# Patient Record
Sex: FEMALE | Race: WHITE | NOT HISPANIC OR LATINO | Employment: FULL TIME | URBAN - METROPOLITAN AREA
[De-identification: names, ages, dates, MRNs, and addresses within clinical notes are randomized per-mention and may not be internally consistent; named-entity substitution may affect disease eponyms.]

---

## 2017-01-06 ENCOUNTER — OFFICE VISIT (OUTPATIENT)
Dept: PEDIATRICS | Facility: CLINIC | Age: 62
End: 2017-01-06
Payer: COMMERCIAL

## 2017-01-06 VITALS
WEIGHT: 236 LBS | TEMPERATURE: 98.2 F | OXYGEN SATURATION: 98 % | HEART RATE: 74 BPM | SYSTOLIC BLOOD PRESSURE: 138 MMHG | DIASTOLIC BLOOD PRESSURE: 70 MMHG | BODY MASS INDEX: 36.95 KG/M2 | RESPIRATION RATE: 16 BRPM

## 2017-01-06 DIAGNOSIS — Z12.11 SCREENING FOR COLON CANCER: ICD-10-CM

## 2017-01-06 DIAGNOSIS — E89.0 POSTABLATIVE HYPOTHYROIDISM: ICD-10-CM

## 2017-01-06 DIAGNOSIS — R04.0 EPISTAXIS: ICD-10-CM

## 2017-01-06 DIAGNOSIS — D12.6 TUBULAR ADENOMA OF COLON: ICD-10-CM

## 2017-01-06 DIAGNOSIS — J45.30 MILD PERSISTENT ASTHMA WITHOUT COMPLICATION: Primary | ICD-10-CM

## 2017-01-06 DIAGNOSIS — Z80.0 FAMILY HISTORY OF COLON CANCER: ICD-10-CM

## 2017-01-06 LAB
T4 FREE SERPL-MCNC: 1.28 NG/DL (ref 0.76–1.46)
TSH SERPL DL<=0.05 MIU/L-ACNC: 0.43 MU/L (ref 0.4–4)

## 2017-01-06 PROCEDURE — 99214 OFFICE O/P EST MOD 30 MIN: CPT | Performed by: FAMILY MEDICINE

## 2017-01-06 PROCEDURE — 84439 ASSAY OF FREE THYROXINE: CPT | Performed by: FAMILY MEDICINE

## 2017-01-06 PROCEDURE — 84443 ASSAY THYROID STIM HORMONE: CPT | Performed by: FAMILY MEDICINE

## 2017-01-06 PROCEDURE — 36415 COLL VENOUS BLD VENIPUNCTURE: CPT | Performed by: FAMILY MEDICINE

## 2017-01-06 RX ORDER — LEVOTHYROXINE SODIUM 125 UG/1
TABLET ORAL
Qty: 45 TABLET | Refills: 3 | Status: SHIPPED | OUTPATIENT
Start: 2017-01-06 | End: 2017-08-15 | Stop reason: DRUGHIGH

## 2017-01-06 RX ORDER — ALBUTEROL SULFATE 90 UG/1
2 AEROSOL, METERED RESPIRATORY (INHALATION) EVERY 6 HOURS PRN
Qty: 1 INHALER | Refills: 3 | Status: SHIPPED | OUTPATIENT
Start: 2017-01-06 | End: 2017-08-15

## 2017-01-06 RX ORDER — MONTELUKAST SODIUM 10 MG/1
10 TABLET ORAL AT BEDTIME
Qty: 90 TABLET | Refills: 3 | Status: SHIPPED | OUTPATIENT
Start: 2017-01-06 | End: 2017-08-15

## 2017-01-06 RX ORDER — LEVOTHYROXINE SODIUM 112 UG/1
TABLET ORAL
Qty: 45 TABLET | Refills: 3 | Status: SHIPPED | OUTPATIENT
Start: 2017-01-06 | End: 2017-08-15

## 2017-01-06 ASSESSMENT — PAIN SCALES - GENERAL: PAINLEVEL: NO PAIN (0)

## 2017-01-06 NOTE — NURSING NOTE
"Chief Complaint   Patient presents with     Recheck Medication     Fasting for labs today-add orders if more labs needed       Initial /70 mmHg  Pulse 74  Temp(Src) 98.2  F (36.8  C) (Oral)  Resp 16  Wt 236 lb (107.049 kg)  SpO2 98% Estimated body mass index is 36.95 kg/(m^2) as calculated from the following:    Height as of 12/27/16: 5' 7\" (1.702 m).    Weight as of this encounter: 236 lb (107.049 kg).  BP completed using cuff size: gokul Davis, JEANNETTE      "

## 2017-01-06 NOTE — PROGRESS NOTES
SUBJECTIVE:                                                    Denita Flores is a 61 year old female who presents to clinic today for the following health issues:      Asthma Follow-Up    Was ACT completed today?    Yes    ACT Total Scores 1/6/2017   ACT TOTAL SCORE -   ASTHMA ER VISITS -   ASTHMA HOSPITALIZATIONS -   ACT TOTAL SCORE (Goal Greater than or Equal to 20) 24   In the past 12 months, how many times did you visit the emergency room for your asthma without being admitted to the hospital? 0   In the past 12 months, how many times were you hospitalized overnight because of your asthma? 0       Hypothyroidism Follow-up      Since last visit, patient describes the following symptoms: Weight stable, no hair loss, no skin changes, no constipation, no loose stools       Amount of exercise or physical activity: None    Problems taking medications regularly: No    Medication side effects: none    Diet: regular (no restrictions)    EPISTAXIS-Noticed intermittent bleeding from both nostrils, mild, for the past 1 week with no previous similar concerns, no other abnormal bleeding    Problem list and histories reviewed & adjusted, as indicated.  Additional history: as documented    Patient Active Problem List   Diagnosis     B12 deficiency anemia     Graves disease     Obesity     Diverticulosis     Colon polyps     History of cervical cancer     YELENA (obstructive sleep apnea)     Warts     CARDIOVASCULAR SCREENING; LDL GOAL LESS THAN 130     Hx of herpes zoster keratoconjunctivitis, os     Plantar warts     Stasis dermatitis of both legs     Obesity (BMI 30-39.9)     Postablative hypothyroidism     Other specified hypothyroidism,post ablative     Venous stasis dermatitis of left lower extremity     Vitamin B12 deficiency (non anemic)     Mild persistent asthma without complication     Spider veins of both lower extremities     Seasonal allergic rhinitis     Family history of colon cancer     Past Surgical History    Procedure Laterality Date     Back surgery       Leep tx, cervical       in her 20's       Social History   Substance Use Topics     Smoking status: Former Smoker     Types: Cigarettes     Quit date: 01/01/2003     Smokeless tobacco: Never Used     Alcohol Use: 0.0 oz/week     0 Standard drinks or equivalent per week     Family History   Problem Relation Age of Onset     HEART DISEASE Father      CANCER Father 67     kidney cancer      HEART DISEASE Sister      CANCER Sister 59     colon cancer      DIABETES Sister      Colon Cancer Maternal Aunt      Colon Cancer Paternal Aunt          Current Outpatient Prescriptions   Medication Sig Dispense Refill     albuterol (PROAIR HFA/PROVENTIL HFA/VENTOLIN HFA) 108 (90 BASE) MCG/ACT Inhaler Inhale 2 puffs into the lungs every 6 hours as needed for shortness of breath / dyspnea 1 Inhaler 3     montelukast (SINGULAIR) 10 MG tablet Take 1 tablet (10 mg) by mouth At Bedtime 90 tablet 3     levothyroxine (SYNTHROID/LEVOTHROID) 125 MCG tablet Take one tablet of 125 mcg alternating with 112 mcg every other day 45 tablet 3     levothyroxine (SYNTHROID/LEVOTHROID) 112 MCG tablet Take one tablet alternating with 125mcg 45 tablet 3     triamcinolone (KENALOG) 0.1 % cream For eczema on lower leg  use twice daily for 2 weeks, then 3 times weekly for 2 weeks, repeat cycle as needed. 45 g 0     MEGARED OMEGA-3 KRILL  MG CAPS Take 1 capsule by mouth daily        cyanocolbalamin (VITAMIN  B-12) 100 MCG tablet Take 100 mcg by mouth daily.       Cholecalciferol (D3 ADULT PO) Take 2,000 Units by mouth daily.       cefUROXime (CEFTIN) 250 MG tablet Take 1 tablet (250 mg) by mouth 2 times daily 20 tablet 0     [DISCONTINUED] albuterol (PROAIR HFA, PROVENTIL HFA, VENTOLIN HFA) 108 (90 BASE) MCG/ACT inhaler Inhale 2 puffs into the lungs every 6 hours as needed for shortness of breath / dyspnea 1 Inhaler 3     [DISCONTINUED] montelukast (SINGULAIR) 10 MG tablet Take 1 tablet (10 mg) by  mouth At Bedtime 90 tablet 3     [DISCONTINUED] levothyroxine (SYNTHROID, LEVOTHROID) 125 MCG tablet Take one tablet of 125 mcg alternating with 112 mcg every other day 45 tablet 3     [DISCONTINUED] levothyroxine (SYNTHROID, LEVOTHROID) 112 MCG tablet Take one tablet alternating with 125mcg 45 tablet 3     Allergies   Allergen Reactions     Sulfa Drugs Hives and Swelling     Noted in 10/18/09 ER     Recent Labs   Lab Test  01/06/17   1138  07/06/16   0902   06/22/15   1012   05/08/13   1140   04/16/10   0943   LDL   --   115*   --   96   --   112   < >   --    HDL   --   53   --   47*   --   50   < >   --    TRIG   --   107   --   106   --   125   < >   --    ALT   --    --    --    --    --   24   --   10   CR   --    --    --    --    --   0.72   --   0.72   GFRESTIMATED   --    --    --    --    --   83   --   84   GFRESTBLACK   --    --    --    --    --   >90   --   >90   POTASSIUM   --    --    --    --    --   4.2   --   3.6   TSH  0.43  0.40   < >  0.21*   < >  1.58   --    --     < > = values in this interval not displayed.      BP Readings from Last 3 Encounters:   01/06/17 138/70   12/27/16 129/75   10/20/16 115/69    Wt Readings from Last 3 Encounters:   01/06/17 236 lb (107.049 kg)   12/27/16 235 lb 12.8 oz (106.958 kg)   10/20/16 234 lb 1.6 oz (106.187 kg)                  Labs reviewed in EPIC  Problem list, Medication list, Allergies, and Medical/Social/Surgical histories reviewed in [facility] and updated as appropriate.    ROS:  C: NEGATIVE for fever, chills, change in weight  INTEGUMENTARY/SKIN: NEGATIVE for worrisome rashes, moles or lesions  ENT/MOUTH: as above  R: NEGATIVE for significant cough or SOB  RESP:NEGATIVE for significant cough or SOB and Hx asthma  CV: NEGATIVE for chest pain, palpitations or peripheral edema  GI: NEGATIVE for nausea, abdominal pain, heartburn, or change in bowel habits  MUSCULOSKELETAL: NEGATIVE for significant arthralgias or myalgia  NEURO: NEGATIVE for weakness,  "dizziness or paresthesias  ENDOCRINE: NEGATIVE for temperature intolerance, skin/hair changes and Hx thyroid disease  HEME/ALLERGY/IMMUNE: NEGATIVE for bleeding problems  PSYCHIATRIC: NEGATIVE for changes in mood or affect    OBJECTIVE:                                                    /70 mmHg  Pulse 74  Temp(Src) 98.2  F (36.8  C) (Oral)  Resp 16  Wt 236 lb (107.049 kg)  SpO2 98%  Body mass index is 36.95 kg/(m^2).  GENERAL: healthy, alert and no distress  HENT: nose and mouth without ulcers or lesions, oropharynx clear and oral mucous membranes moist  NECK: no adenopathy, no asymmetry, masses, or scars and thyroid normal to palpation  RESP: lungs clear to auscultation - no rales, rhonchi or wheezes  CV: regular rate and rhythm, normal S1 S2, no S3 or S4, no murmur, click or rub, no peripheral edema and peripheral pulses strong  MS: no gross musculoskeletal defects noted, no edema  SKIN: no suspicious lesions or rashes  NEURO: Normal strength and tone, mentation intact and speech normal  PSYCH: mentation appears normal, affect normal/bright    Diagnostic Test Results:  Results for orders placed or performed in visit on 01/06/17 (from the past 24 hour(s))   TSH   Result Value Ref Range    TSH 0.43 0.40 - 4.00 mU/L   T4 FREE   Result Value Ref Range    T4 Free 1.28 0.76 - 1.46 ng/dL        ASSESSMENT/PLAN:                                                    Asthma: Mild persistent--controlled   Plan:  No changes in the patient's current treatment plan    Hypothyroidism; controlled/euthyroid   Plan:  No changes in the patient's current treatment plan      BMI:   Estimated body mass index is 36.95 kg/(m^2) as calculated from the following:    Height as of 12/27/16: 5' 7\" (1.702 m).    Weight as of this encounter: 236 lb (107.049 kg).   Weight management plan: Discussed healthy diet and exercise guidelines and patient will follow up in 6 months in clinic to re-evaluate.      1. Mild persistent asthma without " complication  Stable, continue Singulair 10 mg daily, recheck in 6 months at the time of physical  - albuterol (PROAIR HFA/PROVENTIL HFA/VENTOLIN HFA) 108 (90 BASE) MCG/ACT Inhaler; Inhale 2 puffs into the lungs every 6 hours as needed for shortness of breath / dyspnea  Dispense: 1 Inhaler; Refill: 3  - montelukast (SINGULAIR) 10 MG tablet; Take 1 tablet (10 mg) by mouth At Bedtime  Dispense: 90 tablet; Refill: 3    2. Epistaxis  Reviewed etiology of nosebleeds, given education information on the same. Recommended to use saline nasal sprays 1-2 times a day, use petrolatum jelly topically inside the nostrils once daily for the next 1-2 weeks, use room humidifier, follow up if no better in 2 weeks or sooner if needed. Consider ENT referral  for persistent or worsening concerns  Patient verbalised understanding and is agreeable to the plan.      3. Postablative hypothyroidism  Results for orders placed or performed in visit on 01/06/17   TSH   Result Value Ref Range    TSH 0.43 0.40 - 4.00 mU/L   T4 FREE   Result Value Ref Range    T4 Free 1.28 0.76 - 1.46 ng/dL     Reviewed lab results-controlled thyroid labs, will continue with current dosing regimen which consists of the time of physical  - levothyroxine (SYNTHROID/LEVOTHROID) 125 MCG tablet; Take one tablet of 125 mcg alternating with 112 mcg every other day  Dispense: 45 tablet; Refill: 3  - levothyroxine (SYNTHROID/LEVOTHROID) 112 MCG tablet; Take one tablet alternating with 125mcg  Dispense: 45 tablet; Refill: 3    4. Screening for colon cancer    - GASTROENTEROLOGY ADULT REFERRAL +/- PROCEDURE    5. Tubular adenoma of colon    - GASTROENTEROLOGY ADULT REFERRAL +/- PROCEDURE    6. Family history of colon cancer  Sister, maternal and paternal aunts  Family history updated      Work on weight loss  Regular exercise  Chart documentation done in part with Dragon Voice recognition Software. Although reviewed after completion, some word and grammatical error may  remain.    See Patient Instructions    Gee Hitchcock MD  [facility]

## 2017-01-06 NOTE — PATIENT INSTRUCTIONS
Nosebleed (Adult)    Bleeding from the nose most commonly occurs due to injury or drying and cracking of the inner lining of the nose. Most nosebleeds are due to dry air or nose-picking. They can occur during a common cold or an allergy attack. They can also occur on a very hot day, or from dry air in the winter.  If the bleeding site is found, it may be cauterized (treated to cause a blood clot to form). This may be done with a chemical, heat, or electricity. If the bleeding continues after the site is cauterized, or if the site cannot be found, packing may be placed in your nose. This is to apply pressure and stop the bleeding. The packing may be made of gauze or sponge. A small balloon catheter is sometimes used. These must be removed by your doctor. Some types of packing dissolve on their own.  Home care    If packing was put in your nose, unless told otherwise, do not pull on it or try to remove it yourself. You will be given an appointment to have it removed. You may also have been given antibiotics to prevent a sinus infection. If so, finish all of the medicine.    Do not blow your nose for 12 hours after the bleeding stops. This will allow a strong blood clot to form. Do not pick your nose. This may restart bleeding.    Avoid drinking alcohol and hot liquids for the next two days. Alcohol or hot liquids in your mouth can dilate blood vessels in your nose. This can cause bleeding to start again.    Do not take ibuprofen, naproxen, or aspirin-containing medicines. These thin the blood and may promote nose bleeding. You may take acetaminophen for pain, unless another pain medicine was prescribed.    If the bleeding starts again, sit up and lean forward to prevent swallowing blood. Pinch your nose tightly on both sides, as depicted above, for 10 to 15 minutes.  Time yourself, and don t release the pressure on your nose until 10 minutes is up. If bleeding is not controlled, continue to pinch your nose and call  your health care provider or return to this facility.    If you have a cold, allergies, or dry nasal membranes, lubricate the nasal passages. Apply a small amount of petroleum jelly inside the nose with a cotton swab twice a day (morning and night).    Avoid overheating your home, which can dry the air and worsen your condition.    A humidifier in the room where you sleep will add moisture to the air.    Use a saline nasal spray to keep nasal passages moist.    Do not pick your nose. Keep fingernails trimmed to decrease risk of bleeds.  Follow-up care  Follow up with your health care provider, or as advised. Nasal packing should be rechecked or removed within 2 to 3 days.  When to seek medical advice  Call your health care provider right away if any of these occur.    Another nosebleed that you cannot control    Dizziness, weakness or fainting    You become tired or confused    Fever of 100.4 F (38 C) or higher, or as directed by your health care provider    Headache    Sinus or facial pain    Shortness of breath or trouble breathing    8552-3422 [employer]. [address] [place] [address]. All rights reserved. This information is not intended as a substitute for professional medical care. Always follow your healthcare professional's instructions.        Nosebleed  The skin inside your nose is fragile and filled with blood vessels. That's why even a slight injury to your nose sometimes may cause bleeding. Hard nose blowing, dry winter air, colds, and nose picking can also cause nosebleeds. Some medications, such as warfarin (Coumadin), aspirin, and other blood thinners, can predispose you to a nose bleed that is difficult to stop. Normally, nosebleeds are not a cause for concern. But in some cases, they can signal a more serious medical problem. Know when to seek medical care for a nosebleed.  When to go to the emergency room (ER)  Most nosebleeds aren t a medical emergency. In fact, you  often can treat them yourself. However, see your health care provider if you have frequent nosebleeds. And seek care right away if you:    Have a head injury    Have bleeding that lasts more than 15 to 30 minutes or is severe    Feel weak or faint    Have trouble breathing  What to expect in the ER    You will be examined and may have blood tests.    You may be given medicated nose drops to stop the nosebleed.    Gauze may be packed into your nose to put pressure on the vessel and help stop bleeding.    The bleeding vessel may be cauterized.    In rare cases, you may need surgery to control the bleeding.    During this procedure, the vessel is burned with an electrical device or chemical. Your nose is first numbed so you won t feel any pain.  To help stop a nosebleed:    Sit or stand up and lean your head forward (not back)    Gently pinch the soft part of your nose for 10 minutes constantly, without rechecking. Constant pressure is helpful. If your nose still bleeds, try pinching for 10 minutes more.     5285-5927 [employer]. [address] [place] [address], [place] [address]. All rights reserved. This information is not intended as a substitute for professional medical care. Always follow your healthcare professional's instructions.      Schedule for colonoscopy  Schedule for fasting labs and physical in 6 months

## 2017-01-06 NOTE — MR AVS SNAPSHOT
After Visit Summary   1/6/2017    Denita Flores    MRN: [id number]           Patient Information     Date Of Birth          1955        Visit Information        Provider Department      1/6/2017 12:00 PM Gee Hitchcock MD [facility]        Today's Diagnoses     Mild persistent asthma without complication    -  1     Epistaxis         Postablative hypothyroidism         Screening for colon cancer         Tubular adenoma of colon         Family history of colon cancer           Care Instructions      Nosebleed (Adult)    Bleeding from the nose most commonly occurs due to injury or drying and cracking of the inner lining of the nose. Most nosebleeds are due to dry air or nose-picking. They can occur during a common cold or an allergy attack. They can also occur on a very hot day, or from dry air in the winter.  If the bleeding site is found, it may be cauterized (treated to cause a blood clot to form). This may be done with a chemical, heat, or electricity. If the bleeding continues after the site is cauterized, or if the site cannot be found, packing may be placed in your nose. This is to apply pressure and stop the bleeding. The packing may be made of gauze or sponge. A small balloon catheter is sometimes used. These must be removed by your doctor. Some types of packing dissolve on their own.  Home care    If packing was put in your nose, unless told otherwise, do not pull on it or try to remove it yourself. You will be given an appointment to have it removed. You may also have been given antibiotics to prevent a sinus infection. If so, finish all of the medicine.    Do not blow your nose for 12 hours after the bleeding stops. This will allow a strong blood clot to form. Do not pick your nose. This may restart bleeding.    Avoid drinking alcohol and hot liquids for the next two days. Alcohol or hot liquids in your mouth can dilate blood vessels in your nose. This can cause  bleeding to start again.    Do not take ibuprofen, naproxen, or aspirin-containing medicines. These thin the blood and may promote nose bleeding. You may take acetaminophen for pain, unless another pain medicine was prescribed.    If the bleeding starts again, sit up and lean forward to prevent swallowing blood. Pinch your nose tightly on both sides, as depicted above, for 10 to 15 minutes.  Time yourself, and don t release the pressure on your nose until 10 minutes is up. If bleeding is not controlled, continue to pinch your nose and call your health care provider or return to this facility.    If you have a cold, allergies, or dry nasal membranes, lubricate the nasal passages. Apply a small amount of petroleum jelly inside the nose with a cotton swab twice a day (morning and night).    Avoid overheating your home, which can dry the air and worsen your condition.    A humidifier in the room where you sleep will add moisture to the air.    Use a saline nasal spray to keep nasal passages moist.    Do not pick your nose. Keep fingernails trimmed to decrease risk of bleeds.  Follow-up care  Follow up with your health care provider, or as advised. Nasal packing should be rechecked or removed within 2 to 3 days.  When to seek medical advice  Call your health care provider right away if any of these occur.    Another nosebleed that you cannot control    Dizziness, weakness or fainting    You become tired or confused    Fever of 100.4 F (38 C) or higher, or as directed by your health care provider    Headache    Sinus or facial pain    Shortness of breath or trouble breathing    2207-2871 [employer]. [address] [place] [address], [place] [address]. All rights reserved. This information is not intended as a substitute for professional medical care. Always follow your healthcare professional's instructions.        Nosebleed  The skin inside your nose is fragile and filled with blood vessels. That's why even a slight  injury to your nose sometimes may cause bleeding. Hard nose blowing, dry winter air, colds, and nose picking can also cause nosebleeds. Some medications, such as warfarin (Coumadin), aspirin, and other blood thinners, can predispose you to a nose bleed that is difficult to stop. Normally, nosebleeds are not a cause for concern. But in some cases, they can signal a more serious medical problem. Know when to seek medical care for a nosebleed.  When to go to the emergency room (ER)  Most nosebleeds aren t a medical emergency. In fact, you often can treat them yourself. However, see your health care provider if you have frequent nosebleeds. And seek care right away if you:    Have a head injury    Have bleeding that lasts more than 15 to 30 minutes or is severe    Feel weak or faint    Have trouble breathing  What to expect in the ER    You will be examined and may have blood tests.    You may be given medicated nose drops to stop the nosebleed.    Gauze may be packed into your nose to put pressure on the vessel and help stop bleeding.    The bleeding vessel may be cauterized.    In rare cases, you may need surgery to control the bleeding.    During this procedure, the vessel is burned with an electrical device or chemical. Your nose is first numbed so you won t feel any pain.  To help stop a nosebleed:    Sit or stand up and lean your head forward (not back)    Gently pinch the soft part of your nose for 10 minutes constantly, without rechecking. Constant pressure is helpful. If your nose still bleeds, try pinching for 10 minutes more.     4315-8895 [employer]. [address] [place] [address], [place] [address]. All rights reserved. This information is not intended as a substitute for professional medical care. Always follow your healthcare professional's instructions.      Schedule for colonoscopy  Schedule for fasting labs and physical in 6 months        Follow-ups after your visit        Additional Services      GASTROENTEROLOGY ADULT REFERRAL +/- PROCEDURE       Your provider has referred you to Gastroenterology Services.    English    Procedure/Referral: PROCEDURE ONLY - COLONOSCOPY - Reason for procedure: Screening    -[facility]  Please be aware that coverage of these services is subject to the terms and limitations of your health insurance plan.  Call member services at your health plan with any benefit or coverage questions.  Any procedures must be performed at a [place] facility OR coordinated by your clinic's referral office.    Please bring the following with you to your appointment:    (1) Any X-Rays, CTs or MRIs which have been performed.  Contact the facility where they were done to arrange for  prior to your scheduled appointment.    (2) List of current medications   (3) This referral request   (4) Any documents/labs given to you for this referral                  Who to contact     If you have questions or need follow up information about today's clinic visit or your schedule please contact [facility] directly at [phone].  Normal or non-critical lab and imaging results will be communicated to you by [email or web address], letter or phone within 4 business days after the clinic has received the results. If you do not hear from us within 7 days, please contact the clinic through [email or web address] or phone. If you have a critical or abnormal lab result, we will notify you by phone as soon as possible.  Submit refill requests through [employer] or call your pharmacy and they will forward the refill request to us. Please allow 3 business days for your refill to be completed.          Additional Information About Your Visit        [employer] Information     [employer] is an electronic gateway that provides easy, online access to your medical records. With [employer], you can request a clinic appointment, read your test results, renew a prescription or communicate with your care team.     To sign up for [employer] visit the  website at [email or web address]   You will be asked to enter the access code listed below, as well as some personal information. Please follow the directions to create your username and password.     Your access code is: [email or web address]  Expires: 2017 10:17 AM     Your access code will  in 90 days. If you need help or a new code, please contact your [facility] or call [phone] for assistance.        Care EveryWhere ID     This is your Care EveryWhere ID. This could be used by other organizations to access your [place] medical records  [id number]        Your Vitals Were     Pulse Temperature Respirations Pulse Oximetry          74 98.2  F (36.8  C) (Oral) 16 98%         Blood Pressure from Last 3 Encounters:   17 138/70   16 129/75   10/20/16 115/69    Weight from Last 3 Encounters:   17 236 lb (107.049 kg)   16 235 lb 12.8 oz (106.958 kg)   10/20/16 234 lb 1.6 oz (106.187 kg)              We Performed the Following     GASTROENTEROLOGY ADULT REFERRAL +/- PROCEDURE          Where to get your medicines      These medications were sent to [id number] - [facility] [address] AT [facility] & [address] [facility] [address]     Phone:  [phone]    - albuterol 108 (90 BASE) MCG/ACT Inhaler  - levothyroxine 112 MCG tablet  - levothyroxine 125 MCG tablet  - montelukast 10 MG tablet       Primary Care Provider Office Phone # Fax #    Gee Hitchcock [phone]       [facility] [address]  [facility] [address]        Thank you!     Thank you for choosing [facility]  for your care. Our goal is always to provide you with excellent care. Hearing back from our patients is one way we can continue to improve our services. Please take a few minutes to complete the written survey that you may receive in the mail after your visit with us. Thank  you!             Your Updated Medication List - Protect others around you: Learn how to safely use, store and throw away your medicines at www.disposemymeds.org.          This list is accurate as of: 1/6/17 12:32 PM.  Always use your most recent med list.                   Brand Name Dispense Instructions for use    albuterol 108 (90 BASE) MCG/ACT Inhaler    PROAIR HFA/PROVENTIL HFA/VENTOLIN HFA    1 Inhaler    Inhale 2 puffs into the lungs every 6 hours as needed for shortness of breath / dyspnea       cefUROXime 250 MG tablet    CEFTIN    20 tablet    Take 1 tablet (250 mg) by mouth 2 times daily       cyanocolbalamin 100 MCG tablet    vitamin  B-12     Take 100 mcg by mouth daily.       D3 ADULT PO      Take 2,000 Units by mouth daily.       * levothyroxine 125 MCG tablet    SYNTHROID/LEVOTHROID    45 tablet    Take one tablet of 125 mcg alternating with 112 mcg every other day       * levothyroxine 112 MCG tablet    SYNTHROID/LEVOTHROID    45 tablet    Take one tablet alternating with 125mcg       MEGARED OMEGA-3 KRILL  MG Caps      Take 1 capsule by mouth daily       montelukast 10 MG tablet    SINGULAIR    90 tablet    Take 1 tablet (10 mg) by mouth At Bedtime       triamcinolone 0.1 % cream    KENALOG    45 g    For eczema on lower leg  use twice daily for 2 weeks, then 3 times weekly for 2 weeks, repeat cycle as needed.       * Notice:  This list has 2 medication(s) that are the same as other medications prescribed for you. Read the directions carefully, and ask your doctor or other care provider to review them with you.

## 2017-01-07 ASSESSMENT — ASTHMA QUESTIONNAIRES: ACT_TOTALSCORE: 24

## 2017-02-21 ENCOUNTER — SURGERY (OUTPATIENT)
Age: 62
End: 2017-02-21

## 2017-02-21 ENCOUNTER — HOSPITAL ENCOUNTER (OUTPATIENT)
Facility: AMBULATORY SURGERY CENTER | Age: 62
Discharge: HOME | End: 2017-02-21
Attending: INTERNAL MEDICINE | Admitting: INTERNAL MEDICINE
Payer: COMMERCIAL

## 2017-02-21 VITALS
OXYGEN SATURATION: 96 % | RESPIRATION RATE: 16 BRPM | TEMPERATURE: 98.9 F | DIASTOLIC BLOOD PRESSURE: 62 MMHG | SYSTOLIC BLOOD PRESSURE: 144 MMHG

## 2017-02-21 LAB — COLONOSCOPY: NORMAL

## 2017-02-21 PROCEDURE — 45378 DIAGNOSTIC COLONOSCOPY: CPT

## 2017-02-21 PROCEDURE — G8907 PT DOC NO EVENTS ON DISCHARG: HCPCS

## 2017-02-21 PROCEDURE — G8918 PT W/O PREOP ORDER IV AB PRO: HCPCS

## 2017-02-21 RX ORDER — LIDOCAINE 40 MG/G
CREAM TOPICAL
Status: DISCONTINUED | OUTPATIENT
Start: 2017-02-21 | End: 2017-02-22 | Stop reason: HOSPADM

## 2017-02-21 RX ORDER — ONDANSETRON 2 MG/ML
4 INJECTION INTRAMUSCULAR; INTRAVENOUS
Status: DISCONTINUED | OUTPATIENT
Start: 2017-02-21 | End: 2017-02-22 | Stop reason: HOSPADM

## 2017-02-21 RX ORDER — FENTANYL CITRATE 50 UG/ML
INJECTION, SOLUTION INTRAMUSCULAR; INTRAVENOUS PRN
Status: DISCONTINUED | OUTPATIENT
Start: 2017-02-21 | End: 2017-02-21 | Stop reason: HOSPADM

## 2017-02-21 RX ADMIN — FENTANYL CITRATE 100 MCG: 50 INJECTION, SOLUTION INTRAMUSCULAR; INTRAVENOUS at 09:36

## 2017-08-02 ENCOUNTER — TELEPHONE (OUTPATIENT)
Dept: PEDIATRICS | Facility: CLINIC | Age: 62
End: 2017-08-02

## 2017-08-02 DIAGNOSIS — Z13.6 CARDIOVASCULAR SCREENING; LDL GOAL LESS THAN 130: ICD-10-CM

## 2017-08-02 DIAGNOSIS — Z00.00 ROUTINE GENERAL MEDICAL EXAMINATION AT A HEALTH CARE FACILITY: Primary | ICD-10-CM

## 2017-08-02 DIAGNOSIS — E89.0 POSTABLATIVE HYPOTHYROIDISM: ICD-10-CM

## 2017-08-02 DIAGNOSIS — Z13.1 SCREENING FOR DIABETES MELLITUS (DM): ICD-10-CM

## 2017-08-02 DIAGNOSIS — Z13.0 SCREENING FOR DEFICIENCY ANEMIA: ICD-10-CM

## 2017-08-02 NOTE — TELEPHONE ENCOUNTER
Patient advised she is due now for a physical and recheck labs.  Last physical was on 07/06/17.  Patient scheduled an appt on 08/29/17 because that is the only day she can do a morning appt due to her schedule.  Which would give 4 physicals that day so I will call patient and reschedule but before I call her back would you do fasting labs on Denita?  If not she would be able to reschedule to an afternoon appt. Which is what she prefers.    Juli Villalba [facility]  Message routed to Dr. Hitchcock

## 2017-08-02 NOTE — TELEPHONE ENCOUNTER
[facility] Call Center    Phone Message    Name of Caller: Denita    Phone Number: Cell number on file:    Telephone Information:   Mobile [phone]       Best time to return call: Any    May a detailed message be left on voicemail: yes    Relation to patient: Self    Reason for Call: Other: Patient would like clarification on when she needs to come in for labs and how often she needs to have her labs drawn. She states it is for thyroid. Please advise.      Action Taken: Other: [id number] [phone]

## 2017-08-03 NOTE — TELEPHONE ENCOUNTER
Appointment changed to 08/15/17 with Dr. Hitchcock.  Patient advised.  Patient advised fasting labs    Juli Villalba CMA

## 2017-08-03 NOTE — TELEPHONE ENCOUNTER
Left message with patient's sister to have Denita call me back to let her know she does need to be fasting for her lab appointment and we need to reschedule her physical appt on 08/28/17 to a different day because Dr. Hitchcock has too many physicals scheduled that morning.    Juli Villalba CMA

## 2017-08-15 ENCOUNTER — OFFICE VISIT (OUTPATIENT)
Dept: PEDIATRICS | Facility: CLINIC | Age: 62
End: 2017-08-15
Payer: COMMERCIAL

## 2017-08-15 VITALS
SYSTOLIC BLOOD PRESSURE: 116 MMHG | OXYGEN SATURATION: 97 % | HEIGHT: 67 IN | BODY MASS INDEX: 37.73 KG/M2 | WEIGHT: 240.4 LBS | TEMPERATURE: 98.1 F | DIASTOLIC BLOOD PRESSURE: 60 MMHG | HEART RATE: 80 BPM

## 2017-08-15 DIAGNOSIS — L30.8 ECZEMA CRAQUELE: ICD-10-CM

## 2017-08-15 DIAGNOSIS — D12.6 TUBULAR ADENOMA OF COLON: ICD-10-CM

## 2017-08-15 DIAGNOSIS — Z00.01 ENCOUNTER FOR GENERAL ADULT MEDICAL EXAMINATION WITH ABNORMAL FINDINGS: Primary | ICD-10-CM

## 2017-08-15 DIAGNOSIS — I83.93 SPIDER VEINS OF BOTH LOWER EXTREMITIES: ICD-10-CM

## 2017-08-15 DIAGNOSIS — Z13.6 CARDIOVASCULAR SCREENING; LDL GOAL LESS THAN 130: ICD-10-CM

## 2017-08-15 DIAGNOSIS — I87.2 STASIS DERMATITIS OF BOTH LEGS: ICD-10-CM

## 2017-08-15 DIAGNOSIS — Z13.0 SCREENING FOR DEFICIENCY ANEMIA: ICD-10-CM

## 2017-08-15 DIAGNOSIS — Z80.0 FAMILY HISTORY OF COLON CANCER: ICD-10-CM

## 2017-08-15 DIAGNOSIS — R04.0 EPISTAXIS: ICD-10-CM

## 2017-08-15 DIAGNOSIS — Z00.00 ROUTINE GENERAL MEDICAL EXAMINATION AT A HEALTH CARE FACILITY: ICD-10-CM

## 2017-08-15 DIAGNOSIS — B07.0 PLANTAR WARTS: ICD-10-CM

## 2017-08-15 DIAGNOSIS — E89.0 POSTABLATIVE HYPOTHYROIDISM: ICD-10-CM

## 2017-08-15 DIAGNOSIS — E53.8 VITAMIN B12 DEFICIENCY (NON ANEMIC): ICD-10-CM

## 2017-08-15 DIAGNOSIS — G47.33 OSA (OBSTRUCTIVE SLEEP APNEA): ICD-10-CM

## 2017-08-15 DIAGNOSIS — Z13.1 SCREENING FOR DIABETES MELLITUS (DM): ICD-10-CM

## 2017-08-15 DIAGNOSIS — J45.30 MILD PERSISTENT ASTHMA WITHOUT COMPLICATION: ICD-10-CM

## 2017-08-15 LAB
ALBUMIN SERPL-MCNC: 3.7 G/DL (ref 3.4–5)
ALP SERPL-CCNC: 84 U/L (ref 40–150)
ALT SERPL W P-5'-P-CCNC: 20 U/L (ref 0–50)
ANION GAP SERPL CALCULATED.3IONS-SCNC: 7 MMOL/L (ref 3–14)
AST SERPL W P-5'-P-CCNC: 9 U/L (ref 0–45)
BASOPHILS # BLD AUTO: 0 10E9/L (ref 0–0.2)
BASOPHILS NFR BLD AUTO: 0.6 %
BILIRUB SERPL-MCNC: 0.5 MG/DL (ref 0.2–1.3)
BUN SERPL-MCNC: 11 MG/DL (ref 7–30)
CALCIUM SERPL-MCNC: 8.7 MG/DL (ref 8.5–10.1)
CHLORIDE SERPL-SCNC: 105 MMOL/L (ref 94–109)
CHOLEST SERPL-MCNC: 178 MG/DL
CO2 SERPL-SCNC: 30 MMOL/L (ref 20–32)
CREAT SERPL-MCNC: 0.76 MG/DL (ref 0.52–1.04)
DIFFERENTIAL METHOD BLD: NORMAL
EOSINOPHIL # BLD AUTO: 0.2 10E9/L (ref 0–0.7)
EOSINOPHIL NFR BLD AUTO: 2.6 %
ERYTHROCYTE [DISTWIDTH] IN BLOOD BY AUTOMATED COUNT: 13.4 % (ref 10–15)
GFR SERPL CREATININE-BSD FRML MDRD: 77 ML/MIN/1.7M2
GLUCOSE SERPL-MCNC: 88 MG/DL (ref 70–99)
HCT VFR BLD AUTO: 41.6 % (ref 35–47)
HDLC SERPL-MCNC: 52 MG/DL
HGB BLD-MCNC: 13.8 G/DL (ref 11.7–15.7)
LDLC SERPL CALC-MCNC: 101 MG/DL
LYMPHOCYTES # BLD AUTO: 1.8 10E9/L (ref 0.8–5.3)
LYMPHOCYTES NFR BLD AUTO: 25.6 %
MCH RBC QN AUTO: 30.4 PG (ref 26.5–33)
MCHC RBC AUTO-ENTMCNC: 33.2 G/DL (ref 31.5–36.5)
MCV RBC AUTO: 92 FL (ref 78–100)
MONOCYTES # BLD AUTO: 0.6 10E9/L (ref 0–1.3)
MONOCYTES NFR BLD AUTO: 8.3 %
NEUTROPHILS # BLD AUTO: 4.3 10E9/L (ref 1.6–8.3)
NEUTROPHILS NFR BLD AUTO: 62.9 %
NONHDLC SERPL-MCNC: 126 MG/DL
PLATELET # BLD AUTO: 229 10E9/L (ref 150–450)
POTASSIUM SERPL-SCNC: 4 MMOL/L (ref 3.4–5.3)
PROT SERPL-MCNC: 6.9 G/DL (ref 6.8–8.8)
RBC # BLD AUTO: 4.54 10E12/L (ref 3.8–5.2)
SODIUM SERPL-SCNC: 142 MMOL/L (ref 133–144)
T4 FREE SERPL-MCNC: 1.53 NG/DL (ref 0.76–1.46)
TRIGL SERPL-MCNC: 124 MG/DL
TSH SERPL DL<=0.005 MIU/L-ACNC: 0.36 MU/L (ref 0.4–4)
WBC # BLD AUTO: 6.9 10E9/L (ref 4–11)

## 2017-08-15 PROCEDURE — 36415 COLL VENOUS BLD VENIPUNCTURE: CPT | Performed by: FAMILY MEDICINE

## 2017-08-15 PROCEDURE — 80061 LIPID PANEL: CPT | Performed by: FAMILY MEDICINE

## 2017-08-15 PROCEDURE — 80050 GENERAL HEALTH PANEL: CPT | Performed by: FAMILY MEDICINE

## 2017-08-15 PROCEDURE — 84439 ASSAY OF FREE THYROXINE: CPT | Performed by: FAMILY MEDICINE

## 2017-08-15 PROCEDURE — 99396 PREV VISIT EST AGE 40-64: CPT | Performed by: FAMILY MEDICINE

## 2017-08-15 RX ORDER — ALBUTEROL SULFATE 90 UG/1
2 AEROSOL, METERED RESPIRATORY (INHALATION) EVERY 6 HOURS PRN
Qty: 1 INHALER | Refills: 3 | Status: SHIPPED | OUTPATIENT
Start: 2017-08-15 | End: 2018-12-31

## 2017-08-15 RX ORDER — MONTELUKAST SODIUM 10 MG/1
10 TABLET ORAL AT BEDTIME
Qty: 90 TABLET | Refills: 3 | Status: SHIPPED | OUTPATIENT
Start: 2017-08-15 | End: 2018-02-20

## 2017-08-15 RX ORDER — LEVOTHYROXINE SODIUM 112 UG/1
112 TABLET ORAL DAILY
Qty: 90 TABLET | Refills: 0 | Status: SHIPPED | OUTPATIENT
Start: 2017-08-15 | End: 2017-12-18

## 2017-08-15 RX ORDER — TRIAMCINOLONE ACETONIDE 1 MG/G
CREAM TOPICAL
Qty: 45 G | Refills: 0 | Status: SHIPPED | OUTPATIENT
Start: 2017-08-15 | End: 2017-12-28

## 2017-08-15 ASSESSMENT — PAIN SCALES - GENERAL: PAINLEVEL: NO PAIN (0)

## 2017-08-15 NOTE — NURSING NOTE
"Chief Complaint   Patient presents with     Physical     Lesion     Patient c/o possible corn or wart on bottom of right foot     Eczema       Initial /60  Pulse 80  Temp 98.1  F (36.7  C) (Oral)  Ht 5' 7\" (1.702 m)  Wt 240 lb 6.4 oz (109 kg)  SpO2 97%  BMI 37.65 kg/m2 Estimated body mass index is 37.65 kg/(m^2) as calculated from the following:    Height as of this encounter: 5' 7\" (1.702 m).    Weight as of this encounter: 240 lb 6.4 oz (109 kg).  BP completed using cuff size: gokul Davis CMA    "

## 2017-08-15 NOTE — PATIENT INSTRUCTIONS
Decrease the dose of LEVOTHYROXINE to 112 mcg daily  Try over the counter wart treatments for right foot    Schedule for thyroid lab check in 6- 8 weeks  Schedule for recheck in 6 months      Preventive Health Recommendations  Female Ages 50 - 64    Yearly exam: See your health care provider every year in order to  o Review health changes.   o Discuss preventive care.    o Review your medicines if your doctor has prescribed any.      Get a Pap test every three years (unless you have an abnormal result and your provider advises testing more often).    If you get Pap tests with HPV test, you only need to test every 5 years, unless you have an abnormal result.     You do not need a Pap test if your uterus was removed (hysterectomy) and you have not had cancer.    You should be tested each year for STDs (sexually transmitted diseases) if you're at risk.     Have a mammogram every 1 to 2 years.    Have a colonoscopy at age 50, or have a yearly FIT test (stool test). These exams screen for colon cancer.      Have a cholesterol test every 5 years, or more often if advised.    Have a diabetes test (fasting glucose) every three years. If you are at risk for diabetes, you should have this test more often.     If you are at risk for osteoporosis (brittle bone disease), think about having a bone density scan (DEXA).    Shots: Get a flu shot each year. Get a tetanus shot every 10 years.    Nutrition:     Eat at least 5 servings of fruits and vegetables each day.    Eat whole-grain bread, whole-wheat pasta and brown rice instead of white grains and rice.    Talk to your provider about Calcium and Vitamin D.     Lifestyle    Exercise at least 150 minutes a week (30 minutes a day, 5 days a week). This will help you control your weight and prevent disease.    Limit alcohol to one drink per day.    No smoking.     Wear sunscreen to prevent skin cancer.     See your dentist every six months for an exam and cleaning.    See your eye  doctor every 1 to 2 years.

## 2017-08-15 NOTE — PROGRESS NOTES
SUBJECTIVE:   CC: Denita Flores is an 62 year old woman who presents for preventive health visit.     Healthy Habits:    Do you get at least three servings of calcium containing foods daily (dairy, green leafy vegetables, etc.)? no, taking calcium and/or vitamin D supplement: no    Amount of exercise or daily activities, outside of work: 1 day(s) per week    Problems taking medications regularly No    Medication side effects: No    Have you had an eye exam in the past two years? no    Do you see a dentist twice per year? no    Do you have sleep apnea, excessive snoring or daytime drowsiness?no          Asthma Follow-Up    Was ACT completed today?    Yes    ACT Total Scores 8/15/2017   ACT TOTAL SCORE -   ASTHMA ER VISITS -   ASTHMA HOSPITALIZATIONS -   ACT TOTAL SCORE (Goal Greater than or Equal to 20) 23   In the past 12 months, how many times did you visit the emergency room for your asthma without being admitted to the hospital? 0   In the past 12 months, how many times were you hospitalized overnight because of your asthma? 0       Recent asthma triggers that patient is dealing with: None      Hypothyroidism Follow-up      Since last visit, patient describes the following symptoms: Weight stable, no hair loss, no skin changes, no constipation, no loose stools          Today's PHQ-2 Score:   PHQ-2 ( 1999 Pfizer) 8/15/2017 12/27/2016   Q1: Little interest or pleasure in doing things 0 0   Q2: Feeling down, depressed or hopeless 0 0   PHQ-2 Score 0 0         Abuse: Current or Past(Physical, Sexual or Emotional)- No  Do you feel safe in your environment - Yes  Social History   Substance Use Topics     Smoking status: Former Smoker     Types: Cigarettes     Quit date: 1/1/2003     Smokeless tobacco: Never Used     Alcohol use 0.0 oz/week     0 Standard drinks or equivalent per week     The patient does not drink >3 drinks per day nor >7 drinks per week.    Reviewed orders with patient.  Reviewed health  maintenance and updated orders accordingly - Yes  Labs reviewed in EPIC  BP Readings from Last 3 Encounters:   08/15/17 116/60   02/21/17 144/62   01/06/17 138/70    Wt Readings from Last 3 Encounters:   08/15/17 240 lb 6.4 oz (109 kg)   01/06/17 236 lb (107 kg)   12/27/16 235 lb 12.8 oz (107 kg)                  Patient Active Problem List   Diagnosis     Graves disease     Obesity     Diverticulosis of large intestine without hemorrhage     Colon polyps     History of cervical cancer     YELENA (obstructive sleep apnea)     Warts     CARDIOVASCULAR SCREENING; LDL GOAL LESS THAN 130     Hx of herpes zoster keratoconjunctivitis, os     Plantar warts     Stasis dermatitis of both legs     Obesity (BMI 30-39.9)     Postablative hypothyroidism     Other specified hypothyroidism,post ablative     Venous stasis dermatitis of left lower extremity     Vitamin B12 deficiency (non anemic)     Mild persistent asthma without complication     Spider veins of both lower extremities     Seasonal allergic rhinitis     Family history of colon cancer     Epistaxis     Tubular adenoma of colon     Past Surgical History:   Procedure Laterality Date     BACK SURGERY       COLONOSCOPY WITH CO2 INSUFFLATION N/A 2/21/2017    Procedure: COLONOSCOPY WITH CO2 INSUFFLATION;  Surgeon: Duane, William Charles, MD;  Location: [facility]     LEEP TX, CERVICAL      in her 20's       Social History   Substance Use Topics     Smoking status: Former Smoker     Types: Cigarettes     Quit date: 1/1/2003     Smokeless tobacco: Never Used     Alcohol use 0.0 oz/week     0 Standard drinks or equivalent per week     Family History   Problem Relation Age of Onset     HEART DISEASE Father      CANCER Father 67     kidney cancer      HEART DISEASE Sister      CANCER Sister 59     colon cancer      DIABETES Sister      Colon Cancer Maternal Aunt      Colon Cancer Paternal Aunt          Current Outpatient Prescriptions   Medication Sig Dispense Refill     albuterol  (PROAIR HFA/PROVENTIL HFA/VENTOLIN HFA) 108 (90 BASE) MCG/ACT Inhaler Inhale 2 puffs into the lungs every 6 hours as needed for shortness of breath / dyspnea 1 Inhaler 3     levothyroxine (SYNTHROID/LEVOTHROID) 112 MCG tablet Take 1 tablet (112 mcg) by mouth daily 90 tablet 0     montelukast (SINGULAIR) 10 MG tablet Take 1 tablet (10 mg) by mouth At Bedtime 90 tablet 3     triamcinolone (KENALOG) 0.1 % cream For eczema on lower leg  use twice daily for 2 weeks, then 3 times weekly for 2 weeks, repeat cycle as needed. 45 g 0     cyanocolbalamin (VITAMIN  B-12) 100 MCG tablet Take 100 mcg by mouth daily.       Cholecalciferol (D3 ADULT PO) Take 2,000 Units by mouth daily.       [DISCONTINUED] albuterol (PROAIR HFA/PROVENTIL HFA/VENTOLIN HFA) 108 (90 BASE) MCG/ACT Inhaler Inhale 2 puffs into the lungs every 6 hours as needed for shortness of breath / dyspnea 1 Inhaler 3     [DISCONTINUED] levothyroxine (SYNTHROID/LEVOTHROID) 125 MCG tablet Take one tablet of 125 mcg alternating with 112 mcg every other day 45 tablet 3     [DISCONTINUED] levothyroxine (SYNTHROID/LEVOTHROID) 112 MCG tablet Take one tablet alternating with 125mcg 45 tablet 3     MEGARED OMEGA-3 KRILL  MG CAPS Take 1 capsule by mouth daily        Allergies   Allergen Reactions     Sulfa Drugs Hives and Swelling     Noted in 10/18/09 ER     Recent Labs   Lab Test  08/15/17   1028  01/06/17   1138  07/06/16   0902   06/22/15   1012   05/08/13   1140   04/16/10   0943   LDL  101*   --   115*   --   96   --   112   < >   --    HDL  52   --   53   --   47*   --   50   < >   --    TRIG  124   --   107   --   106   --   125   < >   --    ALT  20   --    --    --    --    --   24   --   10   CR  0.76   --    --    --    --    --   0.72   --   0.72   GFRESTIMATED  77   --    --    --    --    --   83   --   84   GFRESTBLACK  >90   --    --    --    --    --   >90   --   >90   POTASSIUM  4.0   --    --    --    --    --   4.2   --   3.6   TSH  0.36*  0.43   0.40   < >  0.21*   < >  1.58   --    --     < > = values in this interval not displayed.              Patient over age 50, mutual decision to screen reflected in health maintenance.      Pertinent mammograms are reviewed under the imaging tab.  History of abnormal Pap smear: YES - updated in Problem List and Health Maintenance accordingly    Reviewed and updated as needed this visit by clinical staffTobacco  Allergies  Meds  Med Hx  Surg Hx  Fam Hx  Soc Hx        Reviewed and updated as needed this visit by Provider          Past Medical History:   Diagnosis Date     Malignant neoplasm (H)      Mild persistent asthma 2013     Thyroid disease       Past Surgical History:   Procedure Laterality Date     BACK SURGERY       COLONOSCOPY WITH CO2 INSUFFLATION N/A 2017    Procedure: COLONOSCOPY WITH CO2 INSUFFLATION;  Surgeon: Duane, William Charles, MD;  Location:  OR     LEEP TX, CERVICAL      in her 20's     Obstetric History       T0      L0     SAB0   TAB0   Ectopic0   Multiple0   Live Births0       # Outcome Date GA Lbr Abhi/2nd Weight Sex Delivery Anes PTL Lv   2             1                      ROS:  C: NEGATIVE for fever, chills, change in weight  INTEGUMENTARY/SKIN: Right plantar wart  E: NEGATIVE for vision changes or irritation  ENT: NEGATIVE for ear, mouth and throat problems  R: NEGATIVE for significant cough or SOB  RESP:Hx asthma  B: NEGATIVE for masses, tenderness or discharge  CV: NEGATIVE for chest pain, palpitations or peripheral edema  GI: NEGATIVE for nausea, abdominal pain, heartburn, or change in bowel habits  : NEGATIVE for unusual urinary or vaginal symptoms. No vaginal bleeding.  M: NEGATIVE for significant arthralgias or myalgia  N: NEGATIVE for weakness, dizziness or paresthesias  E: NEGATIVE for temperature intolerance, skin/hair changes  ENDOCRINE: Hx thyroid disease  H: NEGATIVE for bleeding problems  P: NEGATIVE for changes in mood or affect  "    OBJECTIVE:   /60  Pulse 80  Temp 98.1  F (36.7  C) (Oral)  Ht 5' 7\" (1.702 m)  Wt 240 lb 6.4 oz (109 kg)  SpO2 97%  BMI 37.65 kg/m2  EXAM:  GENERAL APPEARANCE: healthy, alert and no distress  EYES: Eyes grossly normal to inspection, PERRL and conjunctivae and sclerae normal  HENT: ear canals and TM's normal, nose and mouth without ulcers or lesions, oropharynx clear and oral mucous membranes moist  NECK: no adenopathy, no asymmetry, masses, or scars and thyroid normal to palpation  RESP: lungs clear to auscultation - no rales, rhonchi or wheezes  BREAST: normal without masses, tenderness or nipple discharge and no palpable axillary masses or adenopathy  CV: regular rate and rhythm, normal S1 S2, no S3 or S4, no murmur, click or rub, no peripheral edema and peripheral pulses strong  ABDOMEN: soft, nontender, no hepatosplenomegaly, no masses and bowel sounds normal   (female): Deferred per patient's request  MS: no musculoskeletal defects are noted and gait is age appropriate without ataxia  SKIN: 2-3mm wart on the right foot  NEURO: Normal strength and tone, sensory exam grossly normal, mentation intact and speech normal  PSYCH: mentation appears normal and affect normal/bright    ASSESSMENT/PLAN:   1. Encounter for general adult medical examination with abnormal findings  Discussed on regular exercises, daily calcium intake, healthy eating, self breast exams monthly and routine dental checks    2. Mild persistent asthma without complication  Stable, continue with Singular 10 mg daily, and regular p.r.n.  Recheck in one year or sooner if needed  - albuterol (PROAIR HFA/PROVENTIL HFA/VENTOLIN HFA) 108 (90 BASE) MCG/ACT Inhaler; Inhale 2 puffs into the lungs every 6 hours as needed for shortness of breath / dyspnea  Dispense: 1 Inhaler; Refill: 3  - Asthma Action Plan (AAP)    3. Plantar warts   Wart care discussed including use of otc products starting in a few days. Recommended gentle abrasion with " pumice stone wit good handwashing afterward  .RTC in 3weeks for freezing if needed.      4. YELENA (obstructive sleep apnea)  Continue CPAP, start on weight loss    5. Stasis dermatitis of both legs   emphasized on using compression stockings, keep the legs elevated while resting    6. Postablative hypothyroidism  Lab Results   Component Value Date    TSH 0.36 08/15/2017     T4 Free   Date Value Ref Range Status   08/15/2017 1.53 (H) 0.76 - 1.46 ng/dL Final   ]  Reviewed abnormal thyroid labs, recommended to decrease the dose of levothyroxine from Current regimen to 112 MCG daily  Will have thyroid Recheck in 6-8 weeks    - levothyroxine (SYNTHROID/LEVOTHROID) 112 MCG tablet; Take 1 tablet (112 mcg) by mouth daily  Dispense: 90 tablet; Refill: 0  - TSH; Standing  - T4 FREE; Standing    7. Vitamin B12 deficiency (non anemic)  On vitamin B-12 over-the-counter thousand and 60 q. daily    8. Family history of colon cancer  Continue colonoscopies every 5 years    9. Epistaxis  Resolved, continue to use saline nasal spray,  Vaseline jelly topically    10. Tubular adenoma of colon  as above      11. Spider veins of both lower extremities  as above        COUNSELING:   Reviewed preventive health counseling, as reflected in patient instructions  Special attention given to:        Regular exercise       Healthy diet/nutrition       Vision screening       Osteoporosis Prevention/Bone Health       Colon cancer screening       Consider Hep C screening for patients born between 1945 and 1965       (Mindy)menopause management       The 10-year ASCVD risk score (Lesalesly LUGO Jr, et al., 2013) is: 3.6%    Values used to calculate the score:      Age: 62 years      Sex: Female      Is Non- : No      Diabetic: No      Tobacco smoker: No      Systolic Blood Pressure: 116 mmHg      Is BP treated: No      HDL Cholesterol: 52 mg/dL      Total Cholesterol: 178 mg/dL       Advance Care Planning      BP Screening:   Last 3 BP  "Readings:    BP Readings from Last 3 Encounters:   08/15/17 116/60   02/21/17 144/62   01/06/17 138/70       The following was recommended to the patient:  Re-screen BP within a year and recommended lifestyle modifications   reports that she quit smoking about 14 years ago. Her smoking use included Cigarettes. She has never used smokeless tobacco.    Estimated body mass index is 37.65 kg/(m^2) as calculated from the following:    Height as of this encounter: 5' 7\" (1.702 m).    Weight as of this encounter: 240 lb 6.4 oz (109 kg).   Weight management plan: Discussed healthy diet and exercise guidelines and patient will follow up in 12 months in clinic to re-evaluate.    Counseling Resources:  ATP IV Guidelines  Pooled Cohorts Equation Calculator  Breast Cancer Risk Calculator  FRAX Risk Assessment  ICSI Preventive Guidelines  Dietary Guidelines for Americans, 2010  USDA's MyPlate  ASA Prophylaxis  Lung CA Screening    Gee Hitchcock MD  [facility].  Chart documentation done in part with Dragon Voice recognition Software. Although reviewed after completion, some word and grammatical error may remain.      "

## 2017-08-15 NOTE — LETTER
My Asthma Action Plan  Name: Denita Flores   YOB: 1955  Date: 8/15/2017   My doctor: Gee Hitchcock   My clinic: [facility]      My Control Medicine: Singulair        Dose:   My Rescue Medicine: Albuterol        Dose:    My Asthma Severity: mild persistent  Avoid your asthma triggers: upper respiratory infections        GREEN ZONE   Good Control    I feel good    No cough or wheeze    Can work, sleep and play without asthma symptoms       Take your asthma control medicine every day.     1. If exercise triggers your asthma, take your rescue medication    15 minutes before exercise or sports, and    During exercise if you have asthma symptoms  2. Spacer to use with inhaler: If you have a spacer, make sure to use it with your inhaler             YELLOW ZONE Getting Worse  I have ANY of these:    I do not feel good    Cough or wheeze    Chest feels tight    Wake up at night   1. Keep taking your Green Zone medications  2. Start taking your rescue medicine:    every 20 minutes for up to 1 hour. Then every 4 hours for 24-48 hours.  3. If you stay in the Yellow Zone for more than 12-24 hours, contact your doctor.  4. If you do not return to the Green Zone in 12-24 hours or you get worse, start taking your oral steroid medicine if prescribed by your provider.           RED ZONE Medical Alert - Get Help  I have ANY of these:    I feel awful    Medicine is not helping    Breathing getting harder    Trouble walking or talking    Nose opens wide to breathe       1. Take your rescue medicine NOW  2. If your provider has prescribed an oral steroid medicine, start taking it NOW  3. Call your doctor NOW  4. If you are still in the Red Zone after 20 minutes and you have not reached your doctor:    Take your rescue medicine again and    Call 911 or go to the emergency room right away    See your regular doctor within 2 weeks of an Emergency Room or Urgent Care visit for follow-up treatment.         The above medication may be given at school or day care?: N/A (Adult Patient)  Child can carry and use inhaler(s) at school with approval of school nurse?: N/A (Adult Patient)    Electronically signed by: Gee Hitchcock, August 15, 2017    Annual Reminders:  Meet with Asthma Educator,  Flu Shot in the Fall, consider Pneumonia Vaccination for patients with asthma (aged 19 and older).    Pharmacy: [facility] [address] [place] [address] [address] AT [facility] [address]                    Asthma Triggers  How To Control Things That Make Your Asthma Worse    Triggers are things that make your asthma worse.  Look at the list below to help you find your triggers and what you can do about them.  You can help prevent asthma flare-ups by staying away from your triggers.      Trigger                                                          What you can do   Cigarette Smoke  Tobacco smoke can make asthma worse. Do not allow smoking in your home, car or around you.  Be sure no one smokes at a child s day care or school.  If you smoke, ask your health care provider for ways to help you quit.  Ask family members to quit too.  Ask your health care provider for a referral to Quit Plan to help you quit smoking, or call 9-852-337-PLAN.     Colds, Flu, Bronchitis  These are common triggers of asthma. Wash your hands often.  Don t touch your eyes, nose or mouth.  Get a flu shot every year.     Dust Mites  These are tiny bugs that live in cloth or carpet. They are too small to see. Wash sheets and blankets in hot water every week.   Encase pillows and mattress in dust mite proof covers.  Avoid having carpet if you can. If you have carpet, vacuum weekly.   Use a dust mask and HEPA vacuum.   Pollen and Outdoor Mold  Some people are allergic to trees, grass, or weed pollen, or molds. Try to keep your windows closed.  Limit time out doors when pollen count is high.   Ask you health care provider about taking  medicine during allergy season.     Animal Dander  Some people are allergic to skin flakes, urine or saliva from pets with fur or feathers. Keep pets with fur or feathers out of your home.    If you can t keep the pet outdoors, then keep the pet out of your bedroom.  Keep the bedroom door closed.  Keep pets off cloth furniture and away from stuffed toys.     Mice, Rats, and Cockroaches  Some people are allergic to the waste from these pests.   Cover food and garbage.  Clean up spills and food crumbs.  Store grease in the refrigerator.   Keep food out of the bedroom.   Indoor Mold  This can be a trigger if your home has high moisture. Fix leaking faucets, pipes, or other sources of water.   Clean moldy surfaces.  Dehumidify basement if it is damp and smelly.   Smoke, Strong Odors, and Sprays  These can reduce air quality. Stay away from strong odors and sprays, such as perfume, powder, hair spray, paints, smoke incense, paint, cleaning products, candles and new carpet.   Exercise or Sports  Some people with asthma have this trigger. Be active!  Ask your doctor about taking medicine before sports or exercise to prevent symptoms.    Warm up for 5-10 minutes before and after sports or exercise.     Other Triggers of Asthma  Cold air:  Cover your nose and mouth with a scarf.  Sometimes laughing or crying can be a trigger.  Some medicines and food can trigger asthma.

## 2017-08-15 NOTE — MR AVS SNAPSHOT
After Visit Summary   8/15/2017    Denita Flores    MRN: [id number]           Patient Information     Date Of Birth          1955        Visit Information        Provider Department      8/15/2017 2:20 PM Gee Hitchcock MD [facility]        Today's Diagnoses     Encounter for general adult medical examination with abnormal findings    -  1    Mild persistent asthma without complication        Plantar warts        YELENA (obstructive sleep apnea)        Stasis dermatitis of both legs        Postablative hypothyroidism        Vitamin B12 deficiency (non anemic)        Family history of colon cancer        Epistaxis        Tubular adenoma of colon        Spider veins of both lower extremities          Care Instructions    Decrease the dose of LEVOTHYROXINE to 112 mcg daily  Try over the counter wart treatments for right foot    Schedule for thyroid lab check in 6- 8 weeks  Schedule for recheck in 6 months      Preventive Health Recommendations  Female Ages 50 - 64    Yearly exam: See your health care provider every year in order to  o Review health changes.   o Discuss preventive care.    o Review your medicines if your doctor has prescribed any.      Get a Pap test every three years (unless you have an abnormal result and your provider advises testing more often).    If you get Pap tests with HPV test, you only need to test every 5 years, unless you have an abnormal result.     You do not need a Pap test if your uterus was removed (hysterectomy) and you have not had cancer.    You should be tested each year for STDs (sexually transmitted diseases) if you're at risk.     Have a mammogram every 1 to 2 years.    Have a colonoscopy at age 50, or have a yearly FIT test (stool test). These exams screen for colon cancer.      Have a cholesterol test every 5 years, or more often if advised.    Have a diabetes test (fasting glucose) every three years. If you are at risk for diabetes,  you should have this test more often.     If you are at risk for osteoporosis (brittle bone disease), think about having a bone density scan (DEXA).    Shots: Get a flu shot each year. Get a tetanus shot every 10 years.    Nutrition:     Eat at least 5 servings of fruits and vegetables each day.    Eat whole-grain bread, whole-wheat pasta and brown rice instead of white grains and rice.    Talk to your provider about Calcium and Vitamin D.     Lifestyle    Exercise at least 150 minutes a week (30 minutes a day, 5 days a week). This will help you control your weight and prevent disease.    Limit alcohol to one drink per day.    No smoking.     Wear sunscreen to prevent skin cancer.     See your dentist every six months for an exam and cleaning.    See your eye doctor every 1 to 2 years.            Follow-ups after your visit        Who to contact     If you have questions or need follow up information about today's clinic visit or your schedule please contact [facility] directly at [phone].  Normal or non-critical lab and imaging results will be communicated to you by [employer], letter or phone within 4 business days after the clinic has received the results. If you do not hear from us within 7 days, please contact the clinic through [employer] or phone. If you have a critical or abnormal lab result, we will notify you by phone as soon as possible.  Submit refill requests through [employer] or call your pharmacy and they will forward the refill request to us. Please allow 3 business days for your refill to be completed.          Additional Information About Your Visit        [employer] Information     [employer] is an electronic gateway that provides easy, online access to your medical records. With [employer], you can request a clinic appointment, read your test results, renew a prescription or communicate with your care team.     To sign up for [employer] visit the website at [email or web address]   You  "will be asked to enter the access code listed below, as well as some personal information. Please follow the directions to create your username and password.     Your access code is: [email or web address]  Expires: 2017  2:59 PM     Your access code will  in 90 days. If you need help or a new code, please contact your [facility] or call [phone] for assistance.        Care EveryWhere ID     This is your Care EveryWhere ID. This could be used by other organizations to access your [place] medical records  [id number]        Your Vitals Were     Pulse Temperature Height Pulse Oximetry BMI (Body Mass Index)       80 98.1  F (36.7  C) (Oral) 5' 7\" (1.702 m) 97% 37.65 kg/m2        Blood Pressure from Last 3 Encounters:   08/15/17 116/60   17 144/62   17 138/70    Weight from Last 3 Encounters:   08/15/17 240 lb 6.4 oz (109 kg)   17 236 lb (107 kg)   16 235 lb 12.8 oz (107 kg)              Today, you had the following     No orders found for display         Today's Medication Changes          These changes are accurate as of: 8/15/17  2:59 PM.  If you have any questions, ask your nurse or doctor.               These medicines have changed or have updated prescriptions.        Dose/Directions    levothyroxine 112 MCG tablet   Commonly known as:  SYNTHROID/LEVOTHROID   This may have changed:    - how much to take  - how to take this  - when to take this  - additional instructions  - Another medication with the same name was removed. Continue taking this medication, and follow the directions you see here.   Used for:  Postablative hypothyroidism   Changed by:  Gee Hitchcock MD        Dose:  112 mcg   Take 1 tablet (112 mcg) by mouth daily   Quantity:  90 tablet   Refills:  0            Where to get your medicines      These medications were sent to [facility] - [place], [place] - [address] AT [facility] OF  & [address], " [facility] [address]     Phone:  [phone]     albuterol 108 (90 BASE) MCG/ACT Inhaler    levothyroxine 112 MCG tablet                Primary Care Provider Office Phone # Fax #    Gee Hitchcock [phone] [address]  [facility] [address]        Equal Access to Services     [facility]: Hadii aad ku hadasho Soomaali, waaxda luqadaha, qaybta kaalmada adeegyada, waxay idiin hayaan adeeg khjazmyne la'aan ah. So [facility] [phone].    ATENCIÓN: Si habla español, tiene a moore disposición servicios gratuitos de asistencia lingüística. Vamshi al [phone].    We comply with applicable federal civil rights laws and [place] laws. We do not discriminate on the basis of race, color, national origin, age, disability sex, sexual orientation or gender identity.            Thank you!     Thank you for choosing [facility]  for your care. Our goal is always to provide you with excellent care. Hearing back from our patients is one way we can continue to improve our services. Please take a few minutes to complete the written survey that you may receive in the mail after your visit with us. Thank you!             Your Updated Medication List - Protect others around you: Learn how to safely use, store and throw away your medicines at www.disposemymeds.org.          This list is accurate as of: 8/15/17  2:59 PM.  Always use your most recent med list.                   Brand Name Dispense Instructions for use Diagnosis    albuterol 108 (90 BASE) MCG/ACT Inhaler    PROAIR HFA/PROVENTIL HFA/VENTOLIN HFA    1 Inhaler    Inhale 2 puffs into the lungs every 6 hours as needed for shortness of breath / dyspnea    Mild persistent asthma without complication       cyanocolbalamin 100 MCG tablet    vitamin  B-12     Take 100 mcg by mouth daily.        D3 ADULT PO      Take 2,000 Units by mouth daily.        levothyroxine 112 MCG tablet    SYNTHROID/LEVOTHROID    90 tablet    Take 1 tablet  (112 mcg) by mouth daily    Postablative hypothyroidism       MEGARED OMEGA-3 KRILL  MG Caps      Take 1 capsule by mouth daily        montelukast 10 MG tablet    SINGULAIR    90 tablet    Take 1 tablet (10 mg) by mouth At Bedtime    Mild persistent asthma without complication       triamcinolone 0.1 % cream    KENALOG    45 g    For eczema on lower leg  use twice daily for 2 weeks, then 3 times weekly for 2 weeks, repeat cycle as needed.    Eczema craquele

## 2017-08-16 ASSESSMENT — ASTHMA QUESTIONNAIRES: ACT_TOTALSCORE: 23

## 2017-09-22 ENCOUNTER — OFFICE VISIT (OUTPATIENT)
Dept: OTOLARYNGOLOGY | Facility: CLINIC | Age: 62
End: 2017-09-22
Payer: COMMERCIAL

## 2017-09-22 VITALS
WEIGHT: 230 LBS | BODY MASS INDEX: 36.1 KG/M2 | HEART RATE: 82 BPM | DIASTOLIC BLOOD PRESSURE: 82 MMHG | SYSTOLIC BLOOD PRESSURE: 132 MMHG | HEIGHT: 67 IN

## 2017-09-22 DIAGNOSIS — R04.0 EPISTAXIS: Primary | ICD-10-CM

## 2017-09-22 PROCEDURE — 30901 CONTROL OF NOSEBLEED: CPT | Performed by: OTOLARYNGOLOGY

## 2017-09-22 PROCEDURE — 99203 OFFICE O/P NEW LOW 30 MIN: CPT | Mod: 25 | Performed by: OTOLARYNGOLOGY

## 2017-09-22 ASSESSMENT — ENCOUNTER SYMPTOMS
BRUISES/BLEEDS EASILY: 0
TINGLING: 0
CONSTITUTIONAL NEGATIVE: 1
NAUSEA: 0
DIZZINESS: 0
STRIDOR: 0
PHOTOPHOBIA: 0
SINUS PAIN: 0
HEADACHES: 0
COUGH: 0
HEMOPTYSIS: 0
SORE THROAT: 0
HEARTBURN: 0
DOUBLE VISION: 0
VOMITING: 0
BLURRED VISION: 0

## 2017-09-22 NOTE — MR AVS SNAPSHOT
After Visit Summary   2017    Denita Flores    MRN: [id number]           Patient Information     Date Of Birth          1955        Visit Information        Provider Department      2017 10:30 AM Gi Gonzales MD [facility]         Follow-ups after your visit        Your next 10 appointments already scheduled     Oct 13, 2017 10:30 AM CDT   [facility]    [address] [place] [id number] [phone]              Who to contact     If you have questions or need follow up information about today's clinic visit or your schedule please contact [facility] directly at [phone].  Normal or non-critical lab and imaging results will be communicated to you by MyChart, letter or phone within 4 business days after the clinic has received the results. If you do not hear from us within 7 days, please contact the clinic through MyChart or phone. If you have a critical or abnormal lab result, we will notify you by phone as soon as possible.  Submit refill requests through [employer] or call your pharmacy and they will forward the refill request to us. Please allow 3 business days for your refill to be completed.          Additional Information About Your Visit        [employer] Information     [employer] is an electronic gateway that provides easy, online access to your medical records. With [employer], you can request a clinic appointment, read your test results, renew a prescription or communicate with your care team.     To sign up for [employer] visit the website at [email or web address]   You will be asked to enter the access code listed below, as well as some personal information. Please follow the directions to create your username and password.     Your access code is: [email or web address]  Expires: 2017  2:59 PM     Your access code will  in  "90 days. If you need help or a new code, please contact your [facility] or call [phone] for assistance.        Care EveryWhere ID     This is your Care EveryWhere ID. This could be used by other organizations to access your [place] medical records  [id number]        Your Vitals Were     Pulse Height BMI (Body Mass Index)             82 1.702 m (5' 7\") 36.02 kg/m2          Blood Pressure from Last 3 Encounters:   09/22/17 132/82   08/15/17 116/60   02/21/17 144/62    Weight from Last 3 Encounters:   09/22/17 104.3 kg (230 lb)   08/15/17 109 kg (240 lb 6.4 oz)   01/06/17 107 kg (236 lb)              Today, you had the following     No orders found for display       Primary Care Provider Office Phone # Fax #    Gee Hitchcock [phone] [address]  [facility] [address]        Equal Access to Services     [facility] : Hadii aad ku hadasho Soomaali, waaxda luqadaha, qaybta kaalmada adeegyada, waxay idiin hayfritzn ronald golden . So [facility] [phone].    ATENCIÓN: Si habla español, tiene a moore disposición servicios gratuitos de asistencia lingüística. Llame al [phone].    We comply with applicable federal civil rights laws and [place] laws. We do not discriminate on the basis of race, color, national origin, age, disability sex, sexual orientation or gender identity.            Thank you!     Thank you for choosing [facility]  for your care. Our goal is always to provide you with excellent care. Hearing back from our patients is one way we can continue to improve our services. Please take a few minutes to complete the written survey that you may receive in the mail after your visit with us. Thank you!             Your Updated Medication List - Protect others around you: Learn how to safely use, store and throw away your medicines at www.disposemymeds.org.          This list is accurate as of: 9/22/17 11:02 AM.  Always " use your most recent med list.                   Brand Name Dispense Instructions for use Diagnosis    albuterol 108 (90 BASE) MCG/ACT Inhaler    PROAIR HFA/PROVENTIL HFA/VENTOLIN HFA    1 Inhaler    Inhale 2 puffs into the lungs every 6 hours as needed for shortness of breath / dyspnea    Mild persistent asthma without complication       cyanocolbalamin 100 MCG tablet    vitamin  B-12     Take 100 mcg by mouth daily.        D3 ADULT PO      Take 2,000 Units by mouth daily.        levothyroxine 112 MCG tablet    SYNTHROID/LEVOTHROID    90 tablet    Take 1 tablet (112 mcg) by mouth daily    Postablative hypothyroidism       MEGARED OMEGA-3 KRILL  MG Caps      Take 1 capsule by mouth daily        montelukast 10 MG tablet    SINGULAIR    90 tablet    Take 1 tablet (10 mg) by mouth At Bedtime    Mild persistent asthma without complication       triamcinolone 0.1 % cream    KENALOG    45 g    For eczema on lower leg  use twice daily for 2 weeks, then 3 times weekly for 2 weeks, repeat cycle as needed.    Eczema craquele

## 2017-09-22 NOTE — NURSING NOTE
"Denita Flores's goals for this visit include:   Chief Complaint   Patient presents with     Consult     Bloody noses for past year       She requests these members of her care team be copied on today's visit information: yes      PCP: Gee Hitchcock    Referring Provider:  Referred Self, MD  No address on file    Chief Complaint   Patient presents with     Consult     Bloody noses for past year       Initial /82  Pulse 82  Ht 1.702 m (5' 7\")  Wt 104.3 kg (230 lb)  BMI 36.02 kg/m2 Estimated body mass index is 36.02 kg/(m^2) as calculated from the following:    Height as of this encounter: 1.702 m (5' 7\").    Weight as of this encounter: 104.3 kg (230 lb).  Medication Reconciliation: complete    "

## 2017-09-22 NOTE — PROGRESS NOTES
HPI  This pleasant patient is here for active nose bleeds. Started more than a year ago. Denies any easy bruising, abnormal bleeding from other parts of her body. She is not any blood thinner or no hypertension. Has a hx of Graves, YELENA mild, seasonal allergies and asthma. She is not a smoker.    Review of Systems   Constitutional: Negative.    HENT: Positive for congestion and nosebleeds. Negative for ear discharge, ear pain, hearing loss, sinus pain, sore throat and tinnitus.    Eyes: Negative for blurred vision, double vision and photophobia.   Respiratory: Negative for cough, hemoptysis and stridor.    Gastrointestinal: Negative for heartburn, nausea and vomiting.   Skin: Negative.    Neurological: Negative for dizziness, tingling and headaches.   Endo/Heme/Allergies: Positive for environmental allergies. Does not bruise/bleed easily.         Physical Exam   Constitutional: She is well-developed, well-nourished, and in no distress.   HENT:   Head: Normocephalic and atraumatic.   Right Ear: Tympanic membrane, external ear and ear canal normal. No drainage, swelling or tenderness. No middle ear effusion. No decreased hearing is noted.   Left Ear: Tympanic membrane, external ear and ear canal normal. No drainage, swelling or tenderness.  No middle ear effusion. No decreased hearing is noted.   Nose: Mucosal edema, rhinorrhea and septal deviation present. Epistaxis is observed.   Mouth/Throat: Uvula is midline, oropharynx is clear and moist and mucous membranes are normal. No oropharyngeal exudate.   Eyes: Pupils are equal, round, and reactive to light.   Neck: Neck supple. No tracheal deviation present. No thyromegaly present.   Lymphadenopathy:     She has no cervical adenopathy.     Septum is slightly deviated to the right.  Ear wax removed with suctioning from both EACs. Ear drums appeared to be intact.  Left nostril: Kisselbach region is hyperemic; cauterized with silver Nitrate and Vaseline applied.    A/P  62  year old patient with nasal bleeding was seen. Left Little area was cauterized with silver Nitrate. She tolerated well. I would like to see her in 3 weeks to make sure heals well or as needed.

## 2017-09-26 ENCOUNTER — OFFICE VISIT (OUTPATIENT)
Dept: OTOLARYNGOLOGY | Facility: CLINIC | Age: 62
End: 2017-09-26
Payer: COMMERCIAL

## 2017-09-26 VITALS — SYSTOLIC BLOOD PRESSURE: 147 MMHG | DIASTOLIC BLOOD PRESSURE: 83 MMHG | HEART RATE: 69 BPM

## 2017-09-26 DIAGNOSIS — R04.0 EPISTAXIS: Primary | ICD-10-CM

## 2017-09-26 PROCEDURE — 99212 OFFICE O/P EST SF 10 MIN: CPT | Performed by: OTOLARYNGOLOGY

## 2017-09-26 NOTE — MR AVS SNAPSHOT
After Visit Summary   9/26/2017    Denita Flores    MRN: [id number]           Patient Information     Date Of Birth          1955        Visit Information        Provider Department      9/26/2017 2:30 PM Gi Gonzales MD [facility]        Today's Diagnoses     Epistaxis    -  1       Follow-ups after your visit        Your next 10 appointments already scheduled     Oct 13, 2017 10:30 AM CDT   Return Visit with Gi Gonzales MD   [facility]    [address] [place] [id number] [phone]              Who to contact     If you have questions or need follow up information about today's clinic visit or your schedule please contact [facility] directly at [phone].  Normal or non-critical lab and imaging results will be communicated to you by MyChart, letter or phone within 4 business days after the clinic has received the results. If you do not hear from us within 7 days, please contact the clinic through MyChart or phone. If you have a critical or abnormal lab result, we will notify you by phone as soon as possible.  Submit refill requests through [employer] or call your pharmacy and they will forward the refill request to us. Please allow 3 business days for your refill to be completed.          Additional Information About Your Visit        MyChart Information     [employer] is an electronic gateway that provides easy, online access to your medical records. With [employer], you can request a clinic appointment, read your test results, renew a prescription or communicate with your care team.     To sign up for [employer] visit the website at [email or web address]   You will be asked to enter the access code listed below, as well as some personal information. Please follow the directions to create your username and password.     Your access code is: [email or web address]  Expires: 11/13/2017   2:59 PM     Your access code will  in 90 days. If you need help or a new code, please contact your [facility] or call [phone] for assistance.        Care EveryWhere ID     This is your Care EveryWhere ID. This could be used by other organizations to access your [place] medical records  [id number]        Your Vitals Were     Pulse                   69            Blood Pressure from Last 3 Encounters:   17 147/83   17 132/82   08/15/17 116/60    Weight from Last 3 Encounters:   17 104.3 kg (230 lb)   08/15/17 109 kg (240 lb 6.4 oz)   17 107 kg (236 lb)              Today, you had the following     No orders found for display       Primary Care Provider Office Phone # Fax #    Gee Hitchcock [phone] [address]  [facility] [address]        Equal Access to Services     JONATHAN CHILEL : Hadii aad ku hadasho Soomaali, waaxda luqadaha, qaybta kaalmada adeegyada, waxay joniin hayfritzn ronald kharaaftab golden . So [facility] [place] [facility] [phone].    ATENCIÓN: Si habla español, tiene a moore disposición servicios gratuitos de asistencia lingüística. Llame al [phone].    We comply with applicable federal civil rights laws and [place] laws. We do not discriminate on the basis of race, color, national origin, age, disability sex, sexual orientation or gender identity.            Thank you!     Thank you for choosing [facility]  for your care. Our goal is always to provide you with excellent care. Hearing back from our patients is one way we can continue to improve our services. Please take a few minutes to complete the written survey that you may receive in the mail after your visit with us. Thank you!             Your Updated Medication List - Protect others around you: Learn how to safely use, store and throw away your medicines at www.disposemymeds.org.          This list is accurate as of: 17  3:11 PM.  Always use  your most recent med list.                   Brand Name Dispense Instructions for use Diagnosis    albuterol 108 (90 BASE) MCG/ACT Inhaler    PROAIR HFA/PROVENTIL HFA/VENTOLIN HFA    1 Inhaler    Inhale 2 puffs into the lungs every 6 hours as needed for shortness of breath / dyspnea    Mild persistent asthma without complication       cyanocolbalamin 100 MCG tablet    vitamin  B-12     Take 100 mcg by mouth daily.        D3 ADULT PO      Take 2,000 Units by mouth daily.        levothyroxine 112 MCG tablet    SYNTHROID/LEVOTHROID    90 tablet    Take 1 tablet (112 mcg) by mouth daily    Postablative hypothyroidism       MEGARED OMEGA-3 KRILL  MG Caps      Take 1 capsule by mouth daily        montelukast 10 MG tablet    SINGULAIR    90 tablet    Take 1 tablet (10 mg) by mouth At Bedtime    Mild persistent asthma without complication       triamcinolone 0.1 % cream    KENALOG    45 g    For eczema on lower leg  use twice daily for 2 weeks, then 3 times weekly for 2 weeks, repeat cycle as needed.    Eczema craquele

## 2017-09-26 NOTE — LETTER
[facility]  [address] [address] [place] [id number]  [phone]          September 26, 2017    RE:  Denita Flores                                                                                                                                                       [address] [address] [place] [id number]            To whom it may concern:    Denita Flores is under my professional care for Epistaxis She  may return to work with the following: The employee is ABLE to return to work today.    When the patient returns to work, the following restrictions apply:  A) Bend: Frequently (4-8 hours)  B) Squat: Frequently (4-8 hours)  C) Walk/Stand: Frequently (4-8 hours)  D) Reach Above Shoulders: Frequently (4-8 hours)  E) Lift, carry, push, and pull no more than:  11-20 lbs.No working or lifting restrictions.      Sincerely,        Gi Gonzales {PROVIDER CREDENTIALS:[id number]}

## 2017-09-26 NOTE — NURSING NOTE
"Denita Flores's goals for this visit include:   Chief Complaint   Patient presents with     RECHECK     Still nose bleeds       She requests these members of her care team be copied on today's visit information: yes      PCP: Gee Hitchcock    Referring Provider:  No referring provider defined for this encounter.    Chief Complaint   Patient presents with     RECHECK     Still nose bleeds       Initial /83  Pulse 69 Estimated body mass index is 36.02 kg/(m^2) as calculated from the following:    Height as of 9/22/17: 1.702 m (5' 7\").    Weight as of 9/22/17: 104.3 kg (230 lb).  Medication Reconciliation: complete    "

## 2017-10-06 ENCOUNTER — TELEPHONE (OUTPATIENT)
Dept: PEDIATRICS | Facility: CLINIC | Age: 62
End: 2017-10-06

## 2017-10-06 NOTE — TELEPHONE ENCOUNTER
[facility] Call Center    Phone Message    Name of Caller: Denita    Phone Number: Cell number on file:    Telephone Information:   Mobile [phone]       Best time to return call: Before 2pm today    May a detailed message be left on voicemail: yes    Relation to patient: Self    Reason for Call: Other: Patient is unsure of if she should see the lab or follow up with Dr. Hitchcock for her Thyroid. Patient would like a call before 2pm. Please advise.      Action Taken: Message routed to:  Primary Care p [phone]

## 2017-10-06 NOTE — TELEPHONE ENCOUNTER
Called patient, left detailed message with information that only needs lab appt, does not need to see Dr. Hitchcock.  Left phone number to call back with questions.       Lexi David RN, [facility]

## 2017-10-09 ENCOUNTER — TELEPHONE (OUTPATIENT)
Dept: PEDIATRICS | Facility: CLINIC | Age: 62
End: 2017-10-09

## 2017-10-09 DIAGNOSIS — E89.0 POSTABLATIVE HYPOTHYROIDISM: ICD-10-CM

## 2017-10-09 LAB
T4 FREE SERPL-MCNC: 1.36 NG/DL (ref 0.76–1.46)
TSH SERPL DL<=0.005 MIU/L-ACNC: 0.32 MU/L (ref 0.4–4)

## 2017-10-09 PROCEDURE — 84439 ASSAY OF FREE THYROXINE: CPT | Performed by: FAMILY MEDICINE

## 2017-10-09 PROCEDURE — 84443 ASSAY THYROID STIM HORMONE: CPT | Performed by: FAMILY MEDICINE

## 2017-10-09 PROCEDURE — 36415 COLL VENOUS BLD VENIPUNCTURE: CPT | Performed by: FAMILY MEDICINE

## 2017-10-09 NOTE — PROGRESS NOTES
Please inform Denita Flores-Normalizing thyroid Test results,We'll continue with current dose of levothyroxine 112 MCG daily   recheck thyroid labs in 3 months

## 2017-10-09 NOTE — TELEPHONE ENCOUNTER
Notes Recorded by Gee Hitchcock MD on 10/9/2017 at 11:01 AM  Please inform Denita Flores-Normalizing thyroid Test results,We'll continue with current dose of levothyroxine 112 MCG daily   recheck thyroid labs in 3 months             Ref Range & Units 10:30 AM   1mo ago   9mo ago        TSH 0.40 - 4.00 mU/L 0.32 (L) 0.36 (L) 0.43     [facility]ing Agency  [facility]          Specimen Collected: 10/09/17 10:30 AM Last Resulted: 10/09/17 10:58 AM                                 T4 FREE   Status:  Final result   Visible to patient:  No (Not Released) Dx:  Postablative hypothyroidism Order: [id number]       Notes Recorded by Gee Hitchcock MD on 10/9/2017 at 11:01 AM  Please inform Denita Flores-Normalizing thyroid Test results,We'll continue with current dose of levothyroxine 112 MCG daily   recheck thyroid labs in 3 months             Ref Range & Units 10:30 AM   1mo ago   9mo ago        T4 Free 0.76 - 1.46 ng/dL 1.36 1.53 (H) 1.28     Resulting Agency  [facility]

## 2017-10-09 NOTE — TELEPHONE ENCOUNTER
Called patient, sister said she was on her way to work. Called patient cell, gave results and Dr. Hitchcock's message to patient.  Verbalized understanding and agreement to plan.    Lexi David RN, [facility]

## 2017-10-13 ENCOUNTER — OFFICE VISIT (OUTPATIENT)
Dept: OTOLARYNGOLOGY | Facility: CLINIC | Age: 62
End: 2017-10-13
Payer: COMMERCIAL

## 2017-10-13 VITALS — DIASTOLIC BLOOD PRESSURE: 78 MMHG | SYSTOLIC BLOOD PRESSURE: 126 MMHG | HEART RATE: 76 BPM | OXYGEN SATURATION: 95 %

## 2017-10-13 DIAGNOSIS — R04.0 EPISTAXIS: Primary | ICD-10-CM

## 2017-10-13 PROCEDURE — 99212 OFFICE O/P EST SF 10 MIN: CPT | Mod: 25 | Performed by: OTOLARYNGOLOGY

## 2017-10-13 PROCEDURE — 30901 CONTROL OF NOSEBLEED: CPT | Performed by: OTOLARYNGOLOGY

## 2017-10-13 NOTE — PROGRESS NOTES
HPI  This pleasant patient is here for the f/u. She had another episode of active nose bleeding. This time too occurred in the left and stopped after pressuring for 10 minutes. Started more than a year ago. Denies any easy bruising, abnormal bleeding from other parts of her body. She is not any blood thinner or no hypertension. Has a hx of Graves, YELENA mild, seasonal allergies and asthma. She is not a smoker.    Review of Systems   Constitutional: Negative.    HENT: Positive for congestion and nosebleeds. Negative for ear discharge, ear pain, hearing loss, sinus pain, sore throat and tinnitus.    Eyes: Negative for blurred vision, double vision and photophobia.   Respiratory: Negative for cough, hemoptysis and stridor.    Gastrointestinal: Negative for heartburn, nausea and vomiting.   Skin: Negative.    Neurological: Negative for dizziness, tingling and headaches.   Endo/Heme/Allergies: Positive for environmental allergies. Does not bruise/bleed easily.         Physical Exam   Constitutional: She is well-developed, well-nourished, and in no distress.   HENT:   Head: Normocephalic and atraumatic.   Right Ear: Tympanic membrane, external ear and ear canal normal. No drainage, swelling or tenderness. No middle ear effusion. No decreased hearing is noted.   Left Ear: Tympanic membrane, external ear and ear canal normal. No drainage, swelling or tenderness.  No middle ear effusion. No decreased hearing is noted.   Nose: Mucosal edema, rhinorrhea and septal deviation present. Epistaxis is observed.   Mouth/Throat: Uvula is midline, oropharynx is clear and moist and mucous membranes are normal. No oropharyngeal exudate.   Eyes: Pupils are equal, round, and reactive to light.   Neck: Neck supple. No tracheal deviation present. No thyromegaly present.   Lymphadenopathy:     She has no cervical adenopathy.     Septum is slightly deviated to the right.  Ear wax removed with suctioning from both EACs. Ear drums appeared to be  intact.  Left nostril: Kisselbach region is hyperemic; cauterized with silver Nitrate and Vaseline applied.    A/P  62 year old patient with nasal bleeding was seen. Left Little area was cauterized with silver Nitrate. She tolerated well. I would like to see her in 3 weeks to make sure heals well or as needed.

## 2017-10-13 NOTE — NURSING NOTE
"Denita Flores's goals for this visit include: follow up - cauterizing  She requests these members of her care team be copied on today's visit information:     PCP: Gee Hitchcock    Referring Provider:  No referring provider defined for this encounter.    Chief Complaint   Patient presents with     RECHECK     cauterizing - nose       Initial /78 (BP Location: Right arm, Patient Position: Chair, Cuff Size: Adult Large)  Pulse 76  SpO2 95% Estimated body mass index is 36.02 kg/(m^2) as calculated from the following:    Height as of 9/22/17: 1.702 m (5' 7\").    Weight as of 9/22/17: 104.3 kg (230 lb).  Medication Reconciliation: complete     SMA Amarilys    "

## 2017-10-13 NOTE — MR AVS SNAPSHOT
After Visit Summary   10/13/2017    Denita Flores    MRN: [id number]           Patient Information     Date Of Birth          1955        Visit Information        Provider Department      10/13/2017 10:30 AM Gi Gonzales MD [facility]        Today's Diagnoses     Epistaxis    -  1       Follow-ups after your visit        Who to contact     If you have questions or need follow up information about today's clinic visit or your schedule please contact [facility] directly at [phone].  Normal or non-critical lab and imaging results will be communicated to you by [employer], letter or phone within 4 business days after the clinic has received the results. If you do not hear from us within 7 days, please contact the clinic through [employer] or phone. If you have a critical or abnormal lab result, we will notify you by phone as soon as possible.  Submit refill requests through [employer] or call your pharmacy and they will forward the refill request to us. Please allow 3 business days for your refill to be completed.          Additional Information About Your Visit        MyChart Information     [employer] is an electronic gateway that provides easy, online access to your medical records. With [employer], you can request a clinic appointment, read your test results, renew a prescription or communicate with your care team.     To sign up for [employer] visit the website at [email or web address]   You will be asked to enter the access code listed below, as well as some personal information. Please follow the directions to create your username and password.     Your access code is: [email or web address]  Expires: 2017  2:59 PM     Your access code will  in 90 days. If you need help or a new code, please contact your [facility] or call [phone] for assistance.        Care EveryWhere ID     This is your Care EveryWhere ID. This  could be used by other organizations to access your [place] medical records  [id number]        Your Vitals Were     Pulse Pulse Oximetry                76 95%           Blood Pressure from Last 3 Encounters:   10/13/17 126/78   09/26/17 147/83   09/22/17 132/82    Weight from Last 3 Encounters:   09/22/17 104.3 kg (230 lb)   08/15/17 109 kg (240 lb 6.4 oz)   01/06/17 107 kg (236 lb)              Today, you had the following     No orders found for display       Primary Care Provider Office Phone # Fax #    Gee Hitchcock [phone]       [address]  [facility] [address]        Equal Access to Services     JONATHAN CHILEL : Hadii brenda masono Sogeorgette, waaxda luqadaha, qaybta kaalmada adehanyyada, fabian golden . So [facility] [phone].    ATENCIÓN: Si habla español, tiene a moore disposición servicios gratuitos de asistencia lingüística. [facility] [phone].    We comply with applicable federal civil rights laws and [place] laws. We do not discriminate on the basis of race, color, national origin, age, disability, sex, sexual orientation, or gender identity.            Thank you!     Thank you for choosing [facility]  for your care. Our goal is always to provide you with excellent care. Hearing back from our patients is one way we can continue to improve our services. Please take a few minutes to complete the written survey that you may receive in the mail after your visit with us. Thank you!             Your Updated Medication List - Protect others around you: Learn how to safely use, store and throw away your medicines at www.disposemymeds.org.          This list is accurate as of: 10/13/17 12:37 PM.  Always use your most recent med list.                   Brand Name Dispense Instructions for use Diagnosis    albuterol 108 (90 BASE) MCG/ACT Inhaler    PROAIR HFA/PROVENTIL HFA/VENTOLIN HFA    1 Inhaler    Inhale 2 puffs into the lungs every 6  hours as needed for shortness of breath / dyspnea    Mild persistent asthma without complication       cyanocolbalamin 100 MCG tablet    vitamin  B-12     Take 100 mcg by mouth daily.        D3 ADULT PO      Take 2,000 Units by mouth daily.        levothyroxine 112 MCG tablet    SYNTHROID/LEVOTHROID    90 tablet    Take 1 tablet (112 mcg) by mouth daily    Postablative hypothyroidism       MEGARED OMEGA-3 KRILL  MG Caps      Take 1 capsule by mouth daily        montelukast 10 MG tablet    SINGULAIR    90 tablet    Take 1 tablet (10 mg) by mouth At Bedtime    Mild persistent asthma without complication       triamcinolone 0.1 % cream    KENALOG    45 g    For eczema on lower leg  use twice daily for 2 weeks, then 3 times weekly for 2 weeks, repeat cycle as needed.    Eczema craquele

## 2017-10-19 ENCOUNTER — NURSE TRIAGE (OUTPATIENT)
Dept: NURSING | Facility: CLINIC | Age: 62
End: 2017-10-19

## 2017-10-19 NOTE — TELEPHONE ENCOUNTER
"Patient calling reporting cough with productive yellow green sputum. Reporting fever earlier in week. Denies fever today.  \"It hurts in my lungs when coughing.\" Patient has not taken inhaler treatments today. Stating she will now.     Reason for Disposition    Sinus pain (around cheekbone or eye)    Additional Information    Negative: Severe difficulty breathing (e.g., struggling for each breath, speaks in single words)    Negative: Bluish lips, tongue, or face now    Negative: [1] Difficulty breathing AND [2] exposure to flames, smoke, or fumes    Negative: [1] Stridor AND [2] difficulty breathing    Negative: Sounds like a life-threatening emergency to the triager    [1] Previous asthma attacks AND [2] this feels like asthma attack    Negative: Severe difficulty breathing (e.g., struggling for each breath, speak in single words, pulse > 120)    Negative: Bluish lips, tongue, or face now    Negative: Difficult to awaken or acting confused  (e.g., disoriented, slurred speech)    Negative: Passed out (i.e., lost consciousness, collapsed and was not responding)    Negative: Wheezing started suddenly after medicine, an allergic food, or bee sting    Negative: Sounds like a life-threatening emergency to the triager    Negative: [1] SEVERE asthma attack (e.g., very SOB at rest, speaks in single words, loud wheezes) AND [2] not resolved after 2 nebulizer or inhaler treatments    Negative: Peak flow rate less than 50% of baseline level (RED zone)    Negative: [1] Severe wheezing or coughing AND [2] doesn't have neb or inhaler available    Negative: Chest pain    Negative: [1] Hospitalized before with asthma AND [2] feels the same now    Negative: [1] MODERATE asthma attack (e.g., SOB at rest, speaks in phrases, audible wheezes) AND [2] not resolved after 2 nebulizer or inhaler treatments given 20 minutes apart    Negative: [1] Peak flow rate 50-80% of baseline level (YELLOW zone) AND [2] not resolved after 2 nebulizer or " inhaler treatments given 20 minutes apart    Negative: Asthma medicine (nebulizer or inhaler) is needed more frequently than q 4 hours to keep you comfortable    Negative: Fever > 103 F (39.4 C)    Negative: [1] Fever > 101 F (38.3 C) AND [2] age > 60    Negative: Patient sounds very sick or weak to the triager    Negative: [1] Coughing continuously (nonstop) AND [2] keeps from working or sleeping AND [3] not improved after 2 nebulizer or inhaler treatments given 20 minutes apart    Negative: [1] Wheezing or coughing AND [2] hasn't used neb or inhaler twice AND [3] it's available    Negative: [1] Influenza prevalent in community (or household) AND [2] flu symptoms (e.g., cough WITH fever, etc) AND [3] onset < 48 hours ago    Negative: [1] MILD asthma attack (e.g., no SOB at rest, mild SOB with walking, speaks normally in sentences, mild wheezing) AND [2]  persists > 24 hours on appropriate treatment    Negative: Fever present > 3 days (72 hours)    Protocols used: ASTHMA ATTACK-ADULT-AH, COUGH - ACUTE PRODUCTIVE-ADULT-AH

## 2017-10-20 ENCOUNTER — OFFICE VISIT (OUTPATIENT)
Dept: PEDIATRICS | Facility: CLINIC | Age: 62
End: 2017-10-20
Payer: COMMERCIAL

## 2017-10-20 VITALS
WEIGHT: 242.6 LBS | HEART RATE: 75 BPM | TEMPERATURE: 98.4 F | SYSTOLIC BLOOD PRESSURE: 126 MMHG | OXYGEN SATURATION: 96 % | BODY MASS INDEX: 38 KG/M2 | DIASTOLIC BLOOD PRESSURE: 70 MMHG

## 2017-10-20 DIAGNOSIS — R09.1 PLEURISY: ICD-10-CM

## 2017-10-20 DIAGNOSIS — R05.9 COUGH: Primary | ICD-10-CM

## 2017-10-20 DIAGNOSIS — J20.9 ACUTE BRONCHITIS, UNSPECIFIED ORGANISM: ICD-10-CM

## 2017-10-20 PROCEDURE — 99213 OFFICE O/P EST LOW 20 MIN: CPT | Performed by: FAMILY MEDICINE

## 2017-10-20 RX ORDER — CODEINE PHOSPHATE AND GUAIFENESIN 10; 100 MG/5ML; MG/5ML
1 SOLUTION ORAL EVERY 4 HOURS PRN
Qty: 120 ML | Refills: 0 | Status: SHIPPED | OUTPATIENT
Start: 2017-10-20 | End: 2017-11-22

## 2017-10-20 RX ORDER — CEPHALEXIN 500 MG/1
500 CAPSULE ORAL 3 TIMES DAILY
Qty: 30 CAPSULE | Refills: 0 | Status: SHIPPED | OUTPATIENT
Start: 2017-10-20 | End: 2017-10-31

## 2017-10-20 ASSESSMENT — PAIN SCALES - GENERAL: PAINLEVEL: MODERATE PAIN (4)

## 2017-10-20 NOTE — PROGRESS NOTES
SUBJECTIVE:   Denita Flores is a 62 year old female who presents to clinic today for the following health issues:      Acute Illness  Patient with past medical history significant for Asthma, hypothyroidism, obstructive sleep apnea is here with concerns of having productive cough associated with pleuritic pain on deep breathing, nasal and sinus congestion, fatigue and no concerns for fever, chills, decrease or lack of appetite for the past 6-7 days  Patient is concerns for wheezing, But does have shortness of breath while coughing  Patient denies underlying history of allergies, smoking       Acute illness concerns: Cough, sinus  Onset: 5-6 days    Fever: YES- earlier in week    Chills/Sweats: no     Headache (location?): YES    Sinus Pressure:YES    Conjunctivitis:  no    Ear Pain: YES- plugged ears    Rhinorrhea: no    Congestion: YES    Sore Throat: no     Cough: YES-productive of yellow sputum, with shortness of breath, worsening over time Patient notes lung pain with coughing    Wheeze: no    Decreased Appetite: YES    Nausea: no    Vomiting: no    Diarrhea:  YES- looser than normal stools, but not diarrhea    Dysuria/Freq.: no     Fatigue/Achiness: YES    Sick/Strep Exposure: YES- -a lot of kids getting sick     Therapies Tried and outcome: Tylenol prn for pain, inhalers prn            Problem list and histories reviewed & adjusted, as indicated.  Additional history: as documented    Patient Active Problem List   Diagnosis     Graves disease     Obesity     Diverticulosis of large intestine without hemorrhage     Colon polyps     History of cervical cancer     YELENA (obstructive sleep apnea)     Warts     CARDIOVASCULAR SCREENING; LDL GOAL LESS THAN 130     Hx of herpes zoster keratoconjunctivitis, os     Plantar warts     Stasis dermatitis of both legs     Obesity (BMI 30-39.9)     Postablative hypothyroidism     Other specified hypothyroidism,post ablative     Venous stasis dermatitis of  left lower extremity     Vitamin B12 deficiency (non anemic)     Mild persistent asthma without complication     Spider veins of both lower extremities     Seasonal allergic rhinitis     Family history of colon cancer     Epistaxis     Tubular adenoma of colon     Past Surgical History:   Procedure Laterality Date     BACK SURGERY       COLONOSCOPY WITH CO2 INSUFFLATION N/A 2/21/2017    Procedure: COLONOSCOPY WITH CO2 INSUFFLATION;  Surgeon: Duane, William Charles, MD;  Location: [facility] OR     LEEP TX, CERVICAL      in her 20's       Social History   Substance Use Topics     Smoking status: Former Smoker     Types: Cigarettes     Quit date: 1/1/2003     Smokeless tobacco: Never Used     Alcohol use 0.0 oz/week     0 Standard drinks or equivalent per week     Family History   Problem Relation Age of Onset     HEART DISEASE Father      CANCER Father 67     kidney cancer      HEART DISEASE Sister      CANCER Sister 59     colon cancer      DIABETES Sister      Colon Cancer Maternal Aunt      Colon Cancer Paternal Aunt          Current Outpatient Prescriptions   Medication Sig Dispense Refill     cephALEXin (KEFLEX) 500 MG capsule Take 1 capsule (500 mg) by mouth 3 times daily 30 capsule 0     guaiFENesin-codeine (ROBITUSSIN AC) 100-10 MG/5ML SOLN solution Take 5 mLs by mouth every 4 hours as needed for cough 120 mL 0     albuterol (PROAIR HFA/PROVENTIL HFA/VENTOLIN HFA) 108 (90 BASE) MCG/ACT Inhaler Inhale 2 puffs into the lungs every 6 hours as needed for shortness of breath / dyspnea 1 Inhaler 3     levothyroxine (SYNTHROID/LEVOTHROID) 112 MCG tablet Take 1 tablet (112 mcg) by mouth daily 90 tablet 0     montelukast (SINGULAIR) 10 MG tablet Take 1 tablet (10 mg) by mouth At Bedtime 90 tablet 3     triamcinolone (KENALOG) 0.1 % cream For eczema on lower leg  use twice daily for 2 weeks, then 3 times weekly for 2 weeks, repeat cycle as needed. 45 g 0     MEGARED OMEGA-3 KRILL  MG CAPS Take 1 capsule by mouth daily         cyanocolbalamin (VITAMIN  B-12) 100 MCG tablet Take 100 mcg by mouth daily.       Cholecalciferol (D3 ADULT PO) Take 2,000 Units by mouth daily.       Allergies   Allergen Reactions     Sulfa Drugs Hives and Swelling     Noted in 10/18/09 ER     Recent Labs   Lab Test  10/09/17   1030  08/15/17   1028   07/06/16   0902   06/22/15   1012   05/08/13   1140   04/16/10   0943   LDL   --   101*   --   115*   --   96   --   112   < >   --    HDL   --   52   --   53   --   47*   --   50   < >   --    TRIG   --   124   --   107   --   106   --   125   < >   --    ALT   --   20   --    --    --    --    --   24   --   10   CR   --   0.76   --    --    --    --    --   0.72   --   0.72   GFRESTIMATED   --   77   --    --    --    --    --   83   --   84   GFRESTBLACK   --   >90   --    --    --    --    --   >90   --   >90   POTASSIUM   --   4.0   --    --    --    --    --   4.2   --   3.6   TSH  0.32*  0.36*   < >  0.40   < >  0.21*   < >  1.58   --    --     < > = values in this interval not displayed.      BP Readings from Last 3 Encounters:   10/20/17 126/70   10/13/17 126/78   09/26/17 147/83    Wt Readings from Last 3 Encounters:   10/20/17 242 lb 9.6 oz (110 kg)   09/22/17 230 lb (104.3 kg)   08/15/17 240 lb 6.4 oz (109 kg)                  Labs reviewed in EPIC          Reviewed and updated as needed this visit by clinical staffTobacco  Allergies  Med Hx  Surg Hx  Fam Hx  Soc Hx      Reviewed and updated as needed this visit by Provider         ROS:  C: NEGATIVE for fever, chills, change in weight  INTEGUMENTARY/SKIN: NEGATIVE for worrisome rashes, moles or lesions  EYES: NEGATIVE for vision changes or irritation  ENT/MOUTH: as above  RESP:.as above  CV: NEGATIVE for chest pain, palpitations or peripheral edema  GI: NEGATIVE for nausea, abdominal pain, heartburn, or change in bowel habits  PSYCHIATRIC: NEGATIVE for changes in mood or affect    OBJECTIVE:     /70  Pulse 75  Temp 98.4  F (36.9  C)  (Oral)  Wt 242 lb 9.6 oz (110 kg)  SpO2 96%  BMI 38 kg/m2  Body mass index is 38 kg/(m^2).  GENERAL: healthy, alert and no distress  HENT: ear canals and TM's normal, nose and mouth without ulcers or lesions  NECK: no adenopathy, no asymmetry, masses, or scars and thyroid normal to palpation  RESP: lungs clear to auscultation - no rales, rhonchi or wheezes  CV: regular rate and rhythm, normal S1 S2, no S3 or S4, no murmur, click or rub, no peripheral edema and peripheral pulses strong  MS: no gross musculoskeletal defects noted, no edema  SKIN: no suspicious lesions or rashes  PSYCH: mentation appears normal, affect normal/bright    Diagnostic Test Results:  none     ASSESSMENT/PLAN:             1. Cough  ddx-Bronchitis versus pneumonia   treat underlying bronchitis as mentioned below, take Robitussin codeine cough syrup p.r.n. For cough  Push fluids  Dosing and potential medication side effects discussed.  Patient verbalised understanding and is agreeable to the plan.  RTC in 1week if no better by then or sooner prn.      - guaiFENesin-codeine (ROBITUSSIN AC) 100-10 MG/5ML SOLN solution; Take 5 mLs by mouth every 4 hours as needed for cough  Dispense: 120 mL; Refill: 0    2. Pleurisy  as above    - guaiFENesin-codeine (ROBITUSSIN AC) 100-10 MG/5ML SOLN solution; Take 5 mLs by mouth every 4 hours as needed for cough  Dispense: 120 mL; Refill: 0    3. Acute bronchitis, unspecified organism  Start on cephalexin for 10 days, take Robitussin codeine p.r.n. For cough, Push fluids,, recheck if no better in 1 week or sooner if needed.  Dosing and potential medication side effects discussed.  RTC in 1week if no better by then or sooner prn.  Consider imaging  for persistent or worsening concerns  Patient verbalised understanding and is agreeable to the plan.    - cephALEXin (KEFLEX) 500 MG capsule; Take 1 capsule (500 mg) by mouth 3 times daily  Dispense: 30 capsule; Refill: 0  - guaiFENesin-codeine (ROBITUSSIN AC)  100-10 MG/5ML SOLN solution; Take 5 mLs by mouth every 4 hours as needed for cough  Dispense: 120 mL; Refill: 0    Chart documentation done in part with Dragon Voice recognition Software. Although reviewed after completion, some word and grammatical error may remain.    See Patient Instructions    Gee Hitchcock MD  [facility]

## 2017-10-20 NOTE — MR AVS SNAPSHOT
After Visit Summary   10/20/2017    Denita Flores    MRN: [id number]           Patient Information     Date Of Birth          1955        Visit Information        Provider Department      10/20/2017 11:30 AM Gee Hitchcock MD [facility]        Today's Diagnoses     Cough    -  1    Pleurisy        Acute bronchitis, unspecified organism          Care Instructions      Acute Bronchitis  Your healthcare provider has told you that you have acute bronchitis. Bronchitis is infection or inflammation of the bronchial tubes (airways in the lungs). Normally, air moves easily in and out of the airways. Bronchitis narrows the airways, making it harder for air to flow in and out of the lungs. This causes symptoms such as shortness of breath, coughing up yellow or green mucus, and wheezing. Bronchitis can be acute or chronic. Acute means the condition comes on quickly and goes away in a short time, usually within 3 to 10 days. Chronic means a condition lasts a long time and often comes back.    What causes acute bronchitis?  Acute bronchitis almost always starts as a viral respiratory infection, such as a cold or the flu. Certain factors make it more likely for a cold or flu to turn into bronchitis. These include being very young, being elderly, having a heart or lung problem, or having a weak immune system. Cigarette smoking also makes bronchitis more likely.  When bronchitis develops, the airways become swollen. The airways may also become infected with bacteria. This is known as a secondary infection.  Diagnosing acute bronchitis  Your healthcare provider will examine you and ask about your symptoms and health history. You may also have a sputum culture to test the fluid in your lungs. Chest X-rays may be done to look for infection in the lungs.  Treating acute bronchitis  Bronchitis usually clears up as the cold or flu goes away. You can help feel better faster by doing the  following:    Take medicine as directed. You may be told to take ibuprofen or other over-the-counter medicines. These help relieve inflammation in your bronchial tubes. Your healthcare provider may prescribe an inhaler to help open up the bronchial tubes. Most of the time, acute bronchitis is caused by a viral infection. Antibiotics are usually not prescribed for viral infections.    Drink plenty of fluids, such as water, juice, or warm soup. Fluids loosen mucus so that you can cough it up. This helps you breathe more easily. Fluids also prevent dehydration.    Make sure you get plenty of rest.    Do not smoke. Do not allow anyone else to smoke in your home.  Recovery and follow-up  Follow up with your doctor as you are told. You will likely feel better in a week or two. But a dry cough can linger beyond that time. Let your doctor know if you still have symptoms (other than a dry cough) after 2 weeks, or if you re prone to getting bronchial infections. Take steps to protect yourself from future infections. These steps include stopping smoking and avoiding tobacco smoke, washing your hands often, and getting a yearly flu shot.  When to call your healthcare provider  Call the healthcare provider if you have any of the following:    Fever of 100.4 F (38.0 C) or higher, or as advised    Symptoms that get worse, or new symptoms    Trouble breathing    Symptoms that don t start to improve within a week, or within 3 days of taking antibiotics   Date Last Reviewed: 12/1/2016 2000-2017 [employer]. [address] [place] [address]. All rights reserved. This information is not intended as a substitute for professional medical care. Always follow your healthcare professional's instructions.        Pleurisy  You have pain in your chest. Your healthcare provider has told you that you have pleurisy, or pleuritis. Pleurisy is swelling (inflammation) of the pleura. The pleura are two layers of thin smooth  tissue that surround the lungs and line the chest.  What are the symptoms of pleurisy?     Pleurisy is inflammation of the pleura. The pleura cover the lungs and line the chest.   Pleurisy usually causes sharp chest pain. It is usually worse when you take a deep breath, cough, or sneeze.  What causes pleurisy?  Many things can cause pleurisy. A common cause is a viral infection like the flu or pneumonia. Serious lung problems that can cause it include:    A blood clot in the lung (pulmonary embolism)    Air between the pleura (pneumothorax)  Serious heart problems that can cause pleurisy include:    Heart attack    Inflammation of the covering of the heart (pericarditis)  How is pleurisy diagnosed?  Your healthcare provider examines you and asks you about your symptoms and health history. He or she will first check you for the serious causes of chest pain. You may have:    Lab tests    Imaging tests such as chest X-ray, CT scan, or ultrasound    EKG  How is pleurisy treated?  Treatment depends on what is causing the pleurisy. Serious conditions are treated in the hospital. You may need medicines to decrease the inflammation and pain.     Call 911  Call 911 if any of these occur:    Trouble breathing    Chest pain that gets worse  Call your healthcare provider  Call your healthcare provider right away if you have:    A fever of 100.4 F (38 C) or higher, or as directed by your healthcare provider   Date Last Reviewed: 11/1/2016 2000-2017 The [employer]. [address] [place] [address]. All rights reserved. This information is not intended as a substitute for professional medical care. Always follow your healthcare professional's instructions.                Follow-ups after your visit        Your next 10 appointments already scheduled     Oct 31, 2017 10:45 AM CDT   Return Visit with Gail Booth MD   [facility] ([facility]    [address] [place] [address]  [place] [id number] [phone]              Who to contact     If you have questions or need follow up information about today's clinic visit or your schedule please contact [facility] directly at [phone].  Normal or non-critical lab and imaging results will be communicated to you by MyChart, letter or phone within 4 business days after the clinic has received the results. If you do not hear from us within 7 days, please contact the clinic through MyChart or phone. If you have a critical or abnormal lab result, we will notify you by phone as soon as possible.  Submit refill requests through [employer] or call your pharmacy and they will forward the refill request to us. Please allow 3 business days for your refill to be completed.          Additional Information About Your Visit        [employer] Information     [employer] is an electronic gateway that provides easy, online access to your medical records. With [employer], you can request a clinic appointment, read your test results, renew a prescription or communicate with your care team.     To sign up for [employer] visit the website at [email or web address]   You will be asked to enter the access code listed below, as well as some personal information. Please follow the directions to create your username and password.     Your access code is: [email or web address]  Expires: 2017  2:59 PM     Your access code will  in 90 days. If you need help or a new code, please contact your [facility] or call [phone] for assistance.        Care EveryWhere ID     This is your Care EveryWhere ID. This could be used by other organizations to access your [facility] medical records  [id number]        Your Vitals Were     Pulse Temperature Pulse Oximetry BMI (Body Mass Index)          75 98.4  F (36.9  C) (Oral) 96% 38 kg/m2         Blood Pressure from Last 3 Encounters:   10/20/17 126/70   10/13/17 126/78   17 147/83     Weight from Last 3 Encounters:   10/20/17 242 lb 9.6 oz (110 kg)   09/22/17 230 lb (104.3 kg)   08/15/17 240 lb 6.4 oz (109 kg)              Today, you had the following     No orders found for display         Today's Medication Changes          These changes are accurate as of: 10/20/17 11:56 AM.  If you have any questions, ask your nurse or doctor.               Start taking these medicines.        Dose/Directions    cephALEXin 500 MG capsule   Commonly known as:  KEFLEX   Used for:  Acute bronchitis, unspecified organism   Started by:  Gee Hitchcock MD        Dose:  500 mg   Take 1 capsule (500 mg) by mouth 3 times daily   Quantity:  30 capsule   Refills:  0       guaiFENesin-codeine 100-10 MG/5ML Soln solution   Commonly known as:  ROBITUSSIN AC   Used for:  Acute bronchitis, unspecified organism, Pleurisy, Cough   Started by:  Gee Hitchcock MD        Dose:  1 tsp.   Take 5 mLs by mouth every 4 hours as needed for cough   Quantity:  120 mL   Refills:  0            Where to get your medicines      These medications were sent to [facility] [address] [place] [address] [address] [facility] [address] [facility] [address]     Phone:  [phone]     cephALEXin 500 MG capsule         Some of these will need a paper prescription and others can be bought over the counter.  Ask your nurse if you have questions.     Bring a paper prescription for each of these medications     guaiFENesin-codeine 100-10 MG/5ML Soln solution                Primary Care Provider Office Phone # Fax #    Gee Hitchcock [phone] [address]  [facility] [address]        Equal Access to Services     JONATHAN CHILEL AH: Tony Bui, pardeep garcia, da kaalmamagi hansen, fabian mcmullen. So [facility] [phone].    ATENCIÓN: Si habla español, tiene a moore disposición servicios gratuitos de asistencia lingüística.  Vamshi hewitt [phone].    We comply with applicable federal civil rights laws and [place] laws. We do not discriminate on the basis of race, color, national origin, age, disability, sex, sexual orientation, or gender identity.            Thank you!     Thank you for choosing [facility]  for your care. Our goal is always to provide you with excellent care. Hearing back from our patients is one way we can continue to improve our services. Please take a few minutes to complete the written survey that you may receive in the mail after your visit with us. Thank you!             Your Updated Medication List - Protect others around you: Learn how to safely use, store and throw away your medicines at www.disposemymeds.org.          This list is accurate as of: 10/20/17 11:56 AM.  Always use your most recent med list.                   Brand Name Dispense Instructions for use Diagnosis    albuterol 108 (90 BASE) MCG/ACT Inhaler    PROAIR HFA/PROVENTIL HFA/VENTOLIN HFA    1 Inhaler    Inhale 2 puffs into the lungs every 6 hours as needed for shortness of breath / dyspnea    Mild persistent asthma without complication       cephALEXin 500 MG capsule    KEFLEX    30 capsule    Take 1 capsule (500 mg) by mouth 3 times daily    Acute bronchitis, unspecified organism       cyanocolbalamin 100 MCG tablet    vitamin  B-12     Take 100 mcg by mouth daily.        D3 ADULT PO      Take 2,000 Units by mouth daily.        guaiFENesin-codeine 100-10 MG/5ML Soln solution    ROBITUSSIN AC    120 mL    Take 5 mLs by mouth every 4 hours as needed for cough    Acute bronchitis, unspecified organism, Pleurisy, Cough       levothyroxine 112 MCG tablet    SYNTHROID/LEVOTHROID    90 tablet    Take 1 tablet (112 mcg) by mouth daily    Postablative hypothyroidism       MEGARED OMEGA-3 KRILL  MG Caps      Take 1 capsule by mouth daily        montelukast 10 MG tablet    SINGULAIR    90 tablet    Take 1 tablet (10 mg) by  mouth At Bedtime    Mild persistent asthma without complication       triamcinolone 0.1 % cream    KENALOG    45 g    For eczema on lower leg  use twice daily for 2 weeks, then 3 times weekly for 2 weeks, repeat cycle as needed.    Eczema craquele

## 2017-10-20 NOTE — PATIENT INSTRUCTIONS
Acute Bronchitis  Your healthcare provider has told you that you have acute bronchitis. Bronchitis is infection or inflammation of the bronchial tubes (airways in the lungs). Normally, air moves easily in and out of the airways. Bronchitis narrows the airways, making it harder for air to flow in and out of the lungs. This causes symptoms such as shortness of breath, coughing up yellow or green mucus, and wheezing. Bronchitis can be acute or chronic. Acute means the condition comes on quickly and goes away in a short time, usually within 3 to 10 days. Chronic means a condition lasts a long time and often comes back.    What causes acute bronchitis?  Acute bronchitis almost always starts as a viral respiratory infection, such as a cold or the flu. Certain factors make it more likely for a cold or flu to turn into bronchitis. These include being very young, being elderly, having a heart or lung problem, or having a weak immune system. Cigarette smoking also makes bronchitis more likely.  When bronchitis develops, the airways become swollen. The airways may also become infected with bacteria. This is known as a secondary infection.  Diagnosing acute bronchitis  Your healthcare provider will examine you and ask about your symptoms and health history. You may also have a sputum culture to test the fluid in your lungs. Chest X-rays may be done to look for infection in the lungs.  Treating acute bronchitis  Bronchitis usually clears up as the cold or flu goes away. You can help feel better faster by doing the following:    Take medicine as directed. You may be told to take ibuprofen or other over-the-counter medicines. These help relieve inflammation in your bronchial tubes. Your healthcare provider may prescribe an inhaler to help open up the bronchial tubes. Most of the time, acute bronchitis is caused by a viral infection. Antibiotics are usually not prescribed for viral infections.    Drink plenty of fluids, such as  water, juice, or warm soup. Fluids loosen mucus so that you can cough it up. This helps you breathe more easily. Fluids also prevent dehydration.    Make sure you get plenty of rest.    Do not smoke. Do not allow anyone else to smoke in your home.  Recovery and follow-up  Follow up with your doctor as you are told. You will likely feel better in a week or two. But a dry cough can linger beyond that time. Let your doctor know if you still have symptoms (other than a dry cough) after 2 weeks, or if you re prone to getting bronchial infections. Take steps to protect yourself from future infections. These steps include stopping smoking and avoiding tobacco smoke, washing your hands often, and getting a yearly flu shot.  When to call your healthcare provider  Call the healthcare provider if you have any of the following:    Fever of 100.4 F (38.0 C) or higher, or as advised    Symptoms that get worse, or new symptoms    Trouble breathing    Symptoms that don t start to improve within a week, or within 3 days of taking antibiotics   Date Last Reviewed: 12/1/2016 2000-2017 [employer]. [address] [place] [address]. All rights reserved. This information is not intended as a substitute for professional medical care. Always follow your healthcare professional's instructions.        Pleurisy  You have pain in your chest. Your healthcare provider has told you that you have pleurisy, or pleuritis. Pleurisy is swelling (inflammation) of the pleura. The pleura are two layers of thin smooth tissue that surround the lungs and line the chest.  What are the symptoms of pleurisy?     Pleurisy is inflammation of the pleura. The pleura cover the lungs and line the chest.   Pleurisy usually causes sharp chest pain. It is usually worse when you take a deep breath, cough, or sneeze.  What causes pleurisy?  Many things can cause pleurisy. A common cause is a viral infection like the flu or pneumonia. Serious  lung problems that can cause it include:    A blood clot in the lung (pulmonary embolism)    Air between the pleura (pneumothorax)  Serious heart problems that can cause pleurisy include:    Heart attack    Inflammation of the covering of the heart (pericarditis)  How is pleurisy diagnosed?  Your healthcare provider examines you and asks you about your symptoms and health history. He or she will first check you for the serious causes of chest pain. You may have:    Lab tests    Imaging tests such as chest X-ray, CT scan, or ultrasound    EKG  How is pleurisy treated?  Treatment depends on what is causing the pleurisy. Serious conditions are treated in the hospital. You may need medicines to decrease the inflammation and pain.     Call 911  Call 911 if any of these occur:    Trouble breathing    Chest pain that gets worse  Call your healthcare provider  Call your healthcare provider right away if you have:    A fever of 100.4 F (38 C) or higher, or as directed by your healthcare provider   Date Last Reviewed: 11/1/2016 2000-2017 The [employer]. [address] [place], [place] [address]. All rights reserved. This information is not intended as a substitute for professional medical care. Always follow your healthcare professional's instructions.

## 2017-10-20 NOTE — NURSING NOTE
"Chief Complaint   Patient presents with     URI       Initial /70  Pulse 75  Temp 98.4  F (36.9  C) (Oral)  Wt 242 lb 9.6 oz (110 kg)  SpO2 96%  BMI 38 kg/m2 Estimated body mass index is 38 kg/(m^2) as calculated from the following:    Height as of 9/22/17: 5' 7\" (1.702 m).    Weight as of this encounter: 242 lb 9.6 oz (110 kg).  BP completed using cuff size: gokul Davis, CMA    "

## 2017-10-31 ENCOUNTER — OFFICE VISIT (OUTPATIENT)
Dept: DERMATOLOGY | Facility: CLINIC | Age: 62
End: 2017-10-31
Payer: COMMERCIAL

## 2017-10-31 DIAGNOSIS — L82.1 SEBORRHEIC KERATOSIS: Primary | ICD-10-CM

## 2017-10-31 DIAGNOSIS — L30.8 ECZEMA CRAQUELE: ICD-10-CM

## 2017-10-31 DIAGNOSIS — D18.01 CHERRY ANGIOMA: ICD-10-CM

## 2017-10-31 PROCEDURE — 17110 DESTRUCTION B9 LES UP TO 14: CPT | Performed by: DERMATOLOGY

## 2017-10-31 PROCEDURE — 99213 OFFICE O/P EST LOW 20 MIN: CPT | Mod: 25 | Performed by: DERMATOLOGY

## 2017-10-31 NOTE — PROGRESS NOTES
[facility] Dermatology Note      Dermatology Problem List:  1. Condyloma acuminata s/p cryo   -Left groin biopsy 9/2014: Papillomatous epidermal hyperplasia most consistent with a condyloma acuminatum  2. Eczema Nivia  -Current Tx: Triamcinolone cream and emollient   3. Seborrheic keratosis s/p cryo   4. Skin tags s/p cryo     Encounter Date: Oct 31, 2017    CC:  Chief Complaint   Patient presents with     Skin Check     areas of concern under breasts         History of Present Illness:  Ms. Denita Flores is a 62 year old female who presents for areas of concern under breast. The patient was last seen 6/15/17 where the patient was treated for eczema craquela. Since her last visit, this has been doing well with topicals. She has a flare today. Has itchy spots on chest and legs she wants treated. No other skin concerns.     Past Medical History:   Patient Active Problem List   Diagnosis     Graves disease     Obesity     Diverticulosis of large intestine without hemorrhage     Colon polyps     History of cervical cancer     YELENA (obstructive sleep apnea)     Warts     CARDIOVASCULAR SCREENING; LDL GOAL LESS THAN 130     Hx of herpes zoster keratoconjunctivitis, os     Plantar warts     Stasis dermatitis of both legs     Obesity (BMI 30-39.9)     Postablative hypothyroidism     Other specified hypothyroidism,post ablative     Venous stasis dermatitis of left lower extremity     Vitamin B12 deficiency (non anemic)     Mild persistent asthma without complication     Spider veins of both lower extremities     Seasonal allergic rhinitis     Family history of colon cancer     Epistaxis     Tubular adenoma of colon     Past Medical History:   Diagnosis Date     Malignant neoplasm (H)      Mild persistent asthma 5/8/2013     Thyroid disease      Past Surgical History:   Procedure Laterality Date     BACK SURGERY       COLONOSCOPY WITH CO2 INSUFFLATION N/A 2/21/2017    Procedure: COLONOSCOPY WITH CO2  INSUFFLATION;  Surgeon: Duane, William Charles, MD;  Location: [facility] OR     LEEP TX, CERVICAL      in her 20's       Social History:  The patient works at [employer], and [employer].      Family History:  There is no family history of skin cancer.    Medications:  Current Outpatient Prescriptions   Medication Sig Dispense Refill     guaiFENesin-codeine (ROBITUSSIN AC) 100-10 MG/5ML SOLN solution Take 5 mLs by mouth every 4 hours as needed for cough 120 mL 0     albuterol (PROAIR HFA/PROVENTIL HFA/VENTOLIN HFA) 108 (90 BASE) MCG/ACT Inhaler Inhale 2 puffs into the lungs every 6 hours as needed for shortness of breath / dyspnea 1 Inhaler 3     levothyroxine (SYNTHROID/LEVOTHROID) 112 MCG tablet Take 1 tablet (112 mcg) by mouth daily 90 tablet 0     montelukast (SINGULAIR) 10 MG tablet Take 1 tablet (10 mg) by mouth At Bedtime 90 tablet 3     triamcinolone (KENALOG) 0.1 % cream For eczema on lower leg  use twice daily for 2 weeks, then 3 times weekly for 2 weeks, repeat cycle as needed. 45 g 0     MEGARED OMEGA-3 KRILL  MG CAPS Take 1 capsule by mouth daily        cyanocolbalamin (VITAMIN  B-12) 100 MCG tablet Take 100 mcg by mouth daily.       Cholecalciferol (D3 ADULT PO) Take 2,000 Units by mouth daily.         Allergies   Allergen Reactions     Sulfa Drugs Hives and Swelling     Noted in 10/18/09 ER       Review of Systems:  -Constitutional: The patient denies fatigue, fevers, chills, unintended weight loss, and night sweats.  -HEENT: Patient denies nonhealing oral sores.  -Skin: As above in HPI. No additional skin concerns.    Physical exam:  Vitals: There were no vitals taken for this visit.  GEN: This is a well developed, well-nourished female in no acute distress, in a pleasant mood.    SKIN: Full skin, which includes the head/face, both arms, chest, back, abdomen,both legs, genitalia and/or groin buttocks, digits and/or nails, was examined.  -There are waxy stuck on tan to brown papules on the trunk and  extremties .  -There are bright red some shaped papules scattered on the trunk.   -No other lesions of concern on areas examined.       Impression/Plan:  1. Seborrheic keratosis, trunk and extremities, 14 are pruritic on breast, and thigh, dorsal hands, right wrist dorsal and hip    Cryotherapy procedure note: After verbal consent and discussion of risks and benefits including but no limited to dyspigmentation/scar, blister, and pain, 14 was(were) treated with 1-2mm freeze border for 2 cycles with liquid nitrogen. Post cryotherapy instructions were provided.     2. Cherry angioma(s) trunk    No further intervention required. Patient to report changes.     3.Eczema craquele, lower legs, s/p triamcinolone with improvement    Continue triamcinolone as per script    Emollient      Follow-up in 1 year, earlier for new or changing lesions      Staff Involved:  Scribe/Staff    Scribe Disclosure:   I, Cheryl Santo, am serving as a scribe to document services personally performed by Dr. Gail Booth, based on data collection and the provider's statements to me.       Provider Disclosure:   The documentation recorded by the scribe accurately reflects the services I personally performed and the decisions made by me.    Gail Booth MD    Department of Dermatology  [facility]: Phone: [phone], Fax:[phone]  [facility]: Phone: [phone], Fax: [phone]

## 2017-10-31 NOTE — MR AVS SNAPSHOT
After Visit Summary   10/31/2017    Denita Flores    MRN: [id number]           Patient Information     Date Of Birth          1955        Visit Information        Provider Department      10/31/2017 10:45 AM Gail Booth MD [facility]        Today's Diagnoses     Seborrheic keratosis    -  1    Eczema craquele        Cherry angioma          Care Instructions    CRYOTHERAPY    What is it?    Use of a very cold liquid, such as liquid nitrogen, to freeze and destroy abnormal skin cells that need to be removed    What should I expect?    Tenderness and redness    A small blister that might grow and fill with dark purple blood. There may be crusting.    More than one treatment may be needed if the lesions do not go away.    How do I care for the treated area?    Gently wash the area with your hands when bathing.    Use a thin layer of Vaselin to help with healing. You may use a Band-Aid.     The area should heal within 7-10 days.    Do not use an antibiotic or Neosporin ointment.     You may take acetaminophen (Tylenol) for pain.     Call your Doctor if you have:    Severe pain    Signs of infection (warmth, redness, cloudy yellow drainage, and or a bad smell)    Questions or concerns    Contact infromation:  [facility] [phone]  [facility] [phone]            Follow-ups after your visit        Your next 10 appointments already scheduled     Oct 30, 2018 10:30 AM CDT   Return Visit with Gail Booth MD   [facility] ([facility])    [address] [place] [id number] [phone]              Who to contact     If you have questions or need follow up information about today's clinic visit or your schedule please contact [facility] directly at [phone].  Normal or non-critical lab and imaging results will be communicated to you by Sarath  letter or phone within 4 business days after the clinic has received the results. If you do not hear from us within 7 days, please contact the clinic through Twice or phone. If you have a critical or abnormal lab result, we will notify you by phone as soon as possible.  Submit refill requests through Twice or call your pharmacy and they will forward the refill request to us. Please allow 3 business days for your refill to be completed.          Additional Information About Your Visit        Twice Information     Twice is an electronic gateway that provides easy, online access to your medical records. With Twice, you can request a clinic appointment, read your test results, renew a prescription or communicate with your care team.     To sign up for Twice visit the website at [email or web address]   You will be asked to enter the access code listed below, as well as some personal information. Please follow the directions to create your username and password.     Your access code is: [email or web address]  Expires: 2017  2:59 PM     Your access code will  in 90 days. If you need help or a new code, please contact your [facility] or call [phone] for assistance.        Care EveryWhere ID     This is your Care EveryWhere ID. This could be used by other organizations to access your [facility] medical records  [id number]         Blood Pressure from Last 3 Encounters:   10/20/17 126/70   10/13/17 126/78   17 147/83    Weight from Last 3 Encounters:   10/20/17 110 kg (242 lb 9.6 oz)   17 104.3 kg (230 lb)   08/15/17 109 kg (240 lb 6.4 oz)              We Performed the Following     DESTRUCT BENIGN LESION, UP TO 14        Primary Care Provider Office Phone # Fax #    Gee Hitchcock [phone] [address]  [facility] [address]        Equal Access to Services     JONAHTAN CHILEL AH: pardeep Taylor,  da thomas thangfabian clifton ah. Genevieve [facility] [phone].    ATENCIÓN: Si kellie farr, tiene a moore disposición servicios gratuitos de asistencia lingüística. Vamshi al [phone].    We comply with applicable federal civil rights laws and [place] laws. We do not discriminate on the basis of race, color, national origin, age, disability, sex, sexual orientation, or gender identity.            Thank you!     Thank you for choosing [facility]  for your care. Our goal is always to provide you with excellent care. Hearing back from our patients is one way we can continue to improve our services. Please take a few minutes to complete the written survey that you may receive in the mail after your visit with us. Thank you!             Your Updated Medication List - Protect others around you: Learn how to safely use, store and throw away your medicines at www.disposemymeds.org.          This list is accurate as of: 10/31/17 11:16 AM.  Always use your most recent med list.                   Brand Name Dispense Instructions for use Diagnosis    albuterol 108 (90 BASE) MCG/ACT Inhaler    PROAIR HFA/PROVENTIL HFA/VENTOLIN HFA    1 Inhaler    Inhale 2 puffs into the lungs every 6 hours as needed for shortness of breath / dyspnea    Mild persistent asthma without complication       cyanocolbalamin 100 MCG tablet    vitamin  B-12     Take 100 mcg by mouth daily.        D3 ADULT PO      Take 2,000 Units by mouth daily.        guaiFENesin-codeine 100-10 MG/5ML Soln solution    ROBITUSSIN AC    120 mL    Take 5 mLs by mouth every 4 hours as needed for cough    Acute bronchitis, unspecified organism, Pleurisy, Cough       levothyroxine 112 MCG tablet    SYNTHROID/LEVOTHROID    90 tablet    Take 1 tablet (112 mcg) by mouth daily    Postablative hypothyroidism       MEGARED OMEGA-3 KRILL  MG Caps      Take 1 capsule by mouth daily        montelukast 10 MG tablet    SINGULAIR     90 tablet    Take 1 tablet (10 mg) by mouth At Bedtime    Mild persistent asthma without complication       triamcinolone 0.1 % cream    KENALOG    45 g    For eczema on lower leg  use twice daily for 2 weeks, then 3 times weekly for 2 weeks, repeat cycle as needed.    Eczema craquele

## 2017-10-31 NOTE — NURSING NOTE
Dermatology Rooming Note    Denita Flores's goals for this visit include:   Chief Complaint   Patient presents with     Skin Check     areas of concern under breasts       Is a scribe okay for this visit:Yes    Are records needed for this visit(If yes, obtain release of information): No     Vitals: There were no vitals taken for this visit.    Referring Provider:  No referring provider defined for this encounter.    Evangelina Brown LPN

## 2017-10-31 NOTE — PATIENT INSTRUCTIONS
CRYOTHERAPY    What is it?    Use of a very cold liquid, such as liquid nitrogen, to freeze and destroy abnormal skin cells that need to be removed    What should I expect?    Tenderness and redness    A small blister that might grow and fill with dark purple blood. There may be crusting.    More than one treatment may be needed if the lesions do not go away.    How do I care for the treated area?    Gently wash the area with your hands when bathing.    Use a thin layer of Vaselin to help with healing. You may use a Band-Aid.     The area should heal within 7-10 days.    Do not use an antibiotic or Neosporin ointment.     You may take acetaminophen (Tylenol) for pain.     Call your Doctor if you have:    Severe pain    Signs of infection (warmth, redness, cloudy yellow drainage, and or a bad smell)    Questions or concerns    Contact infromation:  [facility] [phone]  [facility] [phone]

## 2017-10-31 NOTE — LETTER
10/31/2017      RE: Denita Flores  [address] [address] [place] [address]       [facility] Dermatology Note      Dermatology Problem List:  1. Condyloma acuminata s/p cryo   -Left groin biopsy 9/2014: Papillomatous epidermal hyperplasia most consistent with a condyloma acuminatum  2. Eczema Nivia  -Current Tx: Triamcinolone cream and emollient   3. Seborrheic keratosis s/p cryo   4. Skin tags s/p cryo     Encounter Date: Oct 31, 2017    CC:  Chief Complaint   Patient presents with     Skin Check     areas of concern under breasts         History of Present Illness:  Ms. Denita Flores is a 62 year old female who presents for areas of concern under breast. The patient was last seen 6/15/17 where the patient was treated for eczema craquela. Since her last visit, this has been doing well with topicals. She has a flare today. Has itchy spots on chest and legs she wants treated. No other skin concerns.     Past Medical History:   Patient Active Problem List   Diagnosis     Graves disease     Obesity     Diverticulosis of large intestine without hemorrhage     Colon polyps     History of cervical cancer     YELENA (obstructive sleep apnea)     Warts     CARDIOVASCULAR SCREENING; LDL GOAL LESS THAN 130     Hx of herpes zoster keratoconjunctivitis, os     Plantar warts     Stasis dermatitis of both legs     Obesity (BMI 30-39.9)     Postablative hypothyroidism     Other specified hypothyroidism,post ablative     Venous stasis dermatitis of left lower extremity     Vitamin B12 deficiency (non anemic)     Mild persistent asthma without complication     Spider veins of both lower extremities     Seasonal allergic rhinitis     Family history of colon cancer     Epistaxis     Tubular adenoma of colon     Past Medical History:   Diagnosis Date     Malignant neoplasm (H)      Mild persistent asthma 5/8/2013     Thyroid disease      Past Surgical History:   Procedure Laterality Date     BACK  SURGERY       COLONOSCOPY WITH CO2 INSUFFLATION N/A 2/21/2017    Procedure: COLONOSCOPY WITH CO2 INSUFFLATION;  Surgeon: Duane, William Charles, MD;  Location:  OR     LEEP TX, CERVICAL      in her 20's       Social History:  The patient works at [employer], and [employer].      Family History:  There is no family history of skin cancer.    Medications:  Current Outpatient Prescriptions   Medication Sig Dispense Refill     guaiFENesin-codeine (ROBITUSSIN AC) 100-10 MG/5ML SOLN solution Take 5 mLs by mouth every 4 hours as needed for cough 120 mL 0     albuterol (PROAIR HFA/PROVENTIL HFA/VENTOLIN HFA) 108 (90 BASE) MCG/ACT Inhaler Inhale 2 puffs into the lungs every 6 hours as needed for shortness of breath / dyspnea 1 Inhaler 3     levothyroxine (SYNTHROID/LEVOTHROID) 112 MCG tablet Take 1 tablet (112 mcg) by mouth daily 90 tablet 0     montelukast (SINGULAIR) 10 MG tablet Take 1 tablet (10 mg) by mouth At Bedtime 90 tablet 3     triamcinolone (KENALOG) 0.1 % cream For eczema on lower leg  use twice daily for 2 weeks, then 3 times weekly for 2 weeks, repeat cycle as needed. 45 g 0     MEGARED OMEGA-3 KRILL  MG CAPS Take 1 capsule by mouth daily        cyanocolbalamin (VITAMIN  B-12) 100 MCG tablet Take 100 mcg by mouth daily.       Cholecalciferol (D3 ADULT PO) Take 2,000 Units by mouth daily.         Allergies   Allergen Reactions     Sulfa Drugs Hives and Swelling     Noted in 10/18/09 ER       Review of Systems:  -Constitutional: The patient denies fatigue, fevers, chills, unintended weight loss, and night sweats.  -HEENT: Patient denies nonhealing oral sores.  -Skin: As above in HPI. No additional skin concerns.    Physical exam:  Vitals: There were no vitals taken for this visit.  GEN: This is a well developed, well-nourished female in no acute distress, in a pleasant mood.    SKIN: Full skin, which includes the head/face, both arms, chest, back, abdomen,both legs, genitalia and/or groin buttocks,  digits and/or nails, was examined.  -There are waxy stuck on tan to brown papules on the trunk and extremties .  -There are bright red some shaped papules scattered on the trunk.   -No other lesions of concern on areas examined.       Impression/Plan:  1. Seborrheic keratosis, trunk and extremities, 14 are pruritic on breast, and thigh, dorsal hands, right wrist dorsal and hip    Cryotherapy procedure note: After verbal consent and discussion of risks and benefits including but no limited to dyspigmentation/scar, blister, and pain, 14 was(were) treated with 1-2mm freeze border for 2 cycles with liquid nitrogen. Post cryotherapy instructions were provided.     2. Cherry angioma(s) trunk    No further intervention required. Patient to report changes.     3.Eczema craquele, lower legs, s/p triamcinolone with improvement    Continue triamcinolone as per script    Emollient      Follow-up in 1 year, earlier for new or changing lesions      Staff Involved:  Scribe/Staff    Scribe Disclosure:   I, Cheryl Santo, am serving as a scribe to document services personally performed by Dr. Gail Booth, based on data collection and the provider's statements to me.       Provider Disclosure:   The documentation recorded by the scribe accurately reflects the services I personally performed and the decisions made by me.    Gail Booth MD    Department of Dermatology  [facility]: Phone: [phone], Fax:[phone]  [facility]: Phone: [phone], Fax: [phone]        Gail Booth MD

## 2017-11-22 ENCOUNTER — OFFICE VISIT (OUTPATIENT)
Dept: PEDIATRICS | Facility: CLINIC | Age: 62
End: 2017-11-22
Payer: COMMERCIAL

## 2017-11-22 VITALS
HEART RATE: 86 BPM | BODY MASS INDEX: 38.06 KG/M2 | WEIGHT: 243 LBS | SYSTOLIC BLOOD PRESSURE: 128 MMHG | TEMPERATURE: 98.2 F | OXYGEN SATURATION: 97 % | DIASTOLIC BLOOD PRESSURE: 64 MMHG

## 2017-11-22 DIAGNOSIS — J01.00 ACUTE NON-RECURRENT MAXILLARY SINUSITIS: Primary | ICD-10-CM

## 2017-11-22 DIAGNOSIS — R05.9 COUGH: ICD-10-CM

## 2017-11-22 PROCEDURE — 99213 OFFICE O/P EST LOW 20 MIN: CPT | Performed by: INTERNAL MEDICINE

## 2017-11-22 RX ORDER — CODEINE PHOSPHATE AND GUAIFENESIN 10; 100 MG/5ML; MG/5ML
1 SOLUTION ORAL EVERY 4 HOURS PRN
Qty: 120 ML | Refills: 0 | Status: SHIPPED | OUTPATIENT
Start: 2017-11-22 | End: 2018-02-20

## 2017-11-22 NOTE — MR AVS SNAPSHOT
After Visit Summary   11/22/2017    Denita Flores    MRN: [id number]           Patient Information     Date Of Birth          1955        Visit Information        Provider Department      11/22/2017 10:50 AM Gustabo Abel MD PhD [facility]        Today's Diagnoses     Acute non-recurrent maxillary sinusitis    -  1    Acute bronchitis, unspecified organism        Pleurisy        Cough          Care Instructions    Medication(s) prescribed today:    Orders Placed This Encounter   Medications     amoxicillin-clavulanate (AUGMENTIN) 875-125 MG per tablet     Sig: Take 1 tablet by mouth 2 times daily for 14 days     Dispense:  28 tablet     Refill:  0     guaiFENesin-codeine (ROBITUSSIN AC) 100-10 MG/5ML SOLN solution     Sig: Take 5 mLs by mouth every 4 hours as needed for cough     Dispense:  120 mL     Refill:  0               Follow-ups after your visit        Your next 10 appointments already scheduled     Oct 30, 2018 10:30 AM CDT   Return Visit with Gail Booth MD   [facility] ([facility])    [address] [place] [address] [phone]              Who to contact     If you have questions or need follow up information about today's clinic visit or your schedule please contact [facility] directly at [phone].  Normal or non-critical lab and imaging results will be communicated to you by MyChart, letter or phone within 4 business days after the clinic has received the results. If you do not hear from us within 7 days, please contact the clinic through MyChart or phone. If you have a critical or abnormal lab result, we will notify you by phone as soon as possible.  Submit refill requests through [employer] or call your pharmacy and they will forward the refill request to us. Please allow 3 business days for your refill to be completed.          Additional Information About Your Visit        [facility]  Information     [employer] is an electronic gateway that provides easy, online access to your medical records. With [employer], you can request a clinic appointment, read your test results, renew a prescription or communicate with your care team.     To sign up for [employer] visit the website at [email or web address]   You will be asked to enter the access code listed below, as well as some personal information. Please follow the directions to create your username and password.     Your access code is: [email or web address]  Expires: 2018 11:24 AM     Your access code will  in 90 days. If you need help or a new code, please contact your [facility] or call [phone] for assistance.        Care EveryWhere ID     This is your Care EveryWhere ID. This could be used by other organizations to access your [place] medical records  [id number]        Your Vitals Were     Pulse Temperature Pulse Oximetry BMI (Body Mass Index)          86 98.2  F (36.8  C) (Temporal) 97% 38.06 kg/m2         Blood Pressure from Last 3 Encounters:   17 128/64   10/20/17 126/70   10/13/17 126/78    Weight from Last 3 Encounters:   17 243 lb (110.2 kg)   10/20/17 242 lb 9.6 oz (110 kg)   17 230 lb (104.3 kg)              Today, you had the following     No orders found for display         Today's Medication Changes          These changes are accurate as of: 17 11:24 AM.  If you have any questions, ask your nurse or doctor.               Start taking these medicines.        Dose/Directions    amoxicillin-clavulanate 875-125 MG per tablet   Commonly known as:  AUGMENTIN   Used for:  Acute non-recurrent maxillary sinusitis   Started by:  Gustabo Abel MD PhD        Dose:  1 tablet   Take 1 tablet by mouth 2 times daily for 14 days   Quantity:  28 tablet   Refills:  0            Where to get your medicines      These medications were sent to [facility] [address] [place], [place] - [address] AT [facility] [address]  [address], [facility] [address]     Phone:  [phone]     amoxicillin-clavulanate 875-125 MG per tablet         Some of these will need a paper prescription and others can be bought over the counter.  Ask your nurse if you have questions.     Bring a paper prescription for each of these medications     guaiFENesin-codeine 100-10 MG/5ML Soln solution                Primary Care Provider Office Phone # Fax #    Gee Hitchcock [phone] [address]  [facility] [address]        Equal Access to Services     JONATHAN CHILEL : Hadii brenda shah hadasho Somaliniali, waaxda luqadaha, qaybta kaalmada adeegyada, fabian mcmullen. So [facility] [phone].    ATENCIÓN: Si habla español, tiene a moore disposición servicios gratuitos de asistencia lingüística. Llame al [phone].    We comply with applicable federal civil rights laws and [place] laws. We do not discriminate on the basis of race, color, national origin, age, disability, sex, sexual orientation, or gender identity.            Thank you!     Thank you for choosing [facility]  for your care. Our goal is always to provide you with excellent care. Hearing back from our patients is one way we can continue to improve our services. Please take a few minutes to complete the written survey that you may receive in the mail after your visit with us. Thank you!             Your Updated Medication List - Protect others around you: Learn how to safely use, store and throw away your medicines at www.disposemymeds.org.          This list is accurate as of: 11/22/17 11:24 AM.  Always use your most recent med list.                   Brand Name Dispense Instructions for use Diagnosis    albuterol 108 (90 BASE) MCG/ACT Inhaler    PROAIR HFA/PROVENTIL HFA/VENTOLIN HFA    1 Inhaler    Inhale 2 puffs into the lungs every 6 hours as needed for shortness of breath /  dyspnea    Mild persistent asthma without complication       amoxicillin-clavulanate 875-125 MG per tablet    AUGMENTIN    28 tablet    Take 1 tablet by mouth 2 times daily for 14 days    Acute non-recurrent maxillary sinusitis       cyanocolbalamin 100 MCG tablet    vitamin  B-12     Take 100 mcg by mouth daily.        D3 ADULT PO      Take 2,000 Units by mouth daily.        guaiFENesin-codeine 100-10 MG/5ML Soln solution    ROBITUSSIN AC    120 mL    Take 5 mLs by mouth every 4 hours as needed for cough    Acute bronchitis, unspecified organism, Pleurisy, Cough       levothyroxine 112 MCG tablet    SYNTHROID/LEVOTHROID    90 tablet    Take 1 tablet (112 mcg) by mouth daily    Postablative hypothyroidism       MEGARED OMEGA-3 KRILL  MG Caps      Take 1 capsule by mouth daily        montelukast 10 MG tablet    SINGULAIR    90 tablet    Take 1 tablet (10 mg) by mouth At Bedtime    Mild persistent asthma without complication       triamcinolone 0.1 % cream    KENALOG    45 g    For eczema on lower leg  use twice daily for 2 weeks, then 3 times weekly for 2 weeks, repeat cycle as needed.    Eczema craquele

## 2017-11-22 NOTE — PROGRESS NOTES
SUBJECTIVE:   Denita Flores is a 62 year old female who presents to clinic today for the following health issues:      RESPIRATORY SYMPTOMS Follow up       Duration: 11-13-17    Description  nasal congestion, facial pain/pressure, cough, wheezing, fever, chills, headache, fatigue/malaise, hoarse voice, myalgias and Shoulders and chest hurts    Severity: moderate    Accompanying signs and symptoms: Chest sore    History (predisposing factors):  Was on ceftin no help     Precipitating or alleviating factors: None    Therapies tried and outcome:  ceftin felt better and now back again      ROS:  Constitutional, HEENT, cardiovascular, pulmonary, gi and gu systems are negative, except as otherwise noted.      Current Outpatient Prescriptions on File Prior to Visit:  levothyroxine (SYNTHROID/LEVOTHROID) 112 MCG tablet Take 1 tablet (112 mcg) by mouth daily   montelukast (SINGULAIR) 10 MG tablet Take 1 tablet (10 mg) by mouth At Bedtime   triamcinolone (KENALOG) 0.1 % cream For eczema on lower leg  use twice daily for 2 weeks, then 3 times weekly for 2 weeks, repeat cycle as needed.   MEGARED OMEGA-3 KRILL  MG CAPS Take 1 capsule by mouth daily    cyanocolbalamin (VITAMIN  B-12) 100 MCG tablet Take 100 mcg by mouth daily.   Cholecalciferol (D3 ADULT PO) Take 2,000 Units by mouth daily.   albuterol (PROAIR HFA/PROVENTIL HFA/VENTOLIN HFA) 108 (90 BASE) MCG/ACT Inhaler Inhale 2 puffs into the lungs every 6 hours as needed for shortness of breath / dyspnea     No current facility-administered medications on file prior to visit.       Problem list, Medication list, Allergies, and Medical/Social/Surgical histories reviewed in [facility] and updated as appropriate.    OBJECTIVE:                                                    /64  Pulse 86  Temp 98.2  F (36.8  C) (Temporal)  Wt 243 lb (110.2 kg)  SpO2 97%  BMI 38.06 kg/m2    GEN: healthy, alert and no distress  HEENT: Moderate nasal congestion, bilateral  maxillary sinus tenderness to palpation.  Oropharynx is clear  Neck:     supple, no thyromegaly, no cervical lymphadenopathy.   SARA:  CTA B/L, no wheezing or crackles.  CV:  RRR no murmur.  Intact distal pulses, good cap refill.         Diagnostic test results:  No results found for this or any previous visit (from the past 24 hour(s)).       ASSESSMENT/PLAN:                                                      62 year old female with the following diagnoses and treatment plan:      ICD-10-CM    1. Acute non-recurrent maxillary sinusitis J01.00 amoxicillin-clavulanate (AUGMENTIN) 875-125 MG per tablet   2. Cough R05 guaiFENesin-codeine (ROBITUSSIN AC) 100-10 MG/5ML SOLN solution       Will call or return to clinic if worsening or symptoms not improving as discussed.  See Patient Instructions.        Gustabo Abel MD-PhD  [facility]    (Note: Chart documentation was done in part with Dragon Voice Recognition software. Although reviewed after completion, some word and grammatical errors may remain.)

## 2017-11-22 NOTE — PATIENT INSTRUCTIONS
Medication(s) prescribed today:    Orders Placed This Encounter   Medications     amoxicillin-clavulanate (AUGMENTIN) 875-125 MG per tablet     Sig: Take 1 tablet by mouth 2 times daily for 14 days     Dispense:  28 tablet     Refill:  0     guaiFENesin-codeine (ROBITUSSIN AC) 100-10 MG/5ML SOLN solution     Sig: Take 5 mLs by mouth every 4 hours as needed for cough     Dispense:  120 mL     Refill:  0

## 2017-11-22 NOTE — NURSING NOTE
"Chief Complaint   Patient presents with     URI       Initial /64  Pulse 86  Temp 98.2  F (36.8  C) (Temporal)  Wt 243 lb (110.2 kg)  SpO2 97%  BMI 38.06 kg/m2 Estimated body mass index is 38.06 kg/(m^2) as calculated from the following:    Height as of 9/22/17: 5' 7\" (1.702 m).    Weight as of this encounter: 243 lb (110.2 kg).  Medication Reconciliation: complete    "

## 2017-12-18 DIAGNOSIS — E89.0 POSTABLATIVE HYPOTHYROIDISM: ICD-10-CM

## 2017-12-18 LAB
T4 FREE SERPL-MCNC: 1.21 NG/DL (ref 0.76–1.46)
TSH SERPL DL<=0.005 MIU/L-ACNC: 0.88 MU/L (ref 0.4–4)

## 2017-12-18 PROCEDURE — 84439 ASSAY OF FREE THYROXINE: CPT | Performed by: FAMILY MEDICINE

## 2017-12-18 PROCEDURE — 36415 COLL VENOUS BLD VENIPUNCTURE: CPT | Performed by: FAMILY MEDICINE

## 2017-12-18 PROCEDURE — 84443 ASSAY THYROID STIM HORMONE: CPT | Performed by: FAMILY MEDICINE

## 2017-12-18 RX ORDER — LEVOTHYROXINE SODIUM 112 UG/1
112 TABLET ORAL DAILY
Qty: 90 TABLET | Refills: 3 | Status: SHIPPED | OUTPATIENT
Start: 2017-12-18 | End: 2018-02-20

## 2017-12-18 NOTE — PROGRESS NOTES
Please inform patient-normal thyroid labs,  Continue with current dose of levothyroxine.  Refill sent today,  We will follow for the six-month recheck in 2/2018( seen for  physical in August)

## 2017-12-18 NOTE — LETTER
December 18, 2017      Denita Flores  [address] [address] [place] [id number]        Dear Denita,     Enclosed are your recent lab results.    Normal thyroid labs.   Continue with current dose of levothyroxine.  A prescription was sent to your [facility].    Please follow up for the six-month recheck in February 2018.    Gee Hitchcock MD                Resulted Orders   TSH   Result Value Ref Range    TSH 0.88 0.40 - 4.00 mU/L   T4 FREE   Result Value Ref Range    T4 Free 1.21 0.76 - 1.46 ng/dL

## 2017-12-21 ENCOUNTER — TELEPHONE (OUTPATIENT)
Dept: PEDIATRICS | Facility: CLINIC | Age: 62
End: 2017-12-21

## 2017-12-21 NOTE — TELEPHONE ENCOUNTER
Notes Recorded by Gee Hitchcock MD on 12/18/2017 at 11:36 AM  Please inform patient-normal thyroid labs,  Continue with current dose of levothyroxine.  Refill sent today,  We will follow for the six-month recheck in 2/2018( seen for  physical in August)    Notified patient of lab result note below. Patient stated understanding and was calling to confirm that medication dose did not change.  Madison Marie, CMA

## 2017-12-21 NOTE — TELEPHONE ENCOUNTER
[facility] Call Center    Phone Message    Name of Caller: Denita    Phone Number: Cell number on file:    Telephone Information:   Mobile [phone]       Best time to return call: any    May a detailed message be left on voicemail: yes    Relation to patient: Self    Reason for Call: Other: Patient is calling requesting her TSH and T4 results from Monday 12/18/2017. Please advise.     Action Taken: Message routed to:  Primary Care p [phone]

## 2017-12-28 DIAGNOSIS — L30.8 ECZEMA CRAQUELE: ICD-10-CM

## 2017-12-29 ENCOUNTER — RADIANT APPOINTMENT (OUTPATIENT)
Dept: MAMMOGRAPHY | Facility: CLINIC | Age: 62
End: 2017-12-29
Payer: COMMERCIAL

## 2017-12-29 DIAGNOSIS — Z00.00 PREVENTATIVE HEALTH CARE: ICD-10-CM

## 2017-12-29 PROCEDURE — G0202 SCR MAMMO BI INCL CAD: HCPCS | Performed by: STUDENT IN AN ORGANIZED HEALTH CARE EDUCATION/TRAINING PROGRAM

## 2017-12-29 PROCEDURE — 77063 BREAST TOMOSYNTHESIS BI: CPT | Performed by: STUDENT IN AN ORGANIZED HEALTH CARE EDUCATION/TRAINING PROGRAM

## 2017-12-29 RX ORDER — TRIAMCINOLONE ACETONIDE 1 MG/G
CREAM TOPICAL
Qty: 45 G | Refills: 3 | Status: SHIPPED | OUTPATIENT
Start: 2017-12-29 | End: 2018-07-14

## 2017-12-29 NOTE — TELEPHONE ENCOUNTER
triamcinolone (KENALOG) 0.1 % cream  Last Written Prescription Date: 8/15/2017  Last Fill Quantity: 45 g,  # refills: 0   Last Office Visit with [facility], [facility] or [facility] prescribing provider: 11/22/2017    Refilled per [facility] protocol.    Modesta Cortes RN

## 2018-02-20 ENCOUNTER — OFFICE VISIT (OUTPATIENT)
Dept: PEDIATRICS | Facility: CLINIC | Age: 63
End: 2018-02-20
Payer: COMMERCIAL

## 2018-02-20 VITALS
WEIGHT: 243.9 LBS | TEMPERATURE: 97.4 F | BODY MASS INDEX: 38.28 KG/M2 | SYSTOLIC BLOOD PRESSURE: 116 MMHG | HEART RATE: 73 BPM | OXYGEN SATURATION: 96 % | HEIGHT: 67 IN | DIASTOLIC BLOOD PRESSURE: 64 MMHG

## 2018-02-20 DIAGNOSIS — E89.0 POSTABLATIVE HYPOTHYROIDISM: ICD-10-CM

## 2018-02-20 DIAGNOSIS — E89.0 POSTABLATIVE HYPOTHYROIDISM: Primary | ICD-10-CM

## 2018-02-20 DIAGNOSIS — J45.30 MILD PERSISTENT ASTHMA WITHOUT COMPLICATION: ICD-10-CM

## 2018-02-20 DIAGNOSIS — I83.93 SPIDER VEINS OF BOTH LOWER EXTREMITIES: ICD-10-CM

## 2018-02-20 LAB
T4 FREE SERPL-MCNC: 1.34 NG/DL (ref 0.76–1.46)
TSH SERPL DL<=0.005 MIU/L-ACNC: 0.35 MU/L (ref 0.4–4)

## 2018-02-20 PROCEDURE — 84443 ASSAY THYROID STIM HORMONE: CPT | Performed by: FAMILY MEDICINE

## 2018-02-20 PROCEDURE — 36415 COLL VENOUS BLD VENIPUNCTURE: CPT | Performed by: FAMILY MEDICINE

## 2018-02-20 PROCEDURE — 99214 OFFICE O/P EST MOD 30 MIN: CPT | Performed by: FAMILY MEDICINE

## 2018-02-20 PROCEDURE — 84439 ASSAY OF FREE THYROXINE: CPT | Performed by: FAMILY MEDICINE

## 2018-02-20 RX ORDER — LEVOTHYROXINE SODIUM 112 UG/1
112 TABLET ORAL DAILY
Qty: 90 TABLET | Refills: 3 | Status: SHIPPED | OUTPATIENT
Start: 2018-02-20 | End: 2018-09-07

## 2018-02-20 RX ORDER — MONTELUKAST SODIUM 10 MG/1
10 TABLET ORAL AT BEDTIME
Qty: 90 TABLET | Refills: 3 | Status: SHIPPED | OUTPATIENT
Start: 2018-02-20 | End: 2018-09-07

## 2018-02-20 ASSESSMENT — PAIN SCALES - GENERAL: PAINLEVEL: NO PAIN (0)

## 2018-02-20 NOTE — PROGRESS NOTES
SUBJECTIVE:   Denita Flores is a 62 year old female who presents to clinic today for the following health issues:      Asthma Follow-Up    Was ACT completed today?    Yes    ACT Total Scores 2/20/2018   ACT TOTAL SCORE -   ASTHMA ER VISITS -   ASTHMA HOSPITALIZATIONS -   ACT TOTAL SCORE (Goal Greater than or Equal to 20) 23   In the past 12 months, how many times did you visit the emergency room for your asthma without being admitted to the hospital? 0   In the past 12 months, how many times were you hospitalized overnight because of your asthma? 0       Recent asthma triggers that patient is dealing with: None      Hypothyroidism Follow-up      Since last visit, patient describes the following symptoms: Weight stable, no hair loss, no skin changes, no constipation, no loose stools      Amount of exercise or physical activity: None    Problems taking medications regularly: No    Medication side effects: none    Diet: regular (no restrictions)        SPIDER VEINS-has had spider veins of both legs for many years, recently getting worse with intermittent ache in the legs at night patient denies previous history of DVT.,  Current concerns for leg swelling, redness or pain.  Patient works as a .      Problem list and histories reviewed & adjusted, as indicated.  Additional history: as documented    Patient Active Problem List   Diagnosis     Graves disease     Obesity     Diverticulosis of large intestine without hemorrhage     Colon polyps     History of cervical cancer     YELENA (obstructive sleep apnea)     Warts     CARDIOVASCULAR SCREENING; LDL GOAL LESS THAN 130     Hx of herpes zoster keratoconjunctivitis, os     Plantar warts     Stasis dermatitis of both legs     Obesity (BMI 30-39.9)     Postablative hypothyroidism     Other specified hypothyroidism,post ablative     Venous stasis dermatitis of left lower extremity     Vitamin B12 deficiency (non anemic)     Mild persistent asthma without  complication     Spider veins of both lower extremities     Seasonal allergic rhinitis     Family history of colon cancer     Epistaxis     Tubular adenoma of colon     Past Surgical History:   Procedure Laterality Date     BACK SURGERY       COLONOSCOPY WITH CO2 INSUFFLATION N/A 2/21/2017    Procedure: COLONOSCOPY WITH CO2 INSUFFLATION;  Surgeon: Duane, William Charles, MD;  Location: [facility] OR     LEEP TX, CERVICAL      in her 20's       Social History   Substance Use Topics     Smoking status: Former Smoker     Types: Cigarettes     Quit date: 1/1/2003     Smokeless tobacco: Never Used     Alcohol use 0.0 oz/week     0 Standard drinks or equivalent per week     Family History   Problem Relation Age of Onset     HEART DISEASE Father      CANCER Father 67     kidney cancer      HEART DISEASE Sister      CANCER Sister 59     colon cancer      DIABETES Sister      Colon Cancer Maternal Aunt      Colon Cancer Paternal Aunt          Current Outpatient Prescriptions   Medication Sig Dispense Refill     montelukast (SINGULAIR) 10 MG tablet Take 1 tablet (10 mg) by mouth At Bedtime 90 tablet 3     levothyroxine (SYNTHROID/LEVOTHROID) 112 MCG tablet Take 1 tablet (112 mcg) by mouth daily 90 tablet 3     order for DME Equipment being ordered: compression stockings- LARGE, 15mm HG, THIGH HIGH 2 Units 0     triamcinolone (KENALOG) 0.1 % cream FOR ECZEMA ON LOWER LEG USE TWICE DAILY FOR 2 WEEKS THEN THREE TIMES DAILY WEEKLY FOR 2 WEEKS REPEAT CYCLE AS NEEDED 45 g 3     [DISCONTINUED] levothyroxine (SYNTHROID/LEVOTHROID) 112 MCG tablet Take 1 tablet (112 mcg) by mouth daily 90 tablet 3     albuterol (PROAIR HFA/PROVENTIL HFA/VENTOLIN HFA) 108 (90 BASE) MCG/ACT Inhaler Inhale 2 puffs into the lungs every 6 hours as needed for shortness of breath / dyspnea 1 Inhaler 3     [DISCONTINUED] montelukast (SINGULAIR) 10 MG tablet Take 1 tablet (10 mg) by mouth At Bedtime 90 tablet 3     MEGARED OMEGA-3 KRILL  MG CAPS Take 1 capsule  by mouth daily        cyanocolbalamin (VITAMIN  B-12) 100 MCG tablet Take 100 mcg by mouth daily.       Cholecalciferol (D3 ADULT PO) Take 2,000 Units by mouth daily.       Allergies   Allergen Reactions     Sulfa Drugs Hives and Swelling     Noted in 10/18/09 ER     Recent Labs   Lab Test  02/20/18   1040  12/18/17   1035   08/15/17   1028   07/06/16   0902   06/22/15   1012   05/08/13   1140   04/16/10   0943   LDL   --    --    --   101*   --   115*   --   96   --   112   < >   --    HDL   --    --    --   52   --   53   --   47*   --   50   < >   --    TRIG   --    --    --   124   --   107   --   106   --   125   < >   --    ALT   --    --    --   20   --    --    --    --    --   24   --   10   CR   --    --    --   0.76   --    --    --    --    --   0.72   --   0.72   GFRESTIMATED   --    --    --   77   --    --    --    --    --   83   --   84   GFRESTBLACK   --    --    --   >90   --    --    --    --    --   >90   --   >90   POTASSIUM   --    --    --   4.0   --    --    --    --    --   4.2   --   3.6   TSH  0.35*  0.88   < >  0.36*   < >  0.40   < >  0.21*   < >  1.58   --    --     < > = values in this interval not displayed.      BP Readings from Last 3 Encounters:   02/20/18 116/64   11/22/17 128/64   10/20/17 126/70    Wt Readings from Last 3 Encounters:   02/20/18 243 lb 14.4 oz (110.6 kg)   11/22/17 243 lb (110.2 kg)   10/20/17 242 lb 9.6 oz (110 kg)                  Labs reviewed in EPIC    Reviewed and updated as needed this visit by clinical staff  Tobacco  Allergies  Med Hx  Surg Hx  Fam Hx  Soc Hx      Reviewed and updated as needed this visit by Provider         ROS:  CONSTITUTIONAL: NEGATIVE for fever, chills, change in weight  RESP: NEGATIVE for significant cough or SOB  RESP:Hx asthma  CV: NEGATIVE for chest pain, palpitations or peripheral edema  GI: NEGATIVE for nausea, abdominal pain, heartburn, or change in bowel habits  MUSCULOSKELETAL: as above  NEURO: NEGATIVE for weakness,  "dizziness or paresthesias  ENDOCRINE: NEGATIVE for temperature intolerance, skin/hair changes and Hx thyroid disease  HEME/ALLERGY/IMMUNE: NEGATIVE for bleeding problems  PSYCHIATRIC: NEGATIVE for changes in mood or affect    OBJECTIVE:     /64 (BP Location: Right arm, Patient Position: Sitting, Cuff Size: Adult Large)  Pulse 73  Temp 97.4  F (36.3  C) (Oral)  Ht 5' 7\" (1.702 m)  Wt 243 lb 14.4 oz (110.6 kg)  SpO2 96%  BMI 38.2 kg/m2  Body mass index is 38.2 kg/(m^2).  GENERAL: healthy, alert and no distress  NECK: no adenopathy, no asymmetry, masses, or scars and thyroid normal to palpation  RESP: lungs clear to auscultation - no rales, rhonchi or wheezes  CV: regular rate and rhythm, normal S1 S2, no S3 or S4, no murmur, click or rub, no peripheral edema and peripheral pulses strong  MS: Multiple spider veins of both thighs and lower legs ,nontender to touch  SKIN: no suspicious lesions or rashes  NEURO: Normal strength and tone, mentation intact and speech normal  PSYCH: mentation appears normal, affect normal/bright    Diagnostic Test Results:  none     ASSESSMENT/PLAN:     Asthma: Mild persistent--controlled   Plan:  No changes in the patient's current treatment plan    Hypothyroidism; borderline euthyroid   Plan:  No changes in the patient's current treatment plan          1. Postablative hypothyroidism  Results for orders placed or performed in visit on 02/20/18   TSH   Result Value Ref Range    TSH 0.35 (L) 0.40 - 4.00 mU/L   T4 FREE   Result Value Ref Range    T4 Free 1.34 0.76 - 1.46 ng/dL      reviewed normal free T4 and slightly low TSH,   will continue with current dose,   recheck at her physical visit in 6 months or sooner if needed.    Patient does not exhibit any hypothyroid symptoms at this time, continue to monitor.  Patient verbalised understanding and is agreeable to the plan.    - levothyroxine (SYNTHROID/LEVOTHROID) 112 MCG tablet; Take 1 tablet (112 mcg) by mouth daily  Dispense: " 90 tablet; Refill: 3    2. Mild persistent asthma without complication  Stable, continue with Singulair 10 mg daily, recheck in 6 months at the time of physical.  - montelukast (SINGULAIR) 10 MG tablet; Take 1 tablet (10 mg) by mouth At Bedtime  Dispense: 90 tablet; Refill: 3    3. Spider veins of both lower extremities  Reviewed the etio path of spider and varicose veins, recommended leg elevation, compression stockings, avoid prolonged standing or walking,. Reviewed signs and symptoms of DVT and superficial thrombophlebitis and rtc for those concerns.  Patient education handouts on the same given.  Patient verbalised understanding and is agreeable to the plan.  Prescription given for compression stockings    - order for DME; Equipment being ordered: compression stockings- LARGE, 15mm HG, THIGH HIGH  Dispense: 2 Units; Refill: 0    Work on weight loss  Regular exercise  Chart documentation done in part with Dragon Voice recognition Software. Although reviewed after completion, some word and grammatical error may remain.    See Patient Instructions    Gee Hitchcock MD  [facility]

## 2018-02-20 NOTE — NURSING NOTE
"Chief Complaint   Patient presents with     Recheck Medication       Initial /64 (BP Location: Right arm, Patient Position: Sitting, Cuff Size: Adult Large)  Pulse 73  Temp 97.4  F (36.3  C) (Oral)  Ht 5' 7\" (1.702 m)  Wt 243 lb 14.4 oz (110.6 kg)  SpO2 96%  BMI 38.2 kg/m2 Estimated body mass index is 38.2 kg/(m^2) as calculated from the following:    Height as of this encounter: 5' 7\" (1.702 m).    Weight as of this encounter: 243 lb 14.4 oz (110.6 kg).  Medication Reconciliation: complete  Linh Davis, JEANNETTE    "

## 2018-02-20 NOTE — MR AVS SNAPSHOT
After Visit Summary   2/20/2018    Denita Flores    MRN: [id number]           Patient Information     Date Of Birth          1955        Visit Information        Provider Department      2/20/2018 11:10 AM Gee Hitchcock MD [facility]        Today's Diagnoses     YELENA (obstructive sleep apnea)    -  1    Postablative hypothyroidism        Mild persistent asthma without complication        Spider veins of both lower extremities          Care Instructions    Schedule for fasting labs and physical in 6 months  Wear compression stockings for spider veins    Self-Care for Spider and Varicose Veins  Your healthcare provider may suggest that you try self-care. Exercising and maintaining a healthy weight may keep problem veins from getting worse. Wearing elastic stockings and elevating your legs can help improve blood flow. Taking breaks when you sit or stand helps, too.     The top of the elastic stocking should be below the bend in your knee for a proper fit.   Exercising  Exercising is good for your veins because it improves blood flow. Walking, cycling, or swimming are great exercises for vein health. But be sure to check with your healthcare provider before starting any exercise program. Also, keep these hints in mind:    When exercising, start out slowly and try to build up to 30 minutes on most days.    Elevate your legs above heart level after exercise to keep blood from pooling in veins.  Maintaining a healthy weight  Being overweight puts extra pressure on your veins. To maintain a healthy weight, try these tips:    Choose lean meats, fish, and skinless chicken.    Use low-fat dairy products.    Eat foods high in fiber, such as whole grains, fruits, and vegetables.    Cut down on sugar, salt, and saturated and trans fats.    Exercise regularly.  Wearing elastic stockings  Elastic stockings gently squeeze veins so blood flows upward. If you need elastic stockings, your  healthcare provider can prescribe them for you. Follow your healthcare provider s advice about how and when to wear them. Elastic stockings come in several different levels of pressure. Ask your healthcare provider which level of pressure would benefit you the most.   Elevating your legs  Raising your legs above heart level will help relieve swelling and keep blood from pooling in veins. Try to elevate your legs for 15 to 20 minutes at the end of the day, and whenever you re relaxing. To make sure your legs are raised above heart level, prop them up on cushions or large pillows.  When sitting and standing  To keep blood moving when you have to sit or stand for long periods, try these tips:    At work, take walking breaks instead of coffee breaks. Walk during your lunch hour. Or try flexing your feet up and down 10 times each hour.    When standing, raise yourself up and down on your toes, or rock back and forth on your heels.  Date Last Reviewed: 5/1/2016 2000-2017 [employer]. [address] [place] [address], [place] [address]. All rights reserved. This information is not intended as a substitute for professional medical care. Always follow your healthcare professional's instructions.        Understanding Spider and Varicose Veins  Do you often hide your legs because of the way they look? You may have noticed tiny red or blue bursts (spider veins). Or maybe you have veins that bulge or look twisted (varicose veins). If so, there are treatments that can help  What are the symptoms?  Spider veins or varicose veins may never be a problem. But sometimes they can cause legs to ache or swell. Your legs may also feel heavy and tired, or like they re burning. These symptoms may be more severe at the end of the day. Prolonged sitting or standing can also make your symptoms worse.  Who gets spider and varicose veins?  Anyone can get spider or varicose veins. But vein problems tend to be hereditary (run in families).  Other factors that can affect veins include:    Pregnancy, hormones, and birth control pills    A job where you stand or sit a lot    Extra weight or lack of exercise    Age     Spider veins look like tiny webs on the ankles, legs, and upper thighs.      Ropy, dark blue, red, or flesh-colored varicose veins are most common on the thighs, calves, and feet.     What can be done?  Spider and varicose veins can affect the way you feel about yourself. Talk to your healthcare provider about your concerns. There are treatments that can ease symptoms and make your legs look better.  Your treatment choices  Treatment may include self-care, sclerotherapy (injecting veins with a chemical), surgery, or newer nonsurgical minimally invasive therapies. Spider veins and some varicose veins can be treated with sclerotherapy. Large varicose veins can often be treated with newer minimally invasive procedures and, in rare cases, surgery may be needed.   Date Last Reviewed: 5/1/2016 2000-2017 [employer]. [address] [place] [address]. All rights reserved. This information is not intended as a substitute for professional medical care. Always follow your healthcare professional's instructions.                Follow-ups after your visit        Your next 10 appointments already scheduled     Oct 30, 2018 10:30 AM CDT   Return Visit with Gail Booth MD   [facility]    [id number] [place] [id number] [phone]              Who to contact     If you have questions or need follow up information about today's clinic visit or your schedule please contact [facility] directly at [phone].  Normal or non-critical lab and imaging results will be communicated to you by MyChart, letter or phone within 4 business days after the clinic has received the results. If you do not hear from us within 7 days, please contact the clinic  "through [employer] or phone. If you have a critical or abnormal lab result, we will notify you by phone as soon as possible.  Submit refill requests through [employer] or call your pharmacy and they will forward the refill request to us. Please allow 3 business days for your refill to be completed.          Additional Information About Your Visit        [employer] Information     [employer] is an electronic gateway that provides easy, online access to your medical records. With [employer], you can request a clinic appointment, read your test results, renew a prescription or communicate with your care team.     To sign up for [employer] visit the website at [email or web address]   You will be asked to enter the access code listed below, as well as some personal information. Please follow the directions to create your username and password.     Your access code is: [email or web address]  Expires: 2018 11:52 AM     Your access code will  in 90 days. If you need help or a new code, please contact your [facility] or call [phone] for assistance.        Care EveryWhere ID     This is your Care EveryWhere ID. This could be used by other organizations to access your [facility] medical records  [id number]        Your Vitals Were     Pulse Temperature Height Pulse Oximetry BMI (Body Mass Index)       73 97.4  F (36.3  C) (Oral) 5' 7\" (1.702 m) 96% 38.2 kg/m2        Blood Pressure from Last 3 Encounters:   18 116/64   17 128/64   10/20/17 126/70    Weight from Last 3 Encounters:   18 243 lb 14.4 oz (110.6 kg)   17 243 lb (110.2 kg)   10/20/17 242 lb 9.6 oz (110 kg)              Today, you had the following     No orders found for display         Today's Medication Changes          These changes are accurate as of 18 11:52 AM.  If you have any questions, ask your nurse or doctor.               Start taking these medicines.        Dose/Directions    order for DME   Used for:  " Spider veins of both lower extremities   Started by:  Gee Hitchcock MD        Equipment being ordered: compression stockings- LARGE, 15mm HG, THIGH HIGH   Quantity:  2 Units   Refills:  0            Where to get your medicines      These medications were sent to [facility] [address] [place], [place] - [address] AT [facility] [address], [facility] [address]     Phone:  [phone]     levothyroxine 112 MCG tablet    montelukast 10 MG tablet         Some of these will need a paper prescription and others can be bought over the counter.  Ask your nurse if you have questions.     Bring a paper prescription for each of these medications     order for DME                Primary Care Provider Office Phone # Fax #    Gee Hitchcock [phone] [address]  [facility] [address]        Equal Access to Services     JONATHAN CHILEL AH: Hadii aad ku hadasho Soomaali, waaxda luqadaha, qaybta kaalmada adeegyada, waxay idiin hayaan adehany kharaaftab snighn . So [facility] [phone].    ATENCIÓN: Si habla español, tiene a moore disposición servicios gratuitos de asistencia lingüística. [facility] [phone].    We comply with applicable federal civil rights laws and [place] laws. We do not discriminate on the basis of race, color, national origin, age, disability, sex, sexual orientation, or gender identity.            Thank you!     Thank you for choosing [facility]  for your care. Our goal is always to provide you with excellent care. Hearing back from our patients is one way we can continue to improve our services. Please take a few minutes to complete the written survey that you may receive in the mail after your visit with us. Thank you!             Your Updated Medication List - Protect others around you: Learn how to safely use, store and throw away your medicines at www.disposemymeds.org.          This list is accurate as of 2/20/18 11:52  AM.  Always use your most recent med list.                   Brand Name Dispense Instructions for use Diagnosis    albuterol 108 (90 BASE) MCG/ACT Inhaler    PROAIR HFA/PROVENTIL HFA/VENTOLIN HFA    1 Inhaler    Inhale 2 puffs into the lungs every 6 hours as needed for shortness of breath / dyspnea    Mild persistent asthma without complication       cyanocolbalamin 100 MCG tablet    vitamin  B-12     Take 100 mcg by mouth daily.        D3 ADULT PO      Take 2,000 Units by mouth daily.        levothyroxine 112 MCG tablet    SYNTHROID/LEVOTHROID    90 tablet    Take 1 tablet (112 mcg) by mouth daily    Postablative hypothyroidism       MEGARED OMEGA-3 KRILL  MG Caps      Take 1 capsule by mouth daily        montelukast 10 MG tablet    SINGULAIR    90 tablet    Take 1 tablet (10 mg) by mouth At Bedtime    Mild persistent asthma without complication       order for DME     2 Units    Equipment being ordered: compression stockings- LARGE, 15mm HG, THIGH HIGH    Spider veins of both lower extremities       triamcinolone 0.1 % cream    KENALOG    45 g    FOR ECZEMA ON LOWER LEG USE TWICE DAILY FOR 2 WEEKS THEN THREE TIMES DAILY WEEKLY FOR 2 WEEKS REPEAT CYCLE AS NEEDED    Eczema craquele

## 2018-02-20 NOTE — PATIENT INSTRUCTIONS
Schedule for fasting labs and physical in 6 months  Wear compression stockings for spider veins    Self-Care for Spider and Varicose Veins  Your healthcare provider may suggest that you try self-care. Exercising and maintaining a healthy weight may keep problem veins from getting worse. Wearing elastic stockings and elevating your legs can help improve blood flow. Taking breaks when you sit or stand helps, too.     The top of the elastic stocking should be below the bend in your knee for a proper fit.   Exercising  Exercising is good for your veins because it improves blood flow. Walking, cycling, or swimming are great exercises for vein health. But be sure to check with your healthcare provider before starting any exercise program. Also, keep these hints in mind:    When exercising, start out slowly and try to build up to 30 minutes on most days.    Elevate your legs above heart level after exercise to keep blood from pooling in veins.  Maintaining a healthy weight  Being overweight puts extra pressure on your veins. To maintain a healthy weight, try these tips:    Choose lean meats, fish, and skinless chicken.    Use low-fat dairy products.    Eat foods high in fiber, such as whole grains, fruits, and vegetables.    Cut down on sugar, salt, and saturated and trans fats.    Exercise regularly.  Wearing elastic stockings  Elastic stockings gently squeeze veins so blood flows upward. If you need elastic stockings, your healthcare provider can prescribe them for you. Follow your healthcare provider s advice about how and when to wear them. Elastic stockings come in several different levels of pressure. Ask your healthcare provider which level of pressure would benefit you the most.   Elevating your legs  Raising your legs above heart level will help relieve swelling and keep blood from pooling in veins. Try to elevate your legs for 15 to 20 minutes at the end of the day, and whenever you re relaxing. To make sure your  legs are raised above heart level, prop them up on cushions or large pillows.  When sitting and standing  To keep blood moving when you have to sit or stand for long periods, try these tips:    At work, take walking breaks instead of coffee breaks. Walk during your lunch hour. Or try flexing your feet up and down 10 times each hour.    When standing, raise yourself up and down on your toes, or rock back and forth on your heels.  Date Last Reviewed: 5/1/2016 2000-2017 [employer]. [address] [place] [id number]. All rights reserved. This information is not intended as a substitute for professional medical care. Always follow your healthcare professional's instructions.        Understanding Spider and Varicose Veins  Do you often hide your legs because of the way they look? You may have noticed tiny red or blue bursts (spider veins). Or maybe you have veins that bulge or look twisted (varicose veins). If so, there are treatments that can help  What are the symptoms?  Spider veins or varicose veins may never be a problem. But sometimes they can cause legs to ache or swell. Your legs may also feel heavy and tired, or like they re burning. These symptoms may be more severe at the end of the day. Prolonged sitting or standing can also make your symptoms worse.  Who gets spider and varicose veins?  Anyone can get spider or varicose veins. But vein problems tend to be hereditary (run in families). Other factors that can affect veins include:    Pregnancy, hormones, and birth control pills    A job where you stand or sit a lot    Extra weight or lack of exercise    Age     Spider veins look like tiny webs on the ankles, legs, and upper thighs.      Ropy, dark blue, red, or flesh-colored varicose veins are most common on the thighs, calves, and feet.     What can be done?  Spider and varicose veins can affect the way you feel about yourself. Talk to your healthcare provider about your concerns.  There are treatments that can ease symptoms and make your legs look better.  Your treatment choices  Treatment may include self-care, sclerotherapy (injecting veins with a chemical), surgery, or newer nonsurgical minimally invasive therapies. Spider veins and some varicose veins can be treated with sclerotherapy. Large varicose veins can often be treated with newer minimally invasive procedures and, in rare cases, surgery may be needed.   Date Last Reviewed: 5/1/2016 2000-2017 [employer]. [address] [place] [address], [place] [address]. All rights reserved. This information is not intended as a substitute for professional medical care. Always follow your healthcare professional's instructions.

## 2018-02-21 ASSESSMENT — ASTHMA QUESTIONNAIRES: ACT_TOTALSCORE: 23

## 2018-05-11 ENCOUNTER — OFFICE VISIT (OUTPATIENT)
Dept: PEDIATRICS | Facility: CLINIC | Age: 63
End: 2018-05-11
Payer: COMMERCIAL

## 2018-05-11 VITALS
SYSTOLIC BLOOD PRESSURE: 116 MMHG | OXYGEN SATURATION: 96 % | TEMPERATURE: 97.1 F | HEIGHT: 67 IN | BODY MASS INDEX: 38.63 KG/M2 | DIASTOLIC BLOOD PRESSURE: 62 MMHG | WEIGHT: 246.1 LBS | HEART RATE: 82 BPM

## 2018-05-11 DIAGNOSIS — R05.9 COUGH: ICD-10-CM

## 2018-05-11 DIAGNOSIS — J30.1 SEASONAL ALLERGIC RHINITIS DUE TO POLLEN, UNSPECIFIED CHRONICITY: ICD-10-CM

## 2018-05-11 DIAGNOSIS — J01.00 ACUTE MAXILLARY SINUSITIS, RECURRENCE NOT SPECIFIED: Primary | ICD-10-CM

## 2018-05-11 PROCEDURE — 99213 OFFICE O/P EST LOW 20 MIN: CPT | Performed by: FAMILY MEDICINE

## 2018-05-11 NOTE — PROGRESS NOTES
SUBJECTIVE:   Denita Flores is a 62 year old female who presents to clinic today for the following health issues:      Acute Illness   Acute illness concerns: Sinus infection  Onset: 1 1/2 weeks    Fever: no     Chills/Sweats: no     Headache (location?): YES    Sinus Pressure:YES/facial pain    Conjunctivitis:  YES- puffy,raw feeling    Ear Pain: YES- right    Rhinorrhea: YES    Congestion: YES    Sore Throat: no      Cough: YES-productive of yellow sputum, productive of green sputum    Wheeze: YES    Decreased Appetite: YES    Nausea: YES    Vomiting: no     Diarrhea:  no     Dysuria/Freq.: no     Fatigue/Achiness: YES    Sick/Strep Exposure: YES-      Therapies Tried and outcome: Albuterol inhaler, singular     She had similar symptoms treated by Dr DUNLAP with antibiotics in Nov of 2017.      Problem list and histories reviewed & adjusted, as indicated.  Additional history: as documented    Patient Active Problem List   Diagnosis     Graves disease     Obesity     Diverticulosis of large intestine without hemorrhage     Colon polyps     History of cervical cancer     YELENA (obstructive sleep apnea)     Warts     CARDIOVASCULAR SCREENING; LDL GOAL LESS THAN 130     Hx of herpes zoster keratoconjunctivitis, os     Plantar warts     Stasis dermatitis of both legs     Obesity (BMI 30-39.9)     Postablative hypothyroidism     Other specified hypothyroidism,post ablative     Venous stasis dermatitis of left lower extremity     Vitamin B12 deficiency (non anemic)     Mild persistent asthma without complication     Spider veins of both lower extremities     Seasonal allergic rhinitis     Family history of colon cancer     Epistaxis     Tubular adenoma of colon     Past Surgical History:   Procedure Laterality Date     BACK SURGERY       COLONOSCOPY WITH CO2 INSUFFLATION N/A 2/21/2017    Procedure: COLONOSCOPY WITH CO2 INSUFFLATION;  Surgeon: Duane, William Charles, MD;  Location:  OR     [facility],  CERVICAL      in her 20's       Social History   Substance Use Topics     Smoking status: Former Smoker     Types: Cigarettes     Quit date: 1/1/2003     Smokeless tobacco: Never Used     Alcohol use 0.0 oz/week     0 Standard drinks or equivalent per week     Family History   Problem Relation Age of Onset     HEART DISEASE Father      CANCER Father 67     kidney cancer      HEART DISEASE Sister      CANCER Sister 59     colon cancer      DIABETES Sister      Colon Cancer Maternal Aunt      Colon Cancer Paternal Aunt          Current Outpatient Prescriptions   Medication Sig Dispense Refill     albuterol (PROAIR HFA/PROVENTIL HFA/VENTOLIN HFA) 108 (90 BASE) MCG/ACT Inhaler Inhale 2 puffs into the lungs every 6 hours as needed for shortness of breath / dyspnea 1 Inhaler 3     Cholecalciferol (D3 ADULT PO) Take 2,000 Units by mouth daily.       cyanocolbalamin (VITAMIN  B-12) 100 MCG tablet Take 100 mcg by mouth daily.       levothyroxine (SYNTHROID/LEVOTHROID) 112 MCG tablet Take 1 tablet (112 mcg) by mouth daily 90 tablet 3     MEGARED OMEGA-3 KRILL  MG CAPS Take 1 capsule by mouth daily        montelukast (SINGULAIR) 10 MG tablet Take 1 tablet (10 mg) by mouth At Bedtime 90 tablet 3     order for DME Equipment being ordered: compression stockings- LARGE, 15mm HG, THIGH HIGH (Patient not taking: Reported on 5/11/2018) 2 Units 0     triamcinolone (KENALOG) 0.1 % cream FOR ECZEMA ON LOWER LEG USE TWICE DAILY FOR 2 WEEKS THEN THREE TIMES DAILY WEEKLY FOR 2 WEEKS REPEAT CYCLE AS NEEDED (Patient not taking: Reported on 5/11/2018) 45 g 3     Allergies   Allergen Reactions     Sulfa Drugs Hives and Swelling     Noted in 10/18/09 ER     BP Readings from Last 3 Encounters:   05/11/18 116/62   02/20/18 116/64   11/22/17 128/64    Wt Readings from Last 3 Encounters:   05/11/18 246 lb 1.6 oz (111.6 kg)   02/20/18 243 lb 14.4 oz (110.6 kg)   11/22/17 243 lb (110.2 kg)                    Reviewed and updated as needed this  "visit by clinical staff       Reviewed and updated as needed this visit by Provider         ROS:  Constitutional, HEENT, cardiovascular, pulmonary, GI, , musculoskeletal, neuro, skin, endocrine and psych systems are negative, except as otherwise noted.    OBJECTIVE:     /62 (BP Location: Right arm, Patient Position: Sitting, Cuff Size: Adult Large)  Pulse 82  Temp 97.1  F (36.2  C) (Temporal)  Ht 5' 7\" (1.702 m)  Wt 246 lb 1.6 oz (111.6 kg)  SpO2 96%  BMI 38.54 kg/m2  Body mass index is 38.54 kg/(m^2).  GENERAL: healthy, alert and no distress  HENT: normal cephalic/atraumatic, nose and mouth without ulcers or lesions, oropharynx clear, oral mucous membranes moist and sinuses: maxillary, frontal tenderness on bilateral  NECK: no adenopathy, no asymmetry, masses, or scars and thyroid normal to palpation  RESP: lungs clear to auscultation - no rales, rhonchi or wheezes  CV: regular rate and rhythm, normal S1 S2, no S3 or S4, no murmur, click or rub, no peripheral edema and peripheral pulses stron  MS: no gross musculoskeletal defects noted, no edema  SKIN: no suspicious lesions or rashes    Diagnostic Test Results:  none     ASSESSMENT/PLAN:           ICD-10-CM    1. Acute maxillary sinusitis, recurrence not specified J01.00 amoxicillin-clavulanate (AUGMENTIN) 875-125 MG per tablet   2. Seasonal allergic rhinitis due to pollen, unspecified chronicity J30.1    3. Cough R05        See Patient Instructions    Santhosh Wood MD  [facility]  "

## 2018-05-11 NOTE — PATIENT INSTRUCTIONS
Allergic Rhinitis  Allergic rhinitis is an allergic reaction that affects the nose, and often the eyes. It s often known as nasal allergies. Nasal allergies are often due to things in the environment that are breathed in. Depending what you are sensitive to, nasal allergies may occur only during certain seasons. Or they may occur year round. Common indoor allergens include house dust mites, mold, cockroaches, and pet dander. Outdoor allergens include pollen from trees, grasses, and weeds.   Symptoms include a drippy, stuffy, and itchy nose. They also include sneezing and red and itchy eyes. You may feel tired more often. Severe allergies may also affect your breathing and trigger a condition called asthma.   Tests can be done to see what allergens are affecting you. You may be referred to an allergy specialist for testing and further evaluation.  Home care  Your healthcare provider may prescribe medicines to help relieve allergy symptoms. These may include oral medicines, nasal sprays, or eye drops.  Ask your provider for advice on how to avoid substances that you are allergic to. Below are a few tips for each type of allergen.  Pet dander:  Do not have pets with fur and feathers.  If you can't avoid having a pet, keep it out of your bedroom and off upholstered furniture.  Pollen:  When pollen counts are high, keep windows of your car and home closed. If possible, use an air conditioner instead.  Wear a filter mask when mowing or doing yard work.  House dust mites:  Wash bedding every week in warm water and detergent and dry on a hot setting.  Cover the mattress, box spring, and pillows with allergy covers.   If possible, sleep in a room with no carpet, curtains, or upholstered furniture.  Cockroaches:  Store food in sealed containers.  Remove garbage from the home promptly.  Fix water leaks  Mold:  Keep humidity low by using a dehumidifier or air conditioner. Keep the dehumidifier and air conditioner clean and  free of mold.  Clean moldy areas with bleach and water.  In general:  Vacuum once or twice a week. If possible, use a vacuum with a high-efficiency particulate air (HEPA) filter.  Do not smoke. Avoid cigarette smoke. Cigarette smoke is an irritant that can make symptoms worse.  Follow-up care  Follow up as advised by the healthcare provider or our staff. If you were referred to an allergy specialist, make this appointment promptly.  When to seek medical advice  Call your healthcare provider right away if the following occur:  Coughing or wheezing  Fever of 100.4 F (38 C) or higher, or as directed by your healthcare provider  Raised red bumps (hives)  Continuing symptoms, new symptoms, or worsening symptoms  Call 911 if you have:  Trouble breathing  Severe swelling of the face or severe itching of the eyes or mouth  Date Last Reviewed: 3/1/2017    [id number] [employer]. [address] [place] [address]. All rights reserved. This information is not intended as a substitute for professional medical care. Always follow your healthcare professional's instructions.        Sinusitis (Antibiotic Treatment)    The sinuses are air-filled spaces within the bones of the face. They connect to the inside of the nose. Sinusitis is an inflammation of the tissue that lines the sinuses. Sinusitis can occur during a cold. It can also happen due to allergies to pollens and other particles in the air. Sinusitis can cause symptoms of sinus congestion and a feeling of fullness. A sinus infection causes fever, headache, and facial pain. There is often green or yellow fluid draining from the nose or into the back of the throat (post-nasal drip). You have been given antibiotics to treat this condition.  Home care    Take the full course of antibiotics as instructed. Do not stop taking them, even when you feel better.    Drink plenty of water, hot tea, and other liquids. This may help thin nasal mucus. It also may help  your sinuses drain fluids.    Heat may help soothe painful areas of your face. Use a towel soaked in hot water. Or,  the shower and direct the warm spray onto your face. Using a vaporizer along with a menthol rub at night may also help soothe symptoms.     An expectorant with guaifenesin may help thin nasal mucus and help your sinuses drain fluids.    You can use an over-the-counter decongestant, unless a similar medicine was prescribed to you. Nasal sprays work the fastest. Use one that contains phenylephrine or oxymetazoline. First blow your nose gently. Then use the spray. Do not use these medicines more often than directed on the label. If you do, your symptoms may get worse. You may also take pills that contain pseudoephedrine. Don t use products that combine multiple medicines. This is because side effects may be increased. Read labels. You can also ask the pharmacist for help. (People with high blood pressure should not use decongestants. They can raise blood pressure.)    Over-the-counter antihistamines may help if allergies contributed to your sinusitis.      Do not use nasal rinses or irrigation during an acute sinus infection, unless your healthcare provider tells you to. Rinsing may spread the infection to other areas in your sinuses.    Use acetaminophen or ibuprofen to control pain, unless another pain medicine was prescribed to you. If you have chronic liver or kidney disease or ever had a stomach ulcer, talk with your healthcare provider before using these medicines. (Aspirin should never be taken by anyone under age 18 who is ill with a fever. It may cause severe liver damage.)    Don't smoke. This can make symptoms worse.  Follow-up care  Follow up with your healthcare provider or our staff if you are better in 1 week.  When to seek medical advice  Call your healthcare provider if any of these occur:    Facial pain or headache that gets worse    Stiff neck    Unusual drowsiness or  confusion    Swelling of your forehead or eyelids    Vision problems, such as blurred or double vision    Fever of 100.4 F (38 C) or higher, or as directed by your healthcare provider    Seizure    Breathing problems    Symptoms don't go away in 10 days  Prevention  Here are steps you can take to help prevent an infection:    Keep good hand washing habits.    Don t have close contact with people who have sore throats, colds, or other upper respiratory infections.    Don t smoke, and stay away from secondhand smoke.    Stay up to date with of your vaccines.  Date Last Reviewed: 11/1/2017 2000-2017 [employer]. [address] [place] [address], [place] [address]. All rights reserved. This information is not intended as a substitute for professional medical care. Always follow your healthcare professional's instructions.

## 2018-05-11 NOTE — MR AVS SNAPSHOT
After Visit Summary   5/11/2018    Denita Flores    MRN: [id number]           Patient Information     Date Of Birth          1955        Visit Information        Provider Department      5/11/2018 11:10 AM Santhosh Wood MD [facility]        Today's Diagnoses     Acute maxillary sinusitis, recurrence not specified    -  1    Seasonal allergic rhinitis due to pollen, unspecified chronicity        Cough          Care Instructions      Allergic Rhinitis  Allergic rhinitis is an allergic reaction that affects the nose, and often the eyes. It s often known as nasal allergies. Nasal allergies are often due to things in the environment that are breathed in. Depending what you are sensitive to, nasal allergies may occur only during certain seasons. Or they may occur year round. Common indoor allergens include house dust mites, mold, cockroaches, and pet dander. Outdoor allergens include pollen from trees, grasses, and weeds.   Symptoms include a drippy, stuffy, and itchy nose. They also include sneezing and red and itchy eyes. You may feel tired more often. Severe allergies may also affect your breathing and trigger a condition called asthma.   Tests can be done to see what allergens are affecting you. You may be referred to an allergy specialist for testing and further evaluation.  Home care  Your healthcare provider may prescribe medicines to help relieve allergy symptoms. These may include oral medicines, nasal sprays, or eye drops.  Ask your provider for advice on how to avoid substances that you are allergic to. Below are a few tips for each type of allergen.  Pet dander:  Do not have pets with fur and feathers.  If you can't avoid having a pet, keep it out of your bedroom and off upholstered furniture.  Pollen:  When pollen counts are high, keep windows of your car and home closed. If possible, use an air conditioner instead.  Wear a filter mask when mowing or doing yard  work.  House dust mites:  Wash bedding every week in warm water and detergent and dry on a hot setting.  Cover the mattress, box spring, and pillows with allergy covers.   If possible, sleep in a room with no carpet, curtains, or upholstered furniture.  Cockroaches:  Store food in sealed containers.  Remove garbage from the home promptly.  Fix water leaks  Mold:  Keep humidity low by using a dehumidifier or air conditioner. Keep the dehumidifier and air conditioner clean and free of mold.  Clean moldy areas with bleach and water.  In general:  Vacuum once or twice a week. If possible, use a vacuum with a high-efficiency particulate air (HEPA) filter.  Do not smoke. Avoid cigarette smoke. Cigarette smoke is an irritant that can make symptoms worse.  Follow-up care  Follow up as advised by the healthcare provider or our staff. If you were referred to an allergy specialist, make this appointment promptly.  When to seek medical advice  Call your healthcare provider right away if the following occur:  Coughing or wheezing  Fever of 100.4 F (38 C) or higher, or as directed by your healthcare provider  Raised red bumps (hives)  Continuing symptoms, new symptoms, or worsening symptoms  Call 911 if you have:  Trouble breathing  Severe swelling of the face or severe itching of the eyes or mouth  Date Last Reviewed: 3/1/2017    9467-8691 [employer]. [address] [place] [address]. All rights reserved. This information is not intended as a substitute for professional medical care. Always follow your healthcare professional's instructions.        Sinusitis (Antibiotic Treatment)    The sinuses are air-filled spaces within the bones of the face. They connect to the inside of the nose. Sinusitis is an inflammation of the tissue that lines the sinuses. Sinusitis can occur during a cold. It can also happen due to allergies to pollens and other particles in the air. Sinusitis can cause symptoms of sinus  congestion and a feeling of fullness. A sinus infection causes fever, headache, and facial pain. There is often green or yellow fluid draining from the nose or into the back of the throat (post-nasal drip). You have been given antibiotics to treat this condition.  Home care    Take the full course of antibiotics as instructed. Do not stop taking them, even when you feel better.    Drink plenty of water, hot tea, and other liquids. This may help thin nasal mucus. It also may help your sinuses drain fluids.    Heat may help soothe painful areas of your face. Use a towel soaked in hot water. Or,  the shower and direct the warm spray onto your face. Using a vaporizer along with a menthol rub at night may also help soothe symptoms.     An expectorant with guaifenesin may help thin nasal mucus and help your sinuses drain fluids.    You can use an over-the-counter decongestant, unless a similar medicine was prescribed to you. Nasal sprays work the fastest. Use one that contains phenylephrine or oxymetazoline. First blow your nose gently. Then use the spray. Do not use these medicines more often than directed on the label. If you do, your symptoms may get worse. You may also take pills that contain pseudoephedrine. Don t use products that combine multiple medicines. This is because side effects may be increased. Read labels. You can also ask the pharmacist for help. (People with high blood pressure should not use decongestants. They can raise blood pressure.)    Over-the-counter antihistamines may help if allergies contributed to your sinusitis.      Do not use nasal rinses or irrigation during an acute sinus infection, unless your healthcare provider tells you to. Rinsing may spread the infection to other areas in your sinuses.    Use acetaminophen or ibuprofen to control pain, unless another pain medicine was prescribed to you. If you have chronic liver or kidney disease or ever had a stomach ulcer, talk with your  healthcare provider before using these medicines. (Aspirin should never be taken by anyone under age 18 who is ill with a fever. It may cause severe liver damage.)    Don't smoke. This can make symptoms worse.  Follow-up care  Follow up with your healthcare provider or our staff if you are better in 1 week.  When to seek medical advice  Call your healthcare provider if any of these occur:    Facial pain or headache that gets worse    Stiff neck    Unusual drowsiness or confusion    Swelling of your forehead or eyelids    Vision problems, such as blurred or double vision    Fever of 100.4 F (38 C) or higher, or as directed by your healthcare provider    Seizure    Breathing problems    Symptoms don't go away in 10 days  Prevention  Here are steps you can take to help prevent an infection:    Keep good hand washing habits.    Don t have close contact with people who have sore throats, colds, or other upper respiratory infections.    Don t smoke, and stay away from secondhand smoke.    Stay up to date with of your vaccines.  Date Last Reviewed: 11/1/2017 2000-2017 [employer]. [address] [place] [address]. All rights reserved. This information is not intended as a substitute for professional medical care. Always follow your healthcare professional's instructions.                Follow-ups after your visit        Your next 10 appointments already scheduled     Oct 30, 2018 10:30 AM CDT   [facility] Visit with Gail Booth MD   [facility]    [address] [place] [id number] [phone]              Who to contact     If you have questions or need follow up information about today's clinic visit or your schedule please contact [facility] directly at [phone].  Normal or non-critical lab and imaging results will be communicated to you by MyChart, letter or phone within 4 business days after the clinic has  "received the results. If you do not hear from us within 7 days, please contact the clinic through [employer] or phone. If you have a critical or abnormal lab result, we will notify you by phone as soon as possible.  Submit refill requests through [employer] or call your pharmacy and they will forward the refill request to us. Please allow 3 business days for your refill to be completed.          Additional Information About Your Visit        [employer] Information     [employer] is an electronic gateway that provides easy, online access to your medical records. With [employer], you can request a clinic appointment, read your test results, renew a prescription or communicate with your care team.     To sign up for [employer] visit the website at [email or web address]   You will be asked to enter the access code listed below, as well as some personal information. Please follow the directions to create your username and password.     Your access code is: [email or web address]  Expires: 2018 12:52 PM     Your access code will  in 90 days. If you need help or a new code, please contact your [facility] or call [phone] for assistance.        Care EveryWhere ID     This is your Care EveryWhere ID. This could be used by other organizations to access your [facility] medical records  [id number]        Your Vitals Were     Pulse Temperature Height Pulse Oximetry BMI (Body Mass Index)       82 97.1  F (36.2  C) (Temporal) 5' 7\" (1.702 m) 96% 38.54 kg/m2        Blood Pressure from Last 3 Encounters:   18 116/62   18 116/64   17 128/64    Weight from Last 3 Encounters:   18 246 lb 1.6 oz (111.6 kg)   18 243 lb 14.4 oz (110.6 kg)   17 243 lb (110.2 kg)              Today, you had the following     No orders found for display         Today's Medication Changes          These changes are accurate as of 18 11:48 AM.  If you have any questions, ask your nurse or doctor. "               Start taking these medicines.        Dose/Directions    amoxicillin-clavulanate 875-125 MG per tablet   Commonly known as:  AUGMENTIN   Used for:  Acute maxillary sinusitis, recurrence not specified   Started by:  Santhosh Wood MD        Dose:  1 tablet   Take 1 tablet by mouth 2 times daily   Quantity:  20 tablet   Refills:  0            Where to get your medicines      These medications were sent to [facility] [id number] [place] [address] [address] AT [facility] [address], [facility] [address]     Phone:  [phone]     amoxicillin-clavulanate 875-125 MG per tablet                Primary Care Provider Office Phone # Fax #    Gee Hitchcock [phone] [address]  [facility] [address]        Equal Access to Services     [facility]: Hadii brenda ku hadasho Soomaali, waaxda luqadaha, qaybta kaalmada adeegyada, fabian lopez hayaan ronald golden . So [facility] [phone].    ATENCIÓN: Si habla español, tiene a moore disposición servicios gratuitos de asistencia lingüística. [facility] [phone].    We comply with applicable federal civil rights laws and [place] laws. We do not discriminate on the basis of race, color, national origin, age, disability, sex, sexual orientation, or gender identity.            Thank you!     Thank you for choosing [facility]  for your care. Our goal is always to provide you with excellent care. Hearing back from our patients is one way we can continue to improve our services. Please take a few minutes to complete the written survey that you may receive in the mail after your visit with us. Thank you!             Your Updated Medication List - Protect others around you: Learn how to safely use, store and throw away your medicines at www.disposemymeds.org.          This list is accurate as of 5/11/18 11:48 AM.  Always use your most recent med list.                   Brand  Name Dispense Instructions for use Diagnosis    albuterol 108 (90 Base) MCG/ACT Inhaler    PROAIR HFA/PROVENTIL HFA/VENTOLIN HFA    1 Inhaler    Inhale 2 puffs into the lungs every 6 hours as needed for shortness of breath / dyspnea    Mild persistent asthma without complication       amoxicillin-clavulanate 875-125 MG per tablet    AUGMENTIN    20 tablet    Take 1 tablet by mouth 2 times daily    Acute maxillary sinusitis, recurrence not specified       cyanocolbalamin 100 MCG tablet    vitamin  B-12     Take 100 mcg by mouth daily.        D3 ADULT PO      Take 2,000 Units by mouth daily.        levothyroxine 112 MCG tablet    SYNTHROID/LEVOTHROID    90 tablet    Take 1 tablet (112 mcg) by mouth daily    Postablative hypothyroidism       MEGARED OMEGA-3 KRILL  MG Caps      Take 1 capsule by mouth daily        montelukast 10 MG tablet    SINGULAIR    90 tablet    Take 1 tablet (10 mg) by mouth At Bedtime    Mild persistent asthma without complication       order for DME     2 Units    Equipment being ordered: compression stockings- LARGE, 15mm HG, THIGH HIGH    Spider veins of both lower extremities       triamcinolone 0.1 % cream    KENALOG    45 g    FOR ECZEMA ON LOWER LEG USE TWICE DAILY FOR 2 WEEKS THEN THREE TIMES DAILY WEEKLY FOR 2 WEEKS REPEAT CYCLE AS NEEDED    Eczema craquele

## 2018-06-06 ENCOUNTER — RADIANT APPOINTMENT (OUTPATIENT)
Dept: GENERAL RADIOLOGY | Facility: CLINIC | Age: 63
End: 2018-06-06
Attending: FAMILY MEDICINE
Payer: COMMERCIAL

## 2018-06-06 ENCOUNTER — OFFICE VISIT (OUTPATIENT)
Dept: URGENT CARE | Facility: URGENT CARE | Age: 63
End: 2018-06-06
Payer: COMMERCIAL

## 2018-06-06 VITALS
TEMPERATURE: 102.2 F | HEART RATE: 92 BPM | OXYGEN SATURATION: 92 % | SYSTOLIC BLOOD PRESSURE: 128 MMHG | RESPIRATION RATE: 16 BRPM | DIASTOLIC BLOOD PRESSURE: 68 MMHG

## 2018-06-06 DIAGNOSIS — J18.9 PNEUMONIA DUE TO INFECTIOUS ORGANISM, UNSPECIFIED LATERALITY, UNSPECIFIED PART OF LUNG: Primary | ICD-10-CM

## 2018-06-06 DIAGNOSIS — R05.9 COUGH: ICD-10-CM

## 2018-06-06 PROCEDURE — 99214 OFFICE O/P EST MOD 30 MIN: CPT | Performed by: FAMILY MEDICINE

## 2018-06-06 PROCEDURE — 71046 X-RAY EXAM CHEST 2 VIEWS: CPT

## 2018-06-06 RX ORDER — CEFDINIR 300 MG/1
300 CAPSULE ORAL 2 TIMES DAILY
Qty: 20 CAPSULE | Refills: 0 | Status: SHIPPED | OUTPATIENT
Start: 2018-06-06 | End: 2018-06-16

## 2018-06-06 NOTE — MR AVS SNAPSHOT
After Visit Summary   6/6/2018    Denita Flores    MRN: [id number]           Patient Information     Date Of Birth          1955        Visit Information        Provider Department      6/6/2018 5:50 PM Latia Shah MD [facility]        Today's Diagnoses     Pneumonia due to infectious organism, unspecified laterality, unspecified part of lung    -  1    Cough          Care Instructions      Treating Pneumonia  Pneumonia is an infection of one or both of the lungs. Pneumonia:    Is usually caused by either a virus or a bacteria    Can be very serious, especially in infants, young children, and older adults. It s also serious for those with other long-term health problems or weakened immune systems.    Is sometimes treated at home and sometimes in the hospital    Antibiotic medicines  Antibiotics may be prescribed for pneumonia caused by bacteria. They may be pills (oral medicines), or shots (injections). Or they may be given by IV (intravenously) into a vein. If you are taking oral medicines at home:    Fill your prescription and start taking your medicine as soon as you can.    You will likely start to feel better in a day or 2, but don t stop taking the antibiotic.    Use a pill organizer to help you remember to take your medicine.    Let your healthcare provider know if you have side effects.    Take your medicine exactly as directed on the label. Talk to your provider or pharmacist if you have any questions.  Antiviral medicines  Antiviral medicine may be prescribed for pneumonia caused by a virus. For example, antiviral medicine may be prescribed for pneumonia caused by the flu virus. Antibiotics do not work against viruses. If you are taking antiviral medicine at home:    Fill your prescription and start taking your medicine as soon as you can.    Talk with your provider or pharmacist about possible side effects.    Take the medicine exactly as  instructed.  To relieve symptoms  There are many medicines that can help relieve symptoms of pneumonia. Some are prescription and some are over-the-counter.  Your healthcare provider may recommend:    Acetaminophen or ibuprofen to lower your fever and to lessen headache or other pain    Cough medicine to loosen mucus or to reduce coughing  Make sure you check with your healthcare provider or pharmacist before taking any over-the-counter medicines.  Special treatments  If you are hospitalized for pneumonia, you may have other therapies, including:    Inhaled medicines to help with breathing or chest congestion    Supplemental oxygen to increase low oxygen levels  Drink fluids and eat healthy  You should eat healthy to help your body fight the infection. Drinking a lot of fluids helps to replace fluids lost from fever and to loosen mucus in your chest.    Diet. Make healthy food choices, including fruits and vegetables, lean meats and other proteins, 100% whole grain and low- or no-fat dairy products.    Fluids. Drink at least 6 to 8 tall glasses a day. Water and 100% fruit or vegetable juice are best.  Get plenty of rest and sleep  You may be more tired than usual for a while. It is important to get enough sleep at night. It s also important to rest during the day. Talk with your healthcare provider if coughing or other symptoms are interfering with your sleep.  Preventing the spread of germs  The best thing you can do to prevent spreading germs is to wash your hands often. You should:    Rub your hand with soap and water for 20 to 30 seconds.    Clean in between your fingers, the backs of your hands, and around your nails.    Dry your hands on a separate towel or use paper towels.  You should also:    Keep alcohol-based hand  nearby.    Make sure you also clean surfaces that you touch. Use a product that kills all types of germs.    Stay away from others until you are feeling better.  When to call your  healthcare provider  Call your healthcare provider if you have any of the following:    Symptoms get worse    Fever continues    Shortness of breath gets worse    Increased mucus or mucus that is darker in color    Coughing gets worse    Lips or fingers are bluish in color    Side effects from your medicine   Date Last Reviewed: 12/1/2016 2000-2017 [employer]. [address] [place] [address], [place] [address]. All rights reserved. This information is not intended as a substitute for professional medical care. Always follow your healthcare professional's instructions.        When You Have Pneumonia  You have been diagnosed with pneumonia. This is a serious lung infection. Most cases of pneumonia are caused by bacteria. Pneumonia most often occurs in older adults, young children, and people with chronic health problems.  Home care    Take your medicine exactly as directed. Don t skip doses. Continue taking your antibiotics as until they are all gone, even if you start to feel better. This will prevent the pneumonia from coming back.    Drink at least 8 glasses of water daily, unless directed otherwise. This helps to loosen and thin secretions so that you can cough them up.    Use a cool-mist humidifier in your bedroom. Be sure to clean the humidifier daily.    Don t use medicines to suppress your cough unless your cough is dry, painful, or interferes with your sleep. Coughing up mucus is normal. You may use an expectorant if your healthcare provider says it s okay.    You can use warm compresses or a heating pad on the lowest setting to relieve chest discomfort. Use several times a day for 15-20 minutes at a time. To prevent injury to your skin, set the temperature to warm, not hot. Don t put the compress or pad directly on your skin. Make certain it has a cover or wrap it in a towel. This is to prevent skin burns.    Get plenty of rest until your fever, shortness of breath, and chest pain go away.    Plan  to get a flu shot every year. The flu is a common cause of pneumonia. Getting a flu shot every year can help prevent both the flu and pneumonia.  Getting the pneumococcal vaccine  Talk with your healthcare provider about getting the pneumococcal vaccine. Pneumococcal pneumonia is caused by bacteria that spread from person to person. It can cause minor problems, such as ear infections. But it can also turn into life-threatening illnesses of the lungs (pneumonia), the covering of the brain and spinal cord (meningitis), and the blood (bacteremia).  Children under 2 years of age, adults over age 65, people with certain health conditions, and smokers are at the highest risk of pneumococcal disease. This vaccine can help prevent pneumococcal disease in both adults and children. Some people should not have the vaccine. Make sure to ask your healthcare provider if you should have the vaccine.   Follow-up care  Make a follow-up appointment as directed by our staff.  When to call your healthcare provider  Call your healthcare provider right away if you have any of the following:    Fever of 100.4 F (38 C) or higher, or as directed by your healthcare provider    Mucus from the lungs (sputum) that s yellow, green, bloody, or smells bad    A large amount of sputum    Vomiting    Symptoms that get worse  Call 911  Call 911 right away if you have any of the following:    Chest pain    Trouble breathing    Blue lips or fingernails   Date Last Reviewed: 11/1/2016 2000-2017 [employer]. [address] [place] [address]. All rights reserved. This information is not intended as a substitute for professional medical care. Always follow your healthcare professional's instructions.                Follow-ups after your visit        Follow-up notes from your care team     Return in about 5 days (around 6/11/2018).      Your next 10 appointments already scheduled     Jun 08, 2018 10:30 AM CDT   [facility]  [facility]    [address] [address] [place] [id number] [phone]            Oct 30, 2018 10:30 AM CDT   [facility] Visit with Gail Booth MD   [facility]    [address] [facility] [id number] [phone]              Who to contact     If you have questions or need follow up information about today's clinic visit or your schedule please contact [facility] directly at [phone].  Normal or non-critical lab and imaging results will be communicated to you by MyChart, letter or phone within 4 business days after the clinic has received the results. If you do not hear from us within 7 days, please contact the clinic through MyChart or phone. If you have a critical or abnormal lab result, we will notify you by phone as soon as possible.  Submit refill requests through [employer] or call your pharmacy and they will forward the refill request to us. Please allow 3 business days for your refill to be completed.          Additional Information About Your Visit        MyChart Information     [employer] lets you send messages to your doctor, view your test results, renew your prescriptions, schedule appointments and more. To sign up, go to [email or web address] . Click on \"Log in\" on the left side of the screen, which will take you to the Welcome page. Then click on \"Sign up Now\" on the right side of the page.     You will be asked to enter the access code listed below, as well as some personal information. Please follow the directions to create your username and password.     Your access code is: [email or web address]  Expires: 2018  6:40 PM     Your access code will  in 90 days. If you need help or a new code, please call your [facility] or [phone].        Care EveryWhere ID     This is your Care EveryWhere ID. This could be used by other " organizations to access your [place] medical records  [id number]        Your Vitals Were     Pulse Temperature Respirations Pulse Oximetry          92 102.2  F (39  C) (Oral) 16 90%         Blood Pressure from Last 3 Encounters:   06/06/18 128/68   05/11/18 116/62   02/20/18 116/64    Weight from Last 3 Encounters:   05/11/18 246 lb 1.6 oz (111.6 kg)   02/20/18 243 lb 14.4 oz (110.6 kg)   11/22/17 243 lb (110.2 kg)                 Today's Medication Changes          These changes are accurate as of 6/6/18  6:40 PM.  If you have any questions, ask your nurse or doctor.               Start taking these medicines.        Dose/Directions    cefdinir 300 MG capsule   Commonly known as:  OMNICEF   Used for:  Pneumonia due to infectious organism, unspecified laterality, unspecified part of lung   Started by:  Latia Shah MD        Dose:  300 mg   Take 1 capsule (300 mg) by mouth 2 times daily for 10 days   Quantity:  20 capsule   Refills:  0            Where to get your medicines      These medications were sent to [facility] [address] [place] [address] [address] AT [facility] [address], [facility] [address]     Phone:  [phone]     cefdinir 300 MG capsule                Primary Care Provider Office Phone # Fax #    Gee Hitchcock [phone] [address]  [facility] [address]        Equal Access to Services     OSVALDO CHILEL AH: Hadii brenda masono Sogeorgette, waaxda luqadaha, qaybta kaalmada adeegyada, fabian mcmullen. So [facility] [phone].    ATENCIÓN: Si habla español, tiene a moore disposición servicios gratuitos de asistencia lingüística. Llame al [phone].    We comply with applicable federal civil rights laws and [place] laws. We do not discriminate on the basis of race, color, national origin, age, disability, sex, sexual orientation, or gender identity.            Thank you!     Thank you  for choosing [facility]  for your care. Our goal is always to provide you with excellent care. Hearing back from our patients is one way we can continue to improve our services. Please take a few minutes to complete the written survey that you may receive in the mail after your visit with us. Thank you!             Your Updated Medication List - Protect others around you: Learn how to safely use, store and throw away your medicines at www.disposemymeds.org.          This list is accurate as of 6/6/18  6:40 PM.  Always use your most recent med list.                   Brand Name Dispense Instructions for use Diagnosis    albuterol 108 (90 Base) MCG/ACT Inhaler    PROAIR HFA/PROVENTIL HFA/VENTOLIN HFA    1 Inhaler    Inhale 2 puffs into the lungs every 6 hours as needed for shortness of breath / dyspnea    Mild persistent asthma without complication       amoxicillin-clavulanate 875-125 MG per tablet    AUGMENTIN    20 tablet    Take 1 tablet by mouth 2 times daily    Acute maxillary sinusitis, recurrence not specified       cefdinir 300 MG capsule    OMNICEF    20 capsule    Take 1 capsule (300 mg) by mouth 2 times daily for 10 days    Pneumonia due to infectious organism, unspecified laterality, unspecified part of lung       cyanocolbalamin 100 MCG tablet    vitamin  B-12     Take 100 mcg by mouth daily.        D3 ADULT PO      Take 2,000 Units by mouth daily.        levothyroxine 112 MCG tablet    SYNTHROID/LEVOTHROID    90 tablet    Take 1 tablet (112 mcg) by mouth daily    Postablative hypothyroidism       MEGARED OMEGA-3 KRILL  MG Caps      Take 1 capsule by mouth daily        montelukast 10 MG tablet    SINGULAIR    90 tablet    Take 1 tablet (10 mg) by mouth At Bedtime    Mild persistent asthma without complication       order for DME     2 Units    Equipment being ordered: compression stockings- LARGE, 15mm HG, THIGH HIGH    Spider veins of both lower extremities        triamcinolone 0.1 % cream    KENALOG    45 g    FOR ECZEMA ON LOWER LEG USE TWICE DAILY FOR 2 WEEKS THEN THREE TIMES DAILY WEEKLY FOR 2 WEEKS REPEAT CYCLE AS NEEDED    Eczema craquele

## 2018-06-06 NOTE — LETTER
[facility]  [address]  [facility] [place] [address]  Phone: [phone]  Fax: [phone]    06/06/18    Denita Flores  [address] [address] [place] [id number]      To whom it may concern:     Denita Flores was seen tonight at [facility].  She is not able to attend work tomorrow secondary to a medical illness.  If better, she may attend work on June 8, 2018.  Thanks.     Sincerely,      Latia Contreras MD

## 2018-06-06 NOTE — PATIENT INSTRUCTIONS
Treating Pneumonia  Pneumonia is an infection of one or both of the lungs. Pneumonia:    Is usually caused by either a virus or a bacteria    Can be very serious, especially in infants, young children, and older adults. It s also serious for those with other long-term health problems or weakened immune systems.    Is sometimes treated at home and sometimes in the hospital    Antibiotic medicines  Antibiotics may be prescribed for pneumonia caused by bacteria. They may be pills (oral medicines), or shots (injections). Or they may be given by IV (intravenously) into a vein. If you are taking oral medicines at home:    Fill your prescription and start taking your medicine as soon as you can.    You will likely start to feel better in a day or 2, but don t stop taking the antibiotic.    Use a pill organizer to help you remember to take your medicine.    Let your healthcare provider know if you have side effects.    Take your medicine exactly as directed on the label. Talk to your provider or pharmacist if you have any questions.  Antiviral medicines  Antiviral medicine may be prescribed for pneumonia caused by a virus. For example, antiviral medicine may be prescribed for pneumonia caused by the flu virus. Antibiotics do not work against viruses. If you are taking antiviral medicine at home:    Fill your prescription and start taking your medicine as soon as you can.    Talk with your provider or pharmacist about possible side effects.    Take the medicine exactly as instructed.  To relieve symptoms  There are many medicines that can help relieve symptoms of pneumonia. Some are prescription and some are over-the-counter.  Your healthcare provider may recommend:    Acetaminophen or ibuprofen to lower your fever and to lessen headache or other pain    Cough medicine to loosen mucus or to reduce coughing  Make sure you check with your healthcare provider or pharmacist before taking any over-the-counter  medicines.  Special treatments  If you are hospitalized for pneumonia, you may have other therapies, including:    Inhaled medicines to help with breathing or chest congestion    Supplemental oxygen to increase low oxygen levels  Drink fluids and eat healthy  You should eat healthy to help your body fight the infection. Drinking a lot of fluids helps to replace fluids lost from fever and to loosen mucus in your chest.    Diet. Make healthy food choices, including fruits and vegetables, lean meats and other proteins, 100% whole grain and low- or no-fat dairy products.    Fluids. Drink at least 6 to 8 tall glasses a day. Water and 100% fruit or vegetable juice are best.  Get plenty of rest and sleep  You may be more tired than usual for a while. It is important to get enough sleep at night. It s also important to rest during the day. Talk with your healthcare provider if coughing or other symptoms are interfering with your sleep.  Preventing the spread of germs  The best thing you can do to prevent spreading germs is to wash your hands often. You should:    Rub your hand with soap and water for 20 to 30 seconds.    Clean in between your fingers, the backs of your hands, and around your nails.    Dry your hands on a separate towel or use paper towels.  You should also:    Keep alcohol-based hand  nearby.    Make sure you also clean surfaces that you touch. Use a product that kills all types of germs.    Stay away from others until you are feeling better.  When to call your healthcare provider  Call your healthcare provider if you have any of the following:    Symptoms get worse    Fever continues    Shortness of breath gets worse    Increased mucus or mucus that is darker in color    Coughing gets worse    Lips or fingers are bluish in color    Side effects from your medicine   Date Last Reviewed: 12/1/2016 2000-2017 [employer]. [address] [place] [address], [place] [address]. All rights reserved.  This information is not intended as a substitute for professional medical care. Always follow your healthcare professional's instructions.        When You Have Pneumonia  You have been diagnosed with pneumonia. This is a serious lung infection. Most cases of pneumonia are caused by bacteria. Pneumonia most often occurs in older adults, young children, and people with chronic health problems.  Home care    Take your medicine exactly as directed. Don t skip doses. Continue taking your antibiotics as until they are all gone, even if you start to feel better. This will prevent the pneumonia from coming back.    Drink at least 8 glasses of water daily, unless directed otherwise. This helps to loosen and thin secretions so that you can cough them up.    Use a cool-mist humidifier in your bedroom. Be sure to clean the humidifier daily.    Don t use medicines to suppress your cough unless your cough is dry, painful, or interferes with your sleep. Coughing up mucus is normal. You may use an expectorant if your healthcare provider says it s okay.    You can use warm compresses or a heating pad on the lowest setting to relieve chest discomfort. Use several times a day for 15-20 minutes at a time. To prevent injury to your skin, set the temperature to warm, not hot. Don t put the compress or pad directly on your skin. Make certain it has a cover or wrap it in a towel. This is to prevent skin burns.    Get plenty of rest until your fever, shortness of breath, and chest pain go away.    Plan to get a flu shot every year. The flu is a common cause of pneumonia. Getting a flu shot every year can help prevent both the flu and pneumonia.  Getting the pneumococcal vaccine  Talk with your healthcare provider about getting the pneumococcal vaccine. Pneumococcal pneumonia is caused by bacteria that spread from person to person. It can cause minor problems, such as ear infections. But it can also turn into life-threatening illnesses of  the lungs (pneumonia), the covering of the brain and spinal cord (meningitis), and the blood (bacteremia).  Children under 2 years of age, adults over age 65, people with certain health conditions, and smokers are at the highest risk of pneumococcal disease. This vaccine can help prevent pneumococcal disease in both adults and children. Some people should not have the vaccine. Make sure to ask your healthcare provider if you should have the vaccine.   Follow-up care  Make a follow-up appointment as directed by our staff.  When to call your healthcare provider  Call your healthcare provider right away if you have any of the following:    Fever of 100.4 F (38 C) or higher, or as directed by your healthcare provider    Mucus from the lungs (sputum) that s yellow, green, bloody, or smells bad    A large amount of sputum    Vomiting    Symptoms that get worse  Call 911  Call 911 right away if you have any of the following:    Chest pain    Trouble breathing    Blue lips or fingernails   Date Last Reviewed: 11/1/2016 2000-2017 [employer]. [address] [place] [address], [place] [address]. All rights reserved. This information is not intended as a substitute for professional medical care. Always follow your healthcare professional's instructions.

## 2018-06-06 NOTE — PROGRESS NOTES
SUBJECTIVE:   Denita Flores is a 62 year old female who complains of cough described as productive of yellow sputum, chest congestion, shortness of breath and fever for 1 day.  Also, has some diarrhea.  She denies a history of no other unusual symptoms. She admits to a history of asthma. Patient does not smoke cigarettes.     OBJECTIVE:  /68  Pulse 92  Temp 102.2  F (39  C) (Oral)  Resp 16  SpO2 90%   Appearance: moderately ill.   ENT- ENT exam normal, no neck nodes or sinus tenderness.   Chest - no tachypnea, retractions or cyanosis, air entry reduced diffusely throughout both lungs and S1, S2 normal, no murmur, no gallop, rate regular.    CXR PA/L: NAD    ASSESSMENT:   Pneumonia     PLAN:  Tylenol 325 mg x 3 po given here for fever.  Continue albuterol MDI prn.  O2 sat was rechecked and was 92% on RA.  Symptomatic therapy suggested: push fluids, rest, use vaporizer or mist needed  and use acetaminophen, cough suppressant of choice as needed. Note for work given.  Call or return to clinic prn if these symptoms worsen or fail to improve as anticipated.       Latia Contreras MD

## 2018-06-11 ENCOUNTER — OFFICE VISIT (OUTPATIENT)
Dept: PEDIATRICS | Facility: CLINIC | Age: 63
End: 2018-06-11
Payer: COMMERCIAL

## 2018-06-11 VITALS
HEART RATE: 62 BPM | TEMPERATURE: 96.3 F | SYSTOLIC BLOOD PRESSURE: 141 MMHG | DIASTOLIC BLOOD PRESSURE: 73 MMHG | WEIGHT: 244.2 LBS | OXYGEN SATURATION: 96 % | BODY MASS INDEX: 38.25 KG/M2

## 2018-06-11 DIAGNOSIS — J40 WHEEZY BRONCHITIS: Primary | ICD-10-CM

## 2018-06-11 PROCEDURE — 99213 OFFICE O/P EST LOW 20 MIN: CPT | Performed by: FAMILY MEDICINE

## 2018-06-11 RX ORDER — PREDNISONE 20 MG/1
20 TABLET ORAL DAILY
Qty: 5 TABLET | Refills: 0 | Status: SHIPPED | OUTPATIENT
Start: 2018-06-11 | End: 2018-07-14

## 2018-06-11 NOTE — MR AVS SNAPSHOT
After Visit Summary   6/11/2018    Denita Flores    MRN: [id number]           Patient Information     Date Of Birth          1955        Visit Information        Provider Department      6/11/2018 11:50 AM Santhosh Wood MD [facility]        Today's Diagnoses     Wheezy bronchitis    -  1      Care Instructions      Viral or Bacterial Bronchitis with Wheezing (Adult)    Bronchitis is an infection of the air passages. It often occurs during a cold and is usually caused by a virus. Symptoms include cough with mucus (phlegm) and low-grade fever. This illness is contagious during the first few days and is spread through the air by coughing and sneezing, or by direct contact (touching the sick person and then touching your own eyes, nose, or mouth).  If there is a lot of inflammation, air flow is restricted. The air passages may also go into spasm, especially if you have asthma. This causes wheezing and difficulty breathing even in people who do not have asthma.  Bronchitis usually lasts 7 to 14 days. The wheezing should improve with treatment during the first week. An inhaler is often prescribed to relax the air passages and stop wheezing. Antibiotics will be prescribed if your doctor thinks there is also a secondary bacterial infection.  Home care    If symptoms are severe, rest at home for the first 2 to 3 days. When you go back to your usual activities, don't let yourself get too tired.    Do not smoke. Also avoid being exposed to secondhand smoke.    You may use over-the-counter medicine to control fever or pain, unless another medicine was prescribed. Note: If you have chronic liver or kidney disease or have ever had a stomach ulcer or gastrointestinal bleeding, talk with your healthcare provider before using these medicines. Also talk to your provider if you are taking medicine to prevent blood clots.) Aspirin should never be given to anyone younger than 18 years of age  who is ill with a viral infection or fever. It may cause severe liver or brain damage.    Your appetite may be poor, so a light diet is fine. Avoid dehydration by drinking 6 to 8 glasses of fluids per day (such as water, soft drinks, sports drinks, juices, tea, or soup). Extra fluids will help loosen secretions in the nose and lungs.    Over-the-counter cough, cold, and sore-throat medicines will not shorten the length of the illness, but they may be helpful to reduce symptoms. (Note: Do not use decongestants if you have high blood pressure.)    If you were given an inhaler, use it exactly as directed. If you need to use it more often than prescribed, your condition may be worsening. If this happens, contact your healthcare provider.    If prescribed, finish all antibiotic medicine, even if you are feeling better after only a few days.  Follow-up care  Follow up with your healthcare provider, or as advised. If you had an X-ray or ECG (electrocardiogram), a specialist will review it. You will be notified of any new findings that may affect your care.  If you are age 65 or older, or if you have a chronic lung disease or condition that affects your immune system, or you smoke, ask your healthcare provider about getting a pneumococcal vaccine and a yearly flu shot (influenza vaccine).  When to seek medical advice  Call your healthcare provider right away if any of these occur:    Fever of 100.4 F (38 C) or higher, or as directed by your healthcare provider    Coughing up increasing amounts of colored sputum    Weakness, drowsiness, headache, facial pain, ear pain, or a stiff neck  Call 911  Call 911 if any of these occur.    Coughing up blood    Worsening weakness, drowsiness, headache, or stiff neck    Increased wheezing not helped with medication, shortness of breath, or pain with breathing  Date Last Reviewed: 9/13/2015 2000-2017 [employer]. [address] [place] [address], [place] [address]. All rights  reserved. This information is not intended as a substitute for professional medical care. Always follow your healthcare professional's instructions.                Follow-ups after your visit        Your next 10 appointments already scheduled     Oct 30, 2018 10:30 AM CDT   Return Visit with Gail Booth MD   [facility]    [address] [place] [id number] [phone]              Who to contact     If you have questions or need follow up information about today's clinic visit or your schedule please contact [facility] directly at [phone].  Normal or non-critical lab and imaging results will be communicated to you by [employer]oster[employer], letter or phone within 4 business days after the clinic has received the results. If you do not hear from us within 7 days, please contact the clinic through [employer] or phone. If you have a critical or abnormal lab result, we will notify you by phone as soon as possible.  Submit refill requests through [employer] or call your pharmacy and they will forward the refill request to us. Please allow 3 business days for your refill to be completed.          Additional Information About Your Visit        [employer] Information     [employer] is an electronic gateway that provides easy, online access to your medical records. With [employer], you can request a clinic appointment, read your test results, renew a prescription or communicate with your care team.     To sign up for [employer] visit the website at [email or web address] [email or web address])   You will be asked to enter the access code listed below, as well as some personal information. Please follow the directions to create your username and password.     Your access code is: [email or web address]  Expires: 2018  6:40 PM     Your access code will  in 90 days. If you need help or a new code, please contact your [facility] or call [phone] for  assistance.        Care EveryWhere ID     This is your Care EveryWhere ID. This could be used by other organizations to access your [facility] medical records  [id number]        Your Vitals Were     Pulse Temperature Pulse Oximetry BMI (Body Mass Index)          62 96.3  F (35.7  C) (Temporal) 96% 38.25 kg/m2         Blood Pressure from Last 3 Encounters:   06/11/18 141/73   06/06/18 128/68   05/11/18 116/62    Weight from Last 3 Encounters:   06/11/18 244 lb 3.2 oz (110.8 kg)   05/11/18 246 lb 1.6 oz (111.6 kg)   02/20/18 243 lb 14.4 oz (110.6 kg)              Today, you had the following     No orders found for display         Today's Medication Changes          These changes are accurate as of 6/11/18 12:44 PM.  If you have any questions, ask your nurse or doctor.               Start taking these medicines.        Dose/Directions    predniSONE 20 MG tablet   Commonly known as:  DELTASONE   Used for:  Wheezy bronchitis   Started by:  Santhosh Wood MD        Dose:  20 mg   Take 1 tablet (20 mg) by mouth daily   Quantity:  5 tablet   Refills:  0            Where to get your medicines      These medications were sent to [facility] [address] [place] [address] [address] [facility] [facility] [address]     Phone:  [phone]     predniSONE 20 MG tablet                Primary Care Provider Office Phone # Fax #    Gee Hitchcock [phone] [address]  [facility] [address]        Equal Access to Services     [facility] AH: Hadii brenda cook Sogeorgette, waaxda luqadaha, qaybta kaalmafabian hubbard. So [facility] [phone].    ATENCIÓN: Si habla español, tiene a moore disposición servicios gratuitos de asistencia lingüística. Llame al [phone].    We comply with applicable federal civil rights laws and [place] laws. We do not discriminate on the basis of race, color, national origin,  age, disability, sex, sexual orientation, or gender identity.            Thank you!     Thank you for choosing [facility]  for your care. Our goal is always to provide you with excellent care. Hearing back from our patients is one way we can continue to improve our services. Please take a few minutes to complete the written survey that you may receive in the mail after your visit with us. Thank you!             Your Updated Medication List - Protect others around you: Learn how to safely use, store and throw away your medicines at www.disposemymeds.org.          This list is accurate as of 6/11/18 12:44 PM.  Always use your most recent med list.                   Brand Name Dispense Instructions for use Diagnosis    albuterol 108 (90 Base) MCG/ACT Inhaler    PROAIR HFA/PROVENTIL HFA/VENTOLIN HFA    1 Inhaler    Inhale 2 puffs into the lungs every 6 hours as needed for shortness of breath / dyspnea    Mild persistent asthma without complication       cefdinir 300 MG capsule    OMNICEF    20 capsule    Take 1 capsule (300 mg) by mouth 2 times daily for 10 days    Pneumonia due to infectious organism, unspecified laterality, unspecified part of lung       cyanocolbalamin 100 MCG tablet    vitamin  B-12     Take 100 mcg by mouth daily.        D3 ADULT PO      Take 2,000 Units by mouth daily.        levothyroxine 112 MCG tablet    SYNTHROID/LEVOTHROID    90 tablet    Take 1 tablet (112 mcg) by mouth daily    Postablative hypothyroidism       MEGARED OMEGA-3 KRILL  MG Caps      Take 1 capsule by mouth daily        montelukast 10 MG tablet    SINGULAIR    90 tablet    Take 1 tablet (10 mg) by mouth At Bedtime    Mild persistent asthma without complication       order for DME     2 Units    Equipment being ordered: compression stockings- LARGE, 15mm HG, THIGH HIGH    Spider veins of both lower extremities       predniSONE 20 MG tablet    DELTASONE    5 tablet    Take 1 tablet (20 mg) by mouth daily     Wheezy bronchitis       triamcinolone 0.1 % cream    KENALOG    45 g    FOR ECZEMA ON LOWER LEG USE TWICE DAILY FOR 2 WEEKS THEN THREE TIMES DAILY WEEKLY FOR 2 WEEKS REPEAT CYCLE AS NEEDED    Eczema craquele

## 2018-06-11 NOTE — PROGRESS NOTES
"  SUBJECTIVE:   Denita Flores is a 62 year old female who presents to clinic today for the following health issues:      ED/UC Followup:    Facility:  [facility]  Date of visit: 6/6/18  Reason for visit: Pneumonia  Current Status: tight chest and coughing - patients states she's feeling better but still not where she should be.      She was told she had pneumonia in UC and review of radiologic report indicates neg exam.She has albuterol inhaler and hx of asthma and has been seen by this MD on 5/11/2018 for \"sinusitis\" which responded to treatment.   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  Additional history: as documented    Patient Active Problem List   Diagnosis     Graves disease     Obesity     Diverticulosis of large intestine without hemorrhage     Colon polyps     History of cervical cancer     YELENA (obstructive sleep apnea)     Warts     CARDIOVASCULAR SCREENING; LDL GOAL LESS THAN 130     Hx of herpes zoster keratoconjunctivitis, os     Plantar warts     Stasis dermatitis of both legs     Obesity (BMI 30-39.9)     Postablative hypothyroidism     Other specified hypothyroidism,post ablative     Venous stasis dermatitis of left lower extremity     Vitamin B12 deficiency (non anemic)     Mild persistent asthma without complication     Spider veins of both lower extremities     Seasonal allergic rhinitis     Family history of colon cancer     Epistaxis     Tubular adenoma of colon     Past Surgical History:   Procedure Laterality Date     BACK SURGERY       COLONOSCOPY WITH CO2 INSUFFLATION N/A 2/21/2017    Procedure: COLONOSCOPY WITH CO2 INSUFFLATION;  Surgeon: Duane, William Charles, MD;  Location: [facility] OR     LEEP TX, CERVICAL      in " her 20's       Social History   Substance Use Topics     Smoking status: Former Smoker     Types: Cigarettes     Quit date: 1/1/2003     Smokeless tobacco: Never Used     Alcohol use 0.0 oz/week     0 Standard drinks or equivalent per week     Family History   Problem Relation Age of Onset     HEART DISEASE Father      CANCER Father 67     kidney cancer      HEART DISEASE Sister      CANCER Sister 59     colon cancer      DIABETES Sister      Colon Cancer Maternal Aunt      Colon Cancer Paternal Aunt          Current Outpatient Prescriptions   Medication Sig Dispense Refill     albuterol (PROAIR HFA/PROVENTIL HFA/VENTOLIN HFA) 108 (90 BASE) MCG/ACT Inhaler Inhale 2 puffs into the lungs every 6 hours as needed for shortness of breath / dyspnea 1 Inhaler 3     cefdinir (OMNICEF) 300 MG capsule Take 1 capsule (300 mg) by mouth 2 times daily for 10 days 20 capsule 0     Cholecalciferol (D3 ADULT PO) Take 2,000 Units by mouth daily.       cyanocolbalamin (VITAMIN  B-12) 100 MCG tablet Take 100 mcg by mouth daily.       levothyroxine (SYNTHROID/LEVOTHROID) 112 MCG tablet Take 1 tablet (112 mcg) by mouth daily 90 tablet 3     MEGARED OMEGA-3 KRILL  MG CAPS Take 1 capsule by mouth daily        montelukast (SINGULAIR) 10 MG tablet Take 1 tablet (10 mg) by mouth At Bedtime 90 tablet 3     order for DME Equipment being ordered: compression stockings- LARGE, 15mm HG, THIGH HIGH 2 Units 0     triamcinolone (KENALOG) 0.1 % cream FOR ECZEMA ON LOWER LEG USE TWICE DAILY FOR 2 WEEKS THEN THREE TIMES DAILY WEEKLY FOR 2 WEEKS REPEAT CYCLE AS NEEDED 45 g 3     Allergies   Allergen Reactions     Sulfa Drugs Hives and Swelling     Noted in 10/18/09 ER     BP Readings from Last 3 Encounters:   06/11/18 141/73   06/06/18 128/68   05/11/18 116/62    Wt Readings from Last 3 Encounters:   06/11/18 244 lb 3.2 oz (110.8 kg)   05/11/18 246 lb 1.6 oz (111.6 kg)   02/20/18 243 lb 14.4 oz (110.6 kg)                    Reviewed and updated as  needed this visit by clinical staff       Reviewed and updated as needed this visit by Provider         ROS:  Constitutional, HEENT, cardiovascular, pulmonary, GI, , musculoskeletal, neuro, skin, endocrine and psych systems are negative, except as otherwise noted.    OBJECTIVE:     /73 (BP Location: Right arm, Patient Position: Chair, Cuff Size: Adult Large)  Pulse 62  Temp 96.3  F (35.7  C) (Temporal)  Wt 244 lb 3.2 oz (110.8 kg)  SpO2 96%  BMI 38.25 kg/m2  Body mass index is 38.25 kg/(m^2).  GENERAL: healthy, alert and no distress  HENT: ear canals and TM's normal, nose and mouth without ulcers or lesions  NECK: no adenopathy, no asymmetry, masses, or scars and thyroid normal to palpation  RESP: expiratory wheezes throughout and bronchial breath sounds diffusely  CV: regular rate and rhythm, normal S1 S2, no S3 or S4, no murmur, click or rub, no peripheral edema and peripheral pulses strong  MS: no gross musculoskeletal defects noted, no edema  SKIN: no suspicious lesions or rashes    Diagnostic Test Results:  none     ASSESSMENT/PLAN:         ICD-10-CM    1. Wheezy bronchitis J40 predniSONE (DELTASONE) 20 MG tablet   Continue with antibiotic and inhaler.     See Patient Instructions    Santhosh Wood MD  [facility]

## 2018-06-11 NOTE — PATIENT INSTRUCTIONS
Viral or Bacterial Bronchitis with Wheezing (Adult)    Bronchitis is an infection of the air passages. It often occurs during a cold and is usually caused by a virus. Symptoms include cough with mucus (phlegm) and low-grade fever. This illness is contagious during the first few days and is spread through the air by coughing and sneezing, or by direct contact (touching the sick person and then touching your own eyes, nose, or mouth).  If there is a lot of inflammation, air flow is restricted. The air passages may also go into spasm, especially if you have asthma. This causes wheezing and difficulty breathing even in people who do not have asthma.  Bronchitis usually lasts 7 to 14 days. The wheezing should improve with treatment during the first week. An inhaler is often prescribed to relax the air passages and stop wheezing. Antibiotics will be prescribed if your doctor thinks there is also a secondary bacterial infection.  Home care    If symptoms are severe, rest at home for the first 2 to 3 days. When you go back to your usual activities, don't let yourself get too tired.    Do not smoke. Also avoid being exposed to secondhand smoke.    You may use over-the-counter medicine to control fever or pain, unless another medicine was prescribed. Note: If you have chronic liver or kidney disease or have ever had a stomach ulcer or gastrointestinal bleeding, talk with your healthcare provider before using these medicines. Also talk to your provider if you are taking medicine to prevent blood clots.) Aspirin should never be given to anyone younger than 18 years of age who is ill with a viral infection or fever. It may cause severe liver or brain damage.    Your appetite may be poor, so a light diet is fine. Avoid dehydration by drinking 6 to 8 glasses of fluids per day (such as water, soft drinks, sports drinks, juices, tea, or soup). Extra fluids will help loosen secretions in the nose and lungs.    Over-the-counter  cough, cold, and sore-throat medicines will not shorten the length of the illness, but they may be helpful to reduce symptoms. (Note: Do not use decongestants if you have high blood pressure.)    If you were given an inhaler, use it exactly as directed. If you need to use it more often than prescribed, your condition may be worsening. If this happens, contact your healthcare provider.    If prescribed, finish all antibiotic medicine, even if you are feeling better after only a few days.  Follow-up care  Follow up with your healthcare provider, or as advised. If you had an X-ray or ECG (electrocardiogram), a specialist will review it. You will be notified of any new findings that may affect your care.  If you are age 65 or older, or if you have a chronic lung disease or condition that affects your immune system, or you smoke, ask your healthcare provider about getting a pneumococcal vaccine and a yearly flu shot (influenza vaccine).  When to seek medical advice  Call your healthcare provider right away if any of these occur:    Fever of 100.4 F (38 C) or higher, or as directed by your healthcare provider    Coughing up increasing amounts of colored sputum    Weakness, drowsiness, headache, facial pain, ear pain, or a stiff neck  Call 911  Call 911 if any of these occur.    Coughing up blood    Worsening weakness, drowsiness, headache, or stiff neck    Increased wheezing not helped with medication, shortness of breath, or pain with breathing  Date Last Reviewed: 9/13/2015 2000-2017 [employer]. [address] [place] [address]. All rights reserved. This information is not intended as a substitute for professional medical care. Always follow your healthcare professional's instructions.

## 2018-06-19 ENCOUNTER — TELEPHONE (OUTPATIENT)
Dept: PEDIATRICS | Facility: CLINIC | Age: 63
End: 2018-06-19

## 2018-06-19 NOTE — TELEPHONE ENCOUNTER
Recall letter sent 5/15/18    2nd attempt  Left message for patient to return clinic call regarding scheduling. Patient needs a Physical  appointment for annual wellness with Dr Hitchcock on or after 8/20/18 and  Fasting labs. Number to clinic and Mychart option given, please assist in scheduling once patient returns clinic call from the recall/wait list tab.    Call Center OKAY TO SCHEDULE.    Thanks,   Shamika Pineda  Primary Care   [facility] [place]

## 2018-07-01 ENCOUNTER — TRANSFERRED RECORDS (OUTPATIENT)
Dept: HEALTH INFORMATION MANAGEMENT | Facility: CLINIC | Age: 63
End: 2018-07-01

## 2018-07-03 ENCOUNTER — NURSE TRIAGE (OUTPATIENT)
Dept: NURSING | Facility: CLINIC | Age: 63
End: 2018-07-03

## 2018-07-03 NOTE — TELEPHONE ENCOUNTER
Patient transferred to [facility] from clinic.  Patient states was [facility] 7/1/18 for hematuria.  [facility] contacted her today to say culture was negative and to stop taking the Azo but to continue the antibiotic and to follow up if start to have symptoms.  Patient is having no urinary symptoms; states unsure if having urinary frequency and oral temperature is 99.0.  Patient states [facility] said they would call her back to see how she is feeling.  [facility] advised to call back with any new symptoms/questions and to follow recommendations of [facility].  Additional Information    Negative: Shock suspected (e.g., cold/pale/clammy skin, too weak to stand, low BP, rapid pulse)    Negative: Sounds like a life-threatening emergency to the triager    Negative: Urinary tract infection suspected, but not taking antibiotics    Negative: [1] Unable to urinate (or only a few drops) > 4 hours AND     [2] bladder feels very full (e.g., palpable bladder or strong urge to urinate)    Negative: Passing pure blood or large blood clots (i.e., size > a dime)  (Exceptions: flecks, small strands, or pinkish-red color)    Negative: Patient sounds very sick or weak to the triager    Negative: [1] SEVERE pain (e.g., excruciating) AND [2] no improvement 2 hours after pain medications    Negative: [1] Fever > 100.5 F (38.1 C) AND [2] new onset since starting antibiotics    Negative: [1] Side (flank) or lower back pain AND [2] new onset since starting antibiotics    Negative: [1] Taking antibiotic > 24 hours for UTI AND [2] flank or lower back pain worsening    Negative: [1] Vomiting 2 or more times AND [2] interferes with taking oral antibiotic    Negative: [1] Taking antibiotic > 24 hours for UTI (urinary tract or bladder infection) AND [2] fever persists    Negative: [1] Taking antibiotic > 72 hours (3 days) for UTI AND [2] painful urination or frequency not improved    Negative: [1] Taking antibiotic > 72 hours (3 days)  for UTI AND [2] flank or lower back pain not improved    Negative: Diabetes mellitus or weak immune system (e.g., HIV positive, cancer chemotherapy, transplant patient)    Negative: Sickle cell disease    Negative: [1] Taking antibiotic < 24 hours for UTI AND [2] fever persists  (all triage questions negative)    Negative: [1] Taking antibiotic < 72 hours (3 days) for UTI AND [2] painful urination or frequency not improved  (all triage questions negative)    Negative: [1] Taking antibiotic < 72 hours (3 days) for UTI AND [2] flank or lower back pain not improved    Negative: [1] Reasonable improvement on antibiotics AND [2] no fever (all triage questions negative)    Negative: Preventing urinary tract infections (UTIs), questions about    Protocols used: URINARY TRACT INFECTION ON ANTIBIOTIC FOLLOW-UP CALL [facility]

## 2018-07-14 ENCOUNTER — OFFICE VISIT (OUTPATIENT)
Dept: URGENT CARE | Facility: URGENT CARE | Age: 63
End: 2018-07-14
Payer: COMMERCIAL

## 2018-07-14 VITALS
DIASTOLIC BLOOD PRESSURE: 73 MMHG | SYSTOLIC BLOOD PRESSURE: 135 MMHG | TEMPERATURE: 96.3 F | HEART RATE: 70 BPM | OXYGEN SATURATION: 96 %

## 2018-07-14 DIAGNOSIS — L23.9 ALLERGIC CONTACT DERMATITIS, UNSPECIFIED TRIGGER: Primary | ICD-10-CM

## 2018-07-14 DIAGNOSIS — L30.4 INTERTRIGO: ICD-10-CM

## 2018-07-14 PROCEDURE — 99214 OFFICE O/P EST MOD 30 MIN: CPT

## 2018-07-14 PROCEDURE — 99207 ZZC RSCC CODE FOR CODING REVIEW: CPT

## 2018-07-14 RX ORDER — PREDNISONE 20 MG/1
40 TABLET ORAL DAILY
Qty: 10 TABLET | Refills: 0 | Status: SHIPPED | OUTPATIENT
Start: 2018-07-14 | End: 2018-07-19

## 2018-07-14 RX ORDER — TRIAMCINOLONE ACETONIDE 1 MG/G
CREAM TOPICAL
Qty: 80 G | Refills: 0 | Status: SHIPPED | OUTPATIENT
Start: 2018-07-14 | End: 2018-12-28

## 2018-07-14 RX ORDER — KETOCONAZOLE 20 MG/G
CREAM TOPICAL 2 TIMES DAILY
Qty: 15 G | Refills: 1 | Status: SHIPPED | OUTPATIENT
Start: 2018-07-14 | End: 2018-12-28

## 2018-07-14 NOTE — MR AVS SNAPSHOT
After Visit Summary   7/14/2018    Denita Flores    MRN: [id number]           Patient Information     Date Of Birth          1955        Visit Information        Provider Department      7/14/2018 9:25 AM [facility] URGENT CARE [facility]        Today's Diagnoses     Allergic contact dermatitis, unspecified trigger    -  1    Intertrigo          Care Instructions    Take the steroid 40mg once a day for 5 days      In the belly folds - use the ketoconazole cream as prescribed for at least 2 weeks      On the legs and back where the rash is present and itchy, try the Kenalog steroid cream          Follow-ups after your visit        Your next 10 appointments already scheduled     Jul 30, 2018  3:30 PM CDT   Return Visit with Gee Hitchcock MD   [facility])    [id number] [place] [id number]   [phone]            Aug 06, 2018  2:30 PM CDT   New Visit with Gabriel Delaney MD, [facility] NURSE ONLY   [facility])    [address] [place] [address]   [phone]            Sep 07, 2018 10:40 AM CDT   LAB with LAB FIRST FLOOR [facility])    [address] [place] [address]   [phone]           Please do not eat 10-12 hours before your appointment if you are coming in fasting for labs on lipids, cholesterol, or glucose (sugar). This does not apply to pregnant women. Water, hot tea and black coffee (with nothing added) are okay. Do not drink other fluids, diet soda or chew gum.            Sep 07, 2018 10:50 AM CDT   PHYSICAL with Gee Hitchcock MD   [facility])    [address] [place] [id number]   [phone]            Oct 30, 2018 10:30 AM CDT   Return Visit with Gail Booth MD   [facility]  "[facility]    [address] [address] [place] [id number] [phone]              Who to contact     If you have questions or need follow up information about today's clinic visit or your schedule please contact [facility] directly at [phone].  Normal or non-critical lab and imaging results will be communicated to you by MyChart, letter or phone within 4 business days after the clinic has received the results. If you do not hear from us within 7 days, please contact the clinic through MyChart or phone. If you have a critical or abnormal lab result, we will notify you by phone as soon as possible.  Submit refill requests through [employer] or call your pharmacy and they will forward the refill request to us. Please allow 3 business days for your refill to be completed.          Additional Information About Your Visit        MyChart Information     [employer] lets you send messages to your doctor, view your test results, renew your prescriptions, schedule appointments and more. To sign up, go to [email or web address] . Click on \"Log in\" on the left side of the screen, which will take you to the Welcome page. Then click on \"Sign up Now\" on the right side of the page.     You will be asked to enter the access code listed below, as well as some personal information. Please follow the directions to create your username and password.     Your access code is: [email or web address]  Expires: 2018  6:40 PM     Your access code will  in 90 days. If you need help or a new code, please call your [facility] clinic or [phone].        Care EveryWhere ID     This is your Care EveryWhere ID. This could be used by other organizations to access your [facility] medical records  [id number]        Your Vitals Were     Pulse Temperature Pulse Oximetry             70 96.3  F (35.7  C) (Oral) 96%          Blood Pressure from Last 3 Encounters:   18 135/73   18 141/73   18 " 128/68    Weight from Last 3 Encounters:   06/11/18 244 lb 3.2 oz (110.8 kg)   05/11/18 246 lb 1.6 oz (111.6 kg)   02/20/18 243 lb 14.4 oz (110.6 kg)              Today, you had the following     No orders found for display         Today's Medication Changes          These changes are accurate as of 7/14/18  9:52 AM.  If you have any questions, ask your nurse or doctor.               Start taking these medicines.        Dose/Directions    ketoconazole 2 % cream   Commonly known as:  NIZORAL   Used for:  Intertrigo        Apply topically 2 times daily   Quantity:  15 g   Refills:  1         These medicines have changed or have updated prescriptions.        Dose/Directions    predniSONE 20 MG tablet   Commonly known as:  DELTASONE   This may have changed:  how much to take   Used for:  Allergic contact dermatitis, unspecified trigger        Dose:  40 mg   Take 2 tablets (40 mg) by mouth daily for 5 days   Quantity:  10 tablet   Refills:  0       triamcinolone 0.1 % cream   Commonly known as:  KENALOG   This may have changed:  See the new instructions.   Used for:  Allergic contact dermatitis, unspecified trigger        Apply sparingly to affected area three times daily as needed   Quantity:  80 g   Refills:  0            Where to get your medicines      These medications were sent to [facility] [address] [place] [address] [address] AT [facility] [address] [facility] [address]     Phone:  [phone]     ketoconazole 2 % cream    predniSONE 20 MG tablet    triamcinolone 0.1 % cream                Primary Care Provider Office Phone # Fax #    Gee Hitchcock [phone] [address]  [facility] [address]        Equal Access to Services     OSVALDO CHILEL AH: Tony Bui, pardeep garcia, qaemre hansen, fabian mcmullen. So [facility] [phone].    ATENCIÓN: Si habla español, tiene a moore disposición  servicios gratuitos de asistencia lingüística. Vamshi hewitt [phone].    We comply with applicable federal civil rights laws and [place] laws. We do not discriminate on the basis of race, color, national origin, age, disability, sex, sexual orientation, or gender identity.            Thank you!     Thank you for choosing [facility]  for your care. Our goal is always to provide you with excellent care. Hearing back from our patients is one way we can continue to improve our services. Please take a few minutes to complete the written survey that you may receive in the mail after your visit with us. Thank you!             Your Updated Medication List - Protect others around you: Learn how to safely use, store and throw away your medicines at www.disposemymeds.org.          This list is accurate as of 7/14/18  9:52 AM.  Always use your most recent med list.                   Brand Name Dispense Instructions for use Diagnosis    albuterol 108 (90 Base) MCG/ACT Inhaler    PROAIR HFA/PROVENTIL HFA/VENTOLIN HFA    1 Inhaler    Inhale 2 puffs into the lungs every 6 hours as needed for shortness of breath / dyspnea    Mild persistent asthma without complication       cyanocolbalamin 100 MCG tablet    vitamin  B-12     Take 100 mcg by mouth daily.        D3 ADULT PO      Take 2,000 Units by mouth daily.        ketoconazole 2 % cream    NIZORAL    15 g    Apply topically 2 times daily    Intertrigo       levothyroxine 112 MCG tablet    SYNTHROID/LEVOTHROID    90 tablet    Take 1 tablet (112 mcg) by mouth daily    Postablative hypothyroidism       MEGARED OMEGA-3 KRILL  MG Caps      Take 1 capsule by mouth daily        montelukast 10 MG tablet    SINGULAIR    90 tablet    Take 1 tablet (10 mg) by mouth At Bedtime    Mild persistent asthma without complication       order for DME     2 Units    Equipment being ordered: compression stockings- LARGE, 15mm HG, THIGH HIGH    Spider veins of both lower  extremities       predniSONE 20 MG tablet    DELTASONE    10 tablet    Take 2 tablets (40 mg) by mouth daily for 5 days    Allergic contact dermatitis, unspecified trigger       triamcinolone 0.1 % cream    KENALOG    80 g    Apply sparingly to affected area three times daily as needed    Allergic contact dermatitis, unspecified trigger

## 2018-07-14 NOTE — PATIENT INSTRUCTIONS
Take the steroid 40mg once a day for 5 days      In the belly folds - use the ketoconazole cream as prescribed for at least 2 weeks      On the legs and back where the rash is present and itchy, try the Kenalog steroid cream

## 2018-07-14 NOTE — PROGRESS NOTES
Subjective:   Denita Flores is a 62 year old female who presents for   Chief Complaint   Patient presents with     Urgent Care     Shingles     Back, around stomach, on knees, feet X4 days     Patient comes in today for evaluation for possibly her lower extremities. Currently, the rash persists bilaterally on her upper legs.  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  She endorses itchy at both locations.  There is extensive rash on her lower back.  She has not tried any creams for this.  She states it feels mostly itchy but denies any erythema.  She denies any fevers.  Also in belly folds on her anterior side she has been having this ongoing rash for over a month and states it is also itchy to the touch.  She has not tried any antifungal cream for this.  PMHX/PSHX/MEDS/ALLERGIES/SHX/FHX reviewed in Epic.    Patient Active Problem List    Diagnosis Date Noted     Family history of colon cancer 01/06/2017     Priority: Medium     Epistaxis 01/06/2017     Priority: Medium     Tubular adenoma of colon 01/06/2017     Priority: Medium     Mild persistent asthma without complication 07/06/2016     Priority: Medium     Spider veins of both lower extremities 07/06/2016     Priority: Medium     Seasonal allergic rhinitis 07/06/2016     Priority: Medium     Vitamin B12 deficiency (non anemic) 07/01/2016     Priority: Medium     Venous stasis dermatitis of left lower extremity 11/24/2015     Priority: Medium     Other specified hypothyroidism,post ablative 11/18/2015     Priority: Medium     Postablative hypothyroidism 06/22/2015     Priority: Medium     Plantar warts 01/06/2015     Priority: Medium     Stasis dermatitis of both legs 01/06/2015     Priority: Medium     Obesity (BMI 30-39.9) 01/06/2015     Priority: Medium     Hx of herpes zoster  keratoconjunctivitis, os 10/01/2014     Priority: Medium     CARDIOVASCULAR SCREENING; LDL GOAL LESS THAN 130 05/05/2014     Priority: Medium     Warts 12/12/2013     Priority: Medium     Graves disease 05/08/2013     Priority: Medium     Obesity 05/08/2013     Priority: Medium     Diverticulosis of large intestine without hemorrhage 05/08/2013     Priority: Medium     Colon polyps 05/08/2013     Priority: Medium     History of cervical cancer 05/08/2013     Priority: Medium     YELENA (obstructive sleep apnea) 05/08/2013     Priority: Medium       Current Outpatient Prescriptions   Medication     albuterol (PROAIR HFA/PROVENTIL HFA/VENTOLIN HFA) 108 (90 BASE) MCG/ACT Inhaler     Cholecalciferol (D3 ADULT PO)     cyanocolbalamin (VITAMIN  B-12) 100 MCG tablet     ketoconazole (NIZORAL) 2 % cream     levothyroxine (SYNTHROID/LEVOTHROID) 112 MCG tablet     MEGARED OMEGA-3 KRILL  MG CAPS     montelukast (SINGULAIR) 10 MG tablet     order for DME     predniSONE (DELTASONE) 20 MG tablet     triamcinolone (KENALOG) 0.1 % cream     No current facility-administered medications for this visit.        ROS:  As above per HPI    Objective:   /73  Pulse 70  Temp 96.3  F (35.7  C) (Oral)  SpO2 96%, There is no height or weight on file to calculate BMI.  Gen:  NAD, well-nourished, sitting in chair comfortably  HEENT: EOMI, PERRL sclera anicteric, Head normocephalic, ; nares patent; oropharynx pink and moist  Neck: trachea midline, supple  CV:  Hemodynamically stable  Pulm:  No increased work of breathing   ABD: obese, inbetween panus folds are areas of confluent redness without degradation of the skin layers  Extrem: no cyanosis, edema or clubbing  Skin: dermatitis/eczematous rash of lower back about 7 x 4 inches in size (no weeping), bilaterally on anterior upper legs are small patches of redness that is minimally raised.   Psych: Euthymic, linear thoughts, normal rate of speech      Assessment & Plan:   Denita RIOS  Mark, 62 year old female who presents with:  Allergic contact dermatitis, unspecified trigger  Likely an allergic reaction to harsh chemicals. As hydrocortisone has not done much for her and the extent/size of rash on her lower back we'll try oral steroids and amoderate potency cream  - predniSONE (DELTASONE) 20 MG tablet  Dispense: 10 tablet; Refill: 0  - triamcinolone (KENALOG) 0.1 % cream  Dispense: 80 g; Refill: 0    Intertrigo  Untreated fungal infection in her pannus area. Recommended an azole antifungal to be applied twice a day for at least 2 weeks.   - ketoconazole (NIZORAL) 2 % cream  Dispense: 15 g; Refill: 1      Shubham Cardoza MD    URGENT CARE [facility]    Options for treatment and/or follow-up care were reviewed with the patient. Denita Flores and/or legal guardian was engaged and actively involved in the decision making process. Patient/guardian verbalized understanding of the options discussed and was satisfied with the final plan.

## 2018-07-17 ENCOUNTER — TELEPHONE (OUTPATIENT)
Dept: FAMILY MEDICINE | Facility: CLINIC | Age: 63
End: 2018-07-17

## 2018-07-17 NOTE — TELEPHONE ENCOUNTER
Pt came into [facility] to day to pay her bill.  She wrote a check for $532.74.  Sent it through a e-check and stapled it to the receipt.  She wanted it back, so I voided check with marker.  She was unhappy bc I ruined her writing on the check.  She has receipt indicating it was paid.  Pt left mad because her check was just voided so she couldn't use elsewhere.

## 2018-07-30 ENCOUNTER — OFFICE VISIT (OUTPATIENT)
Dept: PEDIATRICS | Facility: CLINIC | Age: 63
End: 2018-07-30
Payer: COMMERCIAL

## 2018-07-30 VITALS
HEART RATE: 71 BPM | TEMPERATURE: 98.1 F | OXYGEN SATURATION: 96 % | WEIGHT: 244.9 LBS | DIASTOLIC BLOOD PRESSURE: 68 MMHG | SYSTOLIC BLOOD PRESSURE: 117 MMHG | BODY MASS INDEX: 38.36 KG/M2

## 2018-07-30 DIAGNOSIS — Z87.440 H/O URINARY TRACT INFECTION: ICD-10-CM

## 2018-07-30 DIAGNOSIS — R31.0 GROSS HEMATURIA: Primary | ICD-10-CM

## 2018-07-30 DIAGNOSIS — Z80.51 FAMILY HISTORY OF RENAL CANCER: ICD-10-CM

## 2018-07-30 DIAGNOSIS — N20.0 NEPHROLITHIASIS: ICD-10-CM

## 2018-07-30 LAB
ALBUMIN UR-MCNC: 10 MG/DL
APPEARANCE UR: CLEAR
BILIRUB UR QL STRIP: NEGATIVE
COLOR UR AUTO: YELLOW
GLUCOSE UR STRIP-MCNC: NEGATIVE MG/DL
HGB UR QL STRIP: ABNORMAL
KETONES UR STRIP-MCNC: 5 MG/DL
LEUKOCYTE ESTERASE UR QL STRIP: ABNORMAL
MUCOUS THREADS #/AREA URNS LPF: PRESENT /LPF
NITRATE UR QL: NEGATIVE
NON-SQ EPI CELLS #/AREA URNS LPF: ABNORMAL /LPF
PH UR STRIP: 5.5 PH (ref 5–7)
RBC #/AREA URNS AUTO: ABNORMAL /HPF
SOURCE: ABNORMAL
SP GR UR STRIP: 1.02 (ref 1–1.03)
UROBILINOGEN UR STRIP-MCNC: 2 MG/DL (ref 0–2)
WBC #/AREA URNS AUTO: ABNORMAL /HPF

## 2018-07-30 PROCEDURE — 99214 OFFICE O/P EST MOD 30 MIN: CPT | Performed by: FAMILY MEDICINE

## 2018-07-30 PROCEDURE — 81001 URINALYSIS AUTO W/SCOPE: CPT | Performed by: FAMILY MEDICINE

## 2018-07-30 PROCEDURE — 87086 URINE CULTURE/COLONY COUNT: CPT | Performed by: FAMILY MEDICINE

## 2018-07-30 NOTE — PROGRESS NOTES
SUBJECTIVE:   Denita Flores is a 62 year old female who presents to clinic today for the following health issues:      ED/UC Followup:    Patient with past medical history significant for history of cervical cancer, post ablative hypothyroidism, mild persistent asthma is here for follow-up on her [facility] for having gross hematuria that patient was diagnosed with a possible UTI her urine culture showed contamination nikolay.  Patient was treated with 7 days of Macrobid twice daily with resolution of symptoms.  Patient did have history of nephrolithiasis in the past, currently denies UTI symptoms, hematuria, fever, chills, nausea, vomiting, abdominal, flank or back pain.  Has family history of renal cancer in father.  Patient does not smoke.    Facility:  [facility]  Date of visit: 7/1/18  Reason for visit: UTI - blood in urine  Current Status: feeling better             Problem list and histories reviewed & adjusted, as indicated.  Additional history: as documented    Patient Active Problem List   Diagnosis     Graves disease     Obesity     Diverticulosis of large intestine without hemorrhage     Colon polyps     History of cervical cancer     YELENA (obstructive sleep apnea)     Warts     CARDIOVASCULAR SCREENING; LDL GOAL LESS THAN 130     Hx of herpes zoster keratoconjunctivitis, os     Plantar warts     Stasis dermatitis of both legs     Obesity (BMI 30-39.9)     Postablative hypothyroidism     Other specified hypothyroidism,post ablative     Venous stasis dermatitis of left lower extremity     Vitamin B12 deficiency (non anemic)     Mild persistent asthma without complication     Spider veins of both lower extremities     Seasonal allergic rhinitis     Family history of colon cancer     Epistaxis     Tubular adenoma of colon     Past Surgical History:   Procedure Laterality Date     BACK SURGERY       COLONOSCOPY WITH CO2 INSUFFLATION N/A 2/21/2017    Procedure: COLONOSCOPY WITH CO2  INSUFFLATION;  Surgeon: Duane, William Charles, MD;  Location: [facility] OR     LEEP TX, CERVICAL      in her 20's       Social History   Substance Use Topics     Smoking status: Former Smoker     Types: Cigarettes     Quit date: 1/1/2003     Smokeless tobacco: Never Used     Alcohol use 0.0 oz/week     0 Standard drinks or equivalent per week     Family History   Problem Relation Age of Onset     HEART DISEASE Father      Cancer Father 67     kidney cancer      HEART DISEASE Sister      Cancer Sister 59     colon cancer      Diabetes Sister      Colon Cancer Maternal Aunt      Colon Cancer Paternal Aunt          Current Outpatient Prescriptions   Medication Sig Dispense Refill     albuterol (PROAIR HFA/PROVENTIL HFA/VENTOLIN HFA) 108 (90 BASE) MCG/ACT Inhaler Inhale 2 puffs into the lungs every 6 hours as needed for shortness of breath / dyspnea 1 Inhaler 3     Cholecalciferol (D3 ADULT PO) Take 2,000 Units by mouth daily.       cyanocolbalamin (VITAMIN  B-12) 100 MCG tablet Take 100 mcg by mouth daily.       ketoconazole (NIZORAL) 2 % cream Apply topically 2 times daily 15 g 1     levothyroxine (SYNTHROID/LEVOTHROID) 112 MCG tablet Take 1 tablet (112 mcg) by mouth daily 90 tablet 3     MEGARED OMEGA-3 KRILL  MG CAPS Take 1 capsule by mouth daily        montelukast (SINGULAIR) 10 MG tablet Take 1 tablet (10 mg) by mouth At Bedtime 90 tablet 3     order for DME Equipment being ordered: compression stockings- LARGE, 15mm HG, THIGH HIGH 2 Units 0     triamcinolone (KENALOG) 0.1 % cream Apply sparingly to affected area three times daily as needed 80 g 0     Allergies   Allergen Reactions     Sulfa Drugs Hives and Swelling     Noted in 10/18/09 ER     Recent Labs   Lab Test  02/20/18   1040  12/18/17   1035   08/15/17   1028   07/06/16   0902   06/22/15   1012   05/08/13   1140   LDL   --    --    --   101*   --   115*   --   96   --   112   HDL   --    --    --   52   --   53   --   47*   --   50   TRIG   --    --     --   124   --   107   --   106   --   125   ALT   --    --    --   20   --    --    --    --    --   24   CR   --    --    --   0.76   --    --    --    --    --   0.72   GFRESTIMATED   --    --    --   77   --    --    --    --    --   83   GFRESTBLACK   --    --    --   >90   --    --    --    --    --   >90   POTASSIUM   --    --    --   4.0   --    --    --    --    --   4.2   TSH  0.35*  0.88   < >  0.36*   < >  0.40   < >  0.21*   < >  1.58    < > = values in this interval not displayed.      BP Readings from Last 3 Encounters:   07/30/18 117/68   07/14/18 135/73   06/11/18 141/73    Wt Readings from Last 3 Encounters:   07/30/18 244 lb 14.4 oz (111.1 kg)   06/11/18 244 lb 3.2 oz (110.8 kg)   05/11/18 246 lb 1.6 oz (111.6 kg)                  Labs reviewed in EPIC    Reviewed and updated as needed this visit by clinical staff       Reviewed and updated as needed this visit by Provider         ROS:  CONSTITUTIONAL: NEGATIVE for fever, chills, change in weight  INTEGUMENTARY/SKIN: NEGATIVE for worrisome rashes, moles or lesions  RESP: NEGATIVE for significant cough or SOB  CV: NEGATIVE for chest pain, palpitations or peripheral edema  GI: NEGATIVE for nausea, abdominal pain, heartburn, or change in bowel habits  : as above  PSYCHIATRIC: NEGATIVE for changes in mood or affect    OBJECTIVE:     /68 (BP Location: Right arm, Patient Position: Chair, Cuff Size: Adult Large)  Pulse 71  Temp 98.1  F (36.7  C) (Temporal)  Wt 244 lb 14.4 oz (111.1 kg)  SpO2 96%  BMI 38.36 kg/m2  Body mass index is 38.36 kg/(m^2).  GENERAL: healthy, alert and no distress  RESP: lungs clear to auscultation - no rales, rhonchi or wheezes  CV: regular rate and rhythm, normal S1 S2, no S3 or S4, no murmur, click or rub, no peripheral edema and peripheral pulses strong  ABDOMEN: soft, non tender, no guarding or rigidity, no organomegaly, normal BS, no costovertebral angle tenderness  BACK: no CVA tenderness, no paralumbar  tenderness  PSYCH: mentation appears normal, affect normal/bright    Diagnostic Test Results:  none     ASSESSMENT/PLAN:             1. Gross hematuria  Results for orders placed or performed in visit on 07/30/18   UA with Microscopic reflex to Culture ([facility]; [facility])   Result Value Ref Range    Color Urine Yellow     Appearance Urine Clear     Glucose Urine Negative NEG^Negative mg/dL    Bilirubin Urine Negative NEG^Negative    Ketones Urine 5 (A) NEG^Negative mg/dL    Specific Gravity Urine 1.025 1.003 - 1.035    Blood Urine Trace (A) NEG^Negative    pH Urine 5.5 5.0 - 7.0 pH    Protein Albumin Urine 10 (A) NEG^Negative mg/dL    Urobilinogen mg/dL 2.0 0.0 - 2.0 mg/dL    Nitrite Urine Negative NEG^Negative    Leukocyte Esterase Urine Small (A) NEG^Negative    Source Clean catch urine     WBC Urine 5-10 (A) OTO5^0 - 5 /HPF    RBC Urine 2-5 (A) OTO2^O - 2 /HPF    Squamous Epithelial /LPF Urine Few FEW^Few /LPF    Mucous Urine Present (A) NEG^Negative /LPF     Reviewed the possible different etiologies of hematuria including UTI, nephrolithiasis, bladder or renal pathologies including malignancies.  Patient just finished 7 days of Macrobid 3 weeks ago  Reviewed urine exam showing persistent presence of RBC and WBC  Will run the urine culture for further evaluation.  If urine culture is negative, will repeat urine exam to rule out persistent microhematuria in which case we will proceed with abdominal and pelvic CT for further evaluation to rule out nephrolithiasis.  If all the initial workup is negative and patient continues to have blood in the urine, will consider urology consult for further evaluation including cystoscopy  Patient verbalised understanding and is agreeable to the plan.  Also reviewed the presence of ketones in the urine likely from dehydration.  Recommended patient to push fluids  Will f/u on results and call with recommendations.    - Urine Culture Aerobic Bacterial    2. H/O urinary  tract infection  as above    - UA with Microscopic reflex to Culture ([facility])  - Urine Culture Aerobic Bacterial    3. Nephrolithiasis  as above    - UA with Microscopic reflex to Culture ([facility])  - Urine Culture Aerobic Bacterial    4. Family history of renal cancer, father  as above        Chart documentation done in part with Dragon Voice recognition Software. Although reviewed after completion, some word and grammatical error may remain.    See Patient Instructions    Gee Hitchcock MD  [facility]

## 2018-07-30 NOTE — MR AVS SNAPSHOT
After Visit Summary   7/30/2018    Denita Flores    MRN: [id number]           Patient Information     Date Of Birth          1955        Visit Information        Provider Department      7/30/2018 3:30 PM Gee Hitchcock MD [facility]        Today's Diagnoses     Gross hematuria    -  1    H/O urinary tract infection        Nephrolithiasis          Care Instructions    Push fluids          Follow-ups after your visit        Your next 10 appointments already scheduled     Aug 06, 2018  2:30 PM CDT   New Visit with Gabriel Delaney MD, [facility] NURSE ONLY   [facility] ([facility])    [id number] [place] [address] [id number] [phone]            Sep 07, 2018 10:40 AM CDT   LAB with LAB FIRST FLOOR [facility])    [address] [place] [address] [id number] [phone]           Please do not eat 10-12 hours before your appointment if you are coming in fasting for labs on lipids, cholesterol, or glucose (sugar). This does not apply to pregnant women. Water, hot tea and black coffee (with nothing added) are okay. Do not drink other fluids, diet soda or chew gum.            Sep 07, 2018 10:50 AM CDT   PHYSICAL with Gee Hitchcock MD   [facility] ([facility])    [address] [place] [address] [id number] [phone]            Oct 30, 2018 10:30 AM CDT   Return Visit with Gail Booth MD   [facility])    [address] [place] [address] [id number] [phone]              Who to contact     If you have questions or need follow up information about today's clinic visit or your schedule please contact [facility] directly at [phone].  Normal or non-critical lab and imaging results will be communicated to you by MyChart, letter or phone within 4  business days after the clinic has received the results. If you do not hear from us within 7 days, please contact the clinic through [employer] or phone. If you have a critical or abnormal lab result, we will notify you by phone as soon as possible.  Submit refill requests through [employer] or call your pharmacy and they will forward the refill request to us. Please allow 3 business days for your refill to be completed.          Additional Information About Your Visit        [employer] Information     [employer] is an electronic gateway that provides easy, online access to your medical records. With [employer], you can request a clinic appointment, read your test results, renew a prescription or communicate with your care team.     To sign up for [employer] visit the website at [email or web address]   You will be asked to enter the access code listed below, as well as some personal information. Please follow the directions to create your username and password.     Your access code is: [email or web address]  Expires: 2018  6:40 PM     Your access code will  in 90 days. If you need help or a new code, please contact your [facility] or call [phone] for assistance.        Care EveryWhere ID     This is your Care EveryWhere ID. This could be used by other organizations to access your [place] medical records  [id number]        Your Vitals Were     Pulse Temperature Pulse Oximetry BMI (Body Mass Index)          71 98.1  F (36.7  C) (Temporal) 96% 38.36 kg/m2         Blood Pressure from Last 3 Encounters:   18 117/68   18 135/73   18 141/73    Weight from Last 3 Encounters:   18 244 lb 14.4 oz (111.1 kg)   18 244 lb 3.2 oz (110.8 kg)   18 246 lb 1.6 oz (111.6 kg)              We Performed the Following     UA with Microscopic reflex to Culture (Zaira Dc; [facility])     Urine Culture Aerobic Bacterial        Primary Care Provider Office Phone # Fax #     Gee Hitchcock [phone]       [address]  [facility] [place] [address]        Equal Access to Services     JONATHAN CHILEL : Hadrichard brenda shah joey Bui, wananetteda bharathiqmartine, qashrutita kabrendada jade, fabian mckee lakamisabas benedict. So [facility] [phone].    ATENCIÓN: Si habla español, tiene a moore disposición servicios gratuitos de asistencia lingüística. Llame al [phone].    We comply with applicable federal civil rights laws and [place] laws. We do not discriminate on the basis of race, color, national origin, age, disability, sex, sexual orientation, or gender identity.            Thank you!     Thank you for choosing [facility]  for your care. Our goal is always to provide you with excellent care. Hearing back from our patients is one way we can continue to improve our services. Please take a few minutes to complete the written survey that you may receive in the mail after your visit with us. Thank you!             Your Updated Medication List - Protect others around you: Learn how to safely use, store and throw away your medicines at www.disposemymeds.org.          This list is accurate as of 7/30/18  4:14 PM.  Always use your most recent med list.                   Brand Name Dispense Instructions for use Diagnosis    albuterol 108 (90 Base) MCG/ACT Inhaler    PROAIR HFA/PROVENTIL HFA/VENTOLIN HFA    1 Inhaler    Inhale 2 puffs into the lungs every 6 hours as needed for shortness of breath / dyspnea    Mild persistent asthma without complication       cyanocolbalamin 100 MCG tablet    vitamin  B-12     Take 100 mcg by mouth daily.        D3 ADULT PO      Take 2,000 Units by mouth daily.        ketoconazole 2 % cream    NIZORAL    15 g    Apply topically 2 times daily    Intertrigo       levothyroxine 112 MCG tablet    SYNTHROID/LEVOTHROID    90 tablet    Take 1 tablet (112 mcg) by mouth daily    Postablative hypothyroidism       MEGARED OMEGA-3 KRILL OIL  500 MG Caps      Take 1 capsule by mouth daily        montelukast 10 MG tablet    SINGULAIR    90 tablet    Take 1 tablet (10 mg) by mouth At Bedtime    Mild persistent asthma without complication       order for DME     2 Units    Equipment being ordered: compression stockings- LARGE, 15mm HG, THIGH HIGH    Spider veins of both lower extremities       triamcinolone 0.1 % cream    KENALOG    80 g    Apply sparingly to affected area three times daily as needed    Allergic contact dermatitis, unspecified trigger

## 2018-07-31 ENCOUNTER — TELEPHONE (OUTPATIENT)
Dept: PEDIATRICS | Facility: CLINIC | Age: 63
End: 2018-07-31

## 2018-07-31 DIAGNOSIS — R31.0 GROSS HEMATURIA: Primary | ICD-10-CM

## 2018-07-31 LAB
BACTERIA SPEC CULT: NORMAL
SPECIMEN SOURCE: NORMAL

## 2018-08-01 NOTE — TELEPHONE ENCOUNTER
Please inform patient of negative urine culture.  I will repeat the UA at her [facility] in 9/2018, will consider urology referral  for persistent or worsening concerns

## 2018-08-01 NOTE — TELEPHONE ENCOUNTER
Patient advised of information below per Dr. Hitchcock.   Patient is  concerned with the blood and ketones in her urine and wondering why she has blood in her urine?  And how the urine culture could be negative and to not test her urine until September is it ok to wait that long?  Should she see a Urologist?  Patient states she is not having any symptoms and she hasn't seen any blood in her urine.    Juli Villalba CMA    Please call patient back before 3 pm or after 4 pm

## 2018-08-02 NOTE — TELEPHONE ENCOUNTER
RN staff- please inform patient- if she does not want to wait until September, we can make her see the urologist now to start the work up.  Referral made today, please help her schedule.

## 2018-08-02 NOTE — TELEPHONE ENCOUNTER
LM for patient to return clinics call if she would like to be seen sooner by a urologist. Modesta Cortes RN

## 2018-08-03 NOTE — TELEPHONE ENCOUNTER
Called patient and discussed.    She verbalized understanding, since she had blood in her urine on 7/1/18, she would like to see urology sooner.    Transferred her call to scheduling for urology.    Lexi David RN, [facility]

## 2018-08-03 NOTE — TELEPHONE ENCOUNTER
I spoke with patient on 08/01/18 and she had a lot of questions.  Recommend an RN call her back today.    Juli Villalba CMA

## 2018-08-06 ENCOUNTER — OFFICE VISIT (OUTPATIENT)
Dept: OPHTHALMOLOGY | Facility: CLINIC | Age: 63
End: 2018-08-06
Payer: COMMERCIAL

## 2018-08-06 DIAGNOSIS — H52.13 MYOPIA OF BOTH EYES WITH ASTIGMATISM AND PRESBYOPIA: Primary | ICD-10-CM

## 2018-08-06 DIAGNOSIS — H52.4 MYOPIA OF BOTH EYES WITH ASTIGMATISM AND PRESBYOPIA: Primary | ICD-10-CM

## 2018-08-06 DIAGNOSIS — H52.203 MYOPIA OF BOTH EYES WITH ASTIGMATISM AND PRESBYOPIA: Primary | ICD-10-CM

## 2018-08-06 DIAGNOSIS — H26.9 NUCLEAR CATARACT, NONSENILE: ICD-10-CM

## 2018-08-06 PROCEDURE — 92015 DETERMINE REFRACTIVE STATE: CPT | Performed by: OPHTHALMOLOGY

## 2018-08-06 PROCEDURE — 92004 COMPRE OPH EXAM NEW PT 1/>: CPT | Performed by: OPHTHALMOLOGY

## 2018-08-06 ASSESSMENT — REFRACTION_MANIFEST
OS_AXIS: 085
OD_AXIS: 100
OS_SPHERE: -2.00
OD_CYLINDER: +1.00
OD_ADD: +2.50
OS_CYLINDER: +2.00
OS_ADD: +2.50
OD_SPHERE: -1.00

## 2018-08-06 ASSESSMENT — REFRACTION_WEARINGRX
OS_SPHERE: -2.75
OD_AXIS: 100
OS_ADD: +2.50
OD_CYLINDER: +1.25
OD_SPHERE: -2.00
OD_ADD: +2.50
SPECS_TYPE: BIFOCAL
OS_CYLINDER: +2.25
OD_AXIS: 090
OS_AXIS: 080
OD_ADD: +2.50
OD_CYLINDER: +1.25
OS_ADD: +2.50
OS_CYLINDER: +2.50
SPECS_TYPE: PAL
OD_SPHERE: -2.00
OS_SPHERE: -2.75
OS_AXIS: 088

## 2018-08-06 ASSESSMENT — CUP TO DISC RATIO
OS_RATIO: 0.4
OD_RATIO: 0.4

## 2018-08-06 ASSESSMENT — VISUAL ACUITY
OD_CC: 20/25
CORRECTION_TYPE: GLASSES
METHOD: SNELLEN - LINEAR
OD_SC+: +2
OS_CC: 20/25
OS_SC: 20/60
OD_SC: 20/25
OD_CC+: -2
OS_CC+: -1

## 2018-08-06 ASSESSMENT — CONF VISUAL FIELD
OD_NORMAL: 1
OS_NORMAL: 1

## 2018-08-06 ASSESSMENT — TONOMETRY
OS_IOP_MMHG: 18
IOP_METHOD: TONOPEN
OD_IOP_MMHG: 15

## 2018-08-06 ASSESSMENT — EXTERNAL EXAM - RIGHT EYE: OD_EXAM: NORMAL

## 2018-08-06 ASSESSMENT — SLIT LAMP EXAM - LIDS
COMMENTS: 1+ DERMATOCHALASIS - UPPER LID
COMMENTS: 1+ DERMATOCHALASIS - UPPER LID

## 2018-08-06 ASSESSMENT — ENCOUNTER SYMPTOMS: JOINT SWELLING: 1

## 2018-08-06 ASSESSMENT — EXTERNAL EXAM - LEFT EYE: OS_EXAM: NORMAL

## 2018-08-06 NOTE — MR AVS SNAPSHOT
After Visit Summary   8/6/2018    Denita Flores    MRN: [id number]           Patient Information     Date Of Birth          1955        Visit Information        Provider Department      8/6/2018 2:30 PM Gabriel Delaney MD;  [facility] NURSE ONLY [facility]        Today's Diagnoses     Myopia of both eyes with astigmatism and presbyopia    -  1    Nuclear cataract, nonsenile           Follow-ups after your visit        Follow-up notes from your care team     Return in about 1 year (around 8/6/2019) for Annual Eye Exam.      Your next 10 appointments already scheduled     Aug 07, 2018  2:30 PM CDT   New Visit with Praneeth Fenton MD   [facility] ([facility])    [id number] [facility] [phone]            Sep 07, 2018 10:40 AM CDT   LAB with LAB FIRST FLOOR [facility] ([facility])    [address] [facility] [phone]           Please do not eat 10-12 hours before your appointment if you are coming in fasting for labs on lipids, cholesterol, or glucose (sugar). This does not apply to pregnant women. Water, hot tea and black coffee (with nothing added) are okay. Do not drink other fluids, diet soda or chew gum.            Sep 07, 2018 10:50 AM CDT   PHYSICAL with Gee Hitchcock MD   [facility] ([facility])    [address] [facility] [phone]            Oct 30, 2018 10:30 AM CDT   Return Visit with Gail Booth MD   [facility] ([facility])    [id number] [facility] [phone]              Who to contact     If you have questions or need follow up information about today's clinic visit or your schedule please contact [facility] directly at [phone].  Normal or non-critical lab and imaging  results will be communicated to you by MyChart, letter or phone within 4 business days after the clinic has received the results. If you do not hear from us within 7 days, please contact the clinic through MyChart or phone. If you have a critical or abnormal lab result, we will notify you by phone as soon as possible.  Submit refill requests through [employer] or call your pharmacy and they will forward the refill request to us. Please allow 3 business days for your refill to be completed.          Additional Information About Your Visit        Care EveryWhere ID     This is your Care EveryWhere ID. This could be used by other organizations to access your [place] medical records  [id number]         Blood Pressure from Last 3 Encounters:   07/30/18 117/68   07/14/18 135/73   06/11/18 141/73    Weight from Last 3 Encounters:   07/30/18 111.1 kg (244 lb 14.4 oz)   06/11/18 110.8 kg (244 lb 3.2 oz)   05/11/18 111.6 kg (246 lb 1.6 oz)              We Performed the Following     EYE EXAM, NEW PATIENT,COMPREHESV     REFRACTION        Primary Care Provider Office Phone # Fax #    Gee Hitchcock [phone]       [address] [facility] [address]        Equal Access to Services     JONATHAN CHILEL : Hadii aad ku hadasho Soomaali, waaxda luqadaha, qaybta kaalmada adeegyada, waxay idiin hayfritzn ronald mcmullen. So [facility] [phone].    ATENCIÓN: Si habla español, tiene a moore disposición servicios gratuitos de asistencia lingüística. Llame al [phone].    We comply with applicable federal civil rights laws and [place] laws. We do not discriminate on the basis of race, color, national origin, age, disability, sex, sexual orientation, or gender identity.            Thank you!     Thank you for choosing [facility]  for your care. Our goal is always to provide you with excellent care. Hearing back from our patients is one way we can continue to improve our services. Please take a  few minutes to complete the written survey that you may receive in the mail after your visit with us. Thank you!             Your Updated Medication List - Protect others around you: Learn how to safely use, store and throw away your medicines at www.disposemymeds.org.          This list is accurate as of 8/6/18 11:59 PM.  Always use your most recent med list.                   Brand Name Dispense Instructions for use Diagnosis    albuterol 108 (90 Base) MCG/ACT Inhaler    PROAIR HFA/PROVENTIL HFA/VENTOLIN HFA    1 Inhaler    Inhale 2 puffs into the lungs every 6 hours as needed for shortness of breath / dyspnea    Mild persistent asthma without complication       cyanocolbalamin 100 MCG tablet    vitamin  B-12     Take 100 mcg by mouth daily.        D3 ADULT PO      Take 2,000 Units by mouth daily.        ketoconazole 2 % cream    NIZORAL    15 g    Apply topically 2 times daily    Intertrigo       levothyroxine 112 MCG tablet    SYNTHROID/LEVOTHROID    90 tablet    Take 1 tablet (112 mcg) by mouth daily    Postablative hypothyroidism       MEGARED OMEGA-3 KRILL  MG Caps      Take 1 capsule by mouth daily        montelukast 10 MG tablet    SINGULAIR    90 tablet    Take 1 tablet (10 mg) by mouth At Bedtime    Mild persistent asthma without complication       order for DME     2 Units    Equipment being ordered: compression stockings- LARGE, 15mm HG, THIGH HIGH    Spider veins of both lower extremities       triamcinolone 0.1 % cream    KENALOG    80 g    Apply sparingly to affected area three times daily as needed    Allergic contact dermatitis, unspecified trigger

## 2018-08-06 NOTE — NURSING NOTE
Patient presents with:  COMPREHENSIVE EYE EXAM: Pt felt she could see better without glasses since last week.  Put on a different pair with the same prescritpion and feels like she can a little better.        Referring Provider:  Referred Self, MD  No address on file    HPI    Last Eye Exam:  8/6/14   Informant(s):  EMR   Affected eye(s):  Both   Symptoms:     Blurred vision   Decreased vision   Distorted vision   Difficulty with driving      Duration:  2 weeks      Do you have eye pain now?:  No      Comments:  Saw Dr. Flores in 2014.  Pt has HX of graves diease.  Like to use her bifocal to read real small print.  Pt denies any gtts.             Kim Moreau, COT

## 2018-08-06 NOTE — PROGRESS NOTES
Assessment & Plan   Denita Flores is a 63 year old female who presents with:   Review of systems for the eyes was negative other than the pertinent positives and negatives noted in the HPI.  History is obtained from the patient.    Chief Complaint   Patient presents with     COMPREHENSIVE EYE EXAM     Pt felt she could see better without glasses since last week.  Put on a different pair with the same prescritpion and feels like she can a little better.           Myopia of both eyes with astigmatism and presbyopia  - Rx per MR for glasses (optional)  - REFRACTION    Cataracts  - Good vision. Observe.    Return in about 1 year (around 8/6/2019) for Annual Eye Exam.    Documentation for today's encounter was performed by Neelima Marcos COA. OSC. Acting as a scribe in my presence. I have reviewed and verified that it is an accurate recording of today's encounter.    Attending Physician Attestation:  Complete documentation of historical and exam elements from today's encounter can be found in the full encounter summary report (not reduplicated in this progress note).  I personally obtained the chief complaint(s) and history of present illness.  I confirmed and edited as necessary the review of systems, past medical/surgical history, family history, social history, and examination findings as documented by others; and I examined the patient myself.  I personally reviewed the relevant tests, images, and reports as documented above.  I formulated and edited as necessary the assessment and plan and discussed the findings and management plan with the patient and family. - Gabriel Delaney MD

## 2018-08-07 ENCOUNTER — OFFICE VISIT (OUTPATIENT)
Dept: UROLOGY | Facility: CLINIC | Age: 63
End: 2018-08-07
Attending: FAMILY MEDICINE
Payer: COMMERCIAL

## 2018-08-07 ENCOUNTER — RADIANT APPOINTMENT (OUTPATIENT)
Dept: CT IMAGING | Facility: CLINIC | Age: 63
End: 2018-08-07
Attending: UROLOGY
Payer: COMMERCIAL

## 2018-08-07 VITALS
WEIGHT: 246 LBS | DIASTOLIC BLOOD PRESSURE: 73 MMHG | SYSTOLIC BLOOD PRESSURE: 124 MMHG | BODY MASS INDEX: 38.61 KG/M2 | HEIGHT: 67 IN | HEART RATE: 73 BPM | OXYGEN SATURATION: 95 %

## 2018-08-07 DIAGNOSIS — R31.0 GROSS HEMATURIA: ICD-10-CM

## 2018-08-07 DIAGNOSIS — E66.01 MORBID OBESITY (H): ICD-10-CM

## 2018-08-07 DIAGNOSIS — R31.0 GROSS HEMATURIA: Primary | ICD-10-CM

## 2018-08-07 LAB
ALBUMIN UR-MCNC: 10 MG/DL
ANION GAP SERPL CALCULATED.3IONS-SCNC: 4 MMOL/L (ref 3–14)
APPEARANCE UR: CLEAR
BACTERIA #/AREA URNS HPF: ABNORMAL /HPF
BILIRUB UR QL STRIP: NEGATIVE
BUN SERPL-MCNC: 11 MG/DL (ref 7–30)
CALCIUM SERPL-MCNC: 8.3 MG/DL (ref 8.5–10.1)
CHLORIDE SERPL-SCNC: 108 MMOL/L (ref 94–109)
CO2 SERPL-SCNC: 31 MMOL/L (ref 20–32)
COLOR UR AUTO: YELLOW
CREAT SERPL-MCNC: 0.76 MG/DL (ref 0.52–1.04)
GFR SERPL CREATININE-BSD FRML MDRD: 76 ML/MIN/1.7M2
GLUCOSE SERPL-MCNC: 91 MG/DL (ref 70–99)
GLUCOSE UR STRIP-MCNC: NEGATIVE MG/DL
HGB UR QL STRIP: NEGATIVE
KETONES UR STRIP-MCNC: NEGATIVE MG/DL
LEUKOCYTE ESTERASE UR QL STRIP: ABNORMAL
MUCOUS THREADS #/AREA URNS LPF: PRESENT /LPF
NITRATE UR QL: NEGATIVE
NON-SQ EPI CELLS #/AREA URNS LPF: ABNORMAL /LPF
PH UR STRIP: 7.5 PH (ref 5–7)
POTASSIUM SERPL-SCNC: 4.1 MMOL/L (ref 3.4–5.3)
RBC #/AREA URNS AUTO: ABNORMAL /HPF
SODIUM SERPL-SCNC: 143 MMOL/L (ref 133–144)
SOURCE: ABNORMAL
SP GR UR STRIP: 1.02 (ref 1–1.03)
UROBILINOGEN UR STRIP-MCNC: 2 MG/DL (ref 0–2)
WBC #/AREA URNS AUTO: ABNORMAL /HPF

## 2018-08-07 PROCEDURE — 81001 URINALYSIS AUTO W/SCOPE: CPT | Performed by: UROLOGY

## 2018-08-07 PROCEDURE — 74178 CT ABD&PLV WO CNTR FLWD CNTR: CPT | Performed by: RADIOLOGY

## 2018-08-07 PROCEDURE — 88112 CYTOPATH CELL ENHANCE TECH: CPT | Performed by: UROLOGY

## 2018-08-07 PROCEDURE — 80048 BASIC METABOLIC PNL TOTAL CA: CPT | Performed by: UROLOGY

## 2018-08-07 PROCEDURE — 36415 COLL VENOUS BLD VENIPUNCTURE: CPT | Performed by: UROLOGY

## 2018-08-07 PROCEDURE — 99244 OFF/OP CNSLTJ NEW/EST MOD 40: CPT | Performed by: UROLOGY

## 2018-08-07 RX ORDER — IOPAMIDOL 755 MG/ML
135 INJECTION, SOLUTION INTRAVASCULAR ONCE
Status: COMPLETED | OUTPATIENT
Start: 2018-08-07 | End: 2018-08-07

## 2018-08-07 RX ADMIN — IOPAMIDOL 135 ML: 755 INJECTION, SOLUTION INTRAVASCULAR at 16:27

## 2018-08-07 ASSESSMENT — PAIN SCALES - GENERAL: PAINLEVEL: NO PAIN (0)

## 2018-08-07 NOTE — MR AVS SNAPSHOT
After Visit Summary   8/7/2018    Denita Flores    MRN: [id number]           Patient Information     Date Of Birth          1955        Visit Information        Provider Department      8/7/2018 2:30 PM Praneeth Fenton MD [facility]        Today's Diagnoses     Gross hematuria    -  1    Morbid obesity (H)          Care Instructions    Cystoscopy    What is a Cystoscopy?  This is a procedure done to check for problems inside the bladder. Problems may include polyps (growths), tumors, inflammation (swelling and redness) and other concerns.    The doctor inserts a thin tube (called a cystoscope) into the bladder. The tube is about the size of a pencil. We will clean the area with special soap to remove bacteria and prevent post-procedure infection. We will give you numbing medicine (Lidocaine jelly) to reduce the pain or discomfort you may feel.    The tube allows the doctor to:  The doctor will be able to see inside the bladder by filling the bladder with water. The water makes it easier to see any problems that may be present.    If needed, the doctor may use the tube to:  The doctor is able to take tissue samples (biopsies). Samples are sent to the lab for testing.  The doctor can also burn off any small growths or tumors that are found. This is call fulguration.    How should I get ready for the exam?  There is no special preparation you need to do for this exam. Staff may ask for a urine sample prior to rooming you. You may eat and drink a normal diet before and after the exam. Medications may be taken as usual unless otherwise directed by the physician.    Please tell your doctor if:  You have a history of urinary tract infections.  You know that you have a tumor in your bladder.  You have bleeding problems.  You have any allergies.  You are or may be pregnant.    What happens after the exam?  You may go back to your normal diet and activity as you feel ready, unless your  doctor tells you not to.    For the next two days, you may notice:  Some blood in your urine.  Some burning when you urinate (use the toilet).  An urge to urinate more often.  Bladder spasms.    These are normal after the procedure. They should go away on their own after a day or two.      You can help to relieve the above listed symptoms by:  Drinking 6 to 8 large glasses of water each day (includes drinks at meals).  This will help clear the urine.  Take warm baths to relieve pain and bladder spasms.  Do not add anything to the bath water.  Your doctor may prescribe pain medicine.  You may also take Tylenol (acetaminophen) for pain.    When should I call my doctor?  A fever over 100.0 F (38 C) for more than a day.  (Before you call the doctor, check your temperature under your tongue.)  Chills.  Failure to urinate: No urine comes out when you try to use the toilet.  (Try soaking in a bathtub full of warm water.  If still no urine, call your doctor.)  A lot of blood in the urine or blood clots larger than a nickel.  Pain in the back or abdomen (belly / stomach area).  Pain or spasms that are not relieved by warm tub baths and pain medicine.  Severe pain, burning or other problems while passing urine.  Pain that gets worse after two days.            Follow-ups after your visit        Follow-up notes from your care team     Return in about 2 weeks (around 8/21/2018).      Your next 10 appointments already scheduled     Aug 07, 2018  4:30 PM CDT   CT UROGRAM WO & W CONTRAST with MGCT2   [facility]    [address] [place] [id number] [phone]           Please bring any scans or X-rays taken at other hospitals, if similar tests were done. Also bring a list of your medicines, including vitamins, minerals and over-the-counter drugs. It is safest to leave personal items at home.  Be sure to tell your doctor:   If you have any allergies.   If there s any  chance you are pregnant.   If you are breastfeeding.    If you have diabetes as your medication may need to be adjusted for this exam.  You will have contrast for this exam. To prepare:   Do not eat or drink for 2 hours before your exam. If you need to take medicine, you may take it with small sips of water. (We may ask you to take liquid medicine as well.)   The day before your exam, drink extra fluids at least six 8-ounce glasses (unless your doctor tells you to restrict your fluids).  Patients over 70 or patients with diabetes or kidney problems:   If you haven t had a blood test (creatinine test) within the last 30 days, the Cardiologist/Radiologist may require you to get this test prior to your exam.  Please wear loose clothing, such as a sweat suit or jogging clothes. Avoid snaps, zippers and other metal. We may ask you to undress and put on a hospital gown.  If you have any questions, please call the Imaging Department where you will have your exam.            Aug 10, 2018 11:30 AM CDT   CYSTO with Praneeth Fenton MD   [facility])    [address] [place] [id number]   [phone]            Sep 07, 2018 10:40 AM CDT   LAB with LAB FIRST FLOOR [facility])    [address] [place] [address]   [phone]           Please do not eat 10-12 hours before your appointment if you are coming in fasting for labs on lipids, cholesterol, or glucose (sugar). This does not apply to pregnant women. Water, hot tea and black coffee (with nothing added) are okay. Do not drink other fluids, diet soda or chew gum.            Sep 07, 2018 10:50 AM CDT   PHYSICAL with Gee Hitchcock MD   [facility])    [address] [place] [id number]   [phone]            Oct 30, 2018 10:30 AM CDT   Return Visit with Gail Booth MD   [facility]  "[facility] ([facility])    [address] [facility] [address] [phone]              Future tests that were ordered for you today     Open Future Orders        Priority Expected Expires Ordered    CT Urogram wo & w Contrast Routine  8/7/2019 8/7/2018            Who to contact     If you have questions or need follow up information about today's clinic visit or your schedule please contact [facility] directly at [phone].  Normal or non-critical lab and imaging results will be communicated to you by MyChart, letter or phone within 4 business days after the clinic has received the results. If you do not hear from us within 7 days, please contact the clinic through MyChart or phone. If you have a critical or abnormal lab result, we will notify you by phone as soon as possible.  Submit refill requests through [employer] or call your pharmacy and they will forward the refill request to us. Please allow 3 business days for your refill to be completed.          Additional Information About Your Visit        Care EveryWhere ID     This is your Care EveryWhere ID. This could be used by other organizations to access your [facility] medical records  [id number]        Your Vitals Were     Pulse Height Pulse Oximetry BMI (Body Mass Index)          73 1.702 m (5' 7\") 95% 38.53 kg/m2         Blood Pressure from Last 3 Encounters:   08/07/18 124/73   07/30/18 117/68   07/14/18 135/73    Weight from Last 3 Encounters:   08/07/18 111.6 kg (246 lb)   07/30/18 111.1 kg (244 lb 14.4 oz)   06/11/18 110.8 kg (244 lb 3.2 oz)              We Performed the Following     Basic metabolic panel [LAB15]     Cytology non gyn [UXF4938]     UA reflex to Microscopic and Culture        Primary Care Provider Office Phone # Fax #    Gee Hitchcock [phone]       [address] [facility] [address]        Equal Access to Services     JONATHAN CHILEL AH: Hadii brenda shah " joey Bui, wananetteda luqadaha, qaybta kacarlene hansen, fabian ocampo thanghany josejazmyne lakamisabas benedict. So [facility] [phone].    ATENCIÓN: Si dannyla chika, tiene a moore disposición servicios gratuitos de asistencia lingüística. Vamshi al [phone].    We comply with applicable federal civil rights laws and [place] laws. We do not discriminate on the basis of race, color, national origin, age, disability, sex, sexual orientation, or gender identity.            Thank you!     Thank you for choosing [facility]  for your care. Our goal is always to provide you with excellent care. Hearing back from our patients is one way we can continue to improve our services. Please take a few minutes to complete the written survey that you may receive in the mail after your visit with us. Thank you!             Your Updated Medication List - Protect others around you: Learn how to safely use, store and throw away your medicines at www.disposemymeds.org.          This list is accurate as of 8/7/18  3:20 PM.  Always use your most recent med list.                   Brand Name Dispense Instructions for use Diagnosis    albuterol 108 (90 Base) MCG/ACT Inhaler    PROAIR HFA/PROVENTIL HFA/VENTOLIN HFA    1 Inhaler    Inhale 2 puffs into the lungs every 6 hours as needed for shortness of breath / dyspnea    Mild persistent asthma without complication       cyanocolbalamin 100 MCG tablet    vitamin  B-12     Take 100 mcg by mouth daily.        D3 ADULT PO      Take 2,000 Units by mouth daily.        ketoconazole 2 % cream    NIZORAL    15 g    Apply topically 2 times daily    Intertrigo       levothyroxine 112 MCG tablet    SYNTHROID/LEVOTHROID    90 tablet    Take 1 tablet (112 mcg) by mouth daily    Postablative hypothyroidism       MEGARED OMEGA-3 KRILL  MG Caps      Take 1 capsule by mouth daily        montelukast 10 MG tablet    SINGULAIR    90 tablet    Take 1 tablet (10 mg) by mouth At Bedtime    Mild  persistent asthma without complication       order for DME     2 Units    Equipment being ordered: compression stockings- LARGE, 15mm HG, THIGH HIGH    Spider veins of both lower extremities       triamcinolone 0.1 % cream    KENALOG    80 g    Apply sparingly to affected area three times daily as needed    Allergic contact dermatitis, unspecified trigger

## 2018-08-07 NOTE — PROGRESS NOTES
"Urology Consult History and Physical    Name: Denita Flores    MRN: [id number]   YOB: 1955       We were asked to see Denita Flores at the request of Dr. Hitchcock for evaluation and treatment of gross hematuria.        Chief Complaint:   Gross hematuria    History is obtained from the patient          History of Present Illness:   Denita Flores is a 63 year old female who is being seen for evaluation of gross hematuria. She reports that around  she developed symptoms of a UTI with frequency, dysuria and noted blood on the toilet paper following several voids. She presented to a [facility] where a U/A and U/Cx were obtained and she was started on Abx. UCx resulted as negative. Her symptoms resolved following this and she followed up with Dr. Hitchcock. U/A at that time showed 2-5 RBC on micro. She does have significant >50 pack/year smoking history and quit in . Family history is significant for her father who  from kidney cancer at age 67. She reports remote history of passing a \"5-10mm\" kidney stone around .            Past Medical History:     Past Medical History:   Diagnosis Date     Kidney stones     Passed a 5-10mm stone     Malignant neoplasm (H)      Mild persistent asthma 2013     Thyroid disease             Past Surgical History:     Past Surgical History:   Procedure Laterality Date     BACK SURGERY       COLONOSCOPY WITH CO2 INSUFFLATION N/A 2017    Procedure: COLONOSCOPY WITH CO2 INSUFFLATION;  Surgeon: Duane, William Charles, MD;  Location:  OR     [facility], CERV[facility]      in her 20's            Social History:     Social History   Substance Use Topics     Smoking status: Former Smoker     Packs/day: 1.50     Years: 35.00     Types: Cigarettes     Quit date: 2003     Smokeless tobacco: Never Used     Alcohol use 0.0 oz/week     0 Standard drinks or equivalent per week            Family History:     Family History   Problem Relation Age of Onset     " "Glaucoma Mother      HEART DISEASE Father      Cancer Father 67     kidney cancer      HEART DISEASE Sister      Cancer Sister 59     colon cancer      Diabetes Sister      Colon Cancer Maternal Aunt      Colon Cancer Paternal Aunt      Glaucoma Maternal Grandmother               Allergies:     Allergies   Allergen Reactions     Sulfa Drugs Hives and Swelling     Noted in 10/18/09 ER            Medications:     Current Outpatient Prescriptions   Medication Sig     albuterol (PROAIR HFA/PROVENTIL HFA/VENTOLIN HFA) 108 (90 BASE) MCG/ACT Inhaler Inhale 2 puffs into the lungs every 6 hours as needed for shortness of breath / dyspnea     Cholecalciferol (D3 ADULT PO) Take 2,000 Units by mouth daily.     cyanocolbalamin (VITAMIN  B-12) 100 MCG tablet Take 100 mcg by mouth daily.     ketoconazole (NIZORAL) 2 % cream Apply topically 2 times daily     levothyroxine (SYNTHROID/LEVOTHROID) 112 MCG tablet Take 1 tablet (112 mcg) by mouth daily     MEGARED OMEGA-3 KRILL  MG CAPS Take 1 capsule by mouth daily      montelukast (SINGULAIR) 10 MG tablet Take 1 tablet (10 mg) by mouth At Bedtime     order for DME Equipment being ordered: compression stockings- LARGE, 15mm HG, THIGH HIGH     triamcinolone (KENALOG) 0.1 % cream Apply sparingly to affected area three times daily as needed     No current facility-administered medications for this visit.              Review of Systems:     Skin: negative  Eyes: negative  Ears/Nose/Throat: negative  Respiratory: No shortness of breath, dyspnea on exertion, cough, or hemoptysis  Cardiovascular: negative  Gastrointestinal: negative  Genitourinary: as above  Musculoskeletal: negative  Neurologic: negative  Psychiatric: negative  Hematologic/Lymphatic/Immunologic: negative  Endocrine: thyroid disorder          Physical Exam:     Patient Vitals for the past 24 hrs:   BP Pulse SpO2 Height Weight   08/07/18 1438 124/73 73 95 % 1.702 m (5' 7\") 111.6 kg (246 lb)   Body mass index is 38.53 " kg/(m^2).     General: age-appropriate appearing female in NAD  HEENT: Head AT/NC, EOMI, CN Grossly intact  Lungs: no respiratory distress, or pursed lip breathing  Heart: No obvious jugular venous distension present  Back: no bony midline tenderness, no CVAT bilaterally.  Abdomen: soft, non-distended, non-tender. No organomegaly  Flank: No CVA tenderness  : genital exam deferred until time of cysto  Lymph: no palpable inguinal lymphadenopathy.  LE: no edema.   Musculoskeltal: extremities normal, no peripheral edema  Skin: no suspicious lesions or rashes  Neuro: Alert, oriented, speech and mentation normal;  moving all 4 extremities equally.  Psych: affect and mood normal          Data:   All laboratory data reviewed:    UA RESULTS:  Recent Labs   Lab Test  08/07/18   1440  07/30/18   1550   06/06/12   1416   COLOR  Yellow  Yellow   < >  Yellow   APPEARANCE  Clear  Clear   < >   --    URINEGLC  Negative  Negative   < >  NEGATIVE   URINEBILI  Negative  Negative   < >   --    URINEKETONE  Negative  5*   < >   --    SG  1.021  1.025   < >  >=1.030   UBLD  Negative  Trace*   < >   --    URINEPH  7.5*  5.5   < >  5.5   PROTEIN  10*  10*   < >   --    UROBILINOGEN   --    --    --   0.2 E.U./dL   NITRITE  Negative  Negative   < >  NEGATIVE   LEUKEST  Trace*  Small*   < >   --    RBCU   --   2-5*   --    --    WBCU   --   5-10*   --   Rare    < > = values in this interval not displayed.               Impression and Plan:   Impression:   64 y/o female with morbid obesity who presented with small volume gross hematuria during possible time of UTI, though UCx was negative. She then had microscopic hematuria at time of follow up with Dr. Hitchcock. U/A today negative on dip, however given significant smoking history and family history of kidney cancer a hematuria work up is warranted.       Plan:   Gross hematuria  - Urine cytology   - BMP as last SCr >1 year prior  - CT Urogram  - Office cystoscopy     Thank you for the kind  consultation.    Time spent: 45 minutes of which >50% was counseling.    Praneeth Fenton MD   Urology  [facility]  Clinic Phone [phone]

## 2018-08-07 NOTE — NURSING NOTE
"Denita Flores's goals for this visit include:   Chief Complaint   Patient presents with     Consult     Gross hematuria       She requests these members of her care team be copied on today's visit information: Yes    PCP: Gee Hitchcock    Referring Provider:  Gee Hitchcock MD  [address] [place] [address]    /73 (BP Location: Left arm, Patient Position: Sitting, Cuff Size: Adult Large)  Pulse 73  Ht 1.702 m (5' 7\")  Wt 111.6 kg (246 lb)  SpO2 95%  BMI 38.53 kg/m2    Do you need any medication refills at today's visit? No    "

## 2018-08-08 LAB — COPATH REPORT: NORMAL

## 2018-08-10 ENCOUNTER — OFFICE VISIT (OUTPATIENT)
Dept: UROLOGY | Facility: CLINIC | Age: 63
End: 2018-08-10
Payer: COMMERCIAL

## 2018-08-10 VITALS — SYSTOLIC BLOOD PRESSURE: 113 MMHG | DIASTOLIC BLOOD PRESSURE: 59 MMHG | OXYGEN SATURATION: 95 % | HEART RATE: 83 BPM

## 2018-08-10 DIAGNOSIS — R31.0 GROSS HEMATURIA: Primary | ICD-10-CM

## 2018-08-10 LAB — RADIOLOGIST FLAGS: NORMAL

## 2018-08-10 PROCEDURE — 52000 CYSTOURETHROSCOPY: CPT | Performed by: UROLOGY

## 2018-08-10 ASSESSMENT — PAIN SCALES - GENERAL: PAINLEVEL: NO PAIN (0)

## 2018-08-10 NOTE — PATIENT INSTRUCTIONS
"After Your Cystoscopy    What happens after the exam?    You may go back to your normal diet and activity as you feel ready, unless your doctor tells you not to.    For the next two days, you may notice:    Some blood in your urine  Some burning when you urinate (use the toilet)  An urge to urinate more often  Bladder spasms    These are normal after the procedure and should go away after a day or two.  To relieve these problems drink 6 to 8 large glasses of water each day (includes drinks at meals) as this will help clear the urine.  Take warm baths to relieve pain and bladder spasms.  Do not add anything to the bath water.  You may also take Tylenol (acetaminophen) for pain if needed.    When should I call my doctor?    A fever over 100F (38C) for more than a day. (Before you call the doctor, check your temperature under your tongue)  Chills  Failure to urinate: No urine comes out when you try to use the toilet. (Try soaking in a bathtub full of warm water. If still no urine, call your doctor)  A lot of blood in the urine, or blood clots larger than a nickel  Pain in the back or belly area (abdomen)  Pain or spasms that are not relieved by warm tub baths and pain medicine  Severe pain, burning or other problems while passing urine  Pain that gets worse after two days                AFTER YOUR CYSTOSCOPY        You have just completed a cystoscopy, or \"cysto\", which allowed your physician to learn more about your bladder (or to remove a stent placed after surgery). We suggest that you continue to avoid caffeine, fruit juice, and alcohol for the next 24 hours, however, you are encouraged to return to your normal activities.         A few things that are considered normal after your cystoscopy:     * Small amount of bleeding (or spotting) that clears within the next 24 hours     * Slight burning sensation with urination     * Sensation to of needing to avoid more frequently     * The feeling of \"air\" in your " urine     * Mild discomfort that is relieved with Tylenol        Please contact our office promptly if you:     * Develop a fever above 101 degrees     * Are unable to urinate     * Develop bright red blood that does not stop     * Severe pain or swelling         Please contact our office with any concerns or questions @[facility].

## 2018-08-10 NOTE — PROCEDURES
CYSTOSCOPY PROCEDURE NOTE:    Denita Flores is a 63 year old female who presents with gross hematuria for a Cystoscopy.    Pt ID verified with patient: yes    Procedure verified with patient: Yes    Procedure confirmed with physician and support staff: Yes    Consent confirmed with physician and support staff.    Sign In:  History and Physical Exam reviewed and unchanged  Primary Diagnosis: (R31.0) Gross hematuria  (primary encounter diagnosis)  Informed Consent Discussed: Yes  Sign in Communication: Completed  Time Out:  Team Confirms the Correct Patient, Correct Procedure  Affirmation of Time Out: Yes  Sign Out:  Sign Out Discussion: Yes  Physician: Praneeth Fenton  Indications for procedure:     Denita Flores is a 63 year old female with a history of gross hematuria. Urine cytology 8/7 negative.     Description of procedure:   After fully informed, voluntary consent was obtained, the patient was brought into the procedure room, identified and placed in a dorsal lithotomy position on the cystoscopy table.  The vagina/introitus were prepped with betadine and draped in a sterile fashion.  Urojet lidocaine gel was introduced.  A 15F flexible cystoscope was inserted into the urethra, and the bladder and urethra were examined in a systematic manner.  The patient tolerated the procedure well and there were no complications.      Findings:  External exam revealed no cystocele and no rectocele.   Cystoscopy then showed the urethra to be normal  in appearance with normal coaptation. The bladder itself was completely surveyed.  The ureteric orifices were normal in position and number and effluxing clear urine.  There was no trabeculation.  There were no neoplasms, stones, or diverticula identifed.      Assessment/Plan:   Denita Flores is a 63 year old female with a history of intermittent gross hematuria, now with normal findings on cystoscopy.      - Urine cytology negative  - CT Urogram appears normal with no  upper tract abnormalities. Radiology review pending and I will call her if their interpretation differs.  - We discussed that given the normal findings on the work up, no further intervention is needed at this time  - Recommend continued treatment of symptomatic, culture proven UTI  - If gross hematuria persists, she would require a repeat hematuria work up in 3 years based on guidelines      Praneeth Fenton MD

## 2018-08-10 NOTE — MR AVS SNAPSHOT
After Visit Summary   8/10/2018    Denita Flores    MRN: [id number]           Patient Information     Date Of Birth          1955        Visit Information        Provider Department      8/10/2018 11:30 AM Praneeth Fenton MD; PROC RM 1 MED [facility]        Today's Diagnoses     Gross hematuria    -  1      Care Instructions    After Your Cystoscopy    What happens after the exam?    You may go back to your normal diet and activity as you feel ready, unless your doctor tells you not to.    For the next two days, you may notice:    Some blood in your urine  Some burning when you urinate (use the toilet)  An urge to urinate more often  Bladder spasms    These are normal after the procedure and should go away after a day or two.  To relieve these problems drink 6 to 8 large glasses of water each day (includes drinks at meals) as this will help clear the urine.  Take warm baths to relieve pain and bladder spasms.  Do not add anything to the bath water.  You may also take Tylenol (acetaminophen) for pain if needed.    When should I call my doctor?    A fever over 100F (38C) for more than a day. (Before you call the doctor, check your temperature under your tongue)  Chills  Failure to urinate: No urine comes out when you try to use the toilet. (Try soaking in a bathtub full of warm water. If still no urine, call your doctor)  A lot of blood in the urine, or blood clots larger than a nickel  Pain in the back or belly area (abdomen)  Pain or spasms that are not relieved by warm tub baths and pain medicine  Severe pain, burning or other problems while passing urine  Pain that gets worse after two days                      Follow-ups after your visit        Your next 10 appointments already scheduled     Sep 07, 2018 10:40 AM CDT   LAB with LAB FIRST FLOOR Critical access hospital ([facility], [place])    [id number] [place] [id number]    [phone]           Please do not eat 10-12 hours before your appointment if you are coming in fasting for labs on lipids, cholesterol, or glucose (sugar). This does not apply to pregnant women. Water, hot tea and black coffee (with nothing added) are okay. Do not drink other fluids, diet soda or chew gum.            Sep 07, 2018 10:50 AM CDT   PHYSICAL with [facility]cock [facility]   [facility]    [address] [place] [id number] [phone]            Oct 30, 2018 10:30 AM CDT   [facility] Visit with [facility]ail Booth MD   [facility]    [address] [address] [place] [address] [phone]              Who to contact     If you have questions or need follow up information about today's clinic visit or your schedule please contact [facility] directly at [phone].  Normal or non-critical lab and imaging results will be communicated to you by MyChart, letter or phone within 4 business days after the clinic has received the results. If you do not hear from us within 7 days, please contact the clinic through MyChart or phone. If you have a critical or abnormal lab result, we will notify you by phone as soon as possible.  Submit refill requests through [email or web address] or call your pharmacy and they will forward the refill request to us. Please allow 3 business days for your refill to be completed.          Additional Information About Your Visit        Care EveryWhere ID     This is your Care EveryWhere ID. This could be used by other organizations to access your [place] medical records  [id number]        Your Vitals Were     Pulse Pulse Oximetry                83 95%           Blood Pressure from Last 3 Encounters:   08/10/18 113/59   08/07/18 124/73   07/30/18 117/68    Weight from Last 3 Encounters:   08/07/18 111.6 kg (246 lb)   07/30/18 111.1 kg (244 lb 14.4 oz)   06/11/18 110.8 kg  (244 lb 3.2 oz)              Today, you had the following     No orders found for display       Primary Care Provider Office Phone # Fax #    Gee Hitchcock [phone] [address]  [facility] [place] [facility] [address]        Equal Access to Services     [facility] : Hadii aad ku hadasho Soomaali, waaxda luqadaha, qaybta kaalmada adeegyada, waxay joniin hayfritzn ronald peggyaftab latrixie . So [facility] [phone].    ATENCIÓN: Si habla español, tiene a moore disposición servicios gratuitos de asistencia lingüística. Llame al [phone].    We comply with applicable federal civil rights laws and [place] laws. We do not discriminate on the basis of race, color, national origin, age, disability, sex, sexual orientation, or gender identity.            Thank you!     Thank you for choosing [facility]  for your care. Our goal is always to provide you with excellent care. Hearing back from our patients is one way we can continue to improve our services. Please take a few minutes to complete the written survey that you may receive in the mail after your visit with us. Thank you!             Your Updated Medication List - Protect others around you: Learn how to safely use, store and throw away your medicines at www.disposemymeds.org.          This list is accurate as of 8/10/18 12:06 PM.  Always use your most recent med list.                   Brand Name Dispense Instructions for use Diagnosis    albuterol 108 (90 Base) MCG/ACT inhaler    PROAIR HFA/PROVENTIL HFA/VENTOLIN HFA    1 Inhaler    Inhale 2 puffs into the lungs every 6 hours as needed for shortness of breath / dyspnea    Mild persistent asthma without complication       cyanocolbalamin 100 MCG tablet    vitamin  B-12     Take 100 mcg by mouth daily.        D3 ADULT PO      Take 2,000 Units by mouth daily.        ketoconazole 2 % cream    NIZORAL    15 g    Apply topically 2 times daily    Intertrigo       levothyroxine 112 MCG tablet     SYNTHROID/LEVOTHROID    90 tablet    Take 1 tablet (112 mcg) by mouth daily    Postablative hypothyroidism       MEGARED OMEGA-3 KRILL  MG Caps      Take 1 capsule by mouth daily        montelukast 10 MG tablet    SINGULAIR    90 tablet    Take 1 tablet (10 mg) by mouth At Bedtime    Mild persistent asthma without complication       order for DME     2 Units    Equipment being ordered: compression stockings- LARGE, 15mm HG, THIGH HIGH    Spider veins of both lower extremities       triamcinolone 0.1 % cream    KENALOG    80 g    Apply sparingly to affected area three times daily as needed    Allergic contact dermatitis, unspecified trigger

## 2018-08-22 DIAGNOSIS — Z00.00 ROUTINE GENERAL MEDICAL EXAMINATION AT A HEALTH CARE FACILITY: Primary | ICD-10-CM

## 2018-08-22 DIAGNOSIS — Z13.0 SCREENING FOR DEFICIENCY ANEMIA: ICD-10-CM

## 2018-08-22 DIAGNOSIS — E89.0 POSTABLATIVE HYPOTHYROIDISM: ICD-10-CM

## 2018-08-22 DIAGNOSIS — Z13.6 CARDIOVASCULAR SCREENING; LDL GOAL LESS THAN 130: ICD-10-CM

## 2018-09-07 ENCOUNTER — OFFICE VISIT (OUTPATIENT)
Dept: PEDIATRICS | Facility: CLINIC | Age: 63
End: 2018-09-07
Payer: COMMERCIAL

## 2018-09-07 VITALS
DIASTOLIC BLOOD PRESSURE: 83 MMHG | TEMPERATURE: 98 F | OXYGEN SATURATION: 97 % | HEART RATE: 64 BPM | WEIGHT: 241.3 LBS | HEIGHT: 67 IN | SYSTOLIC BLOOD PRESSURE: 140 MMHG | BODY MASS INDEX: 37.87 KG/M2

## 2018-09-07 DIAGNOSIS — Z13.6 CARDIOVASCULAR SCREENING; LDL GOAL LESS THAN 130: ICD-10-CM

## 2018-09-07 DIAGNOSIS — Z13.0 SCREENING FOR DEFICIENCY ANEMIA: ICD-10-CM

## 2018-09-07 DIAGNOSIS — J45.30 MILD PERSISTENT ASTHMA WITHOUT COMPLICATION: ICD-10-CM

## 2018-09-07 DIAGNOSIS — R53.83 OTHER FATIGUE: ICD-10-CM

## 2018-09-07 DIAGNOSIS — R31.0 GROSS HEMATURIA: ICD-10-CM

## 2018-09-07 DIAGNOSIS — E89.0 POSTABLATIVE HYPOTHYROIDISM: ICD-10-CM

## 2018-09-07 DIAGNOSIS — Z00.00 ROUTINE GENERAL MEDICAL EXAMINATION AT A HEALTH CARE FACILITY: Primary | ICD-10-CM

## 2018-09-07 DIAGNOSIS — R93.89 ABNORMAL CT SCAN: ICD-10-CM

## 2018-09-07 DIAGNOSIS — R03.0 ELEVATED BLOOD PRESSURE READING WITHOUT DIAGNOSIS OF HYPERTENSION: ICD-10-CM

## 2018-09-07 DIAGNOSIS — Z23 NEED FOR PROPHYLACTIC VACCINATION AND INOCULATION AGAINST INFLUENZA: ICD-10-CM

## 2018-09-07 DIAGNOSIS — Z00.00 ROUTINE GENERAL MEDICAL EXAMINATION AT A HEALTH CARE FACILITY: ICD-10-CM

## 2018-09-07 DIAGNOSIS — Z23 NEED FOR SHINGLES VACCINE: ICD-10-CM

## 2018-09-07 DIAGNOSIS — Z12.4 SCREENING FOR CERVICAL CANCER: ICD-10-CM

## 2018-09-07 DIAGNOSIS — Z11.4 SCREENING FOR HUMAN IMMUNODEFICIENCY VIRUS: ICD-10-CM

## 2018-09-07 LAB
BASOPHILS # BLD AUTO: 0.1 10E9/L (ref 0–0.2)
BASOPHILS NFR BLD AUTO: 0.9 %
CHOLEST SERPL-MCNC: 159 MG/DL
DIFFERENTIAL METHOD BLD: NORMAL
EOSINOPHIL # BLD AUTO: 0.3 10E9/L (ref 0–0.7)
EOSINOPHIL NFR BLD AUTO: 4.2 %
ERYTHROCYTE [DISTWIDTH] IN BLOOD BY AUTOMATED COUNT: 13.2 % (ref 10–15)
HCT VFR BLD AUTO: 40.9 % (ref 35–47)
HDLC SERPL-MCNC: 47 MG/DL
HGB BLD-MCNC: 13.4 G/DL (ref 11.7–15.7)
IMM GRANULOCYTES # BLD: 0 10E9/L (ref 0–0.4)
IMM GRANULOCYTES NFR BLD: 0.3 %
LDLC SERPL CALC-MCNC: 87 MG/DL
LYMPHOCYTES # BLD AUTO: 1.4 10E9/L (ref 0.8–5.3)
LYMPHOCYTES NFR BLD AUTO: 20.5 %
MCH RBC QN AUTO: 29.6 PG (ref 26.5–33)
MCHC RBC AUTO-ENTMCNC: 32.8 G/DL (ref 31.5–36.5)
MCV RBC AUTO: 90 FL (ref 78–100)
MONOCYTES # BLD AUTO: 0.5 10E9/L (ref 0–1.3)
MONOCYTES NFR BLD AUTO: 7.6 %
NEUTROPHILS # BLD AUTO: 4.5 10E9/L (ref 1.6–8.3)
NEUTROPHILS NFR BLD AUTO: 66.5 %
NONHDLC SERPL-MCNC: 112 MG/DL
PLATELET # BLD AUTO: 257 10E9/L (ref 150–450)
RBC # BLD AUTO: 4.53 10E12/L (ref 3.8–5.2)
T4 FREE SERPL-MCNC: 1.46 NG/DL (ref 0.76–1.46)
TRIGL SERPL-MCNC: 126 MG/DL
TSH SERPL DL<=0.005 MIU/L-ACNC: 0.26 MU/L (ref 0.4–4)
VIT B12 SERPL-MCNC: 449 PG/ML (ref 193–986)
WBC # BLD AUTO: 6.7 10E9/L (ref 4–11)

## 2018-09-07 PROCEDURE — 99000 SPECIMEN HANDLING OFFICE-LAB: CPT | Performed by: FAMILY MEDICINE

## 2018-09-07 PROCEDURE — 90686 IIV4 VACC NO PRSV 0.5 ML IM: CPT | Performed by: FAMILY MEDICINE

## 2018-09-07 PROCEDURE — 99396 PREV VISIT EST AGE 40-64: CPT | Mod: 25 | Performed by: FAMILY MEDICINE

## 2018-09-07 PROCEDURE — 36415 COLL VENOUS BLD VENIPUNCTURE: CPT | Performed by: FAMILY MEDICINE

## 2018-09-07 PROCEDURE — 83921 ORGANIC ACID SINGLE QUANT: CPT | Mod: 90 | Performed by: FAMILY MEDICINE

## 2018-09-07 PROCEDURE — 82306 VITAMIN D 25 HYDROXY: CPT | Performed by: FAMILY MEDICINE

## 2018-09-07 PROCEDURE — 82607 VITAMIN B-12: CPT | Performed by: FAMILY MEDICINE

## 2018-09-07 PROCEDURE — 99213 OFFICE O/P EST LOW 20 MIN: CPT | Mod: 25 | Performed by: FAMILY MEDICINE

## 2018-09-07 PROCEDURE — 84439 ASSAY OF FREE THYROXINE: CPT | Performed by: FAMILY MEDICINE

## 2018-09-07 PROCEDURE — 87389 HIV-1 AG W/HIV-1&-2 AB AG IA: CPT | Performed by: FAMILY MEDICINE

## 2018-09-07 PROCEDURE — 85025 COMPLETE CBC W/AUTO DIFF WBC: CPT | Performed by: FAMILY MEDICINE

## 2018-09-07 PROCEDURE — 87624 HPV HI-RISK TYP POOLED RSLT: CPT | Performed by: FAMILY MEDICINE

## 2018-09-07 PROCEDURE — 90471 IMMUNIZATION ADMIN: CPT | Performed by: FAMILY MEDICINE

## 2018-09-07 PROCEDURE — 84443 ASSAY THYROID STIM HORMONE: CPT | Performed by: FAMILY MEDICINE

## 2018-09-07 PROCEDURE — 80061 LIPID PANEL: CPT | Performed by: FAMILY MEDICINE

## 2018-09-07 PROCEDURE — G0145 SCR C/V CYTO,THINLAYER,RESCR: HCPCS | Performed by: FAMILY MEDICINE

## 2018-09-07 RX ORDER — MONTELUKAST SODIUM 10 MG/1
10 TABLET ORAL AT BEDTIME
Qty: 90 TABLET | Refills: 3 | Status: SHIPPED | OUTPATIENT
Start: 2018-09-07 | End: 2019-09-13

## 2018-09-07 RX ORDER — LEVOTHYROXINE SODIUM 112 UG/1
112 TABLET ORAL DAILY
Qty: 90 TABLET | Refills: 3 | Status: SHIPPED | OUTPATIENT
Start: 2018-09-07 | End: 2019-09-30

## 2018-09-07 ASSESSMENT — PAIN SCALES - GENERAL: PAINLEVEL: NO PAIN (0)

## 2018-09-07 NOTE — PROGRESS NOTES
SUBJECTIVE:   CC: Denita Flores is an 63 year old woman who presents for preventive health visit.     Healthy Habits:    Patient is here for annual physical and with questions on her recent CT scan that she had from neurology.  Patient states she never received any results for her CT urogram she wants PCP to review the results with her today.  Also patient is wondering if she needs to increase her dose of levothyroxine due to her ongoing fatigue.   Her past medical history is significant for ablative hypothyroidism, asthma, obesity  Patient denies concerns for depression, insomnia.          Do you get at least three servings of calcium containing foods daily (dairy, green leafy vegetables, etc.)? yes    Amount of exercise or daily activities, outside of work: 5-7 day(s) per week    Problems taking medications regularly No    Medication side effects: No    Have you had an eye exam in the past two years? yes    Do you see a dentist twice per year? yes    Do you have sleep apnea, excessive snoring or daytime drowsiness? Yes, uses mouth guard              Asthma Follow-Up    Was ACT completed today?    Yes    ACT Total Scores 9/7/2018   ACT TOTAL SCORE -   ASTHMA ER VISITS -   ASTHMA HOSPITALIZATIONS -   ACT TOTAL SCORE (Goal Greater than or Equal to 20) 24   In the past 12 months, how many times did you visit the emergency room for your asthma without being admitted to the hospital? 0   In the past 12 months, how many times were you hospitalized overnight because of your asthma? 0       Recent asthma triggers that patient is dealing with: None      Hypothyroidism Follow-up      Since last visit, patient describes the following symptoms: Weight stable, no hair loss, no skin changes, no constipation, no loose stools  C/o ongoing fatigue    Today's PHQ-2 Score:   PHQ-2 ( 1999 Pfizer) 9/7/2018 7/30/2018   Q1: Little interest or pleasure in doing things 0 0   Q2: Feeling down, depressed or hopeless 0 0   PHQ-2 Score 0  0       Abuse: Current or Past(Physical, Sexual or Emotional)- No  Do you feel safe in your environment - Yes    Social History   Substance Use Topics     Smoking status: Former Smoker     Packs/day: 1.50     Years: 35.00     Types: Cigarettes     Quit date: 1/1/2003     Smokeless tobacco: Never Used     Alcohol use 0.0 oz/week     0 Standard drinks or equivalent per week     If you drink alcohol do you typically have >3 drinks per day or >7 drinks per week? No                     Reviewed orders with patient.  Reviewed health maintenance and updated orders accordingly - Yes  Labs reviewed in EPIC  BP Readings from Last 3 Encounters:   09/07/18 140/83   08/10/18 113/59   08/07/18 124/73    Wt Readings from Last 3 Encounters:   09/07/18 241 lb 4.8 oz (109.5 kg)   08/07/18 246 lb (111.6 kg)   07/30/18 244 lb 14.4 oz (111.1 kg)                  Patient Active Problem List   Diagnosis     Graves disease     Obesity     Diverticulosis of large intestine without hemorrhage     Colon polyps     History of cervical cancer     YELENA (obstructive sleep apnea)     Warts     CARDIOVASCULAR SCREENING; LDL GOAL LESS THAN 130     Hx of herpes zoster keratoconjunctivitis, os     Plantar warts     Stasis dermatitis of both legs     Obesity (BMI 30-39.9)     Postablative hypothyroidism     Other specified hypothyroidism,post ablative     Venous stasis dermatitis of left lower extremity     Vitamin B12 deficiency (non anemic)     Mild persistent asthma without complication     Spider veins of both lower extremities     Seasonal allergic rhinitis     Family history of colon cancer     Epistaxis     Tubular adenoma of colon     Family history of renal cancer     Nephrolithiasis     Gross hematuria     Morbid obesity (H)     Past Surgical History:   Procedure Laterality Date     BACK SURGERY       COLONOSCOPY WITH CO2 INSUFFLATION N/A 2/21/2017    Procedure: COLONOSCOPY WITH CO2 INSUFFLATION;  Surgeon: Duane, William Charles, MD;   Location: [facility] OR     CYSTOSCOPY FLEXIBLE  8/10/2018     LEEP TX, CERVICAL      in her 20's       Social History   Substance Use Topics     Smoking status: Former Smoker     Packs/day: 1.50     Years: 35.00     Types: Cigarettes     Quit date: 1/1/2003     Smokeless tobacco: Never Used     Alcohol use 0.0 oz/week     0 Standard drinks or equivalent per week     Family History   Problem Relation Age of Onset     Glaucoma Mother      HEART DISEASE Father      Cancer Father 67     kidney cancer      HEART DISEASE Sister      Cancer Sister 59     colon cancer      Diabetes Sister      Colon Cancer Maternal Aunt      Colon Cancer Paternal Aunt      Glaucoma Maternal Grandmother          Current Outpatient Prescriptions   Medication Sig Dispense Refill     albuterol (PROAIR HFA/PROVENTIL HFA/VENTOLIN HFA) 108 (90 BASE) MCG/ACT Inhaler Inhale 2 puffs into the lungs every 6 hours as needed for shortness of breath / dyspnea 1 Inhaler 3     Cholecalciferol (D3 ADULT PO) Take 2,000 Units by mouth daily.       cyanocolbalamin (VITAMIN  B-12) 100 MCG tablet Take 100 mcg by mouth daily.       ketoconazole (NIZORAL) 2 % cream Apply topically 2 times daily 15 g 1     levothyroxine (SYNTHROID/LEVOTHROID) 112 MCG tablet Take 1 tablet (112 mcg) by mouth daily 90 tablet 3     MEGARED OMEGA-3 KRILL  MG CAPS Take 1 capsule by mouth daily        montelukast (SINGULAIR) 10 MG tablet Take 1 tablet (10 mg) by mouth At Bedtime 90 tablet 3     triamcinolone (KENALOG) 0.1 % cream Apply sparingly to affected area three times daily as needed 80 g 0     zoster vaccine recombinant adjuvanted (SHINGRIX) injection Inject 0.5 mLs into the muscle once for 1 dose 0.5 mL 0     order for DME Equipment being ordered: compression stockings- LARGE, 15mm HG, THIGH HIGH (Patient not taking: Reported on 9/7/2018) 2 Units 0     [DISCONTINUED] levothyroxine (SYNTHROID/LEVOTHROID) 112 MCG tablet Take 1 tablet (112 mcg) by mouth daily 90 tablet 3      [DISCONTINUED] montelukast (SINGULAIR) 10 MG tablet Take 1 tablet (10 mg) by mouth At Bedtime 90 tablet 3     Allergies   Allergen Reactions     Sulfa Drugs Hives and Swelling     Noted in 10/18/09 ER     Recent Labs   Lab Test  18   1043  18   1527  18   1040   08/15/17   1028   16   0902   13   1140   LDL  87   --    --    --   101*   --   115*   < >  112   HDL  47*   --    --    --   52   --   53   < >  50   TRIG  126   --    --    --   124   --   107   < >  125   ALT   --    --    --    --   20   --    --    --   24   CR   --   0.76   --    --   0.76   --    --    --   0.72   GFRESTIMATED   --   76   --    --   77   --    --    --   83   GFRESTBLACK   --   >90   --    --   >90   --    --    --   >90   POTASSIUM   --   4.1   --    --   4.0   --    --    --   4.2   TSH  0.26*   --   0.35*   < >  0.36*   < >  0.40   < >  1.58    < > = values in this interval not displayed.        Patient over age 50, mutual decision to screen reflected in health maintenance.    Pertinent mammograms are reviewed under the imaging tab.  History of abnormal Pap smear: NO - age 30- 65 PAP every 3 years recommended  PAP / HPV 2015 10/14/2014 10/8/2013   PAP NIL NIL NIL     Reviewed and updated as needed this visit by clinical staff  Tobacco  Allergies  Meds  Med Hx  Surg Hx  Fam Hx  Soc Hx        Reviewed and updated as needed this visit by Provider          Past Medical History:   Diagnosis Date     Kidney stones     Passed a 5-10mm stone     Malignant neoplasm (H)      Mild persistent asthma 2013     Thyroid disease       Past Surgical History:   Procedure Laterality Date     BACK SURGERY       COLONOSCOPY WITH CO2 INSUFFLATION N/A 2017    Procedure: COLONOSCOPY WITH CO2 INSUFFLATION;  Surgeon: Duane, William Charles, MD;  Location: [facility]     CYSTOSCOPY FLEXIBLE  8/10/2018     LEEP TX, CERVICAL      in her 20's     Obstetric History       T0      L0     SAB0   TAB0    "Ectopic0   Multiple0   Live Births0       # Outcome Date GA Lbr Abhi/2nd Weight Sex Delivery Anes PTL Lv   2             1                    ROS:  CONSTITUTIONAL:fatigue  INTEGUMENTARY/SKIN: NEGATIVE for worrisome rashes, moles or lesions  EYES: NEGATIVE for vision changes or irritation  ENT: NEGATIVE for ear, mouth and throat problems  RESP: NEGATIVE for significant cough or SOB  History of asthma  BREAST: NEGATIVE for masses, tenderness or discharge  CV: NEGATIVE for chest pain, palpitations or peripheral edema  GI: NEGATIVE for nausea, abdominal pain, heartburn, or change in bowel habits  : NEGATIVE for unusual urinary or vaginal symptoms. No vaginal bleeding.   menopausal female: ongoing urinary frequency  MUSCULOSKELETAL: NEGATIVE for significant arthralgias or myalgia  NEURO: NEGATIVE for weakness, dizziness or paresthesias  ENDOCRINE: NEGATIVE for temperature intolerance, skin/hair changes  ENDOCRINE: Hx thyroid disease  HEME/ALLERGY/IMMUNE: NEGATIVE for bleeding problems  PSYCHIATRIC: NEGATIVE for changes in mood or affect     OBJECTIVE:   /83 (BP Location: Right arm, Patient Position: Sitting, Cuff Size: Adult Large)  Pulse 64  Temp 98  F (36.7  C) (Oral)  Ht 5' 7\" (1.702 m)  Wt 241 lb 4.8 oz (109.5 kg)  SpO2 97%  BMI 37.79 kg/m2  EXAM:  GENERAL APPEARANCE: healthy, alert and no distress  EYES: Eyes grossly normal to inspection, PERRL and conjunctivae and sclerae normal  HENT: ear canals and TM's normal, nose and mouth without ulcers or lesions, oropharynx clear and oral mucous membranes moist  NECK: no adenopathy, no asymmetry, masses, or scars and thyroid normal to palpation  RESP: lungs clear to auscultation - no rales, rhonchi or wheezes  BREAST: normal without masses, tenderness or nipple discharge and no palpable axillary masses or adenopathy  CV: regular rate and rhythm, normal S1 S2, no S3 or S4, no murmur, click or rub, no peripheral edema and peripheral pulses " strong  ABDOMEN: soft, nontender, no hepatosplenomegaly, no masses and bowel sounds normal   (female): normal female external genitalia, normal urethral meatus, vaginal mucosal atrophy noted, normal cervix, adnexae, and uterus without masses or abnormal discharge  MS: no musculoskeletal defects are noted and gait is age appropriate without ataxia  SKIN: no suspicious lesions or rashes  NEURO: Normal strength and tone, sensory exam grossly normal, mentation intact and speech normal  PSYCH: mentation appears normal and affect normal/bright    Diagnostic Test Results:  Results for orders placed or performed in visit on 09/07/18 (from the past 24 hour(s))   **TSH with free T4 reflex FUTURE anytime   Result Value Ref Range    TSH 0.26 (L) 0.40 - 4.00 mU/L   Lipid panel reflex to direct LDL Fasting   Result Value Ref Range    Cholesterol 159 <200 mg/dL    Triglycerides 126 <150 mg/dL    HDL Cholesterol 47 (L) >49 mg/dL    LDL Cholesterol Calculated 87 <100 mg/dL    Non HDL Cholesterol 112 <130 mg/dL   CBC with platelets differential   Result Value Ref Range    WBC 6.7 4.0 - 11.0 10e9/L    RBC Count 4.53 3.8 - 5.2 10e12/L    Hemoglobin 13.4 11.7 - 15.7 g/dL    Hematocrit 40.9 35.0 - 47.0 %    MCV 90 78 - 100 fl    MCH 29.6 26.5 - 33.0 pg    MCHC 32.8 31.5 - 36.5 g/dL    RDW 13.2 10.0 - 15.0 %    Platelet Count 257 150 - 450 10e9/L    Diff Method Automated Method     % Neutrophils 66.5 %    % Lymphocytes 20.5 %    % Monocytes 7.6 %    % Eosinophils 4.2 %    % Basophils 0.9 %    % Immature Granulocytes 0.3 %    Absolute Neutrophil 4.5 1.6 - 8.3 10e9/L    Absolute Lymphocytes 1.4 0.8 - 5.3 10e9/L    Absolute Monocytes 0.5 0.0 - 1.3 10e9/L    Absolute Eosinophils 0.3 0.0 - 0.7 10e9/L    Absolute Basophils 0.1 0.0 - 0.2 10e9/L    Abs Immature Granulocytes 0.0 0 - 0.4 10e9/L   T4 free   Result Value Ref Range    T4 Free 1.46 0.76 - 1.46 ng/dL       ASSESSMENT/PLAN:   1. Routine general medical examination at [facility]  facility  Discussed on regular exercises, daily calcium intake, healthy eating, self breast exams monthly and routine dental checks    - Pap imaged thin layer screen with HPV - recommended age 30 - 65 years (select HPV order below)  - HPV High Risk Types DNA Cervical  - HIV Antigen Antibody Combo    2. Need for prophylactic vaccination and inoculation against influenza    - FLU VACCINE, SPLIT VIRUS, IM (QUADRIVALENT) [82728]- >3 YRS  - Vaccine Administration, Initial [93347]    3. Abnormal CT scan  Per patient's request, her recent CT urogram was reviewed that showed a stable pulmonary nodule since 2010 and a new concern for gallbladder nodule that is needing further evaluation.    Recommended patient to have ultrasound abdomen for further evaluation she does not have any current concerning symptoms regarding gallbladder disease.  Will f/u on results and call with recommendations.  Patient verbalised understanding and is agreeable to the plan.      Recent Results (from the past 744 hour(s))   CT Urogram wo & w Contrast   Result Value    Radiologist flags Small gallbladder wall indeterminate nodule.    Narrative    EXAMINATION: CT UROGRAM WO & W CONTRAST, 8/7/2018 4:44 PM    TECHNIQUE:  Helical CT images from the lung bases through the  symphysis pubis were obtained  without and with contrast using CT  urogram protocol. Contrast dose: 135 ml Isovue 370     COMPARISON: CT abdomen on 04/16/2010    HISTORY: Gross hematuria    FINDINGS:    Urinary tract: Kidneys are unremarkable. No evidence of  nephrolithiasis, hydroureter, or hydronephrosis. Urinary bladder is  decompressed without focal abnormality. Excretory phase demonstrate  normal-appearing ureters with adequate opacification of the collecting  system.    Remainder of the abdomen and pelvis: Few subcentimeter hypodensities  in the liver are too small to characterize, stable since 2010 and  favor benign etiology. Patent hepatic vasculature. No biliary  tree  dilation. Small nodularity arising from the gallbladder wall is too  small to characterize (series 5 image 155). No hyperdense gallstone,  gallbladder wall thickening, or pericholecystic fluid collection.  Spleen, pancreas, right adrenal gland are unremarkable. Mild  hypertrophy of the medial limb of the left adrenal gland, stable from  4/16/2010.    Esophagus and stomach are within normal limits. Sigmoid diverticulosis  without CT evidence of diverticulitis. Normal-appearing appendix in  the right lower quadrant. No pneumatosis or pneumoperitoneum. No free  intra-abdominal pelvic fluid. No adnexal mass identified. Prominent  bilateral inguinal nodes with preserved architecture. No abdominal or  retroperitoneal lymphadenopathy. Unremarkable uterus. No adnexal  masses. The no abdominal aortic aneurysm. Mild atherosclerotic  calcifications of the abdominal aorta and its major branches.    No acute soft tissue abnormality. Mild lower thoracolumbar  degenerative changes most significant at the level of L4-L5. Benign  hemangioma of T12 vertebral body (series 10 image 82). No acute  osseous abnormality.    Lung bases:  Scattered predominantly centrilobular emphysematous  changes throughout the visualized lung. 9 mm solid pulmonary nodule in  the superior right lower lobe, has been stable since 04/16/2010.       Impression    IMPRESSION:   1.  Unremarkable CT urogram. Kidneys are unremarkable without evidence  of nephrolithiasis, hydroureter, or hydronephrosis. Urinary bladder is  partially distended, within normal limits.  2. 9 mm pulmonary nodule has been stable since 2010, which is  suggestive of benign etiology. No further follow-up is warranted.  3. Small nodule arising from the gallbladder wall is too small to  characterize. If clinically indicated consider a dedicated gallbladder  ultrasound to further characterize.    [Consider Follow Up: Small gallbladder wall indeterminate nodule.]    This report will be  copied to the [facility] to ensure a  provider acknowledges the finding.     I have personally reviewed the examination and initial interpretation  and I agree with the findings.    ELVIRA HERRERA MD       - US Abdomen Complete; Future    4. Gross hematuria  Patient does not have concerns for hematuria, she recently was seen by urology, had a CT urogram as mentioned above, urine cytology was negative for malignant cells.  Patient was reassured.  She continues to have some urinary urgency, reviewed Kegel exercises.  Follow-up with urology as needed.    5. Screening for cervical cancer    - Pap imaged thin layer screen with HPV - recommended age 30 - 65 years (select HPV order below)  - HPV High Risk Types DNA Cervical    6. Screening for human immunodeficiency virus    - HIV Antigen Antibody Combo    7. Mild persistent asthma without complication  Stable, continue with similar 10 mg daily, recheck in 6 months or sooner if needed  - montelukast (SINGULAIR) 10 MG tablet; Take 1 tablet (10 mg) by mouth At Bedtime  Dispense: 90 tablet; Refill: 3    8. Other fatigue  Hemoglobin   Date Value Ref Range Status   09/07/2018 13.4 11.7 - 15.7 g/dL Final   08/15/2017 13.8 11.7 - 15.7 g/dL Final       Reviewed normal hemoglobin, normal BMP except for slightly low calcium  We will check her vitamin B12 and D levels reviewed thyroid labs which are stable from before  - Methylmalonic acid  - Vitamin B12  - Vitamin D Deficiency    9. Need for shingles vaccine  Hard copy was given to patient to have it done at the pharmacy after checking with insurance for coverage.    - zoster vaccine recombinant adjuvanted (SHINGRIX) injection; Inject 0.5 mLs into the muscle once for 1 dose  Dispense: 0.5 mL; Refill: 0    10. Postablative hypothyroidism  TSH   Date Value Ref Range Status   09/07/2018 0.26 (L) 0.40 - 4.00 mU/L Final   02/20/2018 0.35 (L) 0.40 - 4.00 mU/L Final   12/18/2017 0.88 0.40 - 4.00 mU/L Final   10/09/2017  "0.32 (L) 0.40 - 4.00 mU/L Final   08/15/2017 0.36 (L) 0.40 - 4.00 mU/L Final     T4 Free   Date Value Ref Range Status   2018 1.46 0.76 - 1.46 ng/dL Final   2018 1.34 0.76 - 1.46 ng/dL Final   2017 1.21 0.76 - 1.46 ng/dL Final   10/09/2017 1.36 0.76 - 1.46 ng/dL Final   08/15/2017 1.53 (H) 0.76 - 1.46 ng/dL Final       Normal free T4 and slightly low TSH   will continue with current dose and will recheck in 6 months to make sure numbers are not getting worse and stable    - levothyroxine (SYNTHROID/LEVOTHROID) 112 MCG tablet; Take 1 tablet (112 mcg) by mouth daily  Dispense: 90 tablet; Refill: 3    11. Elevated blood pressure reading without diagnosis of hypertension  BP Readings from Last 6 Encounters:   18 140/83   08/10/18 113/59   18 124/73   18 117/68   18 135/73   18 141/73     Will follow low salt diet, weight loss and regular exercises.  Recheck at her next visit      COUNSELING:   Reviewed preventive health counseling, as reflected in patient instructions  Special attention given to:        Regular exercise       Healthy diet/nutrition       Vision screening       Immunizations    Vaccinated for: Influenza             Osteoporosis Prevention/Bone Health       HIV screeninx in teen years, 1x in adult years, and at intervals if high risk       (Mindy)menopause management       The 10-year ASCVD risk score ([place] BRITTNEY Jr, et al., 2013) is: 5.6%    Values used to calculate the score:      Age: 63 years      Sex: Female      Is Non- : No      Diabetic: No      Tobacco smoker: No      Systolic Blood Pressure: 140 mmHg      Is BP treated: No      HDL Cholesterol: 47 mg/dL      Total Cholesterol: 159 mg/dL       Advance Care Planning    BP Readings from Last 1 Encounters:   18 140/83     Estimated body mass index is 37.79 kg/(m^2) as calculated from the following:    Height as of this encounter: 5' 7\" (1.702 m).    Weight as of this " encounter: 241 lb 4.8 oz (109.5 kg).    BP Screening:   Last 3 BP Readings:    BP Readings from Last 3 Encounters:   09/07/18 140/83   08/10/18 113/59   08/07/18 124/73       The following was recommended to the patient:  Re-screen BP within a year and recommended lifestyle modifications  Weight management plan: Discussed healthy diet and exercise guidelines and patient will follow up in 6 months in clinic to re-evaluate.     reports that she quit smoking about 15 years ago. Her smoking use included Cigarettes. She has a 52.50 pack-year smoking history. She has never used smokeless tobacco.      Counseling Resources:  ATP IV Guidelines  Pooled Cohorts Equation Calculator  Breast Cancer Risk Calculator  FRAX Risk Assessment  ICSI Preventive Guidelines  Dietary Guidelines for Americans, 2010  USDA's MyPlate  ASA Prophylaxis  Lung CA Screening    Gee Hitchcock MD  [facility]  Chart documentation done in part with Dragon Voice recognition Software. Although reviewed after completion, some word and grammatical error may remain.      Injectable Influenza Immunization Documentation    1.  Is the person to be vaccinated sick today?   No    2. Does the person to be vaccinated have an allergy to a component   of the vaccine?   No  Egg Allergy Algorithm Link    3. Has the person to be vaccinated ever had a serious reaction   to influenza vaccine in the past?   No    4. Has the person to be vaccinated ever had Guillain-Barré syndrome?   No    Form completed by Linh Davis CMA

## 2018-09-07 NOTE — MR AVS SNAPSHOT
After Visit Summary   9/7/2018    Denita Flores    MRN: [id number]           Patient Information     Date Of Birth          1955        Visit Information        Provider Department      9/7/2018 10:50 AM Gee Hitchcock MD [facility]        Today's Diagnoses     Routine general medical examination at a health care facility    -  1    Need for prophylactic vaccination and inoculation against influenza        Abnormal CT scan        Screening for cervical cancer        Screening for human immunodeficiency virus        Mild persistent asthma without complication        Other fatigue        Need for shingles vaccine          Care Instructions    Get the flu shot today  Call [phone] to schedule for ultrasound abdomen      Preventive Health Recommendations  Female Ages 50 - 64    Yearly exam: See your health care provider every year in order to  o Review health changes.   o Discuss preventive care.    o Review your medicines if your doctor has prescribed any.      Get a Pap test every three years (unless you have an abnormal result and your provider advises testing more often).    If you get Pap tests with HPV test, you only need to test every 5 years, unless you have an abnormal result.     You do not need a Pap test if your uterus was removed (hysterectomy) and you have not had cancer.    You should be tested each year for STDs (sexually transmitted diseases) if you're at risk.     Have a mammogram every 1 to 2 years.    Have a colonoscopy at age 50, or have a yearly FIT test (stool test). These exams screen for colon cancer.      Have a cholesterol test every 5 years, or more often if advised.    Have a diabetes test (fasting glucose) every three years. If you are at risk for diabetes, you should have this test more often.     If you are at risk for osteoporosis (brittle bone disease), think about having a bone density scan (DEXA).    Shots: Get a flu shot each year. Get  a tetanus shot every 10 years.    Nutrition:     Eat at least 5 servings of fruits and vegetables each day.    Eat whole-grain bread, whole-wheat pasta and brown rice instead of white grains and rice.    Get adequate Calcium and Vitamin D.     Lifestyle    Exercise at least 150 minutes a week (30 minutes a day, 5 days a week). This will help you control your weight and prevent disease.    Limit alcohol to one drink per day.    No smoking.     Wear sunscreen to prevent skin cancer.     See your dentist every six months for an exam and cleaning.    See your eye doctor every 1 to 2 years.            Follow-ups after your visit        Your next 10 appointments already scheduled     Oct 30, 2018 10:30 AM CDT   Return Visit with Gail Booth MD   [facility] ([facility])    [address] [place] [address] [phone]              Future tests that were ordered for you today     Open Future Orders        Priority Expected Expires Ordered    US Abdomen Complete Routine  9/7/2019 9/7/2018            Who to contact     If you have questions or need follow up information about today's clinic visit or your schedule please contact [facility] directly at [phone].  Normal or non-critical lab and imaging results will be communicated to you by MyChart, letter or phone within 4 business days after the clinic has received the results. If you do not hear from us within 7 days, please contact the clinic through MyChart or phone. If you have a critical or abnormal lab result, we will notify you by phone as soon as possible.  Submit refill requests through [employer] or call your pharmacy and they will forward the refill request to us. Please allow 3 business days for your refill to be completed.          Additional Information About Your Visit        Care EveryWhere ID     This is your Care EveryWhere ID. This could be used by other organizations to access your  "[place] medical records  [id number]        Your Vitals Were     Pulse Temperature Height Pulse Oximetry BMI (Body Mass Index)       64 98  F (36.7  C) (Oral) 5' 7\" (1.702 m) 97% 37.79 kg/m2        Blood Pressure from Last 3 Encounters:   09/07/18 140/83   08/10/18 113/59   08/07/18 124/73    Weight from Last 3 Encounters:   09/07/18 241 lb 4.8 oz (109.5 kg)   08/07/18 246 lb (111.6 kg)   07/30/18 244 lb 14.4 oz (111.1 kg)              We Performed the Following     FLU VACCINE, SPLIT VIRUS, IM (QUADRIVALENT) [65087]- >3 YRS     HIV Antigen Antibody Combo     HPV High Risk Types DNA Cervical     Methylmalonic acid     Pap imaged thin layer screen with HPV - recommended age 30 - 65 years (select HPV order below)     Vaccine Administration, Initial [29089]     Vitamin B12     Vitamin D Deficiency          Today's Medication Changes          These changes are accurate as of 9/7/18 11:30 AM.  If you have any questions, ask your nurse or doctor.               Start taking these medicines.        Dose/Directions    zoster vaccine recombinant adjuvanted injection   Commonly known as:  SHINGRIX   Used for:  Need for shingles vaccine   Started by:  Gee Hitchcock MD        Dose:  1 each   Inject 0.5 mLs into the muscle once for 1 dose   Quantity:  0.5 mL   Refills:  0            Where to get your medicines      These medications were sent to [email or web address] [address] [facility] [address] [facility]  [address], [facility] [address]     Phone:  [phone]     montelukast 10 MG tablet         Some of these will need a paper prescription and others can be bought over the counter.  Ask your nurse if you have questions.     Bring a paper prescription for each of these medications     zoster vaccine recombinant adjuvanted injection                Primary Care Provider Office Phone # Fax #    Gee Hitchcock [phone] [address] [address] [place], [place] [address]E [facility] " [address]        Equal Access to Services     [facility] : Hadii brenda shah marlonabbey Hao, wananetteda luqmartine, qaybta kabrendamagi hansen, fabian mcmullen. So [facility] [phone].    ATENCIÓN: Si habla español, tiene a moore disposición servicios gratuitos de asistencia lingüística. Leandroame al [phone].    We comply with applicable federal civil rights laws and [place] laws. We do not discriminate on the basis of race, color, national origin, age, disability, sex, sexual orientation, or gender identity.            Thank you!     Thank you for choosing [facility]  for your care. Our goal is always to provide you with excellent care. Hearing back from our patients is one way we can continue to improve our services. Please take a few minutes to complete the written survey that you may receive in the mail after your visit with us. Thank you!             Your Updated Medication List - Protect others around you: Learn how to safely use, store and throw away your medicines at www.disposemymeds.org.          This list is accurate as of 9/7/18 11:30 AM.  Always use your most recent med list.                   Brand Name Dispense Instructions for use Diagnosis    albuterol 108 (90 Base) MCG/ACT inhaler    PROAIR HFA/PROVENTIL HFA/VENTOLIN HFA    1 Inhaler    Inhale 2 puffs into the lungs every 6 hours as needed for shortness of breath / dyspnea    Mild persistent asthma without complication       cyanocolbalamin 100 MCG tablet    vitamin  B-12     Take 100 mcg by mouth daily.        D3 ADULT PO      Take 2,000 Units by mouth daily.        ketoconazole 2 % cream    NIZORAL    15 g    Apply topically 2 times daily    Intertrigo       levothyroxine 112 MCG tablet    SYNTHROID/LEVOTHROID    90 tablet    Take 1 tablet (112 mcg) by mouth daily    Postablative hypothyroidism       MEGARED OMEGA-3 KRILL  MG Caps      Take 1 capsule by mouth daily        montelukast 10 MG tablet    SINGULAIR     90 tablet    Take 1 tablet (10 mg) by mouth At Bedtime    Mild persistent asthma without complication       order for DME     2 Units    Equipment being ordered: compression stockings- LARGE, 15mm HG, THIGH HIGH    Spider veins of both lower extremities       triamcinolone 0.1 % cream    KENALOG    80 g    Apply sparingly to affected area three times daily as needed    Allergic contact dermatitis, unspecified trigger       zoster vaccine recombinant adjuvanted injection    SHINGRIX    0.5 mL    Inject 0.5 mLs into the muscle once for 1 dose    Need for shingles vaccine

## 2018-09-07 NOTE — LETTER
September 10, 2018      Denita Flores  [address] [address] [place] [address]        Dear Denita,     Enclosed are your recent lab results.    Normal vitamin D and B12 labs, negative HIV screening.      Sincerely,        Gee Hitchcock MD                                                  Resulted Orders   HIV Antigen Antibody Combo   Result Value Ref Range    HIV Antigen Antibody Combo Nonreactive NR^Nonreactive          Comment:      HIV-1 p24 Ag & HIV-1/HIV-2 Ab Not Detected   Methylmalonic acid   Result Value Ref Range    Methylmalonic Acid 0.15 0.00 - 0.40 umol/L      Comment:      (Note)  INTERPRETIVE INFORMATION: MMA Serum/Plasma,                            Vitamin B12 Status  Test developed and characteristics determined by [facility]. See Compliance Statement B: [facility]/CS  Performed by [facility],  [address] [place] [address] [phone]  [email or web address], Dante Escalera MD, Lab. Director     Vitamin B12   Result Value Ref Range    Vitamin B12 449 193 - 986 pg/mL   Vitamin D Deficiency   Result Value Ref Range    Vitamin D Deficiency screening 42 20 - 75 ug/L      Comment:      Season, race, dietary intake, and treatment affect the concentration of   25-hydroxy-Vitamin D. Values may decrease during winter months and increase   during summer months. Values 20-29 ug/L may indicate Vitamin D insufficiency   and values <20 ug/L may indicate Vitamin D deficiency.  Vitamin D determination is routinely performed by an immunoassay specific for   25 hydroxyvitamin D3.  If an individual is on vitamin D2 (ergocalciferol)   supplementation, please specify 25 OH vitamin D2 and D3 level determination by   LCMSMS test VITD23.

## 2018-09-07 NOTE — PATIENT INSTRUCTIONS
Get the flu shot today  Call [phone] to schedule for ultrasound abdomen      Preventive Health Recommendations  Female Ages 50 - 64    Yearly exam: See your health care provider every year in order to  o Review health changes.   o Discuss preventive care.    o Review your medicines if your doctor has prescribed any.      Get a Pap test every three years (unless you have an abnormal result and your provider advises testing more often).    If you get Pap tests with HPV test, you only need to test every 5 years, unless you have an abnormal result.     You do not need a Pap test if your uterus was removed (hysterectomy) and you have not had cancer.    You should be tested each year for STDs (sexually transmitted diseases) if you're at risk.     Have a mammogram every 1 to 2 years.    Have a colonoscopy at age 50, or have a yearly FIT test (stool test). These exams screen for colon cancer.      Have a cholesterol test every 5 years, or more often if advised.    Have a diabetes test (fasting glucose) every three years. If you are at risk for diabetes, you should have this test more often.     If you are at risk for osteoporosis (brittle bone disease), think about having a bone density scan (DEXA).    Shots: Get a flu shot each year. Get a tetanus shot every 10 years.    Nutrition:     Eat at least 5 servings of fruits and vegetables each day.    Eat whole-grain bread, whole-wheat pasta and brown rice instead of white grains and rice.    Get adequate Calcium and Vitamin D.     Lifestyle    Exercise at least 150 minutes a week (30 minutes a day, 5 days a week). This will help you control your weight and prevent disease.    Limit alcohol to one drink per day.    No smoking.     Wear sunscreen to prevent skin cancer.     See your dentist every six months for an exam and cleaning.    See your eye doctor every 1 to 2 years.

## 2018-09-08 ASSESSMENT — ASTHMA QUESTIONNAIRES: ACT_TOTALSCORE: 24

## 2018-09-10 LAB
DEPRECATED CALCIDIOL+CALCIFEROL SERPL-MC: 42 UG/L (ref 20–75)
HIV 1+2 AB+HIV1 P24 AG SERPL QL IA: NONREACTIVE
METHYLMALONATE SERPL-SCNC: 0.15 UMOL/L (ref 0–0.4)

## 2018-09-12 LAB
COPATH REPORT: NORMAL
PAP: NORMAL

## 2018-09-13 ENCOUNTER — TELEPHONE (OUTPATIENT)
Dept: PEDIATRICS | Facility: CLINIC | Age: 63
End: 2018-09-13

## 2018-09-13 ENCOUNTER — RADIANT APPOINTMENT (OUTPATIENT)
Dept: ULTRASOUND IMAGING | Facility: CLINIC | Age: 63
End: 2018-09-13
Attending: FAMILY MEDICINE
Payer: COMMERCIAL

## 2018-09-13 DIAGNOSIS — R93.89 ABNORMAL CT SCAN: ICD-10-CM

## 2018-09-13 PROBLEM — K82.4 POLYP OF GALLBLADDER: Status: ACTIVE | Noted: 2018-09-13

## 2018-09-13 LAB
FINAL DIAGNOSIS: NORMAL
HPV HR 12 DNA CVX QL NAA+PROBE: NEGATIVE
HPV16 DNA SPEC QL NAA+PROBE: NEGATIVE
HPV18 DNA SPEC QL NAA+PROBE: NEGATIVE
SPECIMEN DESCRIPTION: NORMAL
SPECIMEN SOURCE CVX/VAG CYTO: NORMAL

## 2018-09-13 PROCEDURE — 76700 US EXAM ABDOM COMPLETE: CPT

## 2018-09-13 NOTE — TELEPHONE ENCOUNTER
Please inform Lois of US results- it showed a small GB polyp of 4mm, which was viewed as nodule in CT.  This is benign appearing and we will continue to monitor with no further intervention at this time.

## 2018-09-14 NOTE — TELEPHONE ENCOUNTER
Called patient and gave message per Dr. Hitchcock.  Patient verbalized understanding and agreement to plan. Lexi David RN, [facility]

## 2018-09-14 NOTE — TELEPHONE ENCOUNTER
Regarding the 2 small benign appearing cysts in liver, would you like to pass a message to patient:    The liver measures a craniocaudal dimension of 16.7 cm.  There are 2 small benign-appearing cysts in the liver measuring 6 mm  in the left lobe and 13 mm in the right lobe.     Routing to Dr. Hitchcock to clarify.    Lexi David RN, [facility]

## 2018-09-23 ENCOUNTER — OFFICE VISIT (OUTPATIENT)
Dept: URGENT CARE | Facility: URGENT CARE | Age: 63
End: 2018-09-23
Payer: COMMERCIAL

## 2018-09-23 VITALS
TEMPERATURE: 98.2 F | DIASTOLIC BLOOD PRESSURE: 78 MMHG | OXYGEN SATURATION: 95 % | RESPIRATION RATE: 16 BRPM | SYSTOLIC BLOOD PRESSURE: 116 MMHG | WEIGHT: 244 LBS | HEART RATE: 76 BPM | BODY MASS INDEX: 38.22 KG/M2

## 2018-09-23 DIAGNOSIS — R09.89 CHEST CONGESTION: ICD-10-CM

## 2018-09-23 DIAGNOSIS — J34.89 PURULENT NASAL DISCHARGE: ICD-10-CM

## 2018-09-23 DIAGNOSIS — R05.8 PRODUCTIVE COUGH: Primary | ICD-10-CM

## 2018-09-23 DIAGNOSIS — J30.2 ACUTE SEASONAL ALLERGIC RHINITIS, UNSPECIFIED TRIGGER: ICD-10-CM

## 2018-09-23 PROCEDURE — 99214 OFFICE O/P EST MOD 30 MIN: CPT | Performed by: PHYSICIAN ASSISTANT

## 2018-09-23 RX ORDER — FLUTICASONE PROPIONATE 50 MCG
1-2 SPRAY, SUSPENSION (ML) NASAL DAILY
Qty: 1 BOTTLE | Refills: 11 | Status: SHIPPED | OUTPATIENT
Start: 2018-09-23 | End: 2019-04-27

## 2018-09-23 RX ORDER — AZITHROMYCIN 250 MG/1
TABLET, FILM COATED ORAL
Qty: 6 TABLET | Refills: 0 | Status: SHIPPED | OUTPATIENT
Start: 2018-09-23 | End: 2018-10-20

## 2018-09-23 NOTE — MR AVS SNAPSHOT
After Visit Summary   9/23/2018    Denita Flores    MRN: [id number]           Patient Information     Date Of Birth          1955        Visit Information        Provider Department      9/23/2018 11:30 AM Mark Reyes PA-C [facility]        Today's Diagnoses     Productive cough    -  1    Chest congestion        Purulent nasal discharge        Acute seasonal allergic rhinitis, unspecified trigger           Follow-ups after your visit        Your next 10 appointments already scheduled     Oct 30, 2018 10:30 AM CDT   Return Visit with Gail Booth MD   [facility] ([facility])    [address] [address] [place] [address] [phone]              Who to contact     If you have questions or need follow up information about today's clinic visit or your schedule please contact [facility] directly at [phone].  Normal or non-critical lab and imaging results will be communicated to you by MyChart, letter or phone within 4 business days after the clinic has received the results. If you do not hear from us within 7 days, please contact the clinic through MyChart or phone. If you have a critical or abnormal lab result, we will notify you by phone as soon as possible.  Submit refill requests through [employer] or call your pharmacy and they will forward the refill request to us. Please allow 3 business days for your refill to be completed.          Additional Information About Your Visit        Care EveryWhere ID     This is your Care EveryWhere ID. This could be used by other organizations to access your [place] medical records  [id number]        Your Vitals Were     Pulse Temperature Respirations Pulse Oximetry Breastfeeding? BMI (Body Mass Index)    76 98.2  F (36.8  C) (Tympanic) 16 95% No 38.22 kg/m2       Blood Pressure from Last 3 Encounters:   09/23/18 116/78   09/07/18 140/83   08/10/18  113/59    Weight from Last 3 Encounters:   09/23/18 244 lb (110.7 kg)   09/07/18 241 lb 4.8 oz (109.5 kg)   08/07/18 246 lb (111.6 kg)              Today, you had the following     No orders found for display         Today's Medication Changes          These changes are accurate as of 9/23/18 11:59 PM.  If you have any questions, ask your nurse or doctor.               Start taking these medicines.        Dose/Directions    azithromycin 250 MG tablet   Commonly known as:  ZITHROMAX   Used for:  Productive cough, Chest congestion, Purulent nasal discharge, Acute seasonal allergic rhinitis, unspecified trigger   Started by:  Mark Reyes PA-C        2 tabs po qd day 1, then 1 tab po qd days 2-5   Quantity:  6 tablet   Refills:  0       fluticasone 50 MCG/ACT spray   Commonly known as:  FLONASE   Used for:  Acute seasonal allergic rhinitis, unspecified trigger   Started by:  Mark Reyes PA-C        Dose:  1-2 spray   Spray 1-2 sprays into both nostrils daily   Quantity:  1 Bottle   Refills:  11            Where to get your medicines      These medications were sent to [facility] [address] [place] [address] [address] [facility] [address] [facility] [address]     Phone:  [phone]     azithromycin 250 MG tablet    fluticasone 50 MCG/ACT spray                Primary Care Provider Office Phone # Fax #    Gee Hitchcock [phone] [address]  [facility] [address]        Equal Access to Services     [facility] AH: Hadii brenda shah hadasho Soomaali, waaxda luqadaha, qaybta kaalmada adeegyamagi, fabian golden . So [facility] [phone].    ATENCIÓN: Si habla español, tiene a moore disposición servicios gratuitos de asistencia lingüística. Vamshi hewitt [phone].    We comply with applicable federal civil rights laws and [place] laws. We do not discriminate on the basis of race, color, national origin, age, disability,  sex, sexual orientation, or gender identity.            Thank you!     Thank you for choosing [facility]  for your care. Our goal is always to provide you with excellent care. Hearing back from our patients is one way we can continue to improve our services. Please take a few minutes to complete the written survey that you may receive in the mail after your visit with us. Thank you!             Your Updated Medication List - Protect others around you: Learn how to safely use, store and throw away your medicines at www.disposemymeds.org.          This list is accurate as of 9/23/18 11:59 PM.  Always use your most recent med list.                   Brand Name Dispense Instructions for use Diagnosis    albuterol 108 (90 Base) MCG/ACT inhaler    PROAIR HFA/PROVENTIL HFA/VENTOLIN HFA    1 Inhaler    Inhale 2 puffs into the lungs every 6 hours as needed for shortness of breath / dyspnea    Mild persistent asthma without complication       azithromycin 250 MG tablet    ZITHROMAX    6 tablet    2 tabs po qd day 1, then 1 tab po qd days 2-5    Productive cough, Chest congestion, Purulent nasal discharge, Acute seasonal allergic rhinitis, unspecified trigger       cyanocolbalamin 100 MCG tablet    vitamin  B-12     Take 100 mcg by mouth daily.        D3 ADULT PO      Take 2,000 Units by mouth daily.        fluticasone 50 MCG/ACT spray    FLONASE    1 Bottle    Spray 1-2 sprays into both nostrils daily    Acute seasonal allergic rhinitis, unspecified trigger       ketoconazole 2 % cream    NIZORAL    15 g    Apply topically 2 times daily    Intertrigo       levothyroxine 112 MCG tablet    SYNTHROID/LEVOTHROID    90 tablet    Take 1 tablet (112 mcg) by mouth daily    Postablative hypothyroidism       MEGARED OMEGA-3 KRILL  MG Caps      Take 1 capsule by mouth daily        montelukast 10 MG tablet    SINGULAIR    90 tablet    Take 1 tablet (10 mg) by mouth At Bedtime    Mild persistent asthma  without complication       order for DME     2 Units    Equipment being ordered: compression stockings- LARGE, 15mm HG, THIGH HIGH    Spider veins of both lower extremities       triamcinolone 0.1 % cream    KENALOG    80 g    Apply sparingly to affected area three times daily as needed    Allergic contact dermatitis, unspecified trigger

## 2018-10-01 NOTE — PROGRESS NOTES
SUBJECTIVE:   Denita Flores is a 63 year old female presenting with a chief complaint of sinus congestion, nasal drainage, coughing.  Onset of symptoms was 6 day(s) ago.  Course of illness is worsening.    Severity moderate  Current and Associated symptoms: sinus congestion, drainage, coughing  Treatment measures tried include OTC medications.  Predisposing factors include recent illness.    Past Medical History:   Diagnosis Date     Kidney stones 2000    Passed a 5-10mm stone     Malignant neoplasm (H)      Mild persistent asthma 5/8/2013     Thyroid disease         Allergies   Allergen Reactions     Sulfa Drugs Hives and Swelling     Noted in 10/18/09 ER         Social History   Substance Use Topics     Smoking status: Former Smoker     Packs/day: 1.50     Years: 35.00     Types: Cigarettes     Quit date: 1/1/2003     Smokeless tobacco: Never Used     Alcohol use 0.0 oz/week     0 Standard drinks or equivalent per week       ROS:  CONSTITUTIONAL:NEGATIVE for fever, chills, change in weight  INTEGUMENTARY/SKIN: NEGATIVE for worrisome rashes, moles or lesions  EYES: NEGATIVE for vision changes or irritation  ENT/MOUTH: POSITIVE for nasal congestion, drainage, congestion  RESP:Postiive for productive cough  CV: NEGATIVE for chest pain, palpitations or peripheral edema  GI: NEGATIVE for nausea, abdominal pain, heartburn, or change in bowel habits  MUSCULOSKELETAL: NEGATIVE for significant arthralgias or myalgia  NEURO: NEGATIVE for weakness, dizziness or paresthesias    OBJECTIVE  :/78  Pulse 76  Temp 98.2  F (36.8  C) (Tympanic)  Resp 16  Wt 244 lb (110.7 kg)  SpO2 95%  Breastfeeding? No  BMI 38.22 kg/m2  GENERAL APPEARANCE: healthy, alert and no distress  EYES: EOMI,  PERRL, conjunctiva clear  HENT: TM's normal bilaterally, nasal turbinates erythematous, swollen, frontal sinus tenderness  and maxillary sinus tenderness   NECK: supple, nontender, no lymphadenopathy  RESP: lungs clear to auscultation -  "no rales, rhonchi or wheezes  CV: regular rates and rhythm, normal S1 S2, no murmur noted  NEURO: Normal strength and tone, sensory exam grossly normal,  normal speech and mentation  SKIN: no suspicious lesions or rashes    ASSESSMENT/PLAN:\"      ICD-10-CM    1. Productive cough R05 azithromycin (ZITHROMAX) 250 MG tablet   2. Chest congestion R09.89 azithromycin (ZITHROMAX) 250 MG tablet   3. Purulent nasal discharge J34.89 azithromycin (ZITHROMAX) 250 MG tablet   4. Acute seasonal allergic rhinitis, unspecified trigger J30.2 azithromycin (ZITHROMAX) 250 MG tablet     fluticasone (FLONASE) 50 MCG/ACT spray       Orders Placed This Encounter     azithromycin (ZITHROMAX) 250 MG tablet     fluticasone (FLONASE) 50 MCG/ACT spray       Follow up with PCP as needed  See orders in Epic    "

## 2018-10-20 ENCOUNTER — OFFICE VISIT (OUTPATIENT)
Dept: URGENT CARE | Facility: URGENT CARE | Age: 63
End: 2018-10-20
Payer: COMMERCIAL

## 2018-10-20 VITALS
TEMPERATURE: 97.2 F | DIASTOLIC BLOOD PRESSURE: 67 MMHG | SYSTOLIC BLOOD PRESSURE: 130 MMHG | OXYGEN SATURATION: 100 % | HEART RATE: 68 BPM

## 2018-10-20 DIAGNOSIS — M54.42 ACUTE LEFT-SIDED LOW BACK PAIN WITH LEFT-SIDED SCIATICA: Primary | ICD-10-CM

## 2018-10-20 PROCEDURE — 99213 OFFICE O/P EST LOW 20 MIN: CPT | Performed by: PHYSICIAN ASSISTANT

## 2018-10-20 RX ORDER — PREDNISONE 20 MG/1
20 TABLET ORAL DAILY
Qty: 5 TABLET | Refills: 0 | Status: ON HOLD | OUTPATIENT
Start: 2018-10-20 | End: 2019-01-21

## 2018-10-20 RX ORDER — METHOCARBAMOL 750 MG/1
750 TABLET, FILM COATED ORAL 4 TIMES DAILY PRN
Qty: 30 TABLET | Refills: 0 | Status: SHIPPED | OUTPATIENT
Start: 2018-10-20 | End: 2020-03-23

## 2018-10-20 NOTE — MR AVS SNAPSHOT
After Visit Summary   10/20/2018    Denita Flores    MRN: [id number]           Patient Information     Date Of Birth          1955        Visit Information        Provider Department      10/20/2018 11:40 AM Paige Borden PA-C [facility]        Today's Diagnoses     Acute left-sided low back pain with left-sided sciatica    -  1      Care Instructions      Aleve TWO TIMES PER DAY for pain  Tylenol 500-1000mg every 4-6 hours for pain  Use Muscle Relaxer as needed (Methocarbomal)  Take prednisone as directed    Exercises to Strengthen Your Lower Back  Strong lower back and abdominal muscles work together to support your spine. The exercises below will help strengthen the lower back. It is important that you begin exercising slowly and increase levels gradually.  Always begin any exercise program with stretching. If you feel pain while doing any of these exercises, stop and talk to your doctor about a more specific exercise program that better suits your condition.   Low back stretch  The point of stretching is to make you more flexible and increase your range of motion. Stretch only as much as you are able. Stretch slowly. Do not push your stretch to the limit. If at any point you feel pain while stretching, this is your (temporary) limit.    Lie on your back with your knees bent and both feet on the ground.    Slowly raise your left knee to your chest as you flatten your lower back against the floor. Hold for 5 seconds.    Relax and repeat the exercise with your right knee.    Do 10 of these exercises for each leg.    Repeat hugging both knees to your chest at the same time.  Building lower back strength  Start your exercise routine with 10 to 30 minutes a day, 1 to 3 times a day.  Initial exercises  Lying on your back:  1. Ankle pumps: Move your foot up and down, towards your head, and then away. Repeat 10 times with each foot.  2. Heel slides: Slowly bend your  knee, drawing the heel of your foot towards you. Then slide your heel/foot from you, straightening your knee. Do not lift your foot off the floor (this is not a leg lift).  3. Abdominal contraction: Bend your knees and put your hands on your stomach. Tighten your stomach muscles. Hold for 5 seconds, then relax. Repeat 10 times.  4. Straight leg raise: Bend one leg at the knee and keep the other leg straight. Tighten your stomach muscles. Slowly lift your straight leg 6 to 12 inches off the floor and hold for up to 5 seconds. Repeat 10 times on each side.  Standin. Wall squats: Stand with your back against the wall. Move your feet about 12 inches away from the wall. Tighten your stomach muscles, and slowly bend your knees until they are at about a 45 degree angle. Do not go down too far. Hold about 5 seconds. Then slowly return to your starting position. Repeat 10 times.  2. Heel raises: Stand facing the wall. Slowly raise the heels of your feet up and down, while keeping your toes on the floor. If you have trouble balancing, you can touch the wall with your hands. Repeat 10 times.  More advanced exercises  When you feel comfortable enough, try these exercises.  1. Kneeling lumbar extension: Begin on your hands and knees. At the same time, raise and straighten your right arm and left leg until they are parallel to the ground. Hold for 2 seconds and come back slowly to a starting position. Repeat with left arm and right leg, alternating 10 times.  2. Prone lumbar extension: Lie face down, arms extended overhead, palms on the floor. At the same time, raise your right arm and left leg as high as comfortably possible. Hold for 10 seconds and slowly return to start. Repeat with left arm and right leg, alternating 10 times. Gradually build up to 20 times. (Advanced: Repeat this exercise raising both arms and both legs a few inches off the floor at the same time. Hold for 5 seconds and release.)  3. Pelvic tilt: Lie on  the floor on your back with your knees bent at 90 degrees. Your feet should be flat on the floor. Inhale, exhale, then slowly contract your abdominal muscles bringing your navel toward your spine. Let your pelvis rock back until your lower back is flat on the floor. Hold for 10 seconds while breathing smoothly.  4. Abdominal crunch: Perform a pelvic tilt (above) flattening your lower back against the floor. Holding the tension in your abdominal muscles, take another breath and raise your shoulder blades off the ground (this is not a full sit-up). Keep your head in line with your body (don t bend your neck forward). Hold for 2 seconds, then slowly lower.  Date Last Reviewed: 6/1/2016 2000-2017 [employer]. [address] [place] [address]. All rights reserved. This information is not intended as a substitute for professional medical care. Always follow your healthcare professional's instructions.                Follow-ups after your visit        Your next 10 appointments already scheduled     Oct 30, 2018 10:30 AM CDT   [facility] with Gail Booth MD   [facility]    [id number] [place] [id number] [phone]              Who to contact     If you have questions or need follow up information about today's clinic visit or your schedule please contact [facility] directly at [phone].  Normal or non-critical lab and imaging results will be communicated to you by MyChart, letter or phone within 4 business days after the clinic has received the results. If you do not hear from us within 7 days, please contact the clinic through MyChart or phone. If you have a critical or abnormal lab result, we will notify you by phone as soon as possible.  Submit refill requests through [facility] or call your pharmacy and they will forward the refill request to us. Please allow 3 business days for your refill to be  completed.          Additional Information About Your Visit        Care EveryWhere ID     This is your Care EveryWhere ID. This could be used by other organizations to access your [place] medical records  [id number]        Your Vitals Were     Pulse Temperature Pulse Oximetry             68 97.2  F (36.2  C) (Oral) 100%          Blood Pressure from Last 3 Encounters:   10/20/18 130/67   09/23/18 116/78   09/07/18 140/83    Weight from Last 3 Encounters:   09/23/18 244 lb (110.7 kg)   09/07/18 241 lb 4.8 oz (109.5 kg)   08/07/18 246 lb (111.6 kg)              Today, you had the following     No orders found for display         Today's Medication Changes          These changes are accurate as of 10/20/18 12:10 PM.  If you have any questions, ask your nurse or doctor.               Start taking these medicines.        Dose/Directions    methocarbamol 750 MG tablet   Commonly known as:  ROBAXIN   Used for:  Acute left-sided low back pain with left-sided sciatica   Started by:  Paige Borden PA-C        Dose:  750 mg   Take 1 tablet (750 mg) by mouth 4 times daily as needed for muscle spasms   Quantity:  30 tablet   Refills:  0       predniSONE 20 MG tablet   Commonly known as:  DELTASONE   Used for:  Acute left-sided low back pain with left-sided sciatica   Started by:  Paige Borden PA-C        Dose:  20 mg   Take 1 tablet (20 mg) by mouth daily   Quantity:  5 tablet   Refills:  0         Stop taking these medicines if you haven't already. Please contact your care team if you have questions.     azithromycin 250 MG tablet   Commonly known as:  ZITHROMAX   Stopped by:  Paige Borden PA-C                Where to get your medicines      These medications were sent to [facility] [address] [address] AT [facility] [address], [facility] [address]     Phone:  [phone]     methocarbamol 750 MG tablet    predniSONE 20 MG tablet                 Primary Care Provider Office Phone # Fax #    Gee Hitchcock [phone] [address]  [facility] [place] [address]        Equal Access to Services     JONATHAN CHILEL : Hadii brenda ku marlono Somaliniali, waaxda luqadaha, qaybta kaalmada jade, fabian reyesronald mcmullen. So [facility] [phone].    ATENCIÓN: Si habla español, tiene a moore disposición servicios gratuitos de asistencia lingüística. Llame al [phone].    We comply with applicable federal civil rights laws and [place] laws. We do not discriminate on the basis of race, color, national origin, age, disability, sex, sexual orientation, or gender identity.            Thank you!     Thank you for choosing [facility]  for your care. Our goal is always to provide you with excellent care. Hearing back from our patients is one way we can continue to improve our services. Please take a few minutes to complete the written survey that you may receive in the mail after your visit with us. Thank you!             Your Updated Medication List - Protect others around you: Learn how to safely use, store and throw away your medicines at www.disposemymeds.org.          This list is accurate as of 10/20/18 12:10 PM.  Always use your most recent med list.                   Brand Name Dispense Instructions for use Diagnosis    albuterol 108 (90 Base) MCG/ACT inhaler    PROAIR HFA/PROVENTIL HFA/VENTOLIN HFA    1 Inhaler    Inhale 2 puffs into the lungs every 6 hours as needed for shortness of breath / dyspnea    Mild persistent asthma without complication       cyanocolbalamin 100 MCG tablet    vitamin  B-12     Take 100 mcg by mouth daily.        D3 ADULT PO      Take 2,000 Units by mouth daily.        fluticasone 50 MCG/ACT spray    FLONASE    1 Bottle    Spray 1-2 sprays into both nostrils daily    Acute seasonal allergic rhinitis, unspecified trigger       ketoconazole 2 % cream    NIZORAL    15 g     Apply topically 2 times daily    Intertrigo       levothyroxine 112 MCG tablet    SYNTHROID/LEVOTHROID    90 tablet    Take 1 tablet (112 mcg) by mouth daily    Postablative hypothyroidism       MEGARED OMEGA-3 KRILL  MG Caps      Take 1 capsule by mouth daily        methocarbamol 750 MG tablet    ROBAXIN    30 tablet    Take 1 tablet (750 mg) by mouth 4 times daily as needed for muscle spasms    Acute left-sided low back pain with left-sided sciatica       montelukast 10 MG tablet    SINGULAIR    90 tablet    Take 1 tablet (10 mg) by mouth At Bedtime    Mild persistent asthma without complication       order for DME     2 Units    Equipment being ordered: compression stockings- LARGE, 15mm HG, THIGH HIGH    Spider veins of both lower extremities       predniSONE 20 MG tablet    DELTASONE    5 tablet    Take 1 tablet (20 mg) by mouth daily    Acute left-sided low back pain with left-sided sciatica       triamcinolone 0.1 % cream    KENALOG    80 g    Apply sparingly to affected area three times daily as needed    Allergic contact dermatitis, unspecified trigger

## 2018-10-20 NOTE — PROGRESS NOTES
12 L side of back and into groin   [place] still before that  Took 2 tylenol without improvement    SUBJECTIVE  HPI: Denita Flores is a 63 year old female who presents for evaluation of back pain  Symptoms began last night, around 9P, she began to feel like her back was getting sore, midnight is when the pain really began. Pain is located in her lower back left side and radiates into left leg. She rates the pain as a 9/10 at its worst. 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 The pain is worsened by movement and changing position. She has taking 2 Tylenol without improvement.   ASSOCIATED sx include: denies fecal incontinence, lower extremity numbness, tingling, urinary incontinence (Diret patient to the ER). She admits to radicular lower extremity symptoms right without LE weakness.   Red flag symptoms: negative for, fever, chills, night sweats, dizziness, fatigue, weakness.    Past Medical History:   Diagnosis Date     Kidney stones 2000    Passed a 5-10mm stone     Malignant neoplasm (H)      Mild persistent asthma 5/8/2013     Thyroid disease      Current Outpatient Prescriptions   Medication Sig Dispense Refill     albuterol (PROAIR HFA/PROVENTIL HFA/VENTOLIN HFA) 108 (90 BASE) MCG/ACT Inhaler Inhale 2 puffs into the lungs every 6 hours as needed for shortness of breath / dyspnea 1 Inhaler 3     Cholecalciferol (D3 ADULT PO) Take 2,000 Units by mouth daily.       cyanocolbalamin (VITAMIN  B-12) 100 MCG tablet Take 100 mcg by mouth daily.       fluticasone (FLONASE) 50 MCG/ACT spray Spray 1-2 sprays into both nostrils daily 1 Bottle 11     ketoconazole (NIZORAL) 2 % cream Apply topically 2 times daily 15 g 1     levothyroxine (SYNTHROID/LEVOTHROID) 112 MCG tablet Take 1 tablet (112 mcg) by mouth daily 90 tablet 3     MEGARED OMEGA-3 KRILL  MG CAPS Take 1 capsule by mouth daily        montelukast (SINGULAIR) 10 MG tablet Take 1 tablet (10 mg) by mouth At Bedtime  90 tablet 3     order for DME Equipment being ordered: compression stockings- LARGE, 15mm HG, THIGH HIGH 2 Units 0     triamcinolone (KENALOG) 0.1 % cream Apply sparingly to affected area three times daily as needed 80 g 0     Social History   Substance Use Topics     Smoking status: Former Smoker     Packs/day: 1.50     Years: 35.00     Types: Cigarettes     Quit date: 1/1/2003     Smokeless tobacco: Never Used     Alcohol use 0.0 oz/week     0 Standard drinks or equivalent per week       ROS:  10 review of systems negative except as stated above.    OBJECTIVE:  /67  Pulse 68  Temp 97.2  F (36.2  C) (Oral)  SpO2 100%  Back examination: Back symmetric, no curvature. ROM normal. No CVA tenderness., positive findings: paraspinal muscle spasm, tenderness to palpation on left side glute.  [unfilled] leg raise test: negative  GENERAL APPEARANCE: healthy, alert and no distress  RESP: lungs clear to auscultation - no rales, rhonchi or wheezes  CV: regular rates and rhythm, normal S1 S2, no murmur noted  ABDOMEN:  soft, nontender, no HSM or masses and bowel sounds normal  NEURO: Normal strength and tone with no weakness or sensory deficit noted, reflexes normal   SKIN: no suspicious lesions or rashes    ASSESSMENT/IMPRESSION/PLAN:  1. Acute left-sided low back pain with left-sided sciatica  Overuse with painting causing her acute back pain. 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   - predniSONE (DELTASONE) 20 MG tablet; Take 1 tablet (20 mg) by mouth daily  Dispense: 5 tablet; Refill: 0  - methocarbamol (ROBAXIN) 750 MG tablet; Take 1 tablet (750 mg) by mouth 4 times daily as needed for muscle spasms  Dispense: 30 tablet; Refill: 0        Paige Borden PA-C

## 2018-10-20 NOTE — PATIENT INSTRUCTIONS
Aleve TWO TIMES PER DAY for pain  Tylenol 500-1000mg every 4-6 hours for pain  Use Muscle Relaxer as needed (Methocarbomal)  Take prednisone as directed    Exercises to Strengthen Your Lower Back  Strong lower back and abdominal muscles work together to support your spine. The exercises below will help strengthen the lower back. It is important that you begin exercising slowly and increase levels gradually.  Always begin any exercise program with stretching. If you feel pain while doing any of these exercises, stop and talk to your doctor about a more specific exercise program that better suits your condition.   Low back stretch  The point of stretching is to make you more flexible and increase your range of motion. Stretch only as much as you are able. Stretch slowly. Do not push your stretch to the limit. If at any point you feel pain while stretching, this is your (temporary) limit.    Lie on your back with your knees bent and both feet on the ground.    Slowly raise your left knee to your chest as you flatten your lower back against the floor. Hold for 5 seconds.    Relax and repeat the exercise with your right knee.    Do 10 of these exercises for each leg.    Repeat hugging both knees to your chest at the same time.  Building lower back strength  Start your exercise routine with 10 to 30 minutes a day, 1 to 3 times a day.  Initial exercises  Lying on your back:  1. Ankle pumps: Move your foot up and down, towards your head, and then away. Repeat 10 times with each foot.  2. Heel slides: Slowly bend your knee, drawing the heel of your foot towards you. Then slide your heel/foot from you, straightening your knee. Do not lift your foot off the floor (this is not a leg lift).  3. Abdominal contraction: Bend your knees and put your hands on your stomach. Tighten your stomach muscles. Hold for 5 seconds, then relax. Repeat 10 times.  4. Straight leg raise: Bend one leg at the knee and keep the other leg straight.  Tighten your stomach muscles. Slowly lift your straight leg 6 to 12 inches off the floor and hold for up to 5 seconds. Repeat 10 times on each side.  Standin. Wall squats: Stand with your back against the wall. Move your feet about 12 inches away from the wall. Tighten your stomach muscles, and slowly bend your knees until they are at about a 45 degree angle. Do not go down too far. Hold about 5 seconds. Then slowly return to your starting position. Repeat 10 times.  2. Heel raises: Stand facing the wall. Slowly raise the heels of your feet up and down, while keeping your toes on the floor. If you have trouble balancing, you can touch the wall with your hands. Repeat 10 times.  More advanced exercises  When you feel comfortable enough, try these exercises.  1. Kneeling lumbar extension: Begin on your hands and knees. At the same time, raise and straighten your right arm and left leg until they are parallel to the ground. Hold for 2 seconds and come back slowly to a starting position. Repeat with left arm and right leg, alternating 10 times.  2. Prone lumbar extension: Lie face down, arms extended overhead, palms on the floor. At the same time, raise your right arm and left leg as high as comfortably possible. Hold for 10 seconds and slowly return to start. Repeat with left arm and right leg, alternating 10 times. Gradually build up to 20 times. (Advanced: Repeat this exercise raising both arms and both legs a few inches off the floor at the same time. Hold for 5 seconds and release.)  3. Pelvic tilt: Lie on the floor on your back with your knees bent at 90 degrees. Your feet should be flat on the floor. Inhale, exhale, then slowly contract your abdominal muscles bringing your navel toward your spine. Let your pelvis rock back until your lower back is flat on the floor. Hold for 10 seconds while breathing smoothly.  4. Abdominal crunch: Perform a pelvic tilt (above) flattening your lower back against the floor.  Holding the tension in your abdominal muscles, take another breath and raise your shoulder blades off the ground (this is not a full sit-up). Keep your head in line with your body (don t bend your neck forward). Hold for 2 seconds, then slowly lower.  Date Last Reviewed: 6/1/2016 2000-2017 [employer]. [address] [place] [address]. All rights reserved. This information is not intended as a substitute for professional medical care. Always follow your healthcare professional's instructions.

## 2018-10-22 ENCOUNTER — NURSE TRIAGE (OUTPATIENT)
Dept: NURSING | Facility: CLINIC | Age: 63
End: 2018-10-22

## 2018-10-22 ENCOUNTER — HOSPITAL ENCOUNTER (EMERGENCY)
Facility: CLINIC | Age: 63
Discharge: HOME | End: 2018-10-22
Attending: EMERGENCY MEDICINE | Admitting: EMERGENCY MEDICINE
Payer: COMMERCIAL

## 2018-10-22 ENCOUNTER — APPOINTMENT (OUTPATIENT)
Dept: MRI IMAGING | Facility: CLINIC | Age: 63
End: 2018-10-22
Attending: EMERGENCY MEDICINE
Payer: COMMERCIAL

## 2018-10-22 VITALS
HEART RATE: 72 BPM | TEMPERATURE: 97.9 F | SYSTOLIC BLOOD PRESSURE: 149 MMHG | OXYGEN SATURATION: 98 % | DIASTOLIC BLOOD PRESSURE: 99 MMHG | RESPIRATION RATE: 16 BRPM

## 2018-10-22 DIAGNOSIS — R93.89 ABNORMAL MRI: ICD-10-CM

## 2018-10-22 DIAGNOSIS — M51.26 HERNIATED LUMBAR INTERVERTEBRAL DISC: ICD-10-CM

## 2018-10-22 DIAGNOSIS — M79.662 PAIN OF LEFT LOWER LEG: ICD-10-CM

## 2018-10-22 PROCEDURE — 99284 EMERGENCY DEPT VISIT MOD MDM: CPT | Mod: 25

## 2018-10-22 PROCEDURE — 72148 MRI LUMBAR SPINE W/O DYE: CPT

## 2018-10-22 RX ORDER — ONDANSETRON 4 MG/1
4 TABLET, ORALLY DISINTEGRATING ORAL EVERY 8 HOURS PRN
Qty: 10 TABLET | Refills: 0 | Status: SHIPPED | OUTPATIENT
Start: 2018-10-22 | End: 2018-10-25

## 2018-10-22 RX ORDER — METHYLPREDNISOLONE 4 MG
TABLET, DOSE PACK ORAL
Qty: 21 TABLET | Refills: 0 | Status: ON HOLD | OUTPATIENT
Start: 2018-10-22 | End: 2019-01-21

## 2018-10-22 RX ORDER — HYDROCODONE BITARTRATE AND ACETAMINOPHEN 5; 325 MG/1; MG/1
1 TABLET ORAL EVERY 6 HOURS PRN
Qty: 10 TABLET | Refills: 0 | Status: SHIPPED | OUTPATIENT
Start: 2018-10-22 | End: 2020-03-23

## 2018-10-22 ASSESSMENT — ENCOUNTER SYMPTOMS
FREQUENCY: 1
WEAKNESS: 1

## 2018-10-22 NOTE — ED AVS SNAPSHOT
Emergency Department    [address] [facility] [address]    Phone:  [phone]    Fax:  [phone]                                       Denita Flores   MRN: [id number]    Department:   Emergency Department   Date of Visit:  10/22/2018           After Visit Summary Signature Page     I have received my discharge instructions, and my questions have been answered. I have discussed any challenges I see with this plan with the nurse or doctor.    ..........................................................................................................................................  Patient/Patient Representative Signature      ..........................................................................................................................................  Patient Representative Print Name and Relationship to Patient    ..................................................               ................................................  Date                                   Time    ..........................................................................................................................................  Reviewed by Signature/Title    ...................................................              ..............................................  Date                                               Time          22EPIC Rev 08/18

## 2018-10-22 NOTE — ED AVS SNAPSHOT
Emergency Department    [address] [facility] [address]    Phone:  [phone]    Fax:  [phone]                                       Denita Flores   MRN: [id number]    Department:   Emergency Department   Date of Visit:  10/22/2018           Patient Information     Date Of Birth          1955        Your diagnoses for this visit were:     Herniated lumbar intervertebral disc     Abnormal MRI     Pain of left lower leg        You were seen by Erwin Smith MD.      Follow-up Information     Follow up with Josué Parekh MD. Schedule an appointment as soon as possible for a visit in 4 days.    Specialty:  Neurosurgery    Why:  For repeat evaluation and symptom check.  This is very important.      Contact information:    [address]  [facility] [address] [phone]          Follow up with  Emergency Department.    Specialty:  EMERGENCY MEDICINE    Why:  If symptoms worsen    Contact information:    [address] [facility] [place] [address] [phone]      Discharge References/Attachments     HERNIATED INTERVERTEBRAL DISK (ENGLISH)    GETTING INTO AND OUT OF BED, BACK SAFETY (ENGLISH)      Your next 10 appointments already scheduled     Oct 30, 2018 10:30 AM CDT   [facility] Visit with Gail Booth MD   [facility]    [address] [place] [address] [phone]              24 Hour Appointment Hotline       To make an appointment at any [facility], call [phone] ([phone]). If you don't have a family doctor or clinic, we will help you find one. [facility] are conveniently located to serve the needs of you and your family.             Review of your medicines      START taking        Dose / Directions Last dose taken    HYDROcodone-acetaminophen 5-325 MG per tablet   Commonly known as:  NORCO   Dose:  1 tablet   Quantity:  10 tablet        Take 1  tablet by mouth every 6 hours as needed for severe pain   Refills:  0        methylPREDNISolone 4 MG tablet   Commonly known as:  MEDROL DOSEPAK   Quantity:  21 tablet        Follow package instructions   Refills:  0        ondansetron 4 MG ODT tab   Commonly known as:  ZOFRAN ODT   Dose:  4 mg   Quantity:  10 tablet        Take 1 tablet (4 mg) by mouth every 8 hours as needed for nausea   Refills:  0          Our records show that you are taking the medicines listed below. If these are incorrect, please call your family doctor or clinic.        Dose / Directions Last dose taken    albuterol 108 (90 Base) MCG/ACT inhaler   Commonly known as:  PROAIR HFA/PROVENTIL HFA/VENTOLIN HFA   Dose:  2 puff   Quantity:  1 Inhaler        Inhale 2 puffs into the lungs every 6 hours as needed for shortness of breath / dyspnea   Refills:  3        cyanocolbalamin 100 MCG tablet   Commonly known as:  vitamin  B-12   Dose:  100 mcg        Take 100 mcg by mouth daily.   Refills:  0        D3 ADULT PO   Dose:  2000 Units        Take 2,000 Units by mouth daily.   Refills:  0        fluticasone 50 MCG/ACT spray   Commonly known as:  FLONASE   Dose:  1-2 spray   Quantity:  1 Bottle        Spray 1-2 sprays into both nostrils daily   Refills:  11        ketoconazole 2 % cream   Commonly known as:  NIZORAL   Quantity:  15 g        Apply topically 2 times daily   Refills:  1        levothyroxine 112 MCG tablet   Commonly known as:  SYNTHROID/LEVOTHROID   Dose:  112 mcg   Quantity:  90 tablet        Take 1 tablet (112 mcg) by mouth daily   Refills:  3        MEGARED OMEGA-3 KRILL  MG Caps   Dose:  1 capsule        Take 1 capsule by mouth daily   Refills:  0        methocarbamol 750 MG tablet   Commonly known as:  ROBAXIN   Dose:  750 mg   Quantity:  30 tablet        Take 1 tablet (750 mg) by mouth 4 times daily as needed for muscle spasms   Refills:  0        montelukast 10 MG tablet   Commonly known as:  SINGULAIR   Dose:  10 mg    Quantity:  90 tablet        Take 1 tablet (10 mg) by mouth At Bedtime   Refills:  3        order for DME   Quantity:  2 Units        Equipment being ordered: compression stockings- LARGE, 15mm HG, THIGH HIGH   Refills:  0        predniSONE 20 MG tablet   Commonly known as:  DELTASONE   Dose:  20 mg   Quantity:  5 tablet        Take 1 tablet (20 mg) by mouth daily   Refills:  0        triamcinolone 0.1 % cream   Commonly known as:  KENALOG   Quantity:  80 g        Apply sparingly to affected area three times daily as needed   Refills:  0                Information about OPIOIDS     PRESCRIPTION OPIOIDS: WHAT YOU NEED TO KNOW   We gave you an opioid (narcotic) pain medicine. It is important to manage your pain, but opioids are not always the best choice. You should first try all the other options your care team gave you. Take this medicine for as short a time (and as few doses) as possible.    Some activities can increase your pain, such as bandage changes or therapy sessions. It may help to take your pain medicine 30 to 60 minutes before these activities. Reduce your stress by getting enough sleep, working on hobbies you enjoy and practicing relaxation or meditation. Talk to your care team about ways to manage your pain beyond prescription opioids.    These medicines have risks:    DO NOT drive when on new or higher doses of pain medicine. These medicines can affect your alertness and reaction times, and you could be arrested for driving under the influence (DUI). If you need to use opioids long-term, talk to your care team about driving.    DO NOT operate heavy machinery    DO NOT do any other dangerous activities while taking these medicines.    DO NOT drink any alcohol while taking these medicines.     If the opioid prescribed includes acetaminophen, DO NOT take with any other medicines that contain acetaminophen. Read all labels carefully. Look for the word  acetaminophen  or  Tylenol.  Ask your pharmacist if  you have questions or are unsure.    You can get addicted to pain medicines, especially if you have a history of addiction (chemical, alcohol or substance dependence). Talk to your care team about ways to reduce this risk.    All opioids tend to cause constipation. Drink plenty of water and eat foods that have a lot of fiber, such as fruits, vegetables, prune juice, apple juice and high-fiber cereal. Take a laxative (Miralax, milk of magnesia, Colace, Senna) if you don t move your bowels at least every other day. Other side effects include upset stomach, sleepiness, dizziness, throwing up, tolerance (needing more of the medicine to have the same effect), physical dependence and slowed breathing.    Store your pills in a secure place, locked if possible. We will not replace any lost or stolen medicine. If you don t finish your medicine, please throw away (dispose) as directed by your pharmacist. The [place] Pollution [employer] has more information about safe disposal: https://www.pca.[facility].[email or web address].us/living-green/managing-unwanted-medications        Prescriptions were sent or printed at these locations (3 Prescriptions)                   Other Prescriptions                Printed at Department/Unit printer (3 of 3)         HYDROcodone-acetaminophen (NORCO) 5-325 MG per tablet               methylPREDNISolone (MEDROL DOSEPAK) 4 MG tablet               ondansetron (ZOFRAN ODT) 4 MG ODT tab                Procedures and tests performed during your visit     Lumbar spine MRI w/o contrast      Orders Needing Specimen Collection     None      Pending Results     No orders found from 10/20/2018 to 10/23/2018.            Pending Culture Results     No orders found from 10/20/2018 to 10/23/2018.            Pending Results Instructions     If you had any lab results that were not finalized at the time of your Discharge, you can call the ED Lab Result RN at [phone]. You will be contacted by this team for any positive Lab  results or changes in treatment. The nurses are available 7 days a week from 10A to 6:30P.  You can leave a message 24 hours per day and they will return your call.        Test Results From Your Hospital Stay        10/22/2018  4:36 PM      Narrative     MRI LUMBAR SPINE WITHOUT CONTRAST   10/22/2018 4:12 PM     HISTORY: Low back pain with left lower extremity weakness and  incontinence. Evaluate cord compression/cauda equina.    TECHNIQUE: Multiplanar multisequence MRI of the lumbar spine without  contrast.    COMPARISON: None.    FINDINGS: The report is dictated assuming five lumbar-type vertebral  bodies.  Sagittal images demonstrate normal vertebral body height.  Bone marrow signal is unremarkable. Tip of the conus medullaris and  cauda equina are unremarkable.    T12-L1:  No disc herniation or stenosis. Facet joints are  unremarkable.    L1-L2:  No disc herniation or stenosis. Facet joints are unremarkable.       L2-L3:  Mild facet hypertrophy and disc bulge. Mild to moderate  central stenosis. Neural foramen are patent.    L3-L4:  Facet hypertrophy and disc bulge result in moderate central  stenosis. Right neural foramen is patent. There is a somewhat more  focal left foraminal disc protrusion that results in at least moderate  left foraminal stenosis best seen on axial series 8 image 26 and  sagittal series 5 image 11.    L4-L5:  Degenerative disc disease with mild disc bulge. Mild to  moderate facet hypertrophy. Mild central stenosis. Left greater than  right subarticular stenosis with possible compression of the  descending left L5 nerve root in the subarticular recess as  demonstrated on axial series 9 image 36. Mild to moderate left and  mild right foraminal stenosis.    L5-S1:  Prominent right and moderate left facet degenerative changes.  Mild broad-based disc bulge. No central stenosis. Neural foramen are  patent.    Paraspinous soft tissues:  Unremarkable.        Impression     IMPRESSION:    1. At  L2-L3 there is mild to moderate central stenosis.  2. At L3-L4 there is a somewhat focal left foraminal disc protrusion  that results in at least moderate left foraminal stenosis. There is  also moderate central stenosis at this level as a result of more  broad-based disc bulge.  3. At L4-L5 there is mild central stenosis. Left greater than right  subarticular stenosis with possible mild compression of the descending  left L5 nerve root in the subarticular recess. Mild to moderate left  and mild right foraminal stenosis.    ARIK BRANCH MD                Clinical Quality Measure: Blood Pressure Screening     Your blood pressure was checked while you were in the emergency department today. The last reading we obtained was  BP: (!) 152/116 . Please read the guidelines below about what these numbers mean and what you should do about them.  If your systolic blood pressure (the top number) is less than 120 and your diastolic blood pressure (the bottom number) is less than 80, then your blood pressure is normal. There is nothing more that you need to do about it.  If your systolic blood pressure (the top number) is 120-139 or your diastolic blood pressure (the bottom number) is 80-89, your blood pressure may be higher than it should be. You should have your blood pressure rechecked within a year by a primary care provider.  If your systolic blood pressure (the top number) is 140 or greater or your diastolic blood pressure (the bottom number) is 90 or greater, you may have high blood pressure. High blood pressure is treatable, but if left untreated over time it can put you at risk for heart attack, stroke, or kidney failure. You should have your blood pressure rechecked by a primary care provider within the next 4 weeks.  If your provider in the emergency department today gave you specific instructions to follow-up with your doctor or provider even sooner than that, you should follow that instruction and not wait for up to  4 weeks for your follow-up visit.        Thank you for choosing [facility]       Thank you for choosing [facility] for your care. Our goal is always to provide you with excellent care. Hearing back from our patients is one way we can continue to improve our services. Please take a few minutes to complete the written survey that you may receive in the mail after you visit with us. Thank you!        Care EveryWhere ID     This is your Care EveryWhere ID. This could be used by other organizations to access your [facility] medical records  [id number]        Equal Access to Services     JONATHAN CHILEL : Tony cook Sogeorgette, waaxmagi luqadaha, qaybasuncion kaalmamagi hansen, fabian golden . So [facility] [phone].    ATENCIÓN: Si habla español, tiene a moore disposición servicios gratuitos de asistencia lingüística. Llame al [phone].    We comply with applicable federal civil rights laws and [place] laws. We do not discriminate on the basis of race, color, national origin, age, disability, sex, sexual orientation, or gender identity.            After Visit Summary       This is your record. Keep this with you and show to your community pharmacist(s) and doctor(s) at your next visit.

## 2018-10-22 NOTE — TELEPHONE ENCOUNTER
"\"I was helping my sister paint and I think I may have injured my back.  I don't know what I did but the pain came shortly after doing this activity.  I went to  and was given Prednisone and a muscle relaxer and was told to take Tylenol and Aleve.  I have been doing these things.  I fell down 6 times yesterday when my legs gave out and now I injured my knee and little toe.\"  Back pain 6-8/10, knee pain 5/10.      Disposition:  ED.  Pt verbalized understanding and had no further questions.     Tasha Luo RN/FNA    Reason for Disposition    Weakness of a leg or foot (e.g., unable to bear weight, dragging foot)    Additional Information    Negative: Passed out (i.e., lost consciousness, collapsed and was not responding)    Negative: Shock suspected (e.g., cold/pale/clammy skin, too weak to stand, low BP, rapid pulse)    Negative: Sounds like a life-threatening emergency to the triager    Negative: Major injury to the back (e.g., MVA, fall > 10 feet or 3 meters, penetrating injury, etc.)    Negative: Followed a tailbone injury    Negative: [1] Pain in the upper back over the ribs (rib cage) AND [2] radiates (travels, goes) into chest    Negative: [1] Pain in the upper back over the ribs (rib cage) AND [2] worsened by coughing (or clearly increases with breathing)    Negative: Back pain during pregnancy    Negative: Pain mainly in flank (i.e., in the side, over the lower ribs or just below the ribs)    Negative: [1] SEVERE back pain (e.g., excruciating) AND [2] sudden onset AND [3] age > 60    Negative: [1] Unable to urinate (or only a few drops) > 4 hours AND     [2] bladder feels very full (e.g., palpable bladder or strong urge to urinate)    Negative: [1] Urinary or bowel incontinence (i.e., loss of bladder or bowel control) AND [2] new onset    Negative: Numbness in groin or rectal area (i.e., loss of sensation)    Negative: [1] SEVERE abdominal pain AND [2] present > 1 hour    Negative: [1] Abdominal pain AND " [2] age > 60    Protocols used: BACK PAIN-ADULT-AH

## 2018-10-22 NOTE — LETTER
October 22, 2018      To Whom It May Concern:      Denita Flores was seen in our Emergency Department today, 10/22/18.  I expect her condition to improve over the next 3 days.  She may return to work/school when improved.    Sincerely,        Erwin Smith MD

## 2018-10-22 NOTE — ED PROVIDER NOTES
"  History     Chief Complaint:  Back Pain    HPI   Denita Flores is a 63 year old female with a history of type 2 diabetes and hypertension who presents to the emergency department today for evaluation of back pain. Per chart review, patient was seen by urgent care on 10/20 for which she was diagnosed with acute-left sided low back pain with left-sided sciatica and was discharged with prednisone and Robaxin. Today, patient reports that she fell 5 times yesterday after her leg felt \"rubbery\" and weak and endorses taking 3 Robaxin since Friday. She has fallen on both knees for which her left knee is now swollen and she thinks she has a broken toe. She is also noting some urinary frequency and leaking as well as a different feeling when wiping her privates.     Allergies:  Sulfa Drugs     Medications:    Levothyroxine  Albuterol  Singular   Robaxin   Prednisone     Past Medical History:    Kidney stones  Malignant neoplasm  Mild persistent asthma  Hypothyroidism   Morbid obesity   YELENA (obstructive sleep apnea)  Type 2 diabetes  Hypertension     Past Surgical History:    Back surgery  Colonoscopy with CO2 insufflation   LEEP, cervical     Family History:    Mother: Glaucoma  Father: Heart disease, kidney cancer  Sister: Heart disease, colon cancer, diabetes  Maternal Aunt: Colon cancer  Paternal Aunt: Colon cancer  Maternal Grandmother: Glaucoma    Social History:  Smoking Status: Former Smoker    Packs/day: 1.50    Years: 35   Types: Cigarettes   Quit date: 2003  Smokeless Tobacco: Never Used  Alcohol Use: Negative    Marital Status:      Review of Systems   Gastrointestinal:        Different feeling when wiping privates   Genitourinary: Positive for frequency.        Some incontinence   Musculoskeletal:        Swollen knee and toe pain   Neurological: Positive for weakness.   All other systems reviewed and are negative.    Physical Exam     Patient Vitals for the past 24 hrs:   BP Temp Temp src Pulse Resp " SpO2   10/22/18 1241 (!) 152/116 97.9  F (36.6  C) Oral 72 18 98 %     Physical Exam  Vitals: reviewed by me  General: [place], cooperative, and alert to conversation  Eyes: Tracking well, clear conjunctiva BL  ENT: MMM, midline trachea.   Lungs:   No tachypnea, no accessory muscle use. No respiratory distress.   CV: Rate as above, regular rhythm.    Abd: Soft, non tender, no guarding, no rebound. Non distended  MSK: no peripheral edema or joint effusion.  No evidence of trauma  No midline tenderness to CTL or S spine.  Does have minimal left paraspinal tenderness at L3, L4, L5 area.  No skin changes.  Skin: No rash, normal turgor and temperature  Neuro: Clear speech and no facial droop.  Bilateral lower extremities with sensation intact light touch and 5 out of 5 motor throughout.  Does have pain with hip flexion of left leg.  Positive straight leg raise of left.  Able to bear weight, ambulatory, no tenderness to palpation to bilateral knees.  Psych: Not RIS, no e/o AH/VH        Emergency Department Course     Imaging:  Radiology findings were communicated with the patient who voiced understanding of the findings.    Lumbar spine MRI w/o contrast  1. At L2-L3 there is mild to moderate central stenosis.  2. At L3-L4 there is a somewhat focal left foraminal disc protrusion  that results in at least moderate left foraminal stenosis. There is  also moderate central stenosis at this level as a result of more  broad-based disc bulge.  3. At L4-L5 there is mild central stenosis. Left greater than right  subarticular stenosis with possible mild compression of the descending  left L5 nerve root in the subarticular recess. Mild to moderate left  and mild right foraminal stenosis.  Reading per radiology     Emergency Department Course:    1237 Nursing notes and vitals reviewed.    1248 I performed an exam of the patient as documented above.     1551 The patient was sent for a MR while in the  emergency department, results above.     1635 Recheck and update.      1640 I spoke with HAILEE Velarde of the neurosurgery service from [facility] regarding patient's presentation, findings, and plan of care.    1651 Update.     1715 I personally reviewed the imaging results with the patient and answered all related questions prior to discharge.    Impression & Plan      Medical Decision Making:  Denita Flores is a 63 year old female who presents to the emergency department with back pain with left sided leg weakness. Patient does have some concerning symptoms with some incontinence on and off for the last month or two, unclear if that is related to her presentation today. Her MRI was done because she keeps falling, and does appear to be guarding her left lower extremity pretty significantly, and again she had some nonspecific urinary incontinence. Fortunately, the MRI shows no evidence of cauda equina, minimal spinal compression, or stenosis noted. Does have significant radicular  symptoms on the left side. I did ask Dr. Parekh of the neurosurgery team as well as HAILEE Velarde to review the images and patient's case, and they have done so and called back to say that they do think patient is stable for outpatient management. They agree that her radicular symptoms are significant, but no evidence of cord compression at this time. They do favor solu-medrol as well as clinic follow up for steroid injections. I explained this to the patient Ms. Flores, who stated that she is okay with this plan as she prefers to go home and is reassured that she does not need emergent surgery. I went over her pain meds, given her work note for several days, and given her Dr. Parekh's number so she can follow up. Very clear return to emergency department precautions were discussed, will discharge as above.     Diagnosis:    ICD-10-CM    1. Herniated lumbar intervertebral disc M51.26    2. Abnormal MRI R93.89    3. Pain of left lower  leg M79.326      Disposition:   The patient is discharged to home.    Discharge Medications:  New Prescriptions    HYDROCODONE-ACETAMINOPHEN (NORCO) 5-325 MG PER TABLET    Take 1 tablet by mouth every 6 hours as needed for severe pain    METHYLPREDNISOLONE (MEDROL DOSEPAK) 4 MG TABLET    Follow package instructions    ONDANSETRON (ZOFRAN ODT) 4 MG ODT TAB    Take 1 tablet (4 mg) by mouth every 8 hours as needed for nausea     Scribe Disclosure:  I, Leona Meyer, am serving as a scribe at 12:38 PM on 10/22/2018 to document services personally performed by Patricio Smith MD based on my observations and the provider's statements to me.       EMERGENCY DEPARTMENT       Erwin Smith MD  10/22/18 2035

## 2018-10-25 ENCOUNTER — OFFICE VISIT (OUTPATIENT)
Dept: ORTHOPEDICS | Facility: CLINIC | Age: 63
End: 2018-10-25
Payer: COMMERCIAL

## 2018-10-25 ENCOUNTER — RADIANT APPOINTMENT (OUTPATIENT)
Dept: GENERAL RADIOLOGY | Facility: CLINIC | Age: 63
End: 2018-10-25
Attending: PREVENTIVE MEDICINE
Payer: COMMERCIAL

## 2018-10-25 VITALS
DIASTOLIC BLOOD PRESSURE: 82 MMHG | OXYGEN SATURATION: 98 % | SYSTOLIC BLOOD PRESSURE: 147 MMHG | HEART RATE: 71 BPM | BODY MASS INDEX: 38.3 KG/M2 | WEIGHT: 244 LBS | HEIGHT: 67 IN

## 2018-10-25 DIAGNOSIS — M51.26 LUMBAR HERNIATED DISC: ICD-10-CM

## 2018-10-25 DIAGNOSIS — S83.412A SPRAIN OF MEDIAL COLLATERAL LIGAMENT OF LEFT KNEE, INITIAL ENCOUNTER: ICD-10-CM

## 2018-10-25 DIAGNOSIS — M79.671 RIGHT FOOT PAIN: Primary | ICD-10-CM

## 2018-10-25 DIAGNOSIS — S92.911A CLOSED NONDISPLACED FRACTURE OF PHALANX OF TOE OF RIGHT FOOT, UNSPECIFIED TOE, INITIAL ENCOUNTER: ICD-10-CM

## 2018-10-25 DIAGNOSIS — M79.671 RIGHT FOOT PAIN: ICD-10-CM

## 2018-10-25 LAB — RADIOLOGIST FLAGS: NORMAL

## 2018-10-25 PROCEDURE — 99214 OFFICE O/P EST MOD 30 MIN: CPT | Performed by: PREVENTIVE MEDICINE

## 2018-10-25 PROCEDURE — 73630 X-RAY EXAM OF FOOT: CPT | Mod: RT | Performed by: RADIOLOGY

## 2018-10-25 ASSESSMENT — PAIN SCALES - GENERAL: PAINLEVEL: MILD PAIN (3)

## 2018-10-25 NOTE — LETTER
October 25, 2018      Denita Flores  [address] [address] [place] [phone]              To whom it may concern:  Denita is under my care for medical conditions, which require use of a boot on her right foot. She should not drive with the boot until I re-evaluate her on Nov. 1, at which time we will determine if she needs to continue use of the walking boot.        Sincerely,      Frankie Santos MD

## 2018-10-25 NOTE — LETTER
10/25/2018         RE: Denita Flores  [address] [address] [place] [id number]        Dear Colleague,    Thank you for referring your patient, Denita Flores, to the [facility]. Please see a copy of my visit note below.    HISTORY OF PRESENT ILLNESS  Ms. Flores is a pleasant 63 year old year old female who presents to clinic today with recent fall that injured her left knee, right foot, and aggravated her back pain  She has some numbness in her left leg and knee area, and bruising and pain over lateral right foot  She can bend her left knee and walk and bend her knee  She has a limp due to her right foot    MEDICAL HISTORY  Patient Active Problem List   Diagnosis     Graves disease     Obesity     Diverticulosis of large intestine without hemorrhage     Colon polyps     History of cervical cancer     YELENA (obstructive sleep apnea)     Warts     CARDIOVASCULAR SCREENING; LDL GOAL LESS THAN 130     Hx of herpes zoster keratoconjunctivitis, os     Plantar warts     Stasis dermatitis of both legs     Obesity (BMI 30-39.9)     Postablative hypothyroidism     Other specified hypothyroidism,post ablative     Venous stasis dermatitis of left lower extremity     Vitamin B12 deficiency (non anemic)     Mild persistent asthma without complication     Spider veins of both lower extremities     Seasonal allergic rhinitis     Family history of colon cancer     Epistaxis     Tubular adenoma of colon     Family history of renal cancer     Nephrolithiasis     Gross hematuria     Morbid obesity (H)     Elevated blood pressure reading without diagnosis of hypertension     Polyp of gallbladder, 4mm       Current Outpatient Prescriptions   Medication Sig Dispense Refill     albuterol (PROAIR HFA/PROVENTIL HFA/VENTOLIN HFA) 108 (90 BASE) MCG/ACT Inhaler Inhale 2 puffs into the lungs every 6 hours as needed for shortness of breath / dyspnea 1 Inhaler 3     Cholecalciferol (D3 ADULT PO) Take 2,000  Units by mouth daily.       cyanocolbalamin (VITAMIN  B-12) 100 MCG tablet Take 100 mcg by mouth daily.       fluticasone (FLONASE) 50 MCG/ACT spray Spray 1-2 sprays into both nostrils daily 1 Bottle 11     ketoconazole (NIZORAL) 2 % cream Apply topically 2 times daily 15 g 1     levothyroxine (SYNTHROID/LEVOTHROID) 112 MCG tablet Take 1 tablet (112 mcg) by mouth daily 90 tablet 3     MEGARED OMEGA-3 KRILL  MG CAPS Take 1 capsule by mouth daily        methocarbamol (ROBAXIN) 750 MG tablet Take 1 tablet (750 mg) by mouth 4 times daily as needed for muscle spasms 30 tablet 0     methylPREDNISolone (MEDROL DOSEPAK) 4 MG tablet Follow package instructions 21 tablet 0     montelukast (SINGULAIR) 10 MG tablet Take 1 tablet (10 mg) by mouth At Bedtime 90 tablet 3     ondansetron (ZOFRAN ODT) 4 MG ODT tab Take 1 tablet (4 mg) by mouth every 8 hours as needed for nausea 10 tablet 0     order for DME Equipment being ordered: compression stockings- LARGE, 15mm HG, THIGH HIGH 2 Units 0     predniSONE (DELTASONE) 20 MG tablet Take 1 tablet (20 mg) by mouth daily 5 tablet 0     triamcinolone (KENALOG) 0.1 % cream Apply sparingly to affected area three times daily as needed 80 g 0     HYDROcodone-acetaminophen (NORCO) 5-325 MG per tablet Take 1 tablet by mouth every 6 hours as needed for severe pain (Patient not taking: Reported on 10/25/2018) 10 tablet 0       Allergies   Allergen Reactions     Sulfa Drugs Hives and Swelling     Noted in 10/18/09 ER       Family History   Problem Relation Age of Onset     Glaucoma Mother      HEART DISEASE Father      Cancer Father 67     kidney cancer      HEART DISEASE Sister      Cancer Sister 59     colon cancer      Diabetes Sister      Colon Cancer Maternal Aunt      Colon Cancer Paternal Aunt      Glaucoma Maternal Grandmother        Additional medical/Social/Surgical histories reviewed in [facility] and updated as appropriate.     REVIEW OF SYSTEMS (10/25/2018)  10 point ROS of systems  "including Constitutional, Eyes, Respiratory, Cardiovascular, Gastroenterology, Genitourinary, Integumentary, Musculoskeletal, Psychiatric were all negative except for pertinent positives noted in my HPI.     PHYSICAL EXAM  Vitals:    10/25/18 1037   BP: 147/82   Pulse: 71   SpO2: 98%   Weight: 110.7 kg (244 lb)   Height: 1.702 m (5' 7\")     Vital Signs: /82  Pulse 71  Ht 1.702 m (5' 7\")  Wt 110.7 kg (244 lb)  SpO2 98%  BMI 38.22 kg/m2 Patient declined being weighed. Body mass index is 38.22 kg/(m^2).    General  - normal appearance, in no obvious distress, obese  CV  - normal peripheral perfusion  Pulm  - normal respiratory pattern, non-labored  Musculoskeletal - lumbar spine  - stance: normal gait without limp, no obvious leg length discrepancy, normal heel and toe walk  - inspection: normal bone and joint alignment, no obvious scoliosis  - palpation: no paravertebral or bony tenderness  - ROM: flexion exacerbates pain, normal extension, sidebending, rotation  - strength: lower extremities 5/5 in all planes  - special tests:  (+) straight leg raise- left  (+) slump test- low back  Neuro  - patellar and Achilles DTRs 2+ bilaterally, left lower extremity sensory deficit throughout L4/5 distribution, grossly normal coordination, normal muscle tone  Skin  - no ecchymosis, erythema, warmth, or induration, no obvious rash  Psych  - interactive, appropriate, normal mood and affect  Left knee: has pain with palpation over medial joint and MCL, and with flexion of knee, no signficant effusion  Right foot: has pain to palpation over proximal phalanx 5th, bruising over 5th digit foot right, and swelling, painful to weight bear and walk without limp    ASSESSMENT & PLAN  62 yo female with lumbar ddd, herniated disc, left knee MCL sprain, and right 5th toe phalanx fracture  Walking boot for right foot  Hinge brace for left knee, with stretches  Cont. Medications she is currently taking for pain  Reviewed her lumbar " MRI: shows L4/5 herniated disc  Ordered ANTON    Frankie Santos MD, CAQSM    Again, thank you for allowing me to participate in the care of your patient.        Sincerely,        Frankie Santos MD

## 2018-10-25 NOTE — PATIENT INSTRUCTIONS
Thanks for coming today.  Ortho[facility] Medicine Clinic  [address] [place] [address]    To schedule future appointments in Ortho Clinic, you may call [phone].    To schedule ordered imaging by your provider:   Call Central Imaging [phone]    To schedule an injection ordered by your provider:  Call Central Imaging Injection scheduling line: [phone]  [email or web address] available online at:  [email or web address]    Please call if any further questions or concerns ([phone]).  Clinic hours 8 am to 5 pm.    Return to clinic (call) if symptoms worsen or fail to improve.

## 2018-10-25 NOTE — NURSING NOTE
"Denita Flores's chief complaint for this visit includes:  Chief Complaint   Patient presents with     Consult     pt states she has swelling on left knee. pt fell 6 times on 10/21/18. pt states her left  knee kept giving out. pt went to ER for herniated lumbar inervertebral disc.      PCP: Gee Hitchcock    Referring Provider:  Referred Self, MD  No address on file    /82  Pulse 71  Ht 1.702 m (5' 7\")  Wt 110.7 kg (244 lb)  SpO2 98%  BMI 38.22 kg/m2  Mild Pain (3)     Do you need any medication refills at today's visit? No        "

## 2018-10-25 NOTE — MR AVS SNAPSHOT
After Visit Summary   10/25/2018    Denita Flores    MRN: [id number]           Patient Information     Date Of Birth          1955        Visit Information        Provider Department      10/25/2018 10:40 AM Frankie Santos MD [facility]        Today's Diagnoses     Right foot pain    -  1    Lumbar herniated disc          Care Instructions    Thanks for coming today.  Ortho[facility] Medicine Clinic  [address] [place] [address]    To schedule future appointments in Ortho Clinic, you may call [phone].    To schedule ordered imaging by your provider:   Call Central Imaging [phone]    To schedule an injection ordered by your provider:  Call Central Imaging Injection scheduling line: [phone]  [email or web address] available online at:  [email or web address]    Please call if any further questions or concerns ([phone]).  Clinic hours 8 am to 5 pm.    Return to clinic (call) if symptoms worsen or fail to improve.            Follow-ups after your visit        Your next 10 appointments already scheduled     Oct 25, 2018  1:05 PM CDT   XR FOOT RIGHT G/E 3 VIEWS with MGXR2   [facility]    [address] [place] [id number] [phone]           How do I prepare for my exam? (Food and drink instructions) No Food and Drink Restrictions.  How do I prepare for my exam? (Other instructions) You do not need to do anything special for this exam.  What should I wear: Wear comfortable clothes.  How long does the exam take: Most scans take less than 5 minutes.  What should I bring: Bring a list of your medicines, including vitamins, minerals and over-the-counter drugs. It is safest to leave personal items at home.  Do I need a :  No  is needed.  What do I need to tell my doctor: Tell your doctor if there s any chance you are pregnant.  What should I do after the exam: No  restrictions, You may resume normal activities.  What is this test: An image of a specific body part shown in shades of black and white.  Who should I call with questions: If you have any questions, please call the Imaging Department where you will have your exam. Directions, parking instructions, and other information is available on our website, [email or web address].            Oct 29, 2018 11:15 AM CDT   New Visit with Josué Parekh MD   [facility] ([facility])    [address] [id number] [phone]            Oct 30, 2018 10:30 AM CDT   Return Visit with Gail Booth MD   [facility] ([facility])    [address] [place] [id number] [phone]              Future tests that were ordered for you today     Open Future Orders        Priority Expected Expires Ordered    X-ray Lumbar epidural injection Routine 10/25/2018 10/25/2019 10/25/2018            Who to contact     If you have questions or need follow up information about today's clinic visit or your schedule please contact [facility] directly at [phone].  Normal or non-critical lab and imaging results will be communicated to you by MyChart, letter or phone within 4 business days after the clinic has received the results. If you do not hear from us within 7 days, please contact the clinic through MyChart or phone. If you have a critical or abnormal lab result, we will notify you by phone as soon as possible.  Submit refill requests through [employer] or call your pharmacy and they will forward the refill request to us. Please allow 3 business days for your refill to be completed.          Additional Information About Your Visit        Care EveryWhere ID     This is your Care EveryWhere ID. This could be used by other organizations to access your [facility] medical records  [id number]        Your Vitals  "Were     Pulse Height Pulse Oximetry BMI (Body Mass Index)          71 1.702 m (5' 7\") 98% 38.22 kg/m2         Blood Pressure from Last 3 Encounters:   10/25/18 147/82   10/22/18 (!) 149/99   10/20/18 130/67    Weight from Last 3 Encounters:   10/25/18 110.7 kg (244 lb)   09/23/18 110.7 kg (244 lb)   09/07/18 109.5 kg (241 lb 4.8 oz)               Primary Care Provider Office Phone # Fax #    Gee Hitchcock [phone] [address] [facility] [address]        Equal Access to Services     JONATHAN CHILEL : Hadrichard Bui, wadewayne garcia, qaybta kaalmada jade, fabian golden . So [facility] [phone].    ATENCIÓN: Si habla español, tiene a moore disposición servicios gratuitos de asistencia lingüística. Ll[facility] [phone].    We comply with applicable federal civil rights laws and [place] laws. We do not discriminate on the basis of race, color, national origin, age, disability, sex, sexual orientation, or gender identity.            Thank you!     Thank you for choosing [facility]  for your care. Our goal is always to provide you with excellent care. Hearing back from our patients is one way we can continue to improve our services. Please take a few minutes to complete the written survey that you may receive in the mail after your visit with us. Thank you!             Your Updated Medication List - Protect others around you: Learn how to safely use, store and throw away your medicines at www.disposemymeds.org.          This list is accurate as of 10/25/18 12:01 PM.  Always use your most recent med list.                   Brand Name Dispense Instructions for use Diagnosis    albuterol 108 (90 Base) MCG/ACT inhaler    PROAIR HFA/PROVENTIL HFA/VENTOLIN HFA    1 Inhaler    Inhale 2 puffs into the lungs every 6 hours as needed for shortness of breath / dyspnea    Mild persistent asthma without complication       cyanocolbalamin " 100 MCG tablet    vitamin  B-12     Take 100 mcg by mouth daily.        D3 ADULT PO      Take 2,000 Units by mouth daily.        fluticasone 50 MCG/ACT spray    FLONASE    1 Bottle    Spray 1-2 sprays into both nostrils daily    Acute seasonal allergic rhinitis, unspecified trigger       HYDROcodone-acetaminophen 5-325 MG per tablet    NORCO    10 tablet    Take 1 tablet by mouth every 6 hours as needed for severe pain        ketoconazole 2 % cream    NIZORAL    15 g    Apply topically 2 times daily    Intertrigo       levothyroxine 112 MCG tablet    SYNTHROID/LEVOTHROID    90 tablet    Take 1 tablet (112 mcg) by mouth daily    Postablative hypothyroidism       MEGARED OMEGA-3 KRILL  MG Caps      Take 1 capsule by mouth daily        methocarbamol 750 MG tablet    ROBAXIN    30 tablet    Take 1 tablet (750 mg) by mouth 4 times daily as needed for muscle spasms    Acute left-sided low back pain with left-sided sciatica       methylPREDNISolone 4 MG tablet    MEDROL DOSEPAK    21 tablet    Follow package instructions        montelukast 10 MG tablet    SINGULAIR    90 tablet    Take 1 tablet (10 mg) by mouth At Bedtime    Mild persistent asthma without complication       ondansetron 4 MG ODT tab    ZOFRAN ODT    10 tablet    Take 1 tablet (4 mg) by mouth every 8 hours as needed for nausea        order for DME     2 Units    Equipment being ordered: compression stockings- LARGE, 15mm HG, THIGH HIGH    Spider veins of both lower extremities       predniSONE 20 MG tablet    DELTASONE    5 tablet    Take 1 tablet (20 mg) by mouth daily    Acute left-sided low back pain with left-sided sciatica       triamcinolone 0.1 % cream    KENALOG    80 g    Apply sparingly to affected area three times daily as needed    Allergic contact dermatitis, unspecified trigger

## 2018-10-25 NOTE — PROGRESS NOTES
HISTORY OF PRESENT ILLNESS  Ms. Flores is a pleasant 63 year old year old female who presents to clinic today with recent fall that injured her left knee, right foot, and aggravated her back pain  She has some numbness in her left leg and knee area, and bruising and pain over lateral right foot  She can bend her left knee and walk and bend her knee  She has a limp due to her right foot    MEDICAL HISTORY  Patient Active Problem List   Diagnosis     Graves disease     Obesity     Diverticulosis of large intestine without hemorrhage     Colon polyps     History of cervical cancer     YELENA (obstructive sleep apnea)     Warts     CARDIOVASCULAR SCREENING; LDL GOAL LESS THAN 130     Hx of herpes zoster keratoconjunctivitis, os     Plantar warts     Stasis dermatitis of both legs     Obesity (BMI 30-39.9)     Postablative hypothyroidism     Other specified hypothyroidism,post ablative     Venous stasis dermatitis of left lower extremity     Vitamin B12 deficiency (non anemic)     Mild persistent asthma without complication     Spider veins of both lower extremities     Seasonal allergic rhinitis     Family history of colon cancer     Epistaxis     Tubular adenoma of colon     Family history of renal cancer     Nephrolithiasis     Gross hematuria     Morbid obesity (H)     Elevated blood pressure reading without diagnosis of hypertension     Polyp of gallbladder, 4mm       Current Outpatient Prescriptions   Medication Sig Dispense Refill     albuterol (PROAIR HFA/PROVENTIL HFA/VENTOLIN HFA) 108 (90 BASE) MCG/ACT Inhaler Inhale 2 puffs into the lungs every 6 hours as needed for shortness of breath / dyspnea 1 Inhaler 3     Cholecalciferol (D3 ADULT PO) Take 2,000 Units by mouth daily.       cyanocolbalamin (VITAMIN  B-12) 100 MCG tablet Take 100 mcg by mouth daily.       fluticasone (FLONASE) 50 MCG/ACT spray Spray 1-2 sprays into both nostrils daily 1 Bottle 11     ketoconazole (NIZORAL) 2 % cream Apply topically 2 times  daily 15 g 1     levothyroxine (SYNTHROID/LEVOTHROID) 112 MCG tablet Take 1 tablet (112 mcg) by mouth daily 90 tablet 3     MEGARED OMEGA-3 KRILL  MG CAPS Take 1 capsule by mouth daily        methocarbamol (ROBAXIN) 750 MG tablet Take 1 tablet (750 mg) by mouth 4 times daily as needed for muscle spasms 30 tablet 0     methylPREDNISolone (MEDROL DOSEPAK) 4 MG tablet Follow package instructions 21 tablet 0     montelukast (SINGULAIR) 10 MG tablet Take 1 tablet (10 mg) by mouth At Bedtime 90 tablet 3     ondansetron (ZOFRAN ODT) 4 MG ODT tab Take 1 tablet (4 mg) by mouth every 8 hours as needed for nausea 10 tablet 0     order for DME Equipment being ordered: compression stockings- LARGE, 15mm HG, THIGH HIGH 2 Units 0     predniSONE (DELTASONE) 20 MG tablet Take 1 tablet (20 mg) by mouth daily 5 tablet 0     triamcinolone (KENALOG) 0.1 % cream Apply sparingly to affected area three times daily as needed 80 g 0     HYDROcodone-acetaminophen (NORCO) 5-325 MG per tablet Take 1 tablet by mouth every 6 hours as needed for severe pain (Patient not taking: Reported on 10/25/2018) 10 tablet 0       Allergies   Allergen Reactions     Sulfa Drugs Hives and Swelling     Noted in 10/18/09 ER       Family History   Problem Relation Age of Onset     Glaucoma Mother      HEART DISEASE Father      Cancer Father 67     kidney cancer      HEART DISEASE Sister      Cancer Sister 59     colon cancer      Diabetes Sister      Colon Cancer Maternal Aunt      Colon Cancer Paternal Aunt      Glaucoma Maternal Grandmother        Additional medical/Social/Surgical histories reviewed in [facility] and updated as appropriate.     REVIEW OF SYSTEMS (10/25/2018)  10 point ROS of systems including Constitutional, Eyes, Respiratory, Cardiovascular, Gastroenterology, Genitourinary, Integumentary, Musculoskeletal, Psychiatric were all negative except for pertinent positives noted in my HPI.     PHYSICAL EXAM  Vitals:    10/25/18 1037   BP: 147/82  "  Pulse: 71   SpO2: 98%   Weight: 110.7 kg (244 lb)   Height: 1.702 m (5' 7\")     Vital Signs: /82  Pulse 71  Ht 1.702 m (5' 7\")  Wt 110.7 kg (244 lb)  SpO2 98%  BMI 38.22 kg/m2 Patient declined being weighed. Body mass index is 38.22 kg/(m^2).    General  - normal appearance, in no obvious distress, obese  CV  - normal peripheral perfusion  Pulm  - normal respiratory pattern, non-labored  Musculoskeletal - lumbar spine  - stance: normal gait without limp, no obvious leg length discrepancy, normal heel and toe walk  - inspection: normal bone and joint alignment, no obvious scoliosis  - palpation: no paravertebral or bony tenderness  - ROM: flexion exacerbates pain, normal extension, sidebending, rotation  - strength: lower extremities 5/5 in all planes  - special tests:  (+) straight leg raise- left  (+) slump test- low back  Neuro  - patellar and Achilles DTRs 2+ bilaterally, left lower extremity sensory deficit throughout L4/5 distribution, grossly normal coordination, normal muscle tone  Skin  - no ecchymosis, erythema, warmth, or induration, no obvious rash  Psych  - interactive, appropriate, normal mood and affect  Left knee: has pain with palpation over medial joint and MCL, and with flexion of knee, no signficant effusion  Right foot: has pain to palpation over proximal phalanx 5th, bruising over 5th digit foot right, and swelling, painful to weight bear and walk without limp    ASSESSMENT & PLAN  64 yo female with lumbar ddd, herniated disc, left knee MCL sprain, and right 5th toe phalanx fracture  Walking boot for right foot  Hinge brace for left knee, with stretches  Cont. Medications she is currently taking for pain  Reviewed her lumbar MRI: shows L4/5 herniated disc  Ordered ANTON    Frankie Santos MD, CAQSM  "

## 2018-10-26 ENCOUNTER — TELEPHONE (OUTPATIENT)
Dept: ORTHOPEDICS | Facility: CLINIC | Age: 63
End: 2018-10-26

## 2018-10-26 NOTE — TELEPHONE ENCOUNTER
Called and talked with patient, relayed that it is ok to get her injection while having a bulging disk.  Discussed multiple questions.  She had no other questions.  Mariana Smart RN

## 2018-10-26 NOTE — TELEPHONE ENCOUNTER
Health Call Center    Phone Message  Patient is calling and asking about her buldging disc, is there a wait time before she should get the steroid injection?   May a detailed message be left on voicemail: yes    Reason for Call: Other: Patient is calling and asking about her buldging disc, is there a wait time before she should get the steroid injection?      Action Taken: Message routed to:  Adult Clinics: Sports Medicine p [phone]

## 2018-10-30 ENCOUNTER — OFFICE VISIT (OUTPATIENT)
Dept: DERMATOLOGY | Facility: CLINIC | Age: 63
End: 2018-10-30
Payer: COMMERCIAL

## 2018-10-30 DIAGNOSIS — L82.1 SEBORRHEIC KERATOSIS: Primary | ICD-10-CM

## 2018-10-30 DIAGNOSIS — D48.5 NEOPLASM OF UNCERTAIN BEHAVIOR OF SKIN: ICD-10-CM

## 2018-10-30 PROCEDURE — 11100 HC BIOPSY SKIN/SUBQ/MUC MEM, SINGLE LESION: CPT | Mod: 59 | Performed by: DERMATOLOGY

## 2018-10-30 PROCEDURE — 99213 OFFICE O/P EST LOW 20 MIN: CPT | Mod: 25 | Performed by: DERMATOLOGY

## 2018-10-30 PROCEDURE — 17110 DESTRUCTION B9 LES UP TO 14: CPT | Performed by: DERMATOLOGY

## 2018-10-30 PROCEDURE — 88305 TISSUE EXAM BY PATHOLOGIST: CPT | Mod: TC | Performed by: DERMATOLOGY

## 2018-10-30 ASSESSMENT — PAIN SCALES - GENERAL: PAINLEVEL: SEVERE PAIN (6)

## 2018-10-30 NOTE — PROGRESS NOTES
[facility] Dermatology Note      Dermatology Problem List:  1. Condyloma acuminata s/p cryo   -Left groin biopsy 9/2014: Papillomatous epidermal hyperplasia most consistent with a condyloma acuminatum  2. Eczema Nivia  -Current Tx: Triamcinolone cream and emollient   3. Seborrheic keratosis s/p cryo   4. Skin tags s/p cryo     Encounter Date: Oct 30, 2018    CC:  Chief Complaint   Patient presents with     Skin Check     areas of concern breast area, right hip and back no hx of skin cancer         History of Present Illness:  Ms. Denita Flores is a 63 year old female who presents for areas of concern under breast. The patient was last seen 10/31/17 when she had a number of symptomatic SKs treated with cryo. Today, the patient reports that she is in a considerable amount of pain. She threw out her back while painting and broke her right toe. In terms of skin concerns, she has a lesion to examine on the right hip which is pruritic and bothersome. There might be two of them really close together; she cannot tell for sure. Additionally, there are a number of lesions underneath her breasts which itch and bother her. Finally, she wants to make sure we take a close look at all of the spots on her back, since she cannot easily monitor these lesions on her own. No other concerns today.      Past Medical History:   Patient Active Problem List   Diagnosis     Graves disease     Obesity     Diverticulosis of large intestine without hemorrhage     Colon polyps     History of cervical cancer     YELENA (obstructive sleep apnea)     Warts     CARDIOVASCULAR SCREENING; LDL GOAL LESS THAN 130     Hx of herpes zoster keratoconjunctivitis, os     Plantar warts     Stasis dermatitis of both legs     Obesity (BMI 30-39.9)     Postablative hypothyroidism     Other specified hypothyroidism,post ablative     Venous stasis dermatitis of left lower extremity     Vitamin B12 deficiency (non anemic)     Mild persistent  asthma without complication     Spider veins of both lower extremities     Seasonal allergic rhinitis     Family history of colon cancer     Epistaxis     Tubular adenoma of colon     Family history of renal cancer     Nephrolithiasis     Gross hematuria     Morbid obesity (H)     Elevated blood pressure reading without diagnosis of hypertension     Polyp of gallbladder, 4mm     Past Medical History:   Diagnosis Date     Kidney stones 2000    Passed a 5-10mm stone     Malignant neoplasm (H)      Mild persistent asthma 5/8/2013     Thyroid disease      Past Surgical History:   Procedure Laterality Date     BACK SURGERY       COLONOSCOPY WITH CO2 INSUFFLATION N/A 2/21/2017    Procedure: COLONOSCOPY WITH CO2 INSUFFLATION;  Surgeon: Duane, William Charles, MD;  Location: [facility] OR     CYSTOSCOPY FLEXIBLE  8/10/2018     LEEP TX, CERVICAL      in her 20's       Social History:  The patient works at [employer], and [employer].   Kept in chart for convenience.        Family History:  There is no family history of skin cancer.  Kept in chart for convenience.       Medications:  Current Outpatient Prescriptions   Medication Sig Dispense Refill     albuterol (PROAIR HFA/PROVENTIL HFA/VENTOLIN HFA) 108 (90 BASE) MCG/ACT Inhaler Inhale 2 puffs into the lungs every 6 hours as needed for shortness of breath / dyspnea 1 Inhaler 3     Cholecalciferol (D3 ADULT PO) Take 2,000 Units by mouth daily.       cyanocolbalamin (VITAMIN  B-12) 100 MCG tablet Take 100 mcg by mouth daily.       fluticasone (FLONASE) 50 MCG/ACT spray Spray 1-2 sprays into both nostrils daily 1 Bottle 11     HYDROcodone-acetaminophen (NORCO) 5-325 MG per tablet Take 1 tablet by mouth every 6 hours as needed for severe pain 10 tablet 0     ketoconazole (NIZORAL) 2 % cream Apply topically 2 times daily 15 g 1     levothyroxine (SYNTHROID/LEVOTHROID) 112 MCG tablet Take 1 tablet (112 mcg) by mouth daily 90 tablet 3     MEGARED OMEGA-3 KRILL  MG CAPS Take 1  capsule by mouth daily        methylPREDNISolone (MEDROL DOSEPAK) 4 MG tablet Follow package instructions 21 tablet 0     montelukast (SINGULAIR) 10 MG tablet Take 1 tablet (10 mg) by mouth At Bedtime 90 tablet 3     order for DME Equipment being ordered: compression stockings- LARGE, 15mm HG, THIGH HIGH 2 Units 0     triamcinolone (KENALOG) 0.1 % cream Apply sparingly to affected area three times daily as needed 80 g 0     methocarbamol (ROBAXIN) 750 MG tablet Take 1 tablet (750 mg) by mouth 4 times daily as needed for muscle spasms (Patient not taking: Reported on 10/30/2018) 30 tablet 0     predniSONE (DELTASONE) 20 MG tablet Take 1 tablet (20 mg) by mouth daily (Patient not taking: Reported on 10/30/2018) 5 tablet 0       Allergies   Allergen Reactions     Sulfa Drugs Hives and Swelling     Noted in 10/18/09 ER       Review of Systems:  -Skin: As above in HPI. No additional skin concerns.    Physical exam:  Vitals: There were no vitals taken for this visit.  GEN: This is a well developed, well-nourished female in no acute distress, in a pleasant mood.    SKIN: Total skin excluding the undergarment areas was performed. The exam included the head/face, neck, both arms, chest, back, abdomen, both legs, digits and/or nails.   - There are waxy stuck on tan to brown papules on the right lateral thigh x2, right inframammary, central chest, left breast, left forehead  - 5 mm oily papule on the right jaw line  - There is a verrucous papule on the right ball of the foot. 3mm  -No other lesions of concern on areas examined.       Impression/Plan:  1. Seborrheic keratosis, right inframammary, central chest, left breast, right lateral thigh x2, left forehead with hx of pruritus    Cryotherapy procedure note: After verbal consent and discussion of risks and benefits including but no limited to dyspigmentation/scar, blister, and pain, 15 was(were) treated with 1-2mm freeze border for 2 cycles with liquid nitrogen. Post  cryotherapy instructions were provided.     2. 5 mm oily papule on the right jaw line. Neoplasm of uncertain behavior. Likely a sebaceous adenoma r/o other.     Shave biopsy:  After discussion of benefits and risks including but not limited to bleeding/bruising, pain/swelling, infection, scar, incomplete removal, nerve damage/numbness, recurrence, and non-diagnostic biopsy, written consent, verbal consent and photographs were obtained. Time-out was performed. The area was cleaned with isopropyl alcohol. 0.5mL of 1% lidocaine with epinephrine was injected to obtain adequate anesthesia of the lesion on the right jaw line. A shave biopsy was performed. Hemostasis was achieved with aluminium chloride. Vaseline and a sterile dressing were applied. The patient tolerated the procedure and no complications were noted. The patient was provided with verbal and written post care instructions.     3. There is a verrucous papule on the right ball of the foot. Likely wart or corn. Patient with recently problems ambulating due to back problems.     We will monitor this spot.       Follow-up in 12 months, earlier as needed      Staff Involved:  Scribe/Staff    Scribe Disclosure  I, Joaquín Miranda, am serving as a scribe to document services personally performed by Dr. Gail Booth MD, based on data collection and the provider's statements to me.     Provider Disclosure:   The documentation recorded by the scribe accurately reflects the services I personally performed and the decisions made by me.    Gail Booth MD    Department of Dermatology  [facility]: Phone: [phone], Fax:[phone]  [facility]: Phone: [phone], Fax: [phone]

## 2018-10-30 NOTE — MR AVS SNAPSHOT
After Visit Summary   10/30/2018    Denita Flores    MRN: [id number]           Patient Information     Date Of Birth          1955        Visit Information        Provider Department      10/30/2018 10:30 AM Gail Booth MD [facility]        Today's Diagnoses     Seborrheic keratosis    -  1    Neoplasm of uncertain behavior of skin          Care Instructions    Cryotherapy    What is it?    Use of a very cold liquid, such as liquid nitrogen, to freeze and destroy abnormal skin cells that need to be removed    What should I expect?    Tenderness and redness    A small blister that might grow and fill with dark purple blood. There may be crusting.    More than one treatment may be needed if the lesions do not go away.    How do I care for the treated area?    Gently wash the area with your hands when bathing.    Use a thin layer of Vaseline to help with healing. You may use a Band-Aid.     The area should heal within 7-10 days and may leave behind a pink or lighter color.     Do not use an antibiotic or Neosporin ointment.     You may take acetaminophen (Tylenol) for pain.     Call your Doctor if you have:    Severe pain    Signs of infection (warmth, redness, cloudy yellow drainage, and or a bad smell)    Questions or concerns    Who should I call with questions?       [facility]: [phone]       [facility]: [phone]       For urgent needs outside of business hours call the [facility] at [phone]        and ask for the dermatology resident on call    Wound Care After a Biopsy    What is a skin biopsy?  A skin biopsy allows the doctor to examine a very small piece of tissue under the microscope to determine the diagnosis and the best treatment for the skin condition. A local anesthetic (numbing medicine)  is injected with a very small needle into the skin area to be tested. A small piece of skin  is taken from the area. Sometimes a suture (stitch) is used.     What are the risks of a skin biopsy?  I will experience scar, bleeding, swelling, pain, crusting and redness. I may experience incomplete removal or recurrence. Risks of this procedure are excessive bleeding, bruising, infection, nerve damage, numbness, thick (hypertrophic or keloidal) scar and non-diagnostic biopsy.    How should I care for my wound for the first 24 hours?    Keep the wound dry and covered for 24 hours    If it bleeds, hold direct pressure on the area for 15 minutes. If bleeding does not stop then go to the emergency room    Avoid strenuous exercise the first 1-2 days or as your doctor instructs you    How should I care for the wound after 24 hours?    After 24 hours, remove the bandage    You may bathe or shower as normal    If you had a scalp biopsy, you can shampoo as usual and can use shower water to clean the biopsy site daily    Clean the wound twice a day with gentle soap and water    Do not scrub, be gentle    Apply white petroleum/Vaseline after cleaning the wound with a cotton swab or a clean finger, and keep the site covered with a Bandaid /bandage. Bandages are not necessary with a scalp biopsy    If you are unable to cover the site with a Bandaid /bandage, re-apply ointment 2-3 times a day to keep the site moist. Moisture will help with healing    Avoid strenuous activity for first 1-2 days    Avoid lakes, rivers, pools, and oceans until the stitches are removed or the site is healed    How do I clean my wound?    Wash hands thoroughly with soap or use hand  before all wound care    Clean the wound with gentle soap and water    Apply white petroleum/Vaseline  to wound after it is clean    Replace the Bandaid /bandage to keep the wound covered for the first few days or as instructed by your doctor    If you had a scalp biopsy, warm shower water to the area on a daily basis should suffice    What should I use to  clean my wound?     Cotton-tipped applicators (Qtips )    White petroleum jelly (Vaseline ). Use a clean new container and use Q-tips to apply.    Bandaids   as needed    Gentle soap     How should I care for my wound long term?    Do not get your wound dirty    Keep up with wound care for one week or until the area is healed.    A small scab will form and fall off by itself when the area is completely healed. The area will be red and will become pink in color as it heals. Sun protection is very important for how your scar will turn out. Sunscreen with an SPF 30 or greater is recommended once the area is healed.    You should have some soreness but it should be mild and slowly go away over several days. Talk to your doctor about using tylenol for pain,    When should I call my doctor?  If you have increased:     Pain or swelling    Pus or drainage (clear or slightly yellow drainage is ok)    Temperature over 100F    Spreading redness or warmth around wound    When will I hear about my results?  The biopsy results can take 2-3 weeks to come back. The clinic will call you with the results, send you a Yesmywine message, or have you schedule a follow-up clinic or phone time to discuss the results. Contact our clinics if you do not hear from us in 3 weeks.     Who should I call with questions?    [facility]: [phone]     [facility]: [phone]    For urgent needs outside of business hours call the [facility] at [phone] and ask for the dermatology resident on call              Follow-ups after your visit        Follow-up notes from your care team     Return in about 1 year (around 10/30/2019).      Your next 10 appointments already scheduled     Nov 01, 2018 10:00 AM CDT   [facility] with Frankie Santos MD   [facility]    [address] [address] [place] [id number] [phone]             Nov 06, 2018  1:00 PM CST   XR LUMBAR EPIDURAL INJECTION with MGXR3, MG NEURO RAD   [facility]    [address] [address] [place] [id number] [phone]           How do I prepare for my exam? (Food and drink instructions) No Food and Drink Restrictions.  How do I prepare for my exam? (Other instructions) * If you take Coumadin (or any other blood thinners) you may need to stop taking them up to 5 days prior to the exam. Talk to your doctor before stopping. * If you take Plavix, Ticlid, Pletal or Persantine, please ask your doctor if you should stop these medicines. You may need extra tests on the morning of your scan. * If you take Xarelto (Rivaroxaban), you may need to stop taking it 24 hours before treatment. Talk to your doctor before stopping this medicine.  What should I wear: Wear comfortable clothes.  How long does the exam take: Injections take about 30 to 45 minutes. Most people spend up to 2 hours in the clinic or hospital.  What should I bring: Please bring any scans or X-rays taken at other hospitals, if similar tests were done. Also bring a list of your medicines, including vitamins, minerals and over-the-counter drugs. It is safest to leave personal items at home. You will need a  : Plan to have someone drive you home afterward.  What do I need to tell my doctor: Tell your doctor in advance: * If you are allergic to X-ray dye (contrast fluid). * If you may be pregnant.  What should I do after the exam: You may have slight cramping during this exam. The cramps should go away afterward. You may have some spotting after the exam.  What is this test: A spinal shot is done in or near the spine. You may receive steroid medicine (to reduce swelling) or contrast fluid (dye that makes the area show up more clearly on X-rays). A nerve root shot is a shot into the nerve near the spine. It may reduce inflammation near the nerve root or spinal  cord and reduce pain in the arm or leg.  Who should I call with questions: If you have any questions, please call the Imaging Department where you will have your exam. Directions, parking instructions, and other information is available on our website, [email or web address].              Who to contact     If you have questions or need follow up information about today's clinic visit or your schedule please contact [facility] directly at [phone].  Normal or non-critical lab and imaging results will be communicated to you by MyChart, letter or phone within 4 business days after the clinic has received the results. If you do not hear from us within 7 days, please contact the clinic through [facility] or phone. If you have a critical or abnormal lab result, we will notify you by phone as soon as possible.  Submit refill requests through [facility] or call your pharmacy and they will forward the refill request to us. Please allow 3 business days for your refill to be completed.          Additional Information About Your Visit        Care EveryWhere ID     This is your Care EveryWhere ID. This could be used by other organizations to access your [facility] medical records  [id number]         Blood Pressure from Last 3 Encounters:   10/25/18 147/82   10/22/18 (!) 149/99   10/20/18 130/67    Weight from Last 3 Encounters:   10/25/18 244 lb (110.7 kg)   09/23/18 244 lb (110.7 kg)   09/07/18 241 lb 4.8 oz (109.5 kg)              We Performed the Following     BIOPSY SKIN/SUBQ/MUC MEM, SINGLE LESION     Dermatological path order and indications     DESTRUCT BENIGN LESION, UP TO 14        Primary Care Provider Office Phone # Fax #    Gee Hitchcock [phone] [address] [facility] [address]        Equal Access to Services     JONATHAN CHILEL : Tony Bui, pardeep garcia, da hansen, fabian mcmullen. So [facility]  [phone].    ATENCIÓN: Si kellie farr, tiene a moore disposición servicios gratuitos de asistencia lingüística. Vamshi hewitt [phone].    We comply with applicable federal civil rights laws and [place] laws. We do not discriminate on the basis of race, color, national origin, age, disability, sex, sexual orientation, or gender identity.            Thank you!     Thank you for choosing [facility]  for your care. Our goal is always to provide you with excellent care. Hearing back from our patients is one way we can continue to improve our services. Please take a few minutes to complete the written survey that you may receive in the mail after your visit with us. Thank you!             Your Updated Medication List - Protect others around you: Learn how to safely use, store and throw away your medicines at www.disposemymeds.org.          This list is accurate as of 10/30/18 11:05 AM.  Always use your most recent med list.                   Brand Name Dispense Instructions for use Diagnosis    albuterol 108 (90 Base) MCG/ACT inhaler    PROAIR HFA/PROVENTIL HFA/VENTOLIN HFA    1 Inhaler    Inhale 2 puffs into the lungs every 6 hours as needed for shortness of breath / dyspnea    Mild persistent asthma without complication       cyanocolbalamin 100 MCG tablet    vitamin  B-12     Take 100 mcg by mouth daily.        D3 ADULT PO      Take 2,000 Units by mouth daily.        fluticasone 50 MCG/ACT spray    FLONASE    1 Bottle    Spray 1-2 sprays into both nostrils daily    Acute seasonal allergic rhinitis, unspecified trigger       HYDROcodone-acetaminophen 5-325 MG per tablet    NORCO    10 tablet    Take 1 tablet by mouth every 6 hours as needed for severe pain        ketoconazole 2 % cream    NIZORAL    15 g    Apply topically 2 times daily    Intertrigo       levothyroxine 112 MCG tablet    SYNTHROID/LEVOTHROID    90 tablet    Take 1 tablet (112 mcg) by mouth daily    Postablative hypothyroidism        MEGARED OMEGA-3 KRILL  MG Caps      Take 1 capsule by mouth daily        methocarbamol 750 MG tablet    ROBAXIN    30 tablet    Take 1 tablet (750 mg) by mouth 4 times daily as needed for muscle spasms    Acute left-sided low back pain with left-sided sciatica       methylPREDNISolone 4 MG tablet    MEDROL DOSEPAK    21 tablet    Follow package instructions        montelukast 10 MG tablet    SINGULAIR    90 tablet    Take 1 tablet (10 mg) by mouth At Bedtime    Mild persistent asthma without complication       order for DME     2 Units    Equipment being ordered: compression stockings- LARGE, 15mm HG, THIGH HIGH    Spider veins of both lower extremities       predniSONE 20 MG tablet    DELTASONE    5 tablet    Take 1 tablet (20 mg) by mouth daily    Acute left-sided low back pain with left-sided sciatica       triamcinolone 0.1 % cream    KENALOG    80 g    Apply sparingly to affected area three times daily as needed    Allergic contact dermatitis, unspecified trigger

## 2018-10-30 NOTE — PATIENT INSTRUCTIONS
Cryotherapy    What is it?    Use of a very cold liquid, such as liquid nitrogen, to freeze and destroy abnormal skin cells that need to be removed    What should I expect?    Tenderness and redness    A small blister that might grow and fill with dark purple blood. There may be crusting.    More than one treatment may be needed if the lesions do not go away.    How do I care for the treated area?    Gently wash the area with your hands when bathing.    Use a thin layer of Vaseline to help with healing. You may use a Band-Aid.     The area should heal within 7-10 days and may leave behind a pink or lighter color.     Do not use an antibiotic or Neosporin ointment.     You may take acetaminophen (Tylenol) for pain.     Call your Doctor if you have:    Severe pain    Signs of infection (warmth, redness, cloudy yellow drainage, and or a bad smell)    Questions or concerns    Who should I call with questions?       [facility]: [phone]       [facility]: [phone]       For urgent needs outside of business hours call the [facility] at [phone]        and ask for the dermatology resident on call    Wound Care After a Biopsy    What is a skin biopsy?  A skin biopsy allows the doctor to examine a very small piece of tissue under the microscope to determine the diagnosis and the best treatment for the skin condition. A local anesthetic (numbing medicine)  is injected with a very small needle into the skin area to be tested. A small piece of skin is taken from the area. Sometimes a suture (stitch) is used.     What are the risks of a skin biopsy?  I will experience scar, bleeding, swelling, pain, crusting and redness. I may experience incomplete removal or recurrence. Risks of this procedure are excessive bleeding, bruising, infection, nerve damage, numbness, thick (hypertrophic or keloidal) scar and non-diagnostic biopsy.    How should I care  for my wound for the first 24 hours?    Keep the wound dry and covered for 24 hours    If it bleeds, hold direct pressure on the area for 15 minutes. If bleeding does not stop then go to the emergency room    Avoid strenuous exercise the first 1-2 days or as your doctor instructs you    How should I care for the wound after 24 hours?    After 24 hours, remove the bandage    You may bathe or shower as normal    If you had a scalp biopsy, you can shampoo as usual and can use shower water to clean the biopsy site daily    Clean the wound twice a day with gentle soap and water    Do not scrub, be gentle    Apply white petroleum/Vaseline after cleaning the wound with a cotton swab or a clean finger, and keep the site covered with a Bandaid /bandage. Bandages are not necessary with a scalp biopsy    If you are unable to cover the site with a Bandaid /bandage, re-apply ointment 2-3 times a day to keep the site moist. Moisture will help with healing    Avoid strenuous activity for first 1-2 days    Avoid lakes, rivers, pools, and oceans until the stitches are removed or the site is healed    How do I clean my wound?    Wash hands thoroughly with soap or use hand  before all wound care    Clean the wound with gentle soap and water    Apply white petroleum/Vaseline  to wound after it is clean    Replace the Bandaid /bandage to keep the wound covered for the first few days or as instructed by your doctor    If you had a scalp biopsy, warm shower water to the area on a daily basis should suffice    What should I use to clean my wound?     Cotton-tipped applicators (Qtips )    White petroleum jelly (Vaseline ). Use a clean new container and use Q-tips to apply.    Bandaids   as needed    Gentle soap     How should I care for my wound long term?    Do not get your wound dirty    Keep up with wound care for one week or until the area is healed.    A small scab will form and fall off by itself when the area is completely  healed. The area will be red and will become pink in color as it heals. Sun protection is very important for how your scar will turn out. Sunscreen with an SPF 30 or greater is recommended once the area is healed.    You should have some soreness but it should be mild and slowly go away over several days. Talk to your doctor about using tylenol for pain,    When should I call my doctor?  If you have increased:     Pain or swelling    Pus or drainage (clear or slightly yellow drainage is ok)    Temperature over 100F    Spreading redness or warmth around wound    When will I hear about my results?  The biopsy results can take 2-3 weeks to come back. The clinic will call you with the results, send you a Clifford Thames message, or have you schedule a follow-up clinic or phone time to discuss the results. Contact our clinics if you do not hear from us in 3 weeks.     Who should I call with questions?    [facility]: [phone]     [facility]: [phone]    For urgent needs outside of business hours call the [facility] at [phone] and ask for the dermatology resident on call

## 2018-10-30 NOTE — LETTER
"    Patient:  Edward Trent  :   1955  MRN:     [id number]        Ms.Debra BLANCA Trent  [address] [address] [place] [id number]        2018    Dear Edward Trent,     We are writing to inform you of your test results that show an oil gland. No skin cancer was seen.     Thank you for taking the time to be seen in our dermatology clinic. If you have further questions or concerns, please contact the clinic(see phone number listed below).       Sincerely,     Gail Booth MD      Department of Dermatology   [facility]: Phone: [phone], Fax:[phone]   [facility]: Phone: [phone], Fax: [phone]     Resulted Orders   Dermatological path order and indications   Result Value Ref Range    Copath Report       Patient Name: EDWARD TRENT  MR#: [id number]  Specimen #: [id number]  Collected: 10/30/2018  Received: 10/31/2018  Reported: 2018 11:29  Ordering Phy(s): GAIL BOOTH    For improved result formatting, select 'View Enhanced Report Format' under   Linked Documents section.    SPECIMEN(S):  Skin, right jawline    FINAL DIAGNOSIS:  Skin, right jawline:  - Sebaceous hyperplasia  (see description)    I have personally reviewed all specimens  and/or slides, including the   listed special stains, and used them  with my medical judgement to determine or confirm the final diagnosis.    Electronically signed out by:  Shay Clarke M.D., PhD, [facility]    CLINICAL HISTORY:  The patient is a 63-year-old female    GROSS:  The specimen is received in formalin with proper patient identification,   labeled \"R. jawline\" and the specimen  consists of a single, irregular skin shave measuring 0.7 x 0.6 cm.  The   skin surface displays a 0.6 x 0.5 cm  tan nodular lesion.  The resection isaac n is inked black.  It is trisected   and entirely submitted in cassette  A1. (Dictated " by: Marika WRIGHT [facility] 10/31/2018 11:39 AM)    MICROSCOPIC:  The specimen exhibits abundant mature sebaceous glands radially arrayed   around a terminal hair follicle.    CPT Codes:  A: 72755-IF7.P, 46356-VE5.T    TESTING LAB LOCATION:  [facility], [address]   [address] [phone]    COLLECTION SITE:  Client: [facility]  Location: ALEXANDRA ALEXANDER)

## 2018-10-30 NOTE — NURSING NOTE
Denita Flores's goals for this visit include:  Chief Complaint   Patient presents with     Skin Check     areas of concern breast area, right hip and back no hx of skin cancer         She requests these members of her care team be copied on today's visit information:     PCP: Gee Hitchcock    Referring Provider:  No referring provider defined for this encounter.    There were no vitals taken for this visit.    Do you need any medication refills at today's visit? Rae Eaton LPN

## 2018-10-30 NOTE — NURSING NOTE
The following medication was given:     MEDICATION:  Lidocaine with epinephrine 1% 1:163889  ROUTE: SQ  SITE: right jawline  DOSE: 0.5cc  LOT #: -[id number]  : Armando  EXPIRATION DATE: 9-1-19  NDC#: 0585-8956-67   Was there drug waste? No  Multi-dose vial: Yes    Evangelina Eaton LPN  October 30, 2018

## 2018-11-01 ENCOUNTER — OFFICE VISIT (OUTPATIENT)
Dept: ORTHOPEDICS | Facility: CLINIC | Age: 63
End: 2018-11-01
Payer: COMMERCIAL

## 2018-11-01 VITALS
BODY MASS INDEX: 38.3 KG/M2 | SYSTOLIC BLOOD PRESSURE: 119 MMHG | HEART RATE: 72 BPM | WEIGHT: 244 LBS | DIASTOLIC BLOOD PRESSURE: 69 MMHG | HEIGHT: 67 IN

## 2018-11-01 DIAGNOSIS — M51.26 LUMBAR HERNIATED DISC: Primary | ICD-10-CM

## 2018-11-01 DIAGNOSIS — S92.911A CLOSED NONDISPLACED FRACTURE OF PHALANX OF TOE OF RIGHT FOOT, UNSPECIFIED TOE, INITIAL ENCOUNTER: ICD-10-CM

## 2018-11-01 DIAGNOSIS — S83.412A SPRAIN OF MEDIAL COLLATERAL LIGAMENT OF LEFT KNEE, INITIAL ENCOUNTER: ICD-10-CM

## 2018-11-01 LAB — COPATH REPORT: NORMAL

## 2018-11-01 PROCEDURE — 99213 OFFICE O/P EST LOW 20 MIN: CPT | Performed by: PREVENTIVE MEDICINE

## 2018-11-01 ASSESSMENT — PAIN SCALES - GENERAL: PAINLEVEL: MILD PAIN (3)

## 2018-11-01 NOTE — MR AVS SNAPSHOT
After Visit Summary   2018    Denita Flores    MRN: [id number]           Patient Information     Date Of Birth          1955        Visit Information        Provider Department      2018 10:00 AM Frankie Santos MD [facility]        Today's Diagnoses     Lumbar herniated disc    -  1    Closed nondisplaced fracture of phalanx of toe of right foot, unspecified toe, subsequent encounter        Sprain of medial collateral ligament of left knee, subsequent encounter          Care Instructions    Thanks for coming today.  Ortho[facility] Medicine Clinic  [address] [place] [address]    To schedule future appointments in Ortho Clinic, you may call [phone].    To schedule ordered imaging by your provider:   Call Central Imaging [phone]    To schedule an injection ordered by your provider:  Call Central Imaging Injection scheduling line: [phone]  [email or web address] available online at:  [email or web address]    Please call if any further questions or concerns ([phone]).  Clinic hours 8 am to 5 pm.    Return to clinic (call) if symptoms worsen or fail to improve.          Follow-ups after your visit        Your next 10 appointments already scheduled     2018  9:40 AM CST   Return Visit with Frankie Santos MD   [facility]    [address] [place] [address] [phone]            2018  1:00 PM CST   XR LUMBAR EPIDURAL INJECTION with MGXR3, MG NEURO RAD   [facility] ([facility])    [address] [place] [address] [phone]           How do I prepare for my exam? (Food and drink instructions) No Food and Drink Restrictions.  How do I prepare for my exam? (Other instructions) * If you take Coumadin (or any other blood thinners) you may need to stop taking them up to 5 days prior to the exam. Talk  to your doctor before stopping. * If you take Plavix, Ticlid, Pletal or Persantine, please ask your doctor if you should stop these medicines. You may need extra tests on the morning of your scan. * If you take Xarelto (Rivaroxaban), you may need to stop taking it 24 hours before treatment. Talk to your doctor before stopping this medicine.  What should I wear: Wear comfortable clothes.  How long does the exam take: Injections take about 30 to 45 minutes. Most people spend up to 2 hours in the clinic or hospital.  What should I bring: Please bring any scans or X-rays taken at other hospitals, if similar tests were done. Also bring a list of your medicines, including vitamins, minerals and over-the-counter drugs. It is safest to leave personal items at home. You will need a  : Plan to have someone drive you home afterward.  What do I need to tell my doctor: Tell your doctor in advance: * If you are allergic to X-ray dye (contrast fluid). * If you may be pregnant.  What should I do after the exam: You may have slight cramping during this exam. The cramps should go away afterward. You may have some spotting after the exam.  What is this test: A spinal shot is done in or near the spine. You may receive steroid medicine (to reduce swelling) or contrast fluid (dye that makes the area show up more clearly on X-rays). A nerve root shot is a shot into the nerve near the spine. It may reduce inflammation near the nerve root or spinal cord and reduce pain in the arm or leg.  Who should I call with questions: If you have any questions, please call the Imaging Department where you will have your exam. Directions, parking instructions, and other information is available on our website, [email or web address].            Oct 31, 2019 10:45 AM CDT   Return Visit with Gail Booth MD   [facility]    [address] [address] [place] [address] [phone]              Who to  "contact     If you have questions or need follow up information about today's clinic visit or your schedule please contact [facility] directly at [phone].  Normal or non-critical lab and imaging results will be communicated to you by MyChart, letter or phone within 4 business days after the clinic has received the results. If you do not hear from us within 7 days, please contact the clinic through MyChart or phone. If you have a critical or abnormal lab result, we will notify you by phone as soon as possible.  Submit refill requests through [employer] or call your pharmacy and they will forward the refill request to us. Please allow 3 business days for your refill to be completed.          Additional Information About Your Visit        Care EveryWhere ID     This is your Care EveryWhere ID. This could be used by other organizations to access your [facility] medical records  [id number]        Your Vitals Were     Pulse Height BMI (Body Mass Index)             72 1.702 m (5' 7\") 38.22 kg/m2          Blood Pressure from Last 3 Encounters:   11/01/18 119/69   10/25/18 147/82   10/22/18 (!) 149/99    Weight from Last 3 Encounters:   11/01/18 110.7 kg (244 lb)   10/25/18 110.7 kg (244 lb)   09/23/18 110.7 kg (244 lb)              Today, you had the following     No orders found for display       Primary Care Provider Office Phone # Fax #    Gee Hitchcock [phone]       [address]  [facility] [address]        Equal Access to Services     [facility]: Hadii aad ku hadasho Soomaali, waaxda luqadaha, qaybta kaalmada adeegyada, wax jessica mcmullen. So [facility] [phone].    ATENCIÓN: Si habla español, tiene a moore disposición servicios gratuitos de asistencia lingüística. Llame al [phone].    We comply with applicable federal civil rights laws and [place] laws. We do not discriminate on the basis of race, color, national origin, age, disability, " sex, sexual orientation, or gender identity.            Thank you!     Thank you for choosing [facility]  for your care. Our goal is always to provide you with excellent care. Hearing back from our patients is one way we can continue to improve our services. Please take a few minutes to complete the written survey that you may receive in the mail after your visit with us. Thank you!             Your Updated Medication List - Protect others around you: Learn how to safely use, store and throw away your medicines at www.disposemymeds.org.          This list is accurate as of 11/1/18 10:58 AM.  Always use your most recent med list.                   Brand Name Dispense Instructions for use Diagnosis    albuterol 108 (90 Base) MCG/ACT inhaler    PROAIR HFA/PROVENTIL HFA/VENTOLIN HFA    1 Inhaler    Inhale 2 puffs into the lungs every 6 hours as needed for shortness of breath / dyspnea    Mild persistent asthma without complication       cyanocolbalamin 100 MCG tablet    vitamin  B-12     Take 100 mcg by mouth daily.        D3 ADULT PO      Take 2,000 Units by mouth daily.        fluticasone 50 MCG/ACT spray    FLONASE    1 Bottle    Spray 1-2 sprays into both nostrils daily    Acute seasonal allergic rhinitis, unspecified trigger       HYDROcodone-acetaminophen 5-325 MG per tablet    NORCO    10 tablet    Take 1 tablet by mouth every 6 hours as needed for severe pain        ketoconazole 2 % cream    NIZORAL    15 g    Apply topically 2 times daily    Intertrigo       levothyroxine 112 MCG tablet    SYNTHROID/LEVOTHROID    90 tablet    Take 1 tablet (112 mcg) by mouth daily    Postablative hypothyroidism       MEGARED OMEGA-3 KRILL  MG Caps      Take 1 capsule by mouth daily        methocarbamol 750 MG tablet    ROBAXIN    30 tablet    Take 1 tablet (750 mg) by mouth 4 times daily as needed for muscle spasms    Acute left-sided low back pain with left-sided sciatica       methylPREDNISolone 4 MG  tablet    MEDROL DOSEPAK    21 tablet    Follow package instructions        montelukast 10 MG tablet    SINGULAIR    90 tablet    Take 1 tablet (10 mg) by mouth At Bedtime    Mild persistent asthma without complication       order for DME     2 Units    Equipment being ordered: compression stockings- LARGE, 15mm HG, THIGH HIGH    Spider veins of both lower extremities       predniSONE 20 MG tablet    DELTASONE    5 tablet    Take 1 tablet (20 mg) by mouth daily    Acute left-sided low back pain with left-sided sciatica       triamcinolone 0.1 % cream    KENALOG    80 g    Apply sparingly to affected area three times daily as needed    Allergic contact dermatitis, unspecified trigger

## 2018-11-01 NOTE — LETTER
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 7th.        Sincerely,      Frankie Santos MD

## 2018-11-01 NOTE — NURSING NOTE
"Denita Flores's chief complaint for this visit includes:  Chief Complaint   Patient presents with     RECHECK     Follow up for foot pain     PCP: Gee Hitchcock    Referring Provider:  No referring provider defined for this encounter.    /69  Pulse 72  Ht 1.702 m (5' 7\")  Wt 110.7 kg (244 lb)  BMI 38.22 kg/m2  Mild Pain (3)     "

## 2018-11-01 NOTE — PATIENT INSTRUCTIONS
Thanks for coming today.  Ortho/Sports Medicine Clinic  [address] [place] [address]    To schedule future appointments in Ortho Clinic, you may call [phone].    To schedule ordered imaging by your provider:   Call Central Imaging [phone]    To schedule an injection ordered by your provider:  Call Central Imaging Injection scheduling line: [phone]  [email or web address] available online at:  [email or web address]    Please call if any further questions or concerns ([phone]).  Clinic hours 8 am to 5 pm.    Return to clinic (call) if symptoms worsen or fail to improve.

## 2018-11-01 NOTE — PROGRESS NOTES
HISTORY OF PRESENT ILLNESS  Ms. Flores is a pleasant 63 year old year old female who presents to clinic today with left knee pain improved, and right foot toe fracture improved and lumbar ddd, plan for injection next week  Overall she is feeling better and walking better with use of knee brace and right foot boot  Less pain today    MEDICAL HISTORY  Patient Active Problem List   Diagnosis     Graves disease     Obesity     Diverticulosis of large intestine without hemorrhage     Colon polyps     History of cervical cancer     YELENA (obstructive sleep apnea)     Warts     CARDIOVASCULAR SCREENING; LDL GOAL LESS THAN 130     Hx of herpes zoster keratoconjunctivitis, os     Plantar warts     Stasis dermatitis of both legs     Obesity (BMI 30-39.9)     Postablative hypothyroidism     Other specified hypothyroidism,post ablative     Venous stasis dermatitis of left lower extremity     Vitamin B12 deficiency (non anemic)     Mild persistent asthma without complication     Spider veins of both lower extremities     Seasonal allergic rhinitis     Family history of colon cancer     Epistaxis     Tubular adenoma of colon     Family history of renal cancer     Nephrolithiasis     Gross hematuria     Morbid obesity (H)     Elevated blood pressure reading without diagnosis of hypertension     Polyp of gallbladder, 4mm       Current Outpatient Prescriptions   Medication Sig Dispense Refill     albuterol (PROAIR HFA/PROVENTIL HFA/VENTOLIN HFA) 108 (90 BASE) MCG/ACT Inhaler Inhale 2 puffs into the lungs every 6 hours as needed for shortness of breath / dyspnea 1 Inhaler 3     Cholecalciferol (D3 ADULT PO) Take 2,000 Units by mouth daily.       cyanocolbalamin (VITAMIN  B-12) 100 MCG tablet Take 100 mcg by mouth daily.       fluticasone (FLONASE) 50 MCG/ACT spray Spray 1-2 sprays into both nostrils daily 1 Bottle 11     HYDROcodone-acetaminophen (NORCO) 5-325 MG per tablet Take 1 tablet by mouth every 6 hours as needed for severe  "pain 10 tablet 0     ketoconazole (NIZORAL) 2 % cream Apply topically 2 times daily 15 g 1     levothyroxine (SYNTHROID/LEVOTHROID) 112 MCG tablet Take 1 tablet (112 mcg) by mouth daily 90 tablet 3     MEGARED OMEGA-3 KRILL  MG CAPS Take 1 capsule by mouth daily        methocarbamol (ROBAXIN) 750 MG tablet Take 1 tablet (750 mg) by mouth 4 times daily as needed for muscle spasms 30 tablet 0     methylPREDNISolone (MEDROL DOSEPAK) 4 MG tablet Follow package instructions 21 tablet 0     montelukast (SINGULAIR) 10 MG tablet Take 1 tablet (10 mg) by mouth At Bedtime 90 tablet 3     order for DME Equipment being ordered: compression stockings- LARGE, 15mm HG, THIGH HIGH 2 Units 0     predniSONE (DELTASONE) 20 MG tablet Take 1 tablet (20 mg) by mouth daily 5 tablet 0     triamcinolone (KENALOG) 0.1 % cream Apply sparingly to affected area three times daily as needed 80 g 0       Allergies   Allergen Reactions     Sulfa Drugs Hives and Swelling     Noted in 10/18/09 ER       Family History   Problem Relation Age of Onset     Glaucoma Mother      HEART DISEASE Father      Cancer Father 67     kidney cancer      HEART DISEASE Sister      Cancer Sister 59     colon cancer      Diabetes Sister      Colon Cancer Maternal Aunt      Colon Cancer Paternal Aunt      Glaucoma Maternal Grandmother        Additional medical/Social/Surgical histories reviewed in [facility] and updated as appropriate.     REVIEW OF SYSTEMS (11/1/2018)  10 point ROS of systems including Constitutional, Eyes, Respiratory, Cardiovascular, Gastroenterology, Genitourinary, Integumentary, Musculoskeletal, Psychiatric were all negative except for pertinent positives noted in my HPI.     PHYSICAL EXAM  Vitals:    11/01/18 1007   BP: 119/69   Pulse: 72   Weight: 110.7 kg (244 lb)   Height: 1.702 m (5' 7\")     Vital Signs: /69  Pulse 72  Ht 1.702 m (5' 7\")  Wt 110.7 kg (244 lb)  BMI 38.22 kg/m2 Patient declined being weighed. Body mass index is 38.22 " kg/(m^2).    General  - normal appearance, in no obvious distress  CV  - normal popliteal pulse  Pulm  - normal respiratory pattern, non-labored  Musculoskeletal - right knee  - stance: mildly antalgic gait, genu varum  - inspection: generalized swelling, trace effusion  - palpation: medial joint line tenderness, patella and patellar tendon non-tender, normal popliteal pulse  - ROM: 100 degrees flexion, 0 degrees extension, slightly painful active ROM  - strength: 5/5 in flexion, 5/5 in extension  - neuro: no sensory or motor deficit  - special tests:  (-) Lachman  (-) anterior drawer  (-) posterior drawer  (-) pivot shift  (-) Adrian  (-) Thessaly  (-) varus at 0 and 30 degrees flexion  (+) valgus at 0 and 30 degrees flexion  (-) Jefferson s compression test  (-) patellar apprehension  Neuro  - no sensory or motor deficit, grossly normal coordination, normal muscle tone  Skin  - no ecchymosis, erythema, warmth, or induration, no obvious rash  Psych  - interactive, appropriate, normal mood and affect  Right foot: has ttp over 5th toe, at base, bruising is improving      ASSESSMENT & PLAN  62 yo female with lumbar ddd, right foot toe fracture, and left knee MCL sprain, overall improved   Cont. Use of brace  Cont. 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

## 2018-11-01 NOTE — LETTER
11/1/2018         RE: Denita Flores  [address] [address] [place] [phone]        Dear Colleague,    Thank you for referring your patient, Denita Flores, to the [facility]. Please see a copy of my visit note below.    HISTORY OF PRESENT ILLNESS  Ms. Flores is a pleasant 63 year old year old female who presents to clinic today with left knee pain improved, and right foot toe fracture improved and lumbar ddd, plan for injection next week  Overall she is feeling better and walking better with use of knee brace and right foot boot  Less pain today    MEDICAL HISTORY  Patient Active Problem List   Diagnosis     Graves disease     Obesity     Diverticulosis of large intestine without hemorrhage     Colon polyps     History of cervical cancer     YELENA (obstructive sleep apnea)     Warts     CARDIOVASCULAR SCREENING; LDL GOAL LESS THAN 130     Hx of herpes zoster keratoconjunctivitis, os     Plantar warts     Stasis dermatitis of both legs     Obesity (BMI 30-39.9)     Postablative hypothyroidism     Other specified hypothyroidism,post ablative     Venous stasis dermatitis of left lower extremity     Vitamin B12 deficiency (non anemic)     Mild persistent asthma without complication     Spider veins of both lower extremities     Seasonal allergic rhinitis     Family history of colon cancer     Epistaxis     Tubular adenoma of colon     Family history of renal cancer     Nephrolithiasis     Gross hematuria     Morbid obesity (H)     Elevated blood pressure reading without diagnosis of hypertension     Polyp of gallbladder, 4mm       Current Outpatient Prescriptions   Medication Sig Dispense Refill     albuterol (PROAIR HFA/PROVENTIL HFA/VENTOLIN HFA) 108 (90 BASE) MCG/ACT Inhaler Inhale 2 puffs into the lungs every 6 hours as needed for shortness of breath / dyspnea 1 Inhaler 3     Cholecalciferol (D3 ADULT PO) Take 2,000 Units by mouth daily.       cyanocolbalamin (VITAMIN  B-12)  100 MCG tablet Take 100 mcg by mouth daily.       fluticasone (FLONASE) 50 MCG/ACT spray Spray 1-2 sprays into both nostrils daily 1 Bottle 11     HYDROcodone-acetaminophen (NORCO) 5-325 MG per tablet Take 1 tablet by mouth every 6 hours as needed for severe pain 10 tablet 0     ketoconazole (NIZORAL) 2 % cream Apply topically 2 times daily 15 g 1     levothyroxine (SYNTHROID/LEVOTHROID) 112 MCG tablet Take 1 tablet (112 mcg) by mouth daily 90 tablet 3     MEGARED OMEGA-3 KRILL  MG CAPS Take 1 capsule by mouth daily        methocarbamol (ROBAXIN) 750 MG tablet Take 1 tablet (750 mg) by mouth 4 times daily as needed for muscle spasms 30 tablet 0     methylPREDNISolone (MEDROL DOSEPAK) 4 MG tablet Follow package instructions 21 tablet 0     montelukast (SINGULAIR) 10 MG tablet Take 1 tablet (10 mg) by mouth At Bedtime 90 tablet 3     order for DME Equipment being ordered: compression stockings- LARGE, 15mm HG, THIGH HIGH 2 Units 0     predniSONE (DELTASONE) 20 MG tablet Take 1 tablet (20 mg) by mouth daily 5 tablet 0     triamcinolone (KENALOG) 0.1 % cream Apply sparingly to affected area three times daily as needed 80 g 0       Allergies   Allergen Reactions     Sulfa Drugs Hives and Swelling     Noted in 10/18/09 ER       Family History   Problem Relation Age of Onset     Glaucoma Mother      HEART DISEASE Father      Cancer Father 67     kidney cancer      HEART DISEASE Sister      Cancer Sister 59     colon cancer      Diabetes Sister      Colon Cancer Maternal Aunt      Colon Cancer Paternal Aunt      Glaucoma Maternal Grandmother        Additional medical/Social/Surgical histories reviewed in [facility] and updated as appropriate.     REVIEW OF SYSTEMS (11/1/2018)  10 point ROS of systems including Constitutional, Eyes, Respiratory, Cardiovascular, Gastroenterology, Genitourinary, Integumentary, Musculoskeletal, Psychiatric were all negative except for pertinent positives noted in my HPI.     PHYSICAL  "EXAM  Vitals:    11/01/18 1007   BP: 119/69   Pulse: 72   Weight: 110.7 kg (244 lb)   Height: 1.702 m (5' 7\")     Vital Signs: /69  Pulse 72  Ht 1.702 m (5' 7\")  Wt 110.7 kg (244 lb)  BMI 38.22 kg/m2 Patient declined being weighed. Body mass index is 38.22 kg/(m^2).    General  - normal appearance, in no obvious distress  CV  - normal popliteal pulse  Pulm  - normal respiratory pattern, non-labored  Musculoskeletal - right knee  - stance: mildly antalgic gait, genu varum  - inspection: generalized swelling, trace effusion  - palpation: medial joint line tenderness, patella and patellar tendon non-tender, normal popliteal pulse  - ROM: 100 degrees flexion, 0 degrees extension, slightly painful active ROM  - strength: 5/5 in flexion, 5/5 in extension  - neuro: no sensory or motor deficit  - special tests:  (-) Lachman  (-) anterior drawer  (-) posterior drawer  (-) pivot shift  (-) Adrian  (-) Thessaly  (-) varus at 0 and 30 degrees flexion  (+) valgus at 0 and 30 degrees flexion  (-) Jefferson s compression test  (-) patellar apprehension  Neuro  - no sensory or motor deficit, grossly normal coordination, normal muscle tone  Skin  - no ecchymosis, erythema, warmth, or induration, no obvious rash  Psych  - interactive, appropriate, normal mood and affect  Right foot: has ttp over 5th toe, at base, bruising is improving      ASSESSMENT & PLAN  64 yo female with lumbar ddd, right foot toe fracture, and left knee MCL sprain, overall improved   Cont. Use of brace  Cont. 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        Sincerely,        Frankie Santos MD    "

## 2018-11-01 NOTE — LETTER
November 1, 2018      Denita Flores  [address] [address] [place] [phone]              To whom it may concern:   Please allow Denita to wear a walking boot at her job at the [employer] as needed.        Sincerely,      Frankie Santos MD

## 2018-11-05 ENCOUNTER — OFFICE VISIT (OUTPATIENT)
Dept: ORTHOPEDICS | Facility: CLINIC | Age: 63
End: 2018-11-05
Payer: COMMERCIAL

## 2018-11-05 VITALS
WEIGHT: 244 LBS | HEART RATE: 80 BPM | DIASTOLIC BLOOD PRESSURE: 72 MMHG | HEIGHT: 67 IN | BODY MASS INDEX: 38.3 KG/M2 | SYSTOLIC BLOOD PRESSURE: 122 MMHG

## 2018-11-05 DIAGNOSIS — M54.16 LUMBAR RADICULAR PAIN: Primary | ICD-10-CM

## 2018-11-05 PROCEDURE — 99213 OFFICE O/P EST LOW 20 MIN: CPT | Performed by: PREVENTIVE MEDICINE

## 2018-11-05 ASSESSMENT — PAIN SCALES - GENERAL: PAINLEVEL: MODERATE PAIN (4)

## 2018-11-05 NOTE — LETTER
November 5, 2018      Denita Flores  [address] [address] [place] [phone]            To whom it may concern: Patient was seen in my office today, and is cleared to return to work without restrictions as a  on November 7th, 2018.  Please contact me with any questions or concerns.        Sincerely,      Frankie Santos MD

## 2018-11-05 NOTE — LETTER
11/5/2018         RE: Denita Flores  [address] [address] [place] [phone]        Dear Colleague,    Thank you for referring your patient, Denita Flores, to the [facility]. Please see a copy of my visit note below.    HISTORY OF PRESENT ILLNESS  Ms. Flores is a pleasant 63 year old year old female who presents to clinic today with low back pain and foot pain after having MRI and will discuss possible injection low back  Feels pain in low back and into legs      MEDICAL HISTORY  Patient Active Problem List   Diagnosis     Graves disease     Obesity     Diverticulosis of large intestine without hemorrhage     Colon polyps     History of cervical cancer     YELENA (obstructive sleep apnea)     Warts     CARDIOVASCULAR SCREENING; LDL GOAL LESS THAN 130     Hx of herpes zoster keratoconjunctivitis, os     Plantar warts     Stasis dermatitis of both legs     Obesity (BMI 30-39.9)     Postablative hypothyroidism     Other specified hypothyroidism,post ablative     Venous stasis dermatitis of left lower extremity     Vitamin B12 deficiency (non anemic)     Mild persistent asthma without complication     Spider veins of both lower extremities     Seasonal allergic rhinitis     Family history of colon cancer     Epistaxis     Tubular adenoma of colon     Family history of renal cancer     Nephrolithiasis     Gross hematuria     Morbid obesity (H)     Elevated blood pressure reading without diagnosis of hypertension     Polyp of gallbladder, 4mm       Current Outpatient Prescriptions   Medication Sig Dispense Refill     albuterol (PROAIR HFA/PROVENTIL HFA/VENTOLIN HFA) 108 (90 BASE) MCG/ACT Inhaler Inhale 2 puffs into the lungs every 6 hours as needed for shortness of breath / dyspnea 1 Inhaler 3     Cholecalciferol (D3 ADULT PO) Take 2,000 Units by mouth daily.       cyanocolbalamin (VITAMIN  B-12) 100 MCG tablet Take 100 mcg by mouth daily.       fluticasone (FLONASE) 50 MCG/ACT spray  Spray 1-2 sprays into both nostrils daily 1 Bottle 11     HYDROcodone-acetaminophen (NORCO) 5-325 MG per tablet Take 1 tablet by mouth every 6 hours as needed for severe pain 10 tablet 0     ketoconazole (NIZORAL) 2 % cream Apply topically 2 times daily 15 g 1     levothyroxine (SYNTHROID/LEVOTHROID) 112 MCG tablet Take 1 tablet (112 mcg) by mouth daily 90 tablet 3     MEGARED OMEGA-3 KRILL  MG CAPS Take 1 capsule by mouth daily        methocarbamol (ROBAXIN) 750 MG tablet Take 1 tablet (750 mg) by mouth 4 times daily as needed for muscle spasms 30 tablet 0     methylPREDNISolone (MEDROL DOSEPAK) 4 MG tablet Follow package instructions 21 tablet 0     montelukast (SINGULAIR) 10 MG tablet Take 1 tablet (10 mg) by mouth At Bedtime 90 tablet 3     order for DME Equipment being ordered: compression stockings- LARGE, 15mm HG, THIGH HIGH 2 Units 0     predniSONE (DELTASONE) 20 MG tablet Take 1 tablet (20 mg) by mouth daily 5 tablet 0     triamcinolone (KENALOG) 0.1 % cream Apply sparingly to affected area three times daily as needed 80 g 0       Allergies   Allergen Reactions     Sulfa Drugs Hives and Swelling     Noted in 10/18/09 ER       Family History   Problem Relation Age of Onset     Glaucoma Mother      HEART DISEASE Father      Cancer Father 67     kidney cancer      HEART DISEASE Sister      Cancer Sister 59     colon cancer      Diabetes Sister      Colon Cancer Maternal Aunt      Colon Cancer Paternal Aunt      Glaucoma Maternal Grandmother        Additional medical/Social/Surgical histories reviewed in [facility] and updated as appropriate.     REVIEW OF SYSTEMS (11/5/2018)  10 point ROS of systems including Constitutional, Eyes, Respiratory, Cardiovascular, Gastroenterology, Genitourinary, Integumentary, Musculoskeletal, Psychiatric were all negative except for pertinent positives noted in my HPI.     PHYSICAL EXAM  Vitals:    11/05/18 0949   BP: 122/72   Pulse: 80   Weight: 110.7 kg (244 lb)   Height:  "1.702 m (5' 7\")     Vital Signs: /72  Pulse 80  Ht 1.702 m (5' 7\")  Wt 110.7 kg (244 lb)  BMI 38.22 kg/m2 Patient declined being weighed. Body mass index is 38.22 kg/(m^2).    General  - normal appearance, in no obvious distress  CV  - normal peripheral perfusion  Pulm  - normal respiratory pattern, non-labored  Musculoskeletal - lumbar spine  - stance: normal gait without limp, no obvious leg length discrepancy, normal heel and toe walk  - inspection: normal bone and joint alignment, no obvious scoliosis  - palpation: no paravertebral or bony tenderness  - ROM: flexion exacerbates pain, normal extension, sidebending, rotation  - strength: lower extremities 5/5 in all planes  - special tests:  (+) straight leg raise  (+) slump test  Neuro  - patellar and Achilles DTRs 2+ bilaterally, lower extremity sensory deficit throughout L5 distribution, grossly normal coordination, normal muscle tone  Skin  - no ecchymosis, erythema, warmth, or induration, no obvious rash  Psych  - interactive, appropriate, normal mood and affect  ASSESSMENT & PLAN  62 yo female with lumbar radicular pain due to herniated disc  Reviewed lumbar MRI shows disc herniations  Ordered and discussed lumbar ANTON  Cont. HEP    Frankie Santos MD, [facility]    Again, thank you for allowing me to participate in the care of your patient.        Sincerely,        Frankie Santos MD    "

## 2018-11-05 NOTE — PROGRESS NOTES
HISTORY OF PRESENT ILLNESS  Ms. Flores is a pleasant 63 year old year old female who presents to clinic today with low back pain and foot pain after having MRI and will discuss possible injection low back  Feels pain in low back and into legs      MEDICAL HISTORY  Patient Active Problem List   Diagnosis     Graves disease     Obesity     Diverticulosis of large intestine without hemorrhage     Colon polyps     History of cervical cancer     YELENA (obstructive sleep apnea)     Warts     CARDIOVASCULAR SCREENING; LDL GOAL LESS THAN 130     Hx of herpes zoster keratoconjunctivitis, os     Plantar warts     Stasis dermatitis of both legs     Obesity (BMI 30-39.9)     Postablative hypothyroidism     Other specified hypothyroidism,post ablative     Venous stasis dermatitis of left lower extremity     Vitamin B12 deficiency (non anemic)     Mild persistent asthma without complication     Spider veins of both lower extremities     Seasonal allergic rhinitis     Family history of colon cancer     Epistaxis     Tubular adenoma of colon     Family history of renal cancer     Nephrolithiasis     Gross hematuria     Morbid obesity (H)     Elevated blood pressure reading without diagnosis of hypertension     Polyp of gallbladder, 4mm       Current Outpatient Prescriptions   Medication Sig Dispense Refill     albuterol (PROAIR HFA/PROVENTIL HFA/VENTOLIN HFA) 108 (90 BASE) MCG/ACT Inhaler Inhale 2 puffs into the lungs every 6 hours as needed for shortness of breath / dyspnea 1 Inhaler 3     Cholecalciferol (D3 ADULT PO) Take 2,000 Units by mouth daily.       cyanocolbalamin (VITAMIN  B-12) 100 MCG tablet Take 100 mcg by mouth daily.       fluticasone (FLONASE) 50 MCG/ACT spray Spray 1-2 sprays into both nostrils daily 1 Bottle 11     HYDROcodone-acetaminophen (NORCO) 5-325 MG per tablet Take 1 tablet by mouth every 6 hours as needed for severe pain 10 tablet 0     ketoconazole (NIZORAL) 2 % cream Apply topically 2 times daily 15 g  "1     levothyroxine (SYNTHROID/LEVOTHROID) 112 MCG tablet Take 1 tablet (112 mcg) by mouth daily 90 tablet 3     MEGARED OMEGA-3 KRILL  MG CAPS Take 1 capsule by mouth daily        methocarbamol (ROBAXIN) 750 MG tablet Take 1 tablet (750 mg) by mouth 4 times daily as needed for muscle spasms 30 tablet 0     methylPREDNISolone (MEDROL DOSEPAK) 4 MG tablet Follow package instructions 21 tablet 0     montelukast (SINGULAIR) 10 MG tablet Take 1 tablet (10 mg) by mouth At Bedtime 90 tablet 3     order for DME Equipment being ordered: compression stockings- LARGE, 15mm HG, THIGH HIGH 2 Units 0     predniSONE (DELTASONE) 20 MG tablet Take 1 tablet (20 mg) by mouth daily 5 tablet 0     triamcinolone (KENALOG) 0.1 % cream Apply sparingly to affected area three times daily as needed 80 g 0       Allergies   Allergen Reactions     Sulfa Drugs Hives and Swelling     Noted in 10/18/09 ER       Family History   Problem Relation Age of Onset     Glaucoma Mother      HEART DISEASE Father      Cancer Father 67     kidney cancer      HEART DISEASE Sister      Cancer Sister 59     colon cancer      Diabetes Sister      Colon Cancer Maternal Aunt      Colon Cancer Paternal Aunt      Glaucoma Maternal Grandmother        Additional medical/Social/Surgical histories reviewed in [facility] and updated as appropriate.     REVIEW OF SYSTEMS (11/5/2018)  10 point ROS of systems including Constitutional, Eyes, Respiratory, Cardiovascular, Gastroenterology, Genitourinary, Integumentary, Musculoskeletal, Psychiatric were all negative except for pertinent positives noted in my HPI.     PHYSICAL EXAM  Vitals:    11/05/18 0949   BP: 122/72   Pulse: 80   Weight: 110.7 kg (244 lb)   Height: 1.702 m (5' 7\")     Vital Signs: /72  Pulse 80  Ht 1.702 m (5' 7\")  Wt 110.7 kg (244 lb)  BMI 38.22 kg/m2 Patient declined being weighed. Body mass index is 38.22 kg/(m^2).    General  - normal appearance, in no obvious distress  CV  - normal peripheral " perfusion  Pulm  - normal respiratory pattern, non-labored  Musculoskeletal - lumbar spine  - stance: normal gait without limp, no obvious leg length discrepancy, normal heel and toe walk  - inspection: normal bone and joint alignment, no obvious scoliosis  - palpation: no paravertebral or bony tenderness  - ROM: flexion exacerbates pain, normal extension, sidebending, rotation  - strength: lower extremities 5/5 in all planes  - special tests:  (+) straight leg raise  (+) slump test  Neuro  - patellar and Achilles DTRs 2+ bilaterally, lower extremity sensory deficit throughout L5 distribution, grossly normal coordination, normal muscle tone  Skin  - no ecchymosis, erythema, warmth, or induration, no obvious rash  Psych  - interactive, appropriate, normal mood and affect  ASSESSMENT & PLAN  64 yo female with lumbar radicular pain due to herniated disc  Reviewed lumbar MRI shows disc herniations  Ordered and discussed lumbar ANTON  Cont. HEP    Frankie Santos MD, CAQ[facility]

## 2018-11-05 NOTE — PATIENT INSTRUCTIONS
Thanks for coming today.  Ortho[facility] Medicine Clinic  [address] [place] [address]    To schedule future appointments in Ortho Clinic, you may call [phone].    To schedule ordered imaging by your provider:   Call [facility] [phone]    To schedule an injection ordered by your provider:  Call [facility] Injection scheduling line: [phone]  [email or web address] available online at:  [email or web address]    Please call if any further questions or concerns ([phone]).  Clinic hours 8 am to 5 pm.    Return to clinic (call) if symptoms worsen or fail to improve.

## 2018-11-06 ENCOUNTER — RADIANT APPOINTMENT (OUTPATIENT)
Dept: GENERAL RADIOLOGY | Facility: CLINIC | Age: 63
End: 2018-11-06
Attending: PREVENTIVE MEDICINE
Payer: COMMERCIAL

## 2018-11-06 VITALS — HEART RATE: 75 BPM | DIASTOLIC BLOOD PRESSURE: 76 MMHG | SYSTOLIC BLOOD PRESSURE: 165 MMHG | OXYGEN SATURATION: 98 %

## 2018-11-06 DIAGNOSIS — M51.26 LUMBAR HERNIATED DISC: ICD-10-CM

## 2018-11-06 PROCEDURE — 62323 NJX INTERLAMINAR LMBR/SAC: CPT | Performed by: RADIOLOGY

## 2018-11-06 RX ORDER — LIDOCAINE HYDROCHLORIDE 10 MG/ML
5 INJECTION, SOLUTION EPIDURAL; INFILTRATION; INTRACAUDAL; PERINEURAL ONCE
Status: COMPLETED | OUTPATIENT
Start: 2018-11-06 | End: 2018-11-06

## 2018-11-06 RX ORDER — IOPAMIDOL 408 MG/ML
10 INJECTION, SOLUTION INTRATHECAL ONCE
Status: COMPLETED | OUTPATIENT
Start: 2018-11-06 | End: 2018-11-06

## 2018-11-06 RX ORDER — BUPIVACAINE HYDROCHLORIDE 5 MG/ML
3 INJECTION, SOLUTION PERINEURAL ONCE
Status: COMPLETED | OUTPATIENT
Start: 2018-11-06 | End: 2018-11-06

## 2018-11-06 RX ORDER — METHYLPREDNISOLONE ACETATE 80 MG/ML
80 INJECTION, SUSPENSION INTRA-ARTICULAR; INTRALESIONAL; INTRAMUSCULAR; SOFT TISSUE ONCE
Status: COMPLETED | OUTPATIENT
Start: 2018-11-06 | End: 2018-11-06

## 2018-11-06 RX ADMIN — BUPIVACAINE HYDROCHLORIDE 15 MG: 5 INJECTION, SOLUTION PERINEURAL at 13:53

## 2018-11-06 RX ADMIN — IOPAMIDOL 1 ML: 408 INJECTION, SOLUTION INTRATHECAL at 13:53

## 2018-11-06 RX ADMIN — METHYLPREDNISOLONE ACETATE 80 MG: 80 INJECTION, SUSPENSION INTRA-ARTICULAR; INTRALESIONAL; INTRAMUSCULAR; SOFT TISSUE at 13:54

## 2018-11-06 RX ADMIN — LIDOCAINE HYDROCHLORIDE 5 ML: 10 INJECTION, SOLUTION EPIDURAL; INFILTRATION; INTRACAUDAL; PERINEURAL at 13:54

## 2018-11-20 ENCOUNTER — OFFICE VISIT (OUTPATIENT)
Dept: ORTHOPEDICS | Facility: CLINIC | Age: 63
End: 2018-11-20
Payer: COMMERCIAL

## 2018-11-20 VITALS
SYSTOLIC BLOOD PRESSURE: 138 MMHG | DIASTOLIC BLOOD PRESSURE: 73 MMHG | HEART RATE: 85 BPM | BODY MASS INDEX: 38.3 KG/M2 | HEIGHT: 67 IN | WEIGHT: 244 LBS

## 2018-11-20 DIAGNOSIS — S83.412A SPRAIN OF MEDIAL COLLATERAL LIGAMENT OF LEFT KNEE, INITIAL ENCOUNTER: ICD-10-CM

## 2018-11-20 DIAGNOSIS — M51.26 LUMBAR HERNIATED DISC: Primary | ICD-10-CM

## 2018-11-20 DIAGNOSIS — S92.911A CLOSED NONDISPLACED FRACTURE OF PHALANX OF TOE OF RIGHT FOOT, UNSPECIFIED TOE, INITIAL ENCOUNTER: ICD-10-CM

## 2018-11-20 PROCEDURE — 99213 OFFICE O/P EST LOW 20 MIN: CPT | Performed by: PREVENTIVE MEDICINE

## 2018-11-20 ASSESSMENT — PAIN SCALES - GENERAL: PAINLEVEL: MILD PAIN (2)

## 2018-11-20 NOTE — MR AVS SNAPSHOT
After Visit Summary   2018    Denita Flores    MRN: [id number]           Patient Information     Date Of Birth          1955        Visit Information        Provider Department      2018 10:40 AM Frankie Santos MD [facility]        Today's Diagnoses     Lumbar herniated disc    -  1    Closed nondisplaced fracture of phalanx of toe of right foot, unspecified toe, subsequent encounter        Sprain of medial collateral ligament of left knee, subsequent encounter          Care Instructions    Thanks for coming today.  Ortho[facility]Sports Medicine Clinic  [address] [place] [id number]    To schedule future appointments in Ortho Clinic, you may call [phone].    To schedule ordered imaging by your provider:   Call Central Imaging [phone]    To schedule an injection ordered by your provider:  Call Central Imaging Injection scheduling line: [phone]  [email or web address] available online at:  [email or web address]    Please call if any further questions or concerns ([phone]).  Clinic hours 8 am to 5 pm.    Return to clinic (call) if symptoms worsen or fail to improve.          Follow-ups after your visit        Additional Services     PHYSICAL THERAPY REFERRAL (External-Prints)       Physical Therapy Referral                  Your next 10 appointments already scheduled     Dec 18, 2018 10:40 AM CST   Return Visit with Frankie Santos MD   [facility])    [id number] [place] [id number] [phone]            Oct 31, 2019 10:45 AM CDT   Return Visit with Gail Booth MD   [facility])    [id number] [place] [id number] [phone]              Future tests that were ordered for you today     Open Future Orders        Priority Expected Expires Ordered    PHYSICAL THERAPY REFERRAL (External-Prints) Routine   "2019            Who to contact     If you have questions or need follow up information about today's clinic visit or your schedule please contact [facility] directly at [phone].  Normal or non-critical lab and imaging results will be communicated to you by MyChart, letter or phone within 4 business days after the clinic has received the results. If you do not hear from us within 7 days, please contact the clinic through MyChart or phone. If you have a critical or abnormal lab result, we will notify you by phone as soon as possible.  Submit refill requests through [employer] or call your pharmacy and they will forward the refill request to us. Please allow 3 business days for your refill to be completed.          Additional Information About Your Visit        [employer] Information     [employer] is an electronic gateway that provides easy, online access to your medical records. With [employer], you can request a clinic appointment, read your test results, renew a prescription or communicate with your care team.     To sign up for [employer] visit the website at [email or web address]   You will be asked to enter the access code listed below, as well as some personal information. Please follow the directions to create your username and password.     Your access code is: [email or web address]  Expires: 2019 11:13 AM     Your access code will  in 90 days. If you need help or a new code, please contact your [facility] or call [phone] for assistance.        Care EveryWhere ID     This is your Care EveryWhere ID. This could be used by other organizations to access your [place] medical records  [id number]        Your Vitals Were     Pulse Height BMI (Body Mass Index)             85 5' 7\" (1.702 m) 38.22 kg/m2          Blood Pressure from Last 3 Encounters:   18 138/73   18 165/76   18 122/72    Weight from Last 3 Encounters:   18 " 244 lb (110.7 kg)   11/05/18 244 lb (110.7 kg)   11/01/18 244 lb (110.7 kg)               Primary Care Provider Office Phone # Fax #    Gee Hitchcock [phone] [address] [facility] [address]        Equal Access to Services     [facility]: Hadii aad ku hadasho Soomaali, waaxda luqadaha, qaybta kaalmada adeegyada, waxay idiin hayaan adeeg khciarrash laMelodyaan . So [facility] [phone].    ATENCIÓN: Si habla español, tiene a moore disposición servicios gratuitos de asistencia lingüística. Llame al [phone].    We comply with applicable federal civil rights laws and [place] laws. We do not discriminate on the basis of race, color, national origin, age, disability, sex, sexual orientation, or gender identity.            Thank you!     Thank you for choosing [facility]  for your care. Our goal is always to provide you with excellent care. Hearing back from our patients is one way we can continue to improve our services. Please take a few minutes to complete the written survey that you may receive in the mail after your visit with us. Thank you!             Your Updated Medication List - Protect others around you: Learn how to safely use, store and throw away your medicines at www.disposemymeds.org.          This list is accurate as of 11/20/18 11:13 AM.  Always use your most recent med list.                   Brand Name Dispense Instructions for use Diagnosis    albuterol 108 (90 Base) MCG/ACT inhaler    PROAIR HFA/PROVENTIL HFA/VENTOLIN HFA    1 Inhaler    Inhale 2 puffs into the lungs every 6 hours as needed for shortness of breath / dyspnea    Mild persistent asthma without complication       cyanocolbalamin 100 MCG tablet    vitamin  B-12     Take 100 mcg by mouth daily.        D3 ADULT PO      Take 2,000 Units by mouth daily.        fluticasone 50 MCG/ACT spray    FLONASE    1 Bottle    Spray 1-2 sprays into both nostrils daily    Acute seasonal allergic rhinitis,  unspecified trigger       HYDROcodone-acetaminophen 5-325 MG per tablet    NORCO    10 tablet    Take 1 tablet by mouth every 6 hours as needed for severe pain        ketoconazole 2 % cream    NIZORAL    15 g    Apply topically 2 times daily    Intertrigo       levothyroxine 112 MCG tablet    SYNTHROID/LEVOTHROID    90 tablet    Take 1 tablet (112 mcg) by mouth daily    Postablative hypothyroidism       MEGARED OMEGA-3 KRILL  MG Caps      Take 1 capsule by mouth daily        methocarbamol 750 MG tablet    ROBAXIN    30 tablet    Take 1 tablet (750 mg) by mouth 4 times daily as needed for muscle spasms    Acute left-sided low back pain with left-sided sciatica       methylPREDNISolone 4 MG tablet    MEDROL DOSEPAK    21 tablet    Follow package instructions        montelukast 10 MG tablet    SINGULAIR    90 tablet    Take 1 tablet (10 mg) by mouth At Bedtime    Mild persistent asthma without complication       order for DME     2 Units    Equipment being ordered: compression stockings- LARGE, 15mm HG, THIGH HIGH    Spider veins of both lower extremities       predniSONE 20 MG tablet    DELTASONE    5 tablet    Take 1 tablet (20 mg) by mouth daily    Acute left-sided low back pain with left-sided sciatica       triamcinolone 0.1 % cream    KENALOG    80 g    Apply sparingly to affected area three times daily as needed    Allergic contact dermatitis, unspecified trigger

## 2018-11-20 NOTE — LETTER
11/20/2018         RE: Denita Flores  [address] [address] [place] [address]        Dear Colleague,    Thank you for referring your patient, Denita Flores, to the [facility]. Please see a copy of my visit note below.    HISTORY OF PRESENT ILLNESS  Ms. Flores is a pleasant 63 year old year old female who presents to clinic today with   Denita explains that   Location:   Quality:  achy pain      Duration:   Timing: occurs intermittently  Context: occurs while exercising and lifting  Modifying factors:  resting and non-use makes it better, movement and use makes it worse  Associated signs & symptoms: none  Previous similar pain: yes  Exercise: lifting weights and cardiovascular on machine  Additional history: as documented    MEDICAL HISTORY  Patient Active Problem List   Diagnosis     Graves disease     Obesity     Diverticulosis of large intestine without hemorrhage     Colon polyps     History of cervical cancer     YELENA (obstructive sleep apnea)     Warts     CARDIOVASCULAR SCREENING; LDL GOAL LESS THAN 130     Hx of herpes zoster keratoconjunctivitis, os     Plantar warts     Stasis dermatitis of both legs     Obesity (BMI 30-39.9)     Postablative hypothyroidism     Other specified hypothyroidism,post ablative     Venous stasis dermatitis of left lower extremity     Vitamin B12 deficiency (non anemic)     Mild persistent asthma without complication     Spider veins of both lower extremities     Seasonal allergic rhinitis     Family history of colon cancer     Epistaxis     Tubular adenoma of colon     Family history of renal cancer     Nephrolithiasis     Gross hematuria     Morbid obesity (H)     Elevated blood pressure reading without diagnosis of hypertension     Polyp of gallbladder, 4mm       Current Outpatient Prescriptions   Medication Sig Dispense Refill     albuterol (PROAIR HFA/PROVENTIL HFA/VENTOLIN HFA) 108 (90 BASE) MCG/ACT Inhaler Inhale 2 puffs into the lungs  every 6 hours as needed for shortness of breath / dyspnea 1 Inhaler 3     Cholecalciferol (D3 ADULT PO) Take 2,000 Units by mouth daily.       cyanocolbalamin (VITAMIN  B-12) 100 MCG tablet Take 100 mcg by mouth daily.       fluticasone (FLONASE) 50 MCG/ACT spray Spray 1-2 sprays into both nostrils daily 1 Bottle 11     HYDROcodone-acetaminophen (NORCO) 5-325 MG per tablet Take 1 tablet by mouth every 6 hours as needed for severe pain 10 tablet 0     ketoconazole (NIZORAL) 2 % cream Apply topically 2 times daily 15 g 1     levothyroxine (SYNTHROID/LEVOTHROID) 112 MCG tablet Take 1 tablet (112 mcg) by mouth daily 90 tablet 3     MEGARED OMEGA-3 KRILL  MG CAPS Take 1 capsule by mouth daily        methocarbamol (ROBAXIN) 750 MG tablet Take 1 tablet (750 mg) by mouth 4 times daily as needed for muscle spasms 30 tablet 0     methylPREDNISolone (MEDROL DOSEPAK) 4 MG tablet Follow package instructions 21 tablet 0     montelukast (SINGULAIR) 10 MG tablet Take 1 tablet (10 mg) by mouth At Bedtime 90 tablet 3     order for DME Equipment being ordered: compression stockings- LARGE, 15mm HG, THIGH HIGH 2 Units 0     predniSONE (DELTASONE) 20 MG tablet Take 1 tablet (20 mg) by mouth daily 5 tablet 0     triamcinolone (KENALOG) 0.1 % cream Apply sparingly to affected area three times daily as needed 80 g 0       Allergies   Allergen Reactions     Sulfa Drugs Hives and Swelling     Noted in 10/18/09 ER       Family History   Problem Relation Age of Onset     Glaucoma Mother      HEART DISEASE Father      Cancer Father 67     kidney cancer      HEART DISEASE Sister      Cancer Sister 59     colon cancer      Diabetes Sister      Colon Cancer Maternal Aunt      Colon Cancer Paternal Aunt      Glaucoma Maternal Grandmother        Additional medical/Social/Surgical histories reviewed in [facility] and updated as appropriate.     REVIEW OF SYSTEMS (11/20/2018)  10 point ROS of systems including Constitutional, Eyes, Respiratory,  "Cardiovascular, Gastroenterology, Genitourinary, Integumentary, Musculoskeletal, Psychiatric were all negative except for pertinent positives noted in my HPI.     PHYSICAL EXAM  Vitals:    11/20/18 1041   BP: 138/73   Pulse: 85   Weight: 110.7 kg (244 lb)   Height: 1.702 m (5' 7\")     Vital Signs: /73  Pulse 85  Ht 1.702 m (5' 7\")  Wt 110.7 kg (244 lb)  BMI 38.22 kg/m2 Patient declined being weighed. Body mass index is 38.22 kg/(m^2).    General  - normal appearance, in no obvious distress  CV  - normal popliteal pulse  Pulm  - normal respiratory pattern, non-labored  Musculoskeletal - knee  - stance: mildly antalgic gait, genu varum  - inspection: generalized swelling, trace effusion  - palpation: medial joint line tenderness, patella and patellar tendon non-tender, normal popliteal pulse  - ROM: 100 degrees flexion, 0 degrees extension, painful active ROM  - strength: 5/5 in flexion, 5/5 in extension  - neuro: no sensory or motor deficit  - special tests:  (-) Lachman  (-) anterior drawer  (-) posterior drawer  (-) pivot shift  (-) Adrian  (-) Thessaly  (-) varus at 0 and 30 degrees flexion  (-) valgus at 0 and 30 degrees flexion  (-) Jefferson s compression test  (-) patellar apprehension  Neuro  - no sensory or motor deficit, grossly normal coordination, normal muscle tone  Skin  - no ecchymosis, erythema, warmth, or induration, no obvious rash  Psych  - interactive, appropriate, normal mood and affect    ASSESSMENT & PLAN      Frankie Santos MD, CAQSM    Again, thank you for allowing me to participate in the care of your patient.        Sincerely,        Frankie Santos MD    "

## 2018-11-20 NOTE — PATIENT INSTRUCTIONS
Thanks for coming today.  Ortho/Sports Medicine Clinic  [address] [place] [address]    To schedule future appointments in Ortho Clinic, you may call [phone].    To schedule ordered imaging by your provider:   Call Central Imaging [phone]    To schedule an injection ordered by your provider:  Call Central Imaging Injection scheduling line: [phone]  [email or web address] available online at:  [email or web address]    Please call if any further questions or concerns ([phone]).  Clinic hours 8 am to 5 pm.    Return to clinic (call) if symptoms worsen or fail to improve.

## 2018-11-20 NOTE — PROGRESS NOTES
HISTORY OF PRESENT ILLNESS  Ms. Flores is a pleasant 63 year old year old female who presents to clinic today for followup for lumbar injection  She is feeling better overall  She would like to discuss starting PT as her symptoms have  improved    MEDICAL HISTORY  Patient Active Problem List   Diagnosis     Graves disease     Obesity     Diverticulosis of large intestine without hemorrhage     Colon polyps     History of cervical cancer     YELENA (obstructive sleep apnea)     Warts     CARDIOVASCULAR SCREENING; LDL GOAL LESS THAN 130     Hx of herpes zoster keratoconjunctivitis, os     Plantar warts     Stasis dermatitis of both legs     Obesity (BMI 30-39.9)     Postablative hypothyroidism     Other specified hypothyroidism,post ablative     Venous stasis dermatitis of left lower extremity     Vitamin B12 deficiency (non anemic)     Mild persistent asthma without complication     Spider veins of both lower extremities     Seasonal allergic rhinitis     Family history of colon cancer     Epistaxis     Tubular adenoma of colon     Family history of renal cancer     Nephrolithiasis     Gross hematuria     Morbid obesity (H)     Elevated blood pressure reading without diagnosis of hypertension     Polyp of gallbladder, 4mm       Current Outpatient Prescriptions   Medication Sig Dispense Refill     albuterol (PROAIR HFA/PROVENTIL HFA/VENTOLIN HFA) 108 (90 BASE) MCG/ACT Inhaler Inhale 2 puffs into the lungs every 6 hours as needed for shortness of breath / dyspnea 1 Inhaler 3     Cholecalciferol (D3 ADULT PO) Take 2,000 Units by mouth daily.       cyanocolbalamin (VITAMIN  B-12) 100 MCG tablet Take 100 mcg by mouth daily.       fluticasone (FLONASE) 50 MCG/ACT spray Spray 1-2 sprays into both nostrils daily 1 Bottle 11     HYDROcodone-acetaminophen (NORCO) 5-325 MG per tablet Take 1 tablet by mouth every 6 hours as needed for severe pain 10 tablet 0     ketoconazole (NIZORAL) 2 % cream Apply topically 2 times daily 15 g  "1     levothyroxine (SYNTHROID/LEVOTHROID) 112 MCG tablet Take 1 tablet (112 mcg) by mouth daily 90 tablet 3     MEGARED OMEGA-3 KRILL  MG CAPS Take 1 capsule by mouth daily        methocarbamol (ROBAXIN) 750 MG tablet Take 1 tablet (750 mg) by mouth 4 times daily as needed for muscle spasms 30 tablet 0     methylPREDNISolone (MEDROL DOSEPAK) 4 MG tablet Follow package instructions 21 tablet 0     montelukast (SINGULAIR) 10 MG tablet Take 1 tablet (10 mg) by mouth At Bedtime 90 tablet 3     order for DME Equipment being ordered: compression stockings- LARGE, 15mm HG, THIGH HIGH 2 Units 0     predniSONE (DELTASONE) 20 MG tablet Take 1 tablet (20 mg) by mouth daily 5 tablet 0     triamcinolone (KENALOG) 0.1 % cream Apply sparingly to affected area three times daily as needed 80 g 0       Allergies   Allergen Reactions     Sulfa Drugs Hives and Swelling     Noted in 10/18/09 ER       Family History   Problem Relation Age of Onset     Glaucoma Mother      HEART DISEASE Father      Cancer Father 67     kidney cancer      HEART DISEASE Sister      Cancer Sister 59     colon cancer      Diabetes Sister      Colon Cancer Maternal Aunt      Colon Cancer Paternal Aunt      Glaucoma Maternal Grandmother        Additional medical/Social/Surgical histories reviewed in [facility] and updated as appropriate.     REVIEW OF SYSTEMS (11/20/2018)  10 point ROS of systems including Constitutional, Eyes, Respiratory, Cardiovascular, Gastroenterology, Genitourinary, Integumentary, Musculoskeletal, Psychiatric were all negative except for pertinent positives noted in my HPI.     PHYSICAL EXAM  Vitals:    11/20/18 1041   BP: 138/73   Pulse: 85   Weight: 110.7 kg (244 lb)   Height: 1.702 m (5' 7\")     Vital Signs: /73   Pulse 85   Ht 1.702 m (5' 7\")   Wt 110.7 kg (244 lb)   BMI 38.22 kg/m   Patient declined being weighed. Body mass index is 38.22 kg/m .    General  - normal appearance, in no obvious distress  CV  - normal " peripheral perfusion  Pulm  - normal respiratory pattern, non-labored  Musculoskeletal - lumbar spine  - stance: normal gait without limp, no obvious leg length discrepancy, normal heel and toe walk  - inspection: normal bone and joint alignment, no obvious scoliosis  - palpation: no paravertebral or bony tenderness  - ROM: flexion still slightly exacerbates pain, normal extension, sidebending, rotation  - strength: lower extremities 5/5 in all planes  - special tests:  (-) straight leg raise  (-) slump test  Neuro  - patellar and Achilles DTRs 2+ bilaterally, no lower extremity sensory deficit throughout L5 distribution, grossly normal coordination, normal muscle tone  Skin  - no ecchymosis, erythema, warmth, or induration, no obvious rash  Psych  - interactive, appropriate, normal mood and affect  ASSESSMENT & PLAN  64 yo female with lumbar disc herniation, radicular pain, improved  Reviewed her response to injection, can consider repeating  Start PT  F/u in 1-2 months      Frankie Santos MD, CAQSM

## 2018-12-18 ENCOUNTER — OFFICE VISIT (OUTPATIENT)
Dept: ORTHOPEDICS | Facility: CLINIC | Age: 63
End: 2018-12-18
Payer: COMMERCIAL

## 2018-12-18 VITALS — HEART RATE: 82 BPM | DIASTOLIC BLOOD PRESSURE: 74 MMHG | SYSTOLIC BLOOD PRESSURE: 137 MMHG

## 2018-12-18 DIAGNOSIS — M51.26 LUMBAR DISC HERNIATION: Primary | ICD-10-CM

## 2018-12-18 PROCEDURE — 99214 OFFICE O/P EST MOD 30 MIN: CPT | Performed by: PREVENTIVE MEDICINE

## 2018-12-18 ASSESSMENT — PAIN SCALES - GENERAL: PAINLEVEL: NO PAIN (1)

## 2018-12-18 NOTE — PROGRESS NOTES
HISTORY OF PRESENT ILLNESS  Ms. Flores is a pleasant 63 year old year old female who presents to clinic today for followup for low back pain and right foot pain, improved  Had injection  Has not started PT yet  Has some symptoms in her left foot, possibly foot drop    MEDICAL HISTORY  Patient Active Problem List   Diagnosis     Graves disease     Obesity     Diverticulosis of large intestine without hemorrhage     Colon polyps     History of cervical cancer     YELENA (obstructive sleep apnea)     Warts     CARDIOVASCULAR SCREENING; LDL GOAL LESS THAN 130     Hx of herpes zoster keratoconjunctivitis, os     Plantar warts     Stasis dermatitis of both legs     Obesity (BMI 30-39.9)     Postablative hypothyroidism     Other specified hypothyroidism,post ablative     Venous stasis dermatitis of left lower extremity     Vitamin B12 deficiency (non anemic)     Mild persistent asthma without complication     Spider veins of both lower extremities     Seasonal allergic rhinitis     Family history of colon cancer     Epistaxis     Tubular adenoma of colon     Family history of renal cancer     Nephrolithiasis     Gross hematuria     Morbid obesity (H)     Elevated blood pressure reading without diagnosis of hypertension     Polyp of gallbladder, 4mm       Current Outpatient Medications   Medication Sig Dispense Refill     albuterol (PROAIR HFA/PROVENTIL HFA/VENTOLIN HFA) 108 (90 BASE) MCG/ACT Inhaler Inhale 2 puffs into the lungs every 6 hours as needed for shortness of breath / dyspnea 1 Inhaler 3     Cholecalciferol (D3 ADULT PO) Take 2,000 Units by mouth daily.       cyanocolbalamin (VITAMIN  B-12) 100 MCG tablet Take 100 mcg by mouth daily.       fluticasone (FLONASE) 50 MCG/ACT spray Spray 1-2 sprays into both nostrils daily 1 Bottle 11     HYDROcodone-acetaminophen (NORCO) 5-325 MG per tablet Take 1 tablet by mouth every 6 hours as needed for severe pain 10 tablet 0     ketoconazole (NIZORAL) 2 % cream Apply topically  2 times daily 15 g 1     levothyroxine (SYNTHROID/LEVOTHROID) 112 MCG tablet Take 1 tablet (112 mcg) by mouth daily 90 tablet 3     MEGARED OMEGA-3 KRILL  MG CAPS Take 1 capsule by mouth daily        methocarbamol (ROBAXIN) 750 MG tablet Take 1 tablet (750 mg) by mouth 4 times daily as needed for muscle spasms 30 tablet 0     methylPREDNISolone (MEDROL DOSEPAK) 4 MG tablet Follow package instructions 21 tablet 0     montelukast (SINGULAIR) 10 MG tablet Take 1 tablet (10 mg) by mouth At Bedtime 90 tablet 3     order for DME Equipment being ordered: compression stockings- LARGE, 15mm HG, THIGH HIGH 2 Units 0     predniSONE (DELTASONE) 20 MG tablet Take 1 tablet (20 mg) by mouth daily 5 tablet 0     triamcinolone (KENALOG) 0.1 % cream Apply sparingly to affected area three times daily as needed 80 g 0       Allergies   Allergen Reactions     Sulfa Drugs Hives and Swelling     Noted in 10/18/09 ER       Family History   Problem Relation Age of Onset     Glaucoma Mother      Heart Disease Father      Cancer Father 67        kidney cancer      Heart Disease Sister      Cancer Sister 59        colon cancer      Diabetes Sister      Colon Cancer Maternal Aunt      Colon Cancer Paternal Aunt      Glaucoma Maternal Grandmother        Additional medical/Social/Surgical histories reviewed in [facility] and updated as appropriate.     REVIEW OF SYSTEMS (12/18/2018)  10 point ROS of systems including Constitutional, Eyes, Respiratory, Cardiovascular, Gastroenterology, Genitourinary, Integumentary, Musculoskeletal, Psychiatric were all negative except for pertinent positives noted in my HPI.     PHYSICAL EXAM  Vitals:    12/18/18 1041   BP: 137/74   Pulse: 82     Vital Signs: /74   Pulse 82  Patient declined being weighed. There is no height or weight on file to calculate BMI.    General  - normal appearance, in no obvious distress  CV  - normal peripheral perfusion  Pulm  - normal respiratory pattern,  non-labored  Musculoskeletal - lumbar spine  - stance: normal gait without limp, no obvious leg length discrepancy, normal heel and toe walk  - inspection: normal bone and joint alignment, no obvious scoliosis  - palpation: no paravertebral or bony tenderness  - ROM: flexion exacerbates pain, normal extension, sidebending, rotation  - strength: lower extremities 5/5 in all planes  - special tests:  (+) straight leg raise  (+) slump test  Neuro  - patellar and Achilles DTRs 2+ bilaterally, bilateral lower extremity sensory deficit throughout L5 distribution, grossly normal coordination, normal muscle tone  Skin  - no ecchymosis, erythema, warmth, or induration, no obvious rash  Psych  - interactive, appropriate, normal mood and affect  ASSESSMENT & PLAN  64 yo female with lumbar disc herniation, radicular pain, foot drop  Ordered another Mitch  Cont. PT exercises  F/u in 1-2 months        Frankie Santos MD, CAQSM

## 2018-12-18 NOTE — PATIENT INSTRUCTIONS
Thanks for coming today.  [facility]  [address] [place] [address]    To schedule future appointments in Ortho Clinic, you may call [phone].    To schedule ordered imaging by your provider:   Call [facility] [phone]    To schedule an injection ordered by your provider:  Call [facility] Injection scheduling line: [phone]  [facility] available online at:  [email or web address]    Please call if any further questions or concerns ([phone]).  Clinic hours 8 am to 5 pm.    Return to clinic (call) if symptoms worsen or fail to improve.

## 2018-12-18 NOTE — LETTER
12/18/2018         RE: Denita Flores  [address] [address] [place] [id number]        Dear Colleague,    Thank you for referring your patient, Denita Flores, to the [facility]. Please see a copy of my visit note below.    HISTORY OF PRESENT ILLNESS  Ms. Flores is a pleasant 63 year old year old female who presents to clinic today for followup for low back pain and right foot pain, improved  Had injection  Has not started PT yet  Has some symptoms in her left foot, possibly foot drop    MEDICAL HISTORY  Patient Active Problem List   Diagnosis     Graves disease     Obesity     Diverticulosis of large intestine without hemorrhage     Colon polyps     History of cervical cancer     YELENA (obstructive sleep apnea)     Warts     CARDIOVASCULAR SCREENING; LDL GOAL LESS THAN 130     Hx of herpes zoster keratoconjunctivitis, os     Plantar warts     Stasis dermatitis of both legs     Obesity (BMI 30-39.9)     Postablative hypothyroidism     Other specified hypothyroidism,post ablative     Venous stasis dermatitis of left lower extremity     Vitamin B12 deficiency (non anemic)     Mild persistent asthma without complication     Spider veins of both lower extremities     Seasonal allergic rhinitis     Family history of colon cancer     Epistaxis     Tubular adenoma of colon     Family history of renal cancer     Nephrolithiasis     Gross hematuria     Morbid obesity (H)     Elevated blood pressure reading without diagnosis of hypertension     Polyp of gallbladder, 4mm       Current Outpatient Medications   Medication Sig Dispense Refill     albuterol (PROAIR HFA/PROVENTIL HFA/VENTOLIN HFA) 108 (90 BASE) MCG/ACT Inhaler Inhale 2 puffs into the lungs every 6 hours as needed for shortness of breath / dyspnea 1 Inhaler 3     Cholecalciferol (D3 ADULT PO) Take 2,000 Units by mouth daily.       cyanocolbalamin (VITAMIN  B-12) 100 MCG tablet Take 100 mcg by mouth daily.       fluticasone  (FLONASE) 50 MCG/ACT spray Spray 1-2 sprays into both nostrils daily 1 Bottle 11     HYDROcodone-acetaminophen (NORCO) 5-325 MG per tablet Take 1 tablet by mouth every 6 hours as needed for severe pain 10 tablet 0     ketoconazole (NIZORAL) 2 % cream Apply topically 2 times daily 15 g 1     levothyroxine (SYNTHROID/LEVOTHROID) 112 MCG tablet Take 1 tablet (112 mcg) by mouth daily 90 tablet 3     MEGARED OMEGA-3 KRILL  MG CAPS Take 1 capsule by mouth daily        methocarbamol (ROBAXIN) 750 MG tablet Take 1 tablet (750 mg) by mouth 4 times daily as needed for muscle spasms 30 tablet 0     methylPREDNISolone (MEDROL DOSEPAK) 4 MG tablet Follow package instructions 21 tablet 0     montelukast (SINGULAIR) 10 MG tablet Take 1 tablet (10 mg) by mouth At Bedtime 90 tablet 3     order for DME Equipment being ordered: compression stockings- LARGE, 15mm HG, THIGH HIGH 2 Units 0     predniSONE (DELTASONE) 20 MG tablet Take 1 tablet (20 mg) by mouth daily 5 tablet 0     triamcinolone (KENALOG) 0.1 % cream Apply sparingly to affected area three times daily as needed 80 g 0       Allergies   Allergen Reactions     Sulfa Drugs Hives and Swelling     Noted in 10/18/09 ER       Family History   Problem Relation Age of Onset     Glaucoma Mother      Heart Disease Father      Cancer Father 67        kidney cancer      Heart Disease Sister      Cancer Sister 59        colon cancer      Diabetes Sister      Colon Cancer Maternal Aunt      Colon Cancer Paternal Aunt      Glaucoma Maternal Grandmother        Additional medical/Social/Surgical histories reviewed in [facility] and updated as appropriate.     REVIEW OF SYSTEMS (12/18/2018)  10 point ROS of systems including Constitutional, Eyes, Respiratory, Cardiovascular, Gastroenterology, Genitourinary, Integumentary, Musculoskeletal, Psychiatric were all negative except for pertinent positives noted in my HPI.     PHYSICAL EXAM  Vitals:    12/18/18 1041   BP: 137/74   Pulse: 82      Vital Signs: /74   Pulse 82  Patient declined being weighed. There is no height or weight on file to calculate BMI.    General  - normal appearance, in no obvious distress  CV  - normal peripheral perfusion  Pulm  - normal respiratory pattern, non-labored  Musculoskeletal - lumbar spine  - stance: normal gait without limp, no obvious leg length discrepancy, normal heel and toe walk  - inspection: normal bone and joint alignment, no obvious scoliosis  - palpation: no paravertebral or bony tenderness  - ROM: flexion exacerbates pain, normal extension, sidebending, rotation  - strength: lower extremities 5/5 in all planes  - special tests:  (+) straight leg raise  (+) slump test  Neuro  - patellar and Achilles DTRs 2+ bilaterally, bilateral lower extremity sensory deficit throughout L5 distribution, grossly normal coordination, normal muscle tone  Skin  - no ecchymosis, erythema, warmth, or induration, no obvious rash  Psych  - interactive, appropriate, normal mood and affect  ASSESSMENT & PLAN  62 yo female with lumbar disc herniation, radicular pain, foot drop  Ordered another Mitch  Cont. PT exercises  F/u in 1-2 months        Frankie Santos MD, CAQSM    Again, thank you for allowing me to participate in the care of your patient.        Sincerely,        Frankie Santos MD

## 2018-12-18 NOTE — NURSING NOTE
Denita Flores's chief complaint for this visit includes:  Chief Complaint   Patient presents with     RECHECK     Follow up for right knee and low back.      PCP: Gee Hitchcock    Referring Provider:  No referring provider defined for this encounter.    /74   Pulse 82   No Pain (1)     Do you need any medication refills at today's visit? No

## 2018-12-21 ENCOUNTER — HOSPITAL ENCOUNTER (OUTPATIENT)
Dept: PHYSICAL THERAPY | Facility: CLINIC | Age: 63
Setting detail: THERAPIES SERIES
End: 2018-12-21
Attending: PREVENTIVE MEDICINE
Payer: COMMERCIAL

## 2018-12-21 DIAGNOSIS — M51.26 LUMBAR HERNIATED DISC: ICD-10-CM

## 2018-12-21 DIAGNOSIS — S83.412A SPRAIN OF MEDIAL COLLATERAL LIGAMENT OF LEFT KNEE, INITIAL ENCOUNTER: ICD-10-CM

## 2018-12-21 PROCEDURE — 97161 PT EVAL LOW COMPLEX 20 MIN: CPT | Mod: GP | Performed by: PHYSICAL THERAPIST

## 2018-12-21 PROCEDURE — 97162 PT EVAL MOD COMPLEX 30 MIN: CPT | Mod: GP | Performed by: PHYSICAL THERAPIST

## 2018-12-21 NOTE — PROGRESS NOTES
12/21/18 1000   Quick Adds   Type of Visit Initial OP PT Evaluation   General Information   Start of Care Date 12/21/18   Referring Physician Dr. Santos   Orders Evaluate and Treat as Indicated   Order Date 11/20/18   Medical Diagnosis Lumbar herniated disc; Sprain of medial collateral ligament of left knee, subsequent encounter   Onset of illness/injury or Date of Surgery 11/20/18   Surgical/Medical history reviewed Yes   Pertinent history of current problem (include personal factors and/or comorbidities that impact the POC) The pt reports that she originally injured herself while painting over [employer] weekend. She was reaching overhead and tweeked her back. Had back surgery on L3-4 years ago. Went to the ER and was give muscle relaxors. Her knee gave out and she had about 6 falls on her L knee. The pt had 1 epidural and is planned to have ontoher Jan 21. The pt states that her back has been feeling better overall. Has difficulty ascending stairs. Reports that L foot numbness increased after going over curb in school bus. PMH significant for Graves disease, obseity, Diverticulosis of large intestine without hemorrhage, lumbar surgery, cervical cancer   Patient role/Employment history Employed  ()   Living environment [facility]   Patient/Family Goals Statement Improve strength in LLE.   Fall Risk Screen   Fall screen completed by PT   Have you fallen 2 or more times in the past year? Yes   Have you fallen and had an injury in the past year? Yes   Timed Up and Go score (seconds) 11.97   Is patient a fall risk? Yes   Fall screen comments due to frequency of falls   Pain   Pain comments Mild LBP   Cognitive Status Examination   Orientation orientation to person, place and time   Posture   Posture Forward head position   Range of Motion (ROM)   ROM Comment B LEs [facility]   Strength   Strength Comments R LE grossly 4+/5, L hip flexion 3/5, knee extension 4/5, dorsiflexion 4+/5   Transfer Skills    Transfer Comments Requires UE support   Gait   Gait Comments Ambulates with lean to the left, difficulty with consistency of L foot placement   Sensory Examination   Sensory Perception Comments Numbness in L foot that began recently, recommended following up with MD   Clinical Impression   Criteria for Skilled Therapeutic Interventions Met yes, treatment indicated;evaluation only   Clinical Impression Comments The pt would benefit from skilled PT treatment due to pain, weakness, difficulty with gait and stairs however the pt reports that she is unable to attend pool hterapy in the afternoon. This is the only time availabe through [facility] for pool treatments. Recommended looking for other options, would also recommend land based rehab due to severe LLE weakness. The pt will speak to MD and pursue if recommended.   Total Evaluation Time   PT Blayne, Low Complexity Minutes (31762) 30

## 2018-12-26 DIAGNOSIS — L23.9 ALLERGIC CONTACT DERMATITIS, UNSPECIFIED TRIGGER: ICD-10-CM

## 2018-12-27 NOTE — TELEPHONE ENCOUNTER
triamcinolone (KENALOG) 0.1 % cream      Last Written Prescription Date:  07/14/18  Last Fill Quantity: 80 g,   # refills: 0  Last Office Visit: 09/07/18 for annual physical  Future Office visit:

## 2018-12-27 NOTE — PROGRESS NOTES
12/21/18 1000   Quick Adds   Type of Visit Initial OP PT Evaluation   General Information   Start of Care Date 12/21/18   Referring Physician Dr. Santos   Orders Evaluate and Treat as Indicated   Order Date 11/20/18   Medical Diagnosis Lumbar herniated disc; Sprain of medial collateral ligament of left knee, subsequent encounter   Onset of illness/injury or Date of Surgery 11/20/18   Surgical/Medical history reviewed Yes   Pertinent history of current problem (include personal factors and/or comorbidities that impact the POC) The pt reports that she originally injured herself while painting over [employer] weekend. She was reaching overhead and tweeked her back. Had back surgery on L3-4 years ago. Went to the ER and was give muscle relaxors. Her knee gave out and she had about 6 falls on her L knee. The pt had 1 epidural and is planned to have ontoher Jan 21. The pt states that her back has been feeling better overall. Has difficulty ascending stairs. Reports that L foot numbness increased after going over curb in school bus. PMH significant for Graves disease, obseity, Diverticulosis of large intestine without hemorrhage, lumbar surgery, cervical cancer   Patient role/Employment history Employed  ()   Living environment [facility]   Patient/Family Goals Statement Improve strength in LLE.   Fall Risk Screen   Fall screen completed by PT   Have you fallen 2 or more times in the past year? Yes   Have you fallen and had an injury in the past year? Yes   Timed Up and Go score (seconds) 11.97   Is patient a fall risk? Yes   Fall screen comments due to frequency of falls   Pain   Pain comments Mild LBP   Cognitive Status Examination   Orientation orientation to person, place and time   Posture   Posture Forward head position   Range of Motion (ROM)   ROM Comment B LEs [facility]   Strength   Strength Comments R LE grossly 4+/5, L hip flexion 3/5, knee extension 4/5, dorsiflexion 4+/5   Transfer Skills    Transfer Comments Requires UE support   Gait   Gait Comments Ambulates with lean to the left, difficulty with consistency of L foot placement   Sensory Examination   Sensory Perception Comments Numbness in L foot that began recently, recommended following up with MD   Clinical Impression   Criteria for Skilled Therapeutic Interventions Met yes, treatment indicated;evaluation only   PT Diagnosis Pain and weakness   Influenced by the following impairments significant LE weakness, pain, numbness   Functional limitations due to impairments impaired transfers, gait, ability to ambulate longer distances, standing tolerance   Clinical Presentation Evolving/Changing   Clinical Presentation Rationale fluctuating sensory symptoms   Clinical Decision Making (Complexity) Moderate complexity   Therapy Frequency 1 time/week   Predicted Duration of Therapy Intervention (days/wks) 8 weeks   Risk & Benefits of therapy have been explained Yes   Patient, Family & other staff in agreement with plan of care Yes   Clinical Impression Comments The pt would benefit from skilled PT treatment due to pain, weakness, difficulty with gait and stairs. The new nubmness in her L foot is concerning. Recommend following up with MD   Education Assessment   Preferred Learning Style Listening   Barriers to Learning No barriers   GOALS   PT Eval Goals 1;2;3   Goal 1   Goal Identifier Stairs   Goal Description Due to improvements in functional strength, the pt will be able to ascend 1 flight of stairs with rail using reciprocal pattern   Target Date 03/20/19   Goal 2   Goal Identifier L hip weakness   Goal Description The pt will improve L hip flexion strength to 4/5, improving ability to complete transfers and mobilty tasks   Target Date 03/20/19   Goal 3   Goal Identifier HEP   Goal Description The pt will be indpendent with an aquatic HEP focusing on improving strength and activity tolerance, improving ability to self manage chronic pain.   Target  Date 03/20/19   Total Evaluation Time   PT Eval, Moderate Complexity Minutes (02436) 30

## 2018-12-28 DIAGNOSIS — J45.30 MILD PERSISTENT ASTHMA WITHOUT COMPLICATION: ICD-10-CM

## 2018-12-28 NOTE — TELEPHONE ENCOUNTER
VENTOLIN HFA INH W/DOS  PUFFS  Last Written Prescription Date:  12/28/2017  Last Fill Quantity: 1 INHALER,  # refills: 3  Last office visit: 9/7/2018 with prescribing provider:    Last refill per Pharmacy   Future Office Visit:      Toribio Delgado CMA ([facility])

## 2018-12-30 RX ORDER — TRIAMCINOLONE ACETONIDE 1 MG/G
CREAM TOPICAL
Qty: 80 G | Refills: 0 | OUTPATIENT
Start: 2018-12-30

## 2018-12-31 ENCOUNTER — ANCILLARY PROCEDURE (OUTPATIENT)
Dept: MAMMOGRAPHY | Facility: CLINIC | Age: 63
End: 2018-12-31
Attending: FAMILY MEDICINE
Payer: COMMERCIAL

## 2018-12-31 DIAGNOSIS — Z12.31 VISIT FOR SCREENING MAMMOGRAM: ICD-10-CM

## 2018-12-31 PROCEDURE — 77067 SCR MAMMO BI INCL CAD: CPT

## 2018-12-31 PROCEDURE — 77063 BREAST TOMOSYNTHESIS BI: CPT

## 2018-12-31 RX ORDER — ALBUTEROL SULFATE 90 UG/1
2 AEROSOL, METERED RESPIRATORY (INHALATION) EVERY 6 HOURS PRN
Qty: 1 INHALER | Refills: 3 | Status: SHIPPED | OUTPATIENT
Start: 2018-12-31 | End: 2019-04-27

## 2018-12-31 NOTE — TELEPHONE ENCOUNTER
Patient is calling to follow up on Rx request. States she would like it today if possible. Wondering if another provider can approve it. The pharmacy closes at 5pm. Please advise.

## 2019-01-21 ENCOUNTER — HOSPITAL ENCOUNTER (OUTPATIENT)
Facility: CLINIC | Age: 64
Discharge: HOME | End: 2019-01-21
Attending: SURGERY | Admitting: SURGERY
Payer: COMMERCIAL

## 2019-01-21 ENCOUNTER — HOSPITAL ENCOUNTER (OUTPATIENT)
Dept: GENERAL RADIOLOGY | Facility: CLINIC | Age: 64
End: 2019-01-21
Attending: PREVENTIVE MEDICINE | Admitting: SURGERY
Payer: COMMERCIAL

## 2019-01-21 VITALS
OXYGEN SATURATION: 95 % | RESPIRATION RATE: 18 BRPM | SYSTOLIC BLOOD PRESSURE: 141 MMHG | HEART RATE: 69 BPM | TEMPERATURE: 98.2 F | DIASTOLIC BLOOD PRESSURE: 70 MMHG

## 2019-01-21 DIAGNOSIS — M51.26 LUMBAR DISC HERNIATION: ICD-10-CM

## 2019-01-21 PROCEDURE — 25000125 ZZHC RX 250: Performed by: PHYSICIAN ASSISTANT

## 2019-01-21 PROCEDURE — 40000863 ZZH STATISTIC RADIOLOGY XRAY, US, CT, MAR, NM

## 2019-01-21 PROCEDURE — 25000132 ZZH RX MED GY IP 250 OP 250 PS 637: Performed by: RADIOLOGY

## 2019-01-21 PROCEDURE — 62323 NJX INTERLAMINAR LMBR/SAC: CPT

## 2019-01-21 PROCEDURE — 25000128 H RX IP 250 OP 636: Performed by: PHYSICIAN ASSISTANT

## 2019-01-21 PROCEDURE — 25500064 ZZH RX 255 OP 636: Performed by: PHYSICIAN ASSISTANT

## 2019-01-21 RX ORDER — NICOTINE POLACRILEX 4 MG
15-30 LOZENGE BUCCAL
Status: CANCELLED | OUTPATIENT
Start: 2019-01-21

## 2019-01-21 RX ORDER — IOPAMIDOL 408 MG/ML
10 INJECTION, SOLUTION INTRATHECAL ONCE
Status: COMPLETED | OUTPATIENT
Start: 2019-01-21 | End: 2019-01-21

## 2019-01-21 RX ORDER — DEXTROSE MONOHYDRATE 25 G/50ML
25-50 INJECTION, SOLUTION INTRAVENOUS
Status: CANCELLED | OUTPATIENT
Start: 2019-01-21

## 2019-01-21 RX ORDER — DEXTROSE MONOHYDRATE 25 G/50ML
25-50 INJECTION, SOLUTION INTRAVENOUS
Status: DISCONTINUED | OUTPATIENT
Start: 2019-01-21 | End: 2019-01-21

## 2019-01-21 RX ORDER — DIPHENHYDRAMINE HCL 25 MG
25 CAPSULE ORAL ONCE
Status: COMPLETED | OUTPATIENT
Start: 2019-01-21 | End: 2019-01-21

## 2019-01-21 RX ORDER — DEXTROSE MONOHYDRATE 25 G/50ML
25-50 INJECTION, SOLUTION INTRAVENOUS
Status: DISCONTINUED | OUTPATIENT
Start: 2019-01-21 | End: 2019-01-21 | Stop reason: HOSPADM

## 2019-01-21 RX ORDER — DEXAMETHASONE SODIUM PHOSPHATE 10 MG/ML
10 INJECTION, SOLUTION INTRAMUSCULAR; INTRAVENOUS ONCE
Status: COMPLETED | OUTPATIENT
Start: 2019-01-21 | End: 2019-01-21

## 2019-01-21 RX ORDER — NICOTINE POLACRILEX 4 MG
15-30 LOZENGE BUCCAL
Status: DISCONTINUED | OUTPATIENT
Start: 2019-01-21 | End: 2019-01-21 | Stop reason: HOSPADM

## 2019-01-21 RX ORDER — NICOTINE POLACRILEX 4 MG
15-30 LOZENGE BUCCAL
Status: DISCONTINUED | OUTPATIENT
Start: 2019-01-21 | End: 2019-01-21

## 2019-01-21 RX ADMIN — DEXAMETHASONE SODIUM PHOSPHATE 20 MG: 10 INJECTION, SOLUTION INTRAMUSCULAR; INTRAVENOUS at 13:44

## 2019-01-21 RX ADMIN — IOPAMIDOL 1 ML: 408 INJECTION, SOLUTION INTRATHECAL at 13:45

## 2019-01-21 RX ADMIN — DIPHENHYDRAMINE HYDROCHLORIDE 25 MG: 25 CAPSULE ORAL at 14:29

## 2019-01-21 RX ADMIN — LIDOCAINE HYDROCHLORIDE 5 ML: 10 INJECTION, SOLUTION EPIDURAL; INFILTRATION; INTRACAUDAL; PERINEURAL at 13:45

## 2019-01-21 NOTE — IP AVS SNAPSHOT
[place] [address] [place] [address]  Phone:  [phone]                                    After Visit Summary   1/21/2019    Denita Flores    MRN: [id number]           After Visit Summary Signature Page    I have received my discharge instructions, and my questions have been answered. I have discussed any challenges I see with this plan with the nurse or doctor.    ..........................................................................................................................................  Patient/Patient Representative Signature      ..........................................................................................................................................  Patient Representative Print Name and Relationship to Patient    ..................................................               ................................................  Date                                   Time    ..........................................................................................................................................  Reviewed by Signature/Title    ...................................................              ..............................................  Date                                               Time          22EPIC Rev 08/18

## 2019-01-21 NOTE — DISCHARGE INSTRUCTIONS
Steroid Injection Discharge Instructions     After you go home:      You may resume your normal diet.    Care of Puncture Site:      If you have a bandaid on your puncture site, you may remove it the next morning    You may shower tomorrow    No bath tubs, whirlpools or swimming for at least 3 days     Activity:      You may go back to normal activity in 24 hours    You should let pain be your guide as to the extent of your activities    Maintain any activity limitations as ordered by your provider    Do NOT drive a vehicle until tomorrow    Medicines:      You may resume all medications    Resume your Warfarin/Coumadin at your regular dose today. Follow up with your provider to have your INR rechecked    Resume your Platelet Inhibitors and Aspirin tomorrow at your regular dose    For minor pain, you may take Acetaminophen (Tylenol) or Ibuprofen (Advil)    Pain:       You may experience increased or different pain over the next 24-48 hours    For the next 48 hrs - you may use ice packs for discomfort     Call your primary care doctor if:      You have severe pain that does not improve with pain medication    You have chills or a fever greater than 101 F (38 C)    The site is red, swollen, hot or tender    New problems with your bowel or bladder    Any questions or concerns    Other Instructions:      New numbness down your leg post injection is temporary and may last for up to 6 hours. You may need assistance with activity until your leg has normal sensation.    If you are diabetic, monitor your blood sugar closely. Contact the provider who manages your diabetes to help you control your blood sugar if needed.    For Your Information:      A steroid was injected to help decrease swelling and may help to reduce pain. It may take up to 7-10 days to obtain full results.    Some patients will get lasting relief from a single injection. Others may require up to 3 injections to get results. If you have more than one  steroid injection, they should be given 2 weeks apart.    Side effects of your steroid injection are mild and will go away in 2-3 days  - Insomnia  - Heartburn  - Flushed face  - Water retention  - Increased appetite  - Increased blood sugar      If you have questions call:        [facility] Radiology Dept @ [phone]      The provider who performed your procedure was Dr Etienne.    IV Contrast  Allergic Reaction   Procedure: Epidural Steroid Injection.  Contrast Type:  Isovue  Amt: 1-2 cc  Reaction: Hives and itching.  Treated with: Oral Benadryl.    When you get home:    Drink 4-6 large glasses of fluid over the next two hours (unless your provider has asked you to limit fluids). This will help your kidneys remove the contrast fluid from your body.    If your symptoms continue, take 25-50 mg of Benadryl (diphenhydramine) @ 6:30 PM. Take the benadryl every 4 to 6 hours until your symptoms improve.    This drug may make you drowsy. Do not drive for 8 hours after taking Benadryl.    For future exams that use IV contrast dye:      Tell all your care team providers that you are allergic to IV contrast dye    You will need to be premedicated with steroids and/or Benadryl before receiving the IV contrast dye    Call your primary care provider if:      You have new symptoms    Symptoms last more than 48 hours    Symptoms get worse      Call 911 if you have sudden throat tightness or breathing problems.    If you have questions call:          [facility] Radiology Dept @ [phone]

## 2019-01-21 NOTE — IP AVS SNAPSHOT
MRN:[id number]                      After Visit Summary   1/21/2019    Denita Flores    MRN: [id number]           Visit Information        Department      1/21/2019 12:07 PM [facility]          Review of your medicines      UNREVIEWED medicines. Ask your doctor about these medicines       Dose / Directions   albuterol 108 (90 Base) MCG/ACT inhaler  Commonly known as:  PROAIR HFA/PROVENTIL HFA/VENTOLIN HFA  Used for:  Mild persistent asthma without complication      Dose:  2 puff  Inhale 2 puffs into the lungs every 6 hours as needed for shortness of breath / dyspnea  Quantity:  1 Inhaler  Refills:  3     D3 ADULT PO      Dose:  2000 Units  Take 2,000 Units by mouth daily.  Refills:  0     fluticasone 50 MCG/ACT nasal spray  Commonly known as:  FLONASE  Used for:  Acute seasonal allergic rhinitis, unspecified trigger      Dose:  1-2 spray  Spray 1-2 sprays into both nostrils daily  Quantity:  1 Bottle  Refills:  11     HYDROcodone-acetaminophen 5-325 MG tablet  Commonly known as:  NORCO      Dose:  1 tablet  Take 1 tablet by mouth every 6 hours as needed for severe pain  Quantity:  10 tablet  Refills:  0     ketoconazole 2 % external cream  Commonly known as:  NIZORAL  Used for:  Intertrigo      Apply topically 2 times daily  Quantity:  15 g  Refills:  1     levothyroxine 112 MCG tablet  Commonly known as:  SYNTHROID/LEVOTHROID  Used for:  Postablative hypothyroidism      Dose:  112 mcg  Take 1 tablet (112 mcg) by mouth daily  Quantity:  90 tablet  Refills:  3     MEGARED OMEGA-3 KRILL  MG Caps      Dose:  1 capsule  Take 1 capsule by mouth daily  Refills:  0     methocarbamol 750 MG tablet  Commonly known as:  ROBAXIN  Used for:  Acute left-sided low back pain with left-sided sciatica      Dose:  750 mg  Take 1 tablet (750 mg) by mouth 4 times daily as needed for muscle spasms  Quantity:  30 tablet  Refills:  0     montelukast 10 MG tablet  Commonly known as:   SINGULAIR  Used for:  Mild persistent asthma without complication      Dose:  10 mg  Take 1 tablet (10 mg) by mouth At Bedtime  Quantity:  90 tablet  Refills:  3     triamcinolone 0.1 % external cream  Commonly known as:  KENALOG  Used for:  Allergic contact dermatitis, unspecified trigger      Apply sparingly to affected area three times daily as needed  Quantity:  80 g  Refills:  0     vitamin B-12 100 MCG tablet  Commonly known as:  CYANOCOBALAMIN      Dose:  100 mcg  Take 100 mcg by mouth daily.  Refills:  0        CONTINUE these medicines which have NOT CHANGED       Dose / Directions   order for DME  Used for:  Spider veins of both lower extremities      Equipment being ordered: compression stockings- LARGE, 15mm HG, THIGH HIGH  Quantity:  2 Units  Refills:  0              Protect others around you: Learn how to safely use, store and throw away your medicines at www.disposemymeds.org.       Follow-ups after your visit       Your next 10 appointments already scheduled    Feb 25, 2019 11:15 AM CST  Pool Treatment with Erin Dill PT  [facility] ([facility]) [address] [address]  [phone]   Mar 04, 2019 11:15 AM CST  Pool Treatment with Erin Dill PT  [facility] ([facility]) [address] [address]  [phone]   Mar 11, 2019 11:15 AM CDT  Pool Treatment with Erin Dill PT  [facility] ([facility]) [address] [address]  [phone]   Mar 18, 2019 11:15 AM CDT  Pool Treatment with Erin Dill PT  [facility] ([facility]) [address] [address]  [phone]   Mar 25, 2019 11:15 AM CDT  Pool Treatment with Erin Dill PT  [facility] ([facility]) [address] [address]  [phone]   Apr 01,  2019 11:15 AM CDT  Pool Treatment with Erin Michaelafsaneh, PT  [facility] ([facility]) [address] [facility] [address]  [phone]   Apr 08, 2019 11:15 AM CDT  Pool Treatment with Erin Dill, PT  [facility] ([facility]) [address] [facility] [address]  [phone]   Apr 15, 2019 11:15 AM CDT  Pool Treatment with Erin Dill, PT  [facility] ([facility]) [address] [facility] [address]  [phone]   Apr 22, 2019 11:15 AM CDT  Pool Treatment with Erin Dill, PT  [facility] ([facility]) [address] [facility] [address]  [phone]   Apr 29, 2019 11:15 AM CDT  Pool Treatment with Erin Dill, PT  [facility] ([facility]) [address] [facility] [address]  [phone]      Care Instructions       Further instructions from your care team       Steroid Injection Discharge Instructions     After you go home:      You may resume your normal diet.    Care of Puncture Site:      If you have a bandaid on your puncture site, you may remove it the next morning    You may shower tomorrow    No bath tubs, whirlpools or swimming for at least 3 days     Activity:      You may go back to normal activity in 24 hours    You should let pain be your guide as to the extent of your activities    Maintain any activity limitations as ordered by your provider    Do NOT drive a vehicle until tomorrow    Medicines:      You may resume all medications    Resume your Warfarin/Coumadin at your regular dose today. Follow up with your provider to have your INR rechecked    Resume your Platelet Inhibitors and Aspirin tomorrow at your regular dose    For minor pain, you may take Acetaminophen (Tylenol) or Ibuprofen (Advil)    Pain:       You may experience increased or different pain over  the next 24-48 hours    For the next 48 hrs - you may use ice packs for discomfort     Call your primary care doctor if:      You have severe pain that does not improve with pain medication    You have chills or a fever greater than 101 F (38 C)    The site is red, swollen, hot or tender    New problems with your bowel or bladder    Any questions or concerns    Other Instructions:      New numbness down your leg post injection is temporary and may last for up to 6 hours. You may need assistance with activity until your leg has normal sensation.    If you are diabetic, monitor your blood sugar closely. Contact the provider who manages your diabetes to help you control your blood sugar if needed.    For Your Information:      A steroid was injected to help decrease swelling and may help to reduce pain. It may take up to 7-10 days to obtain full results.    Some patients will get lasting relief from a single injection. Others may require up to 3 injections to get results. If you have more than one steroid injection, they should be given 2 weeks apart.    Side effects of your steroid injection are mild and will go away in 2-3 days  - Insomnia  - Heartburn  - Flushed face  - Water retention  - Increased appetite  - Increased blood sugar      If you have questions call:        [facility] Radiology Dept @ [phone]      The provider who performed your procedure was Dr Etienne.    IV Contrast  Allergic Reaction   Procedure: Epidural Steroid Injection.  Contrast Type:  Isovue  Amt: 1-2 cc  Reaction: Hives and itching.  Treated with: Oral Benadryl.    When you get home:    Drink 4-6 large glasses of fluid over the next two hours (unless your provider has asked you to limit fluids). This will help your kidneys remove the contrast fluid from your body.    If your symptoms continue, take 25-50 mg of Benadryl (diphenhydramine) @ 6:30 PM. Take the benadryl every 4 to 6 hours until your symptoms improve.    This drug  may make you drowsy. Do not drive for 8 hours after taking Benadryl.    For future exams that use IV contrast dye:      Tell all your care team providers that you are allergic to IV contrast dye    You will need to be premedicated with steroids and/or Benadryl before receiving the IV contrast dye    Call your primary care provider if:      You have new symptoms    Symptoms last more than 48 hours    Symptoms get worse      Call 911 if you have sudden throat tightness or breathing problems.    If you have questions call:          [facility] Radiology Dept @ [phone]          Additional Information About Your Visit       Care EveryWhere ID    This is your Care EveryWhere ID. This could be used by other organizations to access your [facility] medical records  [id number]       Your Vitals Were     Blood Pressure   141/70    Pulse   69    Temperature   98.2  F (36.8  C)    Respirations   18    Pulse Oximetry   95%          Primary Care Provider Office Phone # Fax #    Gee Hitchcock [phone]      Equal Access to Services    [facility] : Hadii aad ku hadasho Soomaali, waaxda luqadaha, qaybta kaalmada adeegyada, waxay jessica golden . So [facility] [phone].    ATENCIÓN: Si habla español, tiene a moore disposición servicios gratuitos de asistencia lingüística. Llame al [phone].    We comply with applicable federal civil rights laws and [place] laws. We do not discriminate on the basis of race, color, national origin, age, disability, sex, sexual orientation, or gender identity.           Thank you!    Thank you for choosing [facility] for your care. Our goal is always to provide you with excellent care. Hearing back from our patients is one way we can continue to improve our services. Please take a few minutes to complete the written survey that you may receive in the mail after you visit with us. Thank you!            Medication List      Medications           Morning Afternoon Evening Bedtime As Needed    order for DME  INSTRUCTIONS:  Equipment being ordered: compression stockings- LARGE, 15mm HG, THIGH HIGH                       ASK your doctor about these medications          Morning Afternoon Evening Bedtime As Needed    albuterol 108 (90 Base) MCG/ACT inhaler  Also known as:  PROAIR HFA/PROVENTIL HFA/VENTOLIN HFA  INSTRUCTIONS:  Inhale 2 puffs into the lungs every 6 hours as needed for shortness of breath / dyspnea                     D3 ADULT PO  INSTRUCTIONS:  Take 2,000 Units by mouth daily.                     fluticasone 50 MCG/ACT nasal spray  Also known as:  FLONASE  INSTRUCTIONS:  Spray 1-2 sprays into both nostrils daily                     HYDROcodone-acetaminophen 5-325 MG tablet  Also known as:  NORCO  INSTRUCTIONS:  Take 1 tablet by mouth every 6 hours as needed for severe pain                     ketoconazole 2 % external cream  Also known as:  NIZORAL  INSTRUCTIONS:  Apply topically 2 times daily                     levothyroxine 112 MCG tablet  Also known as:  SYNTHROID/LEVOTHROID  INSTRUCTIONS:  Take 1 tablet (112 mcg) by mouth daily                     MEGARED OMEGA-3 KRILL  MG Caps  INSTRUCTIONS:  Take 1 capsule by mouth daily                     methocarbamol 750 MG tablet  Also known as:  ROBAXIN  INSTRUCTIONS:  Take 1 tablet (750 mg) by mouth 4 times daily as needed for muscle spasms                     montelukast 10 MG tablet  Also known as:  SINGULAIR  INSTRUCTIONS:  Take 1 tablet (10 mg) by mouth At Bedtime                     triamcinolone 0.1 % external cream  Also known as:  KENALOG  INSTRUCTIONS:  Apply sparingly to affected area three times daily as needed                     vitamin B-12 100 MCG tablet  Also known as:  CYANOCOBALAMIN  INSTRUCTIONS:  Take 100 mcg by mouth daily.

## 2019-01-21 NOTE — PROGRESS NOTES
Here with friend for ANTON.. Explained pre procedure and gave discharge instructions with verbalized understanding.  Resting on cart with call light in reach.

## 2019-01-21 NOTE — PROGRESS NOTES
RADIOLOGY PROCEDURE NOTE  Patient name: Denita Flores  MRN: [id number]  : 1955    Pre-procedure diagnosis: Left back pain  Post-procedure diagnosis: Same    Procedure Date/Time: 2019  1:54 PM  Procedure: Left L4-L5 TFESI  Estimated blood loss: None  Specimen(s) collected with description: none  The patient tolerated the procedure well with no immediate complications.  Significant findings:none    See imaging dictation for procedural details.    Provider name: Ten Carrillo  Assistant(s):None

## 2019-01-21 NOTE — PROGRESS NOTES
Shortly after return to room developed some hives on arms and back with itching.  Called Dr Etienne and got order for po Benadryl which was given with lessening of hives.  Encouraged liquids and updated AVS with verbalized understanding form pt re: pre medication in the future.  Denies pain post ANTON.  See flow sheet.  Care Suites Discharge Summary    Discharge Criteria:   Discharge Criteria met per MD orders: Yes.   Vital signs stable.     Pt demonstrates ability to ambulate safely: Yes.  (See discharge questionnaire for additional information)    Discharge instructions & education:   Discharge instructions reviewed with patient and friend. Patient verbalizes  understanding.      Medications:   Patient will be discharging on new medications- Benadryl prn. Patient verbalizes reason for use, start date, and side effects Yes.    Items returned to patient:   Home and hospital acquired medications returned to patient NA   Listed belongings gathered and returned to patient: Yes    Patient discharged to home via w/c with friend.    Lila Ruiz

## 2019-02-25 ENCOUNTER — HOSPITAL ENCOUNTER (OUTPATIENT)
Dept: PHYSICAL THERAPY | Facility: CLINIC | Age: 64
Setting detail: THERAPIES SERIES
End: 2019-02-25
Attending: PREVENTIVE MEDICINE
Payer: COMMERCIAL

## 2019-02-25 PROCEDURE — 97113 AQUATIC THERAPY/EXERCISES: CPT | Mod: GP | Performed by: PHYSICAL THERAPIST

## 2019-02-25 NOTE — PROGRESS NOTES
AQUATIC PHYSICAL THERAPY EXERCISE LOG (TRUNK)  Date  Erin Dill, DPT  2/25/19   Warm Up Ambulation (Forward/Side/Back/March/All) X 2 Laps each             Stretching/ROM Chin Tucks     CROM (Flex/Ext/SB/Rot/All)     Shoulder (Shrugs/Rolls)     Scapular (Retraction/Depression)     Upper Trunk (Levator/Scalene/Upper Trapezius)     Pec Stretch (Unilateral/Bilateral)     Posterior Shoulder     Gluteal 30 sec x 2    Hamstring (On Step/Cuff) 30 sec x 2    Calf (In Hole/At Wall) 30 sec x 2    Quad     Hip Flexor (Kneel)     Piriformis (Seated/Stand)     Trunk ROM (Flex/Ext/SB/Rot/All)     BAD RAGAZ              Strengthening Abdominal Sets/ Pelvic Tilt Education prior to initiation of exercises about proper posture and positioning with manual cues for core positioning/tightening    Shoulder (Flex/Ext, Abd/Add, IR/ER, Circles, Alternation flex/ext for core) X 10 with closed paddles away from wall, cues for core activiation    Horizon (Abd/Add, Diagonals) As above    Rowing Arms     UE PNF (D1/D2)     Abdominal Sets (Push/Pull, side to side, push down with board)     Squat X 10 with wall support cued for pushing through heels    Lunge Squat     Hip (Flex/Ext, Abd/Add, single leg bike, circles, figure 8, All) X 10 with wall support 1 UE    Heel/Toe Raises     Step Ups (Forward, Lateral)     Noodle Push Downs with UE(B UE/1 UE) for core strengthening X 10 in wall sit with yellow noodle    Noodle Push Downs with LE X 10  with yellow noodle    Stir the Pot/Punches with Dumbells     Sit to Stand at Bench     Prone Plank on step     Side Plank on step              Aerobic Fast Walking     Bike/Ski/Noble/All              Balance Narrow Base of Support     Tandem Stand     Single Leg Stance     Heel/Toe Walking     3 Step and Stop     Braiding                   Cool Down Ambulation     Virgilina Dangle     Whirlpool

## 2019-03-07 ENCOUNTER — TELEPHONE (OUTPATIENT)
Dept: ORTHOPEDICS | Facility: CLINIC | Age: 64
End: 2019-03-07

## 2019-03-07 NOTE — TELEPHONE ENCOUNTER
[facility] Call Center    Phone Message    May a detailed message be left on voicemail: yes    Reason for Call: Patient states she did not submit a prior auth to [employer] for the epidural injections she had on 11/6/2018 at  and 1/21/2019 at [facility] and needs to discuss the appeal process.     Patient states the physical therapist she was referred to, Erin Mayer at [facility], is not in network so she has not been going to therapy sessions.    Per protocols, writer scheduled patient with a [facility] visit type (back epidural follow up), but she would like to discuss possible sooner appointment.    Please call patient to discuss above messages at [phone]. Best time to reach her is between 9:45am - 2:00pm.    Action Taken: Message routed to:  Adult Clinics: Orthopedics p [phone]

## 2019-03-07 NOTE — TELEPHONE ENCOUNTER
Noted, Will OK earlier appointment and call patient back during appropriate time as she prefers in message.

## 2019-03-08 NOTE — TELEPHONE ENCOUNTER
3/8 called and left voicemail. Instructed patient to call [phone] to be seen sooner. She is currently scheduled to see Dr. Santos on 3/25/19 but we would have times on 3/18. The openings currently on 3/18 are 7:20, 8:00, 8:40, 10:40.    Jeana Mann   Procedure    Ortho/Sports Med/Ent/Eye   [facility]   [phone]

## 2019-03-18 ENCOUNTER — OFFICE VISIT (OUTPATIENT)
Dept: ORTHOPEDICS | Facility: CLINIC | Age: 64
End: 2019-03-18
Payer: COMMERCIAL

## 2019-03-18 VITALS
SYSTOLIC BLOOD PRESSURE: 142 MMHG | DIASTOLIC BLOOD PRESSURE: 74 MMHG | HEIGHT: 67 IN | WEIGHT: 244 LBS | BODY MASS INDEX: 38.3 KG/M2 | HEART RATE: 72 BPM

## 2019-03-18 DIAGNOSIS — M51.26 LUMBAR HERNIATED DISC: Primary | ICD-10-CM

## 2019-03-18 DIAGNOSIS — M54.16 LUMBAR RADICULAR PAIN: ICD-10-CM

## 2019-03-18 PROCEDURE — 99214 OFFICE O/P EST MOD 30 MIN: CPT | Performed by: PREVENTIVE MEDICINE

## 2019-03-18 RX ORDER — DICLOFENAC SODIUM 75 MG/1
75 TABLET, DELAYED RELEASE ORAL 2 TIMES DAILY
Qty: 40 TABLET | Refills: 1 | Status: SHIPPED | OUTPATIENT
Start: 2019-03-18 | End: 2020-03-23

## 2019-03-18 ASSESSMENT — MIFFLIN-ST. JEOR: SCORE: 1689.41

## 2019-03-18 NOTE — PROGRESS NOTES
HISTORY OF PRESENT ILLNESS  Ms. Flores is a pleasant 63 year old year old female who presents to clinic today for followup after injection ANTON in Jan. Of this year  She had improvement in her ability to walk and use stairs  She states that she has gone to PT 2 times and has some sciatica for a day or two following those sessions, but overall is feeling better in her left knee pain and foot pain resolved  She still has on/off daily low back pain that sometimes shoots into her legs but this is not constant      MEDICAL HISTORY  Patient Active Problem List   Diagnosis     Graves disease     Obesity     Diverticulosis of large intestine without hemorrhage     Colon polyps     History of cervical cancer     YELENA (obstructive sleep apnea)     Warts     CARDIOVASCULAR SCREENING; LDL GOAL LESS THAN 130     Hx of herpes zoster keratoconjunctivitis, os     Plantar warts     Stasis dermatitis of both legs     Obesity (BMI 30-39.9)     Postablative hypothyroidism     Other specified hypothyroidism,post ablative     Venous stasis dermatitis of left lower extremity     Vitamin B12 deficiency (non anemic)     Mild persistent asthma without complication     Spider veins of both lower extremities     Seasonal allergic rhinitis     Family history of colon cancer     Epistaxis     Tubular adenoma of colon     Family history of renal cancer     Nephrolithiasis     Gross hematuria     Morbid obesity (H)     Elevated blood pressure reading without diagnosis of hypertension     Polyp of gallbladder, 4mm       Current Outpatient Medications   Medication Sig Dispense Refill     albuterol (PROAIR HFA/PROVENTIL HFA/VENTOLIN HFA) 108 (90 Base) MCG/ACT inhaler Inhale 2 puffs into the lungs every 6 hours as needed for shortness of breath / dyspnea 1 Inhaler 3     Cholecalciferol (D3 ADULT PO) Take 2,000 Units by mouth daily.       cyanocolbalamin (VITAMIN  B-12) 100 MCG tablet Take 100 mcg by mouth daily.       fluticasone (FLONASE) 50 MCG/ACT  "spray Spray 1-2 sprays into both nostrils daily 1 Bottle 11     HYDROcodone-acetaminophen (NORCO) 5-325 MG per tablet Take 1 tablet by mouth every 6 hours as needed for severe pain 10 tablet 0     ketoconazole (NIZORAL) 2 % external cream Apply topically 2 times daily 15 g 1     levothyroxine (SYNTHROID/LEVOTHROID) 112 MCG tablet Take 1 tablet (112 mcg) by mouth daily 90 tablet 3     MEGARED OMEGA-3 KRILL  MG CAPS Take 1 capsule by mouth daily        methocarbamol (ROBAXIN) 750 MG tablet Take 1 tablet (750 mg) by mouth 4 times daily as needed for muscle spasms 30 tablet 0     montelukast (SINGULAIR) 10 MG tablet Take 1 tablet (10 mg) by mouth At Bedtime 90 tablet 3     order for DME Equipment being ordered: compression stockings- LARGE, 15mm HG, THIGH HIGH 2 Units 0     triamcinolone (KENALOG) 0.1 % external cream Apply sparingly to affected area three times daily as needed 80 g 0       Allergies   Allergen Reactions     Contrast Dye Hives and Itching     Isovue with ANTON on 1-21-19     Sulfa Drugs Hives and Swelling     Noted in 10/18/09 ER       Family History   Problem Relation Age of Onset     Glaucoma Mother      Heart Disease Father      Cancer Father 67        kidney cancer      Heart Disease Sister      Cancer Sister 59        colon cancer      Diabetes Sister      Colon Cancer Maternal Aunt      Colon Cancer Paternal Aunt      Glaucoma Maternal Grandmother        Additional medical/Social/Surgical histories reviewed in [facility] and updated as appropriate.     REVIEW OF SYSTEMS (3/18/2019)  10 point ROS of systems including Constitutional, Eyes, Respiratory, Cardiovascular, Gastroenterology, Genitourinary, Integumentary, Musculoskeletal, Psychiatric were all negative except for pertinent positives noted in my HPI.     PHYSICAL EXAM  Vitals:    03/18/19 1041   BP: 142/74   Pulse: 72   Weight: 110.7 kg (244 lb)   Height: 1.702 m (5' 7\")     Vital Signs: /74   Pulse 72   Ht 1.702 m (5' 7\")   Wt 110.7 " kg (244 lb)   BMI 38.22 kg/m   Patient declined being weighed. Body mass index is 38.22 kg/m .    General  - normal appearance, in no obvious distress, overweight  CV  - normal peripheral perfusion  Pulm  - normal respiratory pattern, non-labored  Musculoskeletal - lumbar spine  - stance: normal gait without limp, no obvious leg length discrepancy, normal heel and toe walk  - inspection: normal bone and joint alignment, no obvious scoliosis  - palpation: no paravertebral or bony tenderness  - ROM: flexion today does not exacerbates pain, normal extension, sidebending, rotation  - strength: lower extremities 5/5 in all planes  - special tests:  (-) straight leg raise  (-) slump test  Neuro  - patellar and Achilles DTRs 2+ bilaterally, today, no lower extremity sensory deficit throughout L5 distribution, grossly normal coordination, normal muscle tone  Skin  - no ecchymosis, erythema, warmth, or induration, no obvious rash  Psych  - interactive, appropriate, normal mood and affect  ASSESSMENT & PLAN  64 yo female with lumbar ddd, radicular pain, improving  Will plan on repeat MITCH in 1 month's time  F/u in 3 weeks  Will obtain pre approval for Mitch  Given number for financial counseling  voltaren bid PRN  Cont. Pool therapy as she is able  No plan for surgery consult yet     Frankie Santos MD, CAQSM

## 2019-03-18 NOTE — LETTER
3/18/2019         RE: Denita Flores  [address]  [facility] [address]        Dear Colleague,    Thank you for referring your patient, Denita Folres, to the [facility]. Please see a copy of my visit note below.    HISTORY OF PRESENT ILLNESS  Ms. Flores is a pleasant 63 year old year old female who presents to clinic today for followup after injection ANTON in Jan. Of this year  She had improvement in her ability to walk and use stairs  She states that she has gone to PT 2 times and has some sciatica for a day or two following those sessions, but overall is feeling better in her left knee pain and foot pain resolved  She still has on/off daily low back pain that sometimes shoots into her legs but this is not constant      MEDICAL HISTORY  Patient Active Problem List   Diagnosis     Graves disease     Obesity     Diverticulosis of large intestine without hemorrhage     Colon polyps     History of cervical cancer     YELENA (obstructive sleep apnea)     Warts     CARDIOVASCULAR SCREENING; LDL GOAL LESS THAN 130     Hx of herpes zoster keratoconjunctivitis, os     Plantar warts     Stasis dermatitis of both legs     Obesity (BMI 30-39.9)     Postablative hypothyroidism     Other specified hypothyroidism,post ablative     Venous stasis dermatitis of left lower extremity     Vitamin B12 deficiency (non anemic)     Mild persistent asthma without complication     Spider veins of both lower extremities     Seasonal allergic rhinitis     Family history of colon cancer     Epistaxis     Tubular adenoma of colon     Family history of renal cancer     Nephrolithiasis     Gross hematuria     Morbid obesity (H)     Elevated blood pressure reading without diagnosis of hypertension     Polyp of gallbladder, 4mm       Current Outpatient Medications   Medication Sig Dispense Refill     albuterol (PROAIR HFA/PROVENTIL HFA/VENTOLIN HFA) 108 (90 Base) MCG/ACT inhaler Inhale 2 puffs into the lungs every  6 hours as needed for shortness of breath / dyspnea 1 Inhaler 3     Cholecalciferol (D3 ADULT PO) Take 2,000 Units by mouth daily.       cyanocolbalamin (VITAMIN  B-12) 100 MCG tablet Take 100 mcg by mouth daily.       fluticasone (FLONASE) 50 MCG/ACT spray Spray 1-2 sprays into both nostrils daily 1 Bottle 11     HYDROcodone-acetaminophen (NORCO) 5-325 MG per tablet Take 1 tablet by mouth every 6 hours as needed for severe pain 10 tablet 0     ketoconazole (NIZORAL) 2 % external cream Apply topically 2 times daily 15 g 1     levothyroxine (SYNTHROID/LEVOTHROID) 112 MCG tablet Take 1 tablet (112 mcg) by mouth daily 90 tablet 3     MEGARED OMEGA-3 KRILL  MG CAPS Take 1 capsule by mouth daily        methocarbamol (ROBAXIN) 750 MG tablet Take 1 tablet (750 mg) by mouth 4 times daily as needed for muscle spasms 30 tablet 0     montelukast (SINGULAIR) 10 MG tablet Take 1 tablet (10 mg) by mouth At Bedtime 90 tablet 3     order for DME Equipment being ordered: compression stockings- LARGE, 15mm HG, THIGH HIGH 2 Units 0     triamcinolone (KENALOG) 0.1 % external cream Apply sparingly to affected area three times daily as needed 80 g 0       Allergies   Allergen Reactions     Contrast Dye Hives and Itching     Isovue with ANTON on 1-21-19     Sulfa Drugs Hives and Swelling     Noted in 10/18/09 ER       Family History   Problem Relation Age of Onset     Glaucoma Mother      Heart Disease Father      Cancer Father 67        kidney cancer      Heart Disease Sister      Cancer Sister 59        colon cancer      Diabetes Sister      Colon Cancer Maternal Aunt      Colon Cancer Paternal Aunt      Glaucoma Maternal Grandmother        Additional medical/Social/S[facility] and updated as appropriate.     REVIEW OF SYSTEMS (3/18/2019)  10 point ROS of systems including Constitutional, Eyes, Respiratory, Cardiovascular, Gastroenterology, Genitourinary, Integumentary, Musculoskeletal, Psychiatric were all  "negative except for pertinent positives noted in my HPI.     PHYSICAL EXAM  Vitals:    03/18/19 1041   BP: 142/74   Pulse: 72   Weight: 110.7 kg (244 lb)   Height: 1.702 m (5' 7\")     Vital Signs: /74   Pulse 72   Ht 1.702 m (5' 7\")   Wt 110.7 kg (244 lb)   BMI 38.22 kg/m    Patient declined being weighed. Body mass index is 38.22 kg/m .    General  - normal appearance, in no obvious distress, overweight  CV  - normal peripheral perfusion  Pulm  - normal respiratory pattern, non-labored  Musculoskeletal - lumbar spine  - stance: normal gait without limp, no obvious leg length discrepancy, normal heel and toe walk  - inspection: normal bone and joint alignment, no obvious scoliosis  - palpation: no paravertebral or bony tenderness  - ROM: flexion today does not exacerbates pain, normal extension, sidebending, rotation  - strength: lower extremities 5/5 in all planes  - special tests:  (-) straight leg raise  (-) slump test  Neuro  - patellar and Achilles DTRs 2+ bilaterally, today, no lower extremity sensory deficit throughout L5 distribution, grossly normal coordination, normal muscle tone  Skin  - no ecchymosis, erythema, warmth, or induration, no obvious rash  Psych  - interactive, appropriate, normal mood and affect  ASSESSMENT & PLAN  62 yo female with lumbar ddd, radicular pain, improving  Will plan on repeat MITCH in 1 month's time  F/u in 3 weeks  Will obtain pre approval for Mitch  Given number for financial counseling  voltaren bid PRN  Cont. Pool therapy as she is able  No plan for surgery consult yet     Frankie Santos MD, CAQSM    Again, thank you for allowing me to participate in the care of your patient.        Sincerely,        Frankie Santos MD    "

## 2019-04-01 ENCOUNTER — TELEPHONE (OUTPATIENT)
Dept: ORTHOPEDICS | Facility: CLINIC | Age: 64
End: 2019-04-01

## 2019-04-01 NOTE — TELEPHONE ENCOUNTER
Financial Counselor Review:    Procedure to be performed (include CPT code):Yes Xray lumbar epidural injection [id number]      Diagnosis code (include ICD-10 code):Lumbar DDD M54.16    Medication order (include J code):NA    Medication dose and frequency:N/A    Cosmetic procedure/medication:NA    Coverage information only:YES    Coverage and patient financial responsibility information:YES    Does patient need to be contacted by Financial Counselor:YES    Note: Do not use abbreviations and route encounter to Sports Med

## 2019-04-08 NOTE — TELEPHONE ENCOUNTER
Upon review, Xray lumbar epidural injection [id number] CPT 92276 does require PA.     Primary Diagnosis Code: M54.16   Description: Radiculopathy, lumbar region   Date of Service: 4/8/2019 CPT Code: 96653 Description: Injection with guidance L/S Modifier:   Case Number: [id number] Review Date: 4/8/2019 2:10:19 PM Expiration Date: N/A Status: Your case has been sent to Medical Review.

## 2019-04-09 ENCOUNTER — OFFICE VISIT (OUTPATIENT)
Dept: ORTHOPEDICS | Facility: CLINIC | Age: 64
End: 2019-04-09
Payer: COMMERCIAL

## 2019-04-09 VITALS
SYSTOLIC BLOOD PRESSURE: 118 MMHG | HEART RATE: 64 BPM | BODY MASS INDEX: 38.3 KG/M2 | HEIGHT: 67 IN | DIASTOLIC BLOOD PRESSURE: 72 MMHG | WEIGHT: 244 LBS

## 2019-04-09 DIAGNOSIS — M51.16 LUMBAR DISC HERNIATION WITH RADICULOPATHY: Primary | ICD-10-CM

## 2019-04-09 PROCEDURE — 99213 OFFICE O/P EST LOW 20 MIN: CPT | Performed by: PREVENTIVE MEDICINE

## 2019-04-09 ASSESSMENT — MIFFLIN-ST. JEOR: SCORE: 1689.41

## 2019-04-09 NOTE — NURSING NOTE
"Denita Flores's chief complaint for this visit includes:  Chief Complaint   Patient presents with     RECHECK     Follow up for low back pain     PCP: Gee Hitchcock    Referring Provider:  No referring provider defined for this encounter.    /72   Pulse 64   Ht 1.702 m (5' 7\")   Wt 110.7 kg (244 lb)   BMI 38.22 kg/m    Data Unavailable         "

## 2019-04-09 NOTE — PROGRESS NOTES
HISTORY OF PRESENT ILLNESS  Ms. Flores is a pleasant 63 year old year old female who presents to clinic today low back pain that radiates into her legs  She last had an injection 3 months prior. Which helps somewhat, but pain is coming back  She would like to know if she can get another injection    MEDICAL HISTORY  Patient Active Problem List   Diagnosis     Graves disease     Obesity     Diverticulosis of large intestine without hemorrhage     Colon polyps     History of cervical cancer     YELENA (obstructive sleep apnea)     Warts     CARDIOVASCULAR SCREENING; LDL GOAL LESS THAN 130     Hx of herpes zoster keratoconjunctivitis, os     Plantar warts     Stasis dermatitis of both legs     Obesity (BMI 30-39.9)     Postablative hypothyroidism     Other specified hypothyroidism,post ablative     Venous stasis dermatitis of left lower extremity     Vitamin B12 deficiency (non anemic)     Mild persistent asthma without complication     Spider veins of both lower extremities     Seasonal allergic rhinitis     Family history of colon cancer     Epistaxis     Tubular adenoma of colon     Family history of renal cancer     Nephrolithiasis     Gross hematuria     Morbid obesity (H)     Elevated blood pressure reading without diagnosis of hypertension     Polyp of gallbladder, 4mm       Current Outpatient Medications   Medication Sig Dispense Refill     albuterol (PROAIR HFA/PROVENTIL HFA/VENTOLIN HFA) 108 (90 Base) MCG/ACT inhaler Inhale 2 puffs into the lungs every 6 hours as needed for shortness of breath / dyspnea 1 Inhaler 3     Cholecalciferol (D3 ADULT PO) Take 2,000 Units by mouth daily.       cyanocolbalamin (VITAMIN  B-12) 100 MCG tablet Take 100 mcg by mouth daily.       diclofenac (VOLTAREN) 75 MG EC tablet Take 1 tablet (75 mg) by mouth 2 times daily 40 tablet 1     fluticasone (FLONASE) 50 MCG/ACT spray Spray 1-2 sprays into both nostrils daily 1 Bottle 11     HYDROcodone-acetaminophen (NORCO) 5-325 MG per  "tablet Take 1 tablet by mouth every 6 hours as needed for severe pain 10 tablet 0     ketoconazole (NIZORAL) 2 % external cream Apply topically 2 times daily 15 g 1     levothyroxine (SYNTHROID/LEVOTHROID) 112 MCG tablet TAKE 1 TABLET(112 MCG) BY MOUTH DAILY 90 tablet 0     levothyroxine (SYNTHROID/LEVOTHROID) 112 MCG tablet Take 1 tablet (112 mcg) by mouth daily 90 tablet 3     MEGARED OMEGA-3 KRILL  MG CAPS Take 1 capsule by mouth daily        methocarbamol (ROBAXIN) 750 MG tablet Take 1 tablet (750 mg) by mouth 4 times daily as needed for muscle spasms 30 tablet 0     montelukast (SINGULAIR) 10 MG tablet Take 1 tablet (10 mg) by mouth At Bedtime 90 tablet 3     order for DME Equipment being ordered: compression stockings- LARGE, 15mm HG, THIGH HIGH 2 Units 0     triamcinolone (KENALOG) 0.1 % external cream Apply sparingly to affected area three times daily as needed 80 g 0       Allergies   Allergen Reactions     Contrast Dye Hives and Itching     Isovue with ANTON on 1-21-19     Sulfa Drugs Hives and Swelling     Noted in 10/18/09 ER       Family History   Problem Relation Age of Onset     Glaucoma Mother      Heart Disease Father      Cancer Father 67        kidney cancer      Heart Disease Sister      Cancer Sister 59        colon cancer      Diabetes Sister      Colon Cancer Maternal Aunt      Colon Cancer Paternal Aunt      Glaucoma Maternal Grandmother        Additional medical/Social/Surgical histories reviewed in [facility] and updated as appropriate.     REVIEW OF SYSTEMS (4/9/2019)  10 point ROS of systems including Constitutional, Eyes, Respiratory, Cardiovascular, Gastroenterology, Genitourinary, Integumentary, Musculoskeletal, Psychiatric were all negative except for pertinent positives noted in my HPI.     PHYSICAL EXAM  Vitals:    04/09/19 1035   BP: 118/72   Pulse: 64   Weight: 110.7 kg (244 lb)   Height: 1.702 m (5' 7\")       Vital Signs: /72   Pulse 64   Ht 1.702 m (5' 7\")   Wt 110.7 kg " (244 lb)   BMI 38.22 kg/m   Patient declined being weighed. Body mass index is 38.22 kg/m .    General  - normal appearance, in no obvious distress  CV  - normal peripheral perfusion  Pulm  - normal respiratory pattern, non-labored  Musculoskeletal - lumbar spine  - stance: normal gait without limp, no obvious leg length discrepancy, normal heel and toe walk  - inspection: normal bone and joint alignment, no obvious scoliosis  - palpation: no paravertebral or bony tenderness  - ROM: flexion exacerbates pain, normal extension, sidebending, rotation  - strength: lower extremities 5/5 in all planes  - special tests:  (+) straight leg raise- bilateral low back pain  (+) slump test  Neuro  - patellar and Achilles DTRs 2+ bilaterally, some lower extremity sensory deficit throughout L5 distribution, grossly normal coordination, normal muscle tone  Skin  - no ecchymosis, erythema, warmth, or induration, no obvious rash  Psych  - interactive, appropriate, normal mood and affect    ASSESSMENT & PLAN  64 yo female with lumbar ddd, radicular pain not improved  F/u in 2-3 weeks  Ordered another ANTON  Cont. PT  Cont. heidi Santos MD, CAQSM

## 2019-04-09 NOTE — PATIENT INSTRUCTIONS
Thanks for coming today.  [facility]  [address] [place] [address]    To schedule future appointments in Ortho Clinic, you may call [phone].    To schedule ordered imaging by your provider:   Call Central Imaging [phone]    To schedule an injection ordered by your provider:  Call Central Imaging Injection scheduling line: [phone]  [employer] available online at:  [email or web address]    Please call if any further questions or concerns ([phone]).  Clinic hours 8 am to 5 pm.    Return to clinic (call) if symptoms worsen or fail to improve.

## 2019-04-09 NOTE — LETTER
4/9/2019         RE: Denita Flores  [address]  [facility] [address]        Dear Colleague,    Thank you for referring your patient, Denita Flores, to the [facility]. Please see a copy of my visit note below.    HISTORY OF PRESENT ILLNESS  Ms. Flores is a pleasant 63 year old year old female who presents to clinic today low back pain that radiates into her legs  She last had an injection 3 months prior. Which helps somewhat, but pain is coming back  She would like to know if she can get another injection    MEDICAL HISTORY  Patient Active Problem List   Diagnosis     Graves disease     Obesity     Diverticulosis of large intestine without hemorrhage     Colon polyps     History of cervical cancer     YELENA (obstructive sleep apnea)     Warts     CARDIOVASCULAR SCREENING; LDL GOAL LESS THAN 130     Hx of herpes zoster keratoconjunctivitis, os     Plantar warts     Stasis dermatitis of both legs     Obesity (BMI 30-39.9)     Postablative hypothyroidism     Other specified hypothyroidism,post ablative     Venous stasis dermatitis of left lower extremity     Vitamin B12 deficiency (non anemic)     Mild persistent asthma without complication     Spider veins of both lower extremities     Seasonal allergic rhinitis     Family history of colon cancer     Epistaxis     Tubular adenoma of colon     Family history of renal cancer     Nephrolithiasis     Gross hematuria     Morbid obesity (H)     Elevated blood pressure reading without diagnosis of hypertension     Polyp of gallbladder, 4mm       Current Outpatient Medications   Medication Sig Dispense Refill     albuterol (PROAIR HFA/PROVENTIL HFA/VENTOLIN HFA) 108 (90 Base) MCG/ACT inhaler Inhale 2 puffs into the lungs every 6 hours as needed for shortness of breath / dyspnea 1 Inhaler 3     Cholecalciferol (D3 ADULT PO) Take 2,000 Units by mouth daily.       cyanocolbalamin (VITAMIN  B-12) 100 MCG tablet Take 100 mcg by mouth daily.        diclofenac (VOLTAREN) 75 MG EC tablet Take 1 tablet (75 mg) by mouth 2 times daily 40 tablet 1     fluticasone (FLONASE) 50 MCG/ACT spray Spray 1-2 sprays into both nostrils daily 1 Bottle 11     HYDROcodone-acetaminophen (NORCO) 5-325 MG per tablet Take 1 tablet by mouth every 6 hours as needed for severe pain 10 tablet 0     ketoconazole (NIZORAL) 2 % external cream Apply topically 2 times daily 15 g 1     levothyroxine (SYNTHROID/LEVOTHROID) 112 MCG tablet TAKE 1 TABLET(112 MCG) BY MOUTH DAILY 90 tablet 0     levothyroxine (SYNTHROID/LEVOTHROID) 112 MCG tablet Take 1 tablet (112 mcg) by mouth daily 90 tablet 3     MEGARED OMEGA-3 KRILL  MG CAPS Take 1 capsule by mouth daily        methocarbamol (ROBAXIN) 750 MG tablet Take 1 tablet (750 mg) by mouth 4 times daily as needed for muscle spasms 30 tablet 0     montelukast (SINGULAIR) 10 MG tablet Take 1 tablet (10 mg) by mouth At Bedtime 90 tablet 3     order for DME Equipment being ordered: compression stockings- LARGE, 15mm HG, THIGH HIGH 2 Units 0     triamcinolone (KENALOG) 0.1 % external cream Apply sparingly to affected area three times daily as needed 80 g 0       Allergies   Allergen Reactions     Contrast Dye Hives and Itching     Isovue with ANTON on 1-21-19     Sulfa Drugs Hives and Swelling     Noted in 10/18/09 ER       Family History   Problem Relation Age of Onset     Glaucoma Mother      Heart Disease Father      Cancer Father 67        kidney cancer      Heart Disease Sister      Cancer Sister 59        colon cancer      Diabetes Sister      Colon Cancer Maternal Aunt      Colon Cancer Paternal Aunt      Glaucoma Maternal Grandmother        Additional medical/Social/Surgical histories reviewed in [facility] and updated as appropriate.     REVIEW OF SYSTEMS (4/9/2019)  10 point ROS of systems including Constitutional, Eyes, Respiratory, Cardiovascular, Gastroenterology, Genitourinary, Integumentary, Musculoskeletal, Psychiatric were all negative  "except for pertinent positives noted in my HPI.     PHYSICAL EXAM  Vitals:    04/09/19 1035   BP: 118/72   Pulse: 64   Weight: 110.7 kg (244 lb)   Height: 1.702 m (5' 7\")       Vital Signs: /72   Pulse 64   Ht 1.702 m (5' 7\")   Wt 110.7 kg (244 lb)   BMI 38.22 kg/m    Patient declined being weighed. Body mass index is 38.22 kg/m .    General  - normal appearance, in no obvious distress  CV  - normal peripheral perfusion  Pulm  - normal respiratory pattern, non-labored  Musculoskeletal - lumbar spine  - stance: normal gait without limp, no obvious leg length discrepancy, normal heel and toe walk  - inspection: normal bone and joint alignment, no obvious scoliosis  - palpation: no paravertebral or bony tenderness  - ROM: flexion exacerbates pain, normal extension, sidebending, rotation  - strength: lower extremities 5/5 in all planes  - special tests:  (+) straight leg raise- bilateral low back pain  (+) slump test  Neuro  - patellar and Achilles DTRs 2+ bilaterally, some lower extremity sensory deficit throughout L5 distribution, grossly normal coordination, normal muscle tone  Skin  - no ecchymosis, erythema, warmth, or induration, no obvious rash  Psych  - interactive, appropriate, normal mood and affect    ASSESSMENT & PLAN  64 yo female with lumbar ddd, radicular pain not improved  F/u in 2-3 weeks  Ordered another ANTON  Cont. PT  Cont. heidi Santos MD, CAQSM    Again, thank you for allowing me to participate in the care of your patient.        Sincerely,        Frankie Santos MD    "

## 2019-04-11 NOTE — TELEPHONE ENCOUNTER
4/11/19: X-ray Lumbar epidural injection CPT: 88614   Authorization Number:  [id number]    Case Number:  [id number]    Status:  Approved    Approval Date:  4/11/2019 12:00:00 AM      4/11/19: Patient called inquiring on PA for Maria R for  X-ray Lumbar  epidural injection CPT: 25839. I reached out to Prabhjot and was advised by  Bess MORRIS (Rep) that the PA that was approved on 4/10/19 is not site specific.  Patient may go anywhere she likes.  Reached out to the FC team with [facility] at [phone] and gave them the Auth # [id number]

## 2019-04-18 ENCOUNTER — TELEPHONE (OUTPATIENT)
Dept: ORTHOPEDICS | Facility: CLINIC | Age: 64
End: 2019-04-18

## 2019-04-18 NOTE — TELEPHONE ENCOUNTER
[facility] Call Center    Phone Message    May a detailed message be left on voicemail: yes    Reason for Call: Other: patient has an epidural injection scheduled on Monday, April 22nd.  She states the last injection she had she had hives from the contrast.  She is requesting Dr. Santos put that information in her chart so they are aware that this happened.  She is wondering if she should buy Benadryl and take it before the injection on Monday.  She will only be available for the next hour today but a message can be left on her cell phone.  Or she can be reached tomorrow.      Action Taken: Message routed to:  Adult Clinics: Sports Medicine p [phone]

## 2019-04-19 NOTE — TELEPHONE ENCOUNTER
Pt calling back about the message below. Please call pt back ASAP to discuss. Pt states after 2pm to please leave a voicemail.

## 2019-04-22 ENCOUNTER — HOSPITAL ENCOUNTER (OUTPATIENT)
Dept: GENERAL RADIOLOGY | Facility: CLINIC | Age: 64
End: 2019-04-22
Attending: PREVENTIVE MEDICINE
Payer: COMMERCIAL

## 2019-04-22 ENCOUNTER — HOSPITAL ENCOUNTER (OUTPATIENT)
Facility: CLINIC | Age: 64
Discharge: HOME | End: 2019-04-22
Admitting: PHYSICIAN ASSISTANT
Payer: COMMERCIAL

## 2019-04-22 ENCOUNTER — TELEPHONE (OUTPATIENT)
Dept: ORTHOPEDICS | Facility: CLINIC | Age: 64
End: 2019-04-22

## 2019-04-22 VITALS
DIASTOLIC BLOOD PRESSURE: 52 MMHG | HEIGHT: 68 IN | WEIGHT: 244 LBS | OXYGEN SATURATION: 95 % | RESPIRATION RATE: 18 BRPM | HEART RATE: 83 BPM | BODY MASS INDEX: 36.98 KG/M2 | SYSTOLIC BLOOD PRESSURE: 159 MMHG | TEMPERATURE: 98.5 F

## 2019-04-22 DIAGNOSIS — Z91.041 CONTRAST MEDIA ALLERGY: Primary | ICD-10-CM

## 2019-04-22 DIAGNOSIS — M51.16 LUMBAR DISC HERNIATION WITH RADICULOPATHY: ICD-10-CM

## 2019-04-22 PROCEDURE — 27211111 XR LUMBAR SACRAL TRANSFORMINAL INJ LEFT

## 2019-04-22 PROCEDURE — 25500064 ZZH RX 255 OP 636: Performed by: PHYSICIAN ASSISTANT

## 2019-04-22 PROCEDURE — 40000863 ZZH STATISTIC RADIOLOGY XRAY, US, CT, MAR, NM

## 2019-04-22 PROCEDURE — 25000125 ZZHC RX 250: Performed by: PHYSICIAN ASSISTANT

## 2019-04-22 PROCEDURE — 62323 NJX INTERLAMINAR LMBR/SAC: CPT

## 2019-04-22 PROCEDURE — 25000128 H RX IP 250 OP 636: Performed by: PHYSICIAN ASSISTANT

## 2019-04-22 RX ORDER — NICOTINE POLACRILEX 4 MG
15-30 LOZENGE BUCCAL
Status: DISCONTINUED | OUTPATIENT
Start: 2019-04-22 | End: 2019-04-22 | Stop reason: HOSPADM

## 2019-04-22 RX ORDER — NICOTINE POLACRILEX 4 MG
15-30 LOZENGE BUCCAL
Status: CANCELLED | OUTPATIENT
Start: 2019-04-22

## 2019-04-22 RX ORDER — DEXTROSE MONOHYDRATE 25 G/50ML
25-50 INJECTION, SOLUTION INTRAVENOUS
Status: DISCONTINUED | OUTPATIENT
Start: 2019-04-22 | End: 2019-04-22 | Stop reason: HOSPADM

## 2019-04-22 RX ORDER — LIDOCAINE HYDROCHLORIDE 10 MG/ML
30 INJECTION, SOLUTION EPIDURAL; INFILTRATION; INTRACAUDAL; PERINEURAL ONCE
Status: COMPLETED | OUTPATIENT
Start: 2019-04-22 | End: 2019-04-22

## 2019-04-22 RX ORDER — IOPAMIDOL 408 MG/ML
10 INJECTION, SOLUTION INTRATHECAL ONCE
Status: COMPLETED | OUTPATIENT
Start: 2019-04-22 | End: 2019-04-22

## 2019-04-22 RX ORDER — DEXAMETHASONE SODIUM PHOSPHATE 10 MG/ML
10 INJECTION, SOLUTION INTRAMUSCULAR; INTRAVENOUS EVERY 6 HOURS
Status: DISCONTINUED | OUTPATIENT
Start: 2019-04-22 | End: 2019-04-23 | Stop reason: HOSPADM

## 2019-04-22 RX ORDER — METHYLPREDNISOLONE 4 MG
TABLET, DOSE PACK ORAL
Qty: 21 TABLET | Refills: 0 | Status: SHIPPED | OUTPATIENT
Start: 2019-04-22 | End: 2019-04-27

## 2019-04-22 RX ORDER — DEXTROSE MONOHYDRATE 25 G/50ML
25-50 INJECTION, SOLUTION INTRAVENOUS
Status: CANCELLED | OUTPATIENT
Start: 2019-04-22

## 2019-04-22 RX ORDER — DIPHENHYDRAMINE HCL 25 MG
25 TABLET ORAL EVERY 6 HOURS PRN
COMMUNITY
End: 2020-03-23

## 2019-04-22 RX ADMIN — LIDOCAINE HYDROCHLORIDE 3 ML: 10 INJECTION, SOLUTION EPIDURAL; INFILTRATION; INTRACAUDAL; PERINEURAL at 13:40

## 2019-04-22 RX ADMIN — LIDOCAINE HYDROCHLORIDE 3 ML: 10 INJECTION, SOLUTION EPIDURAL; INFILTRATION; INTRACAUDAL; PERINEURAL at 13:48

## 2019-04-22 RX ADMIN — DEXAMETHASONE SODIUM PHOSPHATE 20 MG: 10 INJECTION, SOLUTION INTRAMUSCULAR; INTRAVENOUS at 13:47

## 2019-04-22 RX ADMIN — IOPAMIDOL 1 ML: 408 INJECTION, SOLUTION INTRATHECAL at 13:42

## 2019-04-22 ASSESSMENT — MIFFLIN-ST. JEOR: SCORE: 1697.34

## 2019-04-22 NOTE — PROGRESS NOTES
Pt states she has contrast dye allergy and attempted to call and message her PMD for direction pre procedure. No response per pt. Teresa WRIGHT notified pre procedure.  Pt here has sister her for driving to home.

## 2019-04-22 NOTE — PROGRESS NOTES
Teresa WRIGHT here to see and discuss contrast allergy --OK to proceed with exam  OK for pt to take her own benadryl pre procedure.

## 2019-04-22 NOTE — DISCHARGE INSTRUCTIONS
Steroid Injection Discharge Instructions     After you go home:      You may resume your normal diet.    Care of Puncture Site:      If you have a bandaid on your puncture site, you may remove it the next morning    You may shower tomorrow    No bath tubs, whirlpools or swimming for at least 3 days     Activity:      You may go back to normal activity in 24 hours    You should let pain be your guide as to the extent of your activities    Maintain any activity limitations as ordered by your provider    Do NOT drive a vehicle until tomorrow    Medicines:      You may resume all medications    For minor pain, you may take Acetaminophen (Tylenol) or Ibuprofen (Advil)    Pain:       You may experience increased or different pain over the next 24-48 hours    For the next 48 hrs - you may use ice packs for discomfort     Call your primary care doctor if:      You have severe pain that does not improve with pain medication    You have chills or a fever greater than 101 F (38 C)    The site is red, swollen, hot or tender    New problems with your bowel or bladder    Any questions or concerns    Other Instructions:      New numbness down your leg post injection is temporary and may last for up to 6 hours. You may need assistance with activity until your leg has normal sensation.    If you are diabetic, monitor your blood sugar closely. Contact the provider who manages your diabetes to help you control your blood sugar if needed.    For Your Information:      A steroid was injected to help decrease swelling and may help to reduce pain. It may take up to 7-10 days to obtain full results.    Some patients will get lasting relief from a single injection. Others may require up to 3 injections to get results. If you have more than one steroid injection, they should be given 2 weeks apart.    Side effects of your steroid injection are mild and will go away in 2-3 days  - Insomnia  - Heartburn  - Flushed face  - Water  retention  - Increased appetite  - Increased blood sugar      If you have questions call:        [facility] @ [phone]

## 2019-04-22 NOTE — PROGRESS NOTES
Pre procedure plan of care reviewed with pt and sister prior to exam.  All questions answered and pt appears to accept and understand. Discharge instructions also reviewed with pt prior to exam

## 2019-04-22 NOTE — TELEPHONE ENCOUNTER
Called patient and let her know that she is OK to take Benadryl. She states she did prior to injection and so far no reaction.     She has a follow up with Dr. Santos in a couple of weeks, and will let us know if anything changes in the meantime.

## 2019-04-22 NOTE — PROGRESS NOTES
RADIOLOGY PROCEDURE NOTE  Patient name: Denita Flores  MRN: [id number]  : 1955    Pre-procedure diagnosis: low back and left leg pain  Post-procedure diagnosis: Same    Procedure Date/Time: 2019  1:55 PM  Procedure: left L4-5 transforaminal ANTON  Estimated blood loss: None  Specimen(s) collected with description: none  The patient tolerated the procedure well with no immediate complications.  Significant findings:none    See imaging dictation for procedural details.    Provider name: Teresa Sy  Assistant(s):None

## 2019-04-22 NOTE — PROGRESS NOTES
1400 Pt returned from radiology. Bandaid CDI to lower back. Pt denies pain at this time.     1420 OOB - steady on feet. Ambulates w/o difficulty. Bandaid CDI to puncture site.    1450 Pt discharged per w/c to private vehicle. All personal belongings taken with pt.

## 2019-04-22 NOTE — TELEPHONE ENCOUNTER
Health Call Center    Phone Message    May a detailed message be left on voicemail: yes    Reason for Call: Patient requesting a call to discuss methylPREDNISolone (MEDROL DOSEPAK) 4 MG tablet therapy pack.  She said she was not aware that any medications were being prescribed.  Please advise.  Thank you.     Action Taken: Message routed to:  Adult Clinics: Sports Medicine p [phone]

## 2019-04-27 ENCOUNTER — OFFICE VISIT (OUTPATIENT)
Dept: PEDIATRICS | Facility: CLINIC | Age: 64
End: 2019-04-27
Payer: COMMERCIAL

## 2019-04-27 VITALS
TEMPERATURE: 97.9 F | WEIGHT: 245.9 LBS | OXYGEN SATURATION: 96 % | BODY MASS INDEX: 37.94 KG/M2 | DIASTOLIC BLOOD PRESSURE: 77 MMHG | SYSTOLIC BLOOD PRESSURE: 127 MMHG | HEART RATE: 86 BPM

## 2019-04-27 DIAGNOSIS — J01.00 ACUTE MAXILLARY SINUSITIS, RECURRENCE NOT SPECIFIED: Primary | ICD-10-CM

## 2019-04-27 DIAGNOSIS — J20.9 ACUTE BRONCHITIS, UNSPECIFIED ORGANISM: ICD-10-CM

## 2019-04-27 DIAGNOSIS — J45.20 MILD INTERMITTENT ASTHMA WITHOUT COMPLICATION: ICD-10-CM

## 2019-04-27 DIAGNOSIS — J45.30 MILD PERSISTENT ASTHMA WITHOUT COMPLICATION: ICD-10-CM

## 2019-04-27 PROCEDURE — 99214 OFFICE O/P EST MOD 30 MIN: CPT | Performed by: FAMILY MEDICINE

## 2019-04-27 PROCEDURE — 94640 AIRWAY INHALATION TREATMENT: CPT | Performed by: FAMILY MEDICINE

## 2019-04-27 RX ORDER — ALBUTEROL SULFATE 90 UG/1
2 AEROSOL, METERED RESPIRATORY (INHALATION) EVERY 6 HOURS PRN
Qty: 18 G | Refills: 1 | Status: SHIPPED | OUTPATIENT
Start: 2019-04-27 | End: 2019-09-30

## 2019-04-27 RX ORDER — AZITHROMYCIN 250 MG/1
TABLET, FILM COATED ORAL
Qty: 6 TABLET | Refills: 0 | Status: SHIPPED | OUTPATIENT
Start: 2019-04-27 | End: 2019-09-30

## 2019-04-27 RX ORDER — PREDNISONE 20 MG/1
40 TABLET ORAL DAILY
Qty: 10 TABLET | Refills: 0 | Status: SHIPPED | OUTPATIENT
Start: 2019-04-27 | End: 2019-09-30

## 2019-04-27 RX ORDER — ALBUTEROL SULFATE 0.83 MG/ML
2.5 SOLUTION RESPIRATORY (INHALATION) ONCE
Status: COMPLETED | OUTPATIENT
Start: 2019-04-27 | End: 2019-04-27

## 2019-04-27 RX ORDER — BENZONATATE 100 MG/1
100 CAPSULE ORAL 3 TIMES DAILY PRN
Qty: 42 CAPSULE | Refills: 0 | Status: SHIPPED | OUTPATIENT
Start: 2019-04-27 | End: 2020-03-23

## 2019-04-27 RX ADMIN — ALBUTEROL SULFATE 2.5 MG: 0.83 SOLUTION RESPIRATORY (INHALATION) at 11:38

## 2019-04-27 NOTE — PATIENT INSTRUCTIONS
Patient Education     Acute Sinusitis    Acute sinusitis is irritation and swelling of the sinuses. It is usually caused by a viral infection after a common cold. Your doctor can help you find relief.  What is acute sinusitis?  Sinuses are air-filled spaces in the skull behind the face. They are kept moist and clean by a lining of mucosa. Things such as pollen, smoke, and chemical fumes can irritate the mucosa. It can then swell up. As a response to irritation, the mucosa makes more mucus and other fluids. Tiny hairlike cilia cover the mucosa. Cilia help carry mucus toward the opening of the sinus. Too much mucus may cause the cilia to stop working. This blocks the sinus opening. A buildup of fluid in the sinuses then causes pain and pressure. It can also encourage bacteria to grow in the sinuses.  Common symptoms of acute sinusitis  You may have:    Facial soreness pain    Headache    Fever    Fluid draining in the back of the throat (postnasal drip)    Congestion    Drainage that is thick and colored, instead of clear    Cough  Diagnosing acute sinusitis  Your doctor will ask about your symptoms and health history. He or she will look at your ear, nose, and throat. You usually won't need to have X-rays taken.    The doctor may take a sample of mucus to check for bacteria. If you have sinusitis that keeps coming back, you may need imaging tests such as X-rays or CAT scans. This will help your doctor check for a structural problem that may be causing the infection.  Treating acute sinusitis  Treatment is aimed at unblocking the sinus opening and helping the cilia work again. You may need to take antihistamine and decongestant medicine. These can reduce inflammation and decrease the amount of fluid your sinuses make. If you have a bacterial infection, you will need to take antibiotic medicine for 10 to 14 days. Take this medicine until it is gone, even if you feel better.  Date Last Reviewed: 10/1/2016    [id number]  [employer]. [address] [place] [address], [place] [address]. All rights reserved. This information is not intended as a substitute for professional medical care. Always follow your healthcare professional's instructions.         Patient Education     Acute Bronchitis  Your healthcare provider has told you that you have acute bronchitis. Bronchitis is infection or inflammation of the bronchial tubes (airways in the lungs). Normally, air moves easily in and out of the airways. Bronchitis narrows the airways, making it harder for air to flow in and out of the lungs. This causes symptoms such as shortness of breath, coughing up yellow or green mucus, and wheezing. Bronchitis can be acute or chronic. Acute means the condition comes on quickly and goes away in a short time, usually within 3 to 10 days. Chronic means a condition lasts a long time and often comes back.    What causes acute bronchitis?  Acute bronchitis almost always starts as a viral respiratory infection, such as a cold or the flu. Certain factors make it more likely for a cold or flu to turn into bronchitis. These include being very young, being elderly, having a heart or lung problem, or having a weak immune system. Cigarette smoking also makes bronchitis more likely.  When bronchitis develops, the airways become swollen. The airways may also become infected with bacteria. This is known as a secondary infection.  Diagnosing acute bronchitis  Your healthcare provider will examine you and ask about your symptoms and health history. You may also have a sputum culture to test the fluid in your lungs. Chest X-rays may be done to look for infection in the lungs.  Treating acute bronchitis  Bronchitis usually clears up as the cold or flu goes away. You can help feel better faster by doing the following:    Take medicine as directed. You may be told to take ibuprofen or other over-the-counter medicines. These help relieve inflammation in your bronchial  tubes. Your healthcare provider may prescribe an inhaler to help open up the bronchial tubes. Most of the time, acute bronchitis is caused by a viral infection. Antibiotics are usually not prescribed for viral infections.    Drink plenty of fluids, such as water, juice, or warm soup. Fluids loosen mucus so that you can cough it up. This helps you breathe more easily. Fluids also prevent dehydration.    Make sure you get plenty of rest.    Do not smoke. Do not allow anyone else to smoke in your home.  Recovery and follow-up  Follow up with your doctor as you are told. You will likely feel better in a week or two. But a dry cough can linger beyond that time. Let your doctor know if you still have symptoms (other than a dry cough) after 2 weeks, or if you re prone to getting bronchial infections. Take steps to protect yourself from future infections. These steps include stopping smoking and avoiding tobacco smoke, washing your hands often, and getting a yearly flu shot.  When to call your healthcare provider  Call the healthcare provider if you have any of the following:    Fever of 100.4 F (38.0 C) or higher, or as advised    Symptoms that get worse, or new symptoms    Trouble breathing    Symptoms that don t start to improve within a week, or within 3 days of taking antibiotics   Date Last Reviewed: 12/1/2016 2000-2018 [employer]. [address] [place] [address], [place] [address]. All rights reserved. This information is not intended as a substitute for professional medical care. Always follow your healthcare professional's instructions.         Patient Education     Acute Bronchitis  Your healthcare provider has told you that you have acute bronchitis. Bronchitis is infection or inflammation of the bronchial tubes (airways in the lungs). Normally, air moves easily in and out of the airways. Bronchitis narrows the airways, making it harder for air to flow in and out of the lungs. This causes symptoms such  as shortness of breath, coughing up yellow or green mucus, and wheezing. Bronchitis can be acute or chronic. Acute means the condition comes on quickly and goes away in a short time, usually within 3 to 10 days. Chronic means a condition lasts a long time and often comes back.    What causes acute bronchitis?  Acute bronchitis almost always starts as a viral respiratory infection, such as a cold or the flu. Certain factors make it more likely for a cold or flu to turn into bronchitis. These include being very young, being elderly, having a heart or lung problem, or having a weak immune system. Cigarette smoking also makes bronchitis more likely.  When bronchitis develops, the airways become swollen. The airways may also become infected with bacteria. This is known as a secondary infection.  Diagnosing acute bronchitis  Your healthcare provider will examine you and ask about your symptoms and health history. You may also have a sputum culture to test the fluid in your lungs. Chest X-rays may be done to look for infection in the lungs.  Treating acute bronchitis  Bronchitis usually clears up as the cold or flu goes away. You can help feel better faster by doing the following:    Take medicine as directed. You may be told to take ibuprofen or other over-the-counter medicines. These help relieve inflammation in your bronchial tubes. Your healthcare provider may prescribe an inhaler to help open up the bronchial tubes. Most of the time, acute bronchitis is caused by a viral infection. Antibiotics are usually not prescribed for viral infections.    Drink plenty of fluids, such as water, juice, or warm soup. Fluids loosen mucus so that you can cough it up. This helps you breathe more easily. Fluids also prevent dehydration.    Make sure you get plenty of rest.    Do not smoke. Do not allow anyone else to smoke in your home.  Recovery and follow-up  Follow up with your doctor as you are told. You will likely feel better in  a week or two. But a dry cough can linger beyond that time. Let your doctor know if you still have symptoms (other than a dry cough) after 2 weeks, or if you re prone to getting bronchial infections. Take steps to protect yourself from future infections. These steps include stopping smoking and avoiding tobacco smoke, washing your hands often, and getting a yearly flu shot.  When to call your healthcare provider  Call the healthcare provider if you have any of the following:    Fever of 100.4 F (38.0 C) or higher, or as advised    Symptoms that get worse, or new symptoms    Trouble breathing    Symptoms that don t start to improve within a week, or within 3 days of taking antibiotics   Date Last Reviewed: 12/1/2016 2000-2018 [employer]. [address] [place] [address], [place] [address]. All rights reserved. This information is not intended as a substitute for professional medical care. Always follow your healthcare professional's instructions.

## 2019-04-27 NOTE — PROGRESS NOTES
SUBJECTIVE:   Denita Flores is a 63 year old female who presents to clinic today for the following   health issues:        Acute Illness   Acute illness concerns: possible sinus infection  Onset: about a week or two    Fever: no    Chills/Sweats: YES    Headache (location?): YES    Sinus Pressure:YES    Conjunctivitis:  no    Ear Pain: no    Rhinorrhea: YES    Congestion: YES    Sore Throat: YES     Cough: YES    Wheeze: YES    Decreased Appetite: YES    Nausea: no but dizzy    Vomiting: no    Diarrhea:  no    Dysuria/Freq.: no    Fatigue/Achiness: YES    Sick/Strep Exposure: no     Therapies Tried and outcome: claritin helped      Additional history: as documented    Reviewed  and updated as needed this visit by clinical staff  Tobacco  Allergies  Meds         Reviewed and updated as needed this visit by Provider         Patient Active Problem List   Diagnosis     Graves disease     Obesity     Diverticulosis of large intestine without hemorrhage     Colon polyps     History of cervical cancer     YELENA (obstructive sleep apnea)     Warts     CARDIOVASCULAR SCREENING; LDL GOAL LESS THAN 130     Hx of herpes zoster keratoconjunctivitis, os     Plantar warts     Stasis dermatitis of both legs     Obesity (BMI 30-39.9)     Postablative hypothyroidism     Other specified hypothyroidism,post ablative     Venous stasis dermatitis of left lower extremity     Vitamin B12 deficiency (non anemic)     Mild persistent asthma without complication     Spider veins of both lower extremities     Seasonal allergic rhinitis     Family history of colon cancer     Epistaxis     Tubular adenoma of colon     Family history of renal cancer     Nephrolithiasis     Gross hematuria     Morbid obesity (H)     Elevated blood pressure reading without diagnosis of hypertension     Polyp of gallbladder, 4mm     Past Surgical History:   Procedure Laterality Date     BACK SURGERY       COLONOSCOPY WITH CO2 INSUFFLATION N/A 2/21/2017     Procedure: COLONOSCOPY WITH CO2 INSUFFLATION;  Surgeon: Duane, William Charles, MD;  Location: [facility] OR     CYSTOSCOPY FLEXIBLE  8/10/2018     LEEP TX, CERVICAL      in her 20's       Social History     Tobacco Use     Smoking status: Former Smoker     Packs/day: 1.50     Years: 35.00     Pack years: 52.50     Types: Cigarettes     Last attempt to quit: 2003     Years since quittin.3     Smokeless tobacco: Never Used   Substance Use Topics     Alcohol use: Yes     Alcohol/week: 0.0 oz     Comment: 3 dinks/ year     Family History   Problem Relation Age of Onset     Glaucoma Mother      Heart Disease Father      Cancer Father 67        kidney cancer      Heart Disease Sister      Cancer Sister 59        colon cancer      Diabetes Sister      Colon Cancer Maternal Aunt      Colon Cancer Paternal Aunt      Glaucoma Maternal Grandmother          Current Outpatient Medications   Medication Sig Dispense Refill     albuterol (PROAIR HFA/PROVENTIL HFA/VENTOLIN HFA) 108 (90 Base) MCG/ACT inhaler Inhale 2 puffs into the lungs every 6 hours as needed for shortness of breath / dyspnea 18 g 1     azithromycin (ZITHROMAX) 250 MG tablet Take 2 tablets (500 mg) by mouth daily for 1 day, THEN 1 tablet (250 mg) daily for 4 days. 6 tablet 0     benzonatate (TESSALON) 100 MG capsule Take 1 capsule (100 mg) by mouth 3 times daily as needed for cough 42 capsule 0     Cholecalciferol (D3 ADULT PO) Take 2,000 Units by mouth daily.       cyanocolbalamin (VITAMIN  B-12) 100 MCG tablet Take 100 mcg by mouth daily.       diphenhydrAMINE (BENADRYL) 25 MG tablet Take 25 mg by mouth every 6 hours as needed for itching or allergies       ketoconazole (NIZORAL) 2 % external cream Apply topically 2 times daily 15 g 1     levothyroxine (SYNTHROID/LEVOTHROID) 112 MCG tablet TAKE 1 TABLET(112 MCG) BY MOUTH DAILY 90 tablet 0     levothyroxine (SYNTHROID/LEVOTHROID) 112 MCG tablet Take 1 tablet (112 mcg) by mouth daily 90 tablet 3     MEGARED  OMEGA-3 KRILL  MG CAPS Take 1 capsule by mouth daily        montelukast (SINGULAIR) 10 MG tablet Take 1 tablet (10 mg) by mouth At Bedtime 90 tablet 3     order for DME Equipment being ordered: compression stockings- LARGE, 15mm HG, THIGH HIGH 2 Units 0     predniSONE (DELTASONE) 20 MG tablet Take 40 mg by mouth daily for 5 days. 10 tablet 0     triamcinolone (KENALOG) 0.1 % external cream Apply sparingly to affected area three times daily as needed 80 g 0     diclofenac (VOLTAREN) 75 MG EC tablet Take 1 tablet (75 mg) by mouth 2 times daily (Patient not taking: Reported on 4/27/2019) 40 tablet 1     HYDROcodone-acetaminophen (NORCO) 5-325 MG per tablet Take 1 tablet by mouth every 6 hours as needed for severe pain (Patient not taking: Reported on 4/27/2019) 10 tablet 0     methocarbamol (ROBAXIN) 750 MG tablet Take 1 tablet (750 mg) by mouth 4 times daily as needed for muscle spasms (Patient not taking: Reported on 4/27/2019) 30 tablet 0     Allergies   Allergen Reactions     Contrast Dye Hives and Itching     Isovue with ANTON on 1-21-19     Sulfa Drugs Hives and Swelling     Noted in 10/18/09 ER       ROS:  Constitutional, HEENT, cardiovascular, pulmonary, gi and gu systems are negative, except as otherwise noted.    OBJECTIVE:     /77 (BP Location: Right arm, Patient Position: Chair, Cuff Size: Adult Large)   Pulse 86   Temp 97.9  F (36.6  C) (Temporal)   Wt 111.5 kg (245 lb 14.4 oz)   SpO2 96%   BMI 37.94 kg/m    Body mass index is 37.94 kg/m .   GENERAL: healthy, alert and no distress  EYES: Eyes grossly normal to inspection, PERRL and conjunctivae and sclerae normal  HENT: ear canals and TM's normal, nose and mouth without ulcers or lesions  NECK: no adenopathy, no asymmetry, masses, or scars and thyroid normal to palpation  RESP: inspiratory wheezes R lower posterior and decreased breath sounds L mid posterior  CV: regular rate and rhythm, normal S1 S2, no S3 or S4, no murmur, click or rub,  no peripheral edema and peripheral pulses strong    Diagnostic Test Results:  none     ASSESSMENT:   Denita was seen today for sinus problem.    Diagnoses and all orders for this visit:    Acute maxillary sinusitis, recurrence not specified  -     albuterol (PROVENTIL) neb solution 2.5 mg  -     predniSONE (DELTASONE) 20 MG tablet; Take 40 mg by mouth daily for 5 days.  -     azithromycin (ZITHROMAX) 250 MG tablet; Take 2 tablets (500 mg) by mouth daily for 1 day, THEN 1 tablet (250 mg) daily for 4 days.  -     benzonatate (TESSALON) 100 MG capsule; Take 1 capsule (100 mg) by mouth 3 times daily as needed for cough    Acute bronchitis, unspecified organism  -     albuterol (PROVENTIL) neb solution 2.5 mg  -     predniSONE (DELTASONE) 20 MG tablet; Take 40 mg by mouth daily for 5 days.  -     azithromycin (ZITHROMAX) 250 MG tablet; Take 2 tablets (500 mg) by mouth daily for 1 day, THEN 1 tablet (250 mg) daily for 4 days.  -     benzonatate (TESSALON) 100 MG capsule; Take 1 capsule (100 mg) by mouth 3 times daily as needed for cough    Mild intermittent asthma without complication  -     albuterol (PROVENTIL) neb solution 2.5 mg  -     predniSONE (DELTASONE) 20 MG tablet; Take 40 mg by mouth daily for 5 days.  -     azithromycin (ZITHROMAX) 250 MG tablet; Take 2 tablets (500 mg) by mouth daily for 1 day, THEN 1 tablet (250 mg) daily for 4 days.  -     benzonatate (TESSALON) 100 MG capsule; Take 1 capsule (100 mg) by mouth 3 times daily as needed for cough        PLAN:   Orders per epic care  Wheezing improved after an albuterol nebulizer at the clinic  Symptomatic therapy suggested: push fluids, rest, gargle warm salt water, use vaporizer or mist needed , use acetaminophen, ibuprofen as needed, apply heat to sinuses as needed and Return office visit if symptoms persist or worsen.     See Patient Instructions    Ruslan Menendez MD  [facility]

## 2019-05-06 ENCOUNTER — OFFICE VISIT (OUTPATIENT)
Dept: ORTHOPEDICS | Facility: CLINIC | Age: 64
End: 2019-05-06
Payer: COMMERCIAL

## 2019-05-06 VITALS
SYSTOLIC BLOOD PRESSURE: 140 MMHG | HEART RATE: 74 BPM | BODY MASS INDEX: 37.13 KG/M2 | WEIGHT: 245 LBS | DIASTOLIC BLOOD PRESSURE: 100 MMHG | HEIGHT: 68 IN

## 2019-05-06 DIAGNOSIS — M51.16 LUMBAR DISC HERNIATION WITH RADICULOPATHY: Primary | ICD-10-CM

## 2019-05-06 DIAGNOSIS — M51.26 LUMBAR HERNIATED DISC: ICD-10-CM

## 2019-05-06 PROCEDURE — 99213 OFFICE O/P EST LOW 20 MIN: CPT | Performed by: PREVENTIVE MEDICINE

## 2019-05-06 ASSESSMENT — MIFFLIN-ST. JEOR: SCORE: 1701.87

## 2019-05-06 NOTE — NURSING NOTE
"Denita Flores's chief complaint for this visit includes:  Chief Complaint   Patient presents with     Follow Up     Follow up after epidural injection on 4-22     PCP: Gee Hitchcock    Referring Provider:  No referring provider defined for this encounter.    BP (!) 140/100   Pulse 74   Ht 1.715 m (5' 7.5\")   Wt 111.1 kg (245 lb)   BMI 37.81 kg/m    Data Unavailable       "

## 2019-05-06 NOTE — LETTER
5/6/2019         RE: Denita Flores  [address]  [facility] [address]        Dear Colleague,    Thank you for referring your patient, Denita Flores, to the [facility]. Please see a copy of my visit note below.    HISTORY OF PRESENT ILLNESS  Ms. Flores is a pleasant 63 year old year old female who presents to clinic today for followup for lumbar injection  Overall feeling better  Less pain in foot and knee since injection    She has been doing PT and feeling better sicnce injection  Not taking any medications currently         MEDICAL HISTORY  Patient Active Problem List   Diagnosis     Graves disease     Obesity     Diverticulosis of large intestine without hemorrhage     Colon polyps     History of cervical cancer     YELENA (obstructive sleep apnea)     Warts     CARDIOVASCULAR SCREENING; LDL GOAL LESS THAN 130     Hx of herpes zoster keratoconjunctivitis, os     Plantar warts     Stasis dermatitis of both legs     Obesity (BMI 30-39.9)     Postablative hypothyroidism     Other specified hypothyroidism,post ablative     Venous stasis dermatitis of left lower extremity     Vitamin B12 deficiency (non anemic)     Mild persistent asthma without complication     Spider veins of both lower extremities     Seasonal allergic rhinitis     Family history of colon cancer     Epistaxis     Tubular adenoma of colon     Family history of renal cancer     Nephrolithiasis     Gross hematuria     Morbid obesity (H)     Elevated blood pressure reading without diagnosis of hypertension     Polyp of gallbladder, 4mm       Current Outpatient Medications   Medication Sig Dispense Refill     albuterol (PROAIR HFA/PROVENTIL HFA/VENTOLIN HFA) 108 (90 Base) MCG/ACT inhaler Inhale 2 puffs into the lungs every 6 hours as needed for shortness of breath / dyspnea 18 g 1     benzonatate (TESSALON) 100 MG capsule Take 1 capsule (100 mg) by mouth 3 times daily as needed for cough 42 capsule 0      Cholecalciferol (D3 ADULT PO) Take 2,000 Units by mouth daily.       cyanocolbalamin (VITAMIN  B-12) 100 MCG tablet Take 100 mcg by mouth daily.       diclofenac (VOLTAREN) 75 MG EC tablet Take 1 tablet (75 mg) by mouth 2 times daily 40 tablet 1     diphenhydrAMINE (BENADRYL) 25 MG tablet Take 25 mg by mouth every 6 hours as needed for itching or allergies       HYDROcodone-acetaminophen (NORCO) 5-325 MG per tablet Take 1 tablet by mouth every 6 hours as needed for severe pain 10 tablet 0     ketoconazole (NIZORAL) 2 % external cream Apply topically 2 times daily 15 g 1     levothyroxine (SYNTHROID/LEVOTHROID) 112 MCG tablet TAKE 1 TABLET(112 MCG) BY MOUTH DAILY 90 tablet 0     levothyroxine (SYNTHROID/LEVOTHROID) 112 MCG tablet Take 1 tablet (112 mcg) by mouth daily 90 tablet 3     MEGARED OMEGA-3 KRILL  MG CAPS Take 1 capsule by mouth daily        methocarbamol (ROBAXIN) 750 MG tablet Take 1 tablet (750 mg) by mouth 4 times daily as needed for muscle spasms 30 tablet 0     montelukast (SINGULAIR) 10 MG tablet Take 1 tablet (10 mg) by mouth At Bedtime 90 tablet 3     order for DME Equipment being ordered: compression stockings- LARGE, 15mm HG, THIGH HIGH 2 Units 0     triamcinolone (KENALOG) 0.1 % external cream Apply sparingly to affected area three times daily as needed 80 g 0       Allergies   Allergen Reactions     Contrast Dye Hives and Itching     Isovue with ANTON on 1-21-19     Sulfa Drugs Hives and Swelling     Noted in 10/18/09 ER       Family History   Problem Relation Age of Onset     Glaucoma Mother      Heart Disease Father      Cancer Father 67        kidney cancer      Heart Disease Sister      Cancer Sister 59        colon cancer      Diabetes Sister      Colon Cancer Maternal Aunt      Colon Cancer Paternal Aunt      Glaucoma Maternal Grandmother        Additional medical/Social/Surgical histories reviewed in [employer] and updated as appropriate.     REVIEW OF SYSTEMS (5/6/2019)  10 point ROS of  "systems including Constitutional, Eyes, Respiratory, Cardiovascular, Gastroenterology, Genitourinary, Integumentary, Musculoskeletal, Psychiatric were all negative except for pertinent positives noted in my HPI.     PHYSICAL EXAM  Vitals:    05/06/19 1052   BP: (!) 140/100   Pulse: 74   Weight: 111.1 kg (245 lb)   Height: 1.715 m (5' 7.5\")     Vital Signs: BP (!) 140/100   Pulse 74   Ht 1.715 m (5' 7.5\")   Wt 111.1 kg (245 lb)   BMI 37.81 kg/m    Patient declined being weighed. Body mass index is 37.81 kg/m .    General  - normal appearance, in no obvious distress  CV  - normal peripheral perfusion  Pulm  - normal respiratory pattern, non-labored  Musculoskeletal - lumbar spine  - stance: normal gait without limp, no obvious leg length discrepancy, normal heel and toe walk  - inspection: normal bone and joint alignment, no obvious scoliosis  - palpation: no paravertebral or bony tenderness  - ROM: flexion today does not exacerbates pain, normal extension, sidebending, rotation  - strength: lower extremities 5/5 in all planes  - special tests:  (-) straight leg raise  (-) slump test  Neuro  - patellar and Achilles DTRs 2+ bilaterally, no lower extremity sensory deficit throughout L5 distribution, grossly normal coordination, normal muscle tone  Skin  - no ecchymosis, erythema, warmth, or induration, no obvious rash  Psych  - interactive, appropriate, normal mood and affect    Knee: has full flexion with little pain, no effusion  ASSESSMENT & PLAN  64 yo female with lumbar ddd, radicular pain, improved  Reviewed response to ANTON  No plan for further ANTON  F/u in 3 months  Sooner if condition worsens  Cont. Therapy PRN      Frankie Santos MD, CAQSM    Again, thank you for allowing me to participate in the care of your patient.        Sincerely,        Frankie Santos MD    "

## 2019-05-06 NOTE — PROGRESS NOTES
HISTORY OF PRESENT ILLNESS  Ms. Flores is a pleasant 63 year old year old female who presents to clinic today for followup for lumbar injection  Overall feeling better  Less pain in foot and knee since injection    She has been doing PT and feeling better sicnce injection  Not taking any medications currently         MEDICAL HISTORY  Patient Active Problem List   Diagnosis     Graves disease     Obesity     Diverticulosis of large intestine without hemorrhage     Colon polyps     History of cervical cancer     YELENA (obstructive sleep apnea)     Warts     CARDIOVASCULAR SCREENING; LDL GOAL LESS THAN 130     Hx of herpes zoster keratoconjunctivitis, os     Plantar warts     Stasis dermatitis of both legs     Obesity (BMI 30-39.9)     Postablative hypothyroidism     Other specified hypothyroidism,post ablative     Venous stasis dermatitis of left lower extremity     Vitamin B12 deficiency (non anemic)     Mild persistent asthma without complication     Spider veins of both lower extremities     Seasonal allergic rhinitis     Family history of colon cancer     Epistaxis     Tubular adenoma of colon     Family history of renal cancer     Nephrolithiasis     Gross hematuria     Morbid obesity (H)     Elevated blood pressure reading without diagnosis of hypertension     Polyp of gallbladder, 4mm       Current Outpatient Medications   Medication Sig Dispense Refill     albuterol (PROAIR HFA/PROVENTIL HFA/VENTOLIN HFA) 108 (90 Base) MCG/ACT inhaler Inhale 2 puffs into the lungs every 6 hours as needed for shortness of breath / dyspnea 18 g 1     benzonatate (TESSALON) 100 MG capsule Take 1 capsule (100 mg) by mouth 3 times daily as needed for cough 42 capsule 0     Cholecalciferol (D3 ADULT PO) Take 2,000 Units by mouth daily.       cyanocolbalamin (VITAMIN  B-12) 100 MCG tablet Take 100 mcg by mouth daily.       diclofenac (VOLTAREN) 75 MG EC tablet Take 1 tablet (75 mg) by mouth 2 times daily 40 tablet 1      diphenhydrAMINE (BENADRYL) 25 MG tablet Take 25 mg by mouth every 6 hours as needed for itching or allergies       HYDROcodone-acetaminophen (NORCO) 5-325 MG per tablet Take 1 tablet by mouth every 6 hours as needed for severe pain 10 tablet 0     ketoconazole (NIZORAL) 2 % external cream Apply topically 2 times daily 15 g 1     levothyroxine (SYNTHROID/LEVOTHROID) 112 MCG tablet TAKE 1 TABLET(112 MCG) BY MOUTH DAILY 90 tablet 0     levothyroxine (SYNTHROID/LEVOTHROID) 112 MCG tablet Take 1 tablet (112 mcg) by mouth daily 90 tablet 3     MEGARED OMEGA-3 KRILL  MG CAPS Take 1 capsule by mouth daily        methocarbamol (ROBAXIN) 750 MG tablet Take 1 tablet (750 mg) by mouth 4 times daily as needed for muscle spasms 30 tablet 0     montelukast (SINGULAIR) 10 MG tablet Take 1 tablet (10 mg) by mouth At Bedtime 90 tablet 3     order for DME Equipment being ordered: compression stockings- LARGE, 15mm HG, THIGH HIGH 2 Units 0     triamcinolone (KENALOG) 0.1 % external cream Apply sparingly to affected area three times daily as needed 80 g 0       Allergies   Allergen Reactions     Contrast Dye Hives and Itching     Isovue with ANTON on 1-21-19     Sulfa Drugs Hives and Swelling     Noted in 10/18/09 ER       Family History   Problem Relation Age of Onset     Glaucoma Mother      Heart Disease Father      Cancer Father 67        kidney cancer      Heart Disease Sister      Cancer Sister 59        colon cancer      Diabetes Sister      Colon Cancer Maternal Aunt      Colon Cancer Paternal Aunt      Glaucoma Maternal Grandmother        Additional medical/Social/Surgical histories reviewed in [facility] and updated as appropriate.     REVIEW OF SYSTEMS (5/6/2019)  10 point ROS of systems including Constitutional, Eyes, Respiratory, Cardiovascular, Gastroenterology, Genitourinary, Integumentary, Musculoskeletal, Psychiatric were all negative except for pertinent positives noted in my HPI.     PHYSICAL EXAM  Vitals:    05/06/19  "1052   BP: (!) 140/100   Pulse: 74   Weight: 111.1 kg (245 lb)   Height: 1.715 m (5' 7.5\")     Vital Signs: BP (!) 140/100   Pulse 74   Ht 1.715 m (5' 7.5\")   Wt 111.1 kg (245 lb)   BMI 37.81 kg/m   Patient declined being weighed. Body mass index is 37.81 kg/m .    General  - normal appearance, in no obvious distress  CV  - normal peripheral perfusion  Pulm  - normal respiratory pattern, non-labored  Musculoskeletal - lumbar spine  - stance: normal gait without limp, no obvious leg length discrepancy, normal heel and toe walk  - inspection: normal bone and joint alignment, no obvious scoliosis  - palpation: no paravertebral or bony tenderness  - ROM: flexion today does not exacerbates pain, normal extension, sidebending, rotation  - strength: lower extremities 5/5 in all planes  - special tests:  (-) straight leg raise  (-) slump test  Neuro  - patellar and Achilles DTRs 2+ bilaterally, no lower extremity sensory deficit throughout L5 distribution, grossly normal coordination, normal muscle tone  Skin  - no ecchymosis, erythema, warmth, or induration, no obvious rash  Psych  - interactive, appropriate, normal mood and affect    Knee: has full flexion with little pain, no effusion  ASSESSMENT & PLAN  64 yo female with lumbar ddd, radicular pain, improved  Reviewed response to ANTON  No plan for further ANTON  F/u in 3 months  Sooner if condition worsens  Cont. Therapy PRN      Frankie Santos MD, CAQSM  "

## 2019-05-06 NOTE — PATIENT INSTRUCTIONS
Thanks for coming today.  [facility]tho[facility] Medicine Clinic  [address] [place] [address]    To schedule future appointments in Ortho Clinic, you may call [phone].    To schedule ordered imaging by your provider:   Call Central Imaging [phone]    To schedule an injection ordered by your provider:  Call [facility] Injection scheduling line: [phone]  [email or web address] available online at:  [email or web address]    Please call if any further questions or concerns ([phone]).  Clinic hours 8 am to 5 pm.    Return to clinic (call) if symptoms worsen or fail to improve.

## 2019-06-21 ENCOUNTER — TELEPHONE (OUTPATIENT)
Dept: PEDIATRICS | Facility: CLINIC | Age: 64
End: 2019-06-21

## 2019-06-21 NOTE — TELEPHONE ENCOUNTER
Called patient on mobile number to review. Patient denies any change in symptoms or concerns regarding thyroid. Unable to clearly see in chart the plan for follow up thyroid labs. Advised patient will route to Dr. Hitchcock for review and response. Patient notes she just filled medication for #90 days so no calderon.  Linh ESTRELLA, CMA

## 2019-06-21 NOTE — TELEPHONE ENCOUNTER
Patient called and notified of provider's response. Patient verbalized understanding. Regine BUI CMA

## 2019-06-21 NOTE — TELEPHONE ENCOUNTER
Health Call Center    Phone Message    May a detailed message be left on voicemail: no    Reason for Call: Other: Pt saw Dr Hitchcock last September.  She did not know when she was to come back other than for her yearly.  She is asking for a call back because she needs labs for thyroid.  Pt thought she needed to be seen every 6 months.       Action Taken: Message routed to:  Primary Care p [phone]

## 2019-06-21 NOTE — TELEPHONE ENCOUNTER
Please inform Denita-she will be seen once a year during her annual physical including her thyroid check or sooner if needed

## 2019-07-31 ENCOUNTER — OFFICE VISIT (OUTPATIENT)
Dept: ORTHOPEDICS | Facility: CLINIC | Age: 64
End: 2019-07-31
Payer: COMMERCIAL

## 2019-07-31 VITALS — HEIGHT: 68 IN | WEIGHT: 245 LBS | BODY MASS INDEX: 37.13 KG/M2

## 2019-07-31 DIAGNOSIS — M51.26 LUMBAR HERNIATED DISC: Primary | ICD-10-CM

## 2019-07-31 DIAGNOSIS — M51.16 LUMBAR DISC HERNIATION WITH RADICULOPATHY: ICD-10-CM

## 2019-07-31 PROCEDURE — 99213 OFFICE O/P EST LOW 20 MIN: CPT | Performed by: PREVENTIVE MEDICINE

## 2019-07-31 ASSESSMENT — MIFFLIN-ST. JEOR: SCORE: 1701.87

## 2019-07-31 ASSESSMENT — PAIN SCALES - GENERAL: PAINLEVEL: MILD PAIN (3)

## 2019-07-31 NOTE — LETTER
"    7/31/2019         RE: Denita Flores  [address] [facility] [address]        Dear Colleague,    Thank you for referring your patient, Denita Flores, to the [facility]. Please see a copy of my visit note below.    Denita Flores's chief complaint for this visit includes:  Chief Complaint   Patient presents with     Follow Up     Follow up after low back injection      PCP: Gee Hitchcock    Referring Provider:  No referring provider defined for this encounter.    Ht 1.715 m (5' 7.5\")   Wt 111.1 kg (245 lb)   BMI 37.81 kg/m     Mild Pain (3)           HISTORY OF PRESENT ILLNESS  Ms. Flores is a pleasant 63 year old year old female who presents to clinic today for followup for lumbar injection  Had last April and has helped improve her low back and leg pains  Feels good today    MEDICAL HISTORY  Patient Active Problem List   Diagnosis     Graves disease     Obesity     Diverticulosis of large intestine without hemorrhage     Colon polyps     History of cervical cancer     YELENA (obstructive sleep apnea)     Warts     CARDIOVASCULAR SCREENING; LDL GOAL LESS THAN 130     Hx of herpes zoster keratoconjunctivitis, os     Plantar warts     Stasis dermatitis of both legs     Obesity (BMI 30-39.9)     Postablative hypothyroidism     Other specified hypothyroidism,post ablative     Venous stasis dermatitis of left lower extremity     Vitamin B12 deficiency (non anemic)     Mild persistent asthma without complication     Spider veins of both lower extremities     Seasonal allergic rhinitis     Family history of colon cancer     Epistaxis     Tubular adenoma of colon     Family history of renal cancer     Nephrolithiasis     Gross hematuria     Morbid obesity (H)     Elevated blood pressure reading without diagnosis of hypertension     Polyp of gallbladder, 4mm       Current Outpatient Medications   Medication Sig Dispense Refill     albuterol (PROAIR HFA/PROVENTIL HFA/VENTOLIN HFA) 108 " (90 Base) MCG/ACT inhaler Inhale 2 puffs into the lungs every 6 hours as needed for shortness of breath / dyspnea 18 g 1     benzonatate (TESSALON) 100 MG capsule Take 1 capsule (100 mg) by mouth 3 times daily as needed for cough 42 capsule 0     Cholecalciferol (D3 ADULT PO) Take 2,000 Units by mouth daily.       cyanocolbalamin (VITAMIN  B-12) 100 MCG tablet Take 100 mcg by mouth daily.       diclofenac (VOLTAREN) 75 MG EC tablet Take 1 tablet (75 mg) by mouth 2 times daily 40 tablet 1     diphenhydrAMINE (BENADRYL) 25 MG tablet Take 25 mg by mouth every 6 hours as needed for itching or allergies       HYDROcodone-acetaminophen (NORCO) 5-325 MG per tablet Take 1 tablet by mouth every 6 hours as needed for severe pain 10 tablet 0     ketoconazole (NIZORAL) 2 % external cream Apply topically 2 times daily 15 g 1     levothyroxine (SYNTHROID/LEVOTHROID) 112 MCG tablet TAKE 1 TABLET(112 MCG) BY MOUTH DAILY 90 tablet 0     levothyroxine (SYNTHROID/LEVOTHROID) 112 MCG tablet Take 1 tablet (112 mcg) by mouth daily 90 tablet 3     MEGARED OMEGA-3 KRILL  MG CAPS Take 1 capsule by mouth daily        methocarbamol (ROBAXIN) 750 MG tablet Take 1 tablet (750 mg) by mouth 4 times daily as needed for muscle spasms 30 tablet 0     montelukast (SINGULAIR) 10 MG tablet Take 1 tablet (10 mg) by mouth At Bedtime 90 tablet 3     order for DME Equipment being ordered: compression stockings- LARGE, 15mm HG, THIGH HIGH 2 Units 0     triamcinolone (KENALOG) 0.1 % external cream Apply sparingly to affected area three times daily as needed 80 g 0       Allergies   Allergen Reactions     Contrast Dye Hives and Itching     Isovue with ANTON on 1-21-19     Sulfa Drugs Hives and Swelling     Noted in 10/18/09 ER       Family History   Problem Relation Age of Onset     Glaucoma Mother      Heart Disease Father      Cancer Father 67        kidney cancer      Heart Disease Sister      Cancer Sister 59        colon cancer      Diabetes Sister   "    Colon Cancer Maternal Aunt      Colon Cancer Paternal Aunt      Glaucoma Maternal Grandmother        Additional medical/Social/Surgical histories reviewed in [facility] and updated as appropriate.     REVIEW OF SYSTEMS (7/31/2019)  10 point ROS of systems including Constitutional, Eyes, Respiratory, Cardiovascular, Gastroenterology, Genitourinary, Integumentary, Musculoskeletal, Psychiatric were all negative except for pertinent positives noted in my HPI.     PHYSICAL EXAM  Vitals:    07/31/19 1021   Weight: 111.1 kg (245 lb)   Height: 1.715 m (5' 7.5\")     Vital Signs: Ht 1.715 m (5' 7.5\")   Wt 111.1 kg (245 lb)   BMI 37.81 kg/m    Patient declined being weighed. Body mass index is 37.81 kg/m .    General  - normal appearance, in no obvious distress, overweight  CV  - normal peripheral perfusion  Pulm  - normal respiratory pattern, non-labored  Musculoskeletal - lumbar spine  - stance: normal gait without limp, no obvious leg length discrepancy, normal heel and toe walk  - inspection: normal bone and joint alignment, no obvious scoliosis  - palpation: no paravertebral or bony tenderness  - ROM: flexion does not exacerbates pain, normal extension, sidebending, rotation  - strength: lower extremities 5/5 in all planes  - special tests:  (-) straight leg raise  (-) slump test  Neuro  - patellar and Achilles DTRs 2+ bilaterally, no lower extremity sensory deficit throughout L5 distribution, grossly normal coordination, normal muscle tone  Skin  - no ecchymosis, erythema, warmth, or induration, no obvious rash  Psych  - interactive, appropriate, normal mood and affect  ASSESSMENT & PLAN  62 yo female with lumbar ddd, disc herniation, stable  Consider another ANTON  Cont. HEP        Frankie Santos MD, CAQSM    Again, thank you for allowing me to participate in the care of your patient.        Sincerely,        Frankie Santos MD    "

## 2019-07-31 NOTE — PROGRESS NOTES
"Denita Flores's chief complaint for this visit includes:  Chief Complaint   Patient presents with     Follow Up     Follow up after low back injection      PCP: Gee Hitchcock    Referring Provider:  No referring provider defined for this encounter.    Ht 1.715 m (5' 7.5\")   Wt 111.1 kg (245 lb)   BMI 37.81 kg/m    Mild Pain (3)         "

## 2019-07-31 NOTE — PROGRESS NOTES
HISTORY OF PRESENT ILLNESS  Ms. Flores is a pleasant 63 year old year old female who presents to clinic today for followup for lumbar injection  Had last April and has helped improve her low back and leg pains  Feels good today    MEDICAL HISTORY  Patient Active Problem List   Diagnosis     Graves disease     Obesity     Diverticulosis of large intestine without hemorrhage     Colon polyps     History of cervical cancer     YELENA (obstructive sleep apnea)     Warts     CARDIOVASCULAR SCREENING; LDL GOAL LESS THAN 130     Hx of herpes zoster keratoconjunctivitis, os     Plantar warts     Stasis dermatitis of both legs     Obesity (BMI 30-39.9)     Postablative hypothyroidism     Other specified hypothyroidism,post ablative     Venous stasis dermatitis of left lower extremity     Vitamin B12 deficiency (non anemic)     Mild persistent asthma without complication     Spider veins of both lower extremities     Seasonal allergic rhinitis     Family history of colon cancer     Epistaxis     Tubular adenoma of colon     Family history of renal cancer     Nephrolithiasis     Gross hematuria     Morbid obesity (H)     Elevated blood pressure reading without diagnosis of hypertension     Polyp of gallbladder, 4mm       Current Outpatient Medications   Medication Sig Dispense Refill     albuterol (PROAIR HFA/PROVENTIL HFA/VENTOLIN HFA) 108 (90 Base) MCG/ACT inhaler Inhale 2 puffs into the lungs every 6 hours as needed for shortness of breath / dyspnea 18 g 1     benzonatate (TESSALON) 100 MG capsule Take 1 capsule (100 mg) by mouth 3 times daily as needed for cough 42 capsule 0     Cholecalciferol (D3 ADULT PO) Take 2,000 Units by mouth daily.       cyanocolbalamin (VITAMIN  B-12) 100 MCG tablet Take 100 mcg by mouth daily.       diclofenac (VOLTAREN) 75 MG EC tablet Take 1 tablet (75 mg) by mouth 2 times daily 40 tablet 1     diphenhydrAMINE (BENADRYL) 25 MG tablet Take 25 mg by mouth every 6 hours as needed for itching or  "allergies       HYDROcodone-acetaminophen (NORCO) 5-325 MG per tablet Take 1 tablet by mouth every 6 hours as needed for severe pain 10 tablet 0     ketoconazole (NIZORAL) 2 % external cream Apply topically 2 times daily 15 g 1     levothyroxine (SYNTHROID/LEVOTHROID) 112 MCG tablet TAKE 1 TABLET(112 MCG) BY MOUTH DAILY 90 tablet 0     levothyroxine (SYNTHROID/LEVOTHROID) 112 MCG tablet Take 1 tablet (112 mcg) by mouth daily 90 tablet 3     MEGARED OMEGA-3 KRILL  MG CAPS Take 1 capsule by mouth daily        methocarbamol (ROBAXIN) 750 MG tablet Take 1 tablet (750 mg) by mouth 4 times daily as needed for muscle spasms 30 tablet 0     montelukast (SINGULAIR) 10 MG tablet Take 1 tablet (10 mg) by mouth At Bedtime 90 tablet 3     order for DME Equipment being ordered: compression stockings- LARGE, 15mm HG, THIGH HIGH 2 Units 0     triamcinolone (KENALOG) 0.1 % external cream Apply sparingly to affected area three times daily as needed 80 g 0       Allergies   Allergen Reactions     Contrast Dye Hives and Itching     Isovue with ANTON on 1-21-19     Sulfa Drugs Hives and Swelling     Noted in 10/18/09 ER       Family History   Problem Relation Age of Onset     Glaucoma Mother      Heart Disease Father      Cancer Father 67        kidney cancer      Heart Disease Sister      Cancer Sister 59        colon cancer      Diabetes Sister      Colon Cancer Maternal Aunt      Colon Cancer Paternal Aunt      Glaucoma Maternal Grandmother        Additional medical/Social/Surgical histories reviewed in [facility] and updated as appropriate.     REVIEW OF SYSTEMS (7/31/2019)  10 point ROS of systems including Constitutional, Eyes, Respiratory, Cardiovascular, Gastroenterology, Genitourinary, Integumentary, Musculoskeletal, Psychiatric were all negative except for pertinent positives noted in my HPI.     PHYSICAL EXAM  Vitals:    07/31/19 1021   Weight: 111.1 kg (245 lb)   Height: 1.715 m (5' 7.5\")     Vital Signs: Ht 1.715 m (5' 7.5\") "   Wt 111.1 kg (245 lb)   BMI 37.81 kg/m   Patient declined being weighed. Body mass index is 37.81 kg/m .    General  - normal appearance, in no obvious distress, overweight  CV  - normal peripheral perfusion  Pulm  - normal respiratory pattern, non-labored  Musculoskeletal - lumbar spine  - stance: normal gait without limp, no obvious leg length discrepancy, normal heel and toe walk  - inspection: normal bone and joint alignment, no obvious scoliosis  - palpation: no paravertebral or bony tenderness  - ROM: flexion does not exacerbates pain, normal extension, sidebending, rotation  - strength: lower extremities 5/5 in all planes  - special tests:  (-) straight leg raise  (-) slump test  Neuro  - patellar and Achilles DTRs 2+ bilaterally, no lower extremity sensory deficit throughout L5 distribution, grossly normal coordination, normal muscle tone  Skin  - no ecchymosis, erythema, warmth, or induration, no obvious rash  Psych  - interactive, appropriate, normal mood and affect  ASSESSMENT & PLAN  62 yo female with lumbar ddd, disc herniation, stable  Consider another ANTON  Cont. HEP        Frankie Santos MD, CAQSM

## 2019-07-31 NOTE — PATIENT INSTRUCTIONS
Thanks for coming today.  Ortho/[facility] Medicine Clinic  [address] [place] [address]    To schedule future appointments in Ortho Clinic, you may call [phone].    To schedule ordered imaging by your provider:   Call Central Imaging [phone]    To schedule an injection ordered by your provider:  Call Central Imaging Injection scheduling line: [phone]  [email or web address] available online at:  [email or web address]    Please call if any further questions or concerns ([phone]).  Clinic hours 8 am to 5 pm.    Return to clinic (call) if symptoms worsen or fail to improve.

## 2019-09-13 DIAGNOSIS — J45.30 MILD PERSISTENT ASTHMA WITHOUT COMPLICATION: ICD-10-CM

## 2019-09-13 RX ORDER — MONTELUKAST SODIUM 10 MG/1
TABLET ORAL
Qty: 30 TABLET | Refills: 0 | Status: SHIPPED | OUTPATIENT
Start: 2019-09-13 | End: 2019-09-30

## 2019-09-15 DIAGNOSIS — J45.30 MILD PERSISTENT ASTHMA WITHOUT COMPLICATION: ICD-10-CM

## 2019-09-16 RX ORDER — MONTELUKAST SODIUM 10 MG/1
TABLET ORAL
Qty: 90 TABLET | Refills: 0 | OUTPATIENT
Start: 2019-09-16

## 2019-09-20 DIAGNOSIS — Z00.00 ROUTINE GENERAL MEDICAL EXAMINATION AT A HEALTH CARE FACILITY: Primary | ICD-10-CM

## 2019-09-20 DIAGNOSIS — Z13.1 SCREENING FOR DIABETES MELLITUS (DM): ICD-10-CM

## 2019-09-20 DIAGNOSIS — Z13.0 SCREENING FOR DEFICIENCY ANEMIA: ICD-10-CM

## 2019-09-20 DIAGNOSIS — E89.0 POSTABLATIVE HYPOTHYROIDISM: ICD-10-CM

## 2019-09-20 DIAGNOSIS — Z13.6 CARDIOVASCULAR SCREENING; LDL GOAL LESS THAN 160: ICD-10-CM

## 2019-09-30 ENCOUNTER — OFFICE VISIT (OUTPATIENT)
Dept: PEDIATRICS | Facility: CLINIC | Age: 64
End: 2019-09-30
Payer: COMMERCIAL

## 2019-09-30 VITALS
HEART RATE: 67 BPM | SYSTOLIC BLOOD PRESSURE: 131 MMHG | DIASTOLIC BLOOD PRESSURE: 82 MMHG | TEMPERATURE: 98.4 F | BODY MASS INDEX: 37.56 KG/M2 | HEIGHT: 68 IN | OXYGEN SATURATION: 95 % | WEIGHT: 247.8 LBS

## 2019-09-30 DIAGNOSIS — Z13.0 SCREENING FOR DEFICIENCY ANEMIA: ICD-10-CM

## 2019-09-30 DIAGNOSIS — L23.9 ALLERGIC CONTACT DERMATITIS, UNSPECIFIED TRIGGER: ICD-10-CM

## 2019-09-30 DIAGNOSIS — Z12.39 BREAST CANCER SCREENING: ICD-10-CM

## 2019-09-30 DIAGNOSIS — Z13.6 CARDIOVASCULAR SCREENING; LDL GOAL LESS THAN 160: ICD-10-CM

## 2019-09-30 DIAGNOSIS — Z23 NEED FOR PROPHYLACTIC VACCINATION AND INOCULATION AGAINST INFLUENZA: ICD-10-CM

## 2019-09-30 DIAGNOSIS — L30.4 INTERTRIGO: ICD-10-CM

## 2019-09-30 DIAGNOSIS — G47.33 OSA (OBSTRUCTIVE SLEEP APNEA): ICD-10-CM

## 2019-09-30 DIAGNOSIS — E89.0 POSTABLATIVE HYPOTHYROIDISM: ICD-10-CM

## 2019-09-30 DIAGNOSIS — Z13.820 SCREENING FOR OSTEOPOROSIS: ICD-10-CM

## 2019-09-30 DIAGNOSIS — Z78.0 MENOPAUSE: ICD-10-CM

## 2019-09-30 DIAGNOSIS — J45.30 MILD PERSISTENT ASTHMA WITHOUT COMPLICATION: ICD-10-CM

## 2019-09-30 DIAGNOSIS — R31.29 MICROSCOPIC HEMATURIA: ICD-10-CM

## 2019-09-30 DIAGNOSIS — E53.8 VITAMIN B12 DEFICIENCY (NON ANEMIC): ICD-10-CM

## 2019-09-30 DIAGNOSIS — D12.6 TUBULAR ADENOMA OF COLON: ICD-10-CM

## 2019-09-30 DIAGNOSIS — Z23 NEED FOR PNEUMOCOCCAL VACCINE: ICD-10-CM

## 2019-09-30 DIAGNOSIS — Z13.1 SCREENING FOR DIABETES MELLITUS (DM): ICD-10-CM

## 2019-09-30 DIAGNOSIS — Z00.00 ROUTINE GENERAL MEDICAL EXAMINATION AT A HEALTH CARE FACILITY: Primary | ICD-10-CM

## 2019-09-30 DIAGNOSIS — Z00.00 ROUTINE GENERAL MEDICAL EXAMINATION AT A HEALTH CARE FACILITY: ICD-10-CM

## 2019-09-30 DIAGNOSIS — Z80.0 FAMILY HISTORY OF COLON CANCER: ICD-10-CM

## 2019-09-30 LAB
BASOPHILS # BLD AUTO: 0.1 10E9/L (ref 0–0.2)
BASOPHILS NFR BLD AUTO: 1 %
CHOLEST SERPL-MCNC: 187 MG/DL
DIFFERENTIAL METHOD BLD: NORMAL
EOSINOPHIL # BLD AUTO: 0.3 10E9/L (ref 0–0.7)
EOSINOPHIL NFR BLD AUTO: 3.9 %
ERYTHROCYTE [DISTWIDTH] IN BLOOD BY AUTOMATED COUNT: 13 % (ref 10–15)
GLUCOSE SERPL-MCNC: 95 MG/DL (ref 70–99)
HCT VFR BLD AUTO: 44.3 % (ref 35–47)
HDLC SERPL-MCNC: 54 MG/DL
HGB BLD-MCNC: 14.4 G/DL (ref 11.7–15.7)
IMM GRANULOCYTES # BLD: 0 10E9/L (ref 0–0.4)
IMM GRANULOCYTES NFR BLD: 0.4 %
LDLC SERPL CALC-MCNC: 108 MG/DL
LYMPHOCYTES # BLD AUTO: 1.7 10E9/L (ref 0.8–5.3)
LYMPHOCYTES NFR BLD AUTO: 24.1 %
MCH RBC QN AUTO: 30 PG (ref 26.5–33)
MCHC RBC AUTO-ENTMCNC: 32.5 G/DL (ref 31.5–36.5)
MCV RBC AUTO: 92 FL (ref 78–100)
MONOCYTES # BLD AUTO: 0.6 10E9/L (ref 0–1.3)
MONOCYTES NFR BLD AUTO: 8.9 %
NEUTROPHILS # BLD AUTO: 4.3 10E9/L (ref 1.6–8.3)
NEUTROPHILS NFR BLD AUTO: 61.7 %
NONHDLC SERPL-MCNC: 133 MG/DL
PLATELET # BLD AUTO: 244 10E9/L (ref 150–450)
RBC # BLD AUTO: 4.8 10E12/L (ref 3.8–5.2)
TRIGL SERPL-MCNC: 126 MG/DL
TSH SERPL DL<=0.005 MIU/L-ACNC: 1.29 MU/L (ref 0.4–4)
WBC # BLD AUTO: 7 10E9/L (ref 4–11)

## 2019-09-30 PROCEDURE — 87086 URINE CULTURE/COLONY COUNT: CPT | Performed by: FAMILY MEDICINE

## 2019-09-30 PROCEDURE — 90662 IIV NO PRSV INCREASED AG IM: CPT | Performed by: FAMILY MEDICINE

## 2019-09-30 PROCEDURE — 90472 IMMUNIZATION ADMIN EACH ADD: CPT | Performed by: FAMILY MEDICINE

## 2019-09-30 PROCEDURE — 90471 IMMUNIZATION ADMIN: CPT | Performed by: FAMILY MEDICINE

## 2019-09-30 PROCEDURE — 81001 URINALYSIS AUTO W/SCOPE: CPT | Performed by: FAMILY MEDICINE

## 2019-09-30 PROCEDURE — 82947 ASSAY GLUCOSE BLOOD QUANT: CPT | Performed by: FAMILY MEDICINE

## 2019-09-30 PROCEDURE — 84443 ASSAY THYROID STIM HORMONE: CPT | Performed by: FAMILY MEDICINE

## 2019-09-30 PROCEDURE — 99397 PER PM REEVAL EST PAT 65+ YR: CPT | Mod: 25 | Performed by: FAMILY MEDICINE

## 2019-09-30 PROCEDURE — 36415 COLL VENOUS BLD VENIPUNCTURE: CPT | Performed by: FAMILY MEDICINE

## 2019-09-30 PROCEDURE — 80061 LIPID PANEL: CPT | Performed by: FAMILY MEDICINE

## 2019-09-30 PROCEDURE — 90670 PCV13 VACCINE IM: CPT | Performed by: FAMILY MEDICINE

## 2019-09-30 PROCEDURE — 85025 COMPLETE CBC W/AUTO DIFF WBC: CPT | Performed by: FAMILY MEDICINE

## 2019-09-30 RX ORDER — TRIAMCINOLONE ACETONIDE 1 MG/G
CREAM TOPICAL
Qty: 80 G | Refills: 0 | Status: SHIPPED | OUTPATIENT
Start: 2019-09-30 | End: 2020-03-23

## 2019-09-30 RX ORDER — LEVOTHYROXINE SODIUM 112 UG/1
TABLET ORAL
Qty: 90 TABLET | Refills: 3 | Status: SHIPPED | OUTPATIENT
Start: 2019-09-30 | End: 2020-03-23

## 2019-09-30 RX ORDER — KETOCONAZOLE 20 MG/G
CREAM TOPICAL 2 TIMES DAILY
Qty: 15 G | Refills: 1 | Status: SHIPPED | OUTPATIENT
Start: 2019-09-30 | End: 2020-03-23

## 2019-09-30 RX ORDER — MONTELUKAST SODIUM 10 MG/1
TABLET ORAL
Qty: 90 TABLET | Refills: 1 | Status: SHIPPED | OUTPATIENT
Start: 2019-09-30 | End: 2020-03-23

## 2019-09-30 RX ORDER — ALBUTEROL SULFATE 90 UG/1
2 AEROSOL, METERED RESPIRATORY (INHALATION) EVERY 6 HOURS PRN
Qty: 18 G | Refills: 1 | Status: SHIPPED | OUTPATIENT
Start: 2019-09-30 | End: 2020-03-23

## 2019-09-30 ASSESSMENT — PAIN SCALES - GENERAL: PAINLEVEL: NO PAIN (0)

## 2019-09-30 ASSESSMENT — MIFFLIN-ST. JEOR: SCORE: 1709.57

## 2019-09-30 NOTE — PROGRESS NOTES
"  SUBJECTIVE:   Denita Flores is a 64 year old female who presents for Preventive Visit.  Are you in the first 12 months of your Medicare Part B coverage?  Yes    Physical Health:      In general, how would you rate your overall physical health? fair    Outside of work, how many days during the week do you exercise? none    Outside of work, approximately how many minutes a day do you exercise?not applicable    If you drink alcohol do you typically have >3 drinks per day or >7 drinks per week? No    Do you usually eat at least 4 servings of fruit and vegetables a day, include whole grains & fiber and avoid regularly eating high fat or \"junk\" foods? NO    Do you have any problems taking medications regularly?  No    Do you have any side effects from medications? none    Needs assistance for the following daily activities: no assistance needed    Which of the following safety concerns are present in your home?  none identified     Hearing impairment: No    In the past 6 months, have you been bothered by leaking of urine? no    Mental Health:    In general, how would you rate your overall mental or emotional health? good  PHQ-2 Score:      Do you feel safe in your environment? Yes    Do you have a Health Care Directive? No: Advance care planning reviewed with patient; information given to patient to review.    Additional concerns to address?  YES, Patient had work physical with trace of blood in urine. Patient left urine sample today if needed. Review if needs immunizations.    Flu Vaccine - offered, patient will do today.    Asthma Follow-Up  Was ACT completed today?    Yes    ACT Total Scores 9/30/2019   ACT TOTAL SCORE -   ASTHMA ER VISITS -   ASTHMA HOSPITALIZATIONS -   ACT TOTAL SCORE (Goal Greater than or Equal to 20) 16   In the past 12 months, how many times did you visit the emergency room for your asthma without being admitted to the hospital? 0   In the past 12 months, how many times were you hospitalized " overnight because of your asthma? 0       How many days per week do you miss taking your asthma controller medication?  0    Please describe any recent triggers for your asthma: pollens    Have you had any Emergency Room Visits, Urgent Care Visits, or Hospital Admissions since your last office visit?  No      Hypothyroidism Follow-up    Since last visit, patient describes the following symptoms: no hair loss, no skin changes, no constipation, no loose stools and weight gain.    Patient would like discuss taking brand new medication vs generic brand.       Fall risk:  Fallen 2 or more times in the past year?: No  Any fall with injury in the past year?: No    Cognitive Screenin) Repeat 3 items (Leader, Season, Table)    2) Clock draw: NORMAL  3) 3 item recall: Recalls 3 objects  Results: NORMAL clock, 1-2 items recalled: COGNITIVE IMPAIRMENT LESS LIKELY    Mini-CogTM Copyright S Meme. Licensed by the author for use in [facility]; reprinted with permission (sonia@[facility]). All rights reserved.      Do you have sleep apnea, excessive snoring or daytime drowsiness?: yes, uses dental appliance            Reviewed and updated as needed this visit by clinical staff  Tobacco  Allergies         Reviewed and updated as needed this visit by Provider        Social History     Tobacco Use     Smoking status: Former Smoker     Packs/day: 1.50     Years: 35.00     Pack years: 52.50     Types: Cigarettes     Last attempt to quit: 2003     Years since quittin.7     Smokeless tobacco: Never Used   Substance Use Topics     Alcohol use: Yes     Alcohol/week: 0.0 standard drinks     Comment: 3 dinks/ year                           Current providers sharing in care for this patient include:   Patient Care Team:  Gee Hitchcock MD as PCP - General (Family Practice)  Agbeh, Cephas Mawuena, MD as MD (OB/Gyn)  Santhosh Wood MD as Assigned PCP    The following health maintenance items are reviewed in  Epic and correct as of today:  Health Maintenance   Topic Date Due     DEXA  1955     ZOSTER IMMUNIZATION (2 of 3) 09/01/2016     ASTHMA ACTION PLAN  08/15/2018     ADVANCE CARE PLANNING  12/12/2018     ASTHMA CONTROL TEST  03/07/2019     INFLUENZA VACCINE (1) 09/01/2019     FALL RISK ASSESSMENT  09/07/2019     PNEUMOCOCCAL IMMUNIZATION 65+ LOW/MEDIUM RISK (1 of 2 - PCV13) 08/01/2019     MEDICARE ANNUAL WELLNESS VISIT  09/07/2019     TSH W/FREE T4 REFLEX  09/07/2019     MAMMO SCREENING  12/31/2019     DTAP/TDAP/TD IMMUNIZATION (2 - Td) 06/22/2025     COLONOSCOPY  02/21/2027     HEPATITIS C SCREENING  Completed     HIV SCREENING  Completed     PHQ-2  Completed     IPV IMMUNIZATION  Aged Out     MENINGITIS IMMUNIZATION  Aged Out     Lab work is in process  Labs reviewed in EPIC  BP Readings from Last 3 Encounters:   09/30/19 131/82   05/06/19 (!) 140/100   04/27/19 127/77    Wt Readings from Last 3 Encounters:   09/30/19 112.4 kg (247 lb 12.8 oz)   07/31/19 111.1 kg (245 lb)   05/06/19 111.1 kg (245 lb)                  Patient Active Problem List   Diagnosis     Graves disease     Obesity     Diverticulosis of large intestine without hemorrhage     Colon polyps     History of cervical cancer     YLEENA (obstructive sleep apnea)     Warts     CARDIOVASCULAR SCREENING; LDL GOAL LESS THAN 130     Hx of herpes zoster keratoconjunctivitis, os     Plantar warts     Stasis dermatitis of both legs     Obesity (BMI 30-39.9)     Postablative hypothyroidism     Other specified hypothyroidism,post ablative     Venous stasis dermatitis of left lower extremity     Vitamin B12 deficiency (non anemic)     Mild persistent asthma without complication     Spider veins of both lower extremities     Seasonal allergic rhinitis     Family history of colon cancer     Epistaxis     Tubular adenoma of colon     Family history of renal cancer     Nephrolithiasis     Gross hematuria     Morbid obesity (H)     Elevated blood pressure reading  without diagnosis of hypertension     Polyp of gallbladder, 4mm     CARDIOVASCULAR SCREENING; LDL GOAL LESS THAN 160     Microscopic hematuria     Past Surgical History:   Procedure Laterality Date     BACK SURGERY       COLONOSCOPY WITH CO2 INSUFFLATION N/A 2017    Procedure: COLONOSCOPY WITH CO2 INSUFFLATION;  Surgeon: Duane, William Charles, MD;  Location: [facility] OR     CYSTOSCOPY FLEXIBLE  8/10/2018     LEEP TX, CERVICAL      in her 20's       Social History     Tobacco Use     Smoking status: Former Smoker     Packs/day: 1.50     Years: 35.00     Pack years: 52.50     Types: Cigarettes     Last attempt to quit: 2003     Years since quittin.7     Smokeless tobacco: Never Used   Substance Use Topics     Alcohol use: Yes     Alcohol/week: 0.0 standard drinks     Comment: 3 dinks/ year     Family History   Problem Relation Age of Onset     Glaucoma Mother      Heart Disease Father      Cancer Father 67        kidney cancer      Heart Disease Sister      Cancer Sister 59        colon cancer      Diabetes Sister      Colon Cancer Maternal Aunt      Colon Cancer Paternal Aunt      Glaucoma Maternal Grandmother          Current Outpatient Medications   Medication Sig Dispense Refill     albuterol (PROAIR HFA/PROVENTIL HFA/VENTOLIN HFA) 108 (90 Base) MCG/ACT inhaler Inhale 2 puffs into the lungs every 6 hours as needed for shortness of breath / dyspnea 18 g 1     benzonatate (TESSALON) 100 MG capsule Take 1 capsule (100 mg) by mouth 3 times daily as needed for cough 42 capsule 0     Cholecalciferol (D3 ADULT PO) Take 2,000 Units by mouth daily.       cyanocolbalamin (VITAMIN  B-12) 100 MCG tablet Take 100 mcg by mouth daily.       ketoconazole (NIZORAL) 2 % external cream Apply topically 2 times daily 15 g 1     levothyroxine (SYNTHROID/LEVOTHROID) 112 MCG tablet TAKE 1 TABLET(112 MCG) BY MOUTH DAILY 90 tablet 3     MEGARED OMEGA-3 KRILL  MG CAPS Take 1 capsule by mouth daily        montelukast  (SINGULAIR) 10 MG tablet TAKE 1 TABLET(10 MG) BY MOUTH AT BEDTIME 90 tablet 1     order for DME Equipment being ordered: compression stockings- LARGE, 15mm HG, THIGH HIGH 2 Units 0     triamcinolone (KENALOG) 0.1 % external cream Apply sparingly to affected area three times daily as needed 80 g 0     diclofenac (VOLTAREN) 75 MG EC tablet Take 1 tablet (75 mg) by mouth 2 times daily (Patient not taking: Reported on 9/30/2019) 40 tablet 1     diphenhydrAMINE (BENADRYL) 25 MG tablet Take 25 mg by mouth every 6 hours as needed for itching or allergies       HYDROcodone-acetaminophen (NORCO) 5-325 MG per tablet Take 1 tablet by mouth every 6 hours as needed for severe pain (Patient not taking: Reported on 9/30/2019) 10 tablet 0     methocarbamol (ROBAXIN) 750 MG tablet Take 1 tablet (750 mg) by mouth 4 times daily as needed for muscle spasms (Patient not taking: Reported on 9/30/2019) 30 tablet 0     Allergies   Allergen Reactions     Contrast Dye Hives and Itching     Isovue with ANTON on 1-21-19     Sulfa Drugs Hives and Swelling     Noted in 10/18/09 ER     Recent Labs   Lab Test 09/30/19  0912 09/07/18  1043 08/07/18  1527  08/15/17  1028  05/08/13  1140   * 87  --   --  101*   < > 112   HDL 54 47*  --   --  52   < > 50   TRIG 126 126  --   --  124   < > 125   ALT  --   --   --   --  20  --  24   CR  --   --  0.76  --  0.76  --  0.72   GFRESTIMATED  --   --  76  --  77  --  83   GFRESTBLACK  --   --  >90  --  >90  --  >90   POTASSIUM  --   --  4.1  --  4.0  --  4.2   TSH 1.29 0.26*  --    < > 0.36*   < > 1.58    < > = values in this interval not displayed.      Pneumonia Vaccine:Adults age 65+ who received Pneumovax (PPSV23) at 65 years or older: Should be given PCV13 > 1 year after their most recent PPSV23  Mammogram Screening: Mammogram Screening: Patient over age 50, mutual decision to screen reflected in health maintenance.  History of abnormal Pap smear: NO - age 65 - see link Cervical Cytology Screening  "Guidelines    ROS:  CONSTITUTIONAL: NEGATIVE for fever, chills, change in weight  INTEGUMENTARY/SKIN: NEGATIVE for worrisome rashes, moles or lesions  EYES: NEGATIVE for vision changes or irritation  ENT/MOUTH: NEGATIVE for ear, mouth and throat problems  ENT/MOUTH: History of chronic allergic rhinitis  RESP: NEGATIVE for significant cough or SOB  RESP: History of asthma  CV: NEGATIVE for chest pain, palpitations or peripheral edema  GI: NEGATIVE for nausea, abdominal pain, heartburn, or change in bowel habits  : History of microscopic hematuria  MUSCULOSKELETAL: NEGATIVE for significant arthralgias or myalgia  NEURO: NEGATIVE for weakness, dizziness or paresthesias  ENDOCRINE: NEGATIVE for temperature intolerance, skin/hair changes and Hx thyroid disease  HEME/ALLERGY/IMMUNE: NEGATIVE for bleeding problems  PSYCHIATRIC: NEGATIVE for changes in mood or affect    OBJECTIVE:   /82 (BP Location: Right arm, Patient Position: Sitting, Cuff Size: Adult Large)   Pulse 67   Temp 98.4  F (36.9  C) (Oral)   Ht 1.715 m (5' 7.5\")   Wt 112.4 kg (247 lb 12.8 oz)   SpO2 95%   BMI 38.24 kg/m   Estimated body mass index is 38.24 kg/m  as calculated from the following:    Height as of this encounter: 1.715 m (5' 7.5\").    Weight as of this encounter: 112.4 kg (247 lb 12.8 oz).  EXAM:   GENERAL APPEARANCE: healthy, alert, no distress and obese  EYES: Eyes grossly normal to inspection, PERRL and conjunctivae and sclerae normal  HENT: ear canals and TM's normal, nose and mouth without ulcers or lesions, oropharynx clear and oral mucous membranes moist  NECK: no adenopathy, no asymmetry, masses, or scars and thyroid normal to palpation  RESP: lungs clear to auscultation - no rales, rhonchi or wheezes  BREAST: normal without masses, tenderness or nipple discharge and no palpable axillary masses or adenopathy  CV: regular rate and rhythm, normal S1 S2, no S3 or S4, no murmur, click or rub, no peripheral edema and peripheral " pulses strong  ABDOMEN: soft, nontender, no hepatosplenomegaly, no masses and bowel sounds normal  MS: no musculoskeletal defects are noted and gait is age appropriate without ataxia  SKIN: no suspicious lesions or rashes  NEURO: Normal strength and tone, sensory exam grossly normal, mentation intact and speech normal  PSYCH: mentation appears normal and affect normal/bright    Diagnostic Test Results:  Labs reviewed in Epic  Results for orders placed or performed in visit on 09/30/19 (from the past 24 hour(s))   **Glucose FUTURE anytime   Result Value Ref Range    Glucose 95 70 - 99 mg/dL   CBC with platelets differential   Result Value Ref Range    WBC 7.0 4.0 - 11.0 10e9/L    RBC Count 4.80 3.8 - 5.2 10e12/L    Hemoglobin 14.4 11.7 - 15.7 g/dL    Hematocrit 44.3 35.0 - 47.0 %    MCV 92 78 - 100 fl    MCH 30.0 26.5 - 33.0 pg    MCHC 32.5 31.5 - 36.5 g/dL    RDW 13.0 10.0 - 15.0 %    Platelet Count 244 150 - 450 10e9/L    % Neutrophils 61.7 %    % Lymphocytes 24.1 %    % Monocytes 8.9 %    % Eosinophils 3.9 %    % Basophils 1.0 %    % Immature Granulocytes 0.4 %    Absolute Neutrophil 4.3 1.6 - 8.3 10e9/L    Absolute Lymphocytes 1.7 0.8 - 5.3 10e9/L    Absolute Monocytes 0.6 0.0 - 1.3 10e9/L    Absolute Eosinophils 0.3 0.0 - 0.7 10e9/L    Absolute Basophils 0.1 0.0 - 0.2 10e9/L    Abs Immature Granulocytes 0.0 0 - 0.4 10e9/L    Diff Method Automated Method    Lipid panel reflex to direct LDL Fasting   Result Value Ref Range    Cholesterol 187 <200 mg/dL    Triglycerides 126 <150 mg/dL    HDL Cholesterol 54 >49 mg/dL    LDL Cholesterol Calculated 108 (H) <100 mg/dL    Non HDL Cholesterol 133 (H) <130 mg/dL   **TSH with free T4 reflex FUTURE anytime   Result Value Ref Range    TSH 1.29 0.40 - 4.00 mU/L       ASSESSMENT / PLAN:   1. Routine general medical examination at a health care facility  Discussed on regular exercises, daily calcium intake, healthy eating, self breast exams monthly and routine dental  checks  Recommended patient to have it done at the pharmacy after checking with insurance for coverage.      - INFLUENZA (HIGH DOSE) 3 VALENT VACCINE [29155]  - Vaccine Administration, Initial [94135]  - PNEUMOCOCCAL CONJ VACCINE 13 VALENT IM  - DX Hip/Pelvis/Spine; Future  - MA Screening Digital Bilateral; Future    2. 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    - *UA reflex to Microscopic and Culture ([place]; [facility]; [facility]; [facility]; [place]; [place])  - Urine Culture Aerobic Bacterial    3. Mild persistent asthma without complication  Stable except for slight exacerbation during the month of August and September due to worsening allergies for which patient takes extra antihistamine during those 2 months  She will monitor for now,  Will consider adding low-dose of steroid inhaler  for persistent or worsening concerns  Follow-up in 6 months or sooner if needed  - albuterol (PROAIR HFA/PROVENTIL HFA/VENTOLIN HFA) 108 (90 Base) MCG/ACT inhaler; Inhale 2 puffs into the lungs every 6 hours as needed for shortness of breath / dyspnea  Dispense: 18 g; Refill: 1  - montelukast (SINGULAIR) 10 MG tablet; TAKE 1 TABLET(10 MG) BY MOUTH AT BEDTIME  Dispense: 90 tablet; Refill: 1    4. Intertrigo    - ketoconazole (NIZORAL) 2 % external cream; Apply topically 2 times daily  Dispense: 15 g; Refill: 1    5. Postablative hypothyroidism  TSH    Date Value Ref Range Status   09/30/2019 1.29 0.40 - 4.00 mU/L Final   09/07/2018 0.26 (L) 0.40 - 4.00 mU/L Final   02/20/2018 0.35 (L) 0.40 - 4.00 mU/L Final   12/18/2017 0.88 0.40 - 4.00 mU/L Final   10/09/2017 0.32 (L) 0.40 - 4.00 mU/L Final     Reviewed normal thyroid labs, continue with levothyroxine 112 MCG daily, recheck in 1 year or sooner if needed  - levothyroxine (SYNTHROID/LEVOTHROID) 112 MCG tablet; TAKE 1 TABLET(112 MCG) BY MOUTH DAILY  Dispense: 90 tablet; Refill: 3    6. Allergic contact dermatitis, unspecified trigger    - triamcinolone (KENALOG) 0.1 % external cream; Apply sparingly to affected area three times daily as needed  Dispense: 80 g; Refill: 0    7. Need for prophylactic vaccination and inoculation against influenza    - INFLUENZA (HIGH DOSE) 3 VALENT VACCINE [05703]  - Vaccine Administration, Initial [48570]    8. Menopause    - DX Hip/Pelvis/Spine; Future    9. Screening for osteoporosis    - DX Hip/Pelvis/Spine; Future    10. Need for pneumococcal vaccine    - PNEUMOCOCCAL CONJ VACCINE 13 VALENT IM    11. Breast cancer screening    - MA Screening Digital Bilateral; Future    12. CARDIOVASCULAR SCREENING; LDL GOAL LESS THAN 160  LDL Cholesterol Calculated   Date Value Ref Range Status   09/30/2019 108 (H) <100 mg/dL Final     Comment:     Above desirable:  100-129 mg/dl  Borderline High:  130-159 mg/dL  High:             160-189 mg/dL  Very high:       >189 mg/dl           13. Family history of colon cancer  Continue colonoscopy every 5 years, recheck in 2022    14. Vitamin B12 deficiency (non anemic)  Continue with over-the-counter vitamin B12 1000 MCG daily    15. Tubular adenoma of colon  Recheck colonoscopy for 5 years    16. YELENA (obstructive sleep apnea)  Using dental appliances      End of Life Planning:  Patient currently has an advanced directive: No.  I have verified the patient's ablity to prepare an advanced directive/make health care decisions.  Literature was provided to  "assist patient in preparing an advanced directive.    COUNSELING:  Reviewed preventive health counseling, as reflected in patient instructions  Special attention given to:       Regular exercise       Healthy diet/nutrition       Vision screening       Hearing screening       Dental care       Bladder control       Fall risk prevention       Immunizations    Vaccinated for: Influenza and Pneumococcal             Osteoporosis Prevention/Bone Health       Colon cancer screening       (Mindy)menopause management       The 10-year ASCVD risk score (Lesa LUGO Jr., et al., 2013) is: 5.7%    Values used to calculate the score:      Age: 64 years      Sex: Female      Is Non- : No      Diabetic: No      Tobacco smoker: No      Systolic Blood Pressure: 131 mmHg      Is BP treated: No      HDL Cholesterol: 54 mg/dL      Total Cholesterol: 187 mg/dL       Advanced Planning     Estimated body mass index is 38.24 kg/m  as calculated from the following:    Height as of this encounter: 1.715 m (5' 7.5\").    Weight as of this encounter: 112.4 kg (247 lb 12.8 oz).    Weight management plan: Discussed healthy diet and exercise guidelines     reports that she quit smoking about 16 years ago. Her smoking use included cigarettes. She has a 52.50 pack-year smoking history. She has never used smokeless tobacco.      Appropriate preventive services were discussed with this patient, including applicable screening as appropriate for cardiovascular disease, diabetes, osteopenia/osteoporosis, and glaucoma.  As appropriate for age/gender, discussed screening for colorectal cancer, prostate cancer, breast cancer, and cervical cancer. Checklist reviewing preventive services available has been given to the patient.    Reviewed patients plan of care and provided an AVS. The Intermediate Care Plan ( asthma action plan, low back pain action plan, and migraine action plan) for Denita meets the Care Plan requirement. This Care Plan " has been established and reviewed with the Patient.    Counseling Resources:  ATP IV Guidelines  Pooled Cohorts Equation Calculator  Breast Cancer Risk Calculator  FRAX Risk Assessment  ICSI Preventive Guidelines  Dietary Guidelines for Americans, 2010  USDA's MyPlate  ASA Prophylaxis  Lung CA Screening    Gee Hitchcock MD  [facility]  Chart documentation done in part with Dragon Voice recognition Software. Although reviewed after completion, some word and grammatical error may remain.

## 2019-09-30 NOTE — PATIENT INSTRUCTIONS
Get the flu shot today  Get the PCV 13 SHOT TODAY  Schedule for DEXA  Schedule for mammogram [place] 12/2019  Schedule for recheck in 6 months or sooner if needed    Patient Education   Personalized Prevention Plan  You are due for the preventive services outlined below.  Your care team is available to assist you in scheduling these services.  If you have already completed any of these items, please share that information with your care team to update in your medical record.  Health Maintenance Due   Topic Date Due     Osteoporosis Screening  1955     Zoster (Shingles) Vaccine (2 of 3) 09/01/2016     Asthma Action Plan - yearly  08/15/2018     Discuss Advance Care Planning  12/12/2018     Asthma Control Test  03/07/2019     Flu Vaccine (1) 09/01/2019     FALL RISK ASSESSMENT  09/07/2019     Pneumococcal Vaccine (1 of 2 - PCV13) 08/01/2019     Annual Wellness Visit  09/07/2019     Thyroid Function Lab  09/07/2019

## 2019-09-30 NOTE — NURSING NOTE
Prior to immunization administration, verified patients identity using patient s name and date of birth. Please see Immunization Activity for additional information.     Screening Questionnaire for Adult Immunization    Are you sick today?   No   Do you have allergies to medications, food, a vaccine component or latex?   No   Have you ever had a serious reaction after receiving a vaccination?   No   Do you have a long-term health problem with heart disease, lung disease, asthma, kidney disease, metabolic disease (e.g. diabetes), anemia, or other blood disorder?   No   Do you have cancer, leukemia, HIV/AIDS, or any other immune system problem?   No   In the past 3 months, have you taken medications that affect  your immune system, such as prednisone, other steroids, or anticancer drugs; drugs for the treatment of rheumatoid arthritis, Crohn s disease, or psoriasis; or have you had radiation treatments?   No   Have you had a seizure, or a brain or other nervous system problem?   No   During the past year, have you received a transfusion of blood or blood     products, or been given immune (gamma) globulin or antiviral drug?   No   For women: Are you pregnant or is there a chance you could become        pregnant during the next month?   No   Have you received any vaccinations in the past 4 weeks?   No     Immunization questionnaire answers were all negative.        Per orders of Dr. Hitchcock, injection of PCV13 given by Regine Quijano CMA. Patient instructed to remain in clinic for 15 minutes afterwards, and to report any adverse reaction to me immediately.       Screening performed by Regine Quijano CMA on 9/30/2019 at 10:38 AM.

## 2019-10-01 LAB
ALBUMIN UR-MCNC: NEGATIVE MG/DL
APPEARANCE UR: CLEAR
BACTERIA #/AREA URNS HPF: ABNORMAL /HPF
BILIRUB UR QL STRIP: NEGATIVE
COLOR UR AUTO: YELLOW
GLUCOSE UR STRIP-MCNC: NEGATIVE MG/DL
HGB UR QL STRIP: ABNORMAL
KETONES UR STRIP-MCNC: NEGATIVE MG/DL
LEUKOCYTE ESTERASE UR QL STRIP: NEGATIVE
NITRATE UR QL: NEGATIVE
NON-SQ EPI CELLS #/AREA URNS LPF: ABNORMAL /LPF
PH UR STRIP: 6.5 PH (ref 5–7)
RBC #/AREA URNS AUTO: ABNORMAL /HPF
SOURCE: ABNORMAL
SP GR UR STRIP: 1.02 (ref 1–1.03)
UROBILINOGEN UR STRIP-MCNC: NORMAL MG/DL (ref 0–2)
WBC #/AREA URNS AUTO: ABNORMAL /HPF

## 2019-10-01 ASSESSMENT — ASTHMA QUESTIONNAIRES: ACT_TOTALSCORE: 16

## 2019-10-02 LAB
BACTERIA SPEC CULT: NORMAL
SPECIMEN SOURCE: NORMAL

## 2019-10-04 ENCOUNTER — TELEPHONE (OUTPATIENT)
Dept: PEDIATRICS | Facility: CLINIC | Age: 64
End: 2019-10-04

## 2019-10-04 NOTE — TELEPHONE ENCOUNTER
Left VM to call back to the clinic.  Mgean Redding, RN     Please call patient with lab results   dear Lois,   Your urine culture is negative with no concerns for infection   Given that you had a work-up last year that was negative, I would recommend we repeat the work-up in 2 years as the urologist recommended    Let me know if you have any questions. Take care.   Gee Hitchcock MD

## 2019-10-04 NOTE — RESULT ENCOUNTER NOTE
Please call patient with lab results   dear Lois,  Your urine culture is negative with no concerns for infection  Given that you had a work-up last year that was negative, I would recommend we repeat the work-up in 2 years as the urologist recommended   Let me know if you have any questions. Take care.  Gee Hitchcock MD

## 2019-10-17 ENCOUNTER — ANCILLARY PROCEDURE (OUTPATIENT)
Dept: BONE DENSITY | Facility: CLINIC | Age: 64
End: 2019-10-17
Attending: FAMILY MEDICINE
Payer: COMMERCIAL

## 2019-10-17 DIAGNOSIS — Z00.00 ROUTINE GENERAL MEDICAL EXAMINATION AT A HEALTH CARE FACILITY: ICD-10-CM

## 2019-10-17 DIAGNOSIS — Z78.0 MENOPAUSE: ICD-10-CM

## 2019-10-17 DIAGNOSIS — Z13.820 SCREENING FOR OSTEOPOROSIS: ICD-10-CM

## 2019-10-17 PROCEDURE — 77080 DXA BONE DENSITY AXIAL: CPT | Performed by: RADIOLOGY

## 2019-10-17 NOTE — LETTER
October 18, 2019    Denita Flores                                                                                                                                                       [address] [facility] [address]              Dear Denita,    The results of your recent bone density scan were normal.  Continue taking 1200/1500 mg of Calcium daily and 400/800 international units of Vitamin D per day.    Exercise daily to keep your bones strong. Thirty minutes of moderate walking or other aerobic activity is recommended.    If you are currently taking Fosamax, Miacalcin, Estrogen (for women who have had a hysterectomy) or Evista, continue your medication.     If you have any questions or concerns, please call myself or my nurse.    Sincerely,      Gee Hitchcock MD

## 2019-11-01 ENCOUNTER — OFFICE VISIT (OUTPATIENT)
Dept: DERMATOLOGY | Facility: CLINIC | Age: 64
End: 2019-11-01
Payer: COMMERCIAL

## 2019-11-01 DIAGNOSIS — Z12.83 SCREENING FOR SKIN CANCER: ICD-10-CM

## 2019-11-01 DIAGNOSIS — D22.9 MULTIPLE BENIGN NEVI: Primary | ICD-10-CM

## 2019-11-01 DIAGNOSIS — L30.4 INTERTRIGO: ICD-10-CM

## 2019-11-01 PROCEDURE — 17110 DESTRUCTION B9 LES UP TO 14: CPT | Performed by: DERMATOLOGY

## 2019-11-01 PROCEDURE — 99213 OFFICE O/P EST LOW 20 MIN: CPT | Mod: 25 | Performed by: DERMATOLOGY

## 2019-11-01 ASSESSMENT — PAIN SCALES - GENERAL: PAINLEVEL: MILD PAIN (2)

## 2019-11-01 NOTE — LETTER
2019         RE: Edward Trent  [address]  [place] [address]        Dear Colleague,    Thank you for referring your patient, Edward Trent, to the [facility]. Please see a copy of my visit note below.    Visit Date:   2019      SUBJECTIVE:  Edward comes back for a recheck.  She has a history of eczema and some intertrigo, as well as many years ago some condyloma.  She has numerous areas of concern today.  None serious, but she is bothered by them around her neck and her chest.  She would like a general skin check.      OBJECTIVE:  Shows a healthy lady in no distress.  We checked her face, her neck, her back, her breasts, her legs and arms carefully.  She has numerous skin tags about the neck.  She has scattered seborrheic keratoses on the breasts and submammary regions.  She has intertrigo in the abdominal pannus but has a cream for that problem area.   She denies any lesions that have been bleeding, crusting or causing pain.      ASSESSMENT:  Numerous benign nevi and seborrheic keratoses.      PLAN:  Per request, we treated about a dozen of the seborrheic keratoses with cryotherapy.  She tolerated it very well.  I advised that she use powder and her cream for the intertrigo under her pannus.     MEDICATIONS AND ALLERGIES:  Reviewed.      Return p.r.n. to see Dr. Booth.         MARK DAVIS MD             D: 2019   T: 2019   MT:       Name:     EDWARD TRENT   MRN:      [id number]        Account:      [id number]   :      1955           Visit Date:   2019      Document: [id number]        Again, thank you for allowing me to participate in the care of your patient.        Sincerely,        MARK Davis MD

## 2019-11-01 NOTE — PROGRESS NOTES
Visit Date:   2019      SUBJECTIVE:  Edward comes back for a recheck.  She has a history of eczema and some intertrigo, as well as many years ago some condyloma.  She has numerous areas of concern today.  None serious, but she is bothered by them around her neck and her chest.  She would like a general skin check.      OBJECTIVE:  Shows a healthy lady in no distress.  We checked her face, her neck, her back, her breasts, her legs and arms carefully.  She has numerous skin tags about the neck.  She has scattered seborrheic keratoses on the breasts and submammary regions.  She has intertrigo in the abdominal pannus but has a cream for that problem area.   She denies any lesions that have been bleeding, crusting or causing pain.      ASSESSMENT:  Numerous benign nevi and seborrheic keratoses.      PLAN:  Per request, we treated about a dozen of the seborrheic keratoses with cryotherapy.  She tolerated it very well.  I advised that she use powder and her cream for the intertrigo under her pannus.     MEDICATIONS AND ALLERGIES:  Reviewed.      Return p.r.n. to see Dr. Booth.         MARK SMITH MD             D: 2019   T: 2019   MT: LEATHA      Name:     EDWARD TRENT   MRN:      [id number]        Account:      [id number]   :      1955           Visit Date:   2019      Document: [id number]

## 2019-11-01 NOTE — NURSING NOTE
Denita Flores's goals for this visit include:   Chief Complaint   Patient presents with     Skin Check     no major areas of concern       She requests these members of her care team be copied on today's visit information: Yes    PCP: Gee Hitchcock    Referring Provider:  No referring provider defined for this encounter.    There were no vitals taken for this visit.    Do you need any medication refills at today's visit? Possibly, ketaconazole cream

## 2019-11-12 ENCOUNTER — OFFICE VISIT (OUTPATIENT)
Dept: URGENT CARE | Facility: URGENT CARE | Age: 64
End: 2019-11-12
Payer: COMMERCIAL

## 2019-11-12 VITALS
TEMPERATURE: 97 F | BODY MASS INDEX: 38.42 KG/M2 | WEIGHT: 249 LBS | SYSTOLIC BLOOD PRESSURE: 167 MMHG | HEART RATE: 74 BPM | DIASTOLIC BLOOD PRESSURE: 80 MMHG | OXYGEN SATURATION: 96 %

## 2019-11-12 DIAGNOSIS — J30.2 SEASONAL ALLERGIC RHINITIS, UNSPECIFIED TRIGGER: ICD-10-CM

## 2019-11-12 DIAGNOSIS — J45.21 MILD INTERMITTENT ASTHMA WITH EXACERBATION: Primary | ICD-10-CM

## 2019-11-12 PROCEDURE — 99214 OFFICE O/P EST MOD 30 MIN: CPT | Performed by: PHYSICIAN ASSISTANT

## 2019-11-12 RX ORDER — METHYLPREDNISOLONE 4 MG
TABLET, DOSE PACK ORAL
Qty: 21 TABLET | Refills: 0 | Status: SHIPPED | OUTPATIENT
Start: 2019-11-12 | End: 2020-03-23

## 2019-11-12 ASSESSMENT — ENCOUNTER SYMPTOMS
SHORTNESS OF BREATH: 0
FEVER: 0
WHEEZING: 1
DIARRHEA: 0
ABDOMINAL PAIN: 0
VOMITING: 0
CHILLS: 0
HEADACHES: 0
COUGH: 1
FOCAL WEAKNESS: 0
NAUSEA: 0

## 2019-11-13 NOTE — PROGRESS NOTES
HPI  2019    HPI: Denita Flores is a 64 year old female with hx asthma & allergic rhinitis who complains of moderate hoarse voice, productive cough (primarily at night and in AM), nasal congestion, rhinorrhea, and chest tightness/wheezing onset 4 days ago. She takes Singulair for allergies/asthma and uses albuterol as needed. Symptoms are constant in duration. Denies fever/chills, HA, CP, SOB, abd pain, N/V/D, rash, or any other symptoms. Patient denies sick contacts.    Past Medical History:   Diagnosis Date     Graves disease      Kidney stones     Passed a 5-10mm stone     Malignant neoplasm (H)     Cervical CA     Mild persistent asthma 2013     Thyroid disease      Past Surgical History:   Procedure Laterality Date     BACK SURGERY       COLONOSCOPY WITH CO2 INSUFFLATION N/A 2017    Procedure: COLONOSCOPY WITH CO2 INSUFFLATION;  Surgeon: Duane, William Charles, MD;  Location: [facility] OR     CYSTOSCOPY FLEXIBLE  8/10/2018     LEEP TX, CERVICAL      in her 20's     Social History     Tobacco Use     Smoking status: Former Smoker     Packs/day: 1.50     Years: 35.00     Pack years: 52.50     Types: Cigarettes     Last attempt to quit: 2003     Years since quittin.8     Smokeless tobacco: Never Used   Substance Use Topics     Alcohol use: Yes     Alcohol/week: 0.0 standard drinks     Comment: 3 dinks/ year     Drug use: No     Patient Active Problem List   Diagnosis     Graves disease     Obesity     Diverticulosis of large intestine without hemorrhage     Colon polyps     History of cervical cancer     YELENA (obstructive sleep apnea)     Warts     CARDIOVASCULAR SCREENING; LDL GOAL LESS THAN 130     Hx of herpes zoster keratoconjunctivitis, os     Plantar warts     Stasis dermatitis of both legs     Obesity (BMI 30-39.9)     Postablative hypothyroidism     Other specified hypothyroidism,post ablative     Venous stasis dermatitis of left lower extremity     Vitamin B12 deficiency (non  anemic)     Mild persistent asthma without complication     Spider veins of both lower extremities     Seasonal allergic rhinitis     Family history of colon cancer     Epistaxis     Tubular adenoma of colon     Family history of renal cancer     Nephrolithiasis     Gross hematuria     Morbid obesity (H)     Elevated blood pressure reading without diagnosis of hypertension     Polyp of gallbladder, 4mm     CARDIOVASCULAR SCREENING; LDL GOAL LESS THAN 160     Microscopic hematuria     Family History   Problem Relation Age of Onset     Glaucoma Mother      Heart Disease Father      Cancer Father 67        kidney cancer      Heart Disease Sister      Cancer Sister 59        colon cancer      Diabetes Sister      Colon Cancer Maternal Aunt      Colon Cancer Paternal Aunt      Glaucoma Maternal Grandmother         Problem list, Medication list, Allergies, and Medical/Social/Surgical histories reviewed in [facility] and updated as appropriate.      Review of Systems   Constitutional: Negative for chills and fever.   HENT: Positive for congestion.         Hoarse voice   Respiratory: Positive for cough and wheezing. Negative for shortness of breath.         Chest tightness   Cardiovascular: Negative for chest pain.   Gastrointestinal: Negative for abdominal pain, diarrhea, nausea and vomiting.   Skin: Negative for rash.   Neurological: Negative for focal weakness and headaches.   All other systems reviewed and are negative.        Physical Exam  Vitals signs and nursing note reviewed.   HENT:      Head: Normocephalic and atraumatic.      Right Ear: Tympanic membrane and external ear normal.      Left Ear: Tympanic membrane and external ear normal.      Nose: Nose normal.      Mouth/Throat:      Mouth: Mucous membranes are moist.      Pharynx: Oropharynx is clear.   Cardiovascular:      Rate and Rhythm: Normal rate and regular rhythm.      Heart sounds: Normal heart sounds.   Pulmonary:      Effort: Pulmonary effort is normal.       Breath sounds: Normal breath sounds.   Musculoskeletal: Normal range of motion.   Skin:     General: Skin is warm and dry.   Neurological:      Mental Status: She is alert and oriented to person, place, and time.       Vital Signs  BP (!) 167/80   Pulse 74   Temp 97  F (36.1  C) (Oral)   Wt 112.9 kg (249 lb)   SpO2 96%   BMI 38.42 kg/m       Diagnostic Test Results:  none     ASSESSMENT/PLAN      ICD-10-CM    1. Mild intermittent asthma with exacerbation J45.21 methylPREDNISolone (MEDROL DOSEPAK) 4 MG tablet therapy pack   2. Seasonal allergic rhinitis, unspecified trigger J30.2         Lungs CTAB, afebrile, no sign of focal bacterial infection. Suspect symptoms due to allergies/asthma exacerbation. Continue Singulair and albuterol PRN, Rx Medrol dosepak.    I have discussed any lab or imaging results, the patient's diagnosis, and my plan of treatment with the patient and/or family. Patient is aware to come back in if with worsening symptoms or if no relief despite treatment plan.  Patient voiced understanding and had no further questions.       Follow Up: Return in about 3 days (around 11/15/2019) for Follow up w/ primary care provider if not better.    GIANNI Osorio, PA-C  [facility]

## 2019-12-11 DIAGNOSIS — M51.16 LUMBAR DISC HERNIATION WITH RADICULOPATHY: Primary | ICD-10-CM

## 2019-12-20 ENCOUNTER — HOSPITAL ENCOUNTER (OUTPATIENT)
Dept: MRI IMAGING | Facility: CLINIC | Age: 64
Discharge: HOME | End: 2019-12-20
Attending: PREVENTIVE MEDICINE | Admitting: PREVENTIVE MEDICINE
Payer: COMMERCIAL

## 2019-12-20 DIAGNOSIS — M51.16 LUMBAR DISC HERNIATION WITH RADICULOPATHY: ICD-10-CM

## 2019-12-20 PROCEDURE — 72148 MRI LUMBAR SPINE W/O DYE: CPT

## 2020-01-02 ENCOUNTER — ANCILLARY PROCEDURE (OUTPATIENT)
Dept: MAMMOGRAPHY | Facility: CLINIC | Age: 65
End: 2020-01-02
Attending: FAMILY MEDICINE
Payer: COMMERCIAL

## 2020-01-02 DIAGNOSIS — Z12.39 BREAST CANCER SCREENING: ICD-10-CM

## 2020-01-02 DIAGNOSIS — Z00.00 ROUTINE GENERAL MEDICAL EXAMINATION AT A HEALTH CARE FACILITY: ICD-10-CM

## 2020-01-02 PROCEDURE — 77063 BREAST TOMOSYNTHESIS BI: CPT | Performed by: RADIOLOGY

## 2020-01-02 PROCEDURE — 77067 SCR MAMMO BI INCL CAD: CPT | Performed by: RADIOLOGY

## 2020-01-15 ENCOUNTER — OFFICE VISIT (OUTPATIENT)
Dept: ORTHOPEDICS | Facility: CLINIC | Age: 65
End: 2020-01-15
Payer: COMMERCIAL

## 2020-01-15 VITALS — HEIGHT: 68 IN | WEIGHT: 247 LBS | BODY MASS INDEX: 37.44 KG/M2

## 2020-01-15 DIAGNOSIS — M51.16 LUMBAR DISC HERNIATION WITH RADICULOPATHY: Primary | ICD-10-CM

## 2020-01-15 PROCEDURE — 99214 OFFICE O/P EST MOD 30 MIN: CPT | Performed by: PREVENTIVE MEDICINE

## 2020-01-15 ASSESSMENT — PAIN SCALES - GENERAL: PAINLEVEL: MILD PAIN (2)

## 2020-01-15 ASSESSMENT — MIFFLIN-ST. JEOR: SCORE: 1705.94

## 2020-01-15 NOTE — LETTER
1/15/2020         RE: Denita Flores  [address] [facility] [address]        Dear Colleague,    Thank you for referring your patient, Denita Flores, to the [facility]. Please see a copy of my visit note below.    HISTORY OF PRESENT ILLNESS  Ms. Flores is a pleasant 64 year old year old female who presents to clinic today for followup for lumbar MRI  Still having symptoms in low back and legs  Would like to discuss MRI and make a plan    MEDICAL HISTORY  Patient Active Problem List   Diagnosis     Graves disease     Obesity     Diverticulosis of large intestine without hemorrhage     Colon polyps     History of cervical cancer     YELENA (obstructive sleep apnea)     Warts     CARDIOVASCULAR SCREENING; LDL GOAL LESS THAN 130     Hx of herpes zoster keratoconjunctivitis, os     Plantar warts     Stasis dermatitis of both legs     Obesity (BMI 30-39.9)     Postablative hypothyroidism     Other specified hypothyroidism,post ablative     Venous stasis dermatitis of left lower extremity     Vitamin B12 deficiency (non anemic)     Mild persistent asthma without complication     Spider veins of both lower extremities     Seasonal allergic rhinitis     Family history of colon cancer     Epistaxis     Tubular adenoma of colon     Family history of renal cancer     Nephrolithiasis     Gross hematuria     Morbid obesity (H)     Elevated blood pressure reading without diagnosis of hypertension     Polyp of gallbladder, 4mm     CARDIOVASCULAR SCREENING; LDL GOAL LESS THAN 160     Microscopic hematuria       Current Outpatient Medications   Medication Sig Dispense Refill     albuterol (PROAIR HFA/PROVENTIL HFA/VENTOLIN HFA) 108 (90 Base) MCG/ACT inhaler Inhale 2 puffs into the lungs every 6 hours as needed for shortness of breath / dyspnea 18 g 1     benzonatate (TESSALON) 100 MG capsule Take 1 capsule (100 mg) by mouth 3 times daily as needed for cough 42 capsule 0     Cholecalciferol (D3 ADULT  PO) Take 2,000 Units by mouth daily.       cyanocolbalamin (VITAMIN  B-12) 100 MCG tablet Take 100 mcg by mouth daily.       diclofenac (VOLTAREN) 75 MG EC tablet Take 1 tablet (75 mg) by mouth 2 times daily 40 tablet 1     diphenhydrAMINE (BENADRYL) 25 MG tablet Take 25 mg by mouth every 6 hours as needed for itching or allergies       HYDROcodone-acetaminophen (NORCO) 5-325 MG per tablet Take 1 tablet by mouth every 6 hours as needed for severe pain 10 tablet 0     ketoconazole (NIZORAL) 2 % external cream Apply topically 2 times daily 15 g 1     levothyroxine (SYNTHROID/LEVOTHROID) 112 MCG tablet TAKE 1 TABLET(112 MCG) BY MOUTH DAILY 90 tablet 3     MEGARED OMEGA-3 KRILL  MG CAPS Take 1 capsule by mouth daily        methocarbamol (ROBAXIN) 750 MG tablet Take 1 tablet (750 mg) by mouth 4 times daily as needed for muscle spasms 30 tablet 0     methylPREDNISolone (MEDROL DOSEPAK) 4 MG tablet therapy pack Follow package instructions 21 tablet 0     montelukast (SINGULAIR) 10 MG tablet TAKE 1 TABLET(10 MG) BY MOUTH AT BEDTIME 90 tablet 1     order for DME Equipment being ordered: compression stockings- LARGE, 15mm HG, THIGH HIGH 2 Units 0     triamcinolone (KENALOG) 0.1 % external cream Apply sparingly to affected area three times daily as needed 80 g 0       Allergies   Allergen Reactions     Contrast Dye Hives and Itching     Isovue with ANTON on 1-21-19     Sulfa Drugs Hives and Swelling     Noted in 10/18/09 ER       Family History   Problem Relation Age of Onset     Glaucoma Mother      Heart Disease Father      Cancer Father 67        kidney cancer      Heart Disease Sister      Cancer Sister 59        colon cancer      Diabetes Sister      Colon Cancer Maternal Aunt      Colon Cancer Paternal Aunt      Glaucoma Maternal Grandmother        Additional medical/Social/Surgical histories reviewed in [facility] and updated as appropriate.     REVIEW OF SYSTEMS (1/15/2020)  10 point ROS of systems including  "Constitutional, Eyes, Respiratory, Cardiovascular, Gastroenterology, Genitourinary, Integumentary, Musculoskeletal, Psychiatric were all negative except for pertinent positives noted in my HPI.     PHYSICAL EXAM  Vitals:    01/15/20 1021   Weight: 112 kg (247 lb)   Height: 1.715 m (5' 7.5\")     Vital Signs: Ht 1.715 m (5' 7.5\")   Wt 112 kg (247 lb)   BMI 38.11 kg/m    Patient declined being weighed. Body mass index is 38.11 kg/m .    General  - normal appearance, in no obvious distress  CV  - normal peripheral perfusion  Pulm  - normal respiratory pattern, non-labored  Musculoskeletal - lumbar spine  - stance: normal gait without limp, no obvious leg length discrepancy, normal heel and toe walk  - inspection: normal bone and joint alignment, no obvious scoliosis  - palpation: no paravertebral or bony tenderness  - ROM: flexion exacerbates pain, normal extension, sidebending, rotation  - strength: lower extremities 5/5 in all planes  - special tests:  (+) straight leg raise  (+) slump test  Neuro  - patellar and Achilles DTRs 2+ bilaterally, some lower extremity sensory deficit throughout L5 distribution, grossly normal coordination, normal muscle tone  Skin  - no ecchymosis, erythema, warmth, or induration, no obvious rash  Psych  - interactive, appropriate, normal mood and affect  ASSESSMENT & PLAN  63 yo female with lumbar ddd, radicular pain, worse  Reviewed lumbar MRI: shows ddd, herniated discs  Ordered ANTON  Cont. HEP  F/u after    Frankie Santos MD, CAQSM    Again, thank you for allowing me to participate in the care of your patient.        Sincerely,        Frankie Santos MD    "

## 2020-01-15 NOTE — PATIENT INSTRUCTIONS
Thanks for coming today.  Ortho[facility] Clinic  [address] [place] [address]    To schedule future appointments in Ortho Clinic, you may call [phone].    To schedule ordered imaging by your provider:   Call [facility] [phone]    To schedule an injection ordered by your provider:  Call [facility] Injection scheduling line: [phone]  Dctiohart available online at:  [email or web address]    Please call if any further questions or concerns ([phone]).  Clinic hours 8 am to 5 pm.    Return to clinic (call) if symptoms worsen or fail to improve.

## 2020-01-15 NOTE — PROGRESS NOTES
HISTORY OF PRESENT ILLNESS  Ms. Flores is a pleasant 64 year old year old female who presents to clinic today for followup for lumbar MRI  Still having symptoms in low back and legs  Would like to discuss MRI and make a plan    MEDICAL HISTORY  Patient Active Problem List   Diagnosis     Graves disease     Obesity     Diverticulosis of large intestine without hemorrhage     Colon polyps     History of cervical cancer     YELENA (obstructive sleep apnea)     Warts     CARDIOVASCULAR SCREENING; LDL GOAL LESS THAN 130     Hx of herpes zoster keratoconjunctivitis, os     Plantar warts     Stasis dermatitis of both legs     Obesity (BMI 30-39.9)     Postablative hypothyroidism     Other specified hypothyroidism,post ablative     Venous stasis dermatitis of left lower extremity     Vitamin B12 deficiency (non anemic)     Mild persistent asthma without complication     Spider veins of both lower extremities     Seasonal allergic rhinitis     Family history of colon cancer     Epistaxis     Tubular adenoma of colon     Family history of renal cancer     Nephrolithiasis     Gross hematuria     Morbid obesity (H)     Elevated blood pressure reading without diagnosis of hypertension     Polyp of gallbladder, 4mm     CARDIOVASCULAR SCREENING; LDL GOAL LESS THAN 160     Microscopic hematuria       Current Outpatient Medications   Medication Sig Dispense Refill     albuterol (PROAIR HFA/PROVENTIL HFA/VENTOLIN HFA) 108 (90 Base) MCG/ACT inhaler Inhale 2 puffs into the lungs every 6 hours as needed for shortness of breath / dyspnea 18 g 1     benzonatate (TESSALON) 100 MG capsule Take 1 capsule (100 mg) by mouth 3 times daily as needed for cough 42 capsule 0     Cholecalciferol (D3 ADULT PO) Take 2,000 Units by mouth daily.       cyanocolbalamin (VITAMIN  B-12) 100 MCG tablet Take 100 mcg by mouth daily.       diclofenac (VOLTAREN) 75 MG EC tablet Take 1 tablet (75 mg) by mouth 2 times daily 40 tablet 1     diphenhydrAMINE  (BENADRYL) 25 MG tablet Take 25 mg by mouth every 6 hours as needed for itching or allergies       HYDROcodone-acetaminophen (NORCO) 5-325 MG per tablet Take 1 tablet by mouth every 6 hours as needed for severe pain 10 tablet 0     ketoconazole (NIZORAL) 2 % external cream Apply topically 2 times daily 15 g 1     levothyroxine (SYNTHROID/LEVOTHROID) 112 MCG tablet TAKE 1 TABLET(112 MCG) BY MOUTH DAILY 90 tablet 3     MEGARED OMEGA-3 KRILL  MG CAPS Take 1 capsule by mouth daily        methocarbamol (ROBAXIN) 750 MG tablet Take 1 tablet (750 mg) by mouth 4 times daily as needed for muscle spasms 30 tablet 0     methylPREDNISolone (MEDROL DOSEPAK) 4 MG tablet therapy pack Follow package instructions 21 tablet 0     montelukast (SINGULAIR) 10 MG tablet TAKE 1 TABLET(10 MG) BY MOUTH AT BEDTIME 90 tablet 1     order for DME Equipment being ordered: compression stockings- LARGE, 15mm HG, THIGH HIGH 2 Units 0     triamcinolone (KENALOG) 0.1 % external cream Apply sparingly to affected area three times daily as needed 80 g 0       Allergies   Allergen Reactions     Contrast Dye Hives and Itching     Isovue with ANTON on 1-21-19     Sulfa Drugs Hives and Swelling     Noted in 10/18/09 ER       Family History   Problem Relation Age of Onset     Glaucoma Mother      Heart Disease Father      Cancer Father 67        kidney cancer      Heart Disease Sister      Cancer Sister 59        colon cancer      Diabetes Sister      Colon Cancer Maternal Aunt      Colon Cancer Paternal Aunt      Glaucoma Maternal Grandmother        Additional medical/Social/Surgical histories reviewed in [facility] and updated as appropriate.     REVIEW OF SYSTEMS (1/15/2020)  10 point ROS of systems including Constitutional, Eyes, Respiratory, Cardiovascular, Gastroenterology, Genitourinary, Integumentary, Musculoskeletal, Psychiatric were all negative except for pertinent positives noted in my HPI.     PHYSICAL EXAM  Vitals:    01/15/20 1021   Weight: 112  "kg (247 lb)   Height: 1.715 m (5' 7.5\")     Vital Signs: Ht 1.715 m (5' 7.5\")   Wt 112 kg (247 lb)   BMI 38.11 kg/m   Patient declined being weighed. Body mass index is 38.11 kg/m .    General  - normal appearance, in no obvious distress  CV  - normal peripheral perfusion  Pulm  - normal respiratory pattern, non-labored  Musculoskeletal - lumbar spine  - stance: normal gait without limp, no obvious leg length discrepancy, normal heel and toe walk  - inspection: normal bone and joint alignment, no obvious scoliosis  - palpation: no paravertebral or bony tenderness  - ROM: flexion exacerbates pain, normal extension, sidebending, rotation  - strength: lower extremities 5/5 in all planes  - special tests:  (+) straight leg raise  (+) slump test  Neuro  - patellar and Achilles DTRs 2+ bilaterally, some lower extremity sensory deficit throughout L5 distribution, grossly normal coordination, normal muscle tone  Skin  - no ecchymosis, erythema, warmth, or induration, no obvious rash  Psych  - interactive, appropriate, normal mood and affect  ASSESSMENT & PLAN  63 yo female with lumbar ddd, radicular pain, worse  Reviewed lumbar MRI: shows ddd, herniated discs  Ordered ANTON  Cont. HEP  F/u after    Frankie Santos MD, CAQSM  "

## 2020-01-23 ENCOUNTER — HOSPITAL ENCOUNTER (EMERGENCY)
Facility: CLINIC | Age: 65
Discharge: HOME | End: 2020-01-23
Attending: EMERGENCY MEDICINE | Admitting: EMERGENCY MEDICINE
Payer: COMMERCIAL

## 2020-01-23 ENCOUNTER — APPOINTMENT (OUTPATIENT)
Dept: MRI IMAGING | Facility: CLINIC | Age: 65
End: 2020-01-23
Attending: EMERGENCY MEDICINE
Payer: COMMERCIAL

## 2020-01-23 VITALS
TEMPERATURE: 98.1 F | WEIGHT: 250 LBS | OXYGEN SATURATION: 96 % | RESPIRATION RATE: 18 BRPM | BODY MASS INDEX: 39.24 KG/M2 | HEIGHT: 67 IN | SYSTOLIC BLOOD PRESSURE: 134 MMHG | DIASTOLIC BLOOD PRESSURE: 66 MMHG

## 2020-01-23 DIAGNOSIS — H81.399 PERIPHERAL VERTIGO, UNSPECIFIED LATERALITY: ICD-10-CM

## 2020-01-23 PROCEDURE — 99285 EMERGENCY DEPT VISIT HI MDM: CPT | Mod: 25

## 2020-01-23 PROCEDURE — 25500064 ZZH RX 255 OP 636: Performed by: EMERGENCY MEDICINE

## 2020-01-23 PROCEDURE — 25000128 H RX IP 250 OP 636

## 2020-01-23 PROCEDURE — 70553 MRI BRAIN STEM W/O & W/DYE: CPT

## 2020-01-23 PROCEDURE — 25800030 ZZH RX IP 258 OP 636: Performed by: EMERGENCY MEDICINE

## 2020-01-23 PROCEDURE — 96374 THER/PROPH/DIAG INJ IV PUSH: CPT | Mod: 59

## 2020-01-23 PROCEDURE — A9585 GADOBUTROL INJECTION: HCPCS | Performed by: EMERGENCY MEDICINE

## 2020-01-23 PROCEDURE — 25000132 ZZH RX MED GY IP 250 OP 250 PS 637: Performed by: EMERGENCY MEDICINE

## 2020-01-23 PROCEDURE — 96361 HYDRATE IV INFUSION ADD-ON: CPT

## 2020-01-23 RX ORDER — ONDANSETRON 2 MG/ML
8 INJECTION INTRAMUSCULAR; INTRAVENOUS ONCE
Status: COMPLETED | OUTPATIENT
Start: 2020-01-23 | End: 2020-01-23

## 2020-01-23 RX ORDER — MECLIZINE HYDROCHLORIDE 25 MG/1
50 TABLET ORAL ONCE
Status: COMPLETED | OUTPATIENT
Start: 2020-01-23 | End: 2020-01-23

## 2020-01-23 RX ORDER — GADOBUTROL 604.72 MG/ML
11 INJECTION INTRAVENOUS ONCE
Status: COMPLETED | OUTPATIENT
Start: 2020-01-23 | End: 2020-01-23

## 2020-01-23 RX ORDER — MECLIZINE HYDROCHLORIDE 25 MG/1
25 TABLET ORAL EVERY 6 HOURS PRN
Qty: 30 TABLET | Refills: 1 | Status: SHIPPED | OUTPATIENT
Start: 2020-01-23 | End: 2020-03-23

## 2020-01-23 RX ORDER — ONDANSETRON 2 MG/ML
INJECTION INTRAMUSCULAR; INTRAVENOUS
Status: COMPLETED
Start: 2020-01-23 | End: 2020-01-23

## 2020-01-23 RX ORDER — ONDANSETRON 4 MG/1
4 TABLET, ORALLY DISINTEGRATING ORAL EVERY 6 HOURS PRN
Qty: 10 TABLET | Refills: 0 | Status: SHIPPED | OUTPATIENT
Start: 2020-01-23 | End: 2020-03-23

## 2020-01-23 RX ADMIN — ONDANSETRON 8 MG: 2 INJECTION INTRAMUSCULAR; INTRAVENOUS at 18:15

## 2020-01-23 RX ADMIN — GADOBUTROL 11 ML: 604.72 INJECTION INTRAVENOUS at 20:36

## 2020-01-23 RX ADMIN — MECLIZINE HYDROCHLORIDE 50 MG: 25 TABLET ORAL at 18:45

## 2020-01-23 RX ADMIN — SODIUM CHLORIDE 1000 ML: 9 INJECTION, SOLUTION INTRAVENOUS at 18:27

## 2020-01-23 ASSESSMENT — ENCOUNTER SYMPTOMS
WEAKNESS: 1
SHORTNESS OF BREATH: 0
DIZZINESS: 1
NAUSEA: 1

## 2020-01-23 ASSESSMENT — MIFFLIN-ST. JEOR: SCORE: 1711.62

## 2020-01-23 NOTE — LETTER
January 23, 2020      To Whom It May Concern:      Denita Flores was seen in our Emergency Department today, 01/23/20.  I expect her condition to improve over the next 24 hours.  She may return to work/school when improved.    Sincerely,        Lee Salazar RN

## 2020-01-23 NOTE — ED AVS SNAPSHOT
Emergency Department  [address] [facility] [address]  Phone:  [phone]  Fax:  [phone]                                    Denita Flores   MRN: [id number]    Department:   Emergency Department   Date of Visit:  1/23/2020           After Visit Summary Signature Page    I have received my discharge instructions, and my questions have been answered. I have discussed any challenges I see with this plan with the nurse or doctor.    ..........................................................................................................................................  Patient/Patient Representative Signature      ..........................................................................................................................................  Patient Representative Print Name and Relationship to Patient    ..................................................               ................................................  Date                                   Time    ..........................................................................................................................................  Reviewed by Signature/Title    ...................................................              ..............................................  Date                                               Time          22EPIC Rev 08/18

## 2020-01-24 NOTE — ED PROVIDER NOTES
History     Chief Complaint:  Dizziness    HPI  Denita Flores is a 64 year old female who presents with dizziness. The patient was leaving from work when she felt weak on right side. She started driving home when she began to feel dizzy and people started honking at her. She pulled over and called 911. The patient noticed some nausea but no chest pain or shortness of breath. EMS has taken her here at which point she began to vomit copiously. Here, EMS reports blood pressure of 150/90, blood sugar of 109, and a normal 12 lead. The patient denies Zofran. The patient reports being on Augmentin for a tooth infection recently.     Allergies:  Contrast dye and Sulfa drugs    Medications:    albuterol (PROAIR HFA/PROVENTIL HFA/VENTOLIN HFA) 108 (90 Base) MCG/ACT inhaler  benzonatate (TESSALON) 100 MG capsule  diclofenac (VOLTAREN) 75 MG EC tablet  diphenhydrAMINE (BENADRYL) 25 MG tablet  HYDROcodone-acetaminophen (NORCO) 5-325 MG per tablet  ketoconazole (NIZORAL) 2 % external cream  levothyroxine (SYNTHROID/LEVOTHROID) 112 MCG tablet  MEGARED OMEGA-3 KRILL  MG CAPS  methocarbamol (ROBAXIN) 750 MG tablet  methylPREDNISolone (MEDROL DOSEPAK) 4 MG tablet therapy pack  montelukast (SINGULAIR) 10 MG tablet  triamcinolone (KENALOG) 0.1 % external cream    Past Medical History:    Graves disease   Kidney stones  Malignant neoplasm  Asthma   Morbid obesity   Diverticulosis     Past Surgical History:    Back surgery   Leep tx     Family History:    Glaucoma   Heart disease   Cancer      Social History:  Marital Status:     Smoker:   Former   Smokeless:   Never   Alcohol:   Yes  Drugs:   No    Review of Systems   Respiratory: Negative for shortness of breath.    Cardiovascular: Negative for chest pain.   Gastrointestinal: Positive for nausea.   Neurological: Positive for dizziness and weakness.   All other systems reviewed and are negative.    Physical Exam     Patient Vitals for the past 24 hrs:   BP Temp Temp  "src Heart Rate Resp SpO2 Height Weight   01/23/20 1823 (!) 159/67 98.1  F (36.7  C) Oral 69 16 95 % 1.702 m (5' 7\") 113.4 kg (250 lb)     Physical Exam  General/Appearance: appears stated age, well-groomed, appears very uncomfortable and actively vomiting  Eyes: EOMI, no scleral injection, no icterus  ENT: MMM  Neck: supple, nl ROM, no stiffness  Cardiovascular: RRR, nl S1S2, no m/r/g, 2+ pulses in all 4 extremities, cap refill <2sec  Respiratory: CTAB, good air movement throughout, no wheezes/rhonchi/rales, no increased WOB, no retractions  Back: no lesions  GI: abd soft, non-distended, nttp,  no HSM, no rebound, no guarding, nl BS  MSK: SRIVASTAVA, good tone, no bony abnormality  Skin: warm and well-perfused, no rash, no edema, no ecchymosis, nl turgor  Neuro: GCS 15, alert and oriented, neuro exam limited 2/2 active emesis  Psych: interacts appropriately  Heme: no petechia, no purpura, no active bleeding    Emergency Department Course   Imaging:  Radiographic findings were communicated with the patient who voiced understanding of the findings.    MR Brain w/o and w/ contrast:   Normal brain MRI. as per radiology    Interventions:  1815: Zofran 4 mg IV  1827: NS 1L IV Bolus   1845: Meclizine 50 mg PO    Emergency Department Course:  1811 I performed an exam of the patient as documented above.   2123 I rechecked the patient and discussed the results of their workup thus far. The patient feels well at this time.     Findings and plan explained. Patient discharged home with instructions regarding supportive care, medications, and reasons to return. The importance of close follow-up was reviewed. I personally reviewed the workup results with them and answered all related questions prior to discharge.    Impression & Plan    Medical Decision Making:  Denita Flores is a 64 year old female who presents for evaluation of vertigo. The differential diagnosis of vertigo is broad and includes common etiologies such as menieres " disease, labyrinthitis, benign positional vertigo, otitis media, etc.  More serious etiologies considered include central etiologies such as tumor, intracerebral bleed, dissection, ischemic cerebral vascular accident.  The history, physical exam including detailed neurologic exam, and workup in the emergency room suggests a benign cause of vertigo today. Additionally and importantly her MRI is wnl.  Patient feels improved after interventions noted above. Further outpatient management is indicated with vertigo medications.          Diagnosis:    ICD-10-CM    1. Peripheral vertigo, unspecified laterality H81.399      Disposition:  Discharged to home with meclizine.     Discharge Medications:  New Prescriptions    MECLIZINE (ANTIVERT) 25 MG TABLET    Take 1 tablet (25 mg) by mouth every 6 hours as needed for dizziness    ONDANSETRON (ZOFRAN ODT) 4 MG ODT TAB    Take 1 tablet (4 mg) by mouth every 6 hours as needed for nausea or vomiting     Scribe Disclosure: I, Ra Vieira, am serving as a scribe on 1/23/2020 at 6:11 PM to personally document services performed by Tracey Medel* based on my observations and the provider's statements to me.      Tracey Medel MD  01/23/20 [id number]

## 2020-01-24 NOTE — ED NOTES
Bed: ED20  Expected date:   Expected time:   Means of arrival:   Comments:  Shy 1-64F Nausea/dizziness

## 2020-02-04 DIAGNOSIS — Z91.041 CONTRAST MEDIA ALLERGY: Primary | ICD-10-CM

## 2020-02-05 RX ORDER — METHYLPREDNISOLONE 16 MG/1
TABLET ORAL
Qty: 4 TABLET | Refills: 0 | Status: SHIPPED | OUTPATIENT
Start: 2020-02-05 | End: 2020-07-20

## 2020-02-10 ENCOUNTER — TELEPHONE (OUTPATIENT)
Dept: GENERAL RADIOLOGY | Facility: CLINIC | Age: 65
End: 2020-02-10

## 2020-02-10 NOTE — TELEPHONE ENCOUNTER
Spoke with pt regarding upcoming injection on 2/18/20. Pt has a contrast allergy and premedication has been prescribed by Dr. Santos. Discussed with pt when to take the premeds and answered general questions about the injection. She verbalized understanding.

## 2020-02-18 ENCOUNTER — ANCILLARY PROCEDURE (OUTPATIENT)
Dept: GENERAL RADIOLOGY | Facility: CLINIC | Age: 65
End: 2020-02-18
Attending: PREVENTIVE MEDICINE
Payer: COMMERCIAL

## 2020-02-18 DIAGNOSIS — M51.16 LUMBAR DISC HERNIATION WITH RADICULOPATHY: ICD-10-CM

## 2020-02-18 PROCEDURE — 62323 NJX INTERLAMINAR LMBR/SAC: CPT | Performed by: RADIOLOGY

## 2020-02-18 RX ORDER — METHYLPREDNISOLONE ACETATE 80 MG/ML
80 INJECTION, SUSPENSION INTRA-ARTICULAR; INTRALESIONAL; INTRAMUSCULAR; SOFT TISSUE ONCE
Status: COMPLETED | OUTPATIENT
Start: 2020-02-18 | End: 2020-02-18

## 2020-02-18 RX ORDER — BUPIVACAINE HYDROCHLORIDE 5 MG/ML
10 INJECTION, SOLUTION PERINEURAL ONCE
Status: COMPLETED | OUTPATIENT
Start: 2020-02-18 | End: 2020-02-18

## 2020-02-18 RX ORDER — IOPAMIDOL 408 MG/ML
10 INJECTION, SOLUTION INTRATHECAL ONCE
Status: COMPLETED | OUTPATIENT
Start: 2020-02-18 | End: 2020-02-18

## 2020-02-18 RX ADMIN — METHYLPREDNISOLONE ACETATE 80 MG: 80 INJECTION, SUSPENSION INTRA-ARTICULAR; INTRALESIONAL; INTRAMUSCULAR; SOFT TISSUE at 13:05

## 2020-02-18 RX ADMIN — IOPAMIDOL 2 ML: 408 INJECTION, SOLUTION INTRATHECAL at 13:05

## 2020-02-18 RX ADMIN — BUPIVACAINE HYDROCHLORIDE 2 ML: 5 INJECTION, SOLUTION PERINEURAL at 13:05

## 2020-02-18 NOTE — DISCHARGE SUMMARY
AFTER YOU GO HOME   ü DO relax; minimize your activity for 24 hours   ü You may resume normal activity tomorrow   ü You may remove the bandage in the evening or next morning   ü You may resume bathing the next day   ü Drink at least 4 extra glasses of fluid today if not on fluid restrictions   ü DO NOT drive or operate machinery at home or at work for at least 24 hours   VISIT THE EMERGENCY ROOM OR URGENT CARE IF:   ü There is redness or swelling at the injection site   ü There is discharge from the injection site   ü You develop a temperature of 101  F or greater   ADDITIONAL INSTRUCTIONS:   ü You may resume your Coumadin or other blood thinner at your regular dose today. Follow up with your physician to have your INR rechecked if indicated.   ü If you gain no relief from the injection after two (2) weeks, follow-up with your provider for your options.   Contacts:   During business hours from 8 to 5 pm, you may call [phone] to reach a nurse advisor at [facility].   After hours, call [facility]  [phone]. Ask for the Radiologist on-call. Someone is on-call 24 hrs/day.   [facility] Toll Free Number   [phone]

## 2020-02-18 NOTE — PROGRESS NOTES
: Denita was seen in X-ray today for a lumbar injection. Patient rated pain before procedure 2/10. After procedure patient rated pain 0/10.  Patient discharged home with her sister.

## 2020-03-06 ENCOUNTER — OFFICE VISIT (OUTPATIENT)
Dept: URGENT CARE | Facility: URGENT CARE | Age: 65
End: 2020-03-06
Payer: COMMERCIAL

## 2020-03-06 VITALS
TEMPERATURE: 101.6 F | DIASTOLIC BLOOD PRESSURE: 79 MMHG | SYSTOLIC BLOOD PRESSURE: 152 MMHG | HEART RATE: 87 BPM | OXYGEN SATURATION: 96 %

## 2020-03-06 DIAGNOSIS — J10.1 INFLUENZA A: Primary | ICD-10-CM

## 2020-03-06 DIAGNOSIS — J06.9 UPPER RESPIRATORY TRACT INFECTION, UNSPECIFIED TYPE: ICD-10-CM

## 2020-03-06 LAB
FLUAV+FLUBV AG SPEC QL: NEGATIVE
FLUAV+FLUBV AG SPEC QL: POSITIVE
SPECIMEN SOURCE: ABNORMAL

## 2020-03-06 PROCEDURE — 87804 INFLUENZA ASSAY W/OPTIC: CPT | Performed by: FAMILY MEDICINE

## 2020-03-06 PROCEDURE — 99214 OFFICE O/P EST MOD 30 MIN: CPT

## 2020-03-06 RX ORDER — AZITHROMYCIN 250 MG/1
TABLET, FILM COATED ORAL
Qty: 6 TABLET | Refills: 0 | Status: SHIPPED | OUTPATIENT
Start: 2020-03-06 | End: 2020-03-23

## 2020-03-07 NOTE — PROGRESS NOTES
azithrSubjective         HPI   Presents with fever and bodyaches.  Has had harsh congested cough, RIGHT ear hurts.  Tolerating po okay.  No ST.  Works as .  Multiple sick contacts.  Co-worker with influenza A.      Patient Active Problem List   Diagnosis     Graves disease     Obesity     Diverticulosis of large intestine without hemorrhage     Colon polyps     History of cervical cancer     YELENA (obstructive sleep apnea)     Warts     CARDIOVASCULAR SCREENING; LDL GOAL LESS THAN 130     Hx of herpes zoster keratoconjunctivitis, os     Plantar warts     Stasis dermatitis of both legs     Obesity (BMI 30-39.9)     Postablative hypothyroidism     Other specified hypothyroidism,post ablative     Venous stasis dermatitis of left lower extremity     Vitamin B12 deficiency (non anemic)     Mild persistent asthma without complication     Spider veins of both lower extremities     Seasonal allergic rhinitis     Family history of colon cancer     Epistaxis     Tubular adenoma of colon     Family history of renal cancer     Nephrolithiasis     Gross hematuria     Morbid obesity (H)     Elevated blood pressure reading without diagnosis of hypertension     Polyp of gallbladder, 4mm     CARDIOVASCULAR SCREENING; LDL GOAL LESS THAN 160     Microscopic hematuria     Past Surgical History:   Procedure Laterality Date     BACK SURGERY       COLONOSCOPY WITH CO2 INSUFFLATION N/A 2017    Procedure: COLONOSCOPY WITH CO2 INSUFFLATION;  Surgeon: Duane, William Charles, MD;  Location: [facility] OR     CYSTOSCOPY FLEXIBLE  8/10/2018     LEEP TX, CERVICAL      in her 20's       Social History     Tobacco Use     Smoking status: Former Smoker     Packs/day: 1.50     Years: 35.00     Pack years: 52.50     Types: Cigarettes     Last attempt to quit: 2003     Years since quittin.1     Smokeless tobacco: Never Used   Substance Use Topics     Alcohol use: Yes     Alcohol/week: 0.0 standard drinks     Comment: 3 dinks/ year      Family History   Problem Relation Age of Onset     Glaucoma Mother      Heart Disease Father      Cancer Father 67        kidney cancer      Heart Disease Sister      Cancer Sister 59        colon cancer      Diabetes Sister      Colon Cancer Maternal Aunt      Colon Cancer Paternal Aunt      Glaucoma Maternal Grandmother          Current Outpatient Medications   Medication Sig Dispense Refill     albuterol (PROAIR HFA/PROVENTIL HFA/VENTOLIN HFA) 108 (90 Base) MCG/ACT inhaler Inhale 2 puffs into the lungs every 6 hours as needed for shortness of breath / dyspnea 18 g 1     benzonatate (TESSALON) 100 MG capsule Take 1 capsule (100 mg) by mouth 3 times daily as needed for cough 42 capsule 0     Cholecalciferol (D3 ADULT PO) Take 2,000 Units by mouth daily.       cyanocolbalamin (VITAMIN  B-12) 100 MCG tablet Take 100 mcg by mouth daily.       diclofenac (VOLTAREN) 75 MG EC tablet Take 1 tablet (75 mg) by mouth 2 times daily 40 tablet 1     diphenhydrAMINE (BENADRYL) 25 MG tablet Take 25 mg by mouth every 6 hours as needed for itching or allergies       HYDROcodone-acetaminophen (NORCO) 5-325 MG per tablet Take 1 tablet by mouth every 6 hours as needed for severe pain 10 tablet 0     ketoconazole (NIZORAL) 2 % external cream Apply topically 2 times daily 15 g 1     levothyroxine (SYNTHROID/LEVOTHROID) 112 MCG tablet TAKE 1 TABLET(112 MCG) BY MOUTH DAILY 90 tablet 3     meclizine (ANTIVERT) 25 MG tablet Take 1 tablet (25 mg) by mouth every 6 hours as needed for dizziness 30 tablet 1     MEGARED OMEGA-3 KRILL  MG CAPS Take 1 capsule by mouth daily        methocarbamol (ROBAXIN) 750 MG tablet Take 1 tablet (750 mg) by mouth 4 times daily as needed for muscle spasms 30 tablet 0     methylPREDNISolone (MEDROL DOSEPAK) 4 MG tablet therapy pack Follow package instructions 21 tablet 0     methylPREDNISolone (MEDROL) 16 MG tablet Take 32 mg by mouth 12 hours before the procedure and repeat 32 mg by mouth 2 hours  before the procedure to prevent contrast reaction. 4 tablet 0     montelukast (SINGULAIR) 10 MG tablet TAKE 1 TABLET(10 MG) BY MOUTH AT BEDTIME 90 tablet 1     order for DME Equipment being ordered: compression stockings- LARGE, 15mm HG, THIGH HIGH 2 Units 0     triamcinolone (KENALOG) 0.1 % external cream Apply sparingly to affected area three times daily as needed 80 g 0     Allergies   Allergen Reactions     Contrast Dye Hives and Itching     Isovue with ANTON on 1-21-19     Sulfa Drugs Hives and Swelling     Noted in 10/18/09 ER     Recent Labs   Lab Test 09/30/19  0912 09/07/18  1043 08/07/18  1527  08/15/17  1028  05/08/13  1140   * 87  --   --  101*   < > 112   HDL 54 47*  --   --  52   < > 50   TRIG 126 126  --   --  124   < > 125   ALT  --   --   --   --  20  --  24   CR  --   --  0.76  --  0.76  --  0.72   GFRESTIMATED  --   --  76  --  77  --  83   GFRESTBLACK  --   --  >90  --  >90  --  >90   POTASSIUM  --   --  4.1  --  4.0  --  4.2   TSH 1.29 0.26*  --    < > 0.36*   < > 1.58    < > = values in this interval not displayed.      BP Readings from Last 3 Encounters:   03/06/20 (!) 152/79   01/23/20 134/66   11/12/19 (!) 167/80    Wt Readings from Last 3 Encounters:   01/23/20 113.4 kg (250 lb)   01/15/20 112 kg (247 lb)   11/12/19 112.9 kg (249 lb)                    Reviewed and updated as needed this visit by Provider         Review of Systems   ROS COMP: Constitutional, HEENT, cardiovascular, pulmonary, GI, , musculoskeletal, neuro, skin, endocrine and psych systems are negative, except as otherwise noted.      Objective    BP (!) 152/79   Pulse 87   Temp 101.6  F (38.7  C) (Oral)   SpO2 96%     Physical Exam   GENERAL: healthy, alert and no distress  EYES: Eyes grossly normal to inspection, PERRL and conjunctivae and sclerae normal  HENT: ear canals and TM's normal, nose and mouth without ulcers or lesions  NECK: no adenopathy, no asymmetry, masses, or scars and thyroid normal to  palpation  RESP: lungs clear to auscultation - no rales, rhonchi or wheezes, harsh, congested cough  CV: regular rate and rhythm, normal S1 S2, no S3 or S4, no murmur, click or rub, no peripheral edema and peripheral pulses strong  ABDOMEN: soft, nontender, no hepatosplenomegaly, no masses and bowel sounds normal  MS: no gross musculoskeletal defects noted, no edema  SKIN: no suspicious lesions or rashes  NEURO: Normal strength and tone, mentation intact and speech normal  PSYCH: mentation appears normal, affect normal/bright    Diagnostic Test Results:  Labs reviewed in Epic  Results for orders placed or performed in visit on 03/06/20 (from the past 24 hour(s))   Influenza A/B antigen   Result Value Ref Range    Influenza A/B Agn Specimen Nasal     Influenza A Positive (A) NEG^Negative    Influenza B Negative NEG^Negative           Assessment & Plan     1. Influenza A    - Influenza A/B antigen    Continue with supportive cares, including rest, fluids, NSAIDs/Tylenol prn fever/comfort.    Close Follow-up if no change or worsening symptoms prn.  Defers Tamiflu today as will be taking Zpak.    2. Upper respiratory tract infection, unspecified type    - azithromycin (ZITHROMAX) 250 MG tablet; Take 2 tablets (500 mg) by mouth daily for 1 day, THEN 1 tablet (250 mg) daily for 4 days.  Dispense: 6 tablet; Refill: 0     Treat with Zpak.  Close Follow-up if no change or worsening symptoms prn.    Eboni Munoz MD  [facility]

## 2020-03-09 ENCOUNTER — TELEPHONE (OUTPATIENT)
Dept: PEDIATRICS | Facility: CLINIC | Age: 65
End: 2020-03-09

## 2020-03-09 NOTE — TELEPHONE ENCOUNTER
Patient was diagnosed with Influenza A and borderline PNA on Friday after symptoms started on Wednesday. Given Azithromycin and doesn't feel like she's improving.   She still has an occasional fever, chills, lungs hurt, wheezing, coughing and wheezing at night.   She can't find a thermometer.   She was told to call into work through Wednesday. 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  No SOB.   Advised of home care measures, she is using her inhaler and she will call back if she becomes SOB or worsens and doesn't improve in about 1 week. Patient verbalized understanding.     Megan Redding RN

## 2020-03-11 ENCOUNTER — TELEPHONE (OUTPATIENT)
Dept: NURSING | Facility: CLINIC | Age: 65
End: 2020-03-11

## 2020-03-11 NOTE — TELEPHONE ENCOUNTER
[facility]: Nurse Triage Note  SITUATION/BACKGROUND                                                      Denita Flores is a 64 year old female who calls to report having completed antibiotic treatment on 3/10/20 for Influenza A.   Patient has been having intermittent cough since 3/6/20.   She feels that her cough and wheezing has become worse as of today and has intermittent SOB. Using albuterol  Inhaler PRN for SOB.   Cough produces small amount of mucus, yellow in color.   She is a  and not sure if should return to work. Feeling very tired.   Not sure if she has fever at this time. Unable to buy a thermometer, all sold out.     Routed to [facility] as High Priority to review and follow up call.   FYI to  Primary Care

## 2020-03-12 ENCOUNTER — NURSE TRIAGE (OUTPATIENT)
Dept: CALL CENTER | Age: 65
End: 2020-03-12

## 2020-03-12 NOTE — TELEPHONE ENCOUNTER
Pt called stating she is feeling better, she has taken claritin and is breathing better/easier. She does not need a call back from PCP regarding the triage call from 3-11-20. Pt will call back if symptoms return. Pt understood.

## 2020-03-13 NOTE — TELEPHONE ENCOUNTER
Pt is calling regarding symptoms after taking antibiotics and pt would like to know if this is normal. Pt is having diarrhea. Please advise

## 2020-03-13 NOTE — TELEPHONE ENCOUNTER
Patient states she has diarrhea once a day. Her other symptoms are improved. Informed to eat yogurt or take a probiotic and drink lots of fluids. She will call back if the diarrhea doesn't improve. Patient verbalized understanding.     Megan Redding RN

## 2020-03-17 NOTE — PROGRESS NOTES
"Denita Flores is a 64 year old female who is being evaluated via a billable telephone visit.      The patient has been notified of following:     \"This telephone visit will be conducted via a call between you and your physician/provider. We have found that certain health care needs can be provided without the need for a physical exam.  This service lets us provide the care you need with a short phone conversation.  If a prescription is necessary we can send it directly to your pharmacy.  If lab work is needed we can place an order for that and you can then stop by our lab to have the test done at a later time.    If during the course of the call the physician/provider feels a telephone visit is not appropriate, you will not be charged for this service.\"       Denita Flores complains of    Chief Complaint   Patient presents with     Recheck Medication       I have reviewed and updated the patient's Past Medical History, Social History, Family History and Medication List.    ALLERGIES  Contrast dye and Sulfa drugs    Regine BUI CMA   (MA signature)    Asthma Follow-Up  Patient has noticed recent worsening of asthma symptoms since she had influenza-like illness 2 weeks ago along with flareup of her seasonal allergies in the last week.  Patient noticed improving wheezing since she started taking Claritin 10 mg once daily in the last 4 days, though she continues to feel the need to use albuterol inhaler at night due to bronchospasm  Denies fever, chills, nasal congestion, postnasal drip, sore throat, cough, shortness of breath  Was ACT completed today?    Yes    ACT Total Scores 3/17/2020   ACT TOTAL SCORE -   ASTHMA ER VISITS -   ASTHMA HOSPITALIZATIONS -   ACT TOTAL SCORE (Goal Greater than or Equal to 20) 16   In the past 12 months, how many times did you visit the emergency room for your asthma without being admitted to the hospital? 0   In the past 12 months, how many times were you hospitalized overnight " because of your asthma? 0       How many days per week do you miss taking your asthma controller medication?  I do not have an asthma controller medication    Please describe any recent triggers for your asthma: upper respiratory infections and pollens    Have you had any Emergency Room Visits, Urgent Care Visits, or Hospital Admissions since your last office visit?  No      Hypothyroidism Follow-up    Since last visit, patient describes the following symptoms: Weight stable, no hair loss, no skin changes, no constipation, no loose stools      How many servings of fruits and vegetables do you eat daily?  2-3    On average, how many sweetened beverages do you drink each day (Examples: soda, juice, sweet tea, etc.  Do NOT count diet or artificially sweetened beverages)?   0    How many days per week do you exercise enough to make your heart beat faster? 3 or less    How many minutes a day do you exercise enough to make your heart beat faster? 9 or less    How many days per week do you miss taking your medication? 0       Additional provider notes:       Assessment/Plan:  (J30.2) Seasonal allergic rhinitis, unspecified trigger  (primary encounter diagnosis)  Comment:   Plan: Recommended patient to continue with Claritin 10 mg once daily in the morning, start taking Zyrtec 10 mg once at bedtime    (J45.30) Mild persistent asthma without complication  Comment:   Plan: montelukast (SINGULAIR) 10 MG tablet, albuterol        (PROAIR HFA/PROVENTIL HFA/VENTOLIN HFA) 108 (90        Base) MCG/ACT inhaler        Slightly aggravated due to current seasonal allergies.  Control allergies as mentioned above  Avoid potential exposure to possible allergens  Continue singular 10 mg once daily at bedtime    (E89.0) Postablative hypothyroidism  Comment:   Plan: levothyroxine (SYNTHROID/LEVOTHROID) 112 MCG         tablet          TSH   Date Value Ref Range Status   09/30/2019 1.29 0.40 - 4.00 mU/L Final   09/07/2018 0.26 (L) 0.40 - 4.00  mU/L Final   02/20/2018 0.35 (L) 0.40 - 4.00 mU/L Final   12/18/2017 0.88 0.40 - 4.00 mU/L Final   10/09/2017 0.32 (L) 0.40 - 4.00 mU/L Final     Continue with current dose of levothyroxine, recheck at her physical in September or sooner if needed    (L30.4) Intertrigo  Comment:   Plan: ketoconazole (NIZORAL) 2 % external cream        Prescription refills given for ongoing use    (L23.9) Allergic contact dermatitis, unspecified trigger  Comment:   Plan: triamcinolone (KENALOG) 0.1 % external cream        Prescription given for as needed use for dermatitis      I have reviewed the note as documented above.  This accurately captures the substance of my conversation with the patient.  Chart documentation done in part with Dragon Voice recognition Software. Although reviewed after completion, some word and grammatical error may remain.      Phone call contact time  Call Started at 11:10  Call Ended at 11:25    Gee Hitchcock MD

## 2020-03-18 ASSESSMENT — ASTHMA QUESTIONNAIRES: ACT_TOTALSCORE: 16

## 2020-03-23 ENCOUNTER — VIRTUAL VISIT (OUTPATIENT)
Dept: PEDIATRICS | Facility: CLINIC | Age: 65
End: 2020-03-23
Payer: COMMERCIAL

## 2020-03-23 DIAGNOSIS — L30.4 INTERTRIGO: ICD-10-CM

## 2020-03-23 DIAGNOSIS — J45.30 MILD PERSISTENT ASTHMA WITHOUT COMPLICATION: ICD-10-CM

## 2020-03-23 DIAGNOSIS — L23.9 ALLERGIC CONTACT DERMATITIS, UNSPECIFIED TRIGGER: ICD-10-CM

## 2020-03-23 DIAGNOSIS — E89.0 POSTABLATIVE HYPOTHYROIDISM: ICD-10-CM

## 2020-03-23 DIAGNOSIS — J30.2 SEASONAL ALLERGIC RHINITIS, UNSPECIFIED TRIGGER: Primary | ICD-10-CM

## 2020-03-23 PROCEDURE — 99442 ZZC PHYSICIAN TELEPHONE EVALUATION 11-20 MIN: CPT | Performed by: FAMILY MEDICINE

## 2020-03-23 RX ORDER — MONTELUKAST SODIUM 10 MG/1
TABLET ORAL
Qty: 90 TABLET | Refills: 1 | Status: SHIPPED | OUTPATIENT
Start: 2020-03-23 | End: 2020-10-05

## 2020-03-23 RX ORDER — KETOCONAZOLE 20 MG/G
CREAM TOPICAL 2 TIMES DAILY
Qty: 60 G | Refills: 1 | Status: SHIPPED | OUTPATIENT
Start: 2020-03-23 | End: 2020-10-05

## 2020-03-23 RX ORDER — LEVOTHYROXINE SODIUM 112 UG/1
TABLET ORAL
Qty: 90 TABLET | Refills: 3 | Status: SHIPPED | OUTPATIENT
Start: 2020-03-23 | End: 2020-10-05

## 2020-03-23 RX ORDER — ALBUTEROL SULFATE 90 UG/1
2 AEROSOL, METERED RESPIRATORY (INHALATION) EVERY 6 HOURS PRN
Qty: 18 G | Refills: 1 | Status: SHIPPED | OUTPATIENT
Start: 2020-03-23 | End: 2020-12-09

## 2020-03-23 RX ORDER — TRIAMCINOLONE ACETONIDE 1 MG/G
CREAM TOPICAL
Qty: 80 G | Refills: 0 | Status: SHIPPED | OUTPATIENT
Start: 2020-03-23 | End: 2020-10-05

## 2020-07-17 ENCOUNTER — HOSPITAL ENCOUNTER (EMERGENCY)
Facility: CLINIC | Age: 65
Discharge: HOME | End: 2020-07-17
Attending: EMERGENCY MEDICINE | Admitting: EMERGENCY MEDICINE
Payer: COMMERCIAL

## 2020-07-17 VITALS
RESPIRATION RATE: 18 BRPM | DIASTOLIC BLOOD PRESSURE: 77 MMHG | OXYGEN SATURATION: 98 % | HEART RATE: 78 BPM | TEMPERATURE: 97.8 F | SYSTOLIC BLOOD PRESSURE: 128 MMHG

## 2020-07-17 DIAGNOSIS — R42 VERTIGO: ICD-10-CM

## 2020-07-17 LAB
ANION GAP SERPL CALCULATED.3IONS-SCNC: 8 MMOL/L (ref 3–14)
BASOPHILS # BLD AUTO: 0 10E9/L (ref 0–0.2)
BASOPHILS NFR BLD AUTO: 0.3 %
BUN SERPL-MCNC: 10 MG/DL (ref 7–30)
CALCIUM SERPL-MCNC: 9.1 MG/DL (ref 8.5–10.1)
CHLORIDE SERPL-SCNC: 105 MMOL/L (ref 94–109)
CO2 SERPL-SCNC: 27 MMOL/L (ref 20–32)
CREAT SERPL-MCNC: 0.78 MG/DL (ref 0.52–1.04)
DIFFERENTIAL METHOD BLD: ABNORMAL
EOSINOPHIL # BLD AUTO: 0.1 10E9/L (ref 0–0.7)
EOSINOPHIL NFR BLD AUTO: 1.2 %
ERYTHROCYTE [DISTWIDTH] IN BLOOD BY AUTOMATED COUNT: 13 % (ref 10–15)
GFR SERPL CREATININE-BSD FRML MDRD: 79 ML/MIN/{1.73_M2}
GLUCOSE SERPL-MCNC: 136 MG/DL (ref 70–99)
HCT VFR BLD AUTO: 41.8 % (ref 35–47)
HGB BLD-MCNC: 13.9 G/DL (ref 11.7–15.7)
IMM GRANULOCYTES # BLD: 0 10E9/L (ref 0–0.4)
IMM GRANULOCYTES NFR BLD: 0.3 %
LYMPHOCYTES # BLD AUTO: 1.3 10E9/L (ref 0.8–5.3)
LYMPHOCYTES NFR BLD AUTO: 11.9 %
MCH RBC QN AUTO: 30.7 PG (ref 26.5–33)
MCHC RBC AUTO-ENTMCNC: 33.3 G/DL (ref 31.5–36.5)
MCV RBC AUTO: 92 FL (ref 78–100)
MONOCYTES # BLD AUTO: 0.5 10E9/L (ref 0–1.3)
MONOCYTES NFR BLD AUTO: 4.7 %
NEUTROPHILS # BLD AUTO: 9.2 10E9/L (ref 1.6–8.3)
NEUTROPHILS NFR BLD AUTO: 81.6 %
NRBC # BLD AUTO: 0 10*3/UL
NRBC BLD AUTO-RTO: 0 /100
PLATELET # BLD AUTO: 249 10E9/L (ref 150–450)
POTASSIUM SERPL-SCNC: 3.7 MMOL/L (ref 3.4–5.3)
RBC # BLD AUTO: 4.53 10E12/L (ref 3.8–5.2)
SODIUM SERPL-SCNC: 140 MMOL/L (ref 133–144)
WBC # BLD AUTO: 11.2 10E9/L (ref 4–11)

## 2020-07-17 PROCEDURE — 25800030 ZZH RX IP 258 OP 636: Performed by: EMERGENCY MEDICINE

## 2020-07-17 PROCEDURE — 96374 THER/PROPH/DIAG INJ IV PUSH: CPT

## 2020-07-17 PROCEDURE — 93005 ELECTROCARDIOGRAM TRACING: CPT

## 2020-07-17 PROCEDURE — 25000128 H RX IP 250 OP 636: Performed by: EMERGENCY MEDICINE

## 2020-07-17 PROCEDURE — 80048 BASIC METABOLIC PNL TOTAL CA: CPT | Performed by: EMERGENCY MEDICINE

## 2020-07-17 PROCEDURE — 85025 COMPLETE CBC W/AUTO DIFF WBC: CPT | Performed by: EMERGENCY MEDICINE

## 2020-07-17 PROCEDURE — 99284 EMERGENCY DEPT VISIT MOD MDM: CPT | Mod: 25

## 2020-07-17 PROCEDURE — 96361 HYDRATE IV INFUSION ADD-ON: CPT

## 2020-07-17 PROCEDURE — 25000132 ZZH RX MED GY IP 250 OP 250 PS 637: Performed by: EMERGENCY MEDICINE

## 2020-07-17 RX ORDER — ACETAMINOPHEN 160 MG
100 TABLET,DISINTEGRATING ORAL ONCE
Status: DISCONTINUED | OUTPATIENT
Start: 2020-07-17 | End: 2020-07-18 | Stop reason: HOSPADM

## 2020-07-17 RX ORDER — ONDANSETRON 2 MG/ML
4 INJECTION INTRAMUSCULAR; INTRAVENOUS ONCE
Status: COMPLETED | OUTPATIENT
Start: 2020-07-17 | End: 2020-07-17

## 2020-07-17 RX ORDER — DIAZEPAM 5 MG
5 TABLET ORAL ONCE
Status: COMPLETED | OUTPATIENT
Start: 2020-07-17 | End: 2020-07-17

## 2020-07-17 RX ORDER — DIAZEPAM 5 MG
5 TABLET ORAL EVERY 6 HOURS PRN
Qty: 8 TABLET | Refills: 0 | Status: SHIPPED | OUTPATIENT
Start: 2020-07-17 | End: 2020-12-09

## 2020-07-17 RX ADMIN — DIAZEPAM 5 MG: 5 TABLET ORAL at 21:03

## 2020-07-17 RX ADMIN — SODIUM CHLORIDE 1000 ML: 9 INJECTION, SOLUTION INTRAVENOUS at 20:55

## 2020-07-17 RX ADMIN — ONDANSETRON 4 MG: 2 INJECTION INTRAMUSCULAR; INTRAVENOUS at 21:01

## 2020-07-17 ASSESSMENT — ENCOUNTER SYMPTOMS
NAUSEA: 1
VOMITING: 1
HEADACHES: 0
WEAKNESS: 0
DIZZINESS: 1
FEVER: 0
COUGH: 0
DIAPHORESIS: 1
CHILLS: 1
SPEECH DIFFICULTY: 0

## 2020-07-17 NOTE — ED AVS SNAPSHOT
Emergency Department  [address] [facility] [address]  Phone:  [phone]  Fax:  [phone]                                    Denita Flores   MRN: [id number]    Department:   Emergency Department   Date of Visit:  7/17/2020           After Visit Summary Signature Page    I have received my discharge instructions, and my questions have been answered. I have discussed any challenges I see with this plan with the nurse or doctor.    ..........................................................................................................................................  Patient/Patient Representative Signature      ..........................................................................................................................................  Patient Representative Print Name and Relationship to Patient    ..................................................               ................................................  Date                                   Time    ..........................................................................................................................................  Reviewed by Signature/Title    ...................................................              ..............................................  Date                                               Time          22EPIC Rev 08/18

## 2020-07-18 ENCOUNTER — NURSE TRIAGE (OUTPATIENT)
Dept: NURSING | Facility: CLINIC | Age: 65
End: 2020-07-18

## 2020-07-18 NOTE — DISCHARGE INSTRUCTIONS
Zofran as needed for vertigo.  Zofran as needed for nausea and vomiting.  Follow-up with primary care to ensure you are improving as expected.  Return immediately with worsening symptoms or development of any new concerns.

## 2020-07-18 NOTE — ED PROVIDER NOTES
"  History     Chief Complaint:  Dizziness    The history is provided by the patient and medical records.      Denita Flores is a 64 year old female with a history of asthma who presents with dizziness. Shortly prior to arrival Denita had sudden onset of room spinning dizziness. She she was able to drive home, but \"wasn't driving right\". Upon arrival at her home, she developed nausea and emesis, diaphoresis, and chills. She was trying to find Zofran previously prescribed and noticed she had a hard time reading the pill bottles. However, she now feels her vision is at baseline. She had a hard time walking around as she was off balance and ultimately called paramedics.    Denita states her symptoms are exactly the same as an ER visit in January where she was diagnosed with peripheral vertigo (MRI as below). She did not try the meclizine prescribed at that visit today.    She denies any speech difficulties, headache, weakness, numbness, tingling, fever, or cough. She denies any known ill contacts.     [facility] emergency department   MR Brain w/o and w/ contrast: Normal brain MRI. As read by Radiology.    Allergies:  Contrast Dye  Sulfa Drugs    Medications:    Albuterol   Singular   Levothyroxine     Past Medical History:    Graves disease   Kidney stones  Malignant neoplasm  Asthma   Morbid obesity   Diverticulosis   YELENA    Past Surgical History:    Back surgery   Colonoscopy   Cystoscopy Flexible   LEEP procedure     Family History:    Mother: glaucoma  Father: heart disease, kidney cancer  Sister: heart disease, colon cancer, diabetes     Social History:  Smoking status: former quit date: 2003  Alcohol use: Yes: 3/year  Drug use: no  The patient presents to the emergency department alone via EMS  PCP: Gee Hitchcock  Marital Status:       Review of Systems   Constitutional: Positive for chills and diaphoresis. Negative for fever.   Eyes: Negative for visual disturbance (now resolved). "   Respiratory: Negative for cough.    Gastrointestinal: Positive for nausea and vomiting.   Musculoskeletal: Positive for gait problem.   Neurological: Positive for dizziness. Negative for speech difficulty, weakness, numbness and headaches.   All other systems reviewed and are negative.    Physical Exam     Patient Vitals for the past 24 hrs:   BP Pulse Heart Rate Resp SpO2   07/17/20 2200 128/55 80 77 10 90 %   07/17/20 2130 (!) 140/64 80 82 15 94 %   07/17/20 2100 131/64 73 73 14 95 %   07/17/20 2030 (!) 147/71 85 -- -- 99 %       Physical Exam  General: Well-developed and well-nourished. Well appearing middle aged  woman. Cooperative.  Head:  Atraumatic.  Eyes:  Conjunctivae, lids, and sclerae are normal.  ENT:    Cerumen obstructs TMs bilaterally.  Neck:  Supple. Normal range of motion.  CV:  Regular rate and rhythm. Normal heart sounds with no murmurs, rubs, or gallops detected.  Resp:  No respiratory distress. Clear to auscultation bilaterally without decreased breath sounds, wheezing, rales, or rhonchi.  GI:  Soft. Non-distended. Non-tender.    MS:  Normal ROM. No bilateral lower extremity edema.  Skin:  Warm. Non-diaphoretic. No pallor.  Neuro: Awake. A&Ox3.     Strength 5/5 bilateral upper and lower extremities.    No pronator drift.    Sensation intact to light touch.    No facial droop. No dysarthria.    No aphasia.    PERRL.     No visual field deficits.    No dysmetria.    No dysdiadochokinesis.  Psych: Normal mood and affect. Normal speech.  Vitals reviewed.    Emergency Department Course   EKG  Indication: dizziness   Time: 21:31:00 read at 2151  Rate 76 bpm. MO interval 120. QRS duration 90. QT/QTc 430/483.   Normal sinus rhythm. Nonspecific T wave abnormality. Abnormal ECG.   No acute ST changes.  No prior for comparison.    Laboratory:  Laboratory findings were communicated with the patient who voiced understanding of the findings.    BMP: glucose: 136 WNL (Creatinine 0.78)    CBC: WBC  11.2 (H) o/w WNL ( HGB 13.9, )    Interventions:  2055 NS 1L IV Bolus  2101 Zofran 4 mg IV  2103 Valium 5 mg PO    Emergency Department Course:  Past medical records, nursing notes, and vitals reviewed.  2033: I performed an exam of the patient and obtained history, as documented above.     EKG was obtained and reviewed in the emergency department.    IV inserted and blood drawn.    2250: I rechecked the patient who reported that last she had this she had a large amount of ear wax come from her ears, which currently feel clogged and requested they be cleared in the emergency department. Otherwise she is feeling markedly improved, tolerating road test and PO challenge.    I rechecked the patient. I reviewed the results with the patient and answered all related questions prior to discharge.     Findings and plan explained to the patient. Patient discharged home with instructions regarding supportive care, medications, and reasons to return. The importance of close follow-up was reviewed. The patient was prescribed Valium.  Impression & Plan   Medical Decision Making:  Denita is a 64-year-old female who presents with acute onset vertigo with vomiting about 2-1/2 hours prior to arrival.  She states this is exactly the same as a visit in January 2020 where she had a negative brain MRI and was diagnosed with peripheral vertigo.  She did not try meclizine at home and Zofran was not effective.  On exam she appears well.  She has cerumen obstructing TMs bilaterally but exam is otherwise unremarkable including a normal, nonfocal neurologic exam.  Because her symptoms are identical to her prior visit with negative MRI and because her exam is without acute deficit, repeat brain imaging is not indicated.  Basic laboratory studies are noncontributory.  She was given Valium, Zofran, and IV fluids and afterwards felt markedly improved.  She was able to ambulate and tolerate PO.  She is comfortable with plan for discharge  although ED technician did irrigate her ears due to her cerumen at the patient's request.  She understands if she has recurrence of symptoms she should be using Valium as prescribed tonight and Zofran as needed for nausea and vomiting.  I have encouraged her to follow-up with primary care but she will return should she have worsening symptoms or new concerns.  All questions answered.  Amenable to discharge.    Diagnosis:    ICD-10-CM    1. Vertigo  R42      Disposition:  Discharged home.    Discharge Medications:  New Prescriptions    DIAZEPAM (VALIUM) 5 MG TABLET    Take 1 tablet (5 mg) by mouth every 6 hours as needed (vertigo)     Grace Sundloromi  7/17/2020    EMERGENCY DEPARTMENT  Scribe Disclosure:  I, Grace Sundlof, am serving as a scribe at 8:33 PM on 7/17/2020 to document services personally performed by Beatriz Cardenas MD based on my observations and the provider's statements to me.        Beatriz Cardenas MD  07/19/20 [id number]

## 2020-07-18 NOTE — ED NOTES
Bed: ED11  Expected date:   Expected time:   Means of arrival:   Comments:  446 64F nausea vomiting vertigo unknown COVID eta 5

## 2020-07-18 NOTE — TELEPHONE ENCOUNTER
"Patient states she was seen at ED yesterday for vertigo.  She continues to have \"a little dizziness\".  Asking if she should go to .  Per ED notes, patient to return for new/worsening symptoms otherwise to follow up with PCP.  Patient has follow up appointment on 7/20/20.  Patient states symptoms are not worse and will keep 7/20/20 appointment and also check if due for any lab work at that time.        "

## 2020-07-19 ASSESSMENT — ENCOUNTER SYMPTOMS: NUMBNESS: 0

## 2020-07-20 ENCOUNTER — OFFICE VISIT (OUTPATIENT)
Dept: FAMILY MEDICINE | Facility: CLINIC | Age: 65
End: 2020-07-20
Payer: COMMERCIAL

## 2020-07-20 VITALS
SYSTOLIC BLOOD PRESSURE: 144 MMHG | BODY MASS INDEX: 39.86 KG/M2 | DIASTOLIC BLOOD PRESSURE: 84 MMHG | RESPIRATION RATE: 14 BRPM | TEMPERATURE: 97.7 F | HEART RATE: 64 BPM | OXYGEN SATURATION: 94 % | WEIGHT: 254.5 LBS

## 2020-07-20 DIAGNOSIS — Z20.822 SUSPECTED COVID-19 VIRUS INFECTION: ICD-10-CM

## 2020-07-20 DIAGNOSIS — E89.0 POSTABLATIVE HYPOTHYROIDISM: ICD-10-CM

## 2020-07-20 DIAGNOSIS — J30.89 SEASONAL ALLERGIC RHINITIS DUE TO OTHER ALLERGIC TRIGGER: ICD-10-CM

## 2020-07-20 DIAGNOSIS — H81.393 PERIPHERAL VERTIGO OF BOTH EARS: Primary | ICD-10-CM

## 2020-07-20 LAB — INTERPRETATION ECG - MUSE: NORMAL

## 2020-07-20 PROCEDURE — 36415 COLL VENOUS BLD VENIPUNCTURE: CPT | Performed by: PHYSICIAN ASSISTANT

## 2020-07-20 PROCEDURE — 99214 OFFICE O/P EST MOD 30 MIN: CPT | Performed by: PHYSICIAN ASSISTANT

## 2020-07-20 PROCEDURE — 99000 SPECIMEN HANDLING OFFICE-LAB: CPT | Performed by: PHYSICIAN ASSISTANT

## 2020-07-20 PROCEDURE — 86769 SARS-COV-2 COVID-19 ANTIBODY: CPT | Mod: 90 | Performed by: PHYSICIAN ASSISTANT

## 2020-07-20 PROCEDURE — 84443 ASSAY THYROID STIM HORMONE: CPT | Performed by: PHYSICIAN ASSISTANT

## 2020-07-20 RX ORDER — FLUTICASONE PROPIONATE 50 MCG
1 SPRAY, SUSPENSION (ML) NASAL DAILY
Qty: 16 G | Refills: 1 | Status: SHIPPED | OUTPATIENT
Start: 2020-07-20 | End: 2020-10-05

## 2020-07-20 RX ORDER — FEXOFENADINE HCL 180 MG/1
180 TABLET ORAL DAILY
Qty: 90 TABLET | Refills: 1 | Status: SHIPPED | OUTPATIENT
Start: 2020-07-20 | End: 2020-10-05

## 2020-07-20 RX ORDER — CETIRIZINE HYDROCHLORIDE 10 MG/1
10 TABLET ORAL DAILY
COMMUNITY
End: 2020-07-20

## 2020-07-20 NOTE — PROGRESS NOTES
Subjective     Denita Flores is a 64 year old female who presents to clinic today for the following health issues:    HPI       ED/UC Followup:    Facility:  [facility]  Date of visit: 07/17/2020  Reason for visit: Vertigo  Current Status: Vertigo has improved     - Patient new to me, presents today for ED f/u.  - Seen 7/17/2020 at [facility] for acute vertigo, discharged with valium prn. Vertigo has improved, but reports bilat ear pressure, muffled hearing, and sinus pressure.  - Gets TSH checked q6 months, missed last appt due to COVID restrictions, can she get it checked today?  - Believes she may have had COVID in March 2020. 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 Developed URI sx March, tested positive for influenza with superimposed bacterial pneumonia. Wonders if it was actually COVID as well.    Patient Active Problem List   Diagnosis     Graves disease     Obesity     Diverticulosis of large intestine without hemorrhage     Colon polyps     History of cervical cancer     YELENA (obstructive sleep apnea)     Warts     CARDIOVASCULAR SCREENING; LDL GOAL LESS THAN 130     Hx of herpes zoster keratoconjunctivitis, os     Plantar warts     Stasis dermatitis of both legs     Obesity (BMI 30-39.9)     Postablative hypothyroidism     Other specified hypothyroidism,post ablative     Venous stasis dermatitis of left lower extremity     Vitamin B12 deficiency (non anemic)     Mild persistent asthma without complication     Spider veins of both lower extremities     Seasonal allergic rhinitis     Family history of colon cancer     Epistaxis     Tubular adenoma of colon     Family history of renal cancer     Nephrolithiasis     Gross hematuria     Morbid obesity (H)     Elevated blood pressure reading without diagnosis of hypertension     Polyp of gallbladder, 4mm     CARDIOVASCULAR SCREENING; LDL GOAL LESS THAN 160     Microscopic  hematuria     Past Surgical History:   Procedure Laterality Date     BACK SURGERY       COLONOSCOPY WITH CO2 INSUFFLATION N/A 2017    Procedure: COLONOSCOPY WITH CO2 INSUFFLATION;  Surgeon: Duane, William Charles, MD;  Location: [facility] OR     CYSTOSCOPY FLEXIBLE  8/10/2018     LEEP TX, CERVICAL      in her 20's       Social History     Tobacco Use     Smoking status: Former Smoker     Packs/day: 1.50     Years: 35.00     Pack years: 52.50     Types: Cigarettes     Last attempt to quit: 2003     Years since quittin.5     Smokeless tobacco: Never Used   Substance Use Topics     Alcohol use: Yes     Alcohol/week: 0.0 standard drinks     Comment: 3 dinks/ year     Family History   Problem Relation Age of Onset     Glaucoma Mother      Heart Disease Father      Cancer Father 67        kidney cancer      Heart Disease Sister      Cancer Sister 59        colon cancer      Diabetes Sister      Colon Cancer Maternal Aunt      Colon Cancer Paternal Aunt      Glaucoma Maternal Grandmother          Current Outpatient Medications   Medication Sig Dispense Refill     albuterol (PROAIR HFA/PROVENTIL HFA/VENTOLIN HFA) 108 (90 Base) MCG/ACT inhaler Inhale 2 puffs into the lungs every 6 hours as needed for shortness of breath / dyspnea 18 g 1     Cholecalciferol (D3 ADULT PO) Take 2,000 Units by mouth daily.       cyanocolbalamin (VITAMIN  B-12) 100 MCG tablet Take 100 mcg by mouth daily.       diazepam (VALIUM) 5 MG tablet Take 1 tablet (5 mg) by mouth every 6 hours as needed (vertigo) 8 tablet 0     fexofenadine (ALLEGRA) 180 MG tablet Take 1 tablet (180 mg) by mouth daily 90 tablet 1     fluticasone (FLONASE) 50 MCG/ACT nasal spray Spray 1 spray into both nostrils daily 16 g 1     ketoconazole (NIZORAL) 2 % external cream Apply topically 2 times daily 60 g 1     levothyroxine (SYNTHROID/LEVOTHROID) 112 MCG tablet TAKE 1 TABLET(112 MCG) BY MOUTH DAILY 90 tablet 3     MEGARED OMEGA-3 KRILL  MG CAPS Take 1 capsule by  mouth daily        montelukast (SINGULAIR) 10 MG tablet TAKE 1 TABLET(10 MG) BY MOUTH AT BEDTIME 90 tablet 1     triamcinolone (KENALOG) 0.1 % external cream Apply sparingly to affected area three times daily as needed 80 g 0       Reviewed and updated as needed this visit by Provider         Review of Systems   Constitutional, HEENT, cardiovascular, pulmonary, GI, , musculoskeletal, neuro, skin, endocrine and psych systems are negative, except as otherwise noted.      Objective    BP (!) 144/84   Pulse 64   Temp 97.7  F (36.5  C) (Tympanic)   Resp 14   Wt 115.4 kg (254 lb 8 oz)   SpO2 94%   Breastfeeding No   BMI 39.86 kg/m    Body mass index is 39.86 kg/m .  Physical Exam   GENERAL:  WDWN, no acute distress  PSYCH: pleasant, cooperative  EYES: no discharge, no injection. PERRL, EOMI, no nystagmus  HENT:  Normocephalic. Moist mucus membranes. Ear canals clear, TMs pearly gray with bilat serous effusion (L>R). Nasal mucosa pink, boggy, no rhinorrhea. Oropharynx pink, uvula midline.  NECK:  Supple, symmetric  EXTREMITIES:  No gross deformities, moves all 4 limbs spontaneously, no peripheral edema  SKIN:  Warm and dry, no rash or suspicious lesions    NEUROLOGIC: alert, sensation grossly intact. Gait normal.    Diagnostic Test Results:  none         Assessment & Plan       ICD-10-CM    1. Peripheral vertigo of both ears  H81.393    2. Seasonal allergic rhinitis due to other allergic trigger  J30.89 fluticasone (FLONASE) 50 MCG/ACT nasal spray     fexofenadine (ALLEGRA) 180 MG tablet   3. Postablative hypothyroidism  E89.0 TSH with free T4 reflex   4. Suspected COVID-19 virus infection  Z20.828 COVID-19 Virus (Coronavirus) Antibody & Titer Reflex     - Peripheral vertigo 2/2 bilat serous effusion 2/2 allergic rhinitis; reviewed pathophys. Start daily Allegra (had been taking Zyrtec, not effective) and Flonase. Continue use of valium prn vertigo. Discussed anticipated clinical course and sx that warrant  recheck.  - TSH pending  - Discussed role of antibody testing and potential results & implications, results pending.    No follow-ups on file.    Lindsay Driver PA-C  [facility]

## 2020-07-20 NOTE — LETTER
July 22, 2020        Denita Flores  [address] [address]      COVID-19 Antibody Screen   Date Value Ref Range Status   07/20/2020 Negative  Final     Comment:     No COVID-19 antibodies detected.  Patients within 10 days of symptom onset for   COVID-19 may not produce sufficient levels of detectable antibodies.    Immunocompromised COVID-19 patients may take longer to develop antibodies.       COVID-19 Antibody, IgG Titer   Date Value Ref Range Status   07/20/2020 Not Applicable  Final     Comment:     Qualitative screen for total antibodies to COVID-19 (SARS-CoV-2) with   semi-quantitative measurement of IgG COVID-19 antibodies by endpoint titer.    COVID-19 antibodies may be elevated due to a past or current infection.  Negative results do not rule out COVID-19 infection.  Results from antibody   testing should not be used as the sole basis to diagnose or exclude SARS-CoV-2   infection or to inform infection status.  COVID-19 PCR test should be ordered   if current infection is suspected.  False positive results may occur in rare   cases due to cross-reacting antibodies.  This test was developed and its performance characteristics determined by the   [facility] ([facility]),   which is regulated under CLIA as qualified to perform high-complexity testing.    This test has not been reviewed by the FDA.  Testing performed by [facility], [facility], [address], [address], [place] [address]         No results found for: COVAB      You have tested NEGATIVE for COVID-19 antibodies. This suggests you have not had or been exposed to COVID-19. But it does not mean that for sure.     The test finds antibodies in most people 10 days after they get sick. For some people, it takes longer than 10 days for antibodies to show up. Others may never show antibodies against COVID-19, especially if  they have weak immune systems.    If you have COVID-19 symptoms now, please stay home and away from others.     What is antibody testing?    This is a kind of blood test. We take a small sample of your blood, and then test it for something called  antibodies.      Your body makes antibodies to fight infection. If your blood has antibodies for a certain germ, it means you ve been infected with that germ in the past.     Sometimes, antibodies stay in your body for years after you ve had the infection. They can be there even if the germ didn t make you sick. They are a sign that your body fought off the infection.    Will this test find antibodies in everyone who s had COVID-19?    No. The test finds antibodies in most people 10 days after they get sick. For some people, it takes longer than 10 days for antibodies to show up. Others may never show antibodies against COVID-19, especially if they have weak immune systems.    What does it mean if the test finds COVID-19 antibodies?    If we find these antibodies, it suggests:     This person has had the virus.     Their body s immune system fought the virus.     We don t know if this will help protect someone from getting COVID-19 again. Scientists are still learning about this.    What are the signs of COVID-19?    Signs of COVID-19 can appear from 2 to 14 days (up to 2 weeks) after you re infected. Some people have no symptoms or only mild symptoms. Others get very sick. The most common symptoms are:          Cough    Shortness of breath or trouble breathing  Or at least 2 of these symptoms:    Fever    Chills    Repeated shaking with chills    Muscle pain    Headache    Sore throat    Losing your sense of taste or smell    You may have other symptoms. Please contact your doctor or clinic for any symptoms that worry you.    Where can I get more information?     To learn the [place] s guidelines for staying home, please visit the [place] Department of Health website  at https://www.health.state.[place]/diseases/coronavirus/basics.html    To learn more about COVID-19 and how to care for yourself at home, please visit the CDC website at https://www.cdc.gov/coronavirus/2019-ncov/about/steps-when-sick.html    For more options for care at [facility], please visit our website at [email or web address]    [facility] ([facility]) COVID-19 Hotline:  [phone]

## 2020-07-20 NOTE — LETTER
"My Asthma Action Plan    Name: Denita Flores   YOB: 1955  Date: 7/20/2020   My doctor: Lindsay Driver PA-C   My clinic: [facility]        My Rescue Medicine:   Albuterol inhaler (Proair/Ventolin/Proventil HFA)  2-4 puffs EVERY 4 HOURS as needed. Use a spacer if recommended by your provider.   My Asthma Severity:   { :431894::\"Intermittent / Exercise Induced\"}  Know your asthma triggers: { :619083}  upper respiratory infections  pollens          GREEN ZONE   Good Control    I feel good    No cough or wheeze    Can work, sleep and play without asthma symptoms       Take your asthma control medicine every day.     1. If exercise triggers your asthma, take your rescue medication    15 minutes before exercise or sports, and    During exercise if you have asthma symptoms  2. Spacer to use with inhaler: If you have a spacer, make sure to use it with your inhaler             YELLOW ZONE Getting Worse  I have ANY of these:    I do not feel good    Cough or wheeze    Chest feels tight    Wake up at night   1. Keep taking your Green Zone medications  2. Start taking your rescue medicine:    every 20 minutes for up to 1 hour. Then every 4 hours for 24-48 hours.  3. If you stay in the Yellow Zone for more than 12-24 hours, contact your doctor.  4. If you do not return to the Green Zone in 12-24 hours or you get worse, start taking your oral steroid medicine if prescribed by your provider.           RED ZONE Medical Alert - Get Help  I have ANY of these:    I feel awful    Medicine is not helping    Breathing getting harder    Trouble walking or talking    Nose opens wide to breathe       1. Take your rescue medicine NOW  2. If your provider has prescribed an oral steroid medicine, start taking it NOW  3. Call your doctor NOW  4. If you are still in the Red Zone after 20 minutes and you have not reached your doctor:    Take your rescue medicine again and    Call 911 or go to the " emergency room right away    See your regular doctor within 2 weeks of an Emergency Room or Urgent Care visit for follow-up treatment.          Annual Reminders:  Meet with Asthma Educator,  Flu Shot in the Fall, consider Pneumonia Vaccination for patients with asthma (aged 19 and older).    Pharmacy:    [id number] - [place], [place] - [address] AT [facility] [id number] [address] [address] [facility] [address]  [id number] [address] AT [address] [place] [address]    Electronically signed by Lindsay Driver PA-C   Date: 07/20/20                    Asthma Triggers  How To Control Things That Make Your Asthma Worse    Triggers are things that make your asthma worse.  Look at the list below to help you find your triggers and   what you can do about them. You can help prevent asthma flare-ups by staying away from your triggers.      Trigger                                                          What you can do   Cigarette Smoke  Tobacco smoke can make asthma worse. Do not allow smoking in your home, car or around you.  Be sure no one smokes at a child s day care or school.  If you smoke, ask your health care provider for ways to help you quit.  Ask family members to quit too.  Ask your health care provider for a referral to Quit Plan to help you quit smoking, or call 7-586-533-PLAN.     Colds, Flu, Bronchitis  These are common triggers of asthma. Wash your hands often.  Don t touch your eyes, nose or mouth.  Get a flu shot every year.     Dust Mites  These are tiny bugs that live in cloth or carpet. They are too small to see. Wash sheets and blankets in hot water every week.   Encase pillows and mattress in dust mite proof covers.  Avoid having carpet if you can. If you have carpet, vacuum weekly.   Use a dust mask and HEPA vacuum.   Pollen and Outdoor Mold  Some people are allergic to trees, grass, or weed pollen, or molds. Try  to keep your windows closed.  Limit time out doors when pollen count is high.   Ask you health care provider about taking medicine during allergy season.     Animal Dander  Some people are allergic to skin flakes, urine or saliva from pets with fur or feathers. Keep pets with fur or feathers out of your home.    If you can t keep the pet outdoors, then keep the pet out of your bedroom.  Keep the bedroom door closed.  Keep pets off cloth furniture and away from stuffed toys.     Mice, Rats, and Cockroaches  Some people are allergic to the waste from these pests.   Cover food and garbage.  Clean up spills and food crumbs.  Store grease in the refrigerator.   Keep food out of the bedroom.   Indoor Mold  This can be a trigger if your home has high moisture. Fix leaking faucets, pipes, or other sources of water.   Clean moldy surfaces.  Dehumidify basement if it is damp and smelly.   Smoke, Strong Odors, and Sprays  These can reduce air quality. Stay away from strong odors and sprays, such as perfume, powder, hair spray, paints, smoke incense, paint, cleaning products, candles and new carpet.   Exercise or Sports  Some people with asthma have this trigger. Be active!  Ask your doctor about taking medicine before sports or exercise to prevent symptoms.    Warm up for 5-10 minutes before and after sports or exercise.     Other Triggers of Asthma  Cold air:  Cover your nose and mouth with a scarf.  Sometimes laughing or crying can be a trigger.  Some medicines and food can trigger asthma.

## 2020-07-20 NOTE — LETTER
July 22, 2020      Denita Flores  [address] [facility] [facility] [address]        Dear ,    We are writing to inform you of your test results.    You are negative for Covid 19 antibodies.  Your thyroid function is normal.    Resulted Orders   TSH with free T4 reflex   Result Value Ref Range    TSH 1.25 0.40 - 4.00 mU/L   COVID-19 Virus (Coronavirus) Antibody & Titer Reflex   Result Value Ref Range    COVID-19 Antibody Screen Negative       Comment:      No COVID-19 antibodies detected.  Patients within 10 days of symptom onset for   COVID-19 may not produce sufficient levels of detectable antibodies.    Immunocompromised COVID-19 patients may take longer to develop antibodies.      COVID-19 Antibody, IgG Titer Not Applicable       Comment:      Qualitative screen for total antibodies to COVID-19 (SARS-CoV-2) with   semi-quantitative measurement of IgG COVID-19 antibodies by endpoint titer.    COVID-19 antibodies may be elevated due to a past or current infection.  Negative results do not rule out COVID-19 infection.  Results from antibody   testing should not be used as the sole basis to diagnose or exclude SARS-CoV-2   infection or to inform infection status.  COVID-19 PCR test should be ordered   if current infection is suspected.  False positive results may occur in rare   cases due to cross-reacting antibodies.  This test was developed and its performance characteristics determined by the   [facility] ([facility]),   which is regulated under CLIA as qualified to perform high-complexity testing.    This test has not been reviewed by the FDA.  Testing performed by [facility], [facility], [address], [address], [place] [address]         If you have any questions or concerns, please call the clinic at the number listed above.       Sincerely,        Lindsay Driver PA-C

## 2020-07-21 LAB
COVID-19 SPIKE RBD ABY TITER: NORMAL
COVID-19 SPIKE RBD ABY: NEGATIVE
TSH SERPL DL<=0.005 MIU/L-ACNC: 1.25 MU/L (ref 0.4–4)

## 2020-07-22 NOTE — RESULT ENCOUNTER NOTE
Lab letter printed and signed.  Message comments below:  You are negative for Covid 19 antibodies.  Your thyroid function is normal.

## 2020-10-02 DIAGNOSIS — J45.30 MILD PERSISTENT ASTHMA WITHOUT COMPLICATION: ICD-10-CM

## 2020-10-07 RX ORDER — MONTELUKAST SODIUM 10 MG/1
TABLET ORAL
Qty: 90 TABLET | Refills: 1 | OUTPATIENT
Start: 2020-10-07

## 2020-11-03 ENCOUNTER — TELEPHONE (OUTPATIENT)
Dept: PEDIATRICS | Facility: CLINIC | Age: 65
End: 2020-11-03

## 2020-11-03 NOTE — TELEPHONE ENCOUNTER
Patient needs a refill on her levothyroxine 0.112 mcg, fexofenadine 180mg, montelukast 10 mg, triamcinolone cream 0.1%, ketoconazole 2% and fluticasone 50 mcg/act medication. She got an alert that said she needed a physical prior to getting refills. Got her scheduled in first physical spot. She was wondering if she could get 6 months of refills so she can avoid coming into clinic in winter with everything going on with COVID. She has concerns with her health and being exposed to something by coming in for her physical. Please call and let her know if its ok to do this. Had thyroid levels checked and came back good in July. She was at [place], results in chart.

## 2020-11-03 NOTE — TELEPHONE ENCOUNTER
In that case, please inform patient to schedule for virtual visit for medication recheck and refills  But I also see that her refills on the requested medications were just done a month ago-please clarify with patient

## 2020-12-09 ENCOUNTER — VIRTUAL VISIT (OUTPATIENT)
Dept: PEDIATRICS | Facility: CLINIC | Age: 65
End: 2020-12-09
Payer: COMMERCIAL

## 2020-12-09 VITALS — SYSTOLIC BLOOD PRESSURE: 174 MMHG | DIASTOLIC BLOOD PRESSURE: 87 MMHG | HEART RATE: 73 BPM

## 2020-12-09 DIAGNOSIS — M79.642 BILATERAL HAND PAIN: ICD-10-CM

## 2020-12-09 DIAGNOSIS — R03.0 ELEVATED BLOOD PRESSURE READING WITHOUT DIAGNOSIS OF HYPERTENSION: ICD-10-CM

## 2020-12-09 DIAGNOSIS — G47.33 OSA (OBSTRUCTIVE SLEEP APNEA): ICD-10-CM

## 2020-12-09 DIAGNOSIS — M79.641 BILATERAL HAND PAIN: ICD-10-CM

## 2020-12-09 DIAGNOSIS — J30.89 SEASONAL ALLERGIC RHINITIS DUE TO OTHER ALLERGIC TRIGGER: ICD-10-CM

## 2020-12-09 DIAGNOSIS — J45.30 MILD PERSISTENT ASTHMA WITHOUT COMPLICATION: ICD-10-CM

## 2020-12-09 DIAGNOSIS — E89.0 POSTABLATIVE HYPOTHYROIDISM: Primary | ICD-10-CM

## 2020-12-09 PROCEDURE — 99214 OFFICE O/P EST MOD 30 MIN: CPT | Mod: 95 | Performed by: FAMILY MEDICINE

## 2020-12-09 RX ORDER — LEVOTHYROXINE SODIUM 112 UG/1
TABLET ORAL
Qty: 90 TABLET | Refills: 3 | Status: SHIPPED | OUTPATIENT
Start: 2020-12-09 | End: 2021-03-15

## 2020-12-09 RX ORDER — MONTELUKAST SODIUM 10 MG/1
TABLET ORAL
Qty: 90 TABLET | Refills: 3 | Status: SHIPPED | OUTPATIENT
Start: 2020-12-09 | End: 2021-03-15

## 2020-12-09 RX ORDER — FEXOFENADINE HCL 180 MG/1
180 TABLET ORAL DAILY
Qty: 90 TABLET | Refills: 3 | Status: SHIPPED | OUTPATIENT
Start: 2020-12-09 | End: 2021-06-11

## 2020-12-09 RX ORDER — FLUTICASONE PROPIONATE 50 MCG
1 SPRAY, SUSPENSION (ML) NASAL DAILY
Qty: 48 G | Refills: 3 | Status: SHIPPED | OUTPATIENT
Start: 2020-12-09 | End: 2021-06-11

## 2020-12-09 RX ORDER — ALBUTEROL SULFATE 90 UG/1
2 AEROSOL, METERED RESPIRATORY (INHALATION) EVERY 6 HOURS PRN
Qty: 18 G | Refills: 1 | Status: SHIPPED | OUTPATIENT
Start: 2020-12-09 | End: 2021-06-11

## 2020-12-09 NOTE — PATIENT INSTRUCTIONS
Start checking BP at home twice daily   Schedule BP recheck in 2-3 months  Stop Naproxen  Start on tylenol arthritis 650 mg, 1-2 tablets twice a day as needed for hand joint pain      Patient Education     Low-Salt Choices  Eating salt (sodium) can make your body retain too much water. Extra water makes your heart work harder. Canned, packaged, and frozen foods are easy to prepare. But they are often high in sodium. Here are some ideas for low-salt foods you can easily make yourself.     For breakfast    Fruit or 100% fruit juice. It's better to have whole fruit instead of 100% fruit juice.    Whole-wheat bread or an English muffin. Look for sodium content on Nutrition Facts labels.    Low-fat milk or yogurt    Unsalted eggs    Shredded wheat    Corn tortillas    Unsalted steamed rice    Regular (not instant) hot cereal, made without salt  Stay away from    Sausage, gross, and ham    Flour tortillas    Packaged muffins, pancakes, and biscuits    Instant hot cereals    Cottage cheese    For lunch and dinner    Fresh fish, chicken, turkey, or meat--baked, broiled, or roasted without salt    Dry beans, cooked without salt    Tofu, stir-fried without salt    Unsalted fresh fruit and vegetables, or frozen or canned fruit and vegetables with no added salt  Stay away from    Lunch or deli meat that is cured or smoked    Cheese    Tomato juice and ketchup    Canned vegetables, soups, and fish not labeled as no-salt-added or reduced sodium    Packaged gravies and sauces    Olives, pickles, and relish    Bottled salad dressings    For snacks and desserts    Yogurt    Unsalted, air-popped popcorn    Unsalted nuts or seeds  Stay away from    Pies and cakes    Packaged dessert mixes    Pizza    Canned and packaged puddings    Pretzels, chips, crackers, and nuts--unless the label says unsalted    [employer] last reviewed this educational content on 11/1/2019 2000-2020 [employer]. [address] [address]. All rights reserved. This information is not intended as a substitute for professional medical care. Always follow your healthcare professional's instructions.           Patient Education     Low-Salt Diet  This diet removes foods that are high in salt. It also limits the amount of salt you use when cooking. It is most often used for people with high blood pressure, edema (fluid retention), and kidney, liver, or heart disease.  Table salt contains the mineral sodium. Your body needs sodium to work normally. But too much sodium can make your health problems worse. Your healthcare provider is recommending a low-salt (also called low-sodium) diet for you. Your total daily allowance of salt is 1,500 to 2,300 milligrams (mg). It is less than 1 teaspoon of table salt. This means you can have only about 500 to 700 mg of sodium at each meal. People with certain health problems should limit salt intake to the lower end of the recommended range.    When you cook, don t add much salt. If you can cook without using salt, even better. Don t add salt to your food at the table.  When shopping, read food labels. Salt is often called sodium on the label. Choose foods that are salt-free, low salt, or very low salt. Note that foods with reduced salt may not lower your salt intake enough.    Beans, potatoes, and pasta  Ok: Dry beans, split peas, lentils, potatoes, rice, macaroni, pasta, spaghetti without added salt  Avoid: Potato chips, tortilla chips, and similar products  Breads and cereals  Ok: Low-sodium breads, rolls, cereals, and cakes; low-salt crackers, matzo crackers  Avoid: Salted crackers, pretzels, popcorn, Portuguese toast, pancakes, muffins  Dairy  Ok: Milk, chocolate milk, hot chocolate mix, low-salt cheeses, and yogurt  Avoid: Processed cheese and cheese spreads; Roquefort, Camembert, and cottage cheese; buttermilk, instant breakfast drink  Desserts  Ok: Ice cream, frozen yogurt, juice bars, gelatin, cookies and  pies, sugar, honey, jelly, hard candy  Avoid: Most pies, cakes and cookies prepared or processed with salt; instant pudding  Drinks  Ok: Tea, coffee, fizzy (carbonated) drinks, juices  Avoid: Flavored coffees, electrolyte replacement drinks, sports drinks  Meats  Ok: All fresh meat, fish, poultry, low-salt tuna, eggs, egg substitute  Avoid: Smoked, pickled, brine-cured, or salted meats and fish. This includes gross, chipped beef, corned beef, hot dogs, deli meats, ham, kosher meats, salt pork, sausage, canned tuna, salted codfish, smoked salmon, herring, sardines, or anchovies.  Seasonings and spices  Ok: Most seasonings are okay. Good substitutes for salt include: fresh herb blends, hot sauce, lemon, garlic, caro, vinegar, dry mustard, parsley, cilantro, horseradish, tomato paste, regular margarine, mayonnaise, unsalted butter, cream cheese, vegetable oil, cream, low-salt salad dressing and gravy.  Avoid: Regular ketchup, relishes, pickles, soy sauce, teriyaki sauce, Worcestershire sauce, BBQ sauce, tartar sauce, meat tenderizer, chili sauce, regular gravy, regular salad dressing, salted butter  Soups  Ok: Low-salt soups and broths made with allowed foods  Avoid: Bouillon cubes, soups with smoked or salted meats, regular soup and broth  Vegetables  Ok: Most vegetables are okay; also low-salt tomato and vegetable juices  Avoid: Sauerkraut and other brine-soaked vegetables; pickles and other pickled vegetables; tomato juice, olives  [employer] last reviewed this educational content on 8/1/2016 2000-2020 The [employer]. [address] [place] [address], [place] [address]. All rights reserved. This information is not intended as a substitute for professional medical care. Always follow your healthcare professional's instructions.           Patient Education     Kidney Disease: Avoiding High-Sodium Foods  Sodium is a mineral that the body needs in small amounts. Sodium is found in table salt and sea salt. It's  important for people with kidney disease to limit how much sodium is added to meals. Sodium is also found in high amounts in most processed foods. This means pre-prepared foods such as breakfast cereals, cookies, and pickles. It's also found in high amounts in most restaurant foods. If you aren't cooking with fresh ingredients at home, you are very likely eating more salt than you need. When you eat a lot of sodium, it can make you thirsty and cause your body to retain fluid. This can increase blood pressure and strain the kidneys. .  People with chronic kidney disease should limit how much sodium they get to no more than 1,500 mg each day. Reading food labels is one of the best ways to figure out how much sodium you are getting each day.  If you have trouble managing your sodium intake, ask your doctor for a referral to a registered dietitian (RD). An RD can help you with choosing foods low in sodium and with meal planning.    Remember, reading food labels is one of the best ways to know how much sodium you are getting. The following is a list of foods that are high in sodium.    Canned and processed foods, such as gravies, instant cereal, packaged noodles and potato mixes, olives, pickles, soups, vegetables    Cheeses, such as american, blue, parmesan, and roquefort    Cured meats, such as gross, beef jerky, bologna, corned beef, ham, hot dogs, sandwich meats, and sausages    Fast foods, such as burritos, fish sandwiches, milk shakes, salted french fries, and tacos    Frozen foods, such as meat pies, TV dinners, and waffles    Salted snacks, such as chips, crackers, peanuts, popcorn, pretzels, and nuts    Other packaged items, such as bread, antacids, baking soda, bouillon, ketchup, lite salt, relish, salted butter and margarine, soy and teriyaki sauce, steak sauce, and vegetable juices  [employer] last reviewed this educational content on 5/1/2019 2000-2020 The [employer]. [address] [place] [address],  [place] [address]. All rights reserved. This information is not intended as a substitute for professional medical care. Always follow your healthcare professional's instructions.           Patient Education     Using Herbs and Spices  Herbs and spices add flavor to cooking without adding fat or sodium. That's why they're great for healthy cooking. Try these tips to help create tasty, healthy meals.   How much to use?  If a recipe calls for 1 tablespoon of fresh herbs, you can use one of these instead:     1 teaspoon of dried herbs    1/4 teaspoon of powdered herbs  Tips for getting the most of herbs and spices     Use kitchen aminah or a sharp knife to cut leaves of fresh herbs. Cutting the leaves finely will release the most flavor.    When using whole spices, don't grind them until you need them. Crushing the berries and seeds with a mortar and pestle or grating whole nutmeg or cinnamon sticks just before adding them to your recipe will guarantee the most flavor.    Dried herbs pack more flavor for the same quantity than fresh herbs, and powdered herbs are more potent than dried flakes. If you are using powdered herbs in a recipe that calls for fresh, you'll want to decrease the amount you add.    When adding fresh or dried spices and herbs to cold recipes, such as dips or salad dressings, allow the food to sit in the refrigerator for at least a couple of hours before serving so the flavors can blend.    Add fresh spices and herbs to hot dishes as close to serving time as possible for the most flavorful results. Dried herbs and spices should be added early in the cooking process to prevent a powdery taste.    The longer dried herbs and spices sit in your cupboard without being used, the more flavor they lose. Whole spices last longer than ground or powdered spices. Store dried herbs and spices in a cool, dry, dark place. Keep powdered herbs and spices for no longer than a year.    [employer] last reviewed this  educational content on 1/1/2020 2000-2020 The [employer]. [address] [place] [address], [place] [address]. All rights reserved. This information is not intended as a substitute for professional medical care. Always follow your healthcare professional's instructions.           Patient Education     Kidney Disease: Eating Less Sodium  Sodium is a mineral that the body needs in small amounts. Sodium is found in table salt. Most people eat far more salt than they need. There are 2main reasons for this: Salt is present in high amounts in most processed foods (pre-prepared foods like breakfast cereals, cookies, and pickles) and in all restaurant foods. In other words, if you are not cooking from fresh ingredients at home, you are very likely eating more salt than you need. When sodium intake is too high, it can increase thirst and cause the body to retain fluid. To avoid these side effects, people with chronic kidney disease are often told to eat less sodium. The tips on this sheet can show you how.  People with chronic kidney disease should restrict their salt intake to less than 1,500 milligrams (mg) of sodium daily. Food labels generally report the sodium content. Table salt is sodium chloride. One level teaspoon of table salt contains 2.300 mg of sodium.  When you shop for food    Unlike canned and processed foods, fresh foods have no added salt and are better for you. When you re food shopping:    Choose fresh foods when you can.    Read food labels before buying packaged foods. Check the label s nutrition facts for sodium amounts and servings per package.    Try to pick packaged foods with a sodium content of 140 mg (milligrams) or less per serving.    Do not choose foods with over 400 mg of sodium per serving.  Season instead of salt  Try the seasonings and foods listed below to season without sodium.    Basil: tomatoes, squash, eggplant, soups, fish    Bedolla: soups, rice, lentils, chicken    Dill: beets,  cucumbers, green beans    Garlic: sauces, vegetables, meats, fish    Cathy: carrots, chicken, cooked fruit, white sauces    Lemon: asparagus, artichokes, broccoli, spinach, fish    Mint: cold soups, salads, fruit dishes    Oregano: eggplant, chicken, salads, sauces    Thyme: chicken, fish, lean meats, soups, stews  Food that has salt added during cooking tastes less salty than if salt is sprinkled on it at the table. Therefore, use half the amount of salt you want to have in your food during cooking, and sprinkle the other half on the food at the table.  Do not use seasoned salt or salt substitutes. They may contain sodium or potassium (another mineral people with kidney disease are often told to limit).  [employer] last reviewed this educational content on 2/1/2017 2000-2020 [employer]. [address] [place] [address], [place] [address]. All rights reserved. This information is not intended as a substitute for professional medical care. Always follow your healthcare professional's instructions.

## 2020-12-09 NOTE — PROGRESS NOTES
"Denita Flores is a 65 year old female who is being evaluated via a billable telephone visit.    180/90, 174/87,180/95, 174/92    The patient has been notified of following:     \"This telephone visit will be conducted via a call between you and your physician/provider. We have found that certain health care needs can be provided without the need for a physical exam.  This service lets us provide the care you need with a short phone conversation.  If a prescription is necessary we can send it directly to your pharmacy.  If lab work is needed we can place an order for that and you can then stop by our lab to have the test done at a later time.    Telephone visits are billed at different rates depending on your insurance coverage. During this emergency period, for some insurers they may be billed the same as an in-person visit.  Please reach out to your insurance provider with any questions.    If during the course of the call the physician/provider feels a telephone visit is not appropriate, you will not be charged for this service.\"    Patient has given verbal consent for Telephone visit?  Yes    What phone number would you like to be contacted at? [phone]    How would you like to obtain your AVS? Mail a copy    Subjective     Denita Flores is a 65 year old female who presents via phone visit today for the following health issues:    HPI    Patient is here for a telephone visit instead of in person visit due to the current COVID-19 pandemic.  Patient is here for a telephone visit for follow-up on her medication and refills  Past medical history significant for mild persistent asthma, chronic allergies, post ablative hypothyroidism, obstructive sleep apnea  She is also expressing concerns over pain in both hands, right more than the left associated with a feeling of stiffness but no concerns for joint swelling  Has been taking naproxen frequently in the past few months for pain relief  Has history of " elevated blood pressures with no definite history of hypertension in the past   Denies chest pain, SOB, edema legs, visual concerns, focal neurological symptoms, dizziness, syncope, palpitations.    Hypothyroidism Follow-up      Since last visit, patient describes the following symptoms: Weight stable, no hair loss, no skin changes, no constipation, no loose stools      How many servings of fruits and vegetables do you eat daily?  2-3    On average, how many sweetened beverages do you drink each day (Examples: soda, juice, sweet tea, etc.  Do NOT count diet or artificially sweetened beverages)?   0    How many days per week do you exercise enough to make your heart beat faster? 3 or less    How many minutes a day do you exercise enough to make your heart beat faster? 10 - 19    How many days per week do you miss taking your medication? 0        Asthma Follow-Up    Was ACT completed today?  No      Do you have a cough?  No    Are you experiencing any wheezing in your chest?  No    Do you have any shortness of breath?  No     How often are you using a short-acting (rescue) inhaler or nebulizer, such as Albuterol?  A few times a month    How many days per week do you miss taking your asthma controller medication?  0    Please describe any recent triggers for your asthma: Allergies    Have you had any Emergency Room Visits, Urgent Care Visits, or Hospital Admissions since your last office visit?  No            Review of Systems   CONSTITUTIONAL: NEGATIVE for fever, chills, change in weight  INTEGUMENTARY/SKIN: NEGATIVE for worrisome rashes, moles or lesions  EYES: NEGATIVE for vision changes or irritation  ENT/MOUTH: NEGATIVE for ear, mouth and throat problems  ENT/MOUTH: History of allergies  RESP: NEGATIVE for significant cough or SOB  RESP: History of asthma  CV: NEGATIVE for chest pain, palpitations or peripheral edema  CV: Elevated blood pressures  GI: NEGATIVE for nausea, abdominal pain, heartburn, or change in  bowel habits  MUSCULOSKELETAL: Bilateral hand joint pain  NEURO: NEGATIVE for weakness, dizziness or paresthesias  ENDOCRINE: History of post ablative hypothyroidism  HEME/ALLERGY/IMMUNE: NEGATIVE for bleeding problems  PSYCHIATRIC: NEGATIVE for changes in mood or affect       Objective          Vitals:  No vitals were obtained today due to virtual visit.    healthy, alert and no distress  PSYCH: Alert and oriented times 3; coherent speech, normal   rate and volume, able to articulate logical thoughts, able   to abstract reason, no tangential thoughts, no hallucinations   or delusions  Her affect is normal  RESP: No cough, no audible wheezing, able to talk in full sentences  Remainder of exam unable to be completed due to telephone visits            Assessment/Plan:    Assessment & Plan     Postablative hypothyroidism  TSH   Date Value Ref Range Status   07/20/2020 1.25 0.40 - 4.00 mU/L Final   09/30/2019 1.29 0.40 - 4.00 mU/L Final   09/07/2018 0.26 (L) 0.40 - 4.00 mU/L Final   02/20/2018 0.35 (L) 0.40 - 4.00 mU/L Final   12/18/2017 0.88 0.40 - 4.00 mU/L Final     Noted that patient had thyroid labs done in July 2020 when she was seen by a different provider for vertigo  Reviewed normal thyroid labs, will continue with levothyroxine 112 MCG daily, recheck in 1 year or sooner if needed    - levothyroxine (SYNTHROID/LEVOTHROID) 112 MCG tablet; TAKE 1 TABLET(112 MCG) BY MOUTH DAILY    Mild persistent asthma without complication  Stable, continue with Singulair 10 mg daily, albuterol inhaler as needed, continue to control underlying allergies as mentioned below  - montelukast (SINGULAIR) 10 MG tablet; TAKE 1 TABLET(10 MG) BY MOUTH AT BEDTIME  - albuterol (PROAIR HFA/PROVENTIL HFA/VENTOLIN HFA) 108 (90 Base) MCG/ACT inhaler; Inhale 2 puffs into the lungs every 6 hours as needed for shortness of breath / dyspnea    Seasonal allergic rhinitis due to other allergic trigger  Continue with Allegra 180 mg daily at  bedtime  Patient uses Flonase 1 spray once daily  When she feels her symptoms are worse, she uses 1 spray twice a day  - fexofenadine (ALLEGRA) 180 MG tablet; Take 1 tablet (180 mg) by mouth daily  - fluticasone (FLONASE) 50 MCG/ACT nasal spray; Spray 1 spray into both nostrils daily    Elevated blood pressure reading without diagnosis of hypertension  BP Readings from Last 6 Encounters:   12/09/20 (!) 174/87   07/20/20 (!) 144/84   07/17/20 128/77   03/06/20 (!) 152/79   01/23/20 134/66   11/12/19 (!) 167/80     Patient has blood pressure monitor at home  Recommended to check it during this telephone visit  Significantly elevated numbers were noted-180/90, 174/87,180/95, 174/92  Patient has been using naproxen more frequently in the past few months for her bilateral hand joint pain  She also reported not eating healthy since she stayed at home because of the pandemic, 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  Education handouts will be sent along with after visit summary today  Recommended patient to start checking blood pressures 1-2 times a day, follow-up with provider in the office for recheck in 2 to 3 months, will make sure to bring the home blood pressure readings and the monitor for counter checking  Patient verbalised understanding and is agreeable to the plan.      YELENA (obstructive sleep apnea)  Uses the mouth piece    Bilateral hand pain  ddx-degenerative arthritis versus rheumatoid arthritis  Recommended patient to stop using NSAIDs due to elevated blood pressures as mentioned above  Recommended local ice and heat, use stress ball for finger stretches, avoid triggering activities, take Tylenol arthritis 630 mg 1 to 2 tablets twice a day as needed for pain  We will evaluate during the visit in 2 to 3 months or sooner if needed  Patient verbalised understanding and is agreeable to the  "plan.         BMI:   Estimated body mass index is 39.86 kg/m  as calculated from the following:    Height as of 1/23/20: 1.702 m (5' 7\").    Weight as of 7/20/20: 115.4 kg (254 lb 8 oz).            Work on weight loss  Regular exercise  Chart documentation done in part with Dragon Voice recognition Software. Although reviewed after completion, some word and grammatical error may remain.    See Patient Instructions    Return in about 3 months (around 3/9/2021) for Hypertension recheck.    Gee Hitchcock MD  [facility]    Phone call duration:  27:56 minutes              "

## 2021-02-20 ENCOUNTER — TELEPHONE (OUTPATIENT)
Dept: NURSING | Facility: CLINIC | Age: 66
End: 2021-02-20

## 2021-02-20 NOTE — TELEPHONE ENCOUNTER
Patient calls with questions regarding a follow up appointment. Patient had a virtual visit 12/9/20 and was told to have a hypertension follow up in 3 months. Patient had been taking her blood pressure daily, but then quit for awhile. Blood pressure today is 146/79 heart rate 81. Patient reports that she is feeling well, denies concerns. Patient is wondering if she needs to be fasting for follow up. Per Epic review no labs are ordered at this time. Patient verbalizes understanding and is transferred to scheduling to set up follow up visit. Patient will call with any questions or concerns prior to appointment.    Yary Recinos RN  [facility]

## 2021-02-27 ENCOUNTER — APPOINTMENT (OUTPATIENT)
Dept: GENERAL RADIOLOGY | Facility: CLINIC | Age: 66
End: 2021-02-27
Attending: PHYSICIAN ASSISTANT
Payer: COMMERCIAL

## 2021-02-27 ENCOUNTER — HOSPITAL ENCOUNTER (EMERGENCY)
Facility: CLINIC | Age: 66
Discharge: HOME | End: 2021-02-27
Attending: PHYSICIAN ASSISTANT | Admitting: PHYSICIAN ASSISTANT
Payer: COMMERCIAL

## 2021-02-27 VITALS
RESPIRATION RATE: 16 BRPM | OXYGEN SATURATION: 97 % | DIASTOLIC BLOOD PRESSURE: 70 MMHG | SYSTOLIC BLOOD PRESSURE: 145 MMHG | HEIGHT: 68 IN | HEART RATE: 66 BPM | BODY MASS INDEX: 37.13 KG/M2 | TEMPERATURE: 96.7 F | WEIGHT: 245 LBS

## 2021-02-27 DIAGNOSIS — R03.0 ELEVATED BLOOD PRESSURE READING WITHOUT DIAGNOSIS OF HYPERTENSION: ICD-10-CM

## 2021-02-27 DIAGNOSIS — R07.9 CHEST PAIN: ICD-10-CM

## 2021-02-27 LAB
ANION GAP SERPL CALCULATED.3IONS-SCNC: 2 MMOL/L (ref 3–14)
BASOPHILS # BLD AUTO: 0.1 10E9/L (ref 0–0.2)
BASOPHILS NFR BLD AUTO: 1.2 %
BUN SERPL-MCNC: 12 MG/DL (ref 7–30)
CALCIUM SERPL-MCNC: 9 MG/DL (ref 8.5–10.1)
CHLORIDE SERPL-SCNC: 107 MMOL/L (ref 94–109)
CO2 SERPL-SCNC: 30 MMOL/L (ref 20–32)
CREAT SERPL-MCNC: 0.77 MG/DL (ref 0.52–1.04)
DIFFERENTIAL METHOD BLD: NORMAL
EOSINOPHIL # BLD AUTO: 0.2 10E9/L (ref 0–0.7)
EOSINOPHIL NFR BLD AUTO: 2.9 %
ERYTHROCYTE [DISTWIDTH] IN BLOOD BY AUTOMATED COUNT: 12.9 % (ref 10–15)
GFR SERPL CREATININE-BSD FRML MDRD: 80 ML/MIN/{1.73_M2}
GLUCOSE SERPL-MCNC: 93 MG/DL (ref 70–99)
HCT VFR BLD AUTO: 41.9 % (ref 35–47)
HGB BLD-MCNC: 13.6 G/DL (ref 11.7–15.7)
IMM GRANULOCYTES # BLD: 0 10E9/L (ref 0–0.4)
IMM GRANULOCYTES NFR BLD: 0.3 %
INTERPRETATION ECG - MUSE: NORMAL
LYMPHOCYTES # BLD AUTO: 1.5 10E9/L (ref 0.8–5.3)
LYMPHOCYTES NFR BLD AUTO: 20.1 %
MCH RBC QN AUTO: 29.8 PG (ref 26.5–33)
MCHC RBC AUTO-ENTMCNC: 32.5 G/DL (ref 31.5–36.5)
MCV RBC AUTO: 92 FL (ref 78–100)
MONOCYTES # BLD AUTO: 0.6 10E9/L (ref 0–1.3)
MONOCYTES NFR BLD AUTO: 7.4 %
NEUTROPHILS # BLD AUTO: 5.1 10E9/L (ref 1.6–8.3)
NEUTROPHILS NFR BLD AUTO: 68.1 %
NRBC # BLD AUTO: 0 10*3/UL
NRBC BLD AUTO-RTO: 0 /100
NT-PROBNP SERPL-MCNC: 40 PG/ML (ref 0–900)
PLATELET # BLD AUTO: 270 10E9/L (ref 150–450)
POTASSIUM SERPL-SCNC: 4.5 MMOL/L (ref 3.4–5.3)
RBC # BLD AUTO: 4.57 10E12/L (ref 3.8–5.2)
SODIUM SERPL-SCNC: 139 MMOL/L (ref 133–144)
TROPONIN I SERPL-MCNC: <0.015 UG/L (ref 0–0.04)
TSH SERPL DL<=0.005 MIU/L-ACNC: 2.02 MU/L (ref 0.4–4)
WBC # BLD AUTO: 7.5 10E9/L (ref 4–11)

## 2021-02-27 PROCEDURE — 83880 ASSAY OF NATRIURETIC PEPTIDE: CPT | Performed by: PHYSICIAN ASSISTANT

## 2021-02-27 PROCEDURE — 84443 ASSAY THYROID STIM HORMONE: CPT | Performed by: PHYSICIAN ASSISTANT

## 2021-02-27 PROCEDURE — 99285 EMERGENCY DEPT VISIT HI MDM: CPT

## 2021-02-27 PROCEDURE — 93005 ELECTROCARDIOGRAM TRACING: CPT

## 2021-02-27 PROCEDURE — 71046 X-RAY EXAM CHEST 2 VIEWS: CPT

## 2021-02-27 PROCEDURE — 85025 COMPLETE CBC W/AUTO DIFF WBC: CPT | Performed by: PHYSICIAN ASSISTANT

## 2021-02-27 PROCEDURE — 80048 BASIC METABOLIC PNL TOTAL CA: CPT | Performed by: PHYSICIAN ASSISTANT

## 2021-02-27 PROCEDURE — 84484 ASSAY OF TROPONIN QUANT: CPT | Performed by: PHYSICIAN ASSISTANT

## 2021-02-27 ASSESSMENT — ENCOUNTER SYMPTOMS
FREQUENCY: 0
JOINT SWELLING: 1
DIFFICULTY URINATING: 0
DYSURIA: 0
SHORTNESS OF BREATH: 0

## 2021-02-27 ASSESSMENT — MIFFLIN-ST. JEOR: SCORE: 1699.81

## 2021-02-27 NOTE — ED PROVIDER NOTES
History   Chief Complaint:  Hypertension       HPI   Denita Flores is a 65 year old female with history of Graves Disease who presents with Hypertension. The patient states she was told to regularly check her blood pressure in order to determine if she should get medication. Today she recorded a blood pressure of 220s/100s and decided to come in. The last three days she has experienced light headedness but has not passed out. She has experienced frequent tingling in her arm with pain radiating to her neck and arm and mild left sided chest pain for quite some time along with finger swelling bilaterally.  Her chest discomfort is intermittent and she is not able to determine anything specific that brings it on.  The patient denies shortness of breath and urinary changes.  She has some intermittent leg swelling which is worse when she is on her feet more.  No prior history of CAD, DVT, PE, recent surgery or immobilization, cough, fever, or hemoptysis.  No vomiting or diaphoresis.  She is urinating normally.  She was tested last Monday for Covid and it was negative and she has received one dose of the vaccine.  No vertigo, vision loss, one-sided weakness or loss of sensation in arms or legs.    Review of Systems   Respiratory: Negative for shortness of breath.    Cardiovascular: Positive for chest pain.   Genitourinary: Negative for difficulty urinating, dysuria and frequency.   Musculoskeletal: Positive for joint swelling.   All other systems reviewed and are negative.      Allergies:  Sulfa Drugs  Contrast Dye    Medications:  Albuterol   Cholecalciferol  Allegra   Flonase  Synthroid  Krill Oil  Singulair     Past Medical History:    Graves disease  Kidney stones  Neoplasm   Asthma  Thyroid disease  Bilateral hand pain  Polyp of gallbladder  Gross hematuria  Obesity  Nephrolithiasis  Epistaxis  Spider veins   YELENA  Diverticulosis  Warts  Stasis dermatitis  Hypothyroidism      Past Surgical History:    Back  "surgery  Cystoscopy  [place]    Family History:    Glaucoma  Heart disease  Cancer  Diabetes    Social History:  No Smoking or Alcohol use      Physical Exam     Patient Vitals for the past 24 hrs:   BP Temp Temp src Pulse Resp SpO2 Height Weight   02/27/21 1325 -- -- -- -- -- 97 % -- --   02/27/21 1320 (!) 145/70 -- -- 66 -- -- -- --   02/27/21 1230 (!) 163/80 -- -- 63 -- 97 % -- --   02/27/21 1220 (!) 149/75 -- -- 69 -- 95 % -- --   02/27/21 1210 (!) 149/65 -- -- 65 -- 98 % -- --   02/27/21 1200 (!) 151/79 -- -- 67 -- 98 % -- --   02/27/21 1150 (!) 172/80 -- -- 75 -- 99 % -- --   02/27/21 1140 (!) 161/81 -- -- 68 -- 98 % -- --   02/27/21 1115 (!) 210/94 -- -- -- -- -- -- --   02/27/21 1113 -- 96.7  F (35.9  C) Temporal 75 16 97 % 1.727 m (5' 8\") 111.1 kg (245 lb)       Physical Exam  General: Alert and cooperative with exam. Non-toxic appearing  Head:  Scalp is NC/AT  Eyes:  Conjunctiva normal, PERRL  ENT:  The external nose and ears are normal.   Neck:  Normal range of motion without rigidity.  CV:  Regular rate and rhythm    No pathologic murmur, rubs, or gallops.  Resp:  Breath sounds are clear bilaterally.  No crackles, wheezes, rhonchi, stridor.    Non-labored, no retractions or accessory muscle use  Abdomen: Abdomen is soft, no distension, no tenderness, no masses. No peritoneal signs. No CVA tenderness.  MS:  No lower extremity edema or asymmetric calf swelling. Normal ROM in all joints without effusions.    No midline cervical, thoracic, or lumbar tenderness  Skin:  Warm and dry, No rash or lesions noted. 2+ peripheral pulses in all extremities  Neuro: Alert and oriented x3.  No gross motor deficits.  No facial asymmetry.  5/5 strength BL in UE and LE, normal sensation.  Cranial nerves 2-12 intact.  Normal finger to nose and heel to shin testing.  Gait normal  Psych: Awake. Alert. Normal affect. Appropriate interactions.    Emergency Department Course   ECG (12:00:56):  Indication: " Screening for cardiovascular disease.   Rate 65 bpm. IN interval 150. QRS duration 84. QT/QTc 416/432. P-R-T axes 60 41 61.   Interpretation: Normal sinus rhythm. Normal ECG  Agree with computer interpretation.   No significant change compared to EKG dated .   Interpreted at 1204 by Dr. Quesada.     Imaging:  XR Chest port, PA & LAT:  Since 2018, heart size is normal. No pleural   effusion, pneumothorax, or abnormal area of consolidation.   Well-defined opacities adjacent to both left and right heart borders   are thought to be present on previous exam and otherwise unchanged.   These are suspected to be pericardial fat pads.      Laboratory:  CBC:  AWNL. (WBC 7.5, HGB 13.6, )     BMP: Anion GAP 2 (L) o/w WNL (Creatinine: 0.77)    Troponin (Collected 1230): <0.015    TSH with free T4 reflex: 2.02    BNP: 40    Emergency Department Course:  Reviewed:  I reviewed nursing notes, vitals, past medical history, and care everywhere  Assessments:  11:43 AM I completed initial assessment and exam  1325 I reassessed the patient and discussed results of the workup thus far    Disposition:  The patient was discharged to home.       Impression & Plan     Medical Decision Makin-year-old female presents with concern for elevated blood pressures.  Initial blood pressure quite high though markedly improved on recheck.  Blood work here is notable for normal white blood cell count, hemoglobin, platelets, kidney function, electrolytes, TSH, troponin, BNP.  No evidence of hypertensive emergency or secondary hypertension at this time.  Does also endorse some intermittent chest tightness and left arm tingling which has been going on for quite some time and has no clear provoking factor.  EKG normal, troponin negative.  She is a heart score of ([id number]).  I considered pulmonary embolism though she has no signs or risk factors for this, doubt this given duration of symptoms and intermittent nature.  Chest  x-ray clear.  I likewise doubt aortic dissection.  Neurologic exam is normal and no evidence of CNS process or stroke.    We discussed options regarding her chest symptoms and at this time she prefers to follow-up as an outpatient for provocative testing as opposed to serial troponins and admission.  I think this is reasonable especially given the long duration of her chest tightness symptoms and that this is not the primary reason that she was seen today.  Her blood pressure is markedly improved at this time will defer starting a medication to primary care team on Monday.  She will return immediately if changes to chest pain, shortness of breath, or other new or worsening symptoms develop.    Diagnosis:    ICD-10-CM    1. Elevated blood pressure reading without diagnosis of hypertension  R03.0    2. Chest pain  R07.9 Exercise Stress Echocardiogram       Discharge Medications:  Discharge Medication List as of 2/27/2021  1:34 PM          Scribe Disclosure:  VAUGHN, Floyd Victoria, am serving as a scribe at 11:43 AM on 2/27/2021 to document services personally performed by Jefferson Vázquez PA-C based on my observations and the provider's statements to me.          Jefferson Vázquez PA-C  02/27/21 1911

## 2021-03-01 ENCOUNTER — TELEPHONE (OUTPATIENT)
Dept: FAMILY MEDICINE | Facility: CLINIC | Age: 66
End: 2021-03-01

## 2021-03-01 NOTE — TELEPHONE ENCOUNTER
I would recommend to be ratherseen sooner  Please help her schedule for same-day spot tomorrow or day after tomorrow

## 2021-03-01 NOTE — TELEPHONE ENCOUNTER
Patient calling and was told by the hospitalitis to call and let her PCP know that patient was recent seen at the hospital 2/27/21 for high blood pressure, in the hospital patient's BP was 210/94. The hospitalitis did not want to put patient on BP meds and would rather have her PCP decide on that. Today patient is feeling good, not in any distress, patient can be reached between the hours 9:30a-2p. If you are not able to reach patient you may leave a detailed message. Patient is scheduled for a stress test on 3/10/21 and appointment with PCP on 3/24/21. Patient can wait to see PCP on 3/24/21 if PCP is okay with that. José Luis Marcum,  For [facility]

## 2021-03-01 NOTE — TELEPHONE ENCOUNTER
Called Denita back regarding her high blood pressures, she is not concerned as this is an ongoing issue and was safe to discharge from ED 2/27/21. She has cardiac stress test 3/10/21- Dr. Hitchcock Is she okay to wait until her 3/24 appointment with you or do you want her to be seen sooner? (no available appointments would have to take a Sameday appointment)       Edilma Trammell RN, BSN, CMSRN  [facility]

## 2021-03-02 NOTE — TELEPHONE ENCOUNTER
This writer attempted to contact pt on 03/02/21      Reason for call schedule OV and left message.      If patient calls back:   Schedule Office Visit appointment within 2 business days with PCP, document that pt called and close encounter       OK to use same day spot today or tomorrow with Dr. Hitchcock.      Carrol Fan MA

## 2021-03-02 NOTE — TELEPHONE ENCOUNTER
If patient is not ready to be seen by other providers or not patient to have a virtual visit next week, we will then keep the appointment as scheduled for March 15

## 2021-03-10 ENCOUNTER — HOSPITAL ENCOUNTER (OUTPATIENT)
Dept: CARDIOLOGY | Facility: CLINIC | Age: 66
Discharge: HOME | End: 2021-03-10
Attending: PHYSICIAN ASSISTANT | Admitting: PHYSICIAN ASSISTANT
Payer: COMMERCIAL

## 2021-03-10 DIAGNOSIS — R07.9 CHEST PAIN: ICD-10-CM

## 2021-03-10 PROCEDURE — 93321 DOPPLER ECHO F-UP/LMTD STD: CPT | Mod: 26 | Performed by: INTERNAL MEDICINE

## 2021-03-10 PROCEDURE — 93350 STRESS TTE ONLY: CPT | Mod: 26 | Performed by: INTERNAL MEDICINE

## 2021-03-10 PROCEDURE — 93016 CV STRESS TEST SUPVJ ONLY: CPT | Performed by: INTERNAL MEDICINE

## 2021-03-10 PROCEDURE — 93325 DOPPLER ECHO COLOR FLOW MAPG: CPT | Mod: 26 | Performed by: INTERNAL MEDICINE

## 2021-03-10 PROCEDURE — 255N000002 HC RX 255 OP 636: Performed by: PHYSICIAN ASSISTANT

## 2021-03-10 PROCEDURE — 999N000208 ECHO STRESS ECHOCARDIOGRAM

## 2021-03-10 PROCEDURE — 93018 CV STRESS TEST I&R ONLY: CPT | Performed by: INTERNAL MEDICINE

## 2021-03-10 RX ADMIN — HUMAN ALBUMIN MICROSPHERES AND PERFLUTREN 9 ML: 10; .22 INJECTION, SOLUTION INTRAVENOUS at 11:38

## 2021-03-15 ENCOUNTER — OFFICE VISIT (OUTPATIENT)
Dept: FAMILY MEDICINE | Facility: CLINIC | Age: 66
End: 2021-03-15
Payer: COMMERCIAL

## 2021-03-15 VITALS
WEIGHT: 245 LBS | RESPIRATION RATE: 16 BRPM | SYSTOLIC BLOOD PRESSURE: 128 MMHG | HEART RATE: 85 BPM | DIASTOLIC BLOOD PRESSURE: 80 MMHG | BODY MASS INDEX: 40.82 KG/M2 | OXYGEN SATURATION: 95 % | HEIGHT: 65 IN

## 2021-03-15 DIAGNOSIS — E89.0 POSTABLATIVE HYPOTHYROIDISM: ICD-10-CM

## 2021-03-15 DIAGNOSIS — E66.01 MORBID OBESITY (H): ICD-10-CM

## 2021-03-15 DIAGNOSIS — R03.0 ELEVATED BLOOD PRESSURE READING WITHOUT DIAGNOSIS OF HYPERTENSION: ICD-10-CM

## 2021-03-15 DIAGNOSIS — Z23 NEED FOR PNEUMOCOCCAL VACCINATION: ICD-10-CM

## 2021-03-15 DIAGNOSIS — R94.39 ABNORMAL CARDIOVASCULAR STRESS TEST: Primary | ICD-10-CM

## 2021-03-15 DIAGNOSIS — J45.30 MILD PERSISTENT ASTHMA WITHOUT COMPLICATION: ICD-10-CM

## 2021-03-15 PROCEDURE — 90732 PPSV23 VACC 2 YRS+ SUBQ/IM: CPT | Performed by: FAMILY MEDICINE

## 2021-03-15 PROCEDURE — 90471 IMMUNIZATION ADMIN: CPT | Performed by: FAMILY MEDICINE

## 2021-03-15 PROCEDURE — 99214 OFFICE O/P EST MOD 30 MIN: CPT | Mod: 25 | Performed by: FAMILY MEDICINE

## 2021-03-15 RX ORDER — MONTELUKAST SODIUM 10 MG/1
TABLET ORAL
Qty: 90 TABLET | Refills: 3 | Status: SHIPPED | OUTPATIENT
Start: 2021-03-15 | End: 2021-06-11

## 2021-03-15 RX ORDER — LEVOTHYROXINE SODIUM 112 UG/1
TABLET ORAL
Qty: 90 TABLET | Refills: 3 | Status: SHIPPED | OUTPATIENT
Start: 2021-03-15 | End: 2021-06-11

## 2021-03-15 ASSESSMENT — MIFFLIN-ST. JEOR: SCORE: 1652.19

## 2021-03-15 NOTE — LETTER
My Asthma Action Plan  Name: Denita Flores   YOB: 1955  Date: 3/15/2021   My doctor: Gee Hitchcock   My clinic: [facility]      My Control Medicine: Singulair        Dose: 10mg  My Rescue Medicine: Albuterol        Dose:    My Asthma Severity: Mild Persistent  Avoid your asthma triggers: upper respiratory infections and pollens        GREEN ZONE   Good Control    I feel good    No cough or wheeze    Can work, sleep and play without asthma symptoms       Take your asthma control medicine every day.     1. If exercise triggers your asthma, take your rescue medication    15 minutes before exercise or sports, and    During exercise if you have asthma symptoms  2. Spacer to use with inhaler: If you have a spacer, make sure to use it with your inhaler             YELLOW ZONE Getting Worse  I have ANY of these:    I do not feel good    Cough or wheeze    Chest feels tight    Wake up at night   1. Keep taking your Green Zone medications  2. Start taking your rescue medicine:    every 20 minutes for up to 1 hour. Then every 4 hours for 24-48 hours.  3. If you stay in the Yellow Zone for more than 12-24 hours, contact your doctor.  4. If you do not return to the Green Zone in 12-24 hours or you get worse, start taking your oral steroid medicine if prescribed by your provider.           RED ZONE Medical Alert - Get Help  I have ANY of these:    I feel awful    Medicine is not helping    Breathing getting harder    Trouble walking or talking    Nose opens wide to breathe       1. Take your rescue medicine NOW  2. If your provider has prescribed an oral steroid medicine, start taking it NOW  3. Call your doctor NOW  4. If you are still in the Red Zone after 20 minutes and you have not reached your doctor:    Take your rescue medicine again and    Call 911 or go to the emergency room right away    See your regular doctor within 2 weeks of an Emergency Room or Urgent Care visit for  follow-up treatment.        The above medication may be given at school or day care?: N/A (Adult Patient)  Child can carry and use inhaler(s) at school with approval of school nurse?: N/A (Adult Patient)    Electronically signed by: Gee Hitchcock MD, March 15, 2021    Annual Reminders:  Meet with Asthma Educator,  Flu Shot in the Fall, consider Pneumonia Vaccination for patients with asthma (aged 19 and older).    Pharmacy:    [id number] [address] AT [facility] & [address] [place] [place] [id number] [address] [id number] [address] AT [address] [place] [address]                    Asthma Triggers  How To Control Things That Make Your Asthma Worse    Triggers are things that make your asthma worse.  Look at the list below to help you find your triggers and what you can do about them.  You can help prevent asthma flare-ups by staying away from your triggers.      Trigger                                                          What you can do   Cigarette Smoke  Tobacco smoke can make asthma worse. Do not allow smoking in your home, car or around you.  Be sure no one smokes at a child s day care or school.  If you smoke, ask your health care provider for ways to help you quit.  Ask family members to quit too.  Ask your health care provider for a referral to Quit Plan to help you quit smoking, or call 7-493-087-PLAN.     Colds, Flu, Bronchitis  These are common triggers of asthma. Wash your hands often.  Don t touch your eyes, nose or mouth.  Get a flu shot every year.     Dust Mites  These are tiny bugs that live in cloth or carpet. They are too small to see. Wash sheets and blankets in hot water every week.   Encase pillows and mattress in dust mite proof covers.  Avoid having carpet if you can. If you have carpet, vacuum weekly.   Use a dust mask and HEPA vacuum.   Pollen and Outdoor Mold  Some people  are allergic to trees, grass, or weed pollen, or molds. Try to keep your windows closed.  Limit time out doors when pollen count is high.   Ask you health care provider about taking medicine during allergy season.     Animal Dander  Some people are allergic to skin flakes, urine or saliva from pets with fur or feathers. Keep pets with fur or feathers out of your home.    If you can t keep the pet outdoors, then keep the pet out of your bedroom.  Keep the bedroom door closed.  Keep pets off cloth furniture and away from stuffed toys.     Mice, Rats, and Cockroaches  Some people are allergic to the waste from these pests.   Cover food and garbage.  Clean up spills and food crumbs.  Store grease in the refrigerator.   Keep food out of the bedroom.   Indoor Mold  This can be a trigger if your home has high moisture. Fix leaking faucets, pipes, or other sources of water.   Clean moldy surfaces.  Dehumidify basement if it is damp and smelly.   Smoke, Strong Odors, and Sprays  These can reduce air quality. Stay away from strong odors and sprays, such as perfume, powder, hair spray, paints, smoke incense, paint, cleaning products, candles and new carpet.   Exercise or Sports  Some people with asthma have this trigger. Be active!  Ask your doctor about taking medicine before sports or exercise to prevent symptoms.    Warm up for 5-10 minutes before and after sports or exercise.     Other Triggers of Asthma  Cold air:  Cover your nose and mouth with a scarf.  Sometimes laughing or crying can be a trigger.  Some medicines and food can trigger asthma.

## 2021-03-15 NOTE — PROGRESS NOTES
Assessment & Plan     Abnormal cardiovascular stress test  Reviewed exercise stress test results from 3/10/2020 and that was ordered by the ER physician from her recent ER visit on February 27, 2021.  Patient was not aware of the results, reviewed during the visit today about abnormal stress test showing suggestion of anterior wall motion abnormality needing further ischemic evaluation from cardiologist  Referred to cardiology today, patient wants to consult.provider close to home at the [place] [facility] clinic.  She does not have any acute concerning symptoms understands to go to the emergency room if she develops chest pain, chest pressure  - CARDIOLOGY EVAL ADULT REFERRAL; Future    Morbid obesity (H)  Wt Readings from Last 5 Encounters:   03/15/21 111.1 kg (245 lb)   02/27/21 111.1 kg (245 lb)   07/20/20 115.4 kg (254 lb 8 oz)   01/23/20 113.4 kg (250 lb)   01/15/20 112 kg (247 lb)     Emphasized on weight loss, portion control, low calorie and low fat diet, healthy eating, regular exercises.      Postablative hypothyroidism  TSH   Date Value Ref Range Status   02/27/2021 2.02 0.40 - 4.00 mU/L Final   07/20/2020 1.25 0.40 - 4.00 mU/L Final   09/30/2019 1.29 0.40 - 4.00 mU/L Final   09/07/2018 0.26 (L) 0.40 - 4.00 mU/L Final   02/20/2018 0.35 (L) 0.40 - 4.00 mU/L Final     Normal thyroid labs, continue current dose of levothyroxine 112 MCG daily    Mild persistent asthma without complication  Stable, continue with current dose of Singulair 10 mg daily  - PPSV23, IM/SUBQ (2+ YRS) - [id number]  - montelukast (SINGULAIR) 10 MG tablet; TAKE 1 TABLET(10 MG) BY MOUTH AT BEDTIME    Elevated blood pressure reading without diagnosis of hypertension  BP Readings from Last 6 Encounters:   03/15/21 128/80   02/27/21 (!) 145/70   12/09/20 (!) 174/87   07/20/20 (!) 144/84   07/17/20 128/77   03/06/20 (!) 152/79     Reviewed normal blood pressure from today  Will follow low salt diet, weight loss and regular  "exercises.  Continue to monitor  - CARDIOLOGY EVAL ADULT REFERRAL; Future    Need for pneumococcal vaccination    - PPSV23, IM/SUBQ (2+ YRS) - [id number]    Review of the result(s) of each unique test - EKG, stress test, troponin, BNP, CBC, BMP and TSH   :[id number]}     BMI:   Estimated body mass index is 40.77 kg/m  as calculated from the following:    Height as of this encounter: 1.651 m (5' 5\").    Weight as of this encounter: 111.1 kg (245 lb).   Weight management plan: Discussed healthy diet and exercise guidelines    Work on weight loss  Regular exercise  Chart documentation done in part with Dragon Voice recognition Software. Although reviewed after completion, some word and grammatical error may remain.    See Patient Instructions    Return in about 3 months (around 6/15/2021) for Physical Exam, fasting labs.    Gee Hitchcock MD  [facility]    Miracle Barger is a 65 year old who presents for the following health issues     HPI     Hypertension Follow-up  Patient with past medical history significant for postoperative hypothyroidism, obesity, mild persistent asthma, allergies is here for the follow-up on her recent emergency room visit for significant elevated blood pressure.  On February 27, she recorded a home blood pressure of 120s over 100s and , was seen in the emergency room due to concerns for lightheadedness associated with frequent tingling in the left arm with pain radiating to her neck and arm and mild left-sided chest pain it was going on for a while before the ER visit.  Patient denies shortness of breath, dyspnea on exertion, therapy, paroxysmal or current dyspnea, cough, wheezing.  She does not have previous history of coronary artery disease, DVT, PE.  Denies URI symptoms, was recently tested negative for Covid few weeks ago.  In the emergency room, she had a EKG that was normal and had a negative troponin.  She had a stress test which was reportedly abnormal " but patient was not aware of the results yet.        Do you check your blood pressure regularly outside of the clinic? Yes     Are you following a low salt diet? No    Are your blood pressures ever more than 140 on the top number (systolic) OR more   than 90 on the bottom number (diastolic), for example 140/90? Yes      How many servings of fruits and vegetables do you eat daily?  2-3    On average, how many sweetened beverages do you drink each day (Examples: soda, juice, sweet tea, etc.  Do NOT count diet or artificially sweetened beverages)?   0    How many days per week do you exercise enough to make your heart beat faster? 3 or less    How many minutes a day do you exercise enough to make your heart beat faster? 9 or less    How many days per week do you miss taking your medication? 0        Review of Systems   CONSTITUTIONAL: NEGATIVE for fever, chills, change in weight  INTEGUMENTARY/SKIN: NEGATIVE for worrisome rashes, moles or lesions  RESP: NEGATIVE for significant cough or SOB  RESP:POSITIVE for Hx asthma  CV: NEGATIVE for chest pain, palpitations or peripheral edema  CV: Hx HTN  GI: NEGATIVE for nausea, abdominal pain, heartburn, or change in bowel habits  MUSCULOSKELETAL: NEGATIVE for significant arthralgias or myalgia  NEURO: NEGATIVE for weakness, dizziness or paresthesias  ENDOCRINE: NEGATIVE for temperature intolerance, skin/hair changes and Hx thyroid disease  HEME/ALLERGY/IMMUNE: NEGATIVE for bleeding problems  PSYCHIATRIC: NEGATIVE for changes in mood or affect      Objective    There were no vitals taken for this visit.  There is no height or weight on file to calculate BMI.  Physical Exam   GENERAL: healthy, alert and no distress  RESP: lungs clear to auscultation - no rales, rhonchi or wheezes  CV: regular rate and rhythm, normal S1 S2, no S3 or S4, no murmur, click or rub, no peripheral edema and peripheral pulses strong  MS: no gross musculoskeletal defects noted, no edema  NEURO: Normal  strength and tone, mentation intact and speech normal  PSYCH: mentation appears normal, affect normal/bright

## 2021-03-16 ASSESSMENT — ASTHMA QUESTIONNAIRES: ACT_TOTALSCORE: 24

## 2021-03-17 ENCOUNTER — NURSE TRIAGE (OUTPATIENT)
Dept: CARDIOLOGY | Facility: CLINIC | Age: 66
End: 2021-03-17

## 2021-03-17 NOTE — TELEPHONE ENCOUNTER
"Scheduling did a warm transfer because patient had questions about symptoms she has been having. Patient said since about [place] she has been having shoulder, neck, and left arm pain, sometimes her heart will feel like \"an egg getting squished in water.\" She has osteoarthritis and someone told her to take Aleve so she did start taking that. She said the pain just kept getting worse. Some days her fingers are swollen and red. She gets winded going up stairs. Her BP has been high. She was seen in the ER 3/1, her BP was initially 210/94 but then it came down during her time in the ER. All her tests in the ER were good so they sent her home. She had an echo stress test which was abnormal. She saw her PMD 3/15, who referred her to Cardiology. She has an appointment scheduled for 3/24/21 with Dr. Varela. She is trying to decide if she should change her appointment to 3/19 in [place], but she really prefers to be seen in [place]. I looked through the ER note, the stress test results and her PMD note. I told her I feel it is a matter of her comfort level whether she waits until this Friday or next Weds to be seen. Based on what I have read and the length of time she has had the same symptoms, I do not feel they would do any emergent procedures, like an angiogram, if she went back to the ER or if she was seen in clinic on Friday. In the end she decided to call her work and talk to them about time off for appointments. I encouraged her to call back if she changes her mind.   "

## 2021-03-24 ENCOUNTER — TELEPHONE (OUTPATIENT)
Dept: CARDIOLOGY | Facility: CLINIC | Age: 66
End: 2021-03-24

## 2021-03-24 ENCOUNTER — OFFICE VISIT (OUTPATIENT)
Dept: CARDIOLOGY | Facility: CLINIC | Age: 66
End: 2021-03-24
Payer: COMMERCIAL

## 2021-03-24 VITALS
SYSTOLIC BLOOD PRESSURE: 180 MMHG | DIASTOLIC BLOOD PRESSURE: 85 MMHG | BODY MASS INDEX: 41.1 KG/M2 | WEIGHT: 247 LBS | HEART RATE: 88 BPM

## 2021-03-24 DIAGNOSIS — R94.39 ABNORMAL CARDIOVASCULAR STRESS TEST: ICD-10-CM

## 2021-03-24 DIAGNOSIS — T50.8X5A ALLERGIC REACTION TO CONTRAST DYE: Primary | ICD-10-CM

## 2021-03-24 DIAGNOSIS — R03.0 ELEVATED BLOOD PRESSURE READING WITHOUT DIAGNOSIS OF HYPERTENSION: ICD-10-CM

## 2021-03-24 DIAGNOSIS — H10.13 ALLERGIC CONJUNCTIVITIS, BILATERAL: ICD-10-CM

## 2021-03-24 PROCEDURE — 99205 OFFICE O/P NEW HI 60 MIN: CPT | Performed by: INTERNAL MEDICINE

## 2021-03-24 RX ORDER — DIPHENHYDRAMINE HCL 25 MG
25 CAPSULE ORAL DAILY
Qty: 1 CAPSULE | Refills: 0 | Status: SHIPPED | OUTPATIENT
Start: 2021-03-24 | End: 2021-03-25

## 2021-03-24 RX ORDER — PREDNISONE 20 MG/1
40 TABLET ORAL DAILY
Qty: 6 TABLET | Refills: 1 | Status: SHIPPED | OUTPATIENT
Start: 2021-03-24 | End: 2021-05-19

## 2021-03-24 RX ORDER — LISINOPRIL 10 MG/1
10 TABLET ORAL DAILY
Status: DISCONTINUED | OUTPATIENT
Start: 2021-03-24 | End: 2021-04-26

## 2021-03-24 RX ORDER — AMLODIPINE BESYLATE 5 MG/1
5 TABLET ORAL DAILY
Qty: 30 TABLET | Refills: 3 | Status: SHIPPED | OUTPATIENT
Start: 2021-03-24 | End: 2021-04-26

## 2021-03-24 NOTE — TELEPHONE ENCOUNTER
Contacted patient with CTA prep due to allery to contrast dye. Day before  40mg at 10pm, Day of  5am and 10 am with Benadryl 25mg. Patient verbalized understanding and agreed to plan of care.         Received call from [facility] dept.  Patient has contrast dye allergy needs Prednisone 40mg p.o. 13 hours before procedure, 7 hours before procedure and 1 hour before along with 25mg of Benadryl 1 hour before [facility] on [address].    Left detailed message for patient and to call Team 3 with questions.

## 2021-03-24 NOTE — PROGRESS NOTES
Service Date: 2021      It is a pleasure for me to see Denita today for cardiac evaluation.      This lady is extremely anxious and she told me first about her family history.  She tells me that her great grandparents had heart disease and I reassured her that from medical point of view we take notice of what the grandparents and great-grandparents  of but it does not strictly count towards a positive family history.  Her father first had a heart attack at the age of 62 and her mother  of a CVA at the age of 80.  Again, I was able to reassure her that technically their deaths do not count towards a positive family history of premature atherosclerotic disease.      This lady does not smoke, drink or abuse drugs.  She has hypertension and is currently not on any treatment.  Her blood pressure today is 180 systolic.  She was in the emergency room about a month ago with concerns with blood pressure.  Her blood pressure was high as 210 systolic when she presented to the emergency room.  They did a troponin level which was negative and did a chest x-ray.  However, she was discharged without initiation of hypertensive medications and was asked to follow up with her primary physician.  She did present with atypical chest discomfort at the emergency room and a stress echocardiogram was requested.  My colleague, Dr. Velázquez, reported that there may be suggestion of LAD distribution ischemia.  I personally reviewed the echocardiogram.  With this patient's body habitus and a BMI close to 40, I do think the images are decent enough.  The apical views are foreshortened, but I do not see any clear evidence of inducible ischemia.  She managed about 6 minutes on a Eric protocol and she did not have any discomfort nor are there any EKG changes.      With regards to her symptoms, she definitely feels she has heart disease.  For the past few months, she has had diffuse chest discomfort with occasional radiation to her  shoulder and down her left arm.  These do not occur on exertion.  It could happen anytime.  She describes it as continuous when she has it.  She does notice that when she is stressed there will be numbness and tingling in her left hand.      She has no history of diabetes or hypercholesterolemia.  She has a history of Graves disease and asthma.      Her cardiac examination is unremarkable aside from the blood pressure.      She tells me that she has started a baby aspirin recently.  She also says that her left arm veins become less prominent since starting fish oil.  I informed her that I do not think fish oil would have an effect on the appearance of superficial veins.      IMPRESSION:   1.  Atypical chest discomfort and tingling, probably due to hyperventilation syndrome and anxiety.   2.  Hypertension.   3.  Anxiety.   4.  Asthma.      I reassured this lady multiple times during the clinic visit that so far we do not have any definitive evidence that she has coronary artery disease.      She does need treatment for her hypertension.  She is currently on no medications, and with a blood pressure of 180 today and 210 in the emergency room, I think we should start with 2 medications.  I will start her on amlodipine and lisinopril.  Possible side effects were discussed.      She has been taking naproxen up to recently for finger stiffness and pain.  I informed her again that nonsteroidals should be avoided and I am very happy to see that her primary physician had switched her to Tylenol.      We talked about the importance of hygienic measures.  We can only do so much with medications and I informed her that portion control may be the easiest to start with.  She should aim to eat 20% less than what she is doing right now.  She is not an exerciser and I advised her to start gently with walking.      I think a major issue with this lady is her high degree of anxiety regarding her cardiac health and her overall health  in general.  When I informed her that I think she has anxiety which needs to be treated, she was not surprised as she tells me that this is what her sister has told her already.      For further evaluation of her chest discomfort and probably indeterminate stress echocardiogram, I have requested CT coronary angiography.  Her history of dye allergy is noted, and we will premedicate her as necessary.      I have also arranged for her to follow up with one of my mid-level colleagues for treatment of her blood pressure.  However, if her CT coronary angiogram shows no significant obstructive disease, we will more than likely discharge her back to the care of her primary care providers.         RPIYANKA ROBERTSON MD, [facility]             D: 2021   T: 2021   MT: duc      Name:     EDWARD TRENT   MRN:      [id number]        Account:      [id number]   :      1955           Service Date: 2021      Document: [id number]

## 2021-03-24 NOTE — LETTER
3/24/2021    Gee Hitchcock MD  [address]  [place] [address]    RE: Denita Flores       Dear Colleague,    I had the pleasure of seeing Denita Flores in the [facility].    HPI and Plan:   See dictation    Orders Placed This Encounter   Procedures     CTA Angiogram coronary artery     Follow-Up with Cardiac Advanced Practice Provider       Orders Placed This Encounter   Medications     lisinopril (ZESTRIL) tablet 10 mg     amLODIPine (NORVASC) 5 MG tablet     Sig: Take 1 tablet (5 mg) by mouth daily     Dispense:  30 tablet     Refill:  3       Encounter Diagnoses   Name Primary?     Abnormal cardiovascular stress test      Elevated blood pressure reading without diagnosis of hypertension        CURRENT MEDICATIONS:  Current Outpatient Medications   Medication Sig Dispense Refill     albuterol (PROAIR HFA/PROVENTIL HFA/VENTOLIN HFA) 108 (90 Base) MCG/ACT inhaler Inhale 2 puffs into the lungs every 6 hours as needed for shortness of breath / dyspnea 18 g 1     amLODIPine (NORVASC) 5 MG tablet Take 1 tablet (5 mg) by mouth daily 30 tablet 3     Cholecalciferol (D3 ADULT PO) Take 2,000 Units by mouth daily.       cyanocolbalamin (VITAMIN  B-12) 100 MCG tablet Take 100 mcg by mouth daily.       fexofenadine (ALLEGRA) 180 MG tablet Take 1 tablet (180 mg) by mouth daily 90 tablet 3     fluticasone (FLONASE) 50 MCG/ACT nasal spray Spray 1 spray into both nostrils daily 48 g 3     levothyroxine (SYNTHROID/LEVOTHROID) 112 MCG tablet Profile Rx,TAKE 1 TABLET(112 MCG) BY MOUTH DAILY 90 tablet 3     MEGARED OMEGA-3 KRILL  MG CAPS Take 1 capsule by mouth daily        montelukast (SINGULAIR) 10 MG tablet TAKE 1 TABLET(10 MG) BY MOUTH AT BEDTIME 90 tablet 3     ketoconazole (NIZORAL) 2 % external cream Apply topically 2 times daily (Patient not taking: Reported on 3/24/2021) 60 g 0     triamcinolone (KENALOG) 0.1 % external cream Apply sparingly  to affected area three times daily as needed (Patient not taking: Reported on 3/24/2021) 80 g 0       ALLERGIES     Allergies   Allergen Reactions     Contrast Dye Hives and Itching     Isovue with ANTON on 19     Sulfa Drugs Hives and Swelling     Noted in 10/18/09 ER       PAST MEDICAL HISTORY:  Past Medical History:   Diagnosis Date     Graves disease      Kidney stones     Passed a 5-10mm stone     Malignant neoplasm (H)     Cervical CA     Mild persistent asthma 2013     Thyroid disease        PAST SURGICAL HISTORY:  Past Surgical History:   Procedure Laterality Date     BACK SURGERY       COLONOSCOPY WITH CO2 INSUFFLATION N/A 2017    Procedure: COLONOSCOPY WITH CO2 INSUFFLATION;  Surgeon: Duane, William Charles, MD;  Location: [facility] OR     CYSTOSCOPY FLEXIBLE  8/10/2018     LEEP TX, CERVICAL      in her 20's       FAMILY HISTORY:  Family History   Problem Relation Age of Onset     Glaucoma Mother      Heart Disease Father      Cancer Father 67        kidney cancer      Heart Disease Sister      Cancer Sister 59        colon cancer      Diabetes Sister      Colon Cancer Maternal Aunt      Colon Cancer Paternal Aunt      Glaucoma Maternal Grandmother        SOCIAL HISTORY:  Social History     Socioeconomic History     Marital status:      Spouse name: None     Number of children: None     Years of education: None     Highest education level: None   Occupational History     None   Social Needs     Financial resource strain: None     Food insecurity     Worry: None     Inability: None     Transportation needs     Medical: None     Non-medical: None   Tobacco Use     Smoking status: Former Smoker     Packs/day: 1.50     Years: 35.00     Pack years: 52.50     Types: Cigarettes     Quit date: 2003     Years since quittin.2     Smokeless tobacco: Never Used   Substance and Sexual Activity     Alcohol use: Yes     Alcohol/week: 0.0 standard drinks     Comment: 3 dinks/ year     Drug use:  No     Sexual activity: Not Currently   Lifestyle     Physical activity     Days per week: None     Minutes per session: None     Stress: None   Relationships     Social connections     Talks on phone: None     Gets together: None     Attends Hindu service: None     Active member of club or organization: None     Attends meetings of clubs or organizations: None     Relationship status: None     Intimate partner violence     Fear of current or ex partner: None     Emotionally abused: None     Physically abused: None     Forced sexual activity: None   Other Topics Concern     Parent/sibling w/ CABG, MI or angioplasty before 65F 55M? No   Social History Narrative     None       Review of Systems:  Skin:  Negative     Eyes:  Positive for glasses  ENT:  Negative    Respiratory:  Positive for shortness of breath;dyspnea on exertion;sleep apnea  Cardiovascular:    Positive for;palpitations;chest pain;fatigue;lightheadedness;dizziness;syncope or near-syncope;heaviness;edema  Gastroenterology: Negative    Genitourinary:  Negative    Musculoskeletal:  Positive for neck pain  Neurologic:  Positive for headaches  Psychiatric:  Positive for anxiety  Heme/Lymph/Imm:  Positive for allergies  Endocrine:  Positive for thyroid disorder    Physical Exam:  Vitals: BP (!) 180/85 (BP Location: Right arm, Cuff Size: Adult Large)   Pulse 88   Wt 112 kg (247 lb)   BMI 41.10 kg/m      Constitutional:  cooperative, alert and oriented, well developed, well nourished, in no acute distress        Skin:  warm and dry to the touch          Head:  normocephalic, no masses or lesions        Eyes:  pupils equal and round, conjunctivae and lids unremarkable, sclera white, no xanthalasma, EOMS intact, no nystagmus        Lymph:No Cervical lymphadenopathy present     ENT:  no pallor or cyanosis, dentition good        Neck:  carotid pulses are full and equal bilaterally, JVP normal, no carotid bruit        Respiratory:  normal breath sounds, clear  to auscultation, normal A-P diameter, normal symmetry, normal respiratory excursion, no use of accessory muscles         Cardiac: regular rhythm, normal S1/S2, no S3 or S4, apical impulse not displaced, no murmurs, gallops or rubs                pulses full and equal, no bruits auscultated                                        GI:  abdomen soft, non-tender, BS normoactive, no mass, no HSM, no bruits        Extremities and Muscular Skeletal:  no deformities, clubbing, cyanosis, erythema observed              Neurological:  no gross motor deficits        Psych:  Alert and Oriented x 3        Recent Lab Results:  LIPID RESULTS:  Lab Results   Component Value Date    CHOL 187 09/30/2019    HDL 54 09/30/2019     (H) 09/30/2019    TRIG 126 09/30/2019    CHOLHDLRATIO 3.5 06/22/2015       LIVER ENZYME RESULTS:  Lab Results   Component Value Date    AST 9 08/15/2017    ALT 20 08/15/2017       CBC RESULTS:  Lab Results   Component Value Date    WBC 7.5 02/27/2021    RBC 4.57 02/27/2021    HGB 13.6 02/27/2021    HCT 41.9 02/27/2021    MCV 92 02/27/2021    MCH 29.8 02/27/2021    MCHC 32.5 02/27/2021    RDW 12.9 02/27/2021     02/27/2021       BMP RESULTS:  Lab Results   Component Value Date     02/27/2021    POTASSIUM 4.5 02/27/2021    CHLORIDE 107 02/27/2021    CO2 30 02/27/2021    ANIONGAP 2 (L) 02/27/2021    GLC 93 02/27/2021    BUN 12 02/27/2021    CR 0.77 02/27/2021    GFRESTIMATED 80 02/27/2021    GFRESTBLACK >90 02/27/2021    RORY 9.0 02/27/2021        A1C RESULTS:  No results found for: A1C    INR RESULTS:  No results found for: INR      Thank you for allowing me to participate in the care of your patient.      Sincerely,     DR PRIYANKA ROBERTSON MD      [facility]  cc:   Gee Hitchcock MD  [address]  [place] [address]

## 2021-03-24 NOTE — PROGRESS NOTES
HPI and Plan:   See dictation    Orders Placed This Encounter   Procedures     CTA Angiogram coronary artery     Follow-Up with Cardiac Advanced Practice Provider       Orders Placed This Encounter   Medications     lisinopril (ZESTRIL) tablet 10 mg     amLODIPine (NORVASC) 5 MG tablet     Sig: Take 1 tablet (5 mg) by mouth daily     Dispense:  30 tablet     Refill:  3       Encounter Diagnoses   Name Primary?     Abnormal cardiovascular stress test      Elevated blood pressure reading without diagnosis of hypertension        CURRENT MEDICATIONS:  Current Outpatient Medications   Medication Sig Dispense Refill     albuterol (PROAIR HFA/PROVENTIL HFA/VENTOLIN HFA) 108 (90 Base) MCG/ACT inhaler Inhale 2 puffs into the lungs every 6 hours as needed for shortness of breath / dyspnea 18 g 1     amLODIPine (NORVASC) 5 MG tablet Take 1 tablet (5 mg) by mouth daily 30 tablet 3     Cholecalciferol (D3 ADULT PO) Take 2,000 Units by mouth daily.       cyanocolbalamin (VITAMIN  B-12) 100 MCG tablet Take 100 mcg by mouth daily.       fexofenadine (ALLEGRA) 180 MG tablet Take 1 tablet (180 mg) by mouth daily 90 tablet 3     fluticasone (FLONASE) 50 MCG/ACT nasal spray Spray 1 spray into both nostrils daily 48 g 3     levothyroxine (SYNTHROID/LEVOTHROID) 112 MCG tablet Profile Rx,TAKE 1 TABLET(112 MCG) BY MOUTH DAILY 90 tablet 3     MEGARED OMEGA-3 KRILL  MG CAPS Take 1 capsule by mouth daily        montelukast (SINGULAIR) 10 MG tablet TAKE 1 TABLET(10 MG) BY MOUTH AT BEDTIME 90 tablet 3     ketoconazole (NIZORAL) 2 % external cream Apply topically 2 times daily (Patient not taking: Reported on 3/24/2021) 60 g 0     triamcinolone (KENALOG) 0.1 % external cream Apply sparingly to affected area three times daily as needed (Patient not taking: Reported on 3/24/2021) 80 g 0       ALLERGIES     Allergies   Allergen Reactions     Contrast Dye Hives and Itching     Isovue with ANTON on 1-21-19     Sulfa Drugs Hives and Swelling      Noted in 10/18/09 ER       PAST MEDICAL HISTORY:  Past Medical History:   Diagnosis Date     Graves disease      Kidney stones     Passed a 5-10mm stone     Malignant neoplasm (H)     Cervical CA     Mild persistent asthma 2013     Thyroid disease        PAST SURGICAL HISTORY:  Past Surgical History:   Procedure Laterality Date     BACK SURGERY       COLONOSCOPY WITH CO2 INSUFFLATION N/A 2017    Procedure: COLONOSCOPY WITH CO2 INSUFFLATION;  Surgeon: Duane, William Charles, MD;  Location: [facility] OR     CYSTOSCOPY FLEXIBLE  8/10/2018     LEEP TX, CERVICAL      in her 20's       FAMILY HISTORY:  Family History   Problem Relation Age of Onset     Glaucoma Mother      Heart Disease Father      Cancer Father 67        kidney cancer      Heart Disease Sister      Cancer Sister 59        colon cancer      Diabetes Sister      Colon Cancer Maternal Aunt      Colon Cancer Paternal Aunt      Glaucoma Maternal Grandmother        SOCIAL HISTORY:  Social History     Socioeconomic History     Marital status:      Spouse name: None     Number of children: None     Years of education: None     Highest education level: None   Occupational History     None   Social Needs     Financial resource strain: None     Food insecurity     Worry: None     Inability: None     Transportation needs     Medical: None     Non-medical: None   Tobacco Use     Smoking status: Former Smoker     Packs/day: 1.50     Years: 35.00     Pack years: 52.50     Types: Cigarettes     Quit date: 2003     Years since quittin.2     Smokeless tobacco: Never Used   Substance and Sexual Activity     Alcohol use: Yes     Alcohol/week: 0.0 standard drinks     Comment: 3 dinks/ year     Drug use: No     Sexual activity: Not Currently   Lifestyle     Physical activity     Days per week: None     Minutes per session: None     Stress: None   Relationships     Social connections     Talks on phone: None     Gets together: None     Attends Holiness  service: None     Active member of club or organization: None     Attends meetings of clubs or organizations: None     Relationship status: None     Intimate partner violence     Fear of current or ex partner: None     Emotionally abused: None     Physically abused: None     Forced sexual activity: None   Other Topics Concern     Parent/sibling w/ CABG, MI or angioplasty before 65F 55M? No   Social History Narrative     None       Review of Systems:  Skin:  Negative     Eyes:  Positive for glasses  ENT:  Negative    Respiratory:  Positive for shortness of breath;dyspnea on exertion;sleep apnea  Cardiovascular:    Positive for;palpitations;chest pain;fatigue;lightheadedness;dizziness;syncope or near-syncope;heaviness;edema  Gastroenterology: Negative    Genitourinary:  Negative    Musculoskeletal:  Positive for neck pain  Neurologic:  Positive for headaches  Psychiatric:  Positive for anxiety  Heme/Lymph/Imm:  Positive for allergies  Endocrine:  Positive for thyroid disorder    Physical Exam:  Vitals: BP (!) 180/85 (BP Location: Right arm, Cuff Size: Adult Large)   Pulse 88   Wt 112 kg (247 lb)   BMI 41.10 kg/m      Constitutional:  cooperative, alert and oriented, well developed, well nourished, in no acute distress        Skin:  warm and dry to the touch          Head:  normocephalic, no masses or lesions        Eyes:  pupils equal and round, conjunctivae and lids unremarkable, sclera white, no xanthalasma, EOMS intact, no nystagmus        Lymph:No Cervical lymphadenopathy present     ENT:  no pallor or cyanosis, dentition good        Neck:  carotid pulses are full and equal bilaterally, JVP normal, no carotid bruit        Respiratory:  normal breath sounds, clear to auscultation, normal A-P diameter, normal symmetry, normal respiratory excursion, no use of accessory muscles         Cardiac: regular rhythm, normal S1/S2, no S3 or S4, apical impulse not displaced, no murmurs, gallops or rubs                pulses  full and equal, no bruits auscultated                                        GI:  abdomen soft, non-tender, BS normoactive, no mass, no HSM, no bruits        Extremities and Muscular Skeletal:  no deformities, clubbing, cyanosis, erythema observed              Neurological:  no gross motor deficits        Psych:  Alert and Oriented x 3        Recent Lab Results:  LIPID RESULTS:  Lab Results   Component Value Date    CHOL 187 09/30/2019    HDL 54 09/30/2019     (H) 09/30/2019    TRIG 126 09/30/2019    CHOLHDLRATIO 3.5 06/22/2015       LIVER ENZYME RESULTS:  Lab Results   Component Value Date    AST 9 08/15/2017    ALT 20 08/15/2017       CBC RESULTS:  Lab Results   Component Value Date    WBC 7.5 02/27/2021    RBC 4.57 02/27/2021    HGB 13.6 02/27/2021    HCT 41.9 02/27/2021    MCV 92 02/27/2021    MCH 29.8 02/27/2021    MCHC 32.5 02/27/2021    RDW 12.9 02/27/2021     02/27/2021       BMP RESULTS:  Lab Results   Component Value Date     02/27/2021    POTASSIUM 4.5 02/27/2021    CHLORIDE 107 02/27/2021    CO2 30 02/27/2021    ANIONGAP 2 (L) 02/27/2021    GLC 93 02/27/2021    BUN 12 02/27/2021    CR 0.77 02/27/2021    GFRESTIMATED 80 02/27/2021    GFRESTBLACK >90 02/27/2021    RORY 9.0 02/27/2021        A1C RESULTS:  No results found for: A1C    INR RESULTS:  No results found for: INR        CC  Gee Hitchcock MD  [address]  [place] [id number]

## 2021-04-01 ENCOUNTER — TELEPHONE (OUTPATIENT)
Dept: CARDIOLOGY | Facility: CLINIC | Age: 66
End: 2021-04-01

## 2021-04-01 NOTE — TELEPHONE ENCOUNTER
I have had several ongoing conversations with patient since yesterday about her CTA that is scheduled for 4/2. The referral is still pending.    I spoke to Neil with [employer]( [phone]) and she said the PA was submitted two days ago and is currently under medical director review with care [facility].    I told patient at 3:30 this afternoon that we need to cancel the scheduled CTA until we receive confirmation either way about coverage. I had scheduling cancel the CTA.    I told patient that we will wait for word from [employer] and then proceed accordingly.    Patient expressed displeasure about the process and I apologized and acknowledged her frustration over this.

## 2021-04-06 ENCOUNTER — HOSPITAL ENCOUNTER (EMERGENCY)
Facility: CLINIC | Age: 66
Discharge: HOME | End: 2021-04-06
Attending: EMERGENCY MEDICINE | Admitting: EMERGENCY MEDICINE
Payer: COMMERCIAL

## 2021-04-06 ENCOUNTER — APPOINTMENT (OUTPATIENT)
Dept: CT IMAGING | Facility: CLINIC | Age: 66
End: 2021-04-06
Attending: EMERGENCY MEDICINE
Payer: COMMERCIAL

## 2021-04-06 ENCOUNTER — APPOINTMENT (OUTPATIENT)
Dept: GENERAL RADIOLOGY | Facility: CLINIC | Age: 66
End: 2021-04-06
Attending: EMERGENCY MEDICINE
Payer: COMMERCIAL

## 2021-04-06 ENCOUNTER — TELEPHONE (OUTPATIENT)
Dept: CARDIOLOGY | Facility: CLINIC | Age: 66
End: 2021-04-06

## 2021-04-06 VITALS
OXYGEN SATURATION: 95 % | SYSTOLIC BLOOD PRESSURE: 144 MMHG | HEIGHT: 67 IN | RESPIRATION RATE: 14 BRPM | TEMPERATURE: 98.7 F | DIASTOLIC BLOOD PRESSURE: 79 MMHG | WEIGHT: 248 LBS | BODY MASS INDEX: 38.92 KG/M2 | HEART RATE: 74 BPM

## 2021-04-06 DIAGNOSIS — R07.89 ATYPICAL CHEST PAIN: ICD-10-CM

## 2021-04-06 LAB
ANION GAP SERPL CALCULATED.3IONS-SCNC: 5 MMOL/L (ref 3–14)
BASOPHILS # BLD AUTO: 0.1 10E9/L (ref 0–0.2)
BASOPHILS NFR BLD AUTO: 0.6 %
BUN SERPL-MCNC: 11 MG/DL (ref 7–30)
CALCIUM SERPL-MCNC: 8.7 MG/DL (ref 8.5–10.1)
CHLORIDE SERPL-SCNC: 109 MMOL/L (ref 94–109)
CO2 SERPL-SCNC: 27 MMOL/L (ref 20–32)
CREAT SERPL-MCNC: 0.78 MG/DL (ref 0.52–1.04)
D DIMER PPP FEU-MCNC: <0.3 UG/ML FEU (ref 0–0.5)
DIFFERENTIAL METHOD BLD: NORMAL
EOSINOPHIL # BLD AUTO: 0.3 10E9/L (ref 0–0.7)
EOSINOPHIL NFR BLD AUTO: 2.8 %
ERYTHROCYTE [DISTWIDTH] IN BLOOD BY AUTOMATED COUNT: 12.9 % (ref 10–15)
GFR SERPL CREATININE-BSD FRML MDRD: 79 ML/MIN/{1.73_M2}
GLUCOSE SERPL-MCNC: 131 MG/DL (ref 70–99)
HCT VFR BLD AUTO: 41.3 % (ref 35–47)
HGB BLD-MCNC: 13.2 G/DL (ref 11.7–15.7)
IMM GRANULOCYTES # BLD: 0 10E9/L (ref 0–0.4)
IMM GRANULOCYTES NFR BLD: 0.3 %
INTERPRETATION ECG - MUSE: NORMAL
LYMPHOCYTES # BLD AUTO: 2 10E9/L (ref 0.8–5.3)
LYMPHOCYTES NFR BLD AUTO: 21.4 %
MCH RBC QN AUTO: 29.3 PG (ref 26.5–33)
MCHC RBC AUTO-ENTMCNC: 32 G/DL (ref 31.5–36.5)
MCV RBC AUTO: 92 FL (ref 78–100)
MONOCYTES # BLD AUTO: 0.5 10E9/L (ref 0–1.3)
MONOCYTES NFR BLD AUTO: 5.5 %
NEUTROPHILS # BLD AUTO: 6.6 10E9/L (ref 1.6–8.3)
NEUTROPHILS NFR BLD AUTO: 69.4 %
NRBC # BLD AUTO: 0 10*3/UL
NRBC BLD AUTO-RTO: 0 /100
PLATELET # BLD AUTO: 299 10E9/L (ref 150–450)
POTASSIUM SERPL-SCNC: 3.6 MMOL/L (ref 3.4–5.3)
RBC # BLD AUTO: 4.5 10E12/L (ref 3.8–5.2)
SODIUM SERPL-SCNC: 141 MMOL/L (ref 133–144)
TROPONIN I SERPL-MCNC: <0.015 UG/L (ref 0–0.04)
TROPONIN I SERPL-MCNC: <0.015 UG/L (ref 0–0.04)
WBC # BLD AUTO: 9.5 10E9/L (ref 4–11)

## 2021-04-06 PROCEDURE — 250N000011 HC RX IP 250 OP 636: Performed by: EMERGENCY MEDICINE

## 2021-04-06 PROCEDURE — 84484 ASSAY OF TROPONIN QUANT: CPT | Mod: 91 | Performed by: EMERGENCY MEDICINE

## 2021-04-06 PROCEDURE — 71046 X-RAY EXAM CHEST 2 VIEWS: CPT

## 2021-04-06 PROCEDURE — 99285 EMERGENCY DEPT VISIT HI MDM: CPT | Mod: 25

## 2021-04-06 PROCEDURE — 85025 COMPLETE CBC W/AUTO DIFF WBC: CPT | Performed by: EMERGENCY MEDICINE

## 2021-04-06 PROCEDURE — 85379 FIBRIN DEGRADATION QUANT: CPT | Performed by: EMERGENCY MEDICINE

## 2021-04-06 PROCEDURE — 250N000009 HC RX 250: Performed by: EMERGENCY MEDICINE

## 2021-04-06 PROCEDURE — 84484 ASSAY OF TROPONIN QUANT: CPT | Performed by: EMERGENCY MEDICINE

## 2021-04-06 PROCEDURE — 80048 BASIC METABOLIC PNL TOTAL CA: CPT | Performed by: EMERGENCY MEDICINE

## 2021-04-06 PROCEDURE — 96374 THER/PROPH/DIAG INJ IV PUSH: CPT

## 2021-04-06 PROCEDURE — 71275 CT ANGIOGRAPHY CHEST: CPT

## 2021-04-06 PROCEDURE — 93005 ELECTROCARDIOGRAM TRACING: CPT

## 2021-04-06 RX ORDER — METHYLPREDNISOLONE SODIUM SUCCINATE 125 MG/2ML
125 INJECTION, POWDER, LYOPHILIZED, FOR SOLUTION INTRAMUSCULAR; INTRAVENOUS ONCE
Status: COMPLETED | OUTPATIENT
Start: 2021-04-06 | End: 2021-04-06

## 2021-04-06 RX ORDER — IOPAMIDOL 755 MG/ML
79 INJECTION, SOLUTION INTRAVASCULAR ONCE
Status: COMPLETED | OUTPATIENT
Start: 2021-04-06 | End: 2021-04-06

## 2021-04-06 RX ADMIN — IOPAMIDOL 79 ML: 755 INJECTION, SOLUTION INTRAVENOUS at 18:54

## 2021-04-06 RX ADMIN — SODIUM CHLORIDE 101 ML: 9 INJECTION, SOLUTION INTRAVENOUS at 18:54

## 2021-04-06 RX ADMIN — METHYLPREDNISOLONE SODIUM SUCCINATE 125 MG: 125 INJECTION, POWDER, FOR SOLUTION INTRAMUSCULAR; INTRAVENOUS at 18:26

## 2021-04-06 ASSESSMENT — ENCOUNTER SYMPTOMS
SHORTNESS OF BREATH: 0
NUMBNESS: 1
CONSTIPATION: 0
FEVER: 0
SORE THROAT: 0
COUGH: 0
BACK PAIN: 1
DIARRHEA: 0
NECK PAIN: 1
DIFFICULTY URINATING: 0

## 2021-04-06 ASSESSMENT — MIFFLIN-ST. JEOR: SCORE: 1697.55

## 2021-04-06 NOTE — TELEPHONE ENCOUNTER
Phone call to patient to see if scheduling called her to reschedule her CTA that has been hung up with insurance.  I told her that unfortunately that when I took her off the schedule this stopped the review process, thus we need to get this scheduled as soon as possible.    She then related to me that she has been having constant arm and chest pain that is made worse by arm movement or bending over.She tells me that she feels she has a 'blocked artery'.    I recommended that she go to the ED now rather than waiting for another week until we get the CTA. I told her that this could be anxiety or muscle but we need to r/o her heart and this is why Dr. Varela ordered the CTA being that her stress test raised the possibility of being abnormal.    I advised her to have a friend drive her to [facility] ED and tell the admissions people her symptoms.    She agreed to follow through with this.

## 2021-04-06 NOTE — ED PROVIDER NOTES
"  History   Chief Complaint:  Chest Pain    HPI   Denita Flores is a 65 year old female with history of Grave's disease, asthma, hypertension, and anxiety who presents with chest pain. The patient was evaluated in the ED on 02/27, where she was given a referral for cardiology due to her hypertension and chest pain. She was seen by Dr. Varela on 03/24, where an echocardiogram was performed and came back unremarkable. Dr. Varela prescribed her Lisinopril and Amlodipine for hypertension with a recommendation for a cardioangiogram, that has yet to be approved by insurance. The patient reports that she has been having numbness and tingling in her left hand when she makes a fist that causes pain to radiate up to her arm, neck, back and left side of her chest. She states her pain is exacerbated when bending doing, exercising, making a fist with her hand, and walking. When she is walking she \"can feel the blood hurting her heart\". Denies fever, sore throat, cough, shortness of breath, recent travel, difficulty urinating, difficulty with bowel movements, or pain with deep breaths. The patient has received both doses of her COVID-19 vaccine and gets tested for COVID-19 every two weeks for her job.     Review of Systems   Constitutional: Negative for fever.   HENT: Negative for sore throat.    Respiratory: Negative for cough and shortness of breath.    Cardiovascular: Positive for chest pain and leg swelling.   Gastrointestinal: Negative for constipation and diarrhea.   Genitourinary: Negative for difficulty urinating.   Musculoskeletal: Positive for back pain and neck pain.   Neurological: Positive for numbness.        Tingling   All other systems reviewed and are negative.    Allergies:  Contrast Dye  Sulfa Drugs    Medications:  Albuterol  Cholecalciferol  Allegra  Flonase  Synthroid  Krill oil  Singulair   Deltasone  Amlodipine besylate  Lisinopril     Past Medical History:    Graves disease  Malignant neoplasm  Mild persistent " "asthma  Thyroid disease  Abnormal cardiovascular stress test  Bilateral hand pain  Polpy of gallbladder  Obesity  Nephrolithiasis  Tubular adenoma of colon  Epistaxis  YELENA  Spider veins  Diverticulosis  Cervical cancer  Plantar warts  Stasis dermatitis of both legs   Abnormal stress test  Hypothyroidism    Past Surgical History:    Back surgery  Colonoscopy  Cystoscopy  [place]    Family History:    Glaucoma  Heart disease  Cancer  Diabetes     Social History:  Presents alone. 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     Physical Exam     Patient Vitals for the past 24 hrs:   BP Temp Temp src Pulse Resp SpO2 Height Weight   04/06/21 2030 (!) 144/79 -- -- 74 -- 95 % -- --   04/06/21 1830 -- -- -- 74 14 99 % -- --   04/06/21 1815 -- -- -- 73 14 99 % -- --   04/06/21 1800 (!) 168/71 -- -- 65 15 97 % -- --   04/06/21 1700 -- -- -- 72 24 97 % -- --   04/06/21 1635 (!) 165/70 -- -- 78 23 99 % -- --   04/06/21 1630 (!) 165/70 -- -- -- -- -- -- --   04/06/21 1156 (!) 176/79 98.7  F (37.1  C) Oral 97 16 96 % 1.702 m (5' 7\") 112.5 kg (248 lb)     Physical Exam   Constitutional:  Patient is oriented to person, place, and time. They appear well-developed and well-nourished. Mild distress secondary to pain.   HENT:   Mouth/Throat:   Oropharynx is clear and moist.   Eyes:    Conjunctivae normal and EOM are normal. Pupils are equal, round, and reactive to light.   Neck:    Normal range of motion.   Cardiovascular: Normal rate, regular rhythm and normal heart sounds.  Exam reveals no gallop and no friction rub.  No murmur heard.  Pulmonary/Chest:  Effort normal and breath sounds normal. Patient has no wheezes. Patient has no rales.   Abdominal:   Soft. Bowel sounds are normal. Patient exhibits no mass. There is no tenderness. There is no rebound and no guarding.   Musculoskeletal:  Normal range of motion. Patient exhibits no edema.   Neurological:   Patient is alert and oriented to person, place, and time. Patient " has normal strength. No cranial nerve deficit or sensory deficit. GCS 15.   Skin:   Skin is warm and dry. No rash noted. No erythema.   Psychiatric:   Patient has a normal mood and affect. Patient's behavior is normal. Judgment and thought content normal.     Emergency Department Course   ECG  ECG taken at 1153, ECG read at 1555  Normal sinus rhythm  Normal ECG   No significant change as compared to prior, dated 2/27/21  Rate 84 bpm. OK interval 130 ms. QRS duration 90 ms.   QT/QTc 390/460 ms. P-R-T axes 56 41 63.     Imaging:  Chest XR,  PA & LAT  PA and lateral views of the chest were obtained. Cardiomediastinal silhouette is within normal limits. Small nodular opacity projects over the left upper lobe, indeterminant, could be infectious, can be further evaluated with chest CT on nonemergent basis. No significant pleural effusion or pneumothorax.  KARL WIGGINS MD  Reading per radiology    CT Chest Pulmonary Embolism w Contrast  1. No evidence of pulmonary embolism.  2. 1.1 x 0.6 cm right lower lobe nodule, as per Fleischner's Society criteria, this can be further evaluated with chest CT in 3 months to assess for interval change. Other evaluation options including PET/CT  or tissue sampling.  3. Cholelithiasis without CT evidence of acute cholecystitis.  4. Small hiatal hernia.  5. 1.9 cm left adrenal nodule, is indeterminant, can be further evaluated with CT adrenal mass protocol.  6. Upper lobes predominant emphysematous changes.  KARL WIGGINS MD  Reading per radiology    Laboratory:  CBC: WBC 9.5, HGB 13.2,    BMP: Glucose 131(H) o/w WNL (Creatinine 0.78)   Troponin (Collected: 1206): <0.015  Troponin x2 (Collected: 1638): <0.015  D dimer quantitative: <0.3    Emergency Department Course:    Reviewed:  I reviewed nursing notes, vitals, past medical history and care everywhere    Assessments:  1644 I obtained history and examined the patient as noted above.   1814 I rechecked the patient and  explained findings.   2042 Rechecked and updated.     Interventions:  1826 Solu-Medrol 125 mg IV    Disposition:  The patient was discharged to home.     Impression & Plan   CMS Diagnoses: None    Medical Decision Making:  Denita Navas is a 65-year-old female presenting with concerns for chest pain that has been going on for the last couple of months intermittently and not consistently provoked.  She denies any infectious symptoms for Covid or pneumonia.  She does not have any recent travel or history of blood clots.  She has been trying to get her CT coronary angiogram but for some reason this has been delayed.  Her EKG did not show any acute ischemia or arrhythmia.  Delta troponins here are reassuringly normal.  Chest x-ray showed a possible abnormality in the left upper lobe and a CT was recommended.  so I did order a CT PE study.  I did give her some Solu-Medrol as she states that she might of had some mild hives related to some sort of dye in the past but she has had CAT scans of her chest with IV dye with no problems.  She did not have any difficulties with this exam.  She was made aware of the findings.  Ultimately there is no PE or dissection.  There is no evidence of infection.  She does have a pulmonary nodule which she should follow-up with her primary care doctor with.  She also was made aware the gallstones which are an incidental finding.  At this time her discomfort may be more of a radicular pain being that it seems to be very reproducible when she squeezes her fist or uses her arm.    I think at this time she is safe for discharge.  She will follow-up with her CT coronary angiogram.  Follow-up with her primary care doctor as well.        Covid-19  Denita Flores was evaluated during a global COVID-19 pandemic, which necessitated consideration that the patient might be at risk for infection with the SARS-CoV-2 virus that causes COVID-19.   Applicable protocols for evaluation were followed during  the patient's care.     Diagnosis:    ICD-10-CM    1. Atypical chest pain  R07.89        Discharge Medications:  New Prescriptions    No medications on file       Scribe Disclosure:  I, Didi Pollard, am serving as a scribe at 4:33 PM on 4/6/2021 to document services personally performed by Gini Graves MD based on my observations and the provider's statements to me.            Gini Graves MD  04/06/21 [id number]

## 2021-04-14 ENCOUNTER — HOSPITAL ENCOUNTER (OUTPATIENT)
Dept: CARDIOLOGY | Facility: CLINIC | Age: 66
Discharge: HOME | End: 2021-04-14
Attending: INTERNAL MEDICINE | Admitting: INTERNAL MEDICINE
Payer: COMMERCIAL

## 2021-04-14 VITALS — DIASTOLIC BLOOD PRESSURE: 69 MMHG | HEART RATE: 65 BPM | SYSTOLIC BLOOD PRESSURE: 120 MMHG

## 2021-04-14 DIAGNOSIS — R03.0 ELEVATED BLOOD PRESSURE READING WITHOUT DIAGNOSIS OF HYPERTENSION: ICD-10-CM

## 2021-04-14 DIAGNOSIS — R94.39 ABNORMAL CARDIOVASCULAR STRESS TEST: ICD-10-CM

## 2021-04-14 LAB
CREAT BLD-MCNC: 0.6 MG/DL (ref 0.52–1.04)
GFR SERPL CREATININE-BSD FRML MDRD: >90 ML/MIN/{1.73_M2}

## 2021-04-14 PROCEDURE — 250N000009 HC RX 250: Performed by: INTERNAL MEDICINE

## 2021-04-14 PROCEDURE — 250N000013 HC RX MED GY IP 250 OP 250 PS 637: Performed by: INTERNAL MEDICINE

## 2021-04-14 PROCEDURE — 75574 CT ANGIO HRT W/3D IMAGE: CPT | Mod: 26 | Performed by: INTERNAL MEDICINE

## 2021-04-14 PROCEDURE — 250N000011 HC RX IP 250 OP 636: Performed by: INTERNAL MEDICINE

## 2021-04-14 PROCEDURE — 82565 ASSAY OF CREATININE: CPT

## 2021-04-14 PROCEDURE — 75574 CT ANGIO HRT W/3D IMAGE: CPT

## 2021-04-14 RX ORDER — ACYCLOVIR 200 MG/1
0-1 CAPSULE ORAL
Status: DISCONTINUED | OUTPATIENT
Start: 2021-04-14 | End: 2021-04-15 | Stop reason: HOSPADM

## 2021-04-14 RX ORDER — METOPROLOL TARTRATE 25 MG/1
25-100 TABLET, FILM COATED ORAL
Status: COMPLETED | OUTPATIENT
Start: 2021-04-14 | End: 2021-04-14

## 2021-04-14 RX ORDER — DILTIAZEM HCL 60 MG
120 TABLET ORAL
Status: DISCONTINUED | OUTPATIENT
Start: 2021-04-14 | End: 2021-04-15 | Stop reason: HOSPADM

## 2021-04-14 RX ORDER — IOPAMIDOL 755 MG/ML
115 INJECTION, SOLUTION INTRAVASCULAR ONCE
Status: COMPLETED | OUTPATIENT
Start: 2021-04-14 | End: 2021-04-14

## 2021-04-14 RX ORDER — DILTIAZEM HYDROCHLORIDE 5 MG/ML
10-15 INJECTION INTRAVENOUS
Status: DISCONTINUED | OUTPATIENT
Start: 2021-04-14 | End: 2021-04-15 | Stop reason: HOSPADM

## 2021-04-14 RX ORDER — ONDANSETRON 2 MG/ML
4 INJECTION INTRAMUSCULAR; INTRAVENOUS
Status: DISCONTINUED | OUTPATIENT
Start: 2021-04-14 | End: 2021-04-15 | Stop reason: HOSPADM

## 2021-04-14 RX ORDER — DIPHENHYDRAMINE HCL 25 MG
25 CAPSULE ORAL
Status: DISCONTINUED | OUTPATIENT
Start: 2021-04-14 | End: 2021-04-15 | Stop reason: HOSPADM

## 2021-04-14 RX ORDER — METOPROLOL TARTRATE 1 MG/ML
5-15 INJECTION, SOLUTION INTRAVENOUS
Status: DISCONTINUED | OUTPATIENT
Start: 2021-04-14 | End: 2021-04-15 | Stop reason: HOSPADM

## 2021-04-14 RX ORDER — METHYLPREDNISOLONE SODIUM SUCCINATE 125 MG/2ML
125 INJECTION, POWDER, LYOPHILIZED, FOR SOLUTION INTRAMUSCULAR; INTRAVENOUS
Status: DISCONTINUED | OUTPATIENT
Start: 2021-04-14 | End: 2021-04-15 | Stop reason: HOSPADM

## 2021-04-14 RX ORDER — NITROGLYCERIN 0.4 MG/1
0.4 TABLET SUBLINGUAL
Status: DISCONTINUED | OUTPATIENT
Start: 2021-04-14 | End: 2021-04-15 | Stop reason: HOSPADM

## 2021-04-14 RX ORDER — DIPHENHYDRAMINE HYDROCHLORIDE 50 MG/ML
25-50 INJECTION INTRAMUSCULAR; INTRAVENOUS
Status: DISCONTINUED | OUTPATIENT
Start: 2021-04-14 | End: 2021-04-15 | Stop reason: HOSPADM

## 2021-04-14 RX ORDER — IVABRADINE 5 MG/1
5-15 TABLET, FILM COATED ORAL
Status: DISCONTINUED | OUTPATIENT
Start: 2021-04-14 | End: 2021-04-15 | Stop reason: HOSPADM

## 2021-04-14 RX ADMIN — METOPROLOL TARTRATE 10 MG: 1 INJECTION, SOLUTION INTRAVENOUS at 11:00

## 2021-04-14 RX ADMIN — METOPROLOL TARTRATE 10 MG: 1 INJECTION, SOLUTION INTRAVENOUS at 10:50

## 2021-04-14 RX ADMIN — METOPROLOL TARTRATE 10 MG: 1 INJECTION, SOLUTION INTRAVENOUS at 10:55

## 2021-04-14 RX ADMIN — METOPROLOL TARTRATE 100 MG: 50 TABLET, FILM COATED ORAL at 09:07

## 2021-04-14 RX ADMIN — METOPROLOL TARTRATE 10 MG: 1 INJECTION, SOLUTION INTRAVENOUS at 11:08

## 2021-04-14 RX ADMIN — IOPAMIDOL 115 ML: 755 INJECTION, SOLUTION INTRAVENOUS at 11:40

## 2021-04-14 RX ADMIN — NITROGLYCERIN 0.4 MG: 0.4 TABLET SUBLINGUAL at 11:05

## 2021-04-26 ENCOUNTER — OFFICE VISIT (OUTPATIENT)
Dept: CARDIOLOGY | Facility: CLINIC | Age: 66
End: 2021-04-26
Attending: INTERNAL MEDICINE
Payer: COMMERCIAL

## 2021-04-26 VITALS
HEIGHT: 65 IN | BODY MASS INDEX: 41.48 KG/M2 | OXYGEN SATURATION: 99 % | DIASTOLIC BLOOD PRESSURE: 81 MMHG | HEART RATE: 86 BPM | WEIGHT: 249 LBS | SYSTOLIC BLOOD PRESSURE: 154 MMHG

## 2021-04-26 DIAGNOSIS — I25.10 CORONARY ARTERY DISEASE INVOLVING NATIVE CORONARY ARTERY OF NATIVE HEART, ANGINA PRESENCE UNSPECIFIED: ICD-10-CM

## 2021-04-26 DIAGNOSIS — R03.0 ELEVATED BLOOD PRESSURE READING WITHOUT DIAGNOSIS OF HYPERTENSION: ICD-10-CM

## 2021-04-26 DIAGNOSIS — R93.1 ELEVATED CORONARY ARTERY CALCIUM SCORE: Primary | ICD-10-CM

## 2021-04-26 DIAGNOSIS — I10 ESSENTIAL HYPERTENSION: ICD-10-CM

## 2021-04-26 DIAGNOSIS — R94.39 ABNORMAL CARDIOVASCULAR STRESS TEST: ICD-10-CM

## 2021-04-26 DIAGNOSIS — E78.5 DYSLIPIDEMIA: ICD-10-CM

## 2021-04-26 PROCEDURE — 99214 OFFICE O/P EST MOD 30 MIN: CPT | Performed by: NURSE PRACTITIONER

## 2021-04-26 RX ORDER — ATORVASTATIN CALCIUM 40 MG/1
40 TABLET, FILM COATED ORAL AT BEDTIME
Qty: 90 TABLET | Refills: 3 | Status: SHIPPED | OUTPATIENT
Start: 2021-04-26 | End: 2022-04-14

## 2021-04-26 RX ORDER — AMLODIPINE BESYLATE 5 MG/1
10 TABLET ORAL DAILY
Qty: 30 TABLET | Refills: 3 | Status: SHIPPED | OUTPATIENT
Start: 2021-04-26 | End: 2021-09-27 | Stop reason: DRUGHIGH

## 2021-04-26 RX ORDER — ASPIRIN 81 MG/1
81 TABLET, CHEWABLE ORAL DAILY
COMMUNITY
End: 2022-06-20

## 2021-04-26 ASSESSMENT — MIFFLIN-ST. JEOR: SCORE: 1670.34

## 2021-04-26 NOTE — PATIENT INSTRUCTIONS
Thank you for your visit with the [facility] today.    Today's plan:   1. Start taking atorvastatin (lipitor) 40 mg once daily.  2. Increase the dose of amlodipine (norvasc) 5 mg to 10 mg once daily for better blood pressure control.  3. Stop using the ketoconazole cream as this has an interaction with atorvastatin.   4. Check fasting labs in about 1 month to recheck your cholesterol levels.  5. Follow-up with me in about 1 months to go through these results and recheck your blood pressure.    If you have questions or concerns, please call my nurse team at [phone].    Scheduling phone number: [phone]    It was a pleasure seeing you today!     NANDA Loaiza, CNP  Nurse Practitioner  [facility]  April 26, 2021  _______________________________________________________

## 2021-04-26 NOTE — LETTER
4/26/2021    Gee Hitchcock MD  [address]  [facility] [address]    RE: Denita Flores       Dear Colleague,    I had the pleasure of seeing Denita Flores in the [facility].  Cardiology Clinic Progress Note    Service Date: April 26, 2021    Primary Cardiologist: Dr. Varela      Reason for Visit: Follow up of coronary CTA results    HPI:   I met Ms. Denita Flores in the clinic today. She is a very pleasant 65 year old female with a past medical history notable for Graves' disease, mild persistent asthma, obesity, history of what sounds like spinal stenosis with previous back surgery on L3-L4, recently diagnosed hypertension, and recent atypical chest pain.  She has a family history of cardiovascular disease with her father first having a heart attack at the age of 62 and her mother dying of a CVA at the age of 80.    She has been seen several times in the emergency department due to symptoms of chest pain over the next couple of months. She describes diffuse chest discomfort with occasional radiation to her shoulder and down her left arm. These do not occur on exertion.  The symptoms have been occurring fairly randomly over the past few months. She describes it as continuous when she has it.      Her work-up in the emergency department has been unremarkable with a negative troponin on 2/27/2021 and 2 negative troponins serially on 4/6/2021. EKGs have shown normal sinus rhythm and have been essentially normal without ischemic changes. An exercise stress echocardiogram was completed for further evaluation of her symptoms on 3/10/2021 and was nondiagnostic due to suboptimal image quality. There was some suggestion of a possible anterior wall motion abnormality in apical 2 chamber so formal cardiology consultation was recommended. However, there was no clear evidence of ischemia or infarction on the study and the baseline rest echocardiogram  showed normal left ventricular function and wall motion.    The patient was subsequently seen by Dr. Varela in the clinic in cardiology consultation. A CT coronary angiogram was recommended for further evaluation of possible coronary artery disease. She was hypertensive and reportedly anxious in the clinic. It was recommended that she start on low-dose lisinopril 10 mg once daily and amlodipine 5 mg once daily for better blood pressure control. She had already been started on aspirin 81 mg daily prior to this.    The CT coronary angiogram was completed on 4/14/2021 showing mild left anterior descending artery disease. It was difficult to assess a small caliber distal circumflex because of overlap with the coronary sinus but no obvious flow limiting lesions noted. Mild right coronary artery disease was noted. It was noted to be a technically difficult study because of body habitus and some step artifact, though the step artifact was not severe and it is unlikely that flow-limiting lesions are present.    Today, Ms. Flores presents to the clinic in follow up of her recent CT coronary angiogram results.  She tells me that she has been feeling better over the past couple of weeks following her last emergency department visit on 4/6/2021.  She notes that she was told that her symptoms could be secondary to her spinal stenosis and chronic back issues and she has been trying to note her positioning at the onset of her symptoms more closely since that time.  She noted that she moved some of her clothing from a higher coat rack where she had to lift above her shoulders down to her lower back so that she does not have to reach so high to get dressed. Since making this change she has not been having the back pain radiating down her chest and left arm. She otherwise denies symptoms of palpitations, shortness of breath, lightheadedness, dizziness, or lower extremity edema. She has not been checking her blood pressures regularly at  home.      ASSESSMENT AND PLAN:  1. Coronary artery disease  - On the basis of her recent CT coronary angiogram suggesting mild coronary disease in her LAD and RCA.  As noted above, she had a nondiagnostic stress echocardiogram due to suboptimal image quality prior to the coronary CTA. The CTA report notes that it is unlikely that flow-limiting lesions are present. I do not think that would likely get optimal image quality with further stress testing and do not feel strongly that invasive coronary angiography is warranted at this time given the recent improvement in her quite atypical symptoms of chest discomfort. Recommend medical management.  - She is already on aspirin 81 mg daily. We will start atorvastatin 40 mg once daily to try to push her LDL cholesterol under 70 for secondary prevention. Her blood pressure also remains elevated in clinic today at 154/81, although she notes that she has not taken her morning dose of amlodipine 5 mg once daily yet. We will increase the dose of her amlodipine from 5 mg to 10 mg once daily for better blood pressure control.  - Counseled on lifestyle modifications including trying to stay active and get more regular exercise and stick to a heart healthy Mediterranean style diet in an effort to help lose some weight for rectus reduction as well.    2. Atypical chest discomfort  - Suspect her symptoms are most likely musculoskeletal in nature secondary to spinal stenosis as it sounds like they have a clear positional component sometimes brought on by lifting her arms above her head or bending over with radiation down her left neck and arm and sometimes down her left leg.  - There also may be a component of anxiety as she has previously admitted to being quite anxious about her health and now is feeling more reassured by the CT coronary angiogram findings and since she has had normal EKGs and negative troponin checks in the ER.  - Recommend that she follow-up with her orthopedic  specialist for further evaluation and management of her symptoms. She notes that she has previously had surgery and gotten epidural shots for back pain radiating to her leg.    3. Essential hypertension  - Recommend she increase the dose of amlodipine from 5 mg to 10 mg once daily as above.  - She notes that she did not end up starting on lisinopril, so I will have her continue off of this for now but we may need to add this in follow-up depending on her response to the higher dose of amlodipine. I encouraged her to monitor her blood pressures regularly at home and keep a log of her readings, shooting for a goal of consistently under 130/85.    4. Dyslipidemia  - Will start on atorvastatin 40 mg once daily as noted above with repeat fasting lipid profile ALT in 1 month.    Thank you for the opportunity to participate in this pleasant patient's care. I will see her back in follow-up in a 1 month with a repeat fasting lipid profile ALT beforehand. We would be happy to see her sooner if needed for any concerns in the meantime.    NANDA Loaiza, CNP   Nurse Practitioner  [facility]  Pager: [phone]  Text Page  (8am - 5pm, M-F)    Orders this Visit:  No orders of the defined types were placed in this encounter.    Orders Placed This Encounter   Medications     aspirin (ASA) 81 MG chewable tablet     Sig: Take 81 mg by mouth daily     There are no discontinued medications.  Encounter Diagnoses   Name Primary?     Elevated coronary artery calcium score Yes     Coronary artery disease involving native coronary artery of native heart, angina presence unspecified      Abnormal cardiovascular stress test      Essential hypertension      Dyslipidemia      CURRENT MEDICATIONS:  Current Outpatient Medications   Medication Sig Dispense Refill     albuterol (PROAIR HFA/PROVENTIL HFA/VENTOLIN HFA) 108 (90 Base) MCG/ACT inhaler Inhale 2 puffs into the lungs every 6 hours as needed for shortness of breath /  dyspnea 18 g 1     amLODIPine (NORVASC) 5 MG tablet Take 1 tablet (5 mg) by mouth daily 30 tablet 3     aspirin (ASA) 81 MG chewable tablet Take 81 mg by mouth daily       Cholecalciferol (D3 ADULT PO) Take 2,000 Units by mouth daily.       cyanocolbalamin (VITAMIN  B-12) 100 MCG tablet Take 100 mcg by mouth daily.       fexofenadine (ALLEGRA) 180 MG tablet Take 1 tablet (180 mg) by mouth daily 90 tablet 3     fluticasone (FLONASE) 50 MCG/ACT nasal spray Spray 1 spray into both nostrils daily 48 g 3     ketoconazole (NIZORAL) 2 % external cream Apply topically 2 times daily 60 g 0     levothyroxine (SYNTHROID/LEVOTHROID) 112 MCG tablet Profile Rx,TAKE 1 TABLET(112 MCG) BY MOUTH DAILY 90 tablet 3     MEGARED OMEGA-3 KRILL  MG CAPS Take 1 capsule by mouth daily        montelukast (SINGULAIR) 10 MG tablet TAKE 1 TABLET(10 MG) BY MOUTH AT BEDTIME 90 tablet 3     triamcinolone (KENALOG) 0.1 % external cream Apply sparingly to affected area three times daily as needed 80 g 0     predniSONE (DELTASONE) 20 MG tablet Take 2 tablets (40 mg) by mouth daily 10pm night before procedure then at 5am then at 10 am day of procedure (Patient not taking: Reported on 4/26/2021) 6 tablet 1     ALLERGIES  Allergies   Allergen Reactions     Contrast Dye Hives and Itching     Isovue with ANTON on 1-21-19     Sulfa Drugs Hives and Swelling     Noted in 10/18/09 ER     PAST MEDICAL, SURGICAL, FAMILY HISTORY:  History was reviewed and updated as needed, see medical record.    SOCIAL HISTORY:  Social History     Socioeconomic History     Marital status:      Spouse name: Not on file     Number of children: Not on file     Years of education: Not on file     Highest education level: Not on file   Occupational History     Not on file   Social Needs     Financial resource strain: Not on file     Food insecurity     Worry: Not on file     Inability: Not on file     Transportation needs     Medical: Not on file     Non-medical: Not on  "file   Tobacco Use     Smoking status: Former Smoker     Packs/day: 1.50     Years: 35.00     Pack years: 52.50     Types: Cigarettes     Quit date: 2003     Years since quittin.3     Smokeless tobacco: Never Used   Substance and Sexual Activity     Alcohol use: Yes     Alcohol/week: 0.0 standard drinks     Comment: 3 dinks/ year     Drug use: No     Sexual activity: Not Currently   Lifestyle     Physical activity     Days per week: Not on file     Minutes per session: Not on file     Stress: Not on file   Relationships     Social connections     Talks on phone: Not on file     Gets together: Not on file     Attends [employer] service: Not on file     Active member of club or organization: Not on file     Attends meetings of clubs or organizations: Not on file     Relationship status: Not on file     Intimate partner violence     Fear of current or ex partner: Not on file     Emotionally abused: Not on file     Physically abused: Not on file     Forced sexual activity: Not on file   Other Topics Concern     Parent/sibling w/ CABG, MI or angioplasty before 65F 55M? No   Social History Narrative     Not on file     Review of Systems:  Skin:  Negative     Eyes:  Positive for glasses  ENT:       Respiratory:  Positive for shortness of breath;sleep apnea  Cardiovascular:    Positive for;palpitations;chest pain;lightheadedness;dizziness  Gastroenterology: Negative    Genitourinary:  Negative    Musculoskeletal:  Positive for back pain;neck pain  Neurologic:  Negative    Psychiatric:  Negative    Heme/Lymph/Imm:  Positive for allergies  Endocrine:  Positive for thyroid disorder     Physical Exam:  Vitals: BP (!) 154/81 (BP Location: Left arm, Cuff Size: Adult Regular)   Pulse 86   Ht 1.651 m (5' 5\")   Wt 112.9 kg (249 lb)   SpO2 99%   BMI 41.44 kg/m     Wt Readings from Last 4 Encounters:   21 112.9 kg (249 lb)   21 112.5 kg (248 lb)   21 112 kg (247 lb)   03/15/21 111.1 kg (245 lb) "     CONSTITUTIONAL: Appears her stated age, well nourished, and in no acute distress.  HEENT: Pupils equal, round. Sclerae nonicteric.    C/V:  Regular rate and rhythm, normal S1 and S2, no S3 or S4, no murmur, rub or gallop.   RESP: Respirations are unlabored. Lungs are clear to auscultation bilaterally without wheezing, rales, or rhonchi.  EXTREM: No clubbing, cyanosis, or lower extremity edema bilaterally.   NEURO: Alert and oriented, cooperative. Gait steady. No gross focal deficits.   PSYCH: Affect appropriate. Mentation normal. Responds to questions appropriately.  SKIN: Warm and dry. No apparent rashes or bruising.    Recent Lab Results:  LIPID RESULTS:  Lab Results   Component Value Date    CHOL 187 09/30/2019    HDL 54 09/30/2019     (H) 09/30/2019    TRIG 126 09/30/2019    CHOLHDLRATIO 3.5 06/22/2015     LIVER ENZYME RESULTS:  Lab Results   Component Value Date    AST 9 08/15/2017    ALT 20 08/15/2017     CBC RESULTS:  Lab Results   Component Value Date    WBC 9.5 04/06/2021    RBC 4.50 04/06/2021    HGB 13.2 04/06/2021    HCT 41.3 04/06/2021    MCV 92 04/06/2021    MCH 29.3 04/06/2021    MCHC 32.0 04/06/2021    RDW 12.9 04/06/2021     04/06/2021     BMP RESULTS:  Lab Results   Component Value Date     04/06/2021    POTASSIUM 3.6 04/06/2021    CHLORIDE 109 04/06/2021    CO2 27 04/06/2021    ANIONGAP 5 04/06/2021     (H) 04/06/2021    BUN 11 04/06/2021    CR 0.78 04/06/2021    GFRESTIMATED >90 04/14/2021    GFRESTBLACK >90 04/14/2021    RORY 8.7 04/06/2021      This note was completed in part using Dragon voice recognition software. Although reviewed after completion, some word and grammatical errors may occur.    Thank you for allowing me to participate in the care of your patient.      Sincerely,     Tao Haile NP     [facility]    cc:   Jonathan Varela MD  [address]  [place] [id number]

## 2021-04-26 NOTE — PROGRESS NOTES
Cardiology Clinic Progress Note    Service Date: April 26, 2021    Primary Cardiologist: Dr. Varela      Reason for Visit: Follow up of coronary CTA results    HPI:   I met Ms. Denita Flores in the clinic today. She is a very pleasant 65 year old female with a past medical history notable for Graves' disease, mild persistent asthma, obesity, history of what sounds like spinal stenosis with previous back surgery on L3-L4, recently diagnosed hypertension, and recent atypical chest pain.  She has a family history of cardiovascular disease with her father first having a heart attack at the age of 62 and her mother dying of a CVA at the age of 80.    She has been seen several times in the emergency department due to symptoms of chest pain over the next couple of months. She describes diffuse chest discomfort with occasional radiation to her shoulder and down her left arm. These do not occur on exertion.  The symptoms have been occurring fairly randomly over the past few months. She describes it as continuous when she has it.      Her work-up in the emergency department has been unremarkable with a negative troponin on 2/27/2021 and 2 negative troponins serially on 4/6/2021. EKGs have shown normal sinus rhythm and have been essentially normal without ischemic changes. An exercise stress echocardiogram was completed for further evaluation of her symptoms on 3/10/2021 and was nondiagnostic due to suboptimal image quality. There was some suggestion of a possible anterior wall motion abnormality in apical 2 chamber so formal cardiology consultation was recommended. However, there was no clear evidence of ischemia or infarction on the study and the baseline rest echocardiogram showed normal left ventricular function and wall motion.    The patient was subsequently seen by Dr. Varela in the clinic in cardiology consultation. A CT coronary angiogram was recommended for further evaluation of possible coronary artery disease. She  was hypertensive and reportedly anxious in the clinic. It was recommended that she start on low-dose lisinopril 10 mg once daily and amlodipine 5 mg once daily for better blood pressure control. She had already been started on aspirin 81 mg daily prior to this.    The CT coronary angiogram was completed on 4/14/2021 showing mild left anterior descending artery disease. It was difficult to assess a small caliber distal circumflex because of overlap with the coronary sinus but no obvious flow limiting lesions noted. Mild right coronary artery disease was noted. It was noted to be a technically difficult study because of body habitus and some step artifact, though the step artifact was not severe and it is unlikely that flow-limiting lesions are present.    Today, Ms. Flores presents to the clinic in follow up of her recent CT coronary angiogram results.  She tells me that she has been feeling better over the past couple of weeks following her last emergency department visit on 4/6/2021.  She notes that she was told that her symptoms could be secondary to her spinal stenosis and chronic back issues and she has been trying to note her positioning at the onset of her symptoms more closely since that time.  She noted that she moved some of her clothing from a higher coat rack where she had to lift above her shoulders down to her lower back so that she does not have to reach so high to get dressed. Since making this change she has not been having the back pain radiating down her chest and left arm. She otherwise denies symptoms of palpitations, shortness of breath, lightheadedness, dizziness, or lower extremity edema. She has not been checking her blood pressures regularly at home.      ASSESSMENT AND PLAN:  1. Coronary artery disease  - On the basis of her recent CT coronary angiogram suggesting mild coronary disease in her LAD and RCA.  As noted above, she had a nondiagnostic stress echocardiogram due to suboptimal  image quality prior to the coronary CTA. The CTA report notes that it is unlikely that flow-limiting lesions are present. I do not think that would likely get optimal image quality with further stress testing and do not feel strongly that invasive coronary angiography is warranted at this time given the recent improvement in her quite atypical symptoms of chest discomfort. Recommend medical management.  - She is already on aspirin 81 mg daily. We will start atorvastatin 40 mg once daily to try to push her LDL cholesterol under 70 for secondary prevention. Her blood pressure also remains elevated in clinic today at 154/81, although she notes that she has not taken her morning dose of amlodipine 5 mg once daily yet. We will increase the dose of her amlodipine from 5 mg to 10 mg once daily for better blood pressure control.  - Counseled on lifestyle modifications including trying to stay active and get more regular exercise and stick to a heart healthy Mediterranean style diet in an effort to help lose some weight for rectus reduction as well.    2. Atypical chest discomfort  - Suspect her symptoms are most likely musculoskeletal in nature secondary to spinal stenosis as it sounds like they have a clear positional component sometimes brought on by lifting her arms above her head or bending over with radiation down her left neck and arm and sometimes down her left leg.  - There also may be a component of anxiety as she has previously admitted to being quite anxious about her health and now is feeling more reassured by the CT coronary angiogram findings and since she has had normal EKGs and negative troponin checks in the ER.  - Recommend that she follow-up with her orthopedic specialist for further evaluation and management of her symptoms. She notes that she has previously had surgery and gotten epidural shots for back pain radiating to her leg.    3. Essential hypertension  - Recommend she increase the dose of  amlodipine from 5 mg to 10 mg once daily as above.  - She notes that she did not end up starting on lisinopril, so I will have her continue off of this for now but we may need to add this in follow-up depending on her response to the higher dose of amlodipine. I encouraged her to monitor her blood pressures regularly at home and keep a log of her readings, shooting for a goal of consistently under 130/85.    4. Dyslipidemia  - Will start on atorvastatin 40 mg once daily as noted above with repeat fasting lipid profile ALT in 1 month.    Thank you for the opportunity to participate in this pleasant patient's care. I will see her back in follow-up in a 1 month with a repeat fasting lipid profile ALT beforehand. We would be happy to see her sooner if needed for any concerns in the meantime.    NANDA Loaiza, CNP   Nurse Practitioner  [facility]  Pager: [phone]  Text Page  (8am - 5pm, M-F)    Orders this Visit:  No orders of the defined types were placed in this encounter.    Orders Placed This Encounter   Medications     aspirin (ASA) 81 MG chewable tablet     Sig: Take 81 mg by mouth daily     There are no discontinued medications.  Encounter Diagnoses   Name Primary?     Elevated coronary artery calcium score Yes     Coronary artery disease involving native coronary artery of native heart, angina presence unspecified      Abnormal cardiovascular stress test      Essential hypertension      Dyslipidemia      CURRENT MEDICATIONS:  Current Outpatient Medications   Medication Sig Dispense Refill     albuterol (PROAIR HFA/PROVENTIL HFA/VENTOLIN HFA) 108 (90 Base) MCG/ACT inhaler Inhale 2 puffs into the lungs every 6 hours as needed for shortness of breath / dyspnea 18 g 1     amLODIPine (NORVASC) 5 MG tablet Take 1 tablet (5 mg) by mouth daily 30 tablet 3     aspirin (ASA) 81 MG chewable tablet Take 81 mg by mouth daily       Cholecalciferol (D3 ADULT PO) Take 2,000 Units by mouth daily.        cyanocolbalamin (VITAMIN  B-12) 100 MCG tablet Take 100 mcg by mouth daily.       fexofenadine (ALLEGRA) 180 MG tablet Take 1 tablet (180 mg) by mouth daily 90 tablet 3     fluticasone (FLONASE) 50 MCG/ACT nasal spray Spray 1 spray into both nostrils daily 48 g 3     ketoconazole (NIZORAL) 2 % external cream Apply topically 2 times daily 60 g 0     levothyroxine (SYNTHROID/LEVOTHROID) 112 MCG tablet Profile Rx,TAKE 1 TABLET(112 MCG) BY MOUTH DAILY 90 tablet 3     MEGARED OMEGA-3 KRILL  MG CAPS Take 1 capsule by mouth daily        montelukast (SINGULAIR) 10 MG tablet TAKE 1 TABLET(10 MG) BY MOUTH AT BEDTIME 90 tablet 3     triamcinolone (KENALOG) 0.1 % external cream Apply sparingly to affected area three times daily as needed 80 g 0     predniSONE (DELTASONE) 20 MG tablet Take 2 tablets (40 mg) by mouth daily 10pm night before procedure then at 5am then at 10 am day of procedure (Patient not taking: Reported on 2021) 6 tablet 1     ALLERGIES  Allergies   Allergen Reactions     Contrast Dye Hives and Itching     Isovue with ANTON on 19     Sulfa Drugs Hives and Swelling     Noted in 10/18/09 ER     PAST MEDICAL, SURGICAL, FAMILY HISTORY:  History was reviewed and updated as needed, see medical record.    SOCIAL HISTORY:  Social History     Socioeconomic History     Marital status:      Spouse name: Not on file     Number of children: Not on file     Years of education: Not on file     Highest education level: Not on file   Occupational History     Not on file   Social Needs     Financial resource strain: Not on file     Food insecurity     Worry: Not on file     Inability: Not on file     Transportation needs     Medical: Not on file     Non-medical: Not on file   Tobacco Use     Smoking status: Former Smoker     Packs/day: 1.50     Years: 35.00     Pack years: 52.50     Types: Cigarettes     Quit date: 2003     Years since quittin.3     Smokeless tobacco: Never Used   Substance and  "Sexual Activity     Alcohol use: Yes     Alcohol/week: 0.0 standard drinks     Comment: 3 dinks/ year     Drug use: No     Sexual activity: Not Currently   Lifestyle     Physical activity     Days per week: Not on file     Minutes per session: Not on file     Stress: Not on file   Relationships     Social connections     Talks on phone: Not on file     Gets together: Not on file     Attends Latter day service: Not on file     Active member of club or organization: Not on file     Attends meetings of clubs or organizations: Not on file     Relationship status: Not on file     Intimate partner violence     Fear of current or ex partner: Not on file     Emotionally abused: Not on file     Physically abused: Not on file     Forced sexual activity: Not on file   Other Topics Concern     Parent/sibling w/ CABG, MI or angioplasty before 65F 55M? No   Social History Narrative     Not on file     Review of Systems:  Skin:  Negative     Eyes:  Positive for glasses  ENT:       Respiratory:  Positive for shortness of breath;sleep apnea  Cardiovascular:    Positive for;palpitations;chest pain;lightheadedness;dizziness  Gastroenterology: Negative    Genitourinary:  Negative    Musculoskeletal:  Positive for back pain;neck pain  Neurologic:  Negative    Psychiatric:  Negative    Heme/Lymph/Imm:  Positive for allergies  Endocrine:  Positive for thyroid disorder     Physical Exam:  Vitals: BP (!) 154/81 (BP Location: Left arm, Cuff Size: Adult Regular)   Pulse 86   Ht 1.651 m (5' 5\")   Wt 112.9 kg (249 lb)   SpO2 99%   BMI 41.44 kg/m     Wt Readings from Last 4 Encounters:   04/26/21 112.9 kg (249 lb)   04/06/21 112.5 kg (248 lb)   03/24/21 112 kg (247 lb)   03/15/21 111.1 kg (245 lb)     CONSTITUTIONAL: Appears her stated age, well nourished, and in no acute distress.  HEENT: Pupils equal, round. Sclerae nonicteric.    C/V:  Regular rate and rhythm, normal S1 and S2, no S3 or S4, no murmur, rub or gallop.   RESP: Respirations " are unlabored. Lungs are clear to auscultation bilaterally without wheezing, rales, or rhonchi.  EXTREM: No clubbing, cyanosis, or lower extremity edema bilaterally.   NEURO: Alert and oriented, cooperative. Gait steady. No gross focal deficits.   PSYCH: Affect appropriate. Mentation normal. Responds to questions appropriately.  SKIN: Warm and dry. No apparent rashes or bruising.    Recent Lab Results:  LIPID RESULTS:  Lab Results   Component Value Date    CHOL 187 09/30/2019    HDL 54 09/30/2019     (H) 09/30/2019    TRIG 126 09/30/2019    CHOLHDLRATIO 3.5 06/22/2015     LIVER ENZYME RESULTS:  Lab Results   Component Value Date    AST 9 08/15/2017    ALT 20 08/15/2017     CBC RESULTS:  Lab Results   Component Value Date    WBC 9.5 04/06/2021    RBC 4.50 04/06/2021    HGB 13.2 04/06/2021    HCT 41.3 04/06/2021    MCV 92 04/06/2021    MCH 29.3 04/06/2021    MCHC 32.0 04/06/2021    RDW 12.9 04/06/2021     04/06/2021     BMP RESULTS:  Lab Results   Component Value Date     04/06/2021    POTASSIUM 3.6 04/06/2021    CHLORIDE 109 04/06/2021    CO2 27 04/06/2021    ANIONGAP 5 04/06/2021     (H) 04/06/2021    BUN 11 04/06/2021    CR 0.78 04/06/2021    GFRESTIMATED >90 04/14/2021    GFRESTBLACK >90 04/14/2021    RORY 8.7 04/06/2021      CC  Jonathan Varela MD  [id number]  [place] [id number]    This note was completed in part using Dragon voice recognition software. Although reviewed after completion, some word and grammatical errors may occur.

## 2021-05-07 ENCOUNTER — APPOINTMENT (OUTPATIENT)
Dept: ULTRASOUND IMAGING | Facility: CLINIC | Age: 66
End: 2021-05-07
Attending: EMERGENCY MEDICINE
Payer: COMMERCIAL

## 2021-05-07 ENCOUNTER — APPOINTMENT (OUTPATIENT)
Dept: GENERAL RADIOLOGY | Facility: CLINIC | Age: 66
End: 2021-05-07
Attending: NURSE PRACTITIONER
Payer: COMMERCIAL

## 2021-05-07 ENCOUNTER — HOSPITAL ENCOUNTER (EMERGENCY)
Facility: CLINIC | Age: 66
Discharge: HOME | End: 2021-05-07
Attending: NURSE PRACTITIONER | Admitting: NURSE PRACTITIONER
Payer: COMMERCIAL

## 2021-05-07 ENCOUNTER — NURSE TRIAGE (OUTPATIENT)
Dept: NURSING | Facility: CLINIC | Age: 66
End: 2021-05-07

## 2021-05-07 ENCOUNTER — TELEPHONE (OUTPATIENT)
Dept: CARDIOLOGY | Facility: CLINIC | Age: 66
End: 2021-05-07

## 2021-05-07 VITALS
BODY MASS INDEX: 37.89 KG/M2 | OXYGEN SATURATION: 98 % | WEIGHT: 250 LBS | HEIGHT: 68 IN | TEMPERATURE: 97.2 F | DIASTOLIC BLOOD PRESSURE: 79 MMHG | HEART RATE: 96 BPM | SYSTOLIC BLOOD PRESSURE: 137 MMHG | RESPIRATION RATE: 16 BRPM

## 2021-05-07 DIAGNOSIS — M79.89 LEFT LEG SWELLING: ICD-10-CM

## 2021-05-07 DIAGNOSIS — L03.90 CELLULITIS: ICD-10-CM

## 2021-05-07 LAB
ANION GAP SERPL CALCULATED.3IONS-SCNC: 4 MMOL/L (ref 3–14)
BASOPHILS # BLD AUTO: 0.1 10E9/L (ref 0–0.2)
BASOPHILS NFR BLD AUTO: 0.8 %
BUN SERPL-MCNC: 10 MG/DL (ref 7–30)
CALCIUM SERPL-MCNC: 9.3 MG/DL (ref 8.5–10.1)
CHLORIDE SERPL-SCNC: 108 MMOL/L (ref 94–109)
CO2 SERPL-SCNC: 29 MMOL/L (ref 20–32)
CREAT SERPL-MCNC: 0.78 MG/DL (ref 0.52–1.04)
DIFFERENTIAL METHOD BLD: NORMAL
EOSINOPHIL # BLD AUTO: 0.4 10E9/L (ref 0–0.7)
EOSINOPHIL NFR BLD AUTO: 3.8 %
ERYTHROCYTE [DISTWIDTH] IN BLOOD BY AUTOMATED COUNT: 13 % (ref 10–15)
GFR SERPL CREATININE-BSD FRML MDRD: 78 ML/MIN/{1.73_M2}
GLUCOSE SERPL-MCNC: 87 MG/DL (ref 70–99)
HCT VFR BLD AUTO: 38.7 % (ref 35–47)
HGB BLD-MCNC: 12.5 G/DL (ref 11.7–15.7)
IMM GRANULOCYTES # BLD: 0 10E9/L (ref 0–0.4)
IMM GRANULOCYTES NFR BLD: 0.3 %
INTERPRETATION ECG - MUSE: NORMAL
LYMPHOCYTES # BLD AUTO: 2 10E9/L (ref 0.8–5.3)
LYMPHOCYTES NFR BLD AUTO: 21.3 %
MCH RBC QN AUTO: 29.3 PG (ref 26.5–33)
MCHC RBC AUTO-ENTMCNC: 32.3 G/DL (ref 31.5–36.5)
MCV RBC AUTO: 91 FL (ref 78–100)
MONOCYTES # BLD AUTO: 1 10E9/L (ref 0–1.3)
MONOCYTES NFR BLD AUTO: 10 %
NEUTROPHILS # BLD AUTO: 6.1 10E9/L (ref 1.6–8.3)
NEUTROPHILS NFR BLD AUTO: 63.8 %
NRBC # BLD AUTO: 0 10*3/UL
NRBC BLD AUTO-RTO: 0 /100
NT-PROBNP SERPL-MCNC: 32 PG/ML (ref 0–900)
PLATELET # BLD AUTO: 296 10E9/L (ref 150–450)
POTASSIUM SERPL-SCNC: 4 MMOL/L (ref 3.4–5.3)
RBC # BLD AUTO: 4.27 10E12/L (ref 3.8–5.2)
SODIUM SERPL-SCNC: 141 MMOL/L (ref 133–144)
TROPONIN I SERPL-MCNC: <0.015 UG/L (ref 0–0.04)
WBC # BLD AUTO: 9.6 10E9/L (ref 4–11)

## 2021-05-07 PROCEDURE — 71046 X-RAY EXAM CHEST 2 VIEWS: CPT

## 2021-05-07 PROCEDURE — 83880 ASSAY OF NATRIURETIC PEPTIDE: CPT | Performed by: NURSE PRACTITIONER

## 2021-05-07 PROCEDURE — 99285 EMERGENCY DEPT VISIT HI MDM: CPT | Mod: 25

## 2021-05-07 PROCEDURE — 84484 ASSAY OF TROPONIN QUANT: CPT | Performed by: NURSE PRACTITIONER

## 2021-05-07 PROCEDURE — 80048 BASIC METABOLIC PNL TOTAL CA: CPT | Performed by: NURSE PRACTITIONER

## 2021-05-07 PROCEDURE — 93971 EXTREMITY STUDY: CPT | Mod: LT

## 2021-05-07 PROCEDURE — 85025 COMPLETE CBC W/AUTO DIFF WBC: CPT | Performed by: NURSE PRACTITIONER

## 2021-05-07 PROCEDURE — 93005 ELECTROCARDIOGRAM TRACING: CPT

## 2021-05-07 RX ORDER — CEPHALEXIN 500 MG/1
500 CAPSULE ORAL 4 TIMES DAILY
Qty: 40 CAPSULE | Refills: 0 | Status: SHIPPED | OUTPATIENT
Start: 2021-05-07 | End: 2021-05-17

## 2021-05-07 ASSESSMENT — ENCOUNTER SYMPTOMS
MYALGIAS: 1
SHORTNESS OF BREATH: 1
COLOR CHANGE: 1

## 2021-05-07 ASSESSMENT — MIFFLIN-ST. JEOR: SCORE: 1714.55

## 2021-05-07 NOTE — ED PROVIDER NOTES
History   Chief Complaint:  Leg Pain and Leg Swelling       The history is provided by the patient.      Denita Flores is a 65 year old female with history of hypertension, type 2 diabetes, and cervical cancer who presents for evaluation of left lower leg redness and swelling. Denita has had left lower leg redness for some time with a diagnosis of possible varicose veins but no specific etiology.  She had a stress echocardiogram on 3/10 and a CT angiogram on 4/14, results below. She went to her cardiologist on 4/26 for chest pain due to hypertension. They doubled her dose of amlodipine and started her on Lipitor. She has had bilateral lower extremity swelling but noted worsening left lower leg swelling since the change in her medication. She reports slight shortness of breath when going to bed which she attributes to allergies and is relieved with cough drops and is not recently changed. She is not taking oral antibiotics.     Form 4/14/21 Cardiology:  CTA Coronary artery:   1. Mild left anterior descending artery disease.  2. Difficult to assess a small caliber distal circumflex because of  overlap with the coronary sinus but no obvious flow limiting lesions  noted.  3. Mild right coronary artery disease.  4. Technically difficult study because of body habitus and some step  artifact though the step artifact was not severe unlikely that a  flow-limiting lesions are present.  Per radiology.     From 3/10/21 Cardiology:  Exercise Stress Echocardiogram:  This was non-diagnostic stress echocardiogram due to suboptimal image quality.  (parasternal uninterpretable, apicals foreshortened and off axis)  There is suggestion of anterior wall motion abnormality in apical 2 chamber,  suggest further ischemic evaluation, consider cardiology consult    Review of Systems   Respiratory: Positive for shortness of breath.    Cardiovascular: Positive for chest pain and leg swelling.   Musculoskeletal: Positive for myalgias.  "  Skin: Positive for color change.   All other systems reviewed and are negative.    Allergies:  Contrast Dye  Sulfa Drugs    Medications:  Albuterol inhaler  Amlodipine   Aspirin 81 mg  Atorvastatin   Cholecalciferol   Cyanocobalamin    Fexofenadine   Levothyroxine   Montelukast   Prednisone     Past Medical History:     Cervical cancer   Colon polyps   Diabetes, type 2  Diverticulosis   Graves disease  Hypertension   Kidney stones  Malignant neoplasm  Mild persistent asthma  Morbid obesity   Nephrolithiasis   YELENA  Postablative hypothyroidism  Spider veins    Thyroid disease  Tubular adenoma of colon   Venous stasis dermatitis   Vitamin B12 deficiency     Past Surgical History:    Back surgery  Cardiac pacemaker   Colonoscopy with CO2 insufflation  Cystoscopy flexible  LEEP cervical     Family History:    Cancer  Diabetes   Heart disease    Social History:  Presents unaccompanied to the ED  PCP: Gee Hitchcock MD    Physical Exam     Patient Vitals for the past 24 hrs:   BP Temp Temp src Pulse Resp SpO2 Height Weight   05/07/21 1323 (!) 150/71 97.2  F (36.2  C) Temporal 96 16 96 % 1.715 m (5' 7.5\") 113.4 kg (250 lb)       Physical Exam  Nursing notes reviewed. Vitals reviewed.  General: Alert. Well kept.  Eyes:  Conjunctiva non-injected, non-icteric.  Neck/Throat: Moist mucous membranes.  Normal voice.  Cardiac: Regular rhythm. Normal heart sounds with no murmur/rubs/click.  2+ bilateral lower extremity edema.  2+ DP pulses bilateral.  Pulmonary: Clear and equal breath sounds bilaterally. No crackles/rales. No wheezing  Abdomen: Soft. Non-distended. Non-tender to palpation. No masses. No guarding or rebound.  Musculoskeletal: Normal gross range of motion of all 4 extremities.    Neurological: Alert and oriented x4.   Skin: Warm and dry without rashes or petechiae.  Warmth and erythema to the left anterior lower leg.  Psych: Affect normal. Good eye contact.     Emergency Department Course   ECG  ECG taken at " 1553, ECG read at 1553  Normal sinus rhythm. Nonspecific ST abnormality. Abnormal ECG.   No significant change as compared to prior, dated 4/6/21.  Rate 76 bpm. AZ interval 156 ms. QRS duration 86 ms. QT/QTc 402/452 ms. P-R-T axes 73 43 54.     Imaging:  US Lower Extremity Venous Duplex Left:  Negative for deep venous thrombosis in the left lower   extremity.  Per radiology.      XR Chest PA & LAT:  Mild central vascular congestion. Normal heart size. No   overt pulmonary edema. No focal pneumonic consolidation or pleural   effusion.   Per radiology.      Laboratory:   CBC: WBC 9.6, HGB 12.5,    BMP: all WNL (Creatinine 0.78)     BNP: 32  Troponin (Collected 1523): <0.015     Emergency Department Course:    Reviewed:  I reviewed nursing notes, vitals, past medical history and care everywhere    Assessments:  1540 I obtained history and examined the patient as noted above.   1635 I rechecked the patient and explained findings.     Disposition:  The patient was discharged to home.       Impression & Plan     Medical Decision Making:  Denita Flores is a 65 year old female who comes in for evaluation of leg symptoms as detailed above. She denies any trauma or injury. Differential considered included life threatening etiologies such as DVT, as well as muscle strain, baker's cyst, gout, arthritis and other musculoskeletal conditions are more likely. The ultrasound did not show a DVT.  EKG shows no ischemia and troponin is negative.  Chest x-ray shows no pulmonary vascular congestion, BNP is 32, and she denies orthopnea or PND, making congestive heart failure unlikely.  No indication for admission or further work-up today.  I will start the patient on cephalexin secondary to the warm erythema of the left lower leg and unilateral swelling with concern for cellulitis.  We also discussed elevating the leg as much as possible, using compression stockings and icing the knee.  She will follow-up with her cardiologist and  primary care next week.  I will not start the patient on diuretics until treatment with cephalexin is evaluated and she will follow up with cardiology regarding possible diuretics.  We did discuss that if the pain continues, she develops any change in her chronic shortness of breath, or increased swelling or chest pain then return immediately to the ED.  She left with complete understanding and agreement with this plan and no other complaints.     Covid-19  Denita Flores was evaluated during a global COVID-19 pandemic, which necessitated consideration that the patient might be at risk for infection with the SARS-CoV-2 virus that causes COVID-19.   Applicable protocols for evaluation were followed during the patient's care.   COVID-19 was considered as part of the patient's evaluation.     Diagnosis:    ICD-10-CM    1. Left leg swelling  M79.89    2. Cellulitis  L03.90        Discharge Medications:  New Prescriptions    CEPHALEXIN (KEFLEX) 500 MG CAPSULE    Take 1 capsule (500 mg) by mouth 4 times daily for 10 days       Scribe Disclosure:  Blank MENDES, am serving as a scribe at 3:11 PM on 5/7/2021 to document services personally performed by Lashawn Iglesias DNP based on my observations and the provider's statements to me.          Lashawn Iglesias, CNP  05/07/21 0936

## 2021-05-07 NOTE — TELEPHONE ENCOUNTER
"Patient calling in concerned about left leg swelling.   Has varicose veins.   Amlodipine increased to 10 mg in April  Started atorvastatin.   She stated that she has swelling in both ankles after an increase in amlodipine. She noticed the left leg is getting bigger the last few days.   She notified the heart clinic and they told her to call PCP.   Left leg is more swollen and \"hot to the touch and hard and big.\"  Swelling starts at the ankle and ends at her right below the knee. Per patient the left calf is a little tender. 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  Advised ED for evaluation.     Nai Roa RN on 5/7/2021 at 11:34 AM                Reason for Disposition    Thigh or calf pain and only 1 side and present > 1 hour    Thigh, calf, or ankle swelling in both legs, but one side is definitely more swollen (Exception: longstanding difference between legs)    Long-distance travel in past month (e.g., car, bus, train, plane; with trip lasting 6 or more hours)    Thigh, calf, or ankle swelling in both legs, but one side is definitely more swollen    Thigh or calf pain in only one leg and present > 1 hour    Additional Information    Negative: Sounds like a life-threatening emergency to the triager    Negative: Chest pain    Negative: Small area of swelling and followed an insect bite to the area    Negative: Followed a knee injury    Negative: Ankle or foot injury    Negative: Pregnant with leg swelling or edema    Negative: Difficulty breathing at rest    Negative: Entire foot is cool or blue in comparison to other side    Negative: SEVERE swelling (e.g., swelling extends above knee, entire leg is swollen, weeping fluid)    Negative: Thigh, calf, or ankle swelling in only one leg    Negative: Followed a hip injury    Negative: Followed a knee injury    Negative: Followed an ankle or foot injury    Negative: Back pain radiating (shooting) into leg(s)    " Negative: Foot pain is the main symptom    Negative: Ankle pain is the main symptom    Negative: Knee pain is the main symptom    Negative: Leg swelling is the main symptom    Negative: Looks like a broken bone or dislocated joint (e.g., crooked or deformed)    Negative: Sounds like a life-threatening emergency to the triager    Negative: Chest pain    Negative: Difficulty breathing    Negative: Entire foot is cool or blue in comparison to other side    Negative: Unable to walk    Negative: Illness requiring prolonged bedrest in past month (e.g., immobilization, long hospital stay)    Negative: Hip or leg fracture (broken bone) in past month (or had cast on leg or ankle in past month)    Negative: Major surgery in the past month    Negative: History of inherited increased risk of blood clots (e.g., factor 5 Leiden, antithrombin 3, protein C or protein S deficiency, prothrombin mutation)    Negative: History of prior 'blood clot' in leg or lungs (i.e., deep vein thrombosis, pulmonary embolism)    Negative: Thigh, calf, or ankle swelling in only one leg    Negative: Fever and swollen joint    Negative: Fever and red area (or area very tender to touch)    Protocols used: LEG SWELLING AND EDEMA-A-OH, LEG PAIN-A-OH

## 2021-05-07 NOTE — TELEPHONE ENCOUNTER
Patient called in to report that over the few days her left calf has become hard, swollen and red. Both ankles are swollen but this could be due to her Norvasc.    She feels the left calf problem might be due to her meds. I told her that I have a high robinson[piocion that this could be a clot. I advised her to call her PCP for guidance or go to the ED to get some imaging of the leg. I told her that she cannot dismiss this or wait to take action.    She has CAD vis CT coronary angiogram, HTN and dyslipidemia and she is on aspirin 81 MG.    She told me she has no history of blood clots.    I told her that I will follow up with her later today.

## 2021-05-07 NOTE — ED TRIAGE NOTES
Bilateral leg swelling since adjusting Amlodipine for blood pressure control 2 weeks ago, left leg pain since Monday.

## 2021-05-10 ENCOUNTER — CARE COORDINATION (OUTPATIENT)
Dept: CARDIOLOGY | Facility: CLINIC | Age: 66
End: 2021-05-10

## 2021-05-10 ENCOUNTER — NURSE TRIAGE (OUTPATIENT)
Dept: FAMILY MEDICINE | Facility: CLINIC | Age: 66
End: 2021-05-10

## 2021-05-10 NOTE — TELEPHONE ENCOUNTER
Pt calling, she declines in clinic or video or telephone visit.  She has physical scheduled with Dr. Hitchcock on 6/16/21.  She is seeing the heart doctor on 5/24/21.  Pt states she alf not feel she needs an appointment now.  She states the ER advised her to call her pmd in three days.  Pt states she is just sick of going to the doctors all the time.    Pt states she had left leg swelling last week and went to ER.  She was advised it was cellulitis and given antibiotic.  She started the antibiotic on Friday evening.  Her leg is not getting worse, it is about the same now.  She is also wearing compression stockings.  Pt may have scratched her leg that led to the infection.    To provider to review.  Lila FRANKLINN, RN

## 2021-05-10 NOTE — PROGRESS NOTES
Incoming VM from Denita stating that she was recommended to go to the ER by my colleague, MATILDE James, RN on Friday, for leg swelling and firmness.    Denita reports that she was diagnosed with cellulitis and prescribed compression socks and 10 days of cephalexin. She states she is feeling better. She just wanted to call to let us know. She did not request callback.     Future Appointments   Date Time Provider Department Center   5/18/2021 10:00 AM [facility]   5/24/2021 10:50 AM [facility]   6/16/2021 11:00 AM Gee Hitchcock MD [facility]       NOEMI JenkinsN, RN, PHN, HNB-BC   5/10/2021 at 12:24 PM

## 2021-05-11 NOTE — TELEPHONE ENCOUNTER
"Spoke with patient  She feels that her leg is slightly better today  She declined an appointment as she is \"sick of all the doctors appointments\"  She agreed to schedule if symptoms persist or worsen or if she changes her mind  Physical rescheduled from 6/16/2021 to Friday 6/11/2021 as requested by patient    Ramona Carballo RN  [facility]    "

## 2021-05-18 DIAGNOSIS — R93.1 ELEVATED CORONARY ARTERY CALCIUM SCORE: ICD-10-CM

## 2021-05-18 DIAGNOSIS — I25.10 CORONARY ARTERY DISEASE INVOLVING NATIVE CORONARY ARTERY OF NATIVE HEART, ANGINA PRESENCE UNSPECIFIED: ICD-10-CM

## 2021-05-18 DIAGNOSIS — R03.0 ELEVATED BLOOD PRESSURE READING WITHOUT DIAGNOSIS OF HYPERTENSION: ICD-10-CM

## 2021-05-18 DIAGNOSIS — E78.5 DYSLIPIDEMIA: ICD-10-CM

## 2021-05-18 DIAGNOSIS — I10 ESSENTIAL HYPERTENSION: ICD-10-CM

## 2021-05-18 DIAGNOSIS — R94.39 ABNORMAL CARDIOVASCULAR STRESS TEST: ICD-10-CM

## 2021-05-18 LAB
ALT SERPL W P-5'-P-CCNC: 19 U/L (ref 0–50)
CHOLEST SERPL-MCNC: 120 MG/DL
HDLC SERPL-MCNC: 59 MG/DL
LDLC SERPL CALC-MCNC: 46 MG/DL
NONHDLC SERPL-MCNC: 61 MG/DL
TRIGL SERPL-MCNC: 73 MG/DL

## 2021-05-18 PROCEDURE — 80061 LIPID PANEL: CPT | Performed by: NURSE PRACTITIONER

## 2021-05-18 PROCEDURE — 36415 COLL VENOUS BLD VENIPUNCTURE: CPT | Performed by: NURSE PRACTITIONER

## 2021-05-18 PROCEDURE — 84460 ALANINE AMINO (ALT) (SGPT): CPT | Performed by: NURSE PRACTITIONER

## 2021-05-18 NOTE — TELEPHONE ENCOUNTER
Denita called in again concerned her leg diagnosed with cellulitis doesn't look any better. Recommend patient be seen within 3 days, Denita able to tomorrow, warm transfer to scheduling to assist with [place] area scheduling- permission to take same-day appointment     Edilma Trammell RN, BSN, [facility]    Reason for Disposition    [1] Taking antibiotic > 72 hours (3 days) AND [2] cellulitis symptoms are SAME (not getting better)    Protocols used: CELLULITIS ON ANTIBIOTIC FOLLOW-UP CALL-A-AH

## 2021-05-19 ENCOUNTER — OFFICE VISIT (OUTPATIENT)
Dept: INTERNAL MEDICINE | Facility: CLINIC | Age: 66
End: 2021-05-19
Payer: COMMERCIAL

## 2021-05-19 VITALS
BODY MASS INDEX: 38.38 KG/M2 | DIASTOLIC BLOOD PRESSURE: 74 MMHG | WEIGHT: 248.7 LBS | OXYGEN SATURATION: 97 % | SYSTOLIC BLOOD PRESSURE: 132 MMHG | HEART RATE: 73 BPM | TEMPERATURE: 98.3 F | RESPIRATION RATE: 18 BRPM

## 2021-05-19 DIAGNOSIS — I87.2 VENOUS STASIS DERMATITIS OF LEFT LOWER EXTREMITY: ICD-10-CM

## 2021-05-19 DIAGNOSIS — R94.39 ABNORMAL CARDIOVASCULAR STRESS TEST: ICD-10-CM

## 2021-05-19 DIAGNOSIS — L03.116 CELLULITIS OF LEFT ANTERIOR LOWER LEG: Primary | ICD-10-CM

## 2021-05-19 PROCEDURE — 99214 OFFICE O/P EST MOD 30 MIN: CPT | Performed by: PHYSICIAN ASSISTANT

## 2021-05-19 RX ORDER — CLINDAMYCIN HCL 300 MG
300 CAPSULE ORAL 3 TIMES DAILY
Qty: 30 CAPSULE | Refills: 0 | Status: SHIPPED | OUTPATIENT
Start: 2021-05-19 | End: 2021-05-29

## 2021-05-19 NOTE — PROGRESS NOTES
Assessment & Plan     Cellulitis of left anterior lower leg  Change in abx course  Monitor  If not improving recheck   - clindamycin (CLEOCIN) 300 MG capsule; Take 1 capsule (300 mg) by mouth 3 times daily for 10 days    Venous stasis dermatitis of left lower extremity  Likely cause of infection    Abnormal cardiovascular stress test  Seeing cardiology next week                    Return in about 10 days (around 5/29/2021) for recheck if not improving, regular primary provider.    Gini Gore PA-C  [facility]    Miracle Barger is a 65 year old who presents for the following health issues     HPI     ED/ Followup:    Facility:  [facility]  Date of visit: 05/07/2021  Reason for visit: Cellulitis left leg  Current Status: same     Per ED visit  Denita Flores is a 65 year old female who comes in for evaluation of leg symptoms as detailed above. She denies any trauma or injury. Differential considered included life threatening etiologies such as DVT, as well as muscle strain, baker's cyst, gout, arthritis and other musculoskeletal conditions are more likely. The ultrasound did not show a DVT.  EKG shows no ischemia and troponin is negative.  Chest x-ray shows no pulmonary vascular congestion, BNP is 32, and she denies orthopnea or PND, making congestive heart failure unlikely.  No indication for admission or further work-up today.  I will start the patient on cephalexin secondary to the warm erythema of the left lower leg and unilateral swelling with concern for cellulitis.  We also discussed elevating the leg as much as possible, using compression stockings and icing the knee.  She will follow-up with her cardiologist and primary care next week.  I will not start the patient on diuretics until treatment with cephalexin is evaluated and she will follow up with cardiology regarding possible diuretics.    Patient has not noted any improvement in swelling,  redness or pain in the leg with the course of keflex.   No new symptoms         Review of Systems   Constitutional, HEENT, cardiovascular, pulmonary, gi and gu systems are negative, except as otherwise noted.      Objective    /74   Pulse 73   Temp 98.3  F (36.8  C) (Tympanic)   Resp 18   Wt 112.8 kg (248 lb 11.2 oz)   SpO2 97%   BMI 38.38 kg/m    Body mass index is 38.38 kg/m .  Physical Exam   GENERAL: healthy, alert and no distress  RESP: lungs clear to auscultation - no rales, rhonchi or wheezes  CV: regular rates and rhythm and normal S1 S2, no S3 or S4  SKIN:/ MS  Left lower leg-  Swelling noted diffusely from ankle to upper tibia.  Redness anterior lower leg warmth and tender to touch in that area.

## 2021-05-24 ENCOUNTER — OFFICE VISIT (OUTPATIENT)
Dept: CARDIOLOGY | Facility: CLINIC | Age: 66
End: 2021-05-24
Attending: NURSE PRACTITIONER
Payer: COMMERCIAL

## 2021-05-24 VITALS
BODY MASS INDEX: 41.82 KG/M2 | SYSTOLIC BLOOD PRESSURE: 121 MMHG | WEIGHT: 251 LBS | HEART RATE: 73 BPM | HEIGHT: 65 IN | DIASTOLIC BLOOD PRESSURE: 76 MMHG

## 2021-05-24 DIAGNOSIS — R93.1 ELEVATED CORONARY ARTERY CALCIUM SCORE: ICD-10-CM

## 2021-05-24 DIAGNOSIS — R03.0 ELEVATED BLOOD PRESSURE READING WITHOUT DIAGNOSIS OF HYPERTENSION: ICD-10-CM

## 2021-05-24 DIAGNOSIS — I25.10 CORONARY ARTERY DISEASE INVOLVING NATIVE CORONARY ARTERY OF NATIVE HEART, ANGINA PRESENCE UNSPECIFIED: ICD-10-CM

## 2021-05-24 DIAGNOSIS — R94.39 ABNORMAL CARDIOVASCULAR STRESS TEST: ICD-10-CM

## 2021-05-24 DIAGNOSIS — E78.5 DYSLIPIDEMIA: ICD-10-CM

## 2021-05-24 DIAGNOSIS — I10 ESSENTIAL HYPERTENSION: ICD-10-CM

## 2021-05-24 PROCEDURE — 99215 OFFICE O/P EST HI 40 MIN: CPT | Performed by: NURSE PRACTITIONER

## 2021-05-24 RX ORDER — LISINOPRIL 5 MG/1
5 TABLET ORAL DAILY
Qty: 90 TABLET | Refills: 3 | Status: SHIPPED | OUTPATIENT
Start: 2021-05-24 | End: 2022-04-14

## 2021-05-24 ASSESSMENT — MIFFLIN-ST. JEOR: SCORE: 1679.41

## 2021-05-24 NOTE — LETTER
5/24/2021    Gee Hitchcock MD  [address]  [place] [address]    RE: Denita Flores       Dear Colleague,    I had the pleasure of seeing Denita Flores in the [facility].  Cardiology Clinic Progress Note    Service Date: May 24, 2021    Primary Cardiologist: Dr. Varela      Reason for Visit: 1 month follow up    HPI:   I had the pleasure of seeing Ms. Denita Flores in the clinic today. She is a very nice 65 year old woman with a past medical history notable for Graves' disease, mild persistent asthma, obesity, history of what sounds like spinal stenosis with previous back surgery on L3-L4, recently diagnosed hypertension, and recent atypical chest pain.  She has a family history of cardiovascular disease with her father first having a heart attack at the age of 62 and her mother dying of a CVA at the age of 80.    She has been seen several times in the emergency department due to symptoms of chest pain over the past few months. She describes diffuse chest discomfort with occasional radiation to her shoulder and down her left arm. These do not occur on exertion.  The symptoms have been occurring fairly randomly over the past few months. She describes it as continuous when she has it.      Her work-up in the emergency department has been unremarkable with a negative troponin on 2/27/2021 and 2 negative troponins serially on 4/6/2021. EKGs have shown normal sinus rhythm and have been essentially normal without ischemic changes. An exercise stress echocardiogram was completed for further evaluation of her symptoms on 3/10/2021 and was nondiagnostic due to suboptimal image quality. There was some suggestion of a possible anterior wall motion abnormality in apical 2 chamber so formal cardiology consultation was recommended. However, there was no clear evidence of ischemia or infarction on the study and the baseline rest echocardiogram showed  normal left ventricular function and wall motion.    The patient was subsequently seen by Dr. Varela in the clinic in late March 2021 in cardiology consultation. A CT coronary angiogram was recommended for further evaluation of possible coronary artery disease. She was hypertensive and reportedly anxious in the clinic. It was recommended that she start on low-dose lisinopril 10 mg once daily and amlodipine 5 mg once daily for better blood pressure control. She had already been started on aspirin 81 mg daily prior to this.    The CT coronary angiogram was completed on 4/14/2021 showing mild left anterior descending artery disease. It was difficult to assess a small caliber distal circumflex because of overlap with the coronary sinus but no obvious flow limiting lesions noted. Mild right coronary artery disease was noted. It was noted to be a technically difficult study because of body habitus and some step artifact, though the step artifact was not severe and it is unlikely that flow-limiting lesions are present.    I met the patient initially about a month ago in follow up of her CT coronary angiogram results. She noted that she had been feeling better at that point as she had been trying to note her positioning at the onset of her symptoms more closely since that time. I recommended that she start on atorvastatin 40 mg once daily to try to push her LDL cholesterol under 70 for secondary prevention. Her blood pressure was mildly elevated in the clinic and she noted that she never ended up starting on lisinopril, so I also recommended she increase the dose of her amlodipine from 5 mg to 10 mg once daily for better blood pressure control.    Unfortunately, she ended up developing lower extremity edema after increasing the dose of her amlodipine. She also had some pain and erythema particularly in her left calf, so she ended up presenting to the Emergency Department on 05/07/21 for further evaluation. A left lower  extremity ultrasound was completed and was negative for DVT. Presentation was felt to be consistent with cellulitis so she was treated with a course of Keflex. She was seen by her PCP clinic and follow up and did not think her symptoms were improving significantly so she was started on a course of clindamycin.    Today, Ms. Flores presents to the clinic for a routine 1 month follow up visit after starting on atorvastatin and increasing her amlodipine dose. With the exception of the lower extremity edema and discomfort, the patient tells me that she has been feeling quite well. She remains free of chest, neck, or back pain.  She denies symptoms of shortness of breath. She has been checking her blood pressures fairly regularly at home with results primarily around 110 to 130 mmHg systolic. She notes that she has been trying to elevate her legs in the evenings and uses compression stocking occasionally but she has not felt that this has been very helpful for her swelling. She had a fasting lipid profile completed last week showing improvement in her cholesterol levels with an LDL at 46, HDL 59, total cholesterol 120, and triglycerides at 73.     ASSESSMENT AND PLAN:  1. Coronary artery disease  - On the basis of her recent CT coronary angiogram suggesting mild coronary disease in her LAD and RCA. She had a nondiagnostic stress echocardiogram in 03/2021 due to suboptimal image quality prior to the coronary CTA. The CTA report notes that it is unlikely that flow-limiting lesions are present.  -Seems to be stable without recurrent symptoms of chest pain currently.  Suspect her previous atypical chest discomfort was musculoskeletal in nature and secondary to radiation of chronic back/shoulder pain as her symptoms have resolved after trying to limit raising her arms above her head.  - Continue current regimen of aspirin 81 mg daily, atorvastatin 40 mg once daily, and amlodipine.  - Counseled on lifestyle modifications  including trying to stay active and get more regular exercise and stick to a heart healthy Mediterranean style diet in an effort to help lose some weight for rectus reduction as well.    2.  Bilateral lower extremity edema  - Possible that she has had cellulitis with some erythema and discomfort, although she feels that her symptoms have not improved significantly following course of Keflex and clindamycin.  Given the timing of onset of her worsening edema after increasing the dose of her amlodipine from 5 mg to 10 mg once daily, I suspect this is likely a contributing factor. She denies issues with edema on the lower dose of amlodipine at 5 mg daily in the past.  Recommend she decrease back to the 5 mg once daily dosing for amlodipine.  - Counseled on trying to stick to a low-sodium diet, elevating her legs, and wearing her compression stockings as needed to help with her edema as well.    3. Essential hypertension  - Recommend she decrease the dose of amlodipine from 10 mg to 5 mg once daily as above. Will add lisinopril 5 mg once daily to keep her blood pressure in a good range with the lower dose of amlodipine. Recommend a repeat basic metabolic panel in 1 to 2 weeks after starting on the lisinopril.    4. Dyslipidemia  - Treated on atorvastatin 40 mg once daily with LDL cholesterol at 46.    Thank you for the opportunity to participate in this pleasant patient's care. I will plan to have her see Dr. Varela for a routine annual follow-up visit around this time next year with a repeat fasting lipid profile beforehand.   encouraged her to call the clinic with any questions or concerns in the meantime, and we would be happy to arrange sooner follow-up if needed.    40 total minutes was spent today including chart review, precharting, history and exam, post visit documentation, and reviewing studies as outlined above.     Kirill Haile, APRN, CNP   Nurse Practitioner  [facility]  Pager:  [phone]  Text Page  (8am - 5pm, M-F)    Orders this Visit:  Orders Placed This Encounter   Procedures     Lipid panel reflex to direct LDL Fasting     ALT     Basic metabolic panel     Follow-Up with Cardiologist     Orders Placed This Encounter   Medications     lisinopril (ZESTRIL) 5 MG tablet     Sig: Take 1 tablet (5 mg) by mouth daily     Dispense:  90 tablet     Refill:  3     Medications Discontinued During This Encounter   Medication Reason     MEGARED OMEGA-3 KRILL  MG CAPS Therapy completed     Encounter Diagnoses   Name Primary?     Elevated coronary artery calcium score      Coronary artery disease involving native coronary artery of native heart, angina presence unspecified      Abnormal cardiovascular stress test      Essential hypertension      Dyslipidemia      Elevated blood pressure reading without diagnosis of hypertension      CURRENT MEDICATIONS:  Current Outpatient Medications   Medication Sig Dispense Refill     albuterol (PROAIR HFA/PROVENTIL HFA/VENTOLIN HFA) 108 (90 Base) MCG/ACT inhaler Inhale 2 puffs into the lungs every 6 hours as needed for shortness of breath / dyspnea 18 g 1     amLODIPine (NORVASC) 5 MG tablet Take 2 tablets (10 mg) by mouth daily 30 tablet 3     aspirin (ASA) 81 MG chewable tablet Take 81 mg by mouth daily       atorvastatin (LIPITOR) 40 MG tablet Take 1 tablet (40 mg) by mouth At Bedtime 90 tablet 3     Cholecalciferol (D3 ADULT PO) Take 2,000 Units by mouth daily.       clindamycin (CLEOCIN) 300 MG capsule Take 1 capsule (300 mg) by mouth 3 times daily for 10 days 30 capsule 0     cyanocolbalamin (VITAMIN  B-12) 100 MCG tablet Take 100 mcg by mouth daily.       fexofenadine (ALLEGRA) 180 MG tablet Take 1 tablet (180 mg) by mouth daily 90 tablet 3     fluticasone (FLONASE) 50 MCG/ACT nasal spray Spray 1 spray into both nostrils daily 48 g 3     levothyroxine (SYNTHROID/LEVOTHROID) 112 MCG tablet Profile Rx,TAKE 1 TABLET(112 MCG) BY MOUTH DAILY 90 tablet  3     lisinopril (ZESTRIL) 5 MG tablet Take 1 tablet (5 mg) by mouth daily 90 tablet 3     montelukast (SINGULAIR) 10 MG tablet TAKE 1 TABLET(10 MG) BY MOUTH AT BEDTIME 90 tablet 3     triamcinolone (KENALOG) 0.1 % external cream Apply sparingly to affected area three times daily as needed 80 g 0     ALLERGIES  Allergies   Allergen Reactions     Contrast Dye Hives and Itching     Isovue with ANTON on 19     Sulfa Drugs Hives and Swelling     Noted in 10/18/09 ER     PAST MEDICAL, SURGICAL, FAMILY HISTORY:  History was reviewed and updated as needed, see medical record.    SOCIAL HISTORY:  Social History     Socioeconomic History     Marital status:      Spouse name: Not on file     Number of children: Not on file     Years of education: Not on file     Highest education level: Not on file   Occupational History     Not on file   Social Needs     Financial resource strain: Not on file     Food insecurity     Worry: Not on file     Inability: Not on file     Transportation needs     Medical: Not on file     Non-medical: Not on file   Tobacco Use     Smoking status: Former Smoker     Packs/day: 1.50     Years: 35.00     Pack years: 52.50     Types: Cigarettes     Quit date: 2003     Years since quittin.4     Smokeless tobacco: Never Used   Substance and Sexual Activity     Alcohol use: Yes     Alcohol/week: 0.0 standard drinks     Comment: 3 dinks/ year     Drug use: No     Sexual activity: Not Currently   Lifestyle     Physical activity     Days per week: Not on file     Minutes per session: Not on file     Stress: Not on file   Relationships     Social connections     Talks on phone: Not on file     Gets together: Not on file     Attends Zoroastrian service: Not on file     Active member of club or organization: Not on file     Attends meetings of clubs or organizations: Not on file     Relationship status: Not on file     Intimate partner violence     Fear of current or ex partner: Not on file      "Emotionally abused: Not on file     Physically abused: Not on file     Forced sexual activity: Not on file   Other Topics Concern     Parent/sibling w/ CABG, MI or angioplasty before 65F 55M? No   Social History Narrative     Not on file     Review of Systems:  Skin:  Negative     Eyes:  Positive for glasses  ENT:       Respiratory:  Positive for shortness of breath;sleep apnea  Cardiovascular:    Positive for;fatigue;edema  Gastroenterology: Negative    Genitourinary:  Negative    Musculoskeletal:  Positive for back pain;neck pain  Neurologic:  Positive for headaches  Psychiatric:  Negative    Heme/Lymph/Imm:  Positive for allergies  Endocrine:  Positive for thyroid disorder     Physical Exam:  Vitals: /76   Pulse 73   Ht 1.651 m (5' 5\")   Wt 113.9 kg (251 lb)   BMI 41.77 kg/m     Wt Readings from Last 4 Encounters:   05/24/21 113.9 kg (251 lb)   05/19/21 112.8 kg (248 lb 11.2 oz)   05/07/21 113.4 kg (250 lb)   04/26/21 112.9 kg (249 lb)     CONSTITUTIONAL: Appears her stated age, well nourished, and in no acute distress.  HEENT: Pupils equal, round. Sclerae nonicteric.    C/V:  Regular rate and rhythm, normal S1 and S2, no S3 or S4, no murmur, rub or gallop.   RESP: Respirations are unlabored. Lungs are clear to auscultation bilaterally without wheezing, rales, or rhonchi.  EXTREM: 2+ LE edema on the left, 1+ LE edema on the right. Mild scattered erythema noted on the left ankle. No clubbing or cyanosis.   NEURO: Alert and oriented, cooperative. Gait steady. No gross focal deficits.   PSYCH: Affect appropriate. Mentation normal. Responds to questions appropriately.  SKIN: Warm and dry. No apparent rashes or bruising.    Recent Lab Results reviewed today:  LIPID RESULTS:  Lab Results   Component Value Date    CHOL 120 05/18/2021    HDL 59 05/18/2021    LDL 46 05/18/2021    TRIG 73 05/18/2021    CHOLHDLRATIO 3.5 06/22/2015     LIVER ENZYME RESULTS:  Lab Results   Component Value Date    AST 9 08/15/2017    " ALT 19 05/18/2021     CBC RESULTS:  Lab Results   Component Value Date    WBC 9.6 05/07/2021    RBC 4.27 05/07/2021    HGB 12.5 05/07/2021    HCT 38.7 05/07/2021    MCV 91 05/07/2021    MCH 29.3 05/07/2021    MCHC 32.3 05/07/2021    RDW 13.0 05/07/2021     05/07/2021     BMP RESULTS:  Lab Results   Component Value Date     05/07/2021    POTASSIUM 4.0 05/07/2021    CHLORIDE 108 05/07/2021    CO2 29 05/07/2021    ANIONGAP 4 05/07/2021    GLC 87 05/07/2021    BUN 10 05/07/2021    CR 0.78 05/07/2021    GFRESTIMATED 78 05/07/2021    GFRESTBLACK >90 05/07/2021    RORY 9.3 05/07/2021     This note was completed in part using Dragon voice recognition software. Although reviewed after completion, some word and grammatical errors may occur.    Thank you for allowing me to participate in the care of your patient.      Sincerely,     Tao Haile NP     [facility]  cc:   Tao Haile NP  [id number] [id number]

## 2021-05-24 NOTE — PROGRESS NOTES
Cardiology Clinic Progress Note    Service Date: May 24, 2021    Primary Cardiologist: Dr. Varela      Reason for Visit: 1 month follow up    HPI:   I had the pleasure of seeing Ms. Denita Flores in the clinic today. She is a very nice 65 year old woman with a past medical history notable for Graves' disease, mild persistent asthma, obesity, history of what sounds like spinal stenosis with previous back surgery on L3-L4, recently diagnosed hypertension, and recent atypical chest pain.  She has a family history of cardiovascular disease with her father first having a heart attack at the age of 62 and her mother dying of a CVA at the age of 80.    She has been seen several times in the emergency department due to symptoms of chest pain over the past few months. She describes diffuse chest discomfort with occasional radiation to her shoulder and down her left arm. These do not occur on exertion.  The symptoms have been occurring fairly randomly over the past few months. She describes it as continuous when she has it.      Her work-up in the emergency department has been unremarkable with a negative troponin on 2/27/2021 and 2 negative troponins serially on 4/6/2021. EKGs have shown normal sinus rhythm and have been essentially normal without ischemic changes. An exercise stress echocardiogram was completed for further evaluation of her symptoms on 3/10/2021 and was nondiagnostic due to suboptimal image quality. There was some suggestion of a possible anterior wall motion abnormality in apical 2 chamber so formal cardiology consultation was recommended. However, there was no clear evidence of ischemia or infarction on the study and the baseline rest echocardiogram showed normal left ventricular function and wall motion.    The patient was subsequently seen by Dr. Varela in the clinic in late March 2021 in cardiology consultation. A CT coronary angiogram was recommended for further evaluation of possible coronary artery  disease. She was hypertensive and reportedly anxious in the clinic. It was recommended that she start on low-dose lisinopril 10 mg once daily and amlodipine 5 mg once daily for better blood pressure control. She had already been started on aspirin 81 mg daily prior to this.    The CT coronary angiogram was completed on 4/14/2021 showing mild left anterior descending artery disease. It was difficult to assess a small caliber distal circumflex because of overlap with the coronary sinus but no obvious flow limiting lesions noted. Mild right coronary artery disease was noted. It was noted to be a technically difficult study because of body habitus and some step artifact, though the step artifact was not severe and it is unlikely that flow-limiting lesions are present.    I met the patient initially about a month ago in follow up of her CT coronary angiogram results. She noted that she had been feeling better at that point as she had been trying to note her positioning at the onset of her symptoms more closely since that time. I recommended that she start on atorvastatin 40 mg once daily to try to push her LDL cholesterol under 70 for secondary prevention. Her blood pressure was mildly elevated in the clinic and she noted that she never ended up starting on lisinopril, so I also recommended she increase the dose of her amlodipine from 5 mg to 10 mg once daily for better blood pressure control.    Unfortunately, she ended up developing lower extremity edema after increasing the dose of her amlodipine. She also had some pain and erythema particularly in her left calf, so she ended up presenting to the Emergency Department on 05/07/21 for further evaluation. A left lower extremity ultrasound was completed and was negative for DVT. Presentation was felt to be consistent with cellulitis so she was treated with a course of Keflex. She was seen by her PCP clinic and follow up and did not think her symptoms were improving  significantly so she was started on a course of clindamycin.    Today, Ms. Flores presents to the clinic for a routine 1 month follow up visit after starting on atorvastatin and increasing her amlodipine dose. With the exception of the lower extremity edema and discomfort, the patient tells me that she has been feeling quite well. She remains free of chest, neck, or back pain.  She denies symptoms of shortness of breath. She has been checking her blood pressures fairly regularly at home with results primarily around 110 to 130 mmHg systolic. She notes that she has been trying to elevate her legs in the evenings and uses compression stocking occasionally but she has not felt that this has been very helpful for her swelling. She had a fasting lipid profile completed last week showing improvement in her cholesterol levels with an LDL at 46, HDL 59, total cholesterol 120, and triglycerides at 73.     ASSESSMENT AND PLAN:  1. Coronary artery disease  - On the basis of her recent CT coronary angiogram suggesting mild coronary disease in her LAD and RCA. She had a nondiagnostic stress echocardiogram in 03/2021 due to suboptimal image quality prior to the coronary CTA. The CTA report notes that it is unlikely that flow-limiting lesions are present.  -Seems to be stable without recurrent symptoms of chest pain currently.  Suspect her previous atypical chest discomfort was musculoskeletal in nature and secondary to radiation of chronic back/shoulder pain as her symptoms have resolved after trying to limit raising her arms above her head.  - Continue current regimen of aspirin 81 mg daily, atorvastatin 40 mg once daily, and amlodipine.  - Counseled on lifestyle modifications including trying to stay active and get more regular exercise and stick to a heart healthy Mediterranean style diet in an effort to help lose some weight for rectus reduction as well.    2.  Bilateral lower extremity edema  - Possible that she has had  cellulitis with some erythema and discomfort, although she feels that her symptoms have not improved significantly following course of Keflex and clindamycin.  Given the timing of onset of her worsening edema after increasing the dose of her amlodipine from 5 mg to 10 mg once daily, I suspect this is likely a contributing factor. She denies issues with edema on the lower dose of amlodipine at 5 mg daily in the past.  Recommend she decrease back to the 5 mg once daily dosing for amlodipine.  - Counseled on trying to stick to a low-sodium diet, elevating her legs, and wearing her compression stockings as needed to help with her edema as well.    3. Essential hypertension  - Recommend she decrease the dose of amlodipine from 10 mg to 5 mg once daily as above. Will add lisinopril 5 mg once daily to keep her blood pressure in a good range with the lower dose of amlodipine. Recommend a repeat basic metabolic panel in 1 to 2 weeks after starting on the lisinopril.    4. Dyslipidemia  - Treated on atorvastatin 40 mg once daily with LDL cholesterol at 46.    Thank you for the opportunity to participate in this pleasant patient's care. I will plan to have her see Dr. Varela for a routine annual follow-up visit around this time next year with a repeat fasting lipid profile beforehand.   encouraged her to call the clinic with any questions or concerns in the meantime, and we would be happy to arrange sooner follow-up if needed.    40 total minutes was spent today including chart review, precharting, history and exam, post visit documentation, and reviewing studies as outlined above.     NANDA Loaiza, CNP   Nurse Practitioner  [facility] [place]  Pager: [phone]  Text Page  (8am - 5pm, M-F)    Orders this Visit:  Orders Placed This Encounter   Procedures     Lipid panel reflex to direct LDL Fasting     ALT     Basic metabolic panel     Follow-Up with Cardiologist     Orders Placed This Encounter   Medications      lisinopril (ZESTRIL) 5 MG tablet     Sig: Take 1 tablet (5 mg) by mouth daily     Dispense:  90 tablet     Refill:  3     Medications Discontinued During This Encounter   Medication Reason     MEGARED OMEGA-3 KRILL  MG CAPS Therapy completed     Encounter Diagnoses   Name Primary?     Elevated coronary artery calcium score      Coronary artery disease involving native coronary artery of native heart, angina presence unspecified      Abnormal cardiovascular stress test      Essential hypertension      Dyslipidemia      Elevated blood pressure reading without diagnosis of hypertension      CURRENT MEDICATIONS:  Current Outpatient Medications   Medication Sig Dispense Refill     albuterol (PROAIR HFA/PROVENTIL HFA/VENTOLIN HFA) 108 (90 Base) MCG/ACT inhaler Inhale 2 puffs into the lungs every 6 hours as needed for shortness of breath / dyspnea 18 g 1     amLODIPine (NORVASC) 5 MG tablet Take 2 tablets (10 mg) by mouth daily 30 tablet 3     aspirin (ASA) 81 MG chewable tablet Take 81 mg by mouth daily       atorvastatin (LIPITOR) 40 MG tablet Take 1 tablet (40 mg) by mouth At Bedtime 90 tablet 3     Cholecalciferol (D3 ADULT PO) Take 2,000 Units by mouth daily.       clindamycin (CLEOCIN) 300 MG capsule Take 1 capsule (300 mg) by mouth 3 times daily for 10 days 30 capsule 0     cyanocolbalamin (VITAMIN  B-12) 100 MCG tablet Take 100 mcg by mouth daily.       fexofenadine (ALLEGRA) 180 MG tablet Take 1 tablet (180 mg) by mouth daily 90 tablet 3     fluticasone (FLONASE) 50 MCG/ACT nasal spray Spray 1 spray into both nostrils daily 48 g 3     levothyroxine (SYNTHROID/LEVOTHROID) 112 MCG tablet Profile Rx,TAKE 1 TABLET(112 MCG) BY MOUTH DAILY 90 tablet 3     lisinopril (ZESTRIL) 5 MG tablet Take 1 tablet (5 mg) by mouth daily 90 tablet 3     montelukast (SINGULAIR) 10 MG tablet TAKE 1 TABLET(10 MG) BY MOUTH AT BEDTIME 90 tablet 3     triamcinolone (KENALOG) 0.1 % external cream Apply sparingly to affected area  three times daily as needed 80 g 0     ALLERGIES  Allergies   Allergen Reactions     Contrast Dye Hives and Itching     Isovue with ANTON on 19     Sulfa Drugs Hives and Swelling     Noted in 10/18/09 ER     PAST MEDICAL, SURGICAL, FAMILY HISTORY:  History was reviewed and updated as needed, see medical record.    SOCIAL HISTORY:  Social History     Socioeconomic History     Marital status:      Spouse name: Not on file     Number of children: Not on file     Years of education: Not on file     Highest education level: Not on file   Occupational History     Not on file   Social Needs     Financial resource strain: Not on file     Food insecurity     Worry: Not on file     Inability: Not on file     Transportation needs     Medical: Not on file     Non-medical: Not on file   Tobacco Use     Smoking status: Former Smoker     Packs/day: 1.50     Years: 35.00     Pack years: 52.50     Types: Cigarettes     Quit date: 2003     Years since quittin.4     Smokeless tobacco: Never Used   Substance and Sexual Activity     Alcohol use: Yes     Alcohol/week: 0.0 standard drinks     Comment: 3 dinks/ year     Drug use: No     Sexual activity: Not Currently   Lifestyle     Physical activity     Days per week: Not on file     Minutes per session: Not on file     Stress: Not on file   Relationships     Social connections     Talks on phone: Not on file     Gets together: Not on file     Attends Hindu service: Not on file     Active member of club or organization: Not on file     Attends meetings of clubs or organizations: Not on file     Relationship status: Not on file     Intimate partner violence     Fear of current or ex partner: Not on file     Emotionally abused: Not on file     Physically abused: Not on file     Forced sexual activity: Not on file   Other Topics Concern     Parent/sibling w/ CABG, MI or angioplasty before 65F 55M? No   Social History Narrative     Not on file     Review of  "Systems:  Skin:  Negative     Eyes:  Positive for glasses  ENT:       Respiratory:  Positive for shortness of breath;sleep apnea  Cardiovascular:    Positive for;fatigue;edema  Gastroenterology: Negative    Genitourinary:  Negative    Musculoskeletal:  Positive for back pain;neck pain  Neurologic:  Positive for headaches  Psychiatric:  Negative    Heme/Lymph/Imm:  Positive for allergies  Endocrine:  Positive for thyroid disorder     Physical Exam:  Vitals: /76   Pulse 73   Ht 1.651 m (5' 5\")   Wt 113.9 kg (251 lb)   BMI 41.77 kg/m     Wt Readings from Last 4 Encounters:   05/24/21 113.9 kg (251 lb)   05/19/21 112.8 kg (248 lb 11.2 oz)   05/07/21 113.4 kg (250 lb)   04/26/21 112.9 kg (249 lb)     CONSTITUTIONAL: Appears her stated age, well nourished, and in no acute distress.  HEENT: Pupils equal, round. Sclerae nonicteric.    C/V:  Regular rate and rhythm, normal S1 and S2, no S3 or S4, no murmur, rub or gallop.   RESP: Respirations are unlabored. Lungs are clear to auscultation bilaterally without wheezing, rales, or rhonchi.  EXTREM: 2+ LE edema on the left, 1+ LE edema on the right. Mild scattered erythema noted on the left ankle. No clubbing or cyanosis.   NEURO: Alert and oriented, cooperative. Gait steady. No gross focal deficits.   PSYCH: Affect appropriate. Mentation normal. Responds to questions appropriately.  SKIN: Warm and dry. No apparent rashes or bruising.    Recent Lab Results reviewed today:  LIPID RESULTS:  Lab Results   Component Value Date    CHOL 120 05/18/2021    HDL 59 05/18/2021    LDL 46 05/18/2021    TRIG 73 05/18/2021    CHOLHDLRATIO 3.5 06/22/2015     LIVER ENZYME RESULTS:  Lab Results   Component Value Date    AST 9 08/15/2017    ALT 19 05/18/2021     CBC RESULTS:  Lab Results   Component Value Date    WBC 9.6 05/07/2021    RBC 4.27 05/07/2021    HGB 12.5 05/07/2021    HCT 38.7 05/07/2021    MCV 91 05/07/2021    MCH 29.3 05/07/2021    MCHC 32.3 05/07/2021    RDW 13.0 " 05/07/2021     05/07/2021     BMP RESULTS:  Lab Results   Component Value Date     05/07/2021    POTASSIUM 4.0 05/07/2021    CHLORIDE 108 05/07/2021    CO2 29 05/07/2021    ANIONGAP 4 05/07/2021    GLC 87 05/07/2021    BUN 10 05/07/2021    CR 0.78 05/07/2021    GFRESTIMATED 78 05/07/2021    GFRESTBLACK >90 05/07/2021    RORY 9.3 05/07/2021      CC  Jonathan Varela MD  [id number]  [place] [address]    This note was completed in part using Dragon voice recognition software. Although reviewed after completion, some word and grammatical errors may occur.

## 2021-05-24 NOTE — PATIENT INSTRUCTIONS
Thank you for your visit with the [facility] today.    Today's plan:   1. Decrease the dose of amlodipine back to 5 mg once daily.   2. Start taking lisinopril 5 mg once daily for better blood pressure control.  3. Check labs in 1-2 weeks after starting lisinopril to recheck your potassium level and kidney function.   4. Continue to monitor your blood pressure at home and call the clinic if it begins to run consistently over 130/85.  5. Follow up with Dr. Varela around this time next year for a routine annual check in with repeat fasting labs before.     Results:   Your labs from last week look great with improvement in your cholesterol numbers after starting on the statin.   Component      Latest Ref Rng & Units 9/30/2019 5/18/2021   Cholesterol      <200 mg/dL 187 120   Triglycerides      <150 mg/dL 126 73   HDL Cholesterol      >49 mg/dL 54 59   LDL Cholesterol Calculated      <100 mg/dL 108 (H) 46   Non HDL Cholesterol      <130 mg/dL 133 (H) 61   ALT      0 - 50 U/L  19       If you have questions or concerns, please call my nurse team at [phone].    Scheduling phone number: [phone]    It was a pleasure seeing you today!     NANDA Loaiza, CNP  Nurse Practitioner  [facility]  May 24, 2021  ________________________________________________________

## 2021-05-28 DIAGNOSIS — L23.9 ALLERGIC CONTACT DERMATITIS, UNSPECIFIED TRIGGER: ICD-10-CM

## 2021-05-28 RX ORDER — TRIAMCINOLONE ACETONIDE 1 MG/G
CREAM TOPICAL
Qty: 80 G | Refills: 0 | Status: SHIPPED | OUTPATIENT
Start: 2021-05-28 | End: 2022-12-30

## 2021-05-28 NOTE — TELEPHONE ENCOUNTER
Patient requesting refill of her triamcinolone cream  Prescription approved per [facility] Refill Protocol.    Ramona Carballo RN  [facility]

## 2021-06-03 ENCOUNTER — NURSE TRIAGE (OUTPATIENT)
Dept: NURSING | Facility: CLINIC | Age: 66
End: 2021-06-03

## 2021-06-04 DIAGNOSIS — I10 ESSENTIAL HYPERTENSION: ICD-10-CM

## 2021-06-04 LAB
ANION GAP SERPL CALCULATED.3IONS-SCNC: 5 MMOL/L (ref 3–14)
BUN SERPL-MCNC: 12 MG/DL (ref 7–30)
CALCIUM SERPL-MCNC: 8.7 MG/DL (ref 8.5–10.1)
CHLORIDE SERPL-SCNC: 106 MMOL/L (ref 94–109)
CO2 SERPL-SCNC: 29 MMOL/L (ref 20–32)
CREAT SERPL-MCNC: 0.9 MG/DL (ref 0.52–1.04)
GFR SERPL CREATININE-BSD FRML MDRD: 66 ML/MIN/{1.73_M2}
GLUCOSE SERPL-MCNC: 107 MG/DL (ref 70–99)
POTASSIUM SERPL-SCNC: 3.8 MMOL/L (ref 3.4–5.3)
SODIUM SERPL-SCNC: 140 MMOL/L (ref 133–144)

## 2021-06-04 PROCEDURE — 36415 COLL VENOUS BLD VENIPUNCTURE: CPT | Performed by: NURSE PRACTITIONER

## 2021-06-04 PROCEDURE — 80048 BASIC METABOLIC PNL TOTAL CA: CPT | Performed by: NURSE PRACTITIONER

## 2021-06-04 NOTE — TELEPHONE ENCOUNTER
"Triage call.  Patient calling.    [facility]   Patient has appointment scheduled for labs on Friday. She wants to know if she needs to come on an empty stomach.      Per chart review, future lab orders for basic metabolic panel.  Per lab guide, no instructions listed that patient needs to be NPO.  Informed patient.  Patient verbalized understanding.    Aruna Vail RN 06/03/21 7:44 PM  [facility] Nurse Advisor    Reason for Disposition    Health Information question, no triage required and triager able to answer question    Additional Information    Negative: RN needs further essential information from caller in order to complete triage    Negative: Requesting regular office appointment    Negative: [1] Caller requesting NON-URGENT health information AND [2] PCP's office is the best resource    Protocols used: INFORMATION ONLY CALL - NO TRIAGE-A-    COVID 19 Nurse Triage Plan/Patient Instructions    Please be aware that novel coronavirus (COVID-19) may be circulating in the community. If you develop symptoms such as fever, cough, or SOB or if you have concerns about the presence of another infection including coronavirus (COVID-19), please contact your health care provider or visit [email or web address].     Disposition/Instructions    Additional COVID19 information to add for patients.   How can I protect others?  If you have symptoms (fever, cough, body aches or trouble breathing): Stay home and away from others (self-isolate) until:    At least 10 days have passed since your symptoms started, And     You ve had no fever--and no medicine that reduces fever--for 1 full day (24 hours), And      Your other symptoms have resolved (gotten better).     If you don t have symptoms, but a test showed that you have COVID-19 (you tested positive):    Stay home and away from others (self-isolate). Follow the tips under \"How do I self-isolate?\" below for 10 days (20 days if you " have a weak immune system).    You don't need to be retested for COVID-19 before going back to school or work. As long as you're fever-free and feeling better, you can go back to school, work and other activities after waiting the 10 or 20 days.     How do I self-isolate?    Stay in your own room, even for meals. Use your own bathroom if you can.     Stay away from others in your home. No hugging, kissing or shaking hands. No visitors.    Don t go to work, school or anywhere else.     Clean  high touch  surfaces often (doorknobs, counters, handles, etc.). Use a household cleaning spray or wipes. You ll find a full list on the EPA website:  www.epa.gov/pesticide-registration/list-n-disinfectants-use-against-sars-cov-2.    Cover your mouth and nose with a mask, tissue or washcloth to avoid spreading germs.    Wash your hands and face often. Use soap and water.    Caregivers in these groups are at risk for severe illness due to COVID-19:  o People 65 years and older  o People who live in a nursing home or long-term care facility  o People with chronic disease (lung, heart, cancer, diabetes, kidney, liver, immunologic)  o People who have a weakened immune system, including those who:  - Are in cancer treatment  - Take medicine that weakens the immune system, such as corticosteroids  - Had a bone marrow or organ transplant  - Have an immune deficiency  - Have poorly controlled HIV or AIDS  - Are obese (body mass index of 40 or higher)  - Smoke regularly    Caregivers should wear gloves while washing dishes, handling laundry and cleaning bedrooms and bathrooms.    Use caution when washing and drying laundry: Don t shake dirty laundry, and use the warmest water setting that you can.    For more tips, go to www.cdc.gov/coronavirus/2019-ncov/downloads/10Things.pdf.    How can I take care of myself?  1. Get lots of rest. Drink extra fluids (unless a doctor has told you not to).     2. Take Tylenol (acetaminophen) for fever or  pain. If you have liver or kidney problems, ask your family doctor if it s okay to take Tylenol.     Adults can take either:     650 mg (two 325 mg pills) every 4 to 6 hours, or     1,000 mg (two 500 mg pills) every 8 hours as needed.     Note: Don t take more than 3,000 mg in one day.   Acetaminophen is found in many medicines (both prescribed and over-the-counter medicines). Read all labels to be sure you don t take too much.     For children, check the Tylenol bottle for the right dose. The dose is based on the child s age or weight.    3. If you have other health problems (like cancer, heart failure, an organ transplant or severe kidney disease): Call your specialty clinic if you don t feel better in the next 2 days.    4. Know when to call 911: Emergency warning signs include:    Trouble breathing or shortness of breath    Pain or pressure in the chest that doesn t go away    Feeling confused like you haven t felt before, or not being able to wake up    Bluish-colored lips or face    What are the symptoms of COVID-19?     The most common symptoms are cough, fever and trouble breathing.     Less common symptoms include body aches, chills, diarrhea (loose, watery poops), fatigue (feeling very tired), headache, runny nose, sore throat and loss of smell.    COVID-19 can cause severe coughing (bronchitis) and lung infection (pneumonia).    How does it spread?     The virus may spread when a person coughs or sneezes into the air. The virus can travel about 6 feet this way, and it can live on surfaces.      Common  (household disinfectants) will kill the virus.    Who is at risk?  Anyone can catch COVID-19 if they re around someone who has the virus.    How can others protect themselves?     Stay away from people who have COVID-19 (or symptoms of COVID-19).    Wash hands often with soap and water. Or, use hand  with at least 60% alcohol.    Avoid touching the eyes, nose or mouth.     Wear a face mask  when you go out in public, when sick or when caring for a sick person.    Where can I get more information?    [facility]: About COVID-19: [email or web address]    CDC: What to Do If You re Sick: www.cdc.gov/coronavirus/2019-ncov/about/steps-when-sick.html    CDC: Ending Home Isolation: www.cdc.gov/coronavirus/2019-ncov/hcp/disposition-in-home-patients.html     CDC: Caring for Someone: www.cdc.gov/coronavirus/2019-ncov/if-you-are-sick/care-for-someone.html     [facility]: Interim Guidance for Hospital Discharge to Home: www.[facility]/diseases/coronavirus/hcp/hospdischarge.pdf    [facility] clinical trials (COVID-19 research studies): clinicalaffairs.[facility]-clinical-trials     Below are the COVID-19 hotlines at the [place] [facility] ([facility]). Interpreters are available.   o For health questions: Call 639-506-5328 or 1-621.923.3860 (7 a.m. to 7 p.m.)  o For questions about schools and childcare: Call 566-458-8706 or 1-856.150.8380 (7 a.m. to 7 p.m.)          Thank you for taking steps to prevent the spread of this virus.  o Limit your contact with others.  o Wear a simple mask to cover your cough.  o Wash your hands well and often.    Resources    [facility]: About COVID-19: [email or web address]    CDC: What to Do If You're Sick: www.cdc.gov/coronavirus/2019-ncov/about/steps-when-sick.html    CDC: Ending Home Isolation: www.cdc.gov/coronavirus/2019-ncov/hcp/disposition-in-home-patients.html     CDC: Caring for Someone: www.cdc.gov/coronavirus/2019-ncov/if-you-are-sick/care-for-someone.html     [facility]: Interim Guidance for Hospital Discharge to Home: www.[facility]./diseases/coronavirus/hcp/hospdischarge.pdf    [facility] clinical trials (COVID-19 research studies): clinicalaffairs.[facility]/n-clinical-trials     Below are the COVID-19 hotlines at the [facility] ([facility]). Interpreters are available.    [employer]  questions: Call 539-517-2420 or 1-932.295.1552 (7 a.m. to 7 p.m.)  o For questions about schools and childcare: Call 709-913-7621 or 1-890.837.5171 (7 a.m. to 7 p.m.)

## 2021-06-09 ENCOUNTER — TELEPHONE (OUTPATIENT)
Dept: CARDIOLOGY | Facility: CLINIC | Age: 66
End: 2021-06-09

## 2021-06-09 NOTE — TELEPHONE ENCOUNTER
Component      Latest Ref Rng & Units 6/4/2021   Sodium      133 - 144 mmol/L 140   Potassium      3.4 - 5.3 mmol/L 3.8   Chloride      94 - 109 mmol/L 106   Carbon Dioxide      20 - 32 mmol/L 29   Anion Gap      3 - 14 mmol/L 5   Glucose      70 - 99 mg/dL 107 (H)   Urea Nitrogen      7 - 30 mg/dL 12   Creatinine      0.52 - 1.04 mg/dL 0.90   GFR Estimate      >60 mL/min/1.73:m2 66   GFR Estimate If Black      >60 mL/min/1.73:m2 77   Calcium      8.5 - 10.1 mg/dL 8.7       ----- Message from Tao Haile NP sent at 6/7/2021  2:46 PM CDT -----  Please update the patient that her labs look good after starting the lisinopril with stable potassium and kidney function.  She can continue with this without changes and follow-up next May as previously planned.  Her blood sugar is mildly high if she has been fasting and it looks like it has been on the high side on her last couple of checks so would recommend following up with her PCP regarding this and would consider checking a hemoglobin A1c to screen for diabetes as it does not look like one has been checked for quite some time from what I can see. Thank you! Kirill Haile, APRN, CNP    Call placed to pt to review results and recommendations. Pt reports minimal swelling to BLE but improved from prior. Pt denies SOB / CHUNG. Pt monitors wt daily and was advised ot call should she have weight gain of more than 2LB in 1 day or 5LB in one week. Pt verbalized understanding and will follow up in 1 year or sooner as needed.   IZA Mcgee RN, BSN. 06/09/21 3:19 PM

## 2021-06-10 NOTE — PROGRESS NOTES
"SUBJECTIVE:   Denita Flores is a 65 year old female who presents for Preventive Visit.      Patient has been advised of split billing requirements and indicates understanding: Yes   Are you in the first 12 months of your Medicare coverage?  No    Healthy Habits:     In general, how would you rate your overall health?  Good    Frequency of exercise:  1 day/week    Duration of exercise:  Less than 15 minutes    Do you usually eat at least 4 servings of fruit and vegetables a day, include whole grains    & fiber and avoid regularly eating high fat or \"junk\" foods?  No    Taking medications regularly:  Yes    Medication side effects:  None    Ability to successfully perform activities of daily living:  No assistance needed    Home Safety:  No safety concerns identified    Hearing Impairment:  No hearing concerns    In the past 6 months, have you been bothered by leaking of urine?  No    In general, how would you rate your overall mental or emotional health?  Excellent      PHQ-2 Total Score: 0    Additional concerns today:  No    Do you feel safe in your environment? Yes    Have you ever done Advance Care Planning? (For example, a Health Directive, POLST, or a discussion with a medical provider or your loved ones about your wishes): No, advance care planning information given to patient to review.  Patient plans to discuss their wishes with loved ones or provider.         Fall risk  Fallen 2 or more times in the past year?: No  Any fall with injury in the past year?: No    Cognitive Screening   1) Repeat 3 items (Leader, Season, Table)    2) Clock draw: NORMAL  3) 3 item recall: Recalls 3 objects  Results: 3 items recalled: COGNITIVE IMPAIRMENT LESS LIKELY    Mini-CogTM Copyright BARRIE Valentine. Licensed by the author for use in [facility]; reprinted with permission (sonia@.[facility]). All rights reserved.      Do you have sleep apnea, excessive snoring or daytime drowsiness?: yes    Reviewed and updated as needed " this visit by clinical staff  Tobacco  Allergies  Meds   Med Hx  Surg Hx  Fam Hx  Soc Hx        Reviewed and updated as needed this visit by Provider                Social History     Tobacco Use     Smoking status: Former Smoker     Packs/day: 1.50     Years: 35.00     Pack years: 52.50     Types: Cigarettes     Quit date: 2003     Years since quittin.4     Smokeless tobacco: Never Used   Substance Use Topics     Alcohol use: Yes     Alcohol/week: 0.0 standard drinks     Comment: 3 dinks/ year     If you drink alcohol do you typically have >3 drinks per day or >7 drinks per week? No    Alcohol Use 2021   Prescreen: >3 drinks/day or >7 drinks/week? No   Prescreen: >3 drinks/day or >7 drinks/week? -   No flowsheet data found.            Current providers sharing in care for this patient include:   Patient Care Team:  Gee Hitchcock MD as PCP - General (Family Practice)  Agbeh, Cephas Mawuena, MD as MD (OB/Gyn)  Frankie Santos MD as Assigned Musculoskeletal Provider  Gee Hitchcock MD as Assigned PCP  Ip, Jonathan Baires MD as Assigned Heart and Vascular Provider    The following health maintenance items are reviewed in Epic and correct as of today:  Health Maintenance Due   Topic Date Due     MAMMO SCREENING  2021     Lab work is in process  Labs reviewed in EPIC  BP Readings from Last 3 Encounters:   21 110/76   21 121/76   21 132/74    Wt Readings from Last 3 Encounters:   21 111.1 kg (245 lb)   21 113.9 kg (251 lb)   21 112.8 kg (248 lb 11.2 oz)                  Patient Active Problem List   Diagnosis     Graves disease     Obesity     Diverticulosis of large intestine without hemorrhage     Colon polyps     History of cervical cancer     YELENA (obstructive sleep apnea)     Warts     CARDIOVASCULAR SCREENING; LDL GOAL LESS THAN 130     Hx of herpes zoster keratoconjunctivitis, os     Plantar warts     Stasis dermatitis of both legs      Obesity (BMI 30-39.9)     Postablative hypothyroidism     Other specified hypothyroidism,post ablative     Venous stasis dermatitis of left lower extremity     Vitamin B12 deficiency (non anemic)     Mild persistent asthma without complication     Spider veins of both lower extremities     Seasonal allergic rhinitis due to other allergic trigger     Family history of colon cancer     Epistaxis     Tubular adenoma of colon     Family history of renal cancer     Nephrolithiasis     Gross hematuria     Morbid obesity (H)     Elevated blood pressure reading without diagnosis of hypertension     Polyp of gallbladder, 4mm     CARDIOVASCULAR SCREENING; LDL GOAL LESS THAN 160     Microscopic hematuria     Bilateral hand pain     Abnormal cardiovascular stress test     Essential hypertension     Hyperlipidemia LDL goal <100     Past Surgical History:   Procedure Laterality Date     BACK SURGERY       COLONOSCOPY WITH CO2 INSUFFLATION N/A 2017    Procedure: COLONOSCOPY WITH CO2 INSUFFLATION;  Surgeon: Duane, William Charles, MD;  Location: [facility] OR     CYSTOSCOPY FLEXIBLE  8/10/2018     LEEP TX, CERVICAL      in her 20's       Social History     Tobacco Use     Smoking status: Former Smoker     Packs/day: 1.50     Years: 35.00     Pack years: 52.50     Types: Cigarettes     Quit date: 2003     Years since quittin.4     Smokeless tobacco: Never Used   Substance Use Topics     Alcohol use: Yes     Alcohol/week: 0.0 standard drinks     Comment: 3 dinks/ year     Family History   Problem Relation Age of Onset     Glaucoma Mother      Heart Disease Father      Cancer Father 67        kidney cancer      Heart Disease Sister      Cancer Sister 59        colon cancer      Diabetes Sister      Colon Cancer Maternal Aunt      Colon Cancer Paternal Aunt      Glaucoma Maternal Grandmother          Current Outpatient Medications   Medication Sig Dispense Refill     albuterol (PROAIR HFA/PROVENTIL HFA/VENTOLIN HFA) 108 (90 Base)  MCG/ACT inhaler Inhale 2 puffs into the lungs every 6 hours as needed for shortness of breath / dyspnea 18 g 1     amLODIPine (NORVASC) 5 MG tablet Take 2 tablets (10 mg) by mouth daily 30 tablet 3     aspirin (ASA) 81 MG chewable tablet Take 81 mg by mouth daily       atorvastatin (LIPITOR) 40 MG tablet Take 1 tablet (40 mg) by mouth At Bedtime 90 tablet 3     Cholecalciferol (D3 ADULT PO) Take 2,000 Units by mouth daily.       cyanocolbalamin (VITAMIN  B-12) 100 MCG tablet Take 100 mcg by mouth daily.       fexofenadine (ALLEGRA) 180 MG tablet Take 1 tablet (180 mg) by mouth daily 90 tablet 3     fluticasone (FLONASE) 50 MCG/ACT nasal spray Spray 1 spray into both nostrils daily 48 g 3     levothyroxine (SYNTHROID/LEVOTHROID) 112 MCG tablet Profile Rx,TAKE 1 TABLET(112 MCG) BY MOUTH DAILY 90 tablet 3     lisinopril (ZESTRIL) 5 MG tablet Take 1 tablet (5 mg) by mouth daily 90 tablet 3     montelukast (SINGULAIR) 10 MG tablet TAKE 1 TABLET(10 MG) BY MOUTH AT BEDTIME 90 tablet 3     triamcinolone (KENALOG) 0.1 % external cream Apply sparingly to affected area three times daily as needed 80 g 0     Allergies   Allergen Reactions     Contrast Dye Hives and Itching     Isovue with ANTON on 1-21-19     Sulfa Drugs Hives and Swelling     Noted in 10/18/09 ER     Recent Labs   Lab Test 06/04/21  1048 05/18/21  0946 05/07/21  1523 02/27/21  1230 02/27/21  1230 07/20/20  1127 09/30/19  0912 09/30/19  0912 09/07/18  1043 08/15/17  1028 08/15/17  1028 05/08/13  1140 05/08/13  1140   LDL  --  46  --   --   --   --   --  108* 87  --  101*   < > 112   HDL  --  59  --   --   --   --   --  54 47*  --  52   < > 50   TRIG  --  73  --   --   --   --   --  126 126  --  124   < > 125   ALT  --  19  --   --   --   --   --   --   --   --  20  --  24   CR 0.90  --  0.78   < > 0.77  --    < >  --   --    < > 0.76  --  0.72   GFRESTIMATED 66  --  78   < > 80  --    < >  --   --    < > 77  --  83   GFRESTBLACK 77  --  >90   < > >90  --    < >   --   --    < > >90  --  >90   POTASSIUM 3.8  --  4.0   < > 4.5  --    < >  --   --    < > 4.0  --  4.2   TSH  --   --   --   --  2.02 1.25  --  1.29 0.26*   < > 0.36*   < > 1.58    < > = values in this interval not displayed.      Pneumonia Vaccine:UTD  Mammogram Screening: Mammogram Screening: Recommended mammography every 1-2 years with patient discussion and risk factor consideration  History of abnormal Pap smear: NO - age 64 - see link Cervical Cytology Screening Guidelines  Last 3 Pap and HPV Results:   PAP / HPV Latest Ref Rng & Units 9/7/2018 11/24/2015 10/14/2014   PAP - NIL NIL NIL   HPV 16 DNA NEG:Negative Negative - -   HPV 18 DNA NEG:Negative Negative - -   OTHER HR HPV NEG:Negative Negative - -     Any new diagnosis of family breast, ovarian, or bowel cancer? No    FSH-7: No flowsheet data found.  click delete button to remove this line now    Pertinent mammograms are reviewed under the imaging tab.    CONSTITUTIONAL: NEGATIVE for fever, chills, change in weight  INTEGUMENTARY/SKIN: NEGATIVE for worrisome rashes, moles or lesions  EYES: NEGATIVE for vision changes or irritation  ENT/MOUTH: NEGATIVE for ear, mouth and throat problems  ENT/MOUTH: History of chronic allergies  RESP: NEGATIVE for significant cough or SOB  RESP: Mild persistent asthma  BREAST: NEGATIVE for masses, tenderness or discharge  CV: NEGATIVE for chest pain, palpitations or peripheral edema  CV: History of hypertension, coronary artery disease  GI: NEGATIVE for nausea, abdominal pain, heartburn, or change in bowel habits  : Postmenopausal  MUSCULOSKELETAL: NEGATIVE for significant arthralgias or myalgia  NEURO: NEGATIVE for weakness, dizziness or paresthesias  ENDOCRINE: NEGATIVE for temperature intolerance, skin/hair changes and history of hypothyroidism  HEME/ALLERGY/IMMUNE: NEGATIVE for bleeding problems  PSYCHIATRIC: NEGATIVE for changes in mood or affect    OBJECTIVE:   /76   Pulse 59   Temp 97.1  F (36.2  C)  "(Tympanic)   Resp 16   Ht 1.702 m (5' 7\")   Wt 111.1 kg (245 lb)   SpO2 97%   BMI 38.37 kg/m   Estimated body mass index is 38.37 kg/m  as calculated from the following:    Height as of this encounter: 1.702 m (5' 7\").    Weight as of this encounter: 111.1 kg (245 lb).  Physical Exam  GENERAL APPEARANCE: healthy, alert and no distress  EYES: Eyes grossly normal to inspection, PERRL and conjunctivae and sclerae normal  HENT: ear canals and TM's normal, nose and mouth without ulcers or lesions, oropharynx clear and oral mucous membranes moist  NECK: no adenopathy, no asymmetry, masses, or scars and thyroid normal to palpation  RESP: lungs clear to auscultation - no rales, rhonchi or wheezes  BREAST: normal without masses, tenderness or nipple discharge and no palpable axillary masses or adenopathy  CV: regular rate and rhythm, normal S1 S2, no S3 or S4, no murmur, click or rub, no peripheral edema and peripheral pulses strong  ABDOMEN: soft, nontender, no hepatosplenomegaly, no masses and bowel sounds normal  MS: no musculoskeletal defects are noted and gait is age appropriate without ataxia  SKIN: no suspicious lesions or rashes  NEURO: Normal strength and tone, sensory exam grossly normal, mentation intact and speech normal  PSYCH: mentation appears normal and affect normal/bright    Diagnostic Test Results:  Labs reviewed in Epic    ASSESSMENT / PLAN:   1. Encounter for annual physical exam  Discussed on regular exercises, daily calcium intake, healthy eating, self breast exams monthly and routine dental checks      2. Encounter for screening mammogram for malignant neoplasm of breast    - MA SCREENING DIGITAL BILAT - Future  (s+30); Future    3. Mild persistent asthma without complication  Stable continue with current Singulair 10 mg daily, albuterol inhaler as needed, follow for recheck in 1 year or sooner if needed  - montelukast (SINGULAIR) 10 MG tablet; TAKE 1 TABLET(10 MG) BY MOUTH AT BEDTIME  Dispense: " 90 tablet; Refill: 3  - albuterol (PROAIR HFA/PROVENTIL HFA/VENTOLIN HFA) 108 (90 Base) MCG/ACT inhaler; Inhale 2 puffs into the lungs every 6 hours as needed for shortness of breath / dyspnea  Dispense: 18 g; Refill: 1    4. Seasonal allergic rhinitis due to other allergic trigger    - fluticasone (FLONASE) 50 MCG/ACT nasal spray; Spray 1 spray into both nostrils daily  Dispense: 48 g; Refill: 3  - fexofenadine (ALLEGRA) 180 MG tablet; Take 1 tablet (180 mg) by mouth daily  Dispense: 90 tablet; Refill: 3    5. Postablative hypothyroidism  TSH   Date Value Ref Range Status   02/27/2021 2.02 0.40 - 4.00 mU/L Final   07/20/2020 1.25 0.40 - 4.00 mU/L Final   09/30/2019 1.29 0.40 - 4.00 mU/L Final   09/07/2018 0.26 (L) 0.40 - 4.00 mU/L Final   02/20/2018 0.35 (L) 0.40 - 4.00 mU/L Final     Reviewed normal thyroid labs, continue with current dose of levothyroxine  - levothyroxine (SYNTHROID/LEVOTHROID) 112 MCG tablet; Profile Rx,TAKE 1 TABLET(112 MCG) BY MOUTH DAILY  Dispense: 90 tablet; Refill: 3    6. Microscopic hematuria  Chronic, had a work-up from Dr. Fenton in urology in 2018  Patient reported that during her recent physical at work, she was not informed of any concerns about hematuria this time  We will get a repeat UA today  - *UA reflex to Microscopic and Culture ([facility] (except [place] and [place])        8. Coronary artery disease involving native coronary artery of native heart, angina presence unspecified  Stable, continue to follow-up with cardiology, continue with statin and lisinopril        10. Essential hypertension  BP Readings from Last 6 Encounters:   06/11/21 110/76   05/24/21 121/76   05/19/21 132/74   05/07/21 137/79   04/26/21 (!) 154/81   04/14/21 120/69     Blood pressure is at goal, continue with current dose of amlodipine, lisinopril, low-salt diet, regular exercises and weight loss    11. Spider veins of both lower extremities  Recommended compression stockings or Ace  "wrap, keep the legs elevated while resting, keep the skin on the legs well moisturized to avoid cracking and secondary infection leading to cellulitis    12. Family history of colon cancer  Continue colonoscopy every 5 years, is due for recheck in 2022    13. Tubular adenoma of colon  as above      14. Vitamin B12 deficiency (non anemic)  Continue with over-the-counter B12 supplements daily    15. Obesity (BMI 30-39.9)  Wt Readings from Last 5 Encounters:   06/11/21 111.1 kg (245 lb)   05/24/21 113.9 kg (251 lb)   05/19/21 112.8 kg (248 lb 11.2 oz)   05/07/21 113.4 kg (250 lb)   04/26/21 112.9 kg (249 lb)     Appreciated patient's efforts on weight loss, continue with healthy eating, portion control, regular exercises    16. Hyperlipidemia LDL goal <100  LDL Cholesterol Calculated   Date Value Ref Range Status   05/18/2021 46 <100 mg/dL Final     Comment:     Desirable:       <100 mg/dl     LDL is at goal on current dose of Lipitor 40 mg daily  Continue along with baby aspirin      Patient has been advised of split billing requirements and indicates understanding: Yes  COUNSELING:  Reviewed preventive health counseling, as reflected in patient instructions  Special attention given to:       Regular exercise       Healthy diet/nutrition       Vision screening       Hearing screening       Dental care       Bladder control       Fall risk prevention       Osteoporosis prevention/bone health       Colon cancer screening       ([place])menopause management       Advanced Planning     Estimated body mass index is 38.37 kg/m  as calculated from the following:    Height as of this encounter: 1.702 m (5' 7\").    Weight as of this encounter: 111.1 kg (245 lb).    Weight management plan: Discussed healthy diet and exercise guidelines    She reports that she quit smoking about 18 years ago. Her smoking use included cigarettes. She has a 52.50 pack-year smoking history. She has never used smokeless tobacco.      Appropriate " preventive services were discussed with this patient, including applicable screening as appropriate for cardiovascular disease, diabetes, osteopenia/osteoporosis, and glaucoma.  As appropriate for age/gender, discussed screening for colorectal cancer, prostate cancer, breast cancer, and cervical cancer. Checklist reviewing preventive services available has been given to the patient.    Reviewed patients plan of care and provided an AVS. The Intermediate Care Plan ( asthma action plan, low back pain action plan, and migraine action plan) for Denita meets the Care Plan requirement. This Care Plan has been established and reviewed with the Patient.    Counseling Resources:  ATP IV Guidelines  Pooled Cohorts Equation Calculator  Breast Cancer Risk Calculator  Breast Cancer: Medication to Reduce Risk  FRAX Risk Assessment  ICSI Preventive Guidelines  Dietary Guidelines for Americans, 2010  USDA's MyPlate  ASA Prophylaxis  Lung CA Screening    Gee Hitchcock MD  [facility]    Identified Health Risks:  Chart documentation done in part with Dragon Voice recognition Software. Although reviewed after completion, some word and grammatical error may remain.

## 2021-06-10 NOTE — PATIENT INSTRUCTIONS
Patient Education   Personalized Prevention Plan  You are due for the preventive services outlined below.  Your care team is available to assist you in scheduling these services.  If you have already completed any of these items, please share that information with your care team to update in your medical record.  Health Maintenance Due   Topic Date Due     ANNUAL REVIEW OF HM ORDERS  Never done     Discuss Advance Care Planning  12/12/2018     Annual Wellness Visit  09/30/2020     Mammogram  01/02/2021

## 2021-06-11 ENCOUNTER — OFFICE VISIT (OUTPATIENT)
Dept: FAMILY MEDICINE | Facility: CLINIC | Age: 66
End: 2021-06-11
Payer: COMMERCIAL

## 2021-06-11 VITALS
WEIGHT: 245 LBS | HEART RATE: 59 BPM | RESPIRATION RATE: 16 BRPM | HEIGHT: 67 IN | DIASTOLIC BLOOD PRESSURE: 76 MMHG | SYSTOLIC BLOOD PRESSURE: 110 MMHG | BODY MASS INDEX: 38.45 KG/M2 | OXYGEN SATURATION: 97 % | TEMPERATURE: 97.1 F

## 2021-06-11 DIAGNOSIS — E78.5 HYPERLIPIDEMIA LDL GOAL <100: ICD-10-CM

## 2021-06-11 DIAGNOSIS — D12.6 TUBULAR ADENOMA OF COLON: ICD-10-CM

## 2021-06-11 DIAGNOSIS — I10 ESSENTIAL HYPERTENSION: ICD-10-CM

## 2021-06-11 DIAGNOSIS — J45.30 MILD PERSISTENT ASTHMA WITHOUT COMPLICATION: ICD-10-CM

## 2021-06-11 DIAGNOSIS — Z80.0 FAMILY HISTORY OF COLON CANCER: ICD-10-CM

## 2021-06-11 DIAGNOSIS — E53.8 VITAMIN B12 DEFICIENCY (NON ANEMIC): ICD-10-CM

## 2021-06-11 DIAGNOSIS — R31.29 MICROSCOPIC HEMATURIA: ICD-10-CM

## 2021-06-11 DIAGNOSIS — I83.93 SPIDER VEINS OF BOTH LOWER EXTREMITIES: ICD-10-CM

## 2021-06-11 DIAGNOSIS — J30.89 SEASONAL ALLERGIC RHINITIS DUE TO OTHER ALLERGIC TRIGGER: ICD-10-CM

## 2021-06-11 DIAGNOSIS — E89.0 POSTABLATIVE HYPOTHYROIDISM: ICD-10-CM

## 2021-06-11 DIAGNOSIS — Z12.31 ENCOUNTER FOR SCREENING MAMMOGRAM FOR MALIGNANT NEOPLASM OF BREAST: ICD-10-CM

## 2021-06-11 DIAGNOSIS — Z00.00 ENCOUNTER FOR ANNUAL PHYSICAL EXAM: Primary | ICD-10-CM

## 2021-06-11 DIAGNOSIS — I25.10 CORONARY ARTERY DISEASE INVOLVING NATIVE CORONARY ARTERY OF NATIVE HEART, ANGINA PRESENCE UNSPECIFIED: ICD-10-CM

## 2021-06-11 DIAGNOSIS — E66.9 OBESITY (BMI 30-39.9): ICD-10-CM

## 2021-06-11 LAB
ALBUMIN UR-MCNC: NEGATIVE MG/DL
APPEARANCE UR: CLEAR
BACTERIA #/AREA URNS HPF: ABNORMAL /HPF
BILIRUB UR QL STRIP: NEGATIVE
COLOR UR AUTO: YELLOW
GLUCOSE UR STRIP-MCNC: NEGATIVE MG/DL
HGB UR QL STRIP: ABNORMAL
KETONES UR STRIP-MCNC: NEGATIVE MG/DL
LEUKOCYTE ESTERASE UR QL STRIP: ABNORMAL
NITRATE UR QL: NEGATIVE
NON-SQ EPI CELLS #/AREA URNS LPF: ABNORMAL /LPF
PH UR STRIP: 7 PH (ref 5–7)
RBC #/AREA URNS AUTO: ABNORMAL /HPF
RENAL EPI CELLS #/AREA URNS HPF: ABNORMAL /HPF
SOURCE: ABNORMAL
SP GR UR STRIP: 1.02 (ref 1–1.03)
UROBILINOGEN UR STRIP-ACNC: 0.2 EU/DL (ref 0.2–1)
WBC #/AREA URNS AUTO: ABNORMAL /HPF

## 2021-06-11 PROCEDURE — 81001 URINALYSIS AUTO W/SCOPE: CPT | Performed by: FAMILY MEDICINE

## 2021-06-11 PROCEDURE — 99397 PER PM REEVAL EST PAT 65+ YR: CPT | Performed by: FAMILY MEDICINE

## 2021-06-11 RX ORDER — FLUTICASONE PROPIONATE 50 MCG
1 SPRAY, SUSPENSION (ML) NASAL DAILY
Qty: 48 G | Refills: 3 | Status: SHIPPED | OUTPATIENT
Start: 2021-06-11 | End: 2022-06-14

## 2021-06-11 RX ORDER — MONTELUKAST SODIUM 10 MG/1
TABLET ORAL
Qty: 90 TABLET | Refills: 3 | Status: SHIPPED | OUTPATIENT
Start: 2021-06-11 | End: 2022-07-20

## 2021-06-11 RX ORDER — FEXOFENADINE HCL 180 MG/1
180 TABLET ORAL DAILY
Qty: 90 TABLET | Refills: 3 | Status: SHIPPED | OUTPATIENT
Start: 2021-06-11 | End: 2022-06-07

## 2021-06-11 RX ORDER — ALBUTEROL SULFATE 90 UG/1
2 AEROSOL, METERED RESPIRATORY (INHALATION) EVERY 6 HOURS PRN
Qty: 18 G | Refills: 1 | Status: SHIPPED | OUTPATIENT
Start: 2021-06-11 | End: 2022-10-31

## 2021-06-11 RX ORDER — LEVOTHYROXINE SODIUM 112 UG/1
TABLET ORAL
Qty: 90 TABLET | Refills: 3 | Status: SHIPPED | OUTPATIENT
Start: 2021-06-11 | End: 2022-09-01

## 2021-06-11 ASSESSMENT — ENCOUNTER SYMPTOMS
HEMATOCHEZIA: 0
DYSURIA: 0
COUGH: 0
HEARTBURN: 0
NERVOUS/ANXIOUS: 0
HEMATURIA: 0
HEADACHES: 0
PALPITATIONS: 0
WEAKNESS: 0
PARESTHESIAS: 0
JOINT SWELLING: 1
BREAST MASS: 0
ABDOMINAL PAIN: 0
CHILLS: 0
SHORTNESS OF BREATH: 0
NAUSEA: 0
SORE THROAT: 0
CONSTIPATION: 0
MYALGIAS: 0
ARTHRALGIAS: 1
DIARRHEA: 0
FEVER: 0
FREQUENCY: 0
DIZZINESS: 0
EYE PAIN: 0

## 2021-06-11 ASSESSMENT — MIFFLIN-ST. JEOR: SCORE: 1683.94

## 2021-06-11 ASSESSMENT — ACTIVITIES OF DAILY LIVING (ADL): CURRENT_FUNCTION: NO ASSISTANCE NEEDED

## 2021-06-11 NOTE — RESULT ENCOUNTER NOTE
Please inform patient of lab results-moderate amount of blood in the urine, as it was before  I would recommend to have a follow-up with Dr. Fenton in urology for recheck

## 2021-06-16 ENCOUNTER — TELEPHONE (OUTPATIENT)
Dept: CARDIOLOGY | Facility: CLINIC | Age: 66
End: 2021-06-16

## 2021-06-16 NOTE — TELEPHONE ENCOUNTER
JOYCE SHAHID/Kirill Haile CNP 5/24/21 for routine follow up in pt with Hx of Graves' disease, mild persistent asthma, obesity, history of what sounds like spinal stenosis with previous back surgery on L3-L4, recently diagnosed hypertension, and recent atypical chest pain. Pt reports increased edema after initiation of amlodipine 10mg daily. Plan to decrease amlodipine to 5mg daily. Start Lisinopril 5mg daily to assist with BP control. Plan for BMP 2 weeks.   Results of BMP as shown.   Follow up ordered for 5/2022  Component      Latest Ref Rng & Units 6/4/2021   Sodium      133 - 144 mmol/L 140   Potassium      3.4 - 5.3 mmol/L 3.8   Chloride      94 - 109 mmol/L 106   Carbon Dioxide      20 - 32 mmol/L 29   Anion Gap      3 - 14 mmol/L 5   Glucose      70 - 99 mg/dL 107 (H)   Urea Nitrogen      7 - 30 mg/dL 12   Creatinine      0.52 - 1.04 mg/dL 0.90   GFR Estimate      >60 mL/min/1.73:m2 66   GFR Estimate If Black      >60 mL/min/1.73:m2 77   Calcium      8.5 - 10.1 mg/dL 8.7       ----- Message from Tao Haile NP sent at 6/7/2021  2:46 PM CDT -----  Please update the patient that her labs look good after starting the lisinopril with stable potassium and kidney function.  She can continue with this without changes and follow-up next May as previously planned.  Her blood sugar is mildly high if she has been fasting and it looks like it has been on the high side on her last couple of checks so would recommend following up with her PCP regarding this and would consider checking a hemoglobin A1c to screen for diabetes as it does not look like one has been checked for quite some time from what I can see. Thank you! Kirill Haile, APRN, CNP    Call placed to pt to review results and recommendations per Kirill Haile CNP.   No answer Left detailed message with call back information if needed. Will mail results as well.   IZA Mcgee RN, BSN. 06/16/21 3:04 PM

## 2021-06-16 NOTE — LETTER
June 25, 2021       TO: Denita Flores   [address] [facility] [address]       Dear Ms. Flores,    The results of your recent Basic metabolic profile have been reviewed by Kirill Haile CNP. Overall the results are favorable after starting the lisinopril. Your Kidney function (createnine) is stable. We do not recommend any changes to your current management plan at this time.     Component      Latest Ref Rng & Units 6/4/2021   Sodium      133 - 144 mmol/L 140   Potassium      3.4 - 5.3 mmol/L 3.8   Chloride      94 - 109 mmol/L 106   Carbon Dioxide      20 - 32 mmol/L 29   Anion Gap      3 - 14 mmol/L 5   Glucose      70 - 99 mg/dL 107 (H)   Urea Nitrogen      7 - 30 mg/dL 12   Creatinine      0.52 - 1.04 mg/dL 0.90   GFR Estimate      >60 mL/min/1.73:m2 66   GFR Estimate If Black      >60 mL/min/1.73:m2 77   Calcium      8.5 - 10.1 mg/dL 8.7         Sincerely,    [facility]

## 2021-06-24 ENCOUNTER — RECORDS - HEALTHEAST (OUTPATIENT)
Dept: ADMINISTRATIVE | Facility: CLINIC | Age: 66
End: 2021-06-24

## 2021-06-25 ENCOUNTER — ANCILLARY PROCEDURE (OUTPATIENT)
Dept: MAMMOGRAPHY | Facility: CLINIC | Age: 66
End: 2021-06-25
Attending: FAMILY MEDICINE
Payer: COMMERCIAL

## 2021-06-25 DIAGNOSIS — Z12.31 ENCOUNTER FOR SCREENING MAMMOGRAM FOR MALIGNANT NEOPLASM OF BREAST: ICD-10-CM

## 2021-06-25 PROCEDURE — 77067 SCR MAMMO BI INCL CAD: CPT | Performed by: RADIOLOGY

## 2021-06-25 PROCEDURE — 77063 BREAST TOMOSYNTHESIS BI: CPT | Performed by: RADIOLOGY

## 2021-07-23 ENCOUNTER — TELEPHONE (OUTPATIENT)
Dept: FAMILY MEDICINE | Facility: CLINIC | Age: 66
End: 2021-07-23

## 2021-07-23 NOTE — TELEPHONE ENCOUNTER
Patient called     Was seeing a provider in [place]     But has moved and wants a provider closer     Looking into the [facility]     Did advise do not know the  providers well, but would recommend any of the 3 providers here at [place] that are accepting new     Pt chose Dr Lara, future visit scheduled    Next 5 appointments (look out 90 days)    Aug 24, 2021  9:00 AM  Office Visit with Neli Lara DO  [facility] ([facility] [place] [facility] [address] [facility], [address]  [facility] [address]  [phone]        Lila BEAR RN

## 2021-08-24 ENCOUNTER — OFFICE VISIT (OUTPATIENT)
Dept: FAMILY MEDICINE | Facility: CLINIC | Age: 66
End: 2021-08-24
Payer: COMMERCIAL

## 2021-08-24 VITALS
RESPIRATION RATE: 20 BRPM | SYSTOLIC BLOOD PRESSURE: 121 MMHG | DIASTOLIC BLOOD PRESSURE: 75 MMHG | WEIGHT: 244 LBS | HEIGHT: 67 IN | BODY MASS INDEX: 38.3 KG/M2 | OXYGEN SATURATION: 98 % | HEART RATE: 71 BPM | TEMPERATURE: 98.1 F

## 2021-08-24 DIAGNOSIS — Z85.41 HISTORY OF CERVICAL CANCER: Chronic | ICD-10-CM

## 2021-08-24 DIAGNOSIS — E89.0 POSTABLATIVE HYPOTHYROIDISM: Chronic | ICD-10-CM

## 2021-08-24 DIAGNOSIS — D12.6 TUBULAR ADENOMA OF COLON: Chronic | ICD-10-CM

## 2021-08-24 DIAGNOSIS — J45.30 MILD PERSISTENT ASTHMA WITHOUT COMPLICATION: Chronic | ICD-10-CM

## 2021-08-24 DIAGNOSIS — G47.33 OSA (OBSTRUCTIVE SLEEP APNEA): Chronic | ICD-10-CM

## 2021-08-24 DIAGNOSIS — E78.5 HYPERLIPIDEMIA LDL GOAL <100: Chronic | ICD-10-CM

## 2021-08-24 DIAGNOSIS — B07.0 PLANTAR WARTS: ICD-10-CM

## 2021-08-24 DIAGNOSIS — I10 ESSENTIAL HYPERTENSION: Primary | Chronic | ICD-10-CM

## 2021-08-24 DIAGNOSIS — R31.29 MICROSCOPIC HEMATURIA: ICD-10-CM

## 2021-08-24 DIAGNOSIS — Z87.891 FORMER SMOKER: ICD-10-CM

## 2021-08-24 DIAGNOSIS — E66.01 MORBID OBESITY (H): Chronic | ICD-10-CM

## 2021-08-24 DIAGNOSIS — I25.10 CORONARY ARTERY DISEASE INVOLVING NATIVE CORONARY ARTERY OF NATIVE HEART, ANGINA PRESENCE UNSPECIFIED: Chronic | ICD-10-CM

## 2021-08-24 DIAGNOSIS — Z87.898 HISTORY OF VERTIGO: ICD-10-CM

## 2021-08-24 PROBLEM — Z80.0 FAMILY HISTORY OF COLON CANCER: Status: RESOLVED | Noted: 2017-01-06 | Resolved: 2021-08-24

## 2021-08-24 PROBLEM — Z80.51 FAMILY HISTORY OF RENAL CANCER: Status: RESOLVED | Noted: 2018-07-30 | Resolved: 2021-08-24

## 2021-08-24 PROBLEM — Z13.6 CARDIOVASCULAR SCREENING; LDL GOAL LESS THAN 160: Status: RESOLVED | Noted: 2019-09-30 | Resolved: 2021-08-24

## 2021-08-24 PROBLEM — R03.0 ELEVATED BLOOD PRESSURE READING WITHOUT DIAGNOSIS OF HYPERTENSION: Status: RESOLVED | Noted: 2018-09-07 | Resolved: 2021-08-24

## 2021-08-24 PROBLEM — R31.0 GROSS HEMATURIA: Status: RESOLVED | Noted: 2018-08-07 | Resolved: 2021-08-24

## 2021-08-24 PROCEDURE — 99214 OFFICE O/P EST MOD 30 MIN: CPT | Performed by: INTERNAL MEDICINE

## 2021-08-24 RX ORDER — DIAZEPAM 5 MG
5 TABLET ORAL EVERY 6 HOURS PRN
Status: CANCELLED | OUTPATIENT
Start: 2021-08-24

## 2021-08-24 RX ORDER — DIAZEPAM 5 MG
5 TABLET ORAL EVERY 6 HOURS PRN
COMMUNITY
End: 2021-08-24

## 2021-08-24 RX ORDER — ONDANSETRON 4 MG/1
4 TABLET, ORALLY DISINTEGRATING ORAL EVERY 8 HOURS PRN
Status: CANCELLED | OUTPATIENT
Start: 2021-08-24

## 2021-08-24 RX ORDER — ONDANSETRON 4 MG/1
4 TABLET, ORALLY DISINTEGRATING ORAL EVERY 8 HOURS PRN
COMMUNITY
End: 2021-08-24

## 2021-08-24 RX ORDER — ONDANSETRON 4 MG/1
4 TABLET, ORALLY DISINTEGRATING ORAL EVERY 8 HOURS PRN
Qty: 21 TABLET | Refills: 0 | Status: SHIPPED | OUTPATIENT
Start: 2021-08-24 | End: 2022-05-26

## 2021-08-24 ASSESSMENT — MIFFLIN-ST. JEOR: SCORE: 1679.64

## 2021-08-24 NOTE — PROGRESS NOTES
Assessment & Plan     Essential hypertension  controlled    Hyperlipidemia LDL goal <100  Coronary artery disease involving native coronary artery of native heart, angina presence unspecified  Following cardio  On asa/statin    Postablative hypothyroidism  On thyroid supplement    Morbid obesity (H)    YELENA (obstructive sleep apnea)  Uses dental appliance    History of cervical cancer  Remote, surveillance has remained normal since then    Tubular adenoma of colon  utd w/ cscope    Mild persistent asthma without complication  controlled    Microscopic hematuria  Refer to urology, she prefers [place] location  - Adult Urology Referral    Former smoker    History of vertigo  She requests zofran refill (states helps vertigo). Recommend Follow-up at next attack in office and consider trial meclizine.   - ondansetron (ZOFRAN-ODT) 4 MG ODT tab  Dispense: 21 tablet; Refill: 0    Plantar warts  She requests to see a podiatrist rather than derm  - Orthopedic  Referral      Return in about 3 months (around 11/24/2021) for with me, in person, sooner if symptoms worsen or do not improve, Follow up.    Neli Lara DO  [facility]    Miracle Barger is a 66 year old who presents for the following health issues     HPI     New Patient/Transfer of Care  Former PCP: Coretta (pnt moved to [facility])  Specialists: cardio (zoila); urologist (graciela previously, would like to transfer to someone closer by to [place])  Problem list and medications reviewed and updated. See below for additional notes.  Social: quit smoking    Uses a dental appliance for her YELENA. Sees dentist    cone biopsy for hx cervical cancer-states back then they cut out abnormal pieces. Used to get paps every 6 months, then every year, all normal since then.     Chest pain hx-no stents. Sees cardio. Compliant with meds.    She would like a referral to a urologist in [place] and a podiatrist for warts    Requests refill on zofran, uses it  "when she gets vertigo attacks and feels it helps.    Review of Systems   Constitutional, HEENT, cardiovascular, pulmonary, gi and gu systems are negative, except as otherwise noted.      Objective    /75 (BP Location: Right arm, Cuff Size: Adult Regular)   Pulse 71   Temp 98.1  F (36.7  C) (Tympanic)   Resp 20   Ht 1.71 m (5' 7.33\")   Wt 110.7 kg (244 lb)   SpO2 98%   BMI 37.84 kg/m    Body mass index is 37.84 kg/m .  Physical Exam     GENERAL APPEARANCE: AAOx3, no distress. Well developed    PSYCH: appropriate mood and affect.                       "

## 2021-08-25 ASSESSMENT — ASTHMA QUESTIONNAIRES: ACT_TOTALSCORE: 24

## 2021-09-27 DIAGNOSIS — R03.0 ELEVATED BLOOD PRESSURE READING WITHOUT DIAGNOSIS OF HYPERTENSION: ICD-10-CM

## 2021-09-27 DIAGNOSIS — R03.0 ELEVATED BLOOD PRESSURE READING WITHOUT DIAGNOSIS OF HYPERTENSION: Primary | ICD-10-CM

## 2021-09-27 DIAGNOSIS — R94.39 ABNORMAL CARDIOVASCULAR STRESS TEST: ICD-10-CM

## 2021-09-27 DIAGNOSIS — I10 ESSENTIAL HYPERTENSION: ICD-10-CM

## 2021-09-27 RX ORDER — AMLODIPINE BESYLATE 5 MG/1
5 TABLET ORAL DAILY
Qty: 90 TABLET | Refills: 3 | Status: SHIPPED | OUTPATIENT
Start: 2021-09-27 | End: 2022-06-29

## 2021-09-27 NOTE — TELEPHONE ENCOUNTER
Medication Refilled: Amlodipine   Last office visit: 5/24/2021  Last Labs/EKG: NA  Next office visit: 5/2022  Pharmacy sent to: Leonid Mcgee RN

## 2021-11-02 ENCOUNTER — TELEPHONE (OUTPATIENT)
Dept: FAMILY MEDICINE | Facility: CLINIC | Age: 66
End: 2021-11-02
Payer: COMMERCIAL

## 2021-11-02 DIAGNOSIS — B07.0 PLANTAR WARTS: Primary | ICD-10-CM

## 2021-11-02 NOTE — TELEPHONE ENCOUNTER
Reason for Call:  Other Podiatry Referral    Detailed comments: Pt has a planters wart on the bottom of her foot  Phone Number Patient can be reached at: Home number on file [phone] (home)    Best Time:     Can we leave a detailed message on this number? YES    Call taken on 11/2/2021 at 3:35 PM by Diann Wynn

## 2021-11-02 NOTE — TELEPHONE ENCOUNTER
She might want to start with an office visit here   We can freeze plantar warts if that is what she is seeking  The cryotherapy would work best if she uses a nail file or rigo board to file down the callous/dead skin overlying the wart before she comes in for the therapy  I signed the podiatry referral, but primary care visit is probably more appropriate   Please inform brynn t

## 2021-11-18 ENCOUNTER — OFFICE VISIT (OUTPATIENT)
Dept: URGENT CARE | Facility: URGENT CARE | Age: 66
End: 2021-11-18
Payer: COMMERCIAL

## 2021-11-18 VITALS
SYSTOLIC BLOOD PRESSURE: 147 MMHG | DIASTOLIC BLOOD PRESSURE: 76 MMHG | HEART RATE: 66 BPM | TEMPERATURE: 98.7 F | OXYGEN SATURATION: 95 %

## 2021-11-18 DIAGNOSIS — H65.92 OME (OTITIS MEDIA WITH EFFUSION), LEFT: ICD-10-CM

## 2021-11-18 DIAGNOSIS — H61.23 BILATERAL IMPACTED CERUMEN: Primary | ICD-10-CM

## 2021-11-18 PROCEDURE — 99213 OFFICE O/P EST LOW 20 MIN: CPT | Performed by: PHYSICIAN ASSISTANT

## 2021-11-18 RX ORDER — AMOXICILLIN 875 MG
875 TABLET ORAL 2 TIMES DAILY
Qty: 20 TABLET | Refills: 0 | Status: SHIPPED | OUTPATIENT
Start: 2021-11-18 | End: 2021-11-28

## 2021-11-18 ASSESSMENT — ENCOUNTER SYMPTOMS
SHORTNESS OF BREATH: 0
DIZZINESS: 1
APPETITE CHANGE: 0
DIARRHEA: 0
FEVER: 0
VOMITING: 0
COUGH: 0
ABDOMINAL PAIN: 0
NAUSEA: 1

## 2021-11-19 NOTE — PROGRESS NOTES
SUBJECTIVE:   Denita Flores is a 66 year old female presenting with a chief complaint of   Chief Complaint   Patient presents with     Urgent Care     Sinus Problem     x 5 days       She is an established patient of [facility].  Patient presents with complaints of ear decreased hearing and clogged feeling.  Left side of face pain.  Left ear pain.  Has had Om that has lead to vertigo in past.        Treatment:  flonase and allegra.        Review of Systems   Constitutional: Negative for appetite change and fever.   HENT: Positive for ear pain and hearing loss.    Respiratory: Negative for cough and shortness of breath.    Cardiovascular: Negative for chest pain.   Gastrointestinal: Positive for nausea. Negative for abdominal pain, diarrhea and vomiting.   Neurological: Positive for dizziness.   All other systems reviewed and are negative.      Past Medical History:   Diagnosis Date     Coronary artery disease involving native coronary artery of native heart, angina presence unspecified 6/11/2021     Essential hypertension 6/11/2021     Graves disease      Kidney stones 2000    Passed a 5-10mm stone     Malignant neoplasm (H)     Cervical CA     Mild persistent asthma 5/8/2013     Thyroid disease      Family History   Problem Relation Age of Onset     Glaucoma Mother      Heart Disease Father      Cancer Father 67        kidney cancer      Heart Disease Sister      Cancer Sister 59        colon cancer      Diabetes Sister      Colon Cancer Maternal Aunt      Colon Cancer Paternal Aunt      Glaucoma Maternal Grandmother      Current Outpatient Medications   Medication Sig Dispense Refill     albuterol (PROAIR HFA/PROVENTIL HFA/VENTOLIN HFA) 108 (90 Base) MCG/ACT inhaler Inhale 2 puffs into the lungs every 6 hours as needed for shortness of breath / dyspnea 18 g 1     amLODIPine (NORVASC) 5 MG tablet Take 1 tablet (5 mg) by mouth daily 90 tablet 3     aspirin (ASA) 81 MG chewable tablet Take 81 mg by mouth daily        atorvastatin (LIPITOR) 40 MG tablet Take 1 tablet (40 mg) by mouth At Bedtime 90 tablet 3     Cholecalciferol (D3 ADULT PO) Take 2,000 Units by mouth daily.       cyanocolbalamin (VITAMIN  B-12) 100 MCG tablet Take 100 mcg by mouth daily.       fexofenadine (ALLEGRA) 180 MG tablet Take 1 tablet (180 mg) by mouth daily 90 tablet 3     fluticasone (FLONASE) 50 MCG/ACT nasal spray Spray 1 spray into both nostrils daily 48 g 3     levothyroxine (SYNTHROID/LEVOTHROID) 112 MCG tablet Profile Rx,TAKE 1 TABLET(112 MCG) BY MOUTH DAILY 90 tablet 3     lisinopril (ZESTRIL) 5 MG tablet Take 1 tablet (5 mg) by mouth daily 90 tablet 3     montelukast (SINGULAIR) 10 MG tablet TAKE 1 TABLET(10 MG) BY MOUTH AT BEDTIME 90 tablet 3     ondansetron (ZOFRAN-ODT) 4 MG ODT tab Take 1 tablet (4 mg) by mouth every 8 hours as needed for nausea 21 tablet 0     triamcinolone (KENALOG) 0.1 % external cream Apply sparingly to affected area three times daily as needed 80 g 0     Social History     Tobacco Use     Smoking status: Former Smoker     Packs/day: 1.50     Years: 35.00     Pack years: 52.50     Types: Cigarettes     Quit date: 2003     Years since quittin.8     Smokeless tobacco: Never Used   Substance Use Topics     Alcohol use: Yes     Alcohol/week: 0.0 standard drinks     Comment: 3 dinks/ year       OBJECTIVE  There were no vitals taken for this visit.    Physical Exam  Vitals and nursing note reviewed.   Constitutional:       Appearance: Normal appearance. She is obese.   HENT:      Head: Normocephalic and atraumatic.      Right Ear: Ear canal and external ear normal. There is impacted cerumen.      Left Ear: Ear canal and external ear normal. There is impacted cerumen.      Nose: Nose normal.      Mouth/Throat:      Mouth: Mucous membranes are moist.      Pharynx: Oropharynx is clear.   Eyes:      Extraocular Movements: Extraocular movements intact.      Conjunctiva/sclera: Conjunctivae normal.   Cardiovascular:      Rate  and Rhythm: Normal rate and regular rhythm.      Pulses: Normal pulses.      Heart sounds: Normal heart sounds.   Pulmonary:      Effort: Pulmonary effort is normal.      Breath sounds: Normal breath sounds.   Musculoskeletal:      Cervical back: Normal range of motion.   Skin:     General: Skin is warm and dry.   Neurological:      General: No focal deficit present.      Mental Status: She is alert.   Psychiatric:         Mood and Affect: Mood normal.         Behavior: Behavior normal.         Labs:  No results found for this or any previous visit (from the past 24 hour(s)).    X-Ray was not done.    ASSESSMENT:    No diagnosis found.     Medical Decision Making:    Differential Diagnosis:  URI Adult/Peds:  Acute right otitis media, Acute left otitis media and Sinusitis    Serious Comorbid Conditions:  Adult:  reviewed    PLAN:    Bilateral ear irrigation by LOGAN Kumar.  Left TM erythematous and bulging.  Rx for amoxicillin.  Tylenol/motrin prn.  Discussed reasons to seek immediate medical attention.        Followup:    If not improving or if condition worsens, follow up with your Primary Care Provider, If not improving or if conditions worsens over the next 12-24 hours, go to the Emergency Department    There are no Patient Instructions on file for this visit.

## 2021-11-19 NOTE — PATIENT INSTRUCTIONS
Patient Education       Earwax, Home Treatment    Everyone produces earwax from the lining of the ear canal. It serves to lubricate and protect the ear. The wax that forms in the canal naturally moves toward the outside of the ear and falls out. Sometimes the ear canal may contain too much wax. This can cause a blockage and loss of hearing. Directions are given below for home treatment.  Home care  If your doctor has advised you to remove a wax blockage yourself, follow these directions:    Unless a medicine was prescribed, you may use an over-the-counter product made for clearing earwax. These contain carbamide peroxide. Lie down with the blocked ear facing upward. Apply one dropper full of medicine and wait a few minutes. Grasp the outer ear and wiggle it to help the solution enter the canal.    Lean over a sink or basin with the blocked ear facing downward. Use a bulb syringe filled with warm (not hot or cold) water to rinse the ear several times. Use gentle pressure only.    If you are having trouble draining the water out of your ear canal, put a few drops of rubbing alcohol (isopropyl alcohol) into the ear canal. This will help remove the remaining water.    Repeat this procedure once a day for up to three days, or until your hearing is back to normal. Do not use this treatment for more than three days in a row.  Don ts    Don t use cold water to rinse the ear. This will make you dizzy.    Don t perform this procedure if you have an ear infection.    Don t perform this procedure if you have a ruptured eardrum.    Don t use cotton swabs, matches, hairpins, keys, or other objects to  clean  the ear canal. This can cause infection of the ear canal or rupture the eardrum. Because of their size and shape, cotton swabs can push earwax deeper into the ear canal instead of removing it.  Follow-up care  Follow up with your health care provider if you are not improving after three cleaning attempts, or as  advised.  When to seek medical advice  Call your health care provider right away if any of these occur:    Worsening ear pain    Fever of 101 F (38.3 C) or higher, or as directed by your health care provider    Hearing does not return to normal after three days of treatment    Fluid drainage or bleeding from the ear canal    Swelling, redness, or tenderness of the outer ear    Headache, neck pain, or stiff neck    [phone] [employer]. [address] [place] [address], [place] [address]. All rights reserved. This information is not intended as a substitute for professional medical care. Always follow your healthcare professional's instructions.           Patient Education     Middle Ear Infection (Otitis Media) in Adults  What is a middle ear infection?  A middle ear infection occurs behind the eardrum. It is most often caused by a virus or bacteria. Most kids have at least one middle ear infection by the time they are 3 years old. But adults can also get them.   What causes middle ear infections?  Inflammation in the middle ear most often starts after you ve had a sore throat, cold, or other upper respiratory problem. The infection spreads to the middle ear and causes fluid buildup behind the eardrum.    What are the symptoms of a middle ear infection?  These are the most common symptoms of middle ear infections in adults:     Ear pain    Feeling of fullness in the ear    Fluid draining from the ear    Fever    Hearing loss  These symptoms may look like other conditions or health problems. Always talk with your healthcare provider for a diagnosis.   How is a middle ear infection diagnosed?  Your healthcare provider will review your health history and do a physical exam. They will check the outer ear and the eardrum using an otoscope. This is a lighted tool that lets the healthcare provider see inside the ear. A pneumatic otoscope blows a puff of air into the ear to test eardrum movement. When there is fluid or  infection in the middle ear, movement is decreased.   Your provider may also do a tympanometry. This is a test that directs air and sound to the middle ear.   If you have ear infections often, your healthcare provider may suggest having a hearing test.   How is a middle ear infection treated?  Treatment will depend on your symptoms, age, and general health. It will also depend on how severe the condition is.   Treatment may include:    Antibiotics    Pain relievers    Placing small tubes in the eardrum for chronic ear infections   What are possible complications of a middle ear infection?   Untreated ear infections can lead to:    Infection in other parts of the head    Lasting (permanent) hearing loss    Speech and language problems  Can middle ear infections be prevented?  Cold and allergy medicines don't seem to prevent ear infections. And currently there is no vaccine that can prevent the disease. But check with your healthcare provider and make sure your vaccines are up-to-date. Living in a home where cigarettes are smoked can increase the chances of ear infections. So can living in a home where vaping devices, such as e-cigarettes and electronic nicotine, are used   Key points about middle ear infections    Middle ear infections can affect both children and adults.    Pain and fever can be the most common symptoms.    Without treatment, permanent hearing loss may happen.    Take antibiotics as prescribed and finish all of the prescription. This can help prevent antibiotic-resistant infections or incomplete treatment with the infection returning.    Keeping your home smoke-free or free of vaping devices can decrease the chances of ear infections.    Next steps  Tips to help you get the most from a visit to your healthcare provider:    Know the reason for your visit and what you want to happen.    Before your visit, write down questions you want answered.    Bring someone with you to help you ask questions and  remember what your provider tells you.    At the visit, write down the name of a new diagnosis, and any new medicines, treatments, or tests. Also write down any new instructions your provider gives you.    Know why a new medicine or treatment is prescribed, and how it will help you. Also know what the side effects are.    Ask if your condition can be treated in other ways.    Know why a test or procedure is recommended and what the results could mean.    Know what to expect if you do not take the medicine or have the test or procedure.    If you have a follow-up appointment, write down the date, time, and purpose for that visit.    Know how you can contact your provider if you have questions.  Darrel last reviewed this educational content on 1/1/2021 2000-2021 [employer]. All rights reserved. This information is not intended as a substitute for professional medical care. Always follow your healthcare professional's instructions.

## 2021-11-24 ENCOUNTER — OFFICE VISIT (OUTPATIENT)
Dept: UROLOGY | Facility: CLINIC | Age: 66
End: 2021-11-24
Attending: INTERNAL MEDICINE
Payer: COMMERCIAL

## 2021-11-24 VITALS
WEIGHT: 250 LBS | BODY MASS INDEX: 37.89 KG/M2 | SYSTOLIC BLOOD PRESSURE: 130 MMHG | DIASTOLIC BLOOD PRESSURE: 70 MMHG | HEIGHT: 68 IN

## 2021-11-24 DIAGNOSIS — N20.0 NEPHROLITHIASIS: Primary | ICD-10-CM

## 2021-11-24 DIAGNOSIS — Z80.51 FAMILY HISTORY OF KIDNEY CANCER: ICD-10-CM

## 2021-11-24 DIAGNOSIS — Z91.041 CONTRAST MEDIA ALLERGY: ICD-10-CM

## 2021-11-24 DIAGNOSIS — R31.29 MICROSCOPIC HEMATURIA: ICD-10-CM

## 2021-11-24 DIAGNOSIS — Z85.41 HISTORY OF CERVICAL CANCER: ICD-10-CM

## 2021-11-24 DIAGNOSIS — Z87.891 FORMER SMOKER: ICD-10-CM

## 2021-11-24 LAB
ALBUMIN UR-MCNC: NEGATIVE MG/DL
APPEARANCE UR: CLEAR
BILIRUB UR QL STRIP: NEGATIVE
COLOR UR AUTO: YELLOW
GLUCOSE UR STRIP-MCNC: NEGATIVE MG/DL
HGB UR QL STRIP: ABNORMAL
KETONES UR STRIP-MCNC: NEGATIVE MG/DL
LEUKOCYTE ESTERASE UR QL STRIP: NEGATIVE
NITRATE UR QL: NEGATIVE
PH UR STRIP: 6.5 [PH] (ref 5–7)
RESIDUAL VOLUME (RV) (EXTERNAL): 35
SP GR UR STRIP: 1.02 (ref 1–1.03)
UROBILINOGEN UR STRIP-ACNC: 0.2 E.U./DL

## 2021-11-24 PROCEDURE — 88112 CYTOPATH CELL ENHANCE TECH: CPT | Performed by: PATHOLOGY

## 2021-11-24 PROCEDURE — 51798 US URINE CAPACITY MEASURE: CPT | Performed by: UROLOGY

## 2021-11-24 PROCEDURE — 99204 OFFICE O/P NEW MOD 45 MIN: CPT | Mod: 25 | Performed by: UROLOGY

## 2021-11-24 PROCEDURE — 81003 URINALYSIS AUTO W/O SCOPE: CPT | Mod: QW | Performed by: UROLOGY

## 2021-11-24 RX ORDER — DIPHENHYDRAMINE HCL 25 MG
25 CAPSULE ORAL ONCE
Qty: 1 CAPSULE | Refills: 0 | Status: SHIPPED | OUTPATIENT
Start: 2021-11-24 | End: 2021-11-24

## 2021-11-24 RX ORDER — METHYLPREDNISOLONE 16 MG/1
16 TABLET ORAL DAILY
Qty: 1 TABLET | Refills: 0 | Status: SHIPPED | OUTPATIENT
Start: 2021-11-24 | End: 2022-03-11

## 2021-11-24 ASSESSMENT — ENCOUNTER SYMPTOMS
FATIGUE: 0
SINUS PAIN: 1
BACK PAIN: 1
CHILLS: 0
BREAST MASS: 0
DYSPNEA ON EXERTION: 0
DIFFICULTY URINATING: 0
ORTHOPNEA: 0
WEIGHT GAIN: 0
ARTHRALGIAS: 1
HEMOPTYSIS: 0
SLEEP DISTURBANCES DUE TO BREATHING: 0
DECREASED LIBIDO: 0
SEIZURES: 0
SKIN CHANGES: 0
NERVOUS/ANXIOUS: 0
DOUBLE VISION: 0
SORE THROAT: 0
JOINT SWELLING: 1
HOT FLASHES: 0
FEVER: 0
LEG PAIN: 0
BREAST PAIN: 0
SMELL DISTURBANCE: 0
EXTREMITY NUMBNESS: 0
TASTE DISTURBANCE: 0
RECTAL PAIN: 0
BLOOD IN STOOL: 0
POOR WOUND HEALING: 0
LOSS OF CONSCIOUSNESS: 0
COUGH DISTURBING SLEEP: 0
NIGHT SWEATS: 0
WEAKNESS: 0
DEPRESSION: 0
WHEEZING: 0
CONSTIPATION: 0
INCREASED ENERGY: 0
NAIL CHANGES: 0
SWOLLEN GLANDS: 0
STIFFNESS: 1
HOARSE VOICE: 0
NECK PAIN: 0
POSTURAL DYSPNEA: 0
HALLUCINATIONS: 0
RESPIRATORY PAIN: 0
MUSCLE WEAKNESS: 1
NUMBNESS: 0
FLANK PAIN: 0
DECREASED APPETITE: 0
ALTERED TEMPERATURE REGULATION: 0
EYE IRRITATION: 0
EXERCISE INTOLERANCE: 0
DYSURIA: 0
RECTAL BLEEDING: 0
TREMORS: 0
TACHYCARDIA: 0
HEARTBURN: 0
COUGH: 0
PARALYSIS: 0
LEG SWELLING: 0
WEIGHT LOSS: 0
LIGHT-HEADEDNESS: 0
DIZZINESS: 0
DISTURBANCES IN COORDINATION: 0
EYE PAIN: 0
SNORES LOUDLY: 0
HYPOTENSION: 0
EYE REDNESS: 0
BLOATING: 0
JAUNDICE: 0
DECREASED CONCENTRATION: 0
DIARRHEA: 0
NAUSEA: 0
SHORTNESS OF BREATH: 0
MUSCLE CRAMPS: 1
SYNCOPE: 0
TINGLING: 0
PALPITATIONS: 0
BRUISES/BLEEDS EASILY: 0
EYE WATERING: 0
POLYDIPSIA: 0
NECK MASS: 0
INSOMNIA: 0
CLAUDICATION: 0
VOMITING: 0
MEMORY LOSS: 0
SPEECH CHANGE: 0
SINUS CONGESTION: 0
BOWEL INCONTINENCE: 0
HYPERTENSION: 0
SPUTUM PRODUCTION: 0
POLYPHAGIA: 0
TROUBLE SWALLOWING: 0
ABDOMINAL PAIN: 0
MYALGIAS: 1
PANIC: 0
HEMATURIA: 1
HEADACHES: 0

## 2021-11-24 ASSESSMENT — MIFFLIN-ST. JEOR: SCORE: 1709.55

## 2021-11-24 ASSESSMENT — PAIN SCALES - GENERAL: PAINLEVEL: NO PAIN (0)

## 2021-11-24 NOTE — LETTER
11/24/2021       RE: Denita Flores  [address]  [facility] [address]     Dear Colleague,    Thank you for referring your patient, Denita Flores, to the [facility] at [facility]. Please see a copy of my visit note below.    November 24, 2021    Referring Provider: Neli Lara DO  [address] [place] [address]    Primary Care Provider: Neli Lara    Assessment & Plan     Microscopic hematuria  We discussed the etiologies of microscopic hematuria to include but not limited to infection, nephrolithiasis, urologic malignancy, renal disease and idiopathic.  We further discussed that the evaluation includes urine cytology since this is persistent,  imaging with a CT urogram and office cystoscopy.  Patient is agreeable and the will return to cystoscopy after imaging complete.    - CT Urogram wo & w Contrast; Future  - Cytology non gyn; Future  - Cytology non gyn    Nephrolithiasis  Needs CT to assess  - CT Urogram wo & w Contrast; Future    History of cervical cancer  Stable.  She did not receive radiation for this so less risk factor for hematuria  - CT Urogram wo & w Contrast; Future  - Cytology non gyn; Future  - Cytology non gyn    Former smoker  Risk factor for hematuria  - CT Urogram wo & w Contrast; Future  - Cytology non gyn; Future  - Cytology non gyn    Family history of kidney cancer  Need to evaluate kidneys with CT given family history  - CT Urogram wo & w Contrast; Future  - Cytology non gyn; Future  - Cytology non gyn    Contrast media allergy  Reviewing the chart she has received IV contrast in the past with premedication although she cannot remember what it was.  It sounds like it was mostly a couple small hives but will premedicate  - diphenhydrAMINE (BENADRYL ALLERGY) 25 MG capsule; Take 1 capsule (25 mg) by mouth once for 1 dose Take 1 hour prior to your CT scan  - methylPREDNISolone (MEDROL) 16 MG tablet;  Take 1 tablet (16 mg) by mouth daily 12 hours prior to the procedure with IV contrast    RTC for cystoscopy after CT scan    27 minutes were spent today in reviewing the EMR including Dr Fenton's notes from 2018, Dr Varela's notes from vascular, referring MD Dr Lara's notes, patient's prior CT scans, urinalysis, BMP, direct patient care, coordination of care and documentation today    Darline English MD MPH  (she/her/hers)   of Urology  [facility]      HPI:  Denita Flores is a 66 year old female  who presents for evaluation of her pelvic floor symptoms.  She is here today for microscopic hematuria.  She is a  and regularly gets her urine tested and is always told there is blood    She has a history of gross hematuria in 2018 and had a negative evaluation with Dr Fenton at that time (2018).  Patient also has a history of kidney stones as well as cervical cancer treated with cone biopsy, pap smears were normal after.  She also is a former smoker.  She has also had polyps seen on colonoscopy, last one a few years ago she reports as okay.  Has some occasional stress incontinence when she is sick.  Nocturia 1-2.  Denies daytime urgency frequency.    Denies gross hematuria, UTI, vaginal bleeding, vaginal bulge, constipation    Father  of kidney cancer in his 60s.  Denies any other family history of  disease or malignancy    Past Medical History:   Diagnosis Date     Coronary artery disease involving native coronary artery of native heart, angina presence unspecified 2021     Essential hypertension 2021     Graves disease      Kidney stones     Passed a 5-10mm stone     Malignant neoplasm (H)     Cervical CA     Mild persistent asthma 2013     Thyroid disease      Past Surgical History:   Procedure Laterality Date     BACK SURGERY       COLONOSCOPY WITH CO2 INSUFFLATION N/A 2017    Procedure: COLONOSCOPY WITH CO2 INSUFFLATION;   Surgeon: Duane, William Charles, MD;  Location: [facility] OR     CYSTOSCOPY FLEXIBLE  8/10/2018     LEEP TX, CERVICAL      in her 20's       Social History     Socioeconomic History     Marital status:      Spouse name: Not on file     Number of children: Not on file     Years of education: Not on file     Highest education level: Not on file   Occupational History     Not on file   Tobacco Use     Smoking status: Former Smoker     Packs/day: 1.50     Years: 35.00     Pack years: 52.50     Types: Cigarettes     Quit date: 2003     Years since quittin.9     Smokeless tobacco: Never Used   Substance and Sexual Activity     Alcohol use: Yes     Alcohol/week: 0.0 standard drinks     Comment: 3 dinks/ year     Drug use: No     Sexual activity: Not Currently   Other Topics Concern     Parent/sibling w/ CABG, MI or angioplasty before 65F 55M? No   Social History Narrative     Not on file     Social Determinants of Health     Financial Resource Strain: Not on file   Food Insecurity: Not on file   Transportation Needs: Not on file   Physical Activity: Not on file   Stress: Not on file   Social Connections: Not on file   Intimate Partner Violence: Not on file   Housing Stability: Not on file     Family History   Problem Relation Age of Onset     Glaucoma Mother      Heart Disease Father      Cancer Father 67        kidney cancer      Heart Disease Sister      Cancer Sister 59        colon cancer      Diabetes Sister      Colon Cancer Maternal Aunt      Colon Cancer Paternal Aunt      Glaucoma Maternal Grandmother        Review of Systems     Constitutional:  Negative for fever, chills, weight loss, weight gain, fatigue, decreased appetite, night sweats, recent stressors, height gain, height loss, post-operative complications, incisional pain, hallucinations, increased energy, hyperactivity and confused.   HENT:  Positive for ear pain, tinnitus, nosebleeds and sinus pain. Negative for hearing loss, trouble  swallowing, hoarse voice, mouth sores, sore throat, ear discharge, tooth pain, gum tenderness, taste disturbance, smell disturbance, hearing aid, bleeding gums, dry mouth, sinus congestion and neck mass.    Eyes:  Negative for double vision, pain, redness, eye pain, decreased vision, eye watering, eye bulging, eye dryness, flashing lights, spots, floaters, strabismus, tunnel vision, jaundice and eye irritation.   Respiratory:   Negative for cough, hemoptysis, sputum production, shortness of breath, wheezing, sleep disturbances due to breathing, snores loudly, respiratory pain, dyspnea on exertion, cough disturbing sleep and postural dyspnea.    Cardiovascular:  Negative for chest pain, dyspnea on exertion, palpitations, orthopnea, claudication, leg swelling, fingers/toes turn blue, hypertension, hypotension, syncope, history of heart murmur, chest pain on exertion, chest pain at rest, pacemaker, few scattered varicosities, leg pain, sleep disturbances due to breathing, tachycardia, light-headedness, exercise intolerance and edema.   Gastrointestinal:  Negative for heartburn, nausea, vomiting, abdominal pain, diarrhea, constipation, blood in stool, melena, rectal pain, bloating, hemorrhoids, bowel incontinence, jaundice, rectal bleeding, coffee ground emesis and change in stool.   Genitourinary:  Positive for hematuria and nocturia. Negative for bladder incontinence, dysuria, urgency, flank pain, vaginal discharge, difficulty urinating, genital sores, dyspareunia, decreased libido, voiding less frequently, arousal difficulty, abnormal vaginal bleeding, excessive menstruation, menstrual changes, hot flashes, vaginal dryness and postmenopausal bleeding.   Musculoskeletal:  Positive for myalgias, back pain, joint swelling, arthralgias, stiffness, muscle cramps, bone pain and muscle weakness. Negative for neck pain and fracture.   Skin:  Positive for warts. Negative for nail changes, itching, poor wound healing, rash,  hair changes, skin changes, acne, poor wound healing, scarring, flaky skin, Raynaud's phenomenon, sensitivity to sunlight and skin thickening.   Neurological:  Negative for dizziness, tingling, tremors, speech change, seizures, loss of consciousness, weakness, light-headedness, numbness, headaches, disturbances in coordination, extremity numbness, memory loss, difficulty walking and paralysis.   Endo/Heme:  Negative for anemia, swollen glands and bruises/bleeds easily.   Psychiatric/Behavioral:  Negative for depression, hallucinations, memory loss, decreased concentration, mood swings and panic attacks.    Breast:  Negative for breast discharge, breast mass, breast pain and nipple retraction.   Endocrine:  Negative for altered temperature regulation, polyphagia, polydipsia, unwanted hair growth and change in facial hair.      Allergies   Allergen Reactions     Contrast Dye Hives and Itching     Isovue with ANTON on 1-21-19     Sulfa Drugs Hives and Swelling     Noted in 10/18/09 ER       Current Outpatient Medications   Medication     albuterol (PROAIR HFA/PROVENTIL HFA/VENTOLIN HFA) 108 (90 Base) MCG/ACT inhaler     amLODIPine (NORVASC) 5 MG tablet     amoxicillin (AMOXIL) 875 MG tablet     aspirin (ASA) 81 MG chewable tablet     atorvastatin (LIPITOR) 40 MG tablet     Cholecalciferol (D3 ADULT PO)     cyanocolbalamin (VITAMIN  B-12) 100 MCG tablet     fexofenadine (ALLEGRA) 180 MG tablet     fluticasone (FLONASE) 50 MCG/ACT nasal spray     levothyroxine (SYNTHROID/LEVOTHROID) 112 MCG tablet     lisinopril (ZESTRIL) 5 MG tablet     montelukast (SINGULAIR) 10 MG tablet     ondansetron (ZOFRAN-ODT) 4 MG ODT tab     triamcinolone (KENALOG) 0.1 % external cream     No current facility-administered medications for this visit.   There were no vitals taken for this visit.  GENERAL: healthy, alert and no distress  EYES: Eyes grossly normal to inspection, conjunctivae and sclerae normal  HENT: normal cephalic/atraumatic.   External ears, nose and mouth without ulcers or lesions.  RESP: no audible wheeze, cough, or visible cyanosis.  No visible retractions or increased work of breathing.  Able to speak fully in complete sentences.  NEURO: Cranial nerves grossly intact, mentation intact and speech normal  PSYCH: mentation appears normal, affect normal/bright, judgement and insight intact, normal speech and appearance well-groomed    Urinalysis 6/2021 with 5-10 RBC, few renal tubular epithelium.  Over the past 3 years she has had 3 other microscopic urinalysis with microscopic hematuria    BMP 6/2021 with BUN/CR 12/0.9    Urine dip moderate blood    PVR 36 mL by bladder scan    CC  Patient Care Team:  Neli Lara DO as PCP - General (Internal Medicine)  Agbeh, Cephas Mawuena, MD as MD (OB/Gyn)  Gee Hitchcock MD as Assigned PCP  Tao Haile NP as Assigned Heart and Vascular Provider  NELI LARA

## 2021-11-24 NOTE — NURSING NOTE
Chief Complaint   Patient presents with     Microscopic hematuria     UA,PVR     PVR scan was 35ml today    Paige Rodriguez

## 2021-11-24 NOTE — PROGRESS NOTES
November 24, 2021    Referring Provider: Neli Lara DO  [address] [place] [address]    Primary Care Provider: Neli Lara    Assessment & Plan     Microscopic hematuria  We discussed the etiologies of microscopic hematuria to include but not limited to infection, nephrolithiasis, urologic malignancy, renal disease and idiopathic.  We further discussed that the evaluation includes urine cytology since this is persistent,  imaging with a CT urogram and office cystoscopy.  Patient is agreeable and the will return to cystoscopy after imaging complete.    - CT Urogram wo & w Contrast; Future  - Cytology non gyn; Future  - Cytology non gyn    Nephrolithiasis  Needs CT to assess  - CT Urogram wo & w Contrast; Future    History of cervical cancer  Stable.  She did not receive radiation for this so less risk factor for hematuria  - CT Urogram wo & w Contrast; Future  - Cytology non gyn; Future  - Cytology non gyn    Former smoker  Risk factor for hematuria  - CT Urogram wo & w Contrast; Future  - Cytology non gyn; Future  - Cytology non gyn    Family history of kidney cancer  Need to evaluate kidneys with CT given family history  - CT Urogram wo & w Contrast; Future  - Cytology non gyn; Future  - Cytology non gyn    Contrast media allergy  Reviewing the chart she has received IV contrast in the past with premedication although she cannot remember what it was.  It sounds like it was mostly a couple small hives but will premedicate  - diphenhydrAMINE (BENADRYL ALLERGY) 25 MG capsule; Take 1 capsule (25 mg) by mouth once for 1 dose Take 1 hour prior to your CT scan  - methylPREDNISolone (MEDROL) 16 MG tablet; Take 1 tablet (16 mg) by mouth daily 12 hours prior to the procedure with IV contrast    RTC for cystoscopy after CT scan    27 minutes were spent today in reviewing the EMR including Dr Fenton's notes from 2018, Dr Varela's notes from vascular, referring MD Dr Lara's notes, patient's prior CT scans, urinalysis,  BMP, direct patient care, coordination of care and documentation today    Darline English MD MPH  (she/her/hers)   of Urology  [facility]      HPI:  Denita Flores is a 66 year old female  who presents for evaluation of her pelvic floor symptoms.  She is here today for microscopic hematuria.  She is a  and regularly gets her urine tested and is always told there is blood    She has a history of gross hematuria in 2018 and had a negative evaluation with Dr Fenton at that time (2018).  Patient also has a history of kidney stones as well as cervical cancer treated with cone biopsy, pap smears were normal after.  She also is a former smoker.  She has also had polyps seen on colonoscopy, last one a few years ago she reports as okay.  Has some occasional stress incontinence when she is sick.  Nocturia 1-2.  Denies daytime urgency frequency.    Denies gross hematuria, UTI, vaginal bleeding, vaginal bulge, constipation    Father  of kidney cancer in his 60s.  Denies any other family history of  disease or malignancy    Past Medical History:   Diagnosis Date     Coronary artery disease involving native coronary artery of native heart, angina presence unspecified 2021     Essential hypertension 2021     Graves disease      Kidney stones     Passed a 5-10mm stone     Malignant neoplasm (H)     Cervical CA     Mild persistent asthma 2013     Thyroid disease      Past Surgical History:   Procedure Laterality Date     BACK SURGERY       COLONOSCOPY WITH CO2 INSUFFLATION N/A 2017    Procedure: COLONOSCOPY WITH CO2 INSUFFLATION;  Surgeon: Duane, William Charles, MD;  Location: [facility] OR     CYSTOSCOPY FLEXIBLE  8/10/2018     LEEP TX, CERVICAL      in her 20's       Social History     Socioeconomic History     Marital status:      Spouse name: Not on file     Number of children: Not on file     Years of education: Not on file      Highest education level: Not on file   Occupational History     Not on file   Tobacco Use     Smoking status: Former Smoker     Packs/day: 1.50     Years: 35.00     Pack years: 52.50     Types: Cigarettes     Quit date: 2003     Years since quittin.9     Smokeless tobacco: Never Used   Substance and Sexual Activity     Alcohol use: Yes     Alcohol/week: 0.0 standard drinks     Comment: 3 dinks/ year     Drug use: No     Sexual activity: Not Currently   Other Topics Concern     Parent/sibling w/ CABG, MI or angioplasty before 65F 55M? No   Social History Narrative     Not on file     Social Determinants of Health     Financial Resource Strain: Not on file   Food Insecurity: Not on file   Transportation Needs: Not on file   Physical Activity: Not on file   Stress: Not on file   Social Connections: Not on file   Intimate Partner Violence: Not on file   Housing Stability: Not on file     Family History   Problem Relation Age of Onset     Glaucoma Mother      Heart Disease Father      Cancer Father 67        kidney cancer      Heart Disease Sister      Cancer Sister 59        colon cancer      Diabetes Sister      Colon Cancer Maternal Aunt      Colon Cancer Paternal Aunt      Glaucoma Maternal Grandmother        Review of Systems     Constitutional:  Negative for fever, chills, weight loss, weight gain, fatigue, decreased appetite, night sweats, recent stressors, height gain, height loss, post-operative complications, incisional pain, hallucinations, increased energy, hyperactivity and confused.   HENT:  Positive for ear pain, tinnitus, nosebleeds and sinus pain. Negative for hearing loss, trouble swallowing, hoarse voice, mouth sores, sore throat, ear discharge, tooth pain, gum tenderness, taste disturbance, smell disturbance, hearing aid, bleeding gums, dry mouth, sinus congestion and neck mass.    Eyes:  Negative for double vision, pain, redness, eye pain, decreased vision, eye watering, eye bulging, eye  dryness, flashing lights, spots, floaters, strabismus, tunnel vision, jaundice and eye irritation.   Respiratory:   Negative for cough, hemoptysis, sputum production, shortness of breath, wheezing, sleep disturbances due to breathing, snores loudly, respiratory pain, dyspnea on exertion, cough disturbing sleep and postural dyspnea.    Cardiovascular:  Negative for chest pain, dyspnea on exertion, palpitations, orthopnea, claudication, leg swelling, fingers/toes turn blue, hypertension, hypotension, syncope, history of heart murmur, chest pain on exertion, chest pain at rest, pacemaker, few scattered varicosities, leg pain, sleep disturbances due to breathing, tachycardia, light-headedness, exercise intolerance and edema.   Gastrointestinal:  Negative for heartburn, nausea, vomiting, abdominal pain, diarrhea, constipation, blood in stool, melena, rectal pain, bloating, hemorrhoids, bowel incontinence, jaundice, rectal bleeding, coffee ground emesis and change in stool.   Genitourinary:  Positive for hematuria and nocturia. Negative for bladder incontinence, dysuria, urgency, flank pain, vaginal discharge, difficulty urinating, genital sores, dyspareunia, decreased libido, voiding less frequently, arousal difficulty, abnormal vaginal bleeding, excessive menstruation, menstrual changes, hot flashes, vaginal dryness and postmenopausal bleeding.   Musculoskeletal:  Positive for myalgias, back pain, joint swelling, arthralgias, stiffness, muscle cramps, bone pain and muscle weakness. Negative for neck pain and fracture.   Skin:  Positive for warts. Negative for nail changes, itching, poor wound healing, rash, hair changes, skin changes, acne, poor wound healing, scarring, flaky skin, Raynaud's phenomenon, sensitivity to sunlight and skin thickening.   Neurological:  Negative for dizziness, tingling, tremors, speech change, seizures, loss of consciousness, weakness, light-headedness, numbness, headaches, disturbances in  coordination, extremity numbness, memory loss, difficulty walking and paralysis.   Endo/Heme:  Negative for anemia, swollen glands and bruises/bleeds easily.   Psychiatric/Behavioral:  Negative for depression, hallucinations, memory loss, decreased concentration, mood swings and panic attacks.    Breast:  Negative for breast discharge, breast mass, breast pain and nipple retraction.   Endocrine:  Negative for altered temperature regulation, polyphagia, polydipsia, unwanted hair growth and change in facial hair.      Allergies   Allergen Reactions     Contrast Dye Hives and Itching     Isovue with ANTON on 1-21-19     Sulfa Drugs Hives and Swelling     Noted in 10/18/09 ER       Current Outpatient Medications   Medication     albuterol (PROAIR HFA/PROVENTIL HFA/VENTOLIN HFA) 108 (90 Base) MCG/ACT inhaler     amLODIPine (NORVASC) 5 MG tablet     amoxicillin (AMOXIL) 875 MG tablet     aspirin (ASA) 81 MG chewable tablet     atorvastatin (LIPITOR) 40 MG tablet     Cholecalciferol (D3 ADULT PO)     cyanocolbalamin (VITAMIN  B-12) 100 MCG tablet     fexofenadine (ALLEGRA) 180 MG tablet     fluticasone (FLONASE) 50 MCG/ACT nasal spray     levothyroxine (SYNTHROID/LEVOTHROID) 112 MCG tablet     lisinopril (ZESTRIL) 5 MG tablet     montelukast (SINGULAIR) 10 MG tablet     ondansetron (ZOFRAN-ODT) 4 MG ODT tab     triamcinolone (KENALOG) 0.1 % external cream     No current facility-administered medications for this visit.   There were no vitals taken for this visit.  GENERAL: healthy, alert and no distress  EYES: Eyes grossly normal to inspection, conjunctivae and sclerae normal  HENT: normal cephalic/atraumatic.  External ears, nose and mouth without ulcers or lesions.  RESP: no audible wheeze, cough, or visible cyanosis.  No visible retractions or increased work of breathing.  Able to speak fully in complete sentences.  NEURO: Cranial nerves grossly intact, mentation intact and speech normal  PSYCH: mentation appears normal,  affect normal/bright, judgement and insight intact, normal speech and appearance well-groomed    Urinalysis 6/2021 with 5-10 RBC, few renal tubular epithelium.  Over the past 3 years she has had 3 other microscopic urinalysis with microscopic hematuria    BMP 6/2021 with BUN/CR 12/0.9    Urine dip moderate blood    PVR 36 mL by bladder scan    CC  Patient Care Team:  Neli Lara DO as PCP - General (Internal Medicine)  Agbeh, Cephas Mawuena, MD as MD (OB/Gyn)  Gee Hitchcock MD as Assigned PCP  Tao Haile NP as Assigned Heart and Vascular Provider  NELI LARA

## 2021-11-24 NOTE — PATIENT INSTRUCTIONS
Please do the CT scan and take the medications as directed prior to the CT scan    Websites with free information:    American Urogynec[employer] Society patient website: www.voicesforpfd.org    Total Control Program: www.totalcontrolprogram.com    Please return for a cystoscopy (procedure to look in the bladder) and pelvic exam    It was a pleasure meeting with you today.  Thank you for allowing me and my team the privilege of caring for you today.  YOU are the reason we are here, and I truly hope we provided you with the excellent service you deserve.  Please let us know if there is anything else we can do for you so that we can be sure you are leaving completely satisfied with your care experience.    Cystoscopy    Cystoscopy is a procedure that lets your doctor look directly inside your urethra and bladder. It can be used to:    Help diagnose a problem with your urethra, bladder, or kidneys.    Take a sample (biopsy) of bladder or urethral tissue.    Treat certain problems (such as removing kidney stones).    Place a stent to bypass an obstruction.    Take special X-rays of the kidneys.  Based on the findings, your doctor may recommend other tests or treatments.  What is a cystoscope?  A cystoscope is a telescope-like instrument that contains lenses and fiberoptics (small glass wires that make bright light). The cystoscope may be straight and rigid, or flexible to bend around curves in the urethra. The doctor may look directly into the cystoscope, or project the image onto a monitor.  Getting ready    Ask your doctor if you should stop taking any medicines before the procedure.    Follow any other instructions your doctor gives you.  Tell your doctor before the exam if you:    Take any medicines, such as aspirin or blood thinners    Have allergies to any medicines    Are pregnant   The procedure  Cystoscopy is done in the doctor s office, surgery center, or hospital. The doctor and a nurse are present during the  procedure. It takes only a few minutes, longer if a biopsy, X-ray, or treatment needs to be done.  During the procedure:    You lie on an exam table on your back, knees bent and legs apart. You are covered with a drape.    Your urethra and the area around it are washed. Anesthetic jelly may be applied to numb the urethra.    The cystoscope is inserted. A sterile fluid is put into the bladder to expand it. You may feel pressure from this fluid.    When the procedure is done, the cystoscope is removed.  After the procedure   Once you re home:    Drink plenty of fluids.    You may have burning or light bleeding when you urinate--this is normal.    Medicines may be prescribed to ease any discomfort or prevent infection. Take these as directed.    Call your doctor if you have heavy bleeding or blood clots, burning that lasts more than a day, a fever over 100 F  (38  C), or trouble urinating.  Date Last Reviewed: 1/1/2017 2000-2017 [employer]. [address] [place] [address], [place] [address]. All rights reserved. This information is not intended as a substitute for professional medical care. Always follow your healthcare professional's instructions.

## 2021-11-26 ENCOUNTER — OFFICE VISIT (OUTPATIENT)
Dept: PODIATRY | Facility: CLINIC | Age: 66
End: 2021-11-26
Payer: COMMERCIAL

## 2021-11-26 VITALS
HEIGHT: 68 IN | BODY MASS INDEX: 37.89 KG/M2 | DIASTOLIC BLOOD PRESSURE: 74 MMHG | WEIGHT: 250 LBS | SYSTOLIC BLOOD PRESSURE: 126 MMHG

## 2021-11-26 DIAGNOSIS — L84 CALLUS OF FOOT: Primary | ICD-10-CM

## 2021-11-26 DIAGNOSIS — M79.671 RIGHT FOOT PAIN: ICD-10-CM

## 2021-11-26 PROCEDURE — 99243 OFF/OP CNSLTJ NEW/EST LOW 30: CPT | Performed by: PODIATRIST

## 2021-11-26 ASSESSMENT — MIFFLIN-ST. JEOR: SCORE: 1709.55

## 2021-11-26 NOTE — LETTER
11/26/2021         RE: Denita Flores  [address]  [facility] [address]        Dear Colleague,    Thank you for referring your patient, Denita Flores, to the [facility]. Please see a copy of my visit note below.    ASSESSMENT:  Encounter Diagnoses   Name Primary?     Callus of right foot Yes     Right foot pain      MEDICAL DECISION MAKING:  On clinical exam today, the right foot lesion does not have characteristics is just a plantar wart.  Is consistent with a nucleated hyperkeratotic lesion under high pressure point, the fifth metatarsal head.    I discussed the cause and nature of calluses.  I explained that this is related to her bony architecture.  This is likely chronic and will need maintenance.    Using #15 scalpel, I pared down and cored out the lesion today for diagnostic purposes.  I explained that should this provide pain relief, she can return in the future.  However it is often an out-of-pocket expense.  She is instructed to check with her medical insurance plan.    Recommendations:  Self filing with a pumice stone  Over-the-counter topical callus/corn remover if does not cause discomfort  Stiff soled shoes offload the forefoot during the propulsive phase of gait  Cushioned insoles with an aperture cut below the lesion  Other forms of padding      Disclaimer: This note consists of symbols derived from keyboarding, dictation and/or voice recognition software. As a result, there may be errors in the script that have gone undetected. Please consider this when interpreting information found in this chart.    Erwin Fry, STEFANIE, FACFAS, [facility] Department of Podiatry/Foot & Ankle Surgery      ____________________________________________________________________    HPI:       I was asked by Dr. Renny Loera to evaluate Denita Flores in consultation for planta warts.     Chief Complaint: wart on bottom of right foot  Onset of problem: 5 years  Pain/  "discomfort is described as:  pain  Pain Ratin+   Frequency:  \"all of the time\"    The pain is exacerbated when \"not wearing a mole-skin bandage    Past Medical History:   Diagnosis Date     Coronary artery disease involving native coronary artery of native heart, angina presence unspecified 2021     Essential hypertension 2021     Graves disease      Kidney stones     Passed a 5-10mm stone     Malignant neoplasm (H)     Cervical CA     Mild persistent asthma 2013     Thyroid disease    *  *  Past Surgical History:   Procedure Laterality Date     BACK SURGERY       COLONOSCOPY WITH CO2 INSUFFLATION N/A 2017    Procedure: COLONOSCOPY WITH CO2 INSUFFLATION;  Surgeon: Duane, William Charles, MD;  Location: [facility] OR     CYSTOSCOPY FLEXIBLE  8/10/2018     LEEP TX, CERVICAL      in her 20's   *  *  Current Outpatient Medications   Medication Sig Dispense Refill     albuterol (PROAIR HFA/PROVENTIL HFA/VENTOLIN HFA) 108 (90 Base) MCG/ACT inhaler Inhale 2 puffs into the lungs every 6 hours as needed for shortness of breath / dyspnea 18 g 1     amLODIPine (NORVASC) 5 MG tablet Take 1 tablet (5 mg) by mouth daily 90 tablet 3     amoxicillin (AMOXIL) 875 MG tablet Take 1 tablet (875 mg) by mouth 2 times daily for 10 days 20 tablet 0     aspirin (ASA) 81 MG chewable tablet Take 81 mg by mouth daily       atorvastatin (LIPITOR) 40 MG tablet Take 1 tablet (40 mg) by mouth At Bedtime 90 tablet 3     Cholecalciferol (D3 ADULT PO) Take 2,000 Units by mouth daily.       cyanocolbalamin (VITAMIN  B-12) 100 MCG tablet Take 100 mcg by mouth daily.       fexofenadine (ALLEGRA) 180 MG tablet Take 1 tablet (180 mg) by mouth daily 90 tablet 3     fluticasone (FLONASE) 50 MCG/ACT nasal spray Spray 1 spray into both nostrils daily 48 g 3     levothyroxine (SYNTHROID/LEVOTHROID) 112 MCG tablet Profile Rx,TAKE 1 TABLET(112 MCG) BY MOUTH DAILY 90 tablet 3     lisinopril (ZESTRIL) 5 MG tablet Take 1 tablet (5 mg) by mouth " "daily 90 tablet 3     methylPREDNISolone (MEDROL) 16 MG tablet Take 1 tablet (16 mg) by mouth daily 12 hours prior to the procedure with IV contrast 1 tablet 0     montelukast (SINGULAIR) 10 MG tablet TAKE 1 TABLET(10 MG) BY MOUTH AT BEDTIME 90 tablet 3     ondansetron (ZOFRAN-ODT) 4 MG ODT tab Take 1 tablet (4 mg) by mouth every 8 hours as needed for nausea 21 tablet 0     triamcinolone (KENALOG) 0.1 % external cream Apply sparingly to affected area three times daily as needed 80 g 0         EXAM:    Vitals: Ht 1.715 m (5' 7.5\")   Wt 113.4 kg (250 lb)   BMI 38.58 kg/m    BMI: Body mass index is 38.58 kg/m .    Constitutional:  Denita Flores is in no apparent distress, appears well-nourished.  Cooperative with history and physical exam.    Vascular:  Pedal pulses are palpable for both the DP and PT arteries.  CFT < 3 sec.  No edema.      Neuro: Light touch sensation is intact to the L4, L5, S1 distributions  No evidence of weakness, spasticity, or contracture in the lower extremities.     Derm: There is a hyperkeratotic nucleated lesion on the plantar aspect of the right fifth metatarsal head.  This does not show characteristics consistent with a plantar verruca.    Musculoskeletal:    Lower extremity muscle strength is normal. No gross deformities.            Again, thank you for allowing me to participate in the care of your patient.        Sincerely,        Erwin Fry, STEFANIE    "

## 2021-11-26 NOTE — PATIENT INSTRUCTIONS
Thank you for choosing [facility] Podiatry / Foot & Ankle Surgery!    DR. LOPEZ'S CLINIC LOCATIONS     [facility] [phone]   [address] APPOINTMENTS: [phone]   [place] [address] BILLING QUESTIONS: [phone]  [phone] RADIOLOGY: [phone]       [facility]    [id number]    [place] [address] [phone]  [phone]      Follow up: as needed      ROUTINE FOOT CARE (NAIL TRIMMING / CALLUSES)    Go to afcna.org (American Foot [employer]es Association) and search for providers near you.  Otherwise, this is a list we have gathered of  recommended locations/providers in [place].    [facility]   [phone]   [facility]  [phone]  [email or web address]   [facility]  [phone]  [place] + [place] mile radius [facility]  [phone]  [facility].   [facility]  [address] [place] [address]  [phone] Marcus Trujillo DPM  [address] [place] [address] [phone]   [facility]  [phone] [address] [place] [address]  [facility]  Dr. Joaquín Carvalho  [phone]  [place], [place]   [facility]  [phone]  [facility]  (does not take BCBS) FYI:  *Some providers accept insurance while others are out of pocket. Please contact them for details*     CALLUS / CORN / IPK CARE  When there is excessive friction or pressure on the skin, the body responds by making the skin thicker to protect the deeper structures from becoming exposed. While this works well to protect the deeper structures, the thickened skin can cause increased pressure and pain.    Callus: flat, diffuse thickened areas are simple calluses and they are usually caused by friction. Often these are the result of rubbing on a shoe or from going barefoot.    Corns: calluses with a central core on or between the toes are called corns. These result from prominent  joints on toes rubbing together. Theses are a symptom of bone malalignment or illfitting shoes and will always recur unless the underlying bones are addressed surgically.    IPK: calluses with a central core on the ball of the foot are usually IPKs (intractable plantar keratosis). These are caused by excessive pressure from the metatarsals, the bones that make up the ball of the foot. Often one of these bones is too long or too prominent. Again, these will always recur unless the underlying bone issue is addressed. There is no cure for these. They will either go away by themselves, recur, or more could develop.    TREATMENT RECOMENDATIONS  - File: Trim them down with a pumice stone or callus file a couple times a week to remove callus tissue that builds up. An electric callus removing device. Amope Pedi Perfect Electronic Pedicure Foot File and Callus Remover can be a good option.   - Moisturize: Lotion can be applied to soften the callus. A lactic acid or urea based cream such as Carmol, Kersal or Vanicream or thicker cream with shea butter are good options.   - Foot Gear: Good supportive shoes and minimizing barefoot walking can slow down callus formation and can decrease pain levels. Gel inserts can also provide padding to the bottom of the foot to prevent pain and slow recurrence. Toe spacers, toe covers, can custom orthotic inserts can be beneficial as well.  - Surgery: If there is a surgical pathology noted, such as a prominent bone, often this needs to be addressed surgically to minimize recurrence. However, sometimes the lesion simply migrates to another spot after surgery, so it is not a guaranteed cure.

## 2021-11-26 NOTE — PROGRESS NOTES
"ASSESSMENT:  Encounter Diagnoses   Name Primary?     Callus of right foot Yes     Right foot pain      MEDICAL DECISION MAKING:  On clinical exam today, the right foot lesion does not have characteristics is just a plantar wart.  Is consistent with a nucleated hyperkeratotic lesion under high pressure point, the fifth metatarsal head.    I discussed the cause and nature of calluses.  I explained that this is related to her bony architecture.  This is likely chronic and will need maintenance.    Using #15 scalpel, I pared down and cored out the lesion today for diagnostic purposes.  I explained that should this provide pain relief, she can return in the future.  However it is often an out-of-pocket expense.  She is instructed to check with her medical insurance plan.    Recommendations:  Self filing with a pumice stone  Over-the-counter topical callus/corn remover if does not cause discomfort  Stiff soled shoes offload the forefoot during the propulsive phase of gait  Cushioned insoles with an aperture cut below the lesion  Other forms of padding      Disclaimer: This note consists of symbols derived from keyboarding, dictation and/or voice recognition software. As a result, there may be errors in the script that have gone undetected. Please consider this when interpreting information found in this chart.    Erwin Fry DPM, FACFAS, MS    [place] [facility]      ____________________________________________________________________    HPI:       I was asked by Dr. Renny Loera to evaluate Denita Flores in consultation for planta warts.     Chief Complaint: wart on bottom of right foot  Onset of problem: 5 years  Pain/ discomfort is described as:  pain  Pain Ratin+   Frequency:  \"all of the time\"    The pain is exacerbated when \"not wearing a mole-skin bandage    Past Medical History:   Diagnosis Date     Coronary artery disease involving native coronary artery of native " heart, angina presence unspecified 6/11/2021     Essential hypertension 6/11/2021     Graves disease      Kidney stones 2000    Passed a 5-10mm stone     Malignant neoplasm (H)     Cervical CA     Mild persistent asthma 5/8/2013     Thyroid disease    *  *  Past Surgical History:   Procedure Laterality Date     BACK SURGERY       COLONOSCOPY WITH CO2 INSUFFLATION N/A 2/21/2017    Procedure: COLONOSCOPY WITH CO2 INSUFFLATION;  Surgeon: Duane, William Charles, MD;  Location: [facility] OR     CYSTOSCOPY FLEXIBLE  8/10/2018     LEEP TX, CERVICAL      in her 20's   *  *  Current Outpatient Medications   Medication Sig Dispense Refill     albuterol (PROAIR HFA/PROVENTIL HFA/VENTOLIN HFA) 108 (90 Base) MCG/ACT inhaler Inhale 2 puffs into the lungs every 6 hours as needed for shortness of breath / dyspnea 18 g 1     amLODIPine (NORVASC) 5 MG tablet Take 1 tablet (5 mg) by mouth daily 90 tablet 3     amoxicillin (AMOXIL) 875 MG tablet Take 1 tablet (875 mg) by mouth 2 times daily for 10 days 20 tablet 0     aspirin (ASA) 81 MG chewable tablet Take 81 mg by mouth daily       atorvastatin (LIPITOR) 40 MG tablet Take 1 tablet (40 mg) by mouth At Bedtime 90 tablet 3     Cholecalciferol (D3 ADULT PO) Take 2,000 Units by mouth daily.       cyanocolbalamin (VITAMIN  B-12) 100 MCG tablet Take 100 mcg by mouth daily.       fexofenadine (ALLEGRA) 180 MG tablet Take 1 tablet (180 mg) by mouth daily 90 tablet 3     fluticasone (FLONASE) 50 MCG/ACT nasal spray Spray 1 spray into both nostrils daily 48 g 3     levothyroxine (SYNTHROID/LEVOTHROID) 112 MCG tablet Profile Rx,TAKE 1 TABLET(112 MCG) BY MOUTH DAILY 90 tablet 3     lisinopril (ZESTRIL) 5 MG tablet Take 1 tablet (5 mg) by mouth daily 90 tablet 3     methylPREDNISolone (MEDROL) 16 MG tablet Take 1 tablet (16 mg) by mouth daily 12 hours prior to the procedure with IV contrast 1 tablet 0     montelukast (SINGULAIR) 10 MG tablet TAKE 1 TABLET(10 MG) BY MOUTH AT BEDTIME 90 tablet 3      "ondansetron (ZOFRAN-ODT) 4 MG ODT tab Take 1 tablet (4 mg) by mouth every 8 hours as needed for nausea 21 tablet 0     triamcinolone (KENALOG) 0.1 % external cream Apply sparingly to affected area three times daily as needed 80 g 0         EXAM:    Vitals: Ht 1.715 m (5' 7.5\")   Wt 113.4 kg (250 lb)   BMI 38.58 kg/m    BMI: Body mass index is 38.58 kg/m .    Constitutional:  Denita Flores is in no apparent distress, appears well-nourished.  Cooperative with history and physical exam.    Vascular:  Pedal pulses are palpable for both the DP and PT arteries.  CFT < 3 sec.  No edema.      Neuro: Light touch sensation is intact to the L4, L5, S1 distributions  No evidence of weakness, spasticity, or contracture in the lower extremities.     Derm: There is a hyperkeratotic nucleated lesion on the plantar aspect of the right fifth metatarsal head.  This does not show characteristics consistent with a plantar verruca.    Musculoskeletal:    Lower extremity muscle strength is normal. No gross deformities.        "

## 2021-11-29 ENCOUNTER — OFFICE VISIT (OUTPATIENT)
Dept: FAMILY MEDICINE | Facility: CLINIC | Age: 66
End: 2021-11-29
Payer: COMMERCIAL

## 2021-11-29 VITALS
WEIGHT: 242 LBS | SYSTOLIC BLOOD PRESSURE: 142 MMHG | HEIGHT: 68 IN | BODY MASS INDEX: 36.68 KG/M2 | RESPIRATION RATE: 16 BRPM | DIASTOLIC BLOOD PRESSURE: 77 MMHG | HEART RATE: 82 BPM | OXYGEN SATURATION: 96 % | TEMPERATURE: 97.6 F

## 2021-11-29 DIAGNOSIS — M25.542 ARTHRALGIA OF HANDS, BILATERAL: ICD-10-CM

## 2021-11-29 DIAGNOSIS — H66.92 LEFT OTITIS MEDIA, UNSPECIFIED OTITIS MEDIA TYPE: Primary | ICD-10-CM

## 2021-11-29 DIAGNOSIS — M25.541 ARTHRALGIA OF HANDS, BILATERAL: ICD-10-CM

## 2021-11-29 LAB
PATH REPORT.COMMENTS IMP SPEC: NORMAL
PATH REPORT.FINAL DX SPEC: NORMAL
PATH REPORT.GROSS SPEC: NORMAL
PATH REPORT.RELEVANT HX SPEC: NORMAL

## 2021-11-29 PROCEDURE — 99213 OFFICE O/P EST LOW 20 MIN: CPT | Performed by: INTERNAL MEDICINE

## 2021-11-29 ASSESSMENT — MIFFLIN-ST. JEOR: SCORE: 1673.26

## 2021-11-29 NOTE — PROGRESS NOTES
"  Assessment & Plan     Left otitis media, unspecified otitis media type  Resolved w/ ab    Arthralgia of hands, bilateral  Chronic  2012 ELVIRA/RF negative.  Ongoing symptoms w/ swelling/pain intermittently.  Refer to rheum.  - Rheumatology Referral      Return in about 6 months (around 5/29/2022) for Routine preventive, with me, in person, sooner if symptoms worsen or do not improve.    DO CALE [facility] ROSANNA Barger is a 66 year old who presents for the following health issues     Finger pains, bilateral. L>R.   Few years.  Gets Swollen intermittently, pain. Takes tylenol as needed usually in mornings so not sure if worse in mornings.  Denies new trauma or known injuries.    Treated w/ ab two weeks ago for infection of ear. Would like recheck. Feeling better.        Review of Systems   Constitutional, HEENT, cardiovascular, pulmonary, gi and gu systems are negative, except as otherwise noted.      Objective    BP (!) 142/77 (BP Location: Right arm, Patient Position: Sitting, Cuff Size: Adult Large)   Pulse 82   Temp 97.6  F (36.4  C) (Temporal)   Resp 16   Ht 1.715 m (5' 7.5\")   Wt 109.8 kg (242 lb)   SpO2 96%   BMI 37.34 kg/m    Body mass index is 37.34 kg/m .  Physical Exam     GENERAL APPEARANCE: AAOx3, no distress. Well developed.    HENT: NC/AT. External ear and ear canal nontender and nonedematous bilaterally. No exudates. B/L TM intact.     PSYCH: appropriate mood and affect.             "

## 2021-12-22 ENCOUNTER — HOSPITAL ENCOUNTER (OUTPATIENT)
Dept: CT IMAGING | Facility: CLINIC | Age: 66
Discharge: HOME | End: 2021-12-22
Attending: UROLOGY | Admitting: UROLOGY
Payer: COMMERCIAL

## 2021-12-22 DIAGNOSIS — Z85.41 HISTORY OF CERVICAL CANCER: ICD-10-CM

## 2021-12-22 DIAGNOSIS — Z87.891 FORMER SMOKER: ICD-10-CM

## 2021-12-22 DIAGNOSIS — Z80.51 FAMILY HISTORY OF KIDNEY CANCER: ICD-10-CM

## 2021-12-22 DIAGNOSIS — R31.29 MICROSCOPIC HEMATURIA: ICD-10-CM

## 2021-12-22 DIAGNOSIS — N20.0 NEPHROLITHIASIS: ICD-10-CM

## 2021-12-22 PROCEDURE — 74178 CT ABD&PLV WO CNTR FLWD CNTR: CPT

## 2021-12-22 PROCEDURE — 250N000009 HC RX 250: Performed by: UROLOGY

## 2021-12-22 PROCEDURE — 250N000011 HC RX IP 250 OP 636: Performed by: UROLOGY

## 2021-12-22 RX ORDER — IOPAMIDOL 755 MG/ML
120 INJECTION, SOLUTION INTRAVASCULAR ONCE
Status: COMPLETED | OUTPATIENT
Start: 2021-12-22 | End: 2021-12-22

## 2021-12-22 RX ADMIN — SODIUM CHLORIDE 100 ML: 900 INJECTION INTRAVENOUS at 10:51

## 2021-12-22 RX ADMIN — IOPAMIDOL 120 ML: 755 INJECTION, SOLUTION INTRAVENOUS at 10:50

## 2022-01-16 ENCOUNTER — OFFICE VISIT (OUTPATIENT)
Dept: URGENT CARE | Facility: URGENT CARE | Age: 67
End: 2022-01-16
Payer: COMMERCIAL

## 2022-01-16 ENCOUNTER — NURSE TRIAGE (OUTPATIENT)
Dept: NURSING | Facility: CLINIC | Age: 67
End: 2022-01-16
Payer: COMMERCIAL

## 2022-01-16 VITALS
SYSTOLIC BLOOD PRESSURE: 119 MMHG | BODY MASS INDEX: 37.34 KG/M2 | RESPIRATION RATE: 15 BRPM | DIASTOLIC BLOOD PRESSURE: 66 MMHG | WEIGHT: 242 LBS | HEART RATE: 66 BPM | TEMPERATURE: 96.7 F | OXYGEN SATURATION: 98 %

## 2022-01-16 DIAGNOSIS — H66.92 OTITIS MEDIA TREATED WITH ANTIBIOTICS IN THE PAST 60 DAYS, LEFT: ICD-10-CM

## 2022-01-16 DIAGNOSIS — J01.90 ACUTE SINUSITIS WITH SYMPTOMS GREATER THAN 10 DAYS: Primary | ICD-10-CM

## 2022-01-16 PROCEDURE — 99213 OFFICE O/P EST LOW 20 MIN: CPT | Performed by: NURSE PRACTITIONER

## 2022-01-16 RX ORDER — AZITHROMYCIN 250 MG/1
TABLET, FILM COATED ORAL
Qty: 6 TABLET | Refills: 0 | Status: SHIPPED | OUTPATIENT
Start: 2022-01-16 | End: 2022-01-21

## 2022-01-16 NOTE — PROGRESS NOTES
"  Assessment & Plan     Acute sinusitis with symptoms greater than 10 days  - azithromycin (ZITHROMAX) 250 MG tablet; Take 2 tablets (500 mg) by mouth daily for 1 day, THEN 1 tablet (250 mg) daily for 4 days.    Otitis media treated with antibiotics in the past 60 days, left  - azithromycin (ZITHROMAX) 250 MG tablet; Take 2 tablets (500 mg) by mouth daily for 1 day, THEN 1 tablet (250 mg) daily for 4 days.    Unimproved on augmentin  Will start z aurelia  push fluids, rest as able.   Elevate HOB, lots of handwashing.    Toss your toothbrush after 24 hours on the antibiotics.    BMI:   Estimated body mass index is 37.34 kg/m  as calculated from the following:    Height as of 11/29/21: 1.715 m (5' 7.5\").    Weight as of this encounter: 109.8 kg (242 lb).       No follow-ups on file.    NANDA Sweeney CNP  [facility]   Denita is a 66 year old who presents for the following health issues     HPI     Acute Illness  Acute illness concerns: sinus pressure  Onset/Duration: 2 weeks  Symptoms:  Fever: no  Chills/Sweats: no  Headache (location?): YES  Sinus Pressure: YES  Conjunctivitis:  no  Ear Pain: YES: bilateral  Rhinorrhea: YES  Congestion: YES  Sore Throat: no  Cough: YES-waxing and waning over time  Wheeze: no  Decreased Appetite: no  Nausea: no  Vomiting: no  Diarrhea: no  Dysuria/Freq.: no  Dysuria or Hematuria: no  Fatigue/Achiness: YES  Sick/Strep Exposure: just finished augmentin  Therapies tried and outcome: augmentin    Review of Systems   Constitutional, HEENT, cardiovascular, pulmonary, GI, , musculoskeletal, neuro, skin, endocrine and psych systems are negative, except as otherwise noted.      Objective    /66 (BP Location: Left arm, Patient Position: Sitting, Cuff Size: Adult Large)   Pulse 66   Temp (!) 96.7  F (35.9  C) (Tympanic)   Resp 15   Wt 109.8 kg (242 lb)   SpO2 98%   BMI 37.34 kg/m    Body mass index is 37.34 kg/m .  Physical Exam "   GENERAL: healthy, alert and no distress  EYES: Eyes grossly normal to inspection, PERRL and conjunctivae and sclerae normal  HENT: normal cephalic/atraumatic, both ears: bulging membrane and mucopurulent effusion, nose and mouth without ulcers or lesions, oropharynx clear, oral mucous membranes moist and sinuses: maxillary, frontal tenderness on bilaterally  NECK: bilateral anterior cervical adenopathy, no asymmetry, masses, or scars and thyroid normal to palpation  RESP: lungs clear to auscultation - no rales, rhonchi or wheezes  CV: regular rate and rhythm, normal S1 S2, no S3 or S4, no murmur, click or rub, no peripheral edema and peripheral pulses strong  ABDOMEN: soft, nontender, no hepatosplenomegaly, no masses and bowel sounds normal  MS: no gross musculoskeletal defects noted, no edema  SKIN: no suspicious lesions or rashes  NEURO: Normal strength and tone, mentation intact and speech normal  PSYCH: mentation appears normal, affect normal/bright

## 2022-01-16 NOTE — TELEPHONE ENCOUNTER
Allergy/sinus issues with drainage, vertigo occasionally, had TV on, plugged ear and couldn't hear out L ear then, equilibrium off, doesn't think she has fever 98.4. Forehead hurts some. Feels like she is hearing an echo. Moderate pain in L ear.     Singular, nasal spray, allegra, arthritis tylenol (650mg)     Had amox in the last month, ceftin works better, cant blow nose    Reason for Disposition    Walking is very unsteady    Additional Information    Negative: Moving the earlobe or touching the ear clearly increases the pain    Negative: Foreign body struck in the ear (e.g., bug, piece of cotton)    Negative: Followed an ear injury    Negative: [1] Recently diagnosed with otitis media AND [2] currently on oral antibiotics    Negative: [1] Stiff neck (unable to touch chin to chest) AND [2] fever    Negative: [1] Bony area of skull behind the ear is pink or swollen AND [2] fever    Negative: Fever > 104 F (40 C)    Negative: Patient sounds very sick or weak to the triager    Negative: [1] SEVERE pain AND [2] not improved 2 hours after taking analgesic medication (e.g., ibuprofen or acetaminophen)    Protocols used: EARACHE-A-AH

## 2022-01-25 ENCOUNTER — TELEPHONE (OUTPATIENT)
Dept: UROLOGY | Facility: CLINIC | Age: 67
End: 2022-01-25
Payer: COMMERCIAL

## 2022-01-25 NOTE — TELEPHONE ENCOUNTER
[facility] Call Center    Phone Message    May a detailed message be left on voicemail: yes     Reason for Call: Other: Denita called about special instructions prior to cystoscopy for 2/9/22.  She wants to know if she can eat/drink as normal.  Please call her to discuss, she did say you may get her voicemail but please leave specifics there.     Action Taken: Message routed to:  Other: ua uro    Travel Screening: Not Applicable

## 2022-01-26 NOTE — TELEPHONE ENCOUNTER
Called patient this morning and gave her specifics. Informed that no diet restrictions for in office procedure. Instructed to drink some water prior to procedure to be prepared to leave a urine sample.     Nieves Melton LPN

## 2022-02-08 DIAGNOSIS — E89.0 POSTABLATIVE HYPOTHYROIDISM: ICD-10-CM

## 2022-02-09 ENCOUNTER — OFFICE VISIT (OUTPATIENT)
Dept: UROLOGY | Facility: CLINIC | Age: 67
End: 2022-02-09
Payer: COMMERCIAL

## 2022-02-09 VITALS
SYSTOLIC BLOOD PRESSURE: 112 MMHG | WEIGHT: 245 LBS | BODY MASS INDEX: 37.13 KG/M2 | DIASTOLIC BLOOD PRESSURE: 62 MMHG | HEIGHT: 68 IN

## 2022-02-09 DIAGNOSIS — Z85.41 HISTORY OF CERVICAL CANCER: ICD-10-CM

## 2022-02-09 DIAGNOSIS — R31.29 MICROSCOPIC HEMATURIA: Primary | ICD-10-CM

## 2022-02-09 DIAGNOSIS — Z80.51 FAMILY HISTORY OF KIDNEY CANCER: ICD-10-CM

## 2022-02-09 LAB
ALBUMIN UR-MCNC: NEGATIVE MG/DL
APPEARANCE UR: CLEAR
BILIRUB UR QL STRIP: NEGATIVE
COLOR UR AUTO: YELLOW
GLUCOSE UR STRIP-MCNC: NEGATIVE MG/DL
HGB UR QL STRIP: ABNORMAL
KETONES UR STRIP-MCNC: NEGATIVE MG/DL
LEUKOCYTE ESTERASE UR QL STRIP: ABNORMAL
NITRATE UR QL: NEGATIVE
PH UR STRIP: 6 [PH] (ref 5–7)
SP GR UR STRIP: 1.02 (ref 1–1.03)
UROBILINOGEN UR STRIP-ACNC: 0.2 E.U./DL

## 2022-02-09 PROCEDURE — 99212 OFFICE O/P EST SF 10 MIN: CPT | Mod: 25 | Performed by: UROLOGY

## 2022-02-09 PROCEDURE — 52000 CYSTOURETHROSCOPY: CPT | Performed by: UROLOGY

## 2022-02-09 PROCEDURE — 81003 URINALYSIS AUTO W/O SCOPE: CPT | Mod: QW | Performed by: UROLOGY

## 2022-02-09 RX ORDER — ACETAMINOPHEN 500 MG
500-1000 TABLET ORAL EVERY 6 HOURS PRN
COMMUNITY
End: 2022-06-20

## 2022-02-09 RX ORDER — LEVOTHYROXINE SODIUM 112 UG/1
TABLET ORAL
Qty: 90 TABLET | Refills: 3 | OUTPATIENT
Start: 2022-02-09

## 2022-02-09 RX ORDER — KRILL/OM-3/DHA/EPA/PHOSPHO/AST 500MG-86MG
CAPSULE ORAL
COMMUNITY
End: 2022-06-29

## 2022-02-09 ASSESSMENT — PAIN SCALES - GENERAL: PAINLEVEL: MILD PAIN (2)

## 2022-02-09 ASSESSMENT — MIFFLIN-ST. JEOR: SCORE: 1686.87

## 2022-02-09 NOTE — LETTER
"2/9/2022       RE: Denita Flores  [address]  [place] [facility] [address]     Dear Colleague,    Thank you for referring your patient, Denita Flores, to the [facility] at [facility]. Please see a copy of my visit note below.    February 9, 2022    Return visit    Patient returns today for follow up.  She denies any changes in her health since last visit.    /62   Ht 1.715 m (5' 7.5\")   Wt 111.1 kg (245 lb)   BMI 37.81 kg/m    She is comfortable, in no distress, non-labored breathing.  Abdomen is soft, non-tender, non-distended.  Normal external female genitalia.  Negative CST.  Pelvic exam is unremarkable    Cystoscopy Note: After informed consent was obtained patient was prepped and draped in the standard fashion.  The flexible cystoscope was inserted into a normal appearing urethral meatus.  The urothelium was carefully examined and there were no tumors, masses, stones, foreign bodies, or other urothelial abnormalities noted.  Bilateral ureteral orifices were noted in the normal orthotopic position and both effluxed clear urine.  The cystoscope was retroflexed and the bladder neck was unremarkable.  The urethra was carefully examined upon removing the cystoscope and was unremarkable.  Patient tolerated the procedure without complications noted.    CT urogram without obvious abnormalities or etiology for the microscopic hematuria    A/P: 66 year old F with family history of kidney cancer, personal history of cervical cancer with microscopic hematuria    She has now had 2 negative urologic evaluations for the microscopic hematuria so needs to have a nephrologic evaluation    Can continue to monitor her microscopic urinalysis with PCP and return if persists in a few years or sooner if the amount increases, she has gross hematuria or any other concerning symptoms    11 minutes were spent today on the date of the encounter in " reviewing the EMR, direct patient care, coordination of care and documentation in addition to the cystoscopy procedure    Darline English MD MPH  (she/her/hers)   of Urology  [facility]  Patient Care Team:  Neli Lara DO as PCP - General (Internal Medicine)  Agbeh, Cephas Mawuena, MD as MD (OB/Gyn)  Tao Haile, NP as Assigned Heart and Vascular Provider  Amador, Darline Serrato MD as Assigned Surgical Provider  Erwin Fry DPM as Assigned Musculoskeletal Provider  Gee Hitchcock MD as Assigned PCP

## 2022-02-09 NOTE — PATIENT INSTRUCTIONS
"Please see nephrology    Please come back to see us sooner if you see blood in the urine or there is an increase the the number of red blood cells seen in the urine on the microscopic urinalysis    It was a pleasure meeting with you today.  Thank you for allowing me and my team the privilege of caring for you today.  YOU are the reason we are here, and I truly hope we provided you with the excellent service you deserve.  Please let us know if there is anything else we can do for you so that we can be sure you are leaving completely satisfied with your care experience.    AFTER YOUR CYSTOSCOPY  ?  ?  You have just completed a cystoscopy, or \"cysto\", which allowed your physician to learn more about your bladder (or to remove a stent placed after surgery). We suggest that you continue to avoid caffeine, fruit juice, and alcohol for the next 24 hours, however, you are encouraged to return to your normal activities.  ?  ?  A few things that are considered normal after your cystoscopy:  ?  * small amount of bleeding (or spotting) that clears within the next 24 hours  ?  * slight burning sensation with urination  ?  * sensation of needing to void (urinate) more frequently  ?  * the feeling of \"air\" in your urine  ?  * mild discomfort that is relieved with Tylenol    * bladder spasms  ?  ?  ?  Please contact our office promptly if you:  ?  * develop a fever above 101 degrees  ?  * are unable to urinate  ?  * develop bright red blood that does not stop  ?  * experience severe pain or swelling  ?  ?  ?  And of course, please contact our office with any concerns or questions [phone]  ?    "

## 2022-02-09 NOTE — NURSING NOTE
Prior to the start of the procedure and with procedural staff participation, I verbally confirmed the patient s identity using two indicators, relevant allergies, that the procedure was appropriate and matched the consent or emergent situation, and that the correct equipment/implants were available. Immediately prior to starting the procedure I conducted the Time Out with the procedural staff and re-confirmed the patient s name, procedure, and site/side. I have wiped the patient off with the povidone-Iodine solution, draped them, and instilled sterile water into the bladder. (The Joint Commission universal protocol was followed.)  Yes    Sedation (Moderate or Deep): None  Soraida Suarez LPN

## 2022-02-09 NOTE — PROGRESS NOTES
"February 9, 2022    Return visit    Patient returns today for follow up.  She denies any changes in her health since last visit.    /62   Ht 1.715 m (5' 7.5\")   Wt 111.1 kg (245 lb)   BMI 37.81 kg/m    She is comfortable, in no distress, non-labored breathing.  Abdomen is soft, non-tender, non-distended.  Normal external female genitalia.  Negative CST.  Pelvic exam is unremarkable    Cystoscopy Note: After informed consent was obtained patient was prepped and draped in the standard fashion.  The flexible cystoscope was inserted into a normal appearing urethral meatus.  The urothelium was carefully examined and there were no tumors, masses, stones, foreign bodies, or other urothelial abnormalities noted.  Bilateral ureteral orifices were noted in the normal orthotopic position and both effluxed clear urine.  The cystoscope was retroflexed and the bladder neck was unremarkable.  The urethra was carefully examined upon removing the cystoscope and was unremarkable.  Patient tolerated the procedure without complications noted.    CT urogram without obvious abnormalities or etiology for the microscopic hematuria    A/P: 66 year old F with family history of kidney cancer, personal history of cervical cancer with microscopic hematuria    She has now had 2 negative urologic evaluations for the microscopic hematuria so needs to have a nephrologic evaluation    Can continue to monitor her microscopic urinalysis with PCP and return if persists in a few years or sooner if the amount increases, she has gross hematuria or any other concerning symptoms    11 minutes were spent today on the date of the encounter in reviewing the EMR, direct patient care, coordination of care and documentation in addition to the cystoscopy procedure    Darline English MD MPH  (she/her/hers)   of Urology  [facility]      CC  Patient Care Team:  Neli Lara DO as PCP - General (Internal Medicine)  Gabriela, " Vicky Bridges MD as MD (OB/Gyn)  Tao Haile NP as Assigned Heart and Vascular Provider  Amador, Darline Serrato MD as Assigned Surgical Provider  Ang, Erwin Anderson DPM as Assigned Musculoskeletal Provider  Gee Hitchcock MD as Assigned PCP

## 2022-03-05 ENCOUNTER — OFFICE VISIT (OUTPATIENT)
Dept: URGENT CARE | Facility: URGENT CARE | Age: 67
End: 2022-03-05
Payer: COMMERCIAL

## 2022-03-05 ENCOUNTER — ANCILLARY PROCEDURE (OUTPATIENT)
Dept: GENERAL RADIOLOGY | Facility: CLINIC | Age: 67
End: 2022-03-05
Attending: PHYSICIAN ASSISTANT
Payer: COMMERCIAL

## 2022-03-05 VITALS
WEIGHT: 248 LBS | OXYGEN SATURATION: 97 % | TEMPERATURE: 98.5 F | BODY MASS INDEX: 38.27 KG/M2 | DIASTOLIC BLOOD PRESSURE: 69 MMHG | SYSTOLIC BLOOD PRESSURE: 115 MMHG | HEART RATE: 75 BPM

## 2022-03-05 DIAGNOSIS — J01.91 ACUTE RECURRENT SINUSITIS, UNSPECIFIED LOCATION: Primary | ICD-10-CM

## 2022-03-05 DIAGNOSIS — R09.81 NASAL CONGESTION: ICD-10-CM

## 2022-03-05 LAB
BASOPHILS # BLD AUTO: 0.1 10E3/UL (ref 0–0.2)
BASOPHILS NFR BLD AUTO: 1 %
EOSINOPHIL # BLD AUTO: 0.3 10E3/UL (ref 0–0.7)
EOSINOPHIL NFR BLD AUTO: 3 %
ERYTHROCYTE [DISTWIDTH] IN BLOOD BY AUTOMATED COUNT: 13.5 % (ref 10–15)
HCT VFR BLD AUTO: 41.8 % (ref 35–47)
HGB BLD-MCNC: 13.2 G/DL (ref 11.7–15.7)
LYMPHOCYTES # BLD AUTO: 1.6 10E3/UL (ref 0.8–5.3)
LYMPHOCYTES NFR BLD AUTO: 18 %
MCH RBC QN AUTO: 30.3 PG (ref 26.5–33)
MCHC RBC AUTO-ENTMCNC: 31.6 G/DL (ref 31.5–36.5)
MCV RBC AUTO: 96 FL (ref 78–100)
MONOCYTES # BLD AUTO: 0.7 10E3/UL (ref 0–1.3)
MONOCYTES NFR BLD AUTO: 7 %
NEUTROPHILS # BLD AUTO: 6.5 10E3/UL (ref 1.6–8.3)
NEUTROPHILS NFR BLD AUTO: 71 %
PLATELET # BLD AUTO: 267 10E3/UL (ref 150–450)
RBC # BLD AUTO: 4.36 10E6/UL (ref 3.8–5.2)
WBC # BLD AUTO: 9.1 10E3/UL (ref 4–11)

## 2022-03-05 PROCEDURE — 70220 X-RAY EXAM OF SINUSES: CPT | Performed by: RADIOLOGY

## 2022-03-05 PROCEDURE — 36415 COLL VENOUS BLD VENIPUNCTURE: CPT | Performed by: PHYSICIAN ASSISTANT

## 2022-03-05 PROCEDURE — 99214 OFFICE O/P EST MOD 30 MIN: CPT | Performed by: PHYSICIAN ASSISTANT

## 2022-03-05 PROCEDURE — 85025 COMPLETE CBC W/AUTO DIFF WBC: CPT | Performed by: PHYSICIAN ASSISTANT

## 2022-03-05 ASSESSMENT — ENCOUNTER SYMPTOMS
SINUS PAIN: 1
COUGH: 0
JOINT SWELLING: 1
FEVER: 0
DIZZINESS: 1
SHORTNESS OF BREATH: 0
RHINORRHEA: 0
SINUS PRESSURE: 1

## 2022-03-05 NOTE — PROGRESS NOTES
SUBJECTIVE:   Denita Flores is a 66 year old female presenting with a chief complaint of   Chief Complaint   Patient presents with     Urgent Care     Ear Problem     Left ear feels clogged, unable to hear.Has had some dizziness. Congested Head and nose, neck hurts. Pt has been on many antibiotics for this. Left middle finger red,swollen, painful.       She is an established patient of [facility].  Patient presents with (1)  Left ear clogging for years.  Has been on different abx, which improves (amoxicillin and emycin).  Has not seen ENT in past.    (2) Left finger swelling.  States she has an appointment with a rhematologist but wanted to know if it was normal for arthritis to cause swollen joints.      Patient currently on flonase.          Review of Systems   Constitutional: Negative for fever.   HENT: Positive for congestion, hearing loss, sinus pressure and sinus pain. Negative for ear pain and rhinorrhea.    Respiratory: Negative for cough and shortness of breath.    Musculoskeletal: Positive for joint swelling.   Neurological: Positive for dizziness.   All other systems reviewed and are negative.      Past Medical History:   Diagnosis Date     Coronary artery disease involving native coronary artery of native heart, angina presence unspecified 6/11/2021     Essential hypertension 6/11/2021     Graves disease      Kidney stones 2000    Passed a 5-10mm stone     Malignant neoplasm (H)     Cervical CA     Mild persistent asthma 5/8/2013     Swollen finger      Thyroid disease      Family History   Problem Relation Age of Onset     Glaucoma Mother      Heart Disease Father      Cancer Father 67        kidney cancer      Heart Disease Sister      Cancer Sister 59        colon cancer      Diabetes Sister      Colon Cancer Maternal Aunt      Colon Cancer Paternal Aunt      Glaucoma Maternal Grandmother      Current Outpatient Medications   Medication Sig Dispense Refill     acetaminophen (TYLENOL) 500 MG tablet  Take 500-1,000 mg by mouth every 6 hours as needed for mild pain       albuterol (PROAIR HFA/PROVENTIL HFA/VENTOLIN HFA) 108 (90 Base) MCG/ACT inhaler Inhale 2 puffs into the lungs every 6 hours as needed for shortness of breath / dyspnea 18 g 1     amLODIPine (NORVASC) 5 MG tablet Take 1 tablet (5 mg) by mouth daily 90 tablet 3     aspirin (ASA) 81 MG chewable tablet Take 81 mg by mouth daily       atorvastatin (LIPITOR) 40 MG tablet Take 1 tablet (40 mg) by mouth At Bedtime 90 tablet 3     Cholecalciferol (D3 ADULT PO) Take 2,000 Units by mouth daily.       cyanocolbalamin (VITAMIN  B-12) 100 MCG tablet Take 100 mcg by mouth daily.       fexofenadine (ALLEGRA) 180 MG tablet Take 1 tablet (180 mg) by mouth daily 90 tablet 3     fluticasone (FLONASE) 50 MCG/ACT nasal spray Spray 1 spray into both nostrils daily 48 g 3     Krill Oil 500 MG CAPS        levothyroxine (SYNTHROID/LEVOTHROID) 112 MCG tablet Profile Rx,TAKE 1 TABLET(112 MCG) BY MOUTH DAILY 90 tablet 3     lisinopril (ZESTRIL) 5 MG tablet Take 1 tablet (5 mg) by mouth daily 90 tablet 3     methylPREDNISolone (MEDROL) 16 MG tablet Take 1 tablet (16 mg) by mouth daily 12 hours prior to the procedure with IV contrast (Patient not taking: Reported on 2022) 1 tablet 0     montelukast (SINGULAIR) 10 MG tablet TAKE 1 TABLET(10 MG) BY MOUTH AT BEDTIME 90 tablet 3     ondansetron (ZOFRAN-ODT) 4 MG ODT tab Take 1 tablet (4 mg) by mouth every 8 hours as needed for nausea 21 tablet 0     triamcinolone (KENALOG) 0.1 % external cream Apply sparingly to affected area three times daily as needed 80 g 0     Social History     Tobacco Use     Smoking status: Former Smoker     Packs/day: 1.50     Years: 35.00     Pack years: 52.50     Types: Cigarettes     Quit date: 2003     Years since quittin.1     Smokeless tobacco: Never Used   Substance Use Topics     Alcohol use: Yes     Alcohol/week: 0.0 standard drinks     Comment: 3 dinks/ year       OBJECTIVE  BP  115/69   Pulse 75   Temp 98.5  F (36.9  C) (Oral)   Wt 112.5 kg (248 lb)   SpO2 97%   BMI 38.27 kg/m      Physical Exam  Vitals and nursing note reviewed.   Constitutional:       General: She is not in acute distress.     Appearance: Normal appearance. She is obese. She is not ill-appearing.   HENT:      Head: Normocephalic and atraumatic.      Right Ear: Tympanic membrane, ear canal and external ear normal.      Left Ear: Tympanic membrane, ear canal and external ear normal.      Nose: Nose normal.      Mouth/Throat:      Mouth: Mucous membranes are moist.      Pharynx: Oropharynx is clear.   Eyes:      Extraocular Movements: Extraocular movements intact.      Conjunctiva/sclera: Conjunctivae normal.   Cardiovascular:      Rate and Rhythm: Normal rate and regular rhythm.      Pulses: Normal pulses.      Heart sounds: Normal heart sounds.   Pulmonary:      Effort: Pulmonary effort is normal.      Breath sounds: Normal breath sounds.   Musculoskeletal:         General: Swelling present. Normal range of motion.      Cervical back: Normal range of motion.      Comments: Left hand #3digit, DIP edema.  Full ROM,     Skin:     General: Skin is warm and dry.   Neurological:      General: No focal deficit present.      Mental Status: She is alert.   Psychiatric:         Mood and Affect: Mood normal.         Behavior: Behavior normal.         Labs:  Results for orders placed or performed in visit on 03/05/22 (from the past 24 hour(s))   XR Sinus Complete G/E 3 Views    Narrative    EXAM: XR SINUS COMPLETE G/E 3 VW  LOCATION: [facility]  DATE/TIME: 3/5/2022 10:29 AM    INDICATION:  Acute recurrent sinusitis, unspecified location  COMPARISON: None.  TECHNIQUE: CR Paranasal Sinuses.      Impression    IMPRESSION: Well-aerated paranasal sinuses and mastoids. Multiple dental fillings, root canals and dental prostheses.   CBC with platelets and differential    Narrative    The following orders were  created for panel order CBC with platelets and differential.  Procedure                               Abnormality         Status                     ---------                               -----------         ------                     CBC with platelets and d...[[id number]]                      Final result                 Please view results for these tests on the individual orders.   CBC with platelets and differential   Result Value Ref Range    WBC Count 9.1 4.0 - 11.0 10e3/uL    RBC Count 4.36 3.80 - 5.20 10e6/uL    Hemoglobin 13.2 11.7 - 15.7 g/dL    Hematocrit 41.8 35.0 - 47.0 %    MCV 96 78 - 100 fL    MCH 30.3 26.5 - 33.0 pg    MCHC 31.6 31.5 - 36.5 g/dL    RDW 13.5 10.0 - 15.0 %    Platelet Count 267 150 - 450 10e3/uL    % Neutrophils 71 %    % Lymphocytes 18 %    % Monocytes 7 %    % Eosinophils 3 %    % Basophils 1 %    Absolute Neutrophils 6.5 1.6 - 8.3 10e3/uL    Absolute Lymphocytes 1.6 0.8 - 5.3 10e3/uL    Absolute Monocytes 0.7 0.0 - 1.3 10e3/uL    Absolute Eosinophils 0.3 0.0 - 0.7 10e3/uL    Absolute Basophils 0.1 0.0 - 0.2 10e3/uL       X-Ray was not done.    ASSESSMENT:      ICD-10-CM    1. Acute recurrent sinusitis, unspecified location  J01.91 XR Sinus Complete G/E 3 Views     CBC with platelets and differential     Otolaryngology Referral   2. Nasal congestion  R09.81         Medical Decision Making:    Differential Diagnosis:  Sinusitis, eustation tube dysfunction    Serious Comorbid Conditions:  Adult:  reviewed    PLAN:    ENT referral.  Continue with flonase.  F/u with rheumatologist as scheduled.    Discussed reasons to seek immediate medical attention.        Followup:    If not improving or if condition worsens, follow up with your Primary Care Provider, In 4  day(s) follow up with  Ear Nose and Throat    There are no Patient Instructions on file for this visit.

## 2022-03-07 ENCOUNTER — NURSE TRIAGE (OUTPATIENT)
Dept: FAMILY MEDICINE | Facility: CLINIC | Age: 67
End: 2022-03-07

## 2022-03-07 ENCOUNTER — HOSPITAL ENCOUNTER (EMERGENCY)
Facility: CLINIC | Age: 67
Discharge: HOME | End: 2022-03-08
Attending: EMERGENCY MEDICINE | Admitting: EMERGENCY MEDICINE
Payer: COMMERCIAL

## 2022-03-07 ENCOUNTER — OFFICE VISIT (OUTPATIENT)
Dept: URGENT CARE | Facility: URGENT CARE | Age: 67
End: 2022-03-07
Payer: COMMERCIAL

## 2022-03-07 ENCOUNTER — NURSE TRIAGE (OUTPATIENT)
Dept: NURSING | Facility: CLINIC | Age: 67
End: 2022-03-07
Payer: COMMERCIAL

## 2022-03-07 VITALS
OXYGEN SATURATION: 98 % | RESPIRATION RATE: 14 BRPM | BODY MASS INDEX: 37.59 KG/M2 | HEIGHT: 68 IN | DIASTOLIC BLOOD PRESSURE: 73 MMHG | HEART RATE: 77 BPM | TEMPERATURE: 96.6 F | WEIGHT: 248 LBS | SYSTOLIC BLOOD PRESSURE: 154 MMHG

## 2022-03-07 VITALS
HEART RATE: 75 BPM | TEMPERATURE: 98.6 F | OXYGEN SATURATION: 95 % | BODY MASS INDEX: 38.27 KG/M2 | WEIGHT: 248 LBS | DIASTOLIC BLOOD PRESSURE: 85 MMHG | SYSTOLIC BLOOD PRESSURE: 140 MMHG | RESPIRATION RATE: 20 BRPM

## 2022-03-07 DIAGNOSIS — H69.92 DYSFUNCTION OF LEFT EUSTACHIAN TUBE: ICD-10-CM

## 2022-03-07 DIAGNOSIS — R42 VERTIGO: ICD-10-CM

## 2022-03-07 DIAGNOSIS — J34.89 FRONTAL SINUS PAIN: ICD-10-CM

## 2022-03-07 DIAGNOSIS — H81.12 BENIGN PAROXYSMAL POSITIONAL VERTIGO, LEFT: Primary | ICD-10-CM

## 2022-03-07 DIAGNOSIS — H10.45 CHRONIC ALLERGIC CONJUNCTIVITIS: ICD-10-CM

## 2022-03-07 DIAGNOSIS — J32.0 CHRONIC MAXILLARY SINUSITIS: ICD-10-CM

## 2022-03-07 DIAGNOSIS — H91.92 DECREASED HEARING OF LEFT EAR: ICD-10-CM

## 2022-03-07 LAB
ANION GAP SERPL CALCULATED.3IONS-SCNC: 5 MMOL/L (ref 3–14)
BUN SERPL-MCNC: 13 MG/DL (ref 7–30)
CALCIUM SERPL-MCNC: 7.1 MG/DL (ref 8.5–10.1)
CHLORIDE BLD-SCNC: 116 MMOL/L (ref 94–109)
CO2 SERPL-SCNC: 23 MMOL/L (ref 20–32)
CREAT SERPL-MCNC: 0.61 MG/DL (ref 0.52–1.04)
GFR SERPL CREATININE-BSD FRML MDRD: >90 ML/MIN/1.73M2
GLUCOSE BLD-MCNC: 101 MG/DL (ref 70–99)
POTASSIUM BLD-SCNC: 3.1 MMOL/L (ref 3.4–5.3)
SODIUM SERPL-SCNC: 144 MMOL/L (ref 133–144)

## 2022-03-07 PROCEDURE — 258N000003 HC RX IP 258 OP 636: Performed by: EMERGENCY MEDICINE

## 2022-03-07 PROCEDURE — 250N000009 HC RX 250: Performed by: EMERGENCY MEDICINE

## 2022-03-07 PROCEDURE — 99284 EMERGENCY DEPT VISIT MOD MDM: CPT | Mod: 25

## 2022-03-07 PROCEDURE — 80048 BASIC METABOLIC PNL TOTAL CA: CPT | Performed by: EMERGENCY MEDICINE

## 2022-03-07 PROCEDURE — 93005 ELECTROCARDIOGRAM TRACING: CPT

## 2022-03-07 PROCEDURE — 96374 THER/PROPH/DIAG INJ IV PUSH: CPT

## 2022-03-07 PROCEDURE — 99214 OFFICE O/P EST MOD 30 MIN: CPT | Performed by: PHYSICIAN ASSISTANT

## 2022-03-07 PROCEDURE — 250N000011 HC RX IP 250 OP 636: Performed by: EMERGENCY MEDICINE

## 2022-03-07 PROCEDURE — 96361 HYDRATE IV INFUSION ADD-ON: CPT

## 2022-03-07 PROCEDURE — 36415 COLL VENOUS BLD VENIPUNCTURE: CPT | Performed by: EMERGENCY MEDICINE

## 2022-03-07 PROCEDURE — 250N000013 HC RX MED GY IP 250 OP 250 PS 637: Performed by: EMERGENCY MEDICINE

## 2022-03-07 RX ORDER — MECLIZINE HYDROCHLORIDE 25 MG/1
25 TABLET ORAL ONCE
Status: COMPLETED | OUTPATIENT
Start: 2022-03-07 | End: 2022-03-07

## 2022-03-07 RX ORDER — OLOPATADINE HYDROCHLORIDE 1 MG/ML
1 SOLUTION/ DROPS OPHTHALMIC 2 TIMES DAILY
Qty: 5 ML | Refills: 1 | Status: SHIPPED | OUTPATIENT
Start: 2022-03-07 | End: 2022-05-30

## 2022-03-07 RX ORDER — ONDANSETRON 2 MG/ML
4 INJECTION INTRAMUSCULAR; INTRAVENOUS
Status: DISCONTINUED | OUTPATIENT
Start: 2022-03-07 | End: 2022-03-08 | Stop reason: HOSPADM

## 2022-03-07 RX ORDER — METHYLPREDNISOLONE 4 MG
TABLET, DOSE PACK ORAL
Qty: 21 TABLET | Refills: 0 | Status: SHIPPED | OUTPATIENT
Start: 2022-03-07 | End: 2022-05-30

## 2022-03-07 RX ORDER — SCOLOPAMINE TRANSDERMAL SYSTEM 1 MG/1
1 PATCH, EXTENDED RELEASE TRANSDERMAL ONCE
Status: DISCONTINUED | OUTPATIENT
Start: 2022-03-07 | End: 2022-03-08 | Stop reason: HOSPADM

## 2022-03-07 RX ORDER — POTASSIUM CHLORIDE 1.5 G/1.58G
40 POWDER, FOR SOLUTION ORAL ONCE
Status: COMPLETED | OUTPATIENT
Start: 2022-03-07 | End: 2022-03-07

## 2022-03-07 RX ORDER — CEFUROXIME AXETIL 500 MG/1
500 TABLET ORAL 2 TIMES DAILY
Qty: 20 TABLET | Refills: 0 | Status: SHIPPED | OUTPATIENT
Start: 2022-03-07 | End: 2022-03-17

## 2022-03-07 RX ADMIN — SODIUM CHLORIDE 1000 ML: 9 INJECTION, SOLUTION INTRAVENOUS at 21:20

## 2022-03-07 RX ADMIN — SCOPALAMINE 1 PATCH: 1 PATCH, EXTENDED RELEASE TRANSDERMAL at 21:21

## 2022-03-07 RX ADMIN — ONDANSETRON 4 MG: 2 INJECTION INTRAMUSCULAR; INTRAVENOUS at 21:20

## 2022-03-07 RX ADMIN — MECLIZINE HYDROCHLORIDE 25 MG: 25 TABLET ORAL at 21:21

## 2022-03-07 RX ADMIN — POTASSIUM CHLORIDE 40 MEQ: 1.5 POWDER, FOR SOLUTION ORAL at 22:19

## 2022-03-07 NOTE — TELEPHONE ENCOUNTER
"Patient was seen in  on 3/5 for what she thought was a sinus infection because her eyes hurt and she had congestion in her ear. Ruled out any sinus or ear infections at that time.    Patient calling today with a clogged left ear, \"echoey\" feeling the last 2 weeks. Feels equilibrium is off. No fever.    This morning woke with severe vertigo, nausea and vomiting.Felt cold and clammy. Took Zofran and was able to walk to bathroom and back to bed.    This has been a recurrent problem and Patient did get a referral to ENT and is scheduled to be seen May 23rd.    Patient is concerned about missing work. She drives a school bus. Asking if she can get an antibiotic to clear it up to go back to work. Is specifically asking to try Ceftin.    Routing message to PCP to advise.    Patient can be reached at [phone]. Ok to leave message.    Xiao Lord RN  03/07/22 10:46 AM  [facility] Nurse Advisor      Reason for Disposition    [1] MODERATE dizziness (e.g., vertigo; feels very unsteady, interferes with normal activities) AND [2] has been evaluated by physician for this    Additional Information    Negative: [1] Weakness (i.e., paralysis, loss of muscle strength) of the face, arm or leg on one side of the body AND [2] sudden onset AND [3] present now    Negative: [1] Numbness (i.e., loss of sensation) of the face, arm or leg on one side of the body AND [2] sudden onset AND [3] present now    Negative: [1] Loss of speech or garbled speech AND [2] sudden onset AND [3] present now    Negative: Difficult to awaken or acting confused (e.g., disoriented, slurred speech)    Negative: Sounds like a life-threatening emergency to the triager    Negative: Followed a head injury    Negative: Followed an ear injury    Negative: Localized weakness or numbness is main symptom    Negative: Dizziness relates to riding in a car, going to an amusement park, etc.    Negative: [1] Dizziness is main symptom AND [2] NO spinning sensation " (i.e., vertigo)    Negative: SEVERE dizziness (vertigo) (e.g., unable to walk without assistance)    Negative: [1] Dizziness (vertigo) present now AND [2] one or more stroke risk factors (i.e., hypertension, diabetes, prior stroke/TIA, heart attack)  (Exception: prior physician evaluation for this AND no different/worse than usual)    Negative: Severe headache (e.g., excruciating)  (Exception: similar to previous migraines)    Negative: Patient sounds very sick or weak to the triager    Negative: [1] Dizziness (vertigo) present now AND [2] age > 60  (Exception: prior physician evaluation for this AND no different/worse than usual)    Negative: [1] MODERATE dizziness (e.g., vertigo; feels very unsteady, interferes with normal activities) AND [2] has NOT been evaluated by physician for this    Negative: Earache    Negative: Vomiting occurs with dizziness    Negative: Taking a medicine that could cause dizziness (e.g., phenytoin [Dilantin], carbamazepine [Tegretol], primidone [Mysoline])    Protocols used: DIZZINESS - VERTIGO-A-

## 2022-03-07 NOTE — LETTER
March 8, 2022      To Whom It May Concern:      Denita Flores was seen in our Emergency Department today, 03/08/22.  I expect her condition to improve over the next 2 days.  She may return to work/school when improved.    Sincerely,      Dr Elizabeth

## 2022-03-07 NOTE — TELEPHONE ENCOUNTER
Pt called back after this morning requesting abx. She was seen at  and now having problems with vertigo. Feels like she has fluid on left ear. Was unable to get an appt with ENT until 05/2022. Triage called ENT and they require a new order if pt is needing to be seen for vertigo.  Triage huddled with PCP and pt was advised to see  today. Pt verbalized agreement with plan.     Additional Information    Negative: Shock suspected (e.g., cold/pale/clammy skin, too weak to stand, low BP, rapid pulse)    Negative: Difficult to awaken or acting confused (e.g., disoriented, slurred speech)    Negative: Fainted, and still feels dizzy afterwards    Negative: Severe difficulty breathing (e.g., struggling for each breath, speaks in single words)    Negative: Overdose (accidental or intentional) of medications    Negative: New neurologic deficit that is present now: * Weakness of the face, arm, or leg on one side of the body * Numbness of the face, arm, or leg on one side of the body * Loss of speech or garbled speech    Negative: Heart beating < 50 beats per minute OR > 140 beats per minute    Negative: Sounds like a life-threatening emergency to the triager    Negative: Chest pain    Negative: Rectal bleeding, bloody stool, or tarry-black stool    Negative: Vomiting is the main symptom    Negative: Diarrhea is the main symptom    Negative: Headache is the main symptom    Negative: Heat exhaustion suspected (i.e., dehydration from heat exposure)    Negative: Patient states that he/she is having an anxiety/panic attack    Protocols used: DIZZINESS-A-[place]

## 2022-03-08 LAB
ATRIAL RATE - MUSE: 70 BPM
DIASTOLIC BLOOD PRESSURE - MUSE: NORMAL MMHG
INTERPRETATION ECG - MUSE: NORMAL
P AXIS - MUSE: 52 DEGREES
PR INTERVAL - MUSE: 112 MS
QRS DURATION - MUSE: 92 MS
QT - MUSE: 432 MS
QTC - MUSE: 466 MS
R AXIS - MUSE: 40 DEGREES
SYSTOLIC BLOOD PRESSURE - MUSE: NORMAL MMHG
T AXIS - MUSE: 40 DEGREES
VENTRICULAR RATE- MUSE: 70 BPM

## 2022-03-08 RX ORDER — SCOLOPAMINE TRANSDERMAL SYSTEM 1 MG/1
1 PATCH, EXTENDED RELEASE TRANSDERMAL
Qty: 4 PATCH | Refills: 0 | Status: SHIPPED | OUTPATIENT
Start: 2022-03-08 | End: 2022-05-30

## 2022-03-08 RX ORDER — MECLIZINE HYDROCHLORIDE 25 MG/1
25 TABLET ORAL 3 TIMES DAILY PRN
Qty: 20 TABLET | Refills: 0 | Status: SHIPPED | OUTPATIENT
Start: 2022-03-08

## 2022-03-08 NOTE — ED TRIAGE NOTES
Pt. having vertigo since this am. +HX of vertigo times 5 episodes. No facial droop, slurred speech or ext. weakness. + nausea, NJ=665. Seen in UC earlier today and given meds but hasnt taken them. Given zofran 4mg IVP by EMS. Seen at /C on Saturday for bilat. ear discomfort. Pt. hasnt taken her Meclizine. Vertigo is worse with eyes open.

## 2022-03-08 NOTE — ED PROVIDER NOTES
"History   Chief Complaint:  \"Vertigo\"    HPI   History supplemented by electronic chart review    Denita Flores is a 66 year old female who presents by EMS for evaluation of vertigo.  She is a history of intermittent vertigo over a period of years.  In recent days she has had some facial congestion and fullness in her ears especially in the left.  She went to urgent care 2 days ago where a sinus x-ray was performed and she had a serum white blood cell count of 9000, and she was prescribed Flonase and discharged with referral for ENT.  Symptoms persisted so today she went to urgent care again and was prescribed a Medrol Dosepak as well as Ceftin, an antibiotic that she specifically requested as this has been beneficial in the past with your congestion.  Denies she reports a spinning sensation that is worse when her eyes are open.  She has no speech changes, confusion, or symptoms in arms or legs.  No history of stroke or head trauma.  No fevers.  No vomiting.  No anticoagulant use.  She would like to feel better.    Review of Systems  All other systems reviewed and negative except as above in HPI.    Allergies:  Contrast Dye  Sulfa Drugs     Medications:    meclizine (ANTIVERT) 25 MG tablet  scopolamine (TRANSDERM) 1 MG/3DAYS 72 hr patch  acetaminophen (TYLENOL) 500 MG tablet  albuterol (PROAIR HFA/PROVENTIL HFA/VENTOLIN HFA) 108 (90 Base) MCG/ACT inhaler  amLODIPine (NORVASC) 5 MG tablet  aspirin (ASA) 81 MG chewable tablet  atorvastatin (LIPITOR) 40 MG tablet  cefuroxime (CEFTIN) 500 MG tablet  Cholecalciferol (D3 ADULT PO)  cyanocolbalamin (VITAMIN  B-12) 100 MCG tablet  fexofenadine (ALLEGRA) 180 MG tablet  fluticasone (FLONASE) 50 MCG/ACT nasal spray  Krill Oil 500 MG CAPS  levothyroxine (SYNTHROID/LEVOTHROID) 112 MCG tablet  lisinopril (ZESTRIL) 5 MG tablet  methylPREDNISolone (MEDROL DOSEPAK) 4 MG tablet therapy pack  methylPREDNISolone (MEDROL) 16 MG tablet  montelukast (SINGULAIR) 10 MG " tablet  olopatadine (PATANOL) 0.1 % ophthalmic solution  ondansetron (ZOFRAN-ODT) 4 MG ODT tab  triamcinolone (KENALOG) 0.1 % external cream        Past Medical History:    Past Medical History:   Diagnosis Date     Coronary artery disease involving native coronary artery of native heart, angina presence unspecified 6/11/2021     Essential hypertension 6/11/2021     Graves disease      Kidney stones 2000     Malignant neoplasm (H)      Mild persistent asthma 5/8/2013     Swollen finger      Thyroid disease        Patient Active Problem List    Diagnosis Date Noted     Contrast media allergy 11/24/2021     Priority: Medium     Former smoker 08/24/2021     Priority: Medium     History of vertigo 08/24/2021     Priority: Medium     Prn zofran       Essential hypertension 06/11/2021     Priority: Medium     Hyperlipidemia LDL goal <100 06/11/2021     Priority: Medium     Coronary artery disease involving native coronary artery of native heart, angina presence unspecified 06/11/2021     Priority: Medium     No hx of cardiac stents  Replacing diagnoses that were inactivated after the 10/1/2021 regulatory import.       Abnormal cardiovascular stress test 03/15/2021     Priority: Medium     Bilateral hand pain 12/09/2020     Priority: Medium     Microscopic hematuria 09/30/2019     Priority: Medium     Polyp of gallbladder, 4mm 09/13/2018     Priority: Medium     Morbid obesity (H) 08/07/2018     Priority: Medium     Family history of kidney cancer 07/30/2018     Priority: Medium     Nephrolithiasis 07/30/2018     Priority: Medium     Epistaxis 01/06/2017     Priority: Medium     Tubular adenoma of colon 01/06/2017     Priority: Medium     Mild persistent asthma without complication 07/06/2016     Priority: Medium     Spider veins of both lower extremities 07/06/2016     Priority: Medium     Seasonal allergic rhinitis due to other allergic trigger 07/06/2016     Priority: Medium     Vitamin B12 deficiency (non anemic)  07/01/2016     Priority: Medium     Venous stasis dermatitis of left lower extremity 11/24/2015     Priority: Medium     Postablative hypothyroidism 06/22/2015     Priority: Medium     Plantar warts 01/06/2015     Priority: Medium     Stasis dermatitis of both legs 01/06/2015     Priority: Medium     Obesity (BMI 30-39.9) 01/06/2015     Priority: Medium     Hx of herpes zoster keratoconjunctivitis, os 10/01/2014     Priority: Medium     Graves disease 05/08/2013     Priority: Medium     Diverticulosis of large intestine without hemorrhage 05/08/2013     Priority: Medium     Colon polyps 05/08/2013     Priority: Medium     History of cervical cancer 05/08/2013     Priority: Medium     Cone biopsy, Remote, all normal paps since then.       YELENA (obstructive sleep apnea) 05/08/2013     Priority: Medium     Uses dental appliance          Past Surgical History:    Past Surgical History:   Procedure Laterality Date     BACK SURGERY       COLONOSCOPY WITH CO2 INSUFFLATION N/A 2/21/2017    Procedure: COLONOSCOPY WITH CO2 INSUFFLATION;  Surgeon: Duane, William Charles, MD;  Location: [facility] OR     CYSTOSCOPY FLEXIBLE  8/10/2018     LEEP TX, CERVICAL      in her 20's        Family History:    family history includes Cancer (age of onset: 59) in her sister; Cancer (age of onset: 67) in her father; Colon Cancer in her maternal aunt and paternal aunt; Diabetes in her sister; Glaucoma in her maternal grandmother and mother; Heart Disease in her father and sister.    Social History:   reports that she quit smoking about 19 years ago. Her smoking use included cigarettes. She has a 52.50 pack-year smoking history. She has never used smokeless tobacco. She reports current alcohol use. She reports that she does not use drugs.    Physical Exam     Patient Vitals for the past 24 hrs:   BP Temp Temp src Pulse Resp SpO2 Height Weight   03/07/22 2135 -- -- -- -- -- 98 % -- --   03/07/22 2053 -- (!) 96.6  F (35.9  C) Temporal -- -- -- -- --  "  03/07/22 2049 (!) 154/73 -- -- 77 14 95 % 1.715 m (5' 7.5\") 112.5 kg (248 lb)      Physical Exam  General: Woman sitting upright in room 27, sitting very still with eyes closed  HENT: mucous membranes moist, moderate brown cerumen in bilateral external ear canals, no bulging of tympanic membranes or otorrhea noted  Eyes: PERRL, EOMI, no nystagmus  CV: rate as above, regular rhythm, no murmur audible  Resp: normal effort, speaks in full phrases, no cough observed  GI: abdomen soft and nontender, no guarding  MSK: no bony tenderness, mastoids nontender  Skin: appropriately warm and dry, no facial rash  Neuro: awake, alert, clear speech, fully oriented, face symmetric,  normal, strength and sensation intact in all extr, no nuchal rigidity, ambulation not initially tested   Psych: cooperative    Emergency Department Course   Electrocardiogram  ECG taken at 2131, ECG interpreted at 2141 by GORDY Elizabeth MD  Sinus rhythm  Rate 70 bpm. KS interval 112. QRS duration 92. QTc 466      Imaging:    No orders to display        Laboratory:  Labs Ordered and Resulted from Time of ED Arrival to Time of ED Departure   BASIC METABOLIC PANEL - Abnormal       Result Value    Sodium 144      Potassium 3.1 (*)     Chloride 116 (*)     Carbon Dioxide (CO2) 23      Anion Gap 5      Urea Nitrogen 13      Creatinine 0.61      Calcium 7.1 (*)     Glucose 101 (*)     GFR Estimate >90        Emergency Department Course:  Reviewed:  I reviewed nursing notes, vitals, and past medical history    Assessments:  I obtained history and examined the patient as noted above.          Consults:   See above in ED Course    Interventions:  Medications   scopolamine (TRANSDERM) 72 hr patch 1 patch (1 patch Transdermal Patch/Med Applied 3/7/22 2121)     And   scopolamine (TRANSDERM-SCOP) Patch in Place (has no administration in time range)   ondansetron (ZOFRAN) injection 4 mg (4 mg Intravenous Given 3/7/22 2120)   0.9% sodium chloride BOLUS (0 mLs " Intravenous Stopped 3/7/22 2312)   meclizine (ANTIVERT) tablet 25 mg (25 mg Oral Given 3/7/22 2121)   potassium chloride (KLOR-CON) Packet 40 mEq (40 mEq Oral Given 3/7/22 2219)        Disposition:  Discharged     Impression & Plan    Covid-19  Denita Flores was evaluated during a global COVID-19 pandemic, which necessitated consideration that the patient might be at risk for infection with the SARS-CoV-2 virus that causes COVID-19.   Applicable protocols for evaluation were followed during the patient's care.   COVID-19 was considered as part of the patient's evaluation.     Medical Decision Making:  She clearly describes vertigo, which I think is very likely peripheral in etiology given her acute ear symptoms as well.  Vital signs are satisfactory.  We discussed the possibility of central causes of vertigo and the corresponding need for neuroimaging if these are suspected, though neither the patient nor I suspect that this is the etiology so emergent neuroimaging was deferred.  Differential diagnosis included stroke, TIA, metabolic abnormality, infection, intracranial tumor, labyrinth-itis, BPPV and others.  She was treated symptomatically with dramatic improvement, such that she was able to pass an oral challenge and a road test.  She already has steroids and antibiotics prescribed from urgent care.  Additional symptomatic relief was prescribed and outpatient follow-up recommended.  Return here for sudden worsening in any hour.    Diagnosis:    ICD-10-CM    1. Vertigo  R42         Discharge Prescriptions:  Discharge Medication List as of 3/8/2022 12:38 AM      START taking these medications    Details   meclizine (ANTIVERT) 25 MG tablet Take 1 tablet (25 mg) by mouth 3 times daily as needed for dizziness, Disp-20 tablet, R-0, E-Prescribe      scopolamine (TRANSDERM) 1 MG/3DAYS 72 hr patch Place 1 patch onto the skin every 72 hours, Disp-4 patch, R-0, E-Prescribe             3/7/2022   GORDY Elizabeth MD    This  note was completed in part using Dragon voice recognition software. Although reviewed after completion, some word and grammatical errors may occur.           Gabriel Elizabeth MD  03/08/22 0813

## 2022-03-08 NOTE — ED NOTES
Bed: ED27  Expected date:   Expected time:   Means of arrival:   Comments:  531 66f vertigo HTN eta 8

## 2022-03-08 NOTE — TELEPHONE ENCOUNTER
FUTURE VISIT INFORMATION      FUTURE VISIT INFORMATION:    Date: 5/23/22    Time: 10:30AM    Location: [facility]  REFERRAL INFORMATION:    Referring provider:  Nicolle Villanueva    Referring providers clinic:  [facility] [place]     Reason for visit/diagnosis  appt per pt - Acute recurrent sinusitis, unspecified location [J01.91]. referred by Dr. Nicolle Villanueva. med recs in [facility].    RECORDS REQUESTED FROM:       Clinic name Comments Records Status Imaging Status   [facility] [place]  3/7/22 note and referral from Mark Reyes PA-C [facility] [place]  3/5/22 note and referral from Nicolle Villanueva [facility]    Imaging 3/5/22 XR Sinus [facility] PACS   [facility]  10/13/17 note from Gi Gonzales MD   EPIC

## 2022-03-09 ENCOUNTER — PATIENT OUTREACH (OUTPATIENT)
Dept: FAMILY MEDICINE | Facility: CLINIC | Age: 67
End: 2022-03-09
Payer: COMMERCIAL

## 2022-03-09 NOTE — TELEPHONE ENCOUNTER
What type of discharge? Emergency Department  Risk of Hospital admission or ED visit: 50%  Is a TCM episode required? No  When should the patient follow up with PCP? within 30 days of discharge.    Roberto Gaona RN  [facility]

## 2022-03-09 NOTE — TELEPHONE ENCOUNTER
"Appointments in Next Year    Mar 11, 2022  3:00 PM  (Arrive by 2:45 PM)  ED/Hospital Follow Up with Neli Lara DO  [facility] ([facility] ) [phone]   Mar 24, 2022  9:00 AM  New Visit with Gabriele Campos MD  [facility] ([facility] ) [phone]   May 23, 2022 10:30 AM  (Arrive by 10:15 AM)  NEW RHINOLOGY with Dmitriy Pitt MD  [facility] [place] ([facility] [place] ) [phone]   Jun 28, 2022  7:00 AM  LAB with  LAB ONLY  [facility] ([facility] ) [phone]   Jun 28, 2022  8:00 AM  New Visit with Kyle Santoyo MD  [facility] ([facility] ) [phone]        ED/Discharge Protocol    \"Hi, my name is Lila Flores RN, a registered nurse, and I am calling on behalf of Dr. Lara's office at [place].  I am calling to follow up and see how things are going for you after your recent visit.\"    \"I see that you were in the (ER/UC/IP) on 3/7-3/8.    How are you doing now that you are home?\" taking prednisone, helps a little, was holding of on antibiotic that provider in UC prescribed, was given meclizine and scopolamine      Is patient experiencing symptoms that may require a hospital visit?  No     Discharge Instructions    \"Let's review your discharge instructions.  What is/are the follow-up recommendations?  Pt. Response: follow up with PCP     \"Were you instructed to make a follow-up appointment?\"  Pt. Response: Yes.  Has appointment been made?   Yes      \"When you see the provider, I would recommend that you bring your discharge instructions with you.    Medications    \"How many new medications are you on since your hospitalization/ED visit?\"    0-1  \"How many of your current medicines changed (dose, timing, name, etc.) while you were in the " "hospital/ED visit?\"   0-1  \"Do you have questions about your medications?\"   No  \"Were you newly diagnosed with heart failure, COPD, diabetes or did you have a heart attack?\"   No  For patients on insulin: \"Did you start on insulin in the hospital or did you have your insulin dose changed?\"   No  Post Discharge Medication Reconciliation Status: discharge medications reconciled, continue medications without change.    Was MTM referral placed (*Make sure to put transitions as reason for referral)?   No    Call Summary    \"Do you have any questions or concerns about your condition or care plan at the moment?\"    Wants to review meds with PCP     Patient was in ER 3x in the past year (assess appropriateness of ER visits.)      \"If you have questions or things don't continue to improve, we encourage you contact us through the main clinic number,  ([phone]).  Even if the clinic is not open, triage nurses are available 24/7 to help you.     We would like you to know that our clinic has extended hours (provide information).  We also have urgent care (provide details on closest location and hours/contact info)\"      \"Thank you for your time and take care!\"    Lila BEAR, Triage RN  [facility]       "

## 2022-03-11 ENCOUNTER — OFFICE VISIT (OUTPATIENT)
Dept: FAMILY MEDICINE | Facility: CLINIC | Age: 67
End: 2022-03-11
Payer: COMMERCIAL

## 2022-03-11 VITALS
HEIGHT: 68 IN | RESPIRATION RATE: 16 BRPM | HEART RATE: 66 BPM | SYSTOLIC BLOOD PRESSURE: 149 MMHG | BODY MASS INDEX: 37.44 KG/M2 | OXYGEN SATURATION: 98 % | WEIGHT: 247 LBS | DIASTOLIC BLOOD PRESSURE: 79 MMHG | TEMPERATURE: 97.5 F

## 2022-03-11 DIAGNOSIS — Z87.898 HISTORY OF VERTIGO: Chronic | ICD-10-CM

## 2022-03-11 DIAGNOSIS — J34.89 TENDERNESS OVER FRONTAL SINUS: ICD-10-CM

## 2022-03-11 DIAGNOSIS — H92.02 ACUTE EAR PAIN, LEFT: ICD-10-CM

## 2022-03-11 DIAGNOSIS — R42 VERTIGO: Primary | ICD-10-CM

## 2022-03-11 PROCEDURE — 99214 OFFICE O/P EST MOD 30 MIN: CPT | Performed by: INTERNAL MEDICINE

## 2022-03-11 NOTE — LETTER
March 11, 2022      RE: Denita Flores  [address] [facility] [address]       To whom it may concern:    Denita Flores was seen in our clinic today. I recommend she take 3/14/22 through 3/22/22 off due to medical reason.    Sincerely,      Neli Lara DO

## 2022-03-11 NOTE — PROGRESS NOTES
Assessment & Plan     Vertigo  Acute ear pain, left  Tenderness over frontal sinus  History of vertigo  Must keep ENT appt as planned with Dr Nagel March 31st, especially given recurrent vertigo spells and severity of this spell with relation to sinus/ear symptoms. Symptoms persist and given Left ear pain/Sinus pressure, will empirically have her treated with ab (these were prescribed at urgent care (ceftin), not yet taken). She will proceed with taking ab. She has almost completed medrol dose pack-complete as prescribed. She has tried scopolamine without significant relief. Recommend discontinue. She has not tried meclizine (prescribed in ER). Recommend trial this however caution: may cause drowsiness.   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 I let her know she should take time off work given risk associated with driving/vertigo to allow for recovery. Time off letter given to patient and approved M-F of next week, she will have ENT appt the following Monday and further recommendations per ENT depending on symptom course.   I recommend adding physical therapy for vertigo, she declines. Has not tried it for historical episodes either so would recommend re-considering this if ENT eval non revealing/sypmtoms persist. Could also consider repeat head scan. MRI 2020 reassuring.  Patient agreeable to plan  Push fluids  ER precautions reviewed and alarm signs including but not limited to slurred speech, focal weakness, blurry vision, severe headache, or severely worsening symptoms.      Return in about 3 months (around 6/11/2022) for Follow up, with me, in person, sooner if symptoms worsen or do not improve.    Neli Lara DO  [facility] ROSANNA Barger is a 66 year old who presents for the following health issues     HPI     ED/UC Followup:    Facility:  [facility]  Date of visit: 03/07/2022  Reason for visit:  "Dizziness  Current Status: Still having dizziness, fullness in the left ear     Monday was in ER for vertigo. Hx of this. Has had MRI 2020, reassuring. States symptoms felt similar to historical episodes. Given scoploamine patch and meclizine, picked up both but has not yet trialed meclizine. States no significant improvement with scopolamine and in fact, though thought she was better Tuesday/wednesday, symptoms returned yesterday with dizziness intermittently associated with left ear pressure and front sinus pressure. No focal weakness. No blurry vision. No severe ha. No speech changes. No f/c. States dizziness comes and goes. States not nearly as bad as Monday, had to take ambulance, was vomiting a lot Monday. Feels like needs to blow nose but nothing comes out. Has appt with ENT March 21st, Dr Nagel. Reports has not done PT for vertigo in the past and declines for now as well. States in the past symptoms have self resolved. Does get occasional sinus infections. Saw urgent care recently and was prescribed ceftin but did not end up taking it. Did take medrol dose pack and has few tabs left to complete. She is a . 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       Review of Systems   Constitutional, HEENT, cardiovascular, pulmonary, gi and gu systems are negative, except as otherwise noted.      Objective    BP (!) 149/79 (BP Location: Right arm, Patient Position: Sitting, Cuff Size: Adult Large)   Pulse 66   Temp 97.5  F (36.4  C) (Temporal)   Resp 16   Ht 1.715 m (5' 7.5\")   Wt 112 kg (247 lb)   SpO2 98%   BMI 38.11 kg/m    Body mass index is 38.11 kg/m .  Physical Exam     GENERAL APPEARANCE: AAOx3, no distress. Well developed.    HENT: NC/AT. External ear and ear canal nontender and nonedematous bilaterally. No exudates. B/L TM intact.     NEURO: AAOx3, Speech is fluent and comprehension intact. No gait abnormalities noted.    PSYCH: appropriate mood and affect.                   "

## 2022-03-24 ENCOUNTER — MEDICAL CORRESPONDENCE (OUTPATIENT)
Dept: HEALTH INFORMATION MANAGEMENT | Facility: CLINIC | Age: 67
End: 2022-03-24

## 2022-03-24 ENCOUNTER — LAB (OUTPATIENT)
Dept: LAB | Facility: CLINIC | Age: 67
End: 2022-03-24

## 2022-03-24 ENCOUNTER — TRANSFERRED RECORDS (OUTPATIENT)
Dept: HEALTH INFORMATION MANAGEMENT | Facility: CLINIC | Age: 67
End: 2022-03-24

## 2022-03-24 ENCOUNTER — OFFICE VISIT (OUTPATIENT)
Dept: RHEUMATOLOGY | Facility: CLINIC | Age: 67
End: 2022-03-24
Attending: INTERNAL MEDICINE
Payer: COMMERCIAL

## 2022-03-24 ENCOUNTER — HOSPITAL ENCOUNTER (OUTPATIENT)
Dept: GENERAL RADIOLOGY | Facility: CLINIC | Age: 67
Discharge: HOME | End: 2022-03-24
Attending: STUDENT IN AN ORGANIZED HEALTH CARE EDUCATION/TRAINING PROGRAM | Admitting: STUDENT IN AN ORGANIZED HEALTH CARE EDUCATION/TRAINING PROGRAM
Payer: COMMERCIAL

## 2022-03-24 VITALS
HEIGHT: 68 IN | DIASTOLIC BLOOD PRESSURE: 71 MMHG | SYSTOLIC BLOOD PRESSURE: 115 MMHG | BODY MASS INDEX: 37.31 KG/M2 | HEART RATE: 78 BPM | WEIGHT: 246.2 LBS | OXYGEN SATURATION: 100 %

## 2022-03-24 DIAGNOSIS — M25.541 ARTHRALGIA OF HANDS, BILATERAL: ICD-10-CM

## 2022-03-24 DIAGNOSIS — M19.041 PRIMARY OSTEOARTHRITIS OF BOTH HANDS: Primary | ICD-10-CM

## 2022-03-24 DIAGNOSIS — M19.042 PRIMARY OSTEOARTHRITIS OF BOTH HANDS: ICD-10-CM

## 2022-03-24 DIAGNOSIS — M19.041 PRIMARY OSTEOARTHRITIS OF BOTH HANDS: ICD-10-CM

## 2022-03-24 DIAGNOSIS — M25.542 ARTHRALGIA OF HANDS, BILATERAL: ICD-10-CM

## 2022-03-24 DIAGNOSIS — M19.042 PRIMARY OSTEOARTHRITIS OF BOTH HANDS: Primary | ICD-10-CM

## 2022-03-24 DIAGNOSIS — R42 RECURRENT VERTIGO: Primary | ICD-10-CM

## 2022-03-24 LAB — ERYTHROCYTE [SEDIMENTATION RATE] IN BLOOD BY WESTERGREN METHOD: 11 MM/HR (ref 0–30)

## 2022-03-24 PROCEDURE — 99000 SPECIMEN HANDLING OFFICE-LAB: CPT

## 2022-03-24 PROCEDURE — 99205 OFFICE O/P NEW HI 60 MIN: CPT | Performed by: STUDENT IN AN ORGANIZED HEALTH CARE EDUCATION/TRAINING PROGRAM

## 2022-03-24 PROCEDURE — 86200 CCP ANTIBODY: CPT

## 2022-03-24 PROCEDURE — 86038 ANTINUCLEAR ANTIBODIES: CPT

## 2022-03-24 PROCEDURE — 86780 TREPONEMA PALLIDUM: CPT

## 2022-03-24 PROCEDURE — 80048 BASIC METABOLIC PNL TOTAL CA: CPT

## 2022-03-24 PROCEDURE — 86140 C-REACTIVE PROTEIN: CPT

## 2022-03-24 PROCEDURE — 73130 X-RAY EXAM OF HAND: CPT | Mod: 50

## 2022-03-24 PROCEDURE — 86618 LYME DISEASE ANTIBODY: CPT

## 2022-03-24 PROCEDURE — 84443 ASSAY THYROID STIM HORMONE: CPT

## 2022-03-24 PROCEDURE — 36415 COLL VENOUS BLD VENIPUNCTURE: CPT

## 2022-03-24 PROCEDURE — 86039 ANTINUCLEAR ANTIBODIES (ANA): CPT

## 2022-03-24 PROCEDURE — 84182 PROTEIN WESTERN BLOT TEST: CPT | Mod: 90

## 2022-03-24 PROCEDURE — 85652 RBC SED RATE AUTOMATED: CPT

## 2022-03-24 PROCEDURE — 86431 RHEUMATOID FACTOR QUANT: CPT

## 2022-03-24 ASSESSMENT — PAIN SCALES - GENERAL: PAINLEVEL: MODERATE PAIN (4)

## 2022-03-24 NOTE — PROGRESS NOTES
Rheumatology Clinic Visit     Denita Flores MRN# [id number]   YOB: 1955 Age: 66 year old     Date of Visit: Mar 24, 2022   Primary care provider: Neli Lara          Assessment and Plan:     Assessment     Severe hand osteoarthritis  HTN  Morbid obesity  Lumbar DDD    Ms. Flores is 66 year old female seen in clinic for evaluation of hand pain    Severe hand arthritis : She has pain in her hands for few years and for the last one year pain is worse. She has swelling over left middle, index finger PIP, DIP joints, R 2nd PIP joint along with tenderness. No MCP joint involvement noted. No First CMC joint tenderness present.  No synovitis or tenderness present over bilateral wrists,, MTP joints or ankles.  Normal range of motion of shoulders.  No knee effusions.    Hand arthritis is most likely related to erosive osteoarthritis.  Differential includes psoriatic arthritis: She denies history of psoriasis, colitis, inflammatory bowel disease, iritis.  She said her mother possibly has psoriasis.  Will get x-rays of bilateral hands to evaluate for erosive osteoarthritis.    Hand therapy referral given.  For hand pain she can use Voltaren gel topically, compression gloves and paraffin wax treatment.  She did notice improvement with Medrol Dosepak given a week ago.  Patient with inflammatory osteoarthritis or CPPD disease related to osteoarthritis can notice improvement with prednisone taper.       Plan    Symptoms are most likely related to severe hand Osteoarthritis. Will get Xray of hands.     Blood tests ordered     You can use Voltaren gel, compression gloves, paraffin wax treatment     Will put in hand therapy referral.     -- Orders placed this encounter  Orders Placed This Encounter   Procedures     XR Hand Bilateral G/E 3 Views     Rheumatoid factor     Cyclic Citrullinated Peptide Antibody IgG     Erythrocyte sedimentation rate auto     CRP inflammation     Occupational Therapy Referral       TT  60 min was spent on date of the encounter doing chart review, history and exam, documentation and further activities as noted above. Any prior notes, outside records, laboratory results, and imaging studies were reviewed if relevant.    Patient verbalized agreement with and understanding of the rationale for the diagnosis and treatment plan.  All questions were answered to best of my ability and the patient's satisfaction.      Chart documentation done in part with Dragon Voice recognition Software. Although reviewed after completion, some word and grammatical error may remain.                  Active Problem List:     Patient Active Problem List    Diagnosis Date Noted     Contrast media allergy 11/24/2021     Priority: Medium     Former smoker 08/24/2021     Priority: Medium     History of vertigo 08/24/2021     Priority: Medium     Prn zofran       Essential hypertension 06/11/2021     Priority: Medium     Hyperlipidemia LDL goal <100 06/11/2021     Priority: Medium     Coronary artery disease involving native coronary artery of native heart, angina presence unspecified 06/11/2021     Priority: Medium     No hx of cardiac stents  Replacing diagnoses that were inactivated after the 10/1/2021 regulatory import.       Abnormal cardiovascular stress test 03/15/2021     Priority: Medium     Bilateral hand pain 12/09/2020     Priority: Medium     Microscopic hematuria 09/30/2019     Priority: Medium     Polyp of gallbladder, 4mm 09/13/2018     Priority: Medium     Morbid obesity (H) 08/07/2018     Priority: Medium     Family history of kidney cancer 07/30/2018     Priority: Medium     Nephrolithiasis 07/30/2018     Priority: Medium     Epistaxis 01/06/2017     Priority: Medium     Tubular adenoma of colon 01/06/2017     Priority: Medium     Mild persistent asthma without complication 07/06/2016     Priority: Medium     Spider veins of both lower extremities 07/06/2016     Priority: Medium     Seasonal allergic rhinitis  due to other allergic trigger 07/06/2016     Priority: Medium     Vitamin B12 deficiency (non anemic) 07/01/2016     Priority: Medium     Venous stasis dermatitis of left lower extremity 11/24/2015     Priority: Medium     Postablative hypothyroidism 06/22/2015     Priority: Medium     Plantar warts 01/06/2015     Priority: Medium     Stasis dermatitis of both legs 01/06/2015     Priority: Medium     Obesity (BMI 30-39.9) 01/06/2015     Priority: Medium     Hx of herpes zoster keratoconjunctivitis, os 10/01/2014     Priority: Medium     Graves disease 05/08/2013     Priority: Medium     Diverticulosis of large intestine without hemorrhage 05/08/2013     Priority: Medium     Colon polyps 05/08/2013     Priority: Medium     History of cervical cancer 05/08/2013     Priority: Medium     Cone biopsy, Remote, all normal paps since then.       YELENA (obstructive sleep apnea) 05/08/2013     Priority: Medium     Uses dental appliance              History of Present Illness:   Denita Flores is a 66 year old female with PMH of HTN, Graves' disease seen in the clinic in consultation at request of Dr. Lara for evaluation of hand pain.     She has pain and swelling in her fingers especially over the left middle finger.  It was swollen and painful for the last few weeks.  She takes Tylenol 650 mg a day which helps.  Also has swelling over bilateral index finger knuckles.  She does not have any pain over the MCP joint.  Denies any pain in her wrists, elbows, shoulders.  Reports mild discomfort in her feet but denies any swelling or pain in her ankles.  She has chronic low back pain related to lumbar degenerative disc disease.    For the last 4 years she has sinusitis and vertigo symptoms.  She has been to the ER 4 times for severe vertigo.  She has follow-up with ENT provider today.  She was given Medrol Dosepak for sinusitis and vertigo symptoms 2 weeks ago.  She finished her Medrol Dosepak on March 21.  Interestingly she did  notice improvement in her left middle finger pain and pain after taking Medrol Dosepak.    She reports that her mother possibly has psoriasis but she is not sure.  She denies having psoriasis herself.  She has eczema.  Denies inflammatory bowel disease, iritis, dactylitis, plantar fasciitis episodes.  She has history of Graves' disease status post thyroid ablation and is now on levothyroxine.    Denies any raynauds, malar rash, photosensitivity, recurrent mouth/genital ulcers, sicca symptoms, pleuritic chest pains, recurrent sinusitis/rhinitis, swallowing difficulty, hearing or visual changes recently. No h/o arterial/venous thrombosis in the past. Denies any tick bite, recent GI/ infection.           Review of Systems:     Review Of Systems  Constitutional: denies fever, chills, night sweats and weight loss.  Skin: No skin rash.  Eyes: No dryness or irritation in eyes. No episode of eye inflammation or redness.   Ears/Nose/Throat: no recurrent sinus infections.  Respiratory: No shortness of breath, dyspnea on exertion, cough, or hemoptysis  Cardiovascular: no chest pain or palpitations.  Gastrointestinal: no nausea, vomiting, abdominal pain.  Normal bowel movements.  Genitourinary: no dysuria, frequency  or hematuria.  Musculoskeletal: as in HPI  Neurologic: no numbness, tingling.  Psychiatric: no mood disorders.  Hematologic/Lymphatic/Immunologic: no history of easy bruising, petechia or purpura.  No abnormal bleeding.   Endocrine: no h/o thyroid disease or Diabetes.                  Past Medical History:     Past Medical History:   Diagnosis Date     Coronary artery disease involving native coronary artery of native heart, angina presence unspecified 6/11/2021     Essential hypertension 6/11/2021     Graves disease      Kidney stones 2000    Passed a 5-10mm stone     Malignant neoplasm (H)     Cervical CA     Mild persistent asthma 5/8/2013     Swollen finger      Thyroid disease      Past Surgical History:    Procedure Laterality Date     BACK SURGERY       COLONOSCOPY WITH CO2 INSUFFLATION N/A 2017    Procedure: COLONOSCOPY WITH CO2 INSUFFLATION;  Surgeon: Duane, William Charles, MD;  Location: [facility] OR     CYSTOSCOPY FLEXIBLE  8/10/2018     LEEP TX, CERVICAL      in her 20's            Social History:     Social History     Occupational History     Not on file   Tobacco Use     Smoking status: Former Smoker     Packs/day: 1.50     Years: 35.00     Pack years: 52.50     Types: Cigarettes     Quit date: 2003     Years since quittin.2     Smokeless tobacco: Never Used   Substance and Sexual Activity     Alcohol use: Yes     Alcohol/week: 0.0 standard drinks     Comment: 3 dinks/ year     Drug use: No     Sexual activity: Not Currently            Family History:     Family History   Problem Relation Age of Onset     Glaucoma Mother      Heart Disease Father      Cancer Father 67        kidney cancer      Heart Disease Sister      Cancer Sister 59        colon cancer      Diabetes Sister      Colon Cancer Maternal Aunt      Colon Cancer Paternal Aunt      Glaucoma Maternal Grandmother             Allergies:     Allergies   Allergen Reactions     Contrast Dye Hives and Itching     Isovue with ANTON on 19     Sulfa Drugs Hives and Swelling     Noted in 10/18/09 ER            Medications:     Current Outpatient Medications   Medication Sig Dispense Refill     acetaminophen (TYLENOL) 500 MG tablet Take 500-1,000 mg by mouth every 6 hours as needed for mild pain       albuterol (PROAIR HFA/PROVENTIL HFA/VENTOLIN HFA) 108 (90 Base) MCG/ACT inhaler Inhale 2 puffs into the lungs every 6 hours as needed for shortness of breath / dyspnea 18 g 1     amLODIPine (NORVASC) 5 MG tablet Take 1 tablet (5 mg) by mouth daily 90 tablet 3     aspirin (ASA) 81 MG chewable tablet Take 81 mg by mouth daily       atorvastatin (LIPITOR) 40 MG tablet Take 1 tablet (40 mg) by mouth At Bedtime 90 tablet 3     Bacillus Coagulans-Inulin  "(ALIGN PREBIOTIC-PROBIOTIC PO)        Cholecalciferol (D3 ADULT PO) Take 2,000 Units by mouth daily.       cyanocolbalamin (VITAMIN  B-12) 100 MCG tablet Take 100 mcg by mouth daily.       fexofenadine (ALLEGRA) 180 MG tablet Take 1 tablet (180 mg) by mouth daily 90 tablet 3     fluticasone (FLONASE) 50 MCG/ACT nasal spray Spray 1 spray into both nostrils daily 48 g 3     Krill Oil 500 MG CAPS        levothyroxine (SYNTHROID/LEVOTHROID) 112 MCG tablet Profile Rx,TAKE 1 TABLET(112 MCG) BY MOUTH DAILY 90 tablet 3     lisinopril (ZESTRIL) 5 MG tablet Take 1 tablet (5 mg) by mouth daily 90 tablet 3     meclizine (ANTIVERT) 25 MG tablet Take 1 tablet (25 mg) by mouth 3 times daily as needed for dizziness 20 tablet 0     methylPREDNISolone (MEDROL DOSEPAK) 4 MG tablet therapy pack Follow package instructions 21 tablet 0     montelukast (SINGULAIR) 10 MG tablet TAKE 1 TABLET(10 MG) BY MOUTH AT BEDTIME 90 tablet 3     olopatadine (PATANOL) 0.1 % ophthalmic solution Place 1 drop into both eyes 2 times daily 5 mL 1     ondansetron (ZOFRAN-ODT) 4 MG ODT tab Take 1 tablet (4 mg) by mouth every 8 hours as needed for nausea 21 tablet 0     scopolamine (TRANSDERM) 1 MG/3DAYS 72 hr patch Place 1 patch onto the skin every 72 hours 4 patch 0     triamcinolone (KENALOG) 0.1 % external cream Apply sparingly to affected area three times daily as needed 80 g 0            Physical Exam:   Blood pressure 115/71, pulse 78, height 1.715 m (5' 7.5\"), weight 111.7 kg (246 lb 3.2 oz), SpO2 100 %, not currently breastfeeding.  Wt Readings from Last 4 Encounters:   03/24/22 111.7 kg (246 lb 3.2 oz)   03/11/22 112 kg (247 lb)   03/07/22 112.5 kg (248 lb)   03/07/22 112.5 kg (248 lb)       Constitutional: Morbidly obese, appearing stated age; cooperative  Eyes: nl EOM, PERRLA, vision, conjunctiva, sclera  ENT: nl external ears, nose, hearing, lips, teeth, gums, throat  No mucous membrane lesions, normal saliva pool  Neck: no mass or thyroid " enlargement  Resp: lungs clear to auscultation, nl to palpation  CV: RRR, no murmurs, rubs or gallops, no edema  GI: no ABD mass or tenderness, no HSM  : not tested  Lymph: no cervical, supraclavicular, inguinal or epitrochlear nodes    MS: All TMJ, neck, shoulder, elbow, wrist, MCP/PIP/DIP, spine, hip, knee, ankle, and foot MTP/IP joints were examined.     -- She has swelling over left middle, index finger PIP, DIP joints, R 2nd PIP joint along with tenderness. No MCP joint involvement noted. No First CMC joint tenderness present.  No synovitis or tenderness present over bilateral wrists,, MTP joints or ankles.  Normal range of motion of shoulders.  No knee effusions.    -- No dactylitis,  tenosynovitis, enthesopathy.    Skin: no nail pitting, alopecia, rash, nodules or lesions  Neuro: nl cranial nerves, strength, sensation, DTRs.   Psych: nl judgement, orientation, memory, affect.         Data:     No results found for any visits on 03/24/22.    Recent Labs   Lab Test 03/07/22  2114 03/05/22  1044 06/04/21  1048 05/07/21  1523 04/14/21  0920 04/06/21  1206 08/07/18  1527 08/15/17  1028   WBC  --  9.1  --  9.6  --  9.5   < > 6.9   RBC  --  4.36  --  4.27  --  4.50   < > 4.54   HGB  --  13.2  --  12.5  --  13.2   < > 13.8   HCT  --  41.8  --  38.7  --  41.3   < > 41.6   MCV  --  96  --  91  --  92   < > 92   RDW  --  13.5  --  13.0  --  12.9   < > 13.4   PLT  --  267  --  296  --  299   < > 229   ALBUMIN  --   --   --   --   --   --   --  3.7   BUN 13  --  12 10  --  11   < > 11   CREAT  --   --   --   --  0.6  --   --   --     < > = values in this interval not displayed.      Recent Labs   Lab Test 02/27/21  1230 07/20/20  1127 09/30/19  0912 09/07/18  1043 02/20/18  1040 12/18/17  1035   TSH 2.02 1.25 1.29 0.26* 0.35* 0.88   T4  --   --   --  1.46 1.34 1.21     Hemoglobin   Date Value Ref Range Status   03/05/2022 13.2 11.7 - 15.7 g/dL Final   05/07/2021 12.5 11.7 - 15.7 g/dL Final   04/06/2021 13.2 11.7 - 15.7  g/dL Final   02/27/2021 13.6 11.7 - 15.7 g/dL Final     Urea Nitrogen   Date Value Ref Range Status   03/07/2022 13 7 - 30 mg/dL Final   06/04/2021 12 7 - 30 mg/dL Final   05/07/2021 10 7 - 30 mg/dL Final   04/06/2021 11 7 - 30 mg/dL Final     Creatinine   Date Value Ref Range Status   04/14/2021 0.6 0.52 - 1.04 mg/dL Final     Sed Rate   Date Value Ref Range Status   06/06/2012 1.0 0.0 - 20.0 mm/hr Final     AST   Date Value Ref Range Status   08/15/2017 9 0 - 45 U/L Final   05/08/2013 18 0 - 45 U/L Final   04/16/2010 16 0 - 45 U/L Final     Albumin   Date Value Ref Range Status   08/15/2017 3.7 3.4 - 5.0 g/dL Final   05/08/2013 4.2 3.3 - 4.9 g/dL Final   04/16/2010 4.2 3.3 - 4.9 g/dL Final     Alkaline Phosphatase   Date Value Ref Range Status   08/15/2017 84 40 - 150 U/L Final   05/08/2013 95 40 - 150 U/L Final   04/16/2010 105 40 - 150 U/L Final     ALT   Date Value Ref Range Status   05/18/2021 19 0 - 50 U/L Final   08/15/2017 20 0 - 50 U/L Final   05/08/2013 24 0 - 50 U/L Final     Recent Labs   Lab Test 03/07/22  2114 03/05/22  1044 06/04/21  1048 05/18/21  0946 05/07/21  1523 04/06/21  1206 02/27/21  1230 07/20/20  1127 07/17/20  2054 09/30/19  0912 10/09/17  1030 08/15/17  1028   WBC  --  9.1  --   --  9.6 9.5 7.5  --    < > 7.0   < > 6.9   HGB  --  13.2  --   --  12.5 13.2 13.6  --    < > 14.4   < > 13.8   HCT  --  41.8  --   --  38.7 41.3 41.9  --    < > 44.3   < > 41.6   MCV  --  96  --   --  91 92 92  --    < > 92   < > 92   PLT  --  267  --   --  296 299 270  --    < > 244   < > 229   BUN 13  --  12  --  10 11 12  --    < >  --    < > 11   TSH  --   --   --   --   --   --  2.02 1.25  --  1.29   < > 0.36*   AST  --   --   --   --   --   --   --   --   --   --   --  9   ALT  --   --   --  19  --   --   --   --   --   --   --  20   ALKPHOS  --   --   --   --   --   --   --   --   --   --   --  84    < > = values in this interval not displayed.       Reviewed Rheumatology lab flowsheet    Gabriele Campos,  MD  [facility]  Department of Rheumatology & Autoimmune Disorders  [facility]: [phone]   Pager - [phone]

## 2022-03-24 NOTE — PATIENT INSTRUCTIONS
Your symptoms are most likely related to severe hand Osteoarthritis. Will get Xray of hands.     Will check for Rheumatoid arthritis as well     You can use Voltaren gel, compression gloves, paraffin wax treatment     Will put in hand therapy

## 2022-03-24 NOTE — NURSING NOTE
"Chief Complaint   Patient presents with     New Patient     Arthralgia of hands, bilateral       Vitals:    03/24/22 0854   BP: 115/71   BP Location: Left arm   Patient Position: Sitting   Cuff Size: Adult Large   Pulse: 78   SpO2: 100%   Weight: 111.7 kg (246 lb 3.2 oz)   Height: 1.715 m (5' 7.5\")       Body mass index is 37.99 kg/m .    Mariza Locke MA    "

## 2022-03-25 LAB
ANION GAP SERPL CALCULATED.3IONS-SCNC: 8 MMOL/L (ref 3–14)
BUN SERPL-MCNC: 13 MG/DL (ref 7–30)
CALCIUM SERPL-MCNC: 9.5 MG/DL (ref 8.5–10.1)
CHLORIDE BLD-SCNC: 106 MMOL/L (ref 94–109)
CO2 SERPL-SCNC: 24 MMOL/L (ref 20–32)
CREAT SERPL-MCNC: 0.78 MG/DL (ref 0.52–1.04)
CRP SERPL-MCNC: 3.3 MG/L (ref 0–8)
GFR SERPL CREATININE-BSD FRML MDRD: 83 ML/MIN/1.73M2
GLUCOSE BLD-MCNC: 99 MG/DL (ref 70–99)
POTASSIUM BLD-SCNC: 3.9 MMOL/L (ref 3.4–5.3)
RHEUMATOID FACT SER NEPH-ACNC: <6 IU/ML
SODIUM SERPL-SCNC: 138 MMOL/L (ref 133–144)
T PALLIDUM AB SER QL: NONREACTIVE
TSH SERPL DL<=0.005 MIU/L-ACNC: 0.7 MU/L (ref 0.4–4)

## 2022-03-28 LAB
B BURGDOR IGG+IGM SER QL: 0.04
CCP AB SER IA-ACNC: 3.3 U/ML

## 2022-03-30 LAB
ANA PAT SER IF-IMP: ABNORMAL
ANA SER QL IF: ABNORMAL
ANA TITR SER IF: ABNORMAL {TITER}

## 2022-03-31 LAB — INNER EAR 68KD AB SERPL QL IB: NEGATIVE

## 2022-04-14 DIAGNOSIS — E78.5 DYSLIPIDEMIA: ICD-10-CM

## 2022-04-14 DIAGNOSIS — I10 ESSENTIAL HYPERTENSION: ICD-10-CM

## 2022-04-14 DIAGNOSIS — R93.1 ELEVATED CORONARY ARTERY CALCIUM SCORE: ICD-10-CM

## 2022-04-14 RX ORDER — ATORVASTATIN CALCIUM 40 MG/1
40 TABLET, FILM COATED ORAL AT BEDTIME
Qty: 90 TABLET | Refills: 0 | Status: SHIPPED | OUTPATIENT
Start: 2022-04-14 | End: 2022-07-18

## 2022-04-14 RX ORDER — LISINOPRIL 5 MG/1
5 TABLET ORAL DAILY
Qty: 90 TABLET | Refills: 0 | Status: SHIPPED | OUTPATIENT
Start: 2022-04-14 | End: 2022-06-29

## 2022-04-24 ENCOUNTER — HOSPITAL ENCOUNTER (OUTPATIENT)
Dept: MRI IMAGING | Facility: CLINIC | Age: 67
Discharge: HOME | End: 2022-04-24
Attending: OTOLARYNGOLOGY | Admitting: OTOLARYNGOLOGY
Payer: COMMERCIAL

## 2022-04-24 DIAGNOSIS — H91.8X3 ASYMMETRICAL HEARING LOSS: ICD-10-CM

## 2022-04-24 DIAGNOSIS — R42 VERTIGO: ICD-10-CM

## 2022-04-24 PROCEDURE — 70551 MRI BRAIN STEM W/O DYE: CPT

## 2022-05-12 ENCOUNTER — TELEPHONE (OUTPATIENT)
Dept: FAMILY MEDICINE | Facility: CLINIC | Age: 67
End: 2022-05-12
Payer: COMMERCIAL

## 2022-05-12 DIAGNOSIS — J30.89 SEASONAL ALLERGIC RHINITIS DUE TO OTHER ALLERGIC TRIGGER: Primary | ICD-10-CM

## 2022-05-12 NOTE — TELEPHONE ENCOUNTER
Patient followed up with ENT for vertigo as referred. ENT did blood work and MRI and nothing was found. Patient states that she was diagnosed with Meniere's disease. ENT prescribed triamterene hydrochlorothiazide 37.5-25 mg.    Pharmacist advised the patient to check with doctor due to patient has history of sulfa allergy and patient is already on 2 blood pressure medications. ENT advised OK to take it.     1) patient is asking for Dr. Lara recommendation regarding triamterene hydrochlorothiazide?    2) patient is asking if she should return to an allergist. States that she did get allergy shots years ago.   Patient states that she currently feels better is she uses Flonase twice a day (not once a day as on med list) and takes Benadryl twice a week at bed time in addition to daily Allegra.  Patient requesting referral for allergist in [place]. She will check with her insurance.    Please advise. Triage to call the patient back.    Can we leave a detailed message on this number? YES  Phone number patient can be reached at: Cell number on file:    Telephone Information:   Mobile [phone]       Heidi Freire RN  [facility] Triage

## 2022-05-13 NOTE — TELEPHONE ENCOUNTER
Referral to allergist approved.  Ok to proceed with ENT recommended rx (her BP was higher end last visit so unlikely to cause low BP) however I would recommend she monitor BP few times a week and write down. She can follow-up with me for BP recheck and bring log. We can make adjustments then if needed.

## 2022-05-17 NOTE — TELEPHONE ENCOUNTER
Left detailed message of below and to call back to schedule bp follow up.  Stacie Rodriguez, RN  [facility] RN Triage Team

## 2022-05-23 ENCOUNTER — PRE VISIT (OUTPATIENT)
Dept: OTOLARYNGOLOGY | Facility: CLINIC | Age: 67
End: 2022-05-23

## 2022-05-26 DIAGNOSIS — Z87.898 HISTORY OF VERTIGO: ICD-10-CM

## 2022-05-26 RX ORDER — ONDANSETRON 4 MG/1
4 TABLET, ORALLY DISINTEGRATING ORAL EVERY 8 HOURS PRN
Qty: 21 TABLET | Refills: 2 | Status: SHIPPED | OUTPATIENT
Start: 2022-05-26

## 2022-05-30 ENCOUNTER — OFFICE VISIT (OUTPATIENT)
Dept: URGENT CARE | Facility: URGENT CARE | Age: 67
End: 2022-05-30
Payer: COMMERCIAL

## 2022-05-30 ENCOUNTER — NURSE TRIAGE (OUTPATIENT)
Dept: NURSING | Facility: CLINIC | Age: 67
End: 2022-05-30
Payer: COMMERCIAL

## 2022-05-30 VITALS
DIASTOLIC BLOOD PRESSURE: 75 MMHG | OXYGEN SATURATION: 98 % | TEMPERATURE: 97.5 F | SYSTOLIC BLOOD PRESSURE: 111 MMHG | HEART RATE: 70 BPM

## 2022-05-30 DIAGNOSIS — J01.90 ACUTE SINUSITIS WITH SYMPTOMS > 10 DAYS: ICD-10-CM

## 2022-05-30 DIAGNOSIS — R42 VERTIGO: Primary | ICD-10-CM

## 2022-05-30 PROCEDURE — 99213 OFFICE O/P EST LOW 20 MIN: CPT | Performed by: PHYSICIAN ASSISTANT

## 2022-05-30 RX ORDER — CEFUROXIME AXETIL 500 MG/1
500 TABLET ORAL 2 TIMES DAILY
Qty: 20 TABLET | Refills: 0 | Status: SHIPPED | OUTPATIENT
Start: 2022-05-30 | End: 2022-06-09

## 2022-05-30 RX ORDER — TRIAMTERENE/HYDROCHLOROTHIAZID 37.5-25 MG
2 TABLET ORAL DAILY
COMMUNITY
Start: 2022-05-05 | End: 2022-06-29

## 2022-05-30 RX ORDER — PREDNISONE 20 MG/1
40 TABLET ORAL DAILY
Qty: 14 TABLET | Refills: 0 | Status: SHIPPED | OUTPATIENT
Start: 2022-05-30 | End: 2022-06-07

## 2022-05-30 NOTE — PROGRESS NOTES
SUBJECTIVE:  Denita Flores is a 67 year old female patient with pain and pressure along with ears feeling full.  Concern for possible sinus infection.  Has denies any significant cough and no shortness of breath or chest pain.  Patient also has a flare of her vertigo which has been going on for months.  She does have a slight headache and some dizziness.  Feeling off balance.  Does have associated nausea but no vomiting.  Vision is normal.  She is wonder if there is any medications that can be taken does have meclizine for symptom relief.  No other concerns at this time      Past Medical History:   Diagnosis Date     Coronary artery disease involving native coronary artery of native heart, angina presence unspecified 6/11/2021     Essential hypertension 6/11/2021     Graves disease      Kidney stones 2000    Passed a 5-10mm stone     Malignant neoplasm (H)     Cervical CA     Mild persistent asthma 5/8/2013     Swollen finger      Thyroid disease      Current Outpatient Medications   Medication     albuterol (PROAIR HFA/PROVENTIL HFA/VENTOLIN HFA) 108 (90 Base) MCG/ACT inhaler     Cholecalciferol (D3 ADULT PO)     cyanocolbalamin (VITAMIN  B-12) 100 MCG tablet     levothyroxine (SYNTHROID/LEVOTHROID) 112 MCG tablet     meclizine (ANTIVERT) 25 MG tablet     ondansetron (ZOFRAN ODT) 4 MG ODT tab     triamcinolone (KENALOG) 0.1 % external cream     atorvastatin (LIPITOR) 20 MG tablet     fluticasone (FLONASE) 50 MCG/ACT nasal spray     montelukast (SINGULAIR) 10 MG tablet     montelukast (SINGULAIR) 10 MG tablet     triamterene-HCTZ (DYAZIDE) 37.5-25 MG capsule     No current facility-administered medications for this visit.     Social History     Socioeconomic History     Marital status:      Spouse name: Not on file     Number of children: Not on file     Years of education: Not on file     Highest education level: Not on file   Occupational History     Not on file   Tobacco Use     Smoking status: Former  Smoker     Packs/day: 1.50     Years: 35.00     Pack years: 52.50     Types: Cigarettes     Quit date: 2003     Years since quittin.6     Smokeless tobacco: Never Used   Substance and Sexual Activity     Alcohol use: Yes     Alcohol/week: 0.0 standard drinks     Comment: 3 dinks/ year     Drug use: No     Sexual activity: Not Currently   Other Topics Concern     Parent/sibling w/ CABG, MI or angioplasty before 65F 55M? No   Social History Narrative     Not on file     Social Determinants of Health     Financial Resource Strain: Not on file   Food Insecurity: Not on file   Transportation Needs: Not on file   Physical Activity: Not on file   Stress: Not on file   Social Connections: Not on file   Intimate Partner Violence: Not on file   Housing Stability: Not on file     ROS  Negative other than stated above    Exam:  GENERAL APPEARANCE: healthy, alert and no distress  EYES: EOMI,  PERRL  HENT: ear canals and TM's normal, nose and mouth without ulcers or lesions and maxillary sinus tenderness bilateral  NECK: no adenopathy, no asymmetry, masses, or scars and thyroid normal to palpation  RESP: lungs clear to auscultation - no rales, rhonchi or wheezes  CV: regular rates and rhythm, normal S1 S2, no S3 or S4 and no murmur, click or rub -  MS: extremities normal- no gross deformities noted, no evidence of inflammation in joints, FROM in all extremities.  SKIN: no suspicious lesions or rashes  NEURO: Normal strength and tone, sensory exam grossly normal, mentation intact, speech normal, gait normal including heel/toe/tandem walking, cranial nerves 2-12 intact, Romberg negative and proprioception normal      assessment/plan:  (R42) Vertigo  (primary encounter diagnosis)  Comment:   Plan: cefuroxime (CEFTIN) 500 MG tablet, Physical         Therapy Referral, : predniSONE         (DELTASONE) 20 MG tablet        Patient with a history of vertigo seems to be having a flare.  She does have meclizine to use if needed.   She currently does have symptoms consistent with a sinus infection which may be worsening her symptoms.  Will place on Ceftin and give burst of prednisone.  Over-the-counter medications also needed for symptomatic relief.  Will give referral to vestibular rehab for ongoing vertigo.  Red flag signs were discussed and to follow-up with primary as needed    (J01.90) Acute sinusitis with symptoms > 10 days  Comment:   Plan: cefuroxime (CEFTIN) 500 MG tablet,        : predniSONE (DELTASONE) 20 MG         tablet        As above

## 2022-05-30 NOTE — CONFIDENTIAL NOTE
"Vertigo and ear pain. Seen ENT provider, x-ray, no treatment is working.  Episodes of losing her balance.  Happened last night, vomited during the night. Did not take other pills.  Head ear is totally clogged. \"can't function like this anymore\"  \"So many trips to ED\" \"Feels like a huge sinus infection in her head\"  Told not to take anything, \"no antihistamine\"  Triage guidelines recommend to see pcp within 24 hours  Caller verbalized and understands directives  COVID 19 Nurse Triage Plan/Patient Instructions    Please be aware that novel coronavirus (COVID-19) may be circulating in the community. If you develop symptoms such as fever, cough, or SOB or if you have concerns about the presence of another infection including coronavirus (COVID-19), please contact your health care provider or visit [email or web address].     Disposition/Instructions    In-Person Visit with provider recommended. Reference Visit Selection Guide.    Thank you for taking steps to prevent the spread of this virus.  o Limit your contact with others.  o Wear a simple mask to cover your cough.  o Wash your hands well and often.    Resources    [facility]: About COVID-19: [email or web address]    CDC: What to Do If You're Sick: www.cdc.gov/coronavirus/2019-ncov/about/steps-when-sick.html    CDC: Ending Home Isolation: www.cdc.gov/coronavirus/2019-ncov/hcp/disposition-in-home-patients.html     CDC: Caring for Someone: www.cdc.gov/coronavirus/2019-ncov/if-you-are-sick/care-for-someone.html     [facility]: Interim Guidance for Hospital Discharge to Home: www.health.[facility].[place]/diseases/coronavirus/hcp/hospdischarge.pdf    [facility] clinical trials (COVID-19 research studies): [email or web address]/[email or web address]-trials     Below are the COVID-19 hotlines at the [place] Department of Health ([facility]). Interpreters are available.   o For health questions: Call 797-818-1762 or 1-404.333.1854 (7 a.m. to 7 p.m.)  o For " questions about schools and childcare: Call 658-594-0403 or 1-865.276.1477 (7 a.m. to 7 p.m.)

## 2022-06-03 ENCOUNTER — NURSE TRIAGE (OUTPATIENT)
Dept: FAMILY MEDICINE | Facility: CLINIC | Age: 67
End: 2022-06-03
Payer: COMMERCIAL

## 2022-06-03 NOTE — TELEPHONE ENCOUNTER
"PCP; patient reports ongoing but more frequent vertigo episodes. Patient has followed up with ENT but is not very pleased with their experience at their clinic. Patient requesting to follow up with PCP. Writer scheduled OV on 6/7/22 (offered appt on 6/6 but patient declined). Please advise if any further interventions indicated at this time otherwise patient does not need callback and will follow up on 6/7/22.     Patient called  CC: vertigo - occurring more frequently but not currently expreiencing  Onset; a few years, but getting worse  Vertigo: yes  Has seen ear specialist-  A few months ago who did MRI and blood work, patient states theyre was no abnormal reports. Patient expresses frustration with their care at ENT specialty care.   Severity: moderate-severe when occurring. \"when it happens I cant walk, I throw up, I cant function\"  Interventions: Take nausea medication and meclizine which helps \"a little bit\" and triamterene-hydrochlorothiazide.   aggravating factors: none  Recurrent; yes   Other symptoms: fever, chest pain, vomiting, diarrhea, bleeding, palpitations, irregular rhythm, vision changes  Hearing;  sometimes fluid moving in ear , \"cracking\"    Patient's current BP: 113/74 HR 85 at 1125am   Patient has future appt with allergy specialist   Patient has lab only appt on 6/8/22, usnure what the appointment is for, writer reviewed that labs are for appt with Dr. Cordova, no orders in chart. Writer advised patient to follow up with Dr. Cordova's office for orders.       Callback: [phone]- ok to leave detailed      Roberto Gaona RN  [facility]        Reason for Disposition    Dizziness not present now, but is a chronic symptom (recurrent or ongoing AND lasting > 4 weeks)    Additional Information    Negative: Shock suspected (e.g., cold/pale/clammy skin, too weak to stand, low BP, rapid pulse)    Negative: Difficult to awaken or acting confused (e.g., disoriented, slurred " speech)    Negative: Fainted, and still feels dizzy afterwards    Negative: Severe difficulty breathing (e.g., struggling for each breath, speaks in single words)    Negative: Overdose (accidental or intentional) of medications    Negative: New neurologic deficit that is present now: * Weakness of the face, arm, or leg on one side of the body * Numbness of the face, arm, or leg on one side of the body * Loss of speech or garbled speech    Negative: Heart beating < 50 beats per minute OR > 140 beats per minute    Negative: Sounds like a life-threatening emergency to the triager    Negative: Chest pain    Negative: Rectal bleeding, bloody stool, or tarry-black stool    Negative: Vomiting is the main symptom    Negative: Diarrhea is the main symptom    Negative: Headache is the main symptom    Negative: Heat exhaustion suspected (i.e., dehydration from heat exposure)    Negative: Patient states that he/she is having an anxiety/panic attack    Negative: SEVERE dizziness (e.g., unable to stand, requires support to walk, feels like passing out now)    Negative: SEVERE headache or neck pain    Negative: Spinning or tilting sensation (vertigo) present now and one or more stroke risk factors (i.e., hypertension, diabetes, prior stroke/TIA, heart attack, age over 60) (Exception: prior physician evaluation for this AND no different/worse than usual)    Negative: Loss of vision or double vision    Negative: Extra heart beats OR irregular heart beating (i.e., 'palpitations')    Negative: Difficulty breathing    Negative: Drinking very little and has signs of dehydration (e.g., no urine > 12 hours, very dry mouth, very lightheaded)    Negative: Follows bleeding (e.g., stomach, rectum, vagina) (Exception: became dizzy from sight of small amount blood)    Negative: Patient sounds very sick or weak to the triager    Negative: Lightheadedness (dizziness) present now, after 2 hours of rest and fluids    Negative: Spinning or tilting  sensation (vertigo) present now    Negative: Fever > 103 F (39.4 C)    Negative: Fever > 100.0 F (37.8 C) and has diabetes mellitus or a weak immune system (e.g., HIV positive, cancer chemotherapy, organ transplant, splenectomy, chronic steroids)    Negative: Vomiting occurs with dizziness    Negative: Patient wants to be seen    Negative: Taking a medicine that could cause dizziness (e.g., blood pressure medications, diuretics)    Negative: Diabetes    Protocols used: DIZZINESS-A-OH

## 2022-06-06 DIAGNOSIS — R31.9 HEMATURIA: Primary | ICD-10-CM

## 2022-06-06 NOTE — PROGRESS NOTES
Writer placed lab orders per protocol.      Kaelyn Swan  Nephrology RN Care Coordinator  [facility]

## 2022-06-07 ENCOUNTER — OFFICE VISIT (OUTPATIENT)
Dept: FAMILY MEDICINE | Facility: CLINIC | Age: 67
End: 2022-06-07
Payer: COMMERCIAL

## 2022-06-07 VITALS — BODY MASS INDEX: 35.77 KG/M2 | WEIGHT: 236 LBS | RESPIRATION RATE: 16 BRPM | OXYGEN SATURATION: 100 % | HEIGHT: 68 IN

## 2022-06-07 DIAGNOSIS — R42 DIZZINESS: Primary | ICD-10-CM

## 2022-06-07 DIAGNOSIS — I10 ESSENTIAL HYPERTENSION: Chronic | ICD-10-CM

## 2022-06-07 LAB
ALBUMIN UR-MCNC: NEGATIVE MG/DL
APPEARANCE UR: CLEAR
BILIRUB UR QL STRIP: NEGATIVE
COLOR UR AUTO: YELLOW
ERYTHROCYTE [DISTWIDTH] IN BLOOD BY AUTOMATED COUNT: 12.9 % (ref 10–15)
GLUCOSE UR STRIP-MCNC: NEGATIVE MG/DL
HCT VFR BLD AUTO: 45.3 % (ref 35–47)
HGB BLD-MCNC: 14.3 G/DL (ref 11.7–15.7)
HGB UR QL STRIP: ABNORMAL
KETONES UR STRIP-MCNC: NEGATIVE MG/DL
LEUKOCYTE ESTERASE UR QL STRIP: NEGATIVE
MCH RBC QN AUTO: 29.1 PG (ref 26.5–33)
MCHC RBC AUTO-ENTMCNC: 31.6 G/DL (ref 31.5–36.5)
MCV RBC AUTO: 92 FL (ref 78–100)
NITRATE UR QL: NEGATIVE
PH UR STRIP: 6.5 [PH] (ref 5–7)
PLATELET # BLD AUTO: 368 10E3/UL (ref 150–450)
RBC # BLD AUTO: 4.92 10E6/UL (ref 3.8–5.2)
RBC #/AREA URNS AUTO: ABNORMAL /HPF
SP GR UR STRIP: 1.02 (ref 1–1.03)
SQUAMOUS #/AREA URNS AUTO: ABNORMAL /LPF
UROBILINOGEN UR STRIP-ACNC: 0.2 E.U./DL
WBC # BLD AUTO: 15.4 10E3/UL (ref 4–11)
WBC #/AREA URNS AUTO: ABNORMAL /HPF

## 2022-06-07 PROCEDURE — 81001 URINALYSIS AUTO W/SCOPE: CPT | Performed by: INTERNAL MEDICINE

## 2022-06-07 PROCEDURE — 82043 UR ALBUMIN QUANTITATIVE: CPT | Performed by: INTERNAL MEDICINE

## 2022-06-07 PROCEDURE — 80069 RENAL FUNCTION PANEL: CPT | Performed by: INTERNAL MEDICINE

## 2022-06-07 PROCEDURE — 36415 COLL VENOUS BLD VENIPUNCTURE: CPT | Performed by: INTERNAL MEDICINE

## 2022-06-07 PROCEDURE — 85027 COMPLETE CBC AUTOMATED: CPT | Performed by: INTERNAL MEDICINE

## 2022-06-07 PROCEDURE — 99214 OFFICE O/P EST MOD 30 MIN: CPT | Performed by: INTERNAL MEDICINE

## 2022-06-07 ASSESSMENT — ASTHMA QUESTIONNAIRES: ACT_TOTALSCORE: 25

## 2022-06-07 ASSESSMENT — PAIN SCALES - GENERAL: PAINLEVEL: NO PAIN (0)

## 2022-06-07 NOTE — PROGRESS NOTES
"  Assessment & Plan     Dizziness  Chronic/intermittent.  orthostatics today negative  Started on maxzide per ENT and will plan to follow-up with them for recheck. Repeat MRI brain reassuring.  Was doing better with symptom flare up a week ago, she has since stopped benadryl (was taking prn) and stopped allegra (was taking daily) and symptoms have improved. Has follow-up with an allergist due to frequent sinus/ear congestion.  She is on summer break from bus driving which will give her an opportunity to monitor and optimize condition, and is aware she must avoid driving if dizzy.    Essential hypertension  Stable with recent addition of diuretic per ENT  She has renal panel already being checked by nephro today for stability    Return if symptoms worsen or fail to improve.    Neli Lara DO  [facility] ROSANNA Barger is a 66 year old who presents for the following health issues     HPI     CC: dizziness follow-up    Denita saw ENT. Had MRI-no acute issues. States started on maxzide and compliant. States dizziness was improving until end of may/early June, had significant flare. Decided to stop allegra which she takes daily and benadryl which she takes prn and has not had dizziness since then, about one week now. She has appt with allergist in near future due to recurrent sinus congestion/ear discomfort. She was again recently in urgent care and treated for sinus infection with ab/steroids.     -on summer break      Review of Systems   Constitutional, HEENT, cardiovascular, pulmonary, gi and gu systems are negative, except as otherwise noted.      Objective    Resp 16   Ht 1.715 m (5' 7.5\")   Wt 107 kg (236 lb)   SpO2 100%   BMI 36.42 kg/m    Body mass index is 36.42 kg/m .   See orthostatics documentation in vitals section    Physical Exam     GENERAL APPEARANCE: AAOx3, no distress. Well developed.    PSYCH: appropriate mood and affect.               "
OT anterior to pt

## 2022-06-08 LAB
ALBUMIN SERPL-MCNC: 3.8 G/DL (ref 3.4–5)
ANION GAP SERPL CALCULATED.3IONS-SCNC: 5 MMOL/L (ref 3–14)
BUN SERPL-MCNC: 35 MG/DL (ref 7–30)
CALCIUM SERPL-MCNC: 9.3 MG/DL (ref 8.5–10.1)
CHLORIDE BLD-SCNC: 102 MMOL/L (ref 94–109)
CO2 SERPL-SCNC: 28 MMOL/L (ref 20–32)
CREAT SERPL-MCNC: 1.14 MG/DL (ref 0.52–1.04)
CREAT UR-MCNC: 62 MG/DL
GFR SERPL CREATININE-BSD FRML MDRD: 53 ML/MIN/1.73M2
GLUCOSE BLD-MCNC: 88 MG/DL (ref 70–99)
MICROALBUMIN UR-MCNC: 7 MG/L
MICROALBUMIN/CREAT UR: 11.29 MG/G CR (ref 0–25)
PHOSPHATE SERPL-MCNC: 4 MG/DL (ref 2.5–4.5)
POTASSIUM BLD-SCNC: 4.5 MMOL/L (ref 3.4–5.3)
SODIUM SERPL-SCNC: 135 MMOL/L (ref 133–144)

## 2022-06-13 DIAGNOSIS — J30.89 SEASONAL ALLERGIC RHINITIS DUE TO OTHER ALLERGIC TRIGGER: ICD-10-CM

## 2022-06-13 DIAGNOSIS — I10 ESSENTIAL HYPERTENSION: ICD-10-CM

## 2022-06-13 DIAGNOSIS — E78.5 DYSLIPIDEMIA: Primary | ICD-10-CM

## 2022-06-13 DIAGNOSIS — R94.39 ABNORMAL CARDIOVASCULAR STRESS TEST: ICD-10-CM

## 2022-06-13 DIAGNOSIS — R93.1 ELEVATED CORONARY ARTERY CALCIUM SCORE: ICD-10-CM

## 2022-06-13 NOTE — PROGRESS NOTES
Requested by lab to enter new orders for Lipid profile and ALT.  Patient has apmnt upcoming, but the old order is .    New orders entered.      Kitty Martin RN on 2022 at 2:40 PM

## 2022-06-13 NOTE — TELEPHONE ENCOUNTER
Patient is requesting a new RX for Fluticasone to twice daily.    Current prescription:    Summary: Spray 1 spray into both nostrils daily, Disp-48 g, R-3, E-Prescribe   Dose, Route, Frequency: 1 spray, Both Nostrils, DAILY  Start: 6/11/2021  Ord/Sold: 6/11/2021

## 2022-06-14 ENCOUNTER — CARE COORDINATION (OUTPATIENT)
Dept: NEPHROLOGY | Facility: CLINIC | Age: 67
End: 2022-06-14

## 2022-06-14 ENCOUNTER — VIRTUAL VISIT (OUTPATIENT)
Dept: NEPHROLOGY | Facility: CLINIC | Age: 67
End: 2022-06-14
Attending: UROLOGY
Payer: COMMERCIAL

## 2022-06-14 VITALS — BODY MASS INDEX: 36.42 KG/M2 | HEIGHT: 68 IN

## 2022-06-14 DIAGNOSIS — R31.29 MICROSCOPIC HEMATURIA: ICD-10-CM

## 2022-06-14 PROCEDURE — 99204 OFFICE O/P NEW MOD 45 MIN: CPT | Mod: 95 | Performed by: INTERNAL MEDICINE

## 2022-06-14 RX ORDER — FLUTICASONE PROPIONATE 50 MCG
1 SPRAY, SUSPENSION (ML) NASAL 2 TIMES DAILY
Qty: 9.9 ML | Refills: 1 | Status: SHIPPED | OUTPATIENT
Start: 2022-06-14 | End: 2023-10-21

## 2022-06-14 NOTE — PATIENT INSTRUCTIONS
It was a pleasure taking care of you today.  I've included a brief summary of our discussion and care plan from today's visit below.  Please review this information with your primary care provider.    My recommendations are summarized as follows:  - Obtain a serological evaluation for parenchymal kidney disease including double-stranded DNA, complement levels, ANCA serologies, hepatitis B and C serologies, and serum monoclonal protein studies.  - Repeat renal panel.  - Check your blood pressure at home goal blood pressure is 130/80.  - Return visit in 3 months with a repeat renal panel and urinalysis.    To schedule imaging please call [phone]. To schedule your lab appointment at [facility] 1st floor lab call [phone]    Who do I call with any questions after my visit?  Please be in touch if there are any further questions that arise following today's visit.  There are multiple ways to contact your nephrology care team.      During business hours, you may reach your Nephrology RN Care Coordinator, Kaelyn Swan at . To schedule or reschedule an appointment, please call [phone].    You can always send a secure message through GoTo[employer].  GoToTags messages are answered by your nurse or doctor typically within 24 hours.  Please allow extra time on weekends and holidays.      For urgent/emergent questions after business hours, you may reach the on-call Nephrology Fellow by contacting the [facility]  at [phone].     How will I get the results of any tests ordered?    You will receive all of your results.  If you have signed up for [employer]To[employer], any tests ordered at your visit will be available to you after your physician reviews them.  Typically this takes 1-2 weeks.  If there are urgent results that require a change in your care plan, your physician or nurse will call you to discuss the next steps.      What is GoToTags?  GoToTags is a secure way for you to access all  of your healthcare records from the [facility].  It is a web based computer program, so you can sign on to it from any location.  It also allows you to send secure messages to your care team.  I recommend signing up for [employer] access if you have not already done so and are comfortable with using a computer.      How do I schedule a follow-up visit?  If you did not schedule a follow-up visit today, please call [phone] to schedule a follow-up office visit.      Sincerely,    Kyle Santoyo MD  [facility]  Division of Nephrology and Hypertension

## 2022-06-14 NOTE — PROGRESS NOTES
Nephrology Outpatient Consult Note    Requesting Provider  Darline English MD  [address]  [place] [address]    Chief Complaint/Reason for Visit  Microscopic Hematuria    History of Present Illness  This is a 66 year old female with a past medical history significant for cervical cancer, asthma, hypertension, coronary artery disease, and kidney stones who was referred to our clinic for further work-up and management of microhematuria.    The patient canceled her in person clinic, and I did the visit virtually.  She has been struggling with vertigo lately.  She had seen an ENT doctor, and was started on Maxide about a month ago for vertigo.  She also has recently finished a course of antibiotics for an ear infection.  She describes a spinning sensation that was so bad today to the point where she was not able to leave home.  She denies any chest pain or difficulty breathing.  She has no urinary symptoms.  She has no lower extremity edema.  I asked her to check her blood pressure during the visit, and her home machine read 147/70.    With regards to her hematuria, this was first diagnosed a few years ago.  The patient gets yearly physicals as she works as a .  The patient was evaluated by Dr. English from urology.  Work-up included a CT urogram last year which was essentially normal.  Showed no evidence of renal, ureteral, or bladder calculi.  It showed no masses, or suspicious filling defects.  Urine cytologies were also performed last year and were negative for high-grade urothelial carcinoma.  As cystoscopy were performed earlier this year, and it was unremarkable.    With regards to her kidney function, her kidney function has been essentially normal.  However, her most recent laboratory work showed an elevated creatinine of 1.1 mg/dL.  The rest of her work-up is overall reassuring.  She has no proteinuria her serum glucose is normal.  Her hemoglobin is normal.  Her  "urinalysis showed trace blood she had in the past.  However, there is no pyuria, crystals, or casts.  Of note, she had an ELVIRA that was borderline positive earlier this year.    The following portions of the patient's history were reviewed and updated as appropriate: allergies, current medications, past family history, past medical history, past social history, past surgical history and problem list.    Subjective   Review of Systems  A comprehensive review of systems was performed. Pertinent positives and negatives are described above.    Past Medical/Surgical History  Patient  has a past medical history of Coronary artery disease involving native coronary artery of native heart, angina presence unspecified (6/11/2021), Essential hypertension (6/11/2021), Graves disease, Kidney stones (2000), Malignant neoplasm (H), Mild persistent asthma (5/8/2013), Swollen finger, and Thyroid disease.    She has no past medical history of Congestive heart failure, unspecified, Diabetes mellitus (H), or Unspecified cerebral artery occlusion with cerebral infarction.  Patient  has a past surgical history that includes back surgery; leep tx, cervical; Colonoscopy with CO2 insufflation (N/A, 2/21/2017); and Cystoscopy flexible (8/10/2018).    Social History  Patient  reports that she quit smoking about 19 years ago. Her smoking use included cigarettes. She has a 52.50 pack-year smoking history. She has never used smokeless tobacco. She reports current alcohol use. She reports that she does not use drugs.    Family History  family history includes Cancer (age of onset: 59) in her sister; Cancer (age of onset: 67) in her father; Colon Cancer in her maternal aunt and paternal aunt; Diabetes in her sister; Glaucoma in her maternal grandmother and mother; Heart Disease in her father and sister.    Objective   Vital Signs  Height 1.715 m (5' 7.5\"), not currently breastfeeding. Body mass index is 36.42 kg/m .    Physical Examination  Not " performed as this is a virtual visit    Lab Results   Component Value Date    WBC 15.4 (H) 06/07/2022    HGB 14.3 06/07/2022    HCT 45.3 06/07/2022    MCV 92 06/07/2022     06/07/2022     06/07/2022    BUN 35 (H) 06/07/2022    ANIONGAP 5 06/07/2022    ALBUMIN 3.8 06/07/2022     Lab Results   Component Value Date    UROBILINOGEN 0.2 06/07/2022     Current Outpatient Medications   Medication     amLODIPine (NORVASC) 5 MG tablet     aspirin (ASA) 81 MG chewable tablet     atorvastatin (LIPITOR) 40 MG tablet     Cholecalciferol (D3 ADULT PO)     cyanocolbalamin (VITAMIN  B-12) 100 MCG tablet     fluticasone (FLONASE) 50 MCG/ACT nasal spray     levothyroxine (SYNTHROID/LEVOTHROID) 112 MCG tablet     lisinopril (ZESTRIL) 5 MG tablet     meclizine (ANTIVERT) 25 MG tablet     montelukast (SINGULAIR) 10 MG tablet     ondansetron (ZOFRAN ODT) 4 MG ODT tab     triamcinolone (KENALOG) 0.1 % external cream     triamterene-HCTZ (MAXZIDE-25) 37.5-25 MG tablet     acetaminophen (TYLENOL) 500 MG tablet     albuterol (PROAIR HFA/PROVENTIL HFA/VENTOLIN HFA) 108 (90 Base) MCG/ACT inhaler     Krill Oil 500 MG CAPS     No current facility-administered medications for this visit.     Assessment & Plan   #1 Intermittent microscopic hematuria without proteinuria  #2 Hypertension  #3 Acute kidney injury  #4 Remote history of a kidney stone      Today, the patient is experiencing bad vertigo.My assessment is limited given that this visit is virtual.  I am concerned about orthostatic hypotension given the introduction of Maxzide last month.  However, the patient's home machine reported a blood pressure of 147/70.    Given the patient's above symptoms, encouraged her to reach out to her primary care physician, or to go to the emergency department for evaluations if the symptoms are bad or if she can get to see her primary care physician in time.    The patient's laboratory evaluation showed leukocytosis and elevation of serum  creatinine.  It is likely that the patient had an episode of acute kidney injury in association with her infection.    The patient had intermittent microscopic hematuria.  She had a thorough urological evaluation that included a CT urogram, cystoscopy, and urine cytologies, all of which have been negative.  It is possible that the patient has a parenchymal kidney process that is producing the microscopic hematuria.  Possible etiologies include thin basement membrane disease, or mild IgA nephropathy.    Given the recent increase in her serum creatinine, I think it would be reasonable to obtain a basic serological evaluation for glomerular kidney diseases.  If the evaluation is negative, and her kidney function improves, then I would not recommend any further work-up.  It is important for her to keep her blood pressure well controlled.    If we  on abnormalities on the serological evaluation, or if her kidney function continues to worsen, then it would be reasonable then to do a kidney biopsy.    My recommendations are the following:  - Obtain a serological evaluation for parenchymal kidney disease including double-stranded DNA, complement levels, ANCA serologies, hepatitis B and C serologies, and serum monoclonal protein studies.  - Repeat renal panel.  - Check your blood pressure at home goal blood pressure is 130/80.  - Return visit in 3 months with a repeat renal panel and urinalysis.    All questions were answered to the patient's satisfaction.  Total time of this encounter on the date of service was 50 minutes.    Telemedicine Visit Addendum:  Consultation provided at the request of above provider for advice regarding the diagnosis and treatment of patient's condition. The patient's condition can be safely assessed via telemedicine. Patient has agreed to receiving services via telemedicine technology. Date of service is 06/14/22. Time Service Began: 8:22 AM. Time Service Ended: 8:54 AM. Reason that  telemedicine is appropriate: Patient unable to travel. Mode of transmission: phone call    Orders placed today:  Orders Placed This Encounter   Procedures     DNA double stranded antibodies     Complement C3     Complement C4     ANCA IgG by IFA with Reflex to Titer     Hepatitis B Surface Antibody     Hepatitis B surface antigen     Hepatitis C antibody     Protein Immunofixation Serum

## 2022-06-14 NOTE — NURSING NOTE
"Chief Complaint   Patient presents with     New Patient     Microscopic hematuria       Vitals:    06/14/22 0818   Height: 1.715 m (5' 7.5\")       Body mass index is 36.42 kg/m .    MARSHA Smith    "

## 2022-06-14 NOTE — PROGRESS NOTES
MD recommendations below from Nephrology visit    My recommendations are the following:  - Obtain a serological evaluation for parenchymal kidney disease including double-stranded DNA, complement levels, ANCA serologies, hepatitis B and C serologies, and serum monoclonal protein studies.  - Repeat renal panel.  - Check your blood pressure at home goal blood pressure is 130/80.  - Return visit in 3 months with a repeat renal panel and urinalysis      Patient has labs scheduled for tomorrow.       Kaelyn Swan  Nephrology RN Care Coordinator  [facility]

## 2022-06-16 ENCOUNTER — LAB (OUTPATIENT)
Dept: LAB | Facility: CLINIC | Age: 67
End: 2022-06-16
Payer: COMMERCIAL

## 2022-06-16 DIAGNOSIS — I10 ESSENTIAL HYPERTENSION: ICD-10-CM

## 2022-06-16 DIAGNOSIS — R94.39 ABNORMAL CARDIOVASCULAR STRESS TEST: ICD-10-CM

## 2022-06-16 DIAGNOSIS — E78.5 DYSLIPIDEMIA: ICD-10-CM

## 2022-06-16 DIAGNOSIS — R93.1 ELEVATED CORONARY ARTERY CALCIUM SCORE: ICD-10-CM

## 2022-06-16 LAB
ALT SERPL W P-5'-P-CCNC: 23 U/L (ref 0–50)
CHOLEST SERPL-MCNC: 109 MG/DL
FASTING STATUS PATIENT QL REPORTED: YES
HDLC SERPL-MCNC: 53 MG/DL
LDLC SERPL CALC-MCNC: 40 MG/DL
NONHDLC SERPL-MCNC: 56 MG/DL
TRIGL SERPL-MCNC: 81 MG/DL

## 2022-06-16 PROCEDURE — 80061 LIPID PANEL: CPT | Performed by: INTERNAL MEDICINE

## 2022-06-16 PROCEDURE — 84460 ALANINE AMINO (ALT) (SGPT): CPT | Performed by: INTERNAL MEDICINE

## 2022-06-16 PROCEDURE — 36415 COLL VENOUS BLD VENIPUNCTURE: CPT | Performed by: INTERNAL MEDICINE

## 2022-06-20 ENCOUNTER — OFFICE VISIT (OUTPATIENT)
Dept: CARDIOLOGY | Facility: CLINIC | Age: 67
End: 2022-06-20
Payer: COMMERCIAL

## 2022-06-20 VITALS
HEART RATE: 64 BPM | HEIGHT: 68 IN | WEIGHT: 234 LBS | BODY MASS INDEX: 35.46 KG/M2 | OXYGEN SATURATION: 97 % | DIASTOLIC BLOOD PRESSURE: 60 MMHG | SYSTOLIC BLOOD PRESSURE: 95 MMHG

## 2022-06-20 DIAGNOSIS — E78.5 DYSLIPIDEMIA: Primary | ICD-10-CM

## 2022-06-20 DIAGNOSIS — R93.1 ELEVATED CORONARY ARTERY CALCIUM SCORE: ICD-10-CM

## 2022-06-20 PROCEDURE — 99213 OFFICE O/P EST LOW 20 MIN: CPT | Performed by: INTERNAL MEDICINE

## 2022-06-20 NOTE — LETTER
6/20/2022    Neli Lara, DO  [address] [place] [address]    RE: Denita Flores       Dear Colleague,     I had the pleasure of seeing Denita Flores in the [facility].  HPI and Plan:   At the opportunity of seeing this very pleasant 66-year-old lady again for follow-up of her cardiac health    Essentially I think the main problem is anxiety.  She had CT coronary angiography for evaluation of chest discomfort and there was only mild disease and she has normal left ventricular systolic function.  We can be quite certain that her chest pain which incidentally she was no longer has is not due to her heart.    We talked about her cardiac risk factors.  Think based on the latest studies on aspirin and primary prevention she will not need to take an aspirin.  She is on 40 mg of atorvastatin and her LDL was 49 HDL of 53.  Again from my point of view I am not sure there is a need for her to continue taking a statin that I asked her to discuss this with her primary care physician.    Her blood pressure is 95 systolic.  Recently has been low.  She has vertical with nausea and vomiting thought due to in the imbalance.  She has seen ENT specialist who has prescribed her Maxide.  I suspect this is a contributor to her low blood pressure.  I am not sure she is symptomatic from it but I have asked her to stop lisinopril.  She does say that with taking the Maxide her finger swelling and stiffness have improved.    Cardiac examination is normal.    Overall this lady really does not have any major cardiac issues and I am we will discharge her from our clinic.  We will be happy to see her again in future if considered necessary.    I reviewed her cholesterol numbers as she asked me about them.    No orders of the defined types were placed in this encounter.      No orders of the defined types were placed in this encounter.      No diagnosis found.    CURRENT MEDICATIONS:  Current Outpatient Medications    Medication Sig Dispense Refill     albuterol (PROAIR HFA/PROVENTIL HFA/VENTOLIN HFA) 108 (90 Base) MCG/ACT inhaler Inhale 2 puffs into the lungs every 6 hours as needed for shortness of breath / dyspnea 18 g 1     amLODIPine (NORVASC) 5 MG tablet Take 1 tablet (5 mg) by mouth daily 90 tablet 3     aspirin (ASA) 81 MG chewable tablet Take 81 mg by mouth daily       atorvastatin (LIPITOR) 40 MG tablet Take 1 tablet (40 mg) by mouth At Bedtime 90 tablet 0     Cholecalciferol (D3 ADULT PO) Take 2,000 Units by mouth daily.       cyanocolbalamin (VITAMIN  B-12) 100 MCG tablet Take 100 mcg by mouth daily.       fluticasone (FLONASE) 50 MCG/ACT nasal spray Spray 1 spray into both nostrils 2 times daily 9.9 mL 1     levothyroxine (SYNTHROID/LEVOTHROID) 112 MCG tablet Profile Rx,TAKE 1 TABLET(112 MCG) BY MOUTH DAILY 90 tablet 3     lisinopril (ZESTRIL) 5 MG tablet Take 1 tablet (5 mg) by mouth daily 90 tablet 0     meclizine (ANTIVERT) 25 MG tablet Take 1 tablet (25 mg) by mouth 3 times daily as needed for dizziness 20 tablet 0     montelukast (SINGULAIR) 10 MG tablet TAKE 1 TABLET(10 MG) BY MOUTH AT BEDTIME 90 tablet 3     ondansetron (ZOFRAN ODT) 4 MG ODT tab Take 1 tablet (4 mg) by mouth every 8 hours as needed for nausea 21 tablet 2     triamcinolone (KENALOG) 0.1 % external cream Apply sparingly to affected area three times daily as needed 80 g 0     triamterene-HCTZ (MAXZIDE-25) 37.5-25 MG tablet Take 1 tablet by mouth daily       Krill Oil 500 MG CAPS  (Patient not taking: Reported on 6/20/2022)         ALLERGIES     Allergies   Allergen Reactions     Contrast Dye Hives and Itching     Isovue with ANTON on 1-21-19     Sulfa Drugs Hives and Swelling     Noted in 10/18/09 ER       PAST MEDICAL HISTORY:  Past Medical History:   Diagnosis Date     Coronary artery disease involving native coronary artery of native heart, angina presence unspecified 6/11/2021     Essential hypertension 6/11/2021     Graves disease       "Kidney stones     Passed a 5-10mm stone     Malignant neoplasm (H)     Cervical CA     Mild persistent asthma 2013     Swollen finger      Thyroid disease        PAST SURGICAL HISTORY:  Past Surgical History:   Procedure Laterality Date     BACK SURGERY       COLONOSCOPY WITH CO2 INSUFFLATION N/A 2017    Procedure: COLONOSCOPY WITH CO2 INSUFFLATION;  Surgeon: Duane, William Charles, MD;  Location: [facility] OR     CYSTOSCOPY FLEXIBLE  8/10/2018     LEEP TX, CERVICAL      in her 20's       FAMILY HISTORY:  Family History   Problem Relation Age of Onset     Glaucoma Mother      Heart Disease Father      Cancer Father 67        kidney cancer      Heart Disease Sister      Cancer Sister 59        colon cancer      Diabetes Sister      Colon Cancer Maternal Aunt      Colon Cancer Paternal Aunt      Glaucoma Maternal Grandmother        SOCIAL HISTORY:  Social History     Socioeconomic History     Marital status:    Tobacco Use     Smoking status: Former Smoker     Packs/day: 1.50     Years: 35.00     Pack years: 52.50     Types: Cigarettes     Quit date: 2003     Years since quittin.4     Smokeless tobacco: Never Used   Substance and Sexual Activity     Alcohol use: Yes     Alcohol/week: 0.0 standard drinks     Comment: 3 dinks/ year     Drug use: No     Sexual activity: Not Currently   Other Topics Concern     Parent/sibling w/ CABG, MI or angioplasty before 65F 55M? No       Review of Systems:  Skin:        Eyes:       ENT:       Respiratory:  Negative shortness of breath  Cardiovascular:    dizziness;Positive for  Gastroenterology:      Genitourinary:       Musculoskeletal:       Neurologic:       Psychiatric:       Heme/Lymph/Imm:       Endocrine:         Physical Exam:  Vitals: BP 95/60   Pulse 64   Ht 1.715 m (5' 7.5\")   Wt 106.1 kg (234 lb)   SpO2 97%   BMI 36.11 kg/m      Constitutional:  cooperative, alert and oriented, well developed, well nourished, in no acute distress        Skin:  " warm and dry to the touch          Head:  normocephalic, no masses or lesions        Eyes:  pupils equal and round, conjunctivae and lids unremarkable, sclera white, no xanthalasma, EOMS intact, no nystagmus        Lymph:No Cervical lymphadenopathy present     ENT:  no pallor or cyanosis, dentition good        Neck:  carotid pulses are full and equal bilaterally, JVP normal, no carotid bruit        Respiratory:  normal breath sounds, clear to auscultation, normal A-P diameter, normal symmetry, normal respiratory excursion, no use of accessory muscles         Cardiac: regular rhythm, normal S1/S2, no S3 or S4, apical impulse not displaced, no murmurs, gallops or rubs                pulses full and equal, no bruits auscultated                                        GI:  abdomen soft, non-tender, BS normoactive, no mass, no HSM, no bruits        Extremities and Muscular Skeletal:  no deformities, clubbing, cyanosis, erythema observed              Neurological:  no gross motor deficits        Psych:  Alert and Oriented x 3 Anxious      Recent Lab Results:  LIPID RESULTS:  Lab Results   Component Value Date    CHOL 109 06/16/2022    CHOL 120 05/18/2021    HDL 53 06/16/2022    HDL 59 05/18/2021    LDL 40 06/16/2022    LDL 46 05/18/2021    TRIG 81 06/16/2022    TRIG 73 05/18/2021    CHOLHDLRATIO 3.5 06/22/2015       LIVER ENZYME RESULTS:  Lab Results   Component Value Date    AST 9 08/15/2017    ALT 23 06/16/2022    ALT 19 05/18/2021       CBC RESULTS:  Lab Results   Component Value Date    WBC 15.4 (H) 06/07/2022    WBC 9.6 05/07/2021    RBC 4.92 06/07/2022    RBC 4.27 05/07/2021    HGB 14.3 06/07/2022    HGB 12.5 05/07/2021    HCT 45.3 06/07/2022    HCT 38.7 05/07/2021    MCV 92 06/07/2022    MCV 91 05/07/2021    MCH 29.1 06/07/2022    MCH 29.3 05/07/2021    MCHC 31.6 06/07/2022    MCHC 32.3 05/07/2021    RDW 12.9 06/07/2022    RDW 13.0 05/07/2021     06/07/2022     05/07/2021       BMP RESULTS:  Lab  Results   Component Value Date     06/07/2022     06/04/2021    POTASSIUM 4.5 06/07/2022    POTASSIUM 3.8 06/04/2021    CHLORIDE 102 06/07/2022    CHLORIDE 106 06/04/2021    CO2 28 06/07/2022    CO2 29 06/04/2021    ANIONGAP 5 06/07/2022    ANIONGAP 5 06/04/2021    GLC 88 06/07/2022     (H) 06/04/2021    BUN 35 (H) 06/07/2022    BUN 12 06/04/2021    CR 1.14 (H) 06/07/2022    CR 0.90 06/04/2021    GFRESTIMATED 53 (L) 06/07/2022    GFRESTIMATED 66 06/04/2021    GFRESTBLACK 77 06/04/2021    RORY 9.3 06/07/2022    RORY 8.7 06/04/2021      A1C RESULTS:  No results found for: A1C    INR RESULTS:  No results found for: INR    CC  Referred Self,    Thank you for allowing me to participate in the care of your patient.    Sincerely,     DR PRIYANKA ROBERTSON MD     [facility]

## 2022-06-20 NOTE — PROGRESS NOTES
HPI and Plan:   At the opportunity of seeing this very pleasant 66-year-old lady again for follow-up of her cardiac health    Essentially I think the main problem is anxiety.  She had CT coronary angiography for evaluation of chest discomfort and there was only mild disease and she has normal left ventricular systolic function.  We can be quite certain that her chest pain which incidentally she was no longer has is not due to her heart.    We talked about her cardiac risk factors.  Think based on the latest studies on aspirin and primary prevention she will not need to take an aspirin.  She is on 40 mg of atorvastatin and her LDL was 49 HDL of 53.  Again from my point of view I am not sure there is a need for her to continue taking a statin that I asked her to discuss this with her primary care physician.    Her blood pressure is 95 systolic.  Recently has been low.  She has vertical with nausea and vomiting thought due to in the imbalance.  She has seen ENT specialist who has prescribed her Maxide.  I suspect this is a contributor to her low blood pressure.  I am not sure she is symptomatic from it but I have asked her to stop lisinopril.  She does say that with taking the Maxide her finger swelling and stiffness have improved.    Cardiac examination is normal.    Overall this lady really does not have any major cardiac issues and I am we will discharge her from our clinic.  We will be happy to see her again in future if considered necessary.    I reviewed her cholesterol numbers as she asked me about them.    No orders of the defined types were placed in this encounter.      No orders of the defined types were placed in this encounter.      No diagnosis found.    CURRENT MEDICATIONS:  Current Outpatient Medications   Medication Sig Dispense Refill     albuterol (PROAIR HFA/PROVENTIL HFA/VENTOLIN HFA) 108 (90 Base) MCG/ACT inhaler Inhale 2 puffs into the lungs every 6 hours as needed for shortness of breath /  dyspnea 18 g 1     amLODIPine (NORVASC) 5 MG tablet Take 1 tablet (5 mg) by mouth daily 90 tablet 3     aspirin (ASA) 81 MG chewable tablet Take 81 mg by mouth daily       atorvastatin (LIPITOR) 40 MG tablet Take 1 tablet (40 mg) by mouth At Bedtime 90 tablet 0     Cholecalciferol (D3 ADULT PO) Take 2,000 Units by mouth daily.       cyanocolbalamin (VITAMIN  B-12) 100 MCG tablet Take 100 mcg by mouth daily.       fluticasone (FLONASE) 50 MCG/ACT nasal spray Spray 1 spray into both nostrils 2 times daily 9.9 mL 1     levothyroxine (SYNTHROID/LEVOTHROID) 112 MCG tablet Profile Rx,TAKE 1 TABLET(112 MCG) BY MOUTH DAILY 90 tablet 3     lisinopril (ZESTRIL) 5 MG tablet Take 1 tablet (5 mg) by mouth daily 90 tablet 0     meclizine (ANTIVERT) 25 MG tablet Take 1 tablet (25 mg) by mouth 3 times daily as needed for dizziness 20 tablet 0     montelukast (SINGULAIR) 10 MG tablet TAKE 1 TABLET(10 MG) BY MOUTH AT BEDTIME 90 tablet 3     ondansetron (ZOFRAN ODT) 4 MG ODT tab Take 1 tablet (4 mg) by mouth every 8 hours as needed for nausea 21 tablet 2     triamcinolone (KENALOG) 0.1 % external cream Apply sparingly to affected area three times daily as needed 80 g 0     triamterene-HCTZ (MAXZIDE-25) 37.5-25 MG tablet Take 1 tablet by mouth daily       Krill Oil 500 MG CAPS  (Patient not taking: Reported on 6/20/2022)         ALLERGIES     Allergies   Allergen Reactions     Contrast Dye Hives and Itching     Isovue with ANTON on 1-21-19     Sulfa Drugs Hives and Swelling     Noted in 10/18/09 ER       PAST MEDICAL HISTORY:  Past Medical History:   Diagnosis Date     Coronary artery disease involving native coronary artery of native heart, angina presence unspecified 6/11/2021     Essential hypertension 6/11/2021     Graves disease      Kidney stones 2000    Passed a 5-10mm stone     Malignant neoplasm (H)     Cervical CA     Mild persistent asthma 5/8/2013     Swollen finger      Thyroid disease        PAST SURGICAL HISTORY:  Past  "Surgical History:   Procedure Laterality Date     BACK SURGERY       COLONOSCOPY WITH CO2 INSUFFLATION N/A 2017    Procedure: COLONOSCOPY WITH CO2 INSUFFLATION;  Surgeon: Duane, William Charles, MD;  Location: [facility] OR     CYSTOSCOPY FLEXIBLE  8/10/2018     LEEP TX, CERVICAL      in her 20's       FAMILY HISTORY:  Family History   Problem Relation Age of Onset     Glaucoma Mother      Heart Disease Father      Cancer Father 67        kidney cancer      Heart Disease Sister      Cancer Sister 59        colon cancer      Diabetes Sister      Colon Cancer Maternal Aunt      Colon Cancer Paternal Aunt      Glaucoma Maternal Grandmother        SOCIAL HISTORY:  Social History     Socioeconomic History     Marital status:    Tobacco Use     Smoking status: Former Smoker     Packs/day: 1.50     Years: 35.00     Pack years: 52.50     Types: Cigarettes     Quit date: 2003     Years since quittin.4     Smokeless tobacco: Never Used   Substance and Sexual Activity     Alcohol use: Yes     Alcohol/week: 0.0 standard drinks     Comment: 3 dinks/ year     Drug use: No     Sexual activity: Not Currently   Other Topics Concern     Parent/sibling w/ CABG, MI or angioplasty before 65F 55M? No       Review of Systems:  Skin:        Eyes:       ENT:       Respiratory:  Negative shortness of breath  Cardiovascular:    dizziness;Positive for  Gastroenterology:      Genitourinary:       Musculoskeletal:       Neurologic:       Psychiatric:       Heme/Lymph/Imm:       Endocrine:         Physical Exam:  Vitals: BP 95/60   Pulse 64   Ht 1.715 m (5' 7.5\")   Wt 106.1 kg (234 lb)   SpO2 97%   BMI 36.11 kg/m      Constitutional:  cooperative, alert and oriented, well developed, well nourished, in no acute distress        Skin:  warm and dry to the touch          Head:  normocephalic, no masses or lesions        Eyes:  pupils equal and round, conjunctivae and lids unremarkable, sclera white, no xanthalasma, EOMS intact, no " nystagmus        Lymph:No Cervical lymphadenopathy present     ENT:  no pallor or cyanosis, dentition good        Neck:  carotid pulses are full and equal bilaterally, JVP normal, no carotid bruit        Respiratory:  normal breath sounds, clear to auscultation, normal A-P diameter, normal symmetry, normal respiratory excursion, no use of accessory muscles         Cardiac: regular rhythm, normal S1/S2, no S3 or S4, apical impulse not displaced, no murmurs, gallops or rubs                pulses full and equal, no bruits auscultated                                        GI:  abdomen soft, non-tender, BS normoactive, no mass, no HSM, no bruits        Extremities and Muscular Skeletal:  no deformities, clubbing, cyanosis, erythema observed              Neurological:  no gross motor deficits        Psych:  Alert and Oriented x 3 Anxious      Recent Lab Results:  LIPID RESULTS:  Lab Results   Component Value Date    CHOL 109 06/16/2022    CHOL 120 05/18/2021    HDL 53 06/16/2022    HDL 59 05/18/2021    LDL 40 06/16/2022    LDL 46 05/18/2021    TRIG 81 06/16/2022    TRIG 73 05/18/2021    CHOLHDLRATIO 3.5 06/22/2015       LIVER ENZYME RESULTS:  Lab Results   Component Value Date    AST 9 08/15/2017    ALT 23 06/16/2022    ALT 19 05/18/2021       CBC RESULTS:  Lab Results   Component Value Date    WBC 15.4 (H) 06/07/2022    WBC 9.6 05/07/2021    RBC 4.92 06/07/2022    RBC 4.27 05/07/2021    HGB 14.3 06/07/2022    HGB 12.5 05/07/2021    HCT 45.3 06/07/2022    HCT 38.7 05/07/2021    MCV 92 06/07/2022    MCV 91 05/07/2021    MCH 29.1 06/07/2022    MCH 29.3 05/07/2021    MCHC 31.6 06/07/2022    MCHC 32.3 05/07/2021    RDW 12.9 06/07/2022    RDW 13.0 05/07/2021     06/07/2022     05/07/2021       BMP RESULTS:  Lab Results   Component Value Date     06/07/2022     06/04/2021    POTASSIUM 4.5 06/07/2022    POTASSIUM 3.8 06/04/2021    CHLORIDE 102 06/07/2022    CHLORIDE 106 06/04/2021    CO2 28 06/07/2022     CO2 29 06/04/2021    ANIONGAP 5 06/07/2022    ANIONGAP 5 06/04/2021    GLC 88 06/07/2022     (H) 06/04/2021    BUN 35 (H) 06/07/2022    BUN 12 06/04/2021    CR 1.14 (H) 06/07/2022    CR 0.90 06/04/2021    GFRESTIMATED 53 (L) 06/07/2022    GFRESTIMATED 66 06/04/2021    GFRESTBLACK 77 06/04/2021    RORY 9.3 06/07/2022    RORY 8.7 06/04/2021        A1C RESULTS:  No results found for: A1C    INR RESULTS:  No results found for: INR        CC  Referred Self, MD  No address on file

## 2022-06-21 ENCOUNTER — NURSE TRIAGE (OUTPATIENT)
Dept: FAMILY MEDICINE | Facility: CLINIC | Age: 67
End: 2022-06-21
Payer: COMMERCIAL

## 2022-06-21 NOTE — TELEPHONE ENCOUNTER
Writer called patient, reviewed provider message below. Patient expressed verbal understanding and teach back of plan.       Writer scheduled OV:  6/29/2022 9:00 AM (Arrive by 8:40 AM) Neli Lara DO [facility]     While on writer patient checked BP: 106/86 HR 75       Roberto Gaona RN  [facility]

## 2022-06-21 NOTE — TELEPHONE ENCOUNTER
"Patient called to report low BP readings.  Was at cardiologist yesterday and was told to stop lisinopril and baby asprin.  States she does feel a little dizzy but is also dealing with on going  Vertigo.      Was prescribed triamterene-HCTZ (MAXZIDE-25) 37.5-25 MG tablet by ENT.  Patient is asking if she should hold off on the medication?    Has stopped taking lisinopril and Baby ASA yesterday per recommendation by cardiologist.      BP on 2/20 was 85/56 around 9 PM.                   82/53 around 10PM                  109/65 at 1230AM    BP today 108/58 at 6:30AM  86/54  P77 at 11:45AM    Protocol states got to office now.  No appointments available, Please advise      Reason for Disposition    Systolic BP < 90 and NOT dizzy, lightheaded or weak    Answer Assessment - Initial Assessment Questions  1. BLOOD PRESSURE: \"What is the blood pressure?\" \"Did you take at least two measurements 5 minutes apart?\"      108/68 at 6:30AM  Next one 86/54 11:40AM  2. ONSET: \"When did you take your blood pressure?\"      Took it a few times yesterday and again this morning  3. HOW: \"How did you obtain the blood pressure?\" (e.g., visiting nurse, automatic home BP monitor)      Automatic home BP monitor  4. HISTORY: \"Do you have a history of low blood pressure?\" \"What is your blood pressure normally?\"      115/70's  5. MEDICATIONS: \"Are you taking any medications for blood pressure?\" If yes: \"Have they been changed recently?\"      Lisinopril and baby ASA  6. PULSE RATE: \"Do you know what your pulse rate is?\"       77  7. OTHER SYMPTOMS: \"Have you been sick recently?\" \"Have you had a recent injury?\"      Infected tooth and dentist prescribed antibiotics. No injury  8. PREGNANCY: \"Is there any chance you are pregnant?\" \"When was your last menstrual period?\"      no    Protocols used: LOW BLOOD PRESSURE-A-OH    Miguelina Glasgow RN    "

## 2022-06-21 NOTE — TELEPHONE ENCOUNTER
I also recommend she change the way she takes maxzide. Currently taking a full tablet daily. I recommend she change to 1/2 tab daily.  She should schedule in office bp follow-up with me in 1-2 weeks (ok to use same day or hospital slot).     If syncope/chest pain/severe headache/focal weakness/sob, should go to ER in the meantime    Thank you

## 2022-06-23 ENCOUNTER — TELEPHONE (OUTPATIENT)
Dept: FAMILY MEDICINE | Facility: CLINIC | Age: 67
End: 2022-06-23

## 2022-06-23 ENCOUNTER — TRANSFERRED RECORDS (OUTPATIENT)
Dept: FAMILY MEDICINE | Facility: CLINIC | Age: 67
End: 2022-06-23

## 2022-06-23 NOTE — TELEPHONE ENCOUNTER
Please advise    Patient requesting PCP to review ENT recommendations, asking what patient should do regarding Amlodipine if Maxzide dosage is doubled.    Shoaib Schneider at [facility] in [place] recommends patient to double Maxzide rather than cut dosage in half.   Per patient, ENT provider is 95% positive patient has Meniere's Disease therefore needs to take 2 tabs Maxzide for treatment.      [facility]  [address], [address]   Phone: [phone]        Patient asking what dosage she should take today for Maxzide. RN advised patient to take medications as ordered by Dr. Lara until provider responds. Patient verbalized agreement.    Can we leave a detailed message on this number? YES  Phone number patient can be reached at: Home number on file [phone] (home)    Magui Danielle RN  [facility] Triage

## 2022-06-23 NOTE — TELEPHONE ENCOUNTER
Patient recently called about hypotension concerns  Her ACEi was stopped  Now she is reporting ENT wants to increase maxzide    RN staff: please call patient and let her know to hold amlodipine since ENT is increasing maxzide. Monitor BP and set up follow-up with me in 1-2 weeks if not already scheduled (in office)

## 2022-06-24 NOTE — TELEPHONE ENCOUNTER
"Writer called patient to review Dr. Lara's recommendation below:    \"RN staff: please call patient and let her know to hold amlodipine since ENT is increasing maxzide. Monitor BP and set up follow-up with me in 1-2 weeks if not already scheduled (in office)\"    Patient expresses verbal understanding of above and has follow up appointment scheduled for next week:    6/29/2022 9:00 AM (Arrive by 8:40 AM) Neli Lara DO [facility] Shy Polk RN  [facility]            "

## 2022-06-27 NOTE — PROGRESS NOTES
"  Assessment & Plan     Essential hypertension  Vertigo  Improving with diuretics per ENT  Off ACEi and amlodipine  Check lytes/renal function for stability  Encourage hydrdation  - Basic metabolic panel  (Ca, Cl, CO2, Creat, Gluc, K, Na, BUN)    Colon cancer screening  due  - Adult GI  Referral - Procedure Only    Encounter for screening mammogram for breast cancer  due  - MA Screening Bilateral w/ Nico    No follow-ups on file.    Nlei Lara DO  [facility] ROSANNA Barger is a 66 year old, presenting for the following health issues:  Hypertension      History of Present Illness       Reason for visit:  Medication follow up. Changes in meds    She eats 2-3 servings of fruits and vegetables daily.She consumes 1 sweetened beverage(s) daily.She exercises with enough effort to increase her heart rate 10 to 19 minutes per day.  She exercises with enough effort to increase her heart rate 4 days per week.   She is taking medications regularly.     Dizziness has improved. She is taking Dyazide 1 capsule BID. States noticeable improvement. Hand swelling, arthralgias has improved as well. Hydrating. BP at home wnl.  Off acei and amlodipine    Review of Systems   Constitutional, HEENT, cardiovascular, pulmonary, gi and gu systems are negative, except as otherwise noted.      Objective    /65 (BP Location: Left arm, Patient Position: Chair, Cuff Size: Adult Large)   Pulse 67   Temp 97  F (36.1  C) (Tympanic)   Resp 16   Ht 1.715 m (5' 7.5\")   Wt 104.8 kg (231 lb)   SpO2 100%   Breastfeeding No   BMI 35.65 kg/m    Body mass index is 35.65 kg/m .  Physical Exam     GENERAL APPEARANCE: AAOx3, no distress. Well developed.    PSYCH: appropriate mood and affect.                   .  ..  "

## 2022-06-29 ENCOUNTER — OFFICE VISIT (OUTPATIENT)
Dept: FAMILY MEDICINE | Facility: CLINIC | Age: 67
End: 2022-06-29
Payer: COMMERCIAL

## 2022-06-29 VITALS
WEIGHT: 231 LBS | DIASTOLIC BLOOD PRESSURE: 65 MMHG | HEIGHT: 68 IN | BODY MASS INDEX: 35.01 KG/M2 | TEMPERATURE: 97 F | OXYGEN SATURATION: 100 % | RESPIRATION RATE: 16 BRPM | SYSTOLIC BLOOD PRESSURE: 117 MMHG | HEART RATE: 67 BPM

## 2022-06-29 DIAGNOSIS — I10 ESSENTIAL HYPERTENSION: Primary | ICD-10-CM

## 2022-06-29 DIAGNOSIS — Z12.31 ENCOUNTER FOR SCREENING MAMMOGRAM FOR BREAST CANCER: ICD-10-CM

## 2022-06-29 DIAGNOSIS — Z12.11 COLON CANCER SCREENING: ICD-10-CM

## 2022-06-29 DIAGNOSIS — R42 VERTIGO: ICD-10-CM

## 2022-06-29 DIAGNOSIS — R31.29 MICROSCOPIC HEMATURIA: ICD-10-CM

## 2022-06-29 LAB
ANION GAP SERPL CALCULATED.3IONS-SCNC: 7 MMOL/L (ref 3–14)
BUN SERPL-MCNC: 18 MG/DL (ref 7–30)
C3 SERPL-MCNC: 127 MG/DL (ref 81–157)
C4 SERPL-MCNC: 41 MG/DL (ref 13–39)
CALCIUM SERPL-MCNC: 9.9 MG/DL (ref 8.5–10.1)
CHLORIDE BLD-SCNC: 101 MMOL/L (ref 94–109)
CO2 SERPL-SCNC: 29 MMOL/L (ref 20–32)
CREAT SERPL-MCNC: 1.03 MG/DL (ref 0.52–1.04)
GFR SERPL CREATININE-BSD FRML MDRD: 59 ML/MIN/1.73M2
GLUCOSE BLD-MCNC: 108 MG/DL (ref 70–99)
HBV SURFACE AB SERPL IA-ACNC: 1.38 M[IU]/ML
HBV SURFACE AG SERPL QL IA: NONREACTIVE
HCV AB SERPL QL IA: NONREACTIVE
POTASSIUM BLD-SCNC: 3.6 MMOL/L (ref 3.4–5.3)
SODIUM SERPL-SCNC: 137 MMOL/L (ref 133–144)
TOTAL PROTEIN SERUM FOR ELP: 6.6 G/DL (ref 6.4–8.3)

## 2022-06-29 PROCEDURE — 86256 FLUORESCENT ANTIBODY TITER: CPT | Performed by: INTERNAL MEDICINE

## 2022-06-29 PROCEDURE — 80048 BASIC METABOLIC PNL TOTAL CA: CPT | Performed by: INTERNAL MEDICINE

## 2022-06-29 PROCEDURE — 84155 ASSAY OF PROTEIN SERUM: CPT | Performed by: INTERNAL MEDICINE

## 2022-06-29 PROCEDURE — 86334 IMMUNOFIX E-PHORESIS SERUM: CPT

## 2022-06-29 PROCEDURE — 86225 DNA ANTIBODY NATIVE: CPT | Performed by: INTERNAL MEDICINE

## 2022-06-29 PROCEDURE — 86160 COMPLEMENT ANTIGEN: CPT | Mod: 59 | Performed by: INTERNAL MEDICINE

## 2022-06-29 PROCEDURE — 84165 PROTEIN E-PHORESIS SERUM: CPT

## 2022-06-29 PROCEDURE — 86706 HEP B SURFACE ANTIBODY: CPT | Performed by: INTERNAL MEDICINE

## 2022-06-29 PROCEDURE — 86803 HEPATITIS C AB TEST: CPT | Performed by: INTERNAL MEDICINE

## 2022-06-29 PROCEDURE — 87340 HEPATITIS B SURFACE AG IA: CPT | Performed by: INTERNAL MEDICINE

## 2022-06-29 PROCEDURE — 99213 OFFICE O/P EST LOW 20 MIN: CPT | Performed by: INTERNAL MEDICINE

## 2022-06-29 PROCEDURE — 36415 COLL VENOUS BLD VENIPUNCTURE: CPT | Performed by: INTERNAL MEDICINE

## 2022-06-29 PROCEDURE — 86036 ANCA SCREEN EACH ANTIBODY: CPT | Performed by: INTERNAL MEDICINE

## 2022-06-29 RX ORDER — TRIAMTERENE AND HYDROCHLOROTHIAZIDE 37.5; 25 MG/1; MG/1
1 CAPSULE ORAL 2 TIMES DAILY
COMMUNITY
Start: 2022-06-23 | End: 2023-10-21

## 2022-06-30 ENCOUNTER — OFFICE VISIT (OUTPATIENT)
Dept: ALLERGY | Facility: CLINIC | Age: 67
End: 2022-06-30
Payer: COMMERCIAL

## 2022-06-30 VITALS
DIASTOLIC BLOOD PRESSURE: 75 MMHG | SYSTOLIC BLOOD PRESSURE: 110 MMHG | WEIGHT: 230 LBS | BODY MASS INDEX: 35.49 KG/M2 | HEART RATE: 74 BPM | OXYGEN SATURATION: 96 %

## 2022-06-30 DIAGNOSIS — J30.89 SEASONAL ALLERGIC RHINITIS DUE TO OTHER ALLERGIC TRIGGER: ICD-10-CM

## 2022-06-30 DIAGNOSIS — J30.1 SEASONAL ALLERGIC RHINITIS DUE TO POLLEN: Primary | ICD-10-CM

## 2022-06-30 DIAGNOSIS — R42 VERTIGO: ICD-10-CM

## 2022-06-30 LAB
ALBUMIN SERPL ELPH-MCNC: 4.1 G/DL (ref 3.7–5.1)
ALPHA1 GLOB SERPL ELPH-MCNC: 0.3 G/DL (ref 0.2–0.4)
ALPHA2 GLOB SERPL ELPH-MCNC: 0.7 G/DL (ref 0.5–0.9)
ANCA AB PATTERN SER IF-IMP: NORMAL
B-GLOBULIN SERPL ELPH-MCNC: 0.8 G/DL (ref 0.6–1)
C-ANCA TITR SER IF: NORMAL {TITER}
DSDNA AB SER-ACNC: 2.2 IU/ML
GAMMA GLOB SERPL ELPH-MCNC: 0.7 G/DL (ref 0.7–1.6)
M PROTEIN SERPL ELPH-MCNC: 0 G/DL
PROT PATTERN SERPL ELPH-IMP: NORMAL
PROT PATTERN SERPL IFE-IMP: NORMAL

## 2022-06-30 PROCEDURE — 99203 OFFICE O/P NEW LOW 30 MIN: CPT | Mod: 25 | Performed by: INTERNAL MEDICINE

## 2022-06-30 PROCEDURE — 95004 PERQ TESTS W/ALRGNC XTRCS: CPT | Performed by: INTERNAL MEDICINE

## 2022-06-30 ASSESSMENT — ENCOUNTER SYMPTOMS
FACIAL SWELLING: 0
CHEST TIGHTNESS: 0
CHILLS: 0
JOINT SWELLING: 0
FEVER: 0
VOMITING: 0
FATIGUE: 1
COUGH: 0
WHEEZING: 0
SINUS PRESSURE: 0
NAUSEA: 0
RHINORRHEA: 0
ACTIVITY CHANGE: 0
ARTHRALGIAS: 0
EYE REDNESS: 0
EYE ITCHING: 0
ADENOPATHY: 0
DIARRHEA: 0
EYE DISCHARGE: 0
SHORTNESS OF BREATH: 0
MYALGIAS: 0
HEADACHES: 0

## 2022-06-30 ASSESSMENT — ASTHMA QUESTIONNAIRES: ACT_TOTALSCORE: 23

## 2022-06-30 NOTE — PROGRESS NOTES
"Denita Flores was seen in the Allergy Clinic at [facility].    Denita Flores is a 66 year old White female being seen today at the request of Neli Lara DO / [facility] in consultation for allergies.    For the last 2 years she has had increased allergy symptoms.  She was seen by an allergist approximately 15 or more years ago and was placed on allergy immunotherapy for at least 4 years.  She is guessing is from 2006 to 2009.  She found allergy shots to be quite helpful at that time.  She was not having vertigo at that time.  She was also started on montelukast at that time and found that to be quite helpful.    Over the last 1 to 2 years she has had problems with vertigo and has seen an otolaryngologist and was diagnosed with Ménière's disease.  Her dose of triamterene/hydrochlorothiazide was increased from 1 pill to 2 pills on June 25.  Her other hypertensive medications were stopped due to hypotension.  She has had some decreased hearing also identified.  She describes numerous vertigo episodes that have been quite significant with vomiting on May 25, May 29, June 1, June 11, June 14.  She does feel that the increase dose of the triamterene/hydrochlorothiazide has been helpful which was increased on June 25.    She is wondering if allergies could be contributing to any of these symptoms.  She would like to have further allergy evaluation.  She also has a component of rhinorrhea as well as headache in addition to the vertigo sensation.  Allegra caused her to feel \"to dry\".       Past Medical History:   Diagnosis Date     Coronary artery disease involving native coronary artery of native heart, angina presence unspecified 6/11/2021     Essential hypertension 6/11/2021     Graves disease      Kidney stones 2000    Passed a 5-10mm stone     Malignant neoplasm (H)     Cervical CA     Mild persistent asthma 5/8/2013     Swollen finger      Thyroid disease      Family " History   Problem Relation Age of Onset     Glaucoma Mother      Heart Disease Father      Cancer Father 67        kidney cancer      Heart Disease Sister      Cancer Sister 59        colon cancer      Diabetes Sister      Glaucoma Maternal Grandmother      Colon Cancer Maternal Aunt      Colon Cancer Paternal Aunt      Past Surgical History:   Procedure Laterality Date     BACK SURGERY       COLONOSCOPY WITH CO2 INSUFFLATION N/A 2/21/2017    Procedure: COLONOSCOPY WITH CO2 INSUFFLATION;  Surgeon: Duane, William Charles, MD;  Location: [facility] OR     CYSTOSCOPY FLEXIBLE  8/10/2018     LEEP TX, CERVICAL      in her 20's       ENVIRONMENTAL HISTORY:   Pets inside the house include None.  Do you smoke cigarettes or other recreational drugs? No There is/are 0 smokers living in the house. The house does have a damp basement.     SOCIAL HISTORY:   Denita is employed as . She lives with her boyfriend.  Her boyfriend works as a/an [employer].    Review of Systems   Constitutional: Positive for fatigue. Negative for activity change, chills and fever.   HENT: Positive for ear pain and nosebleeds. Negative for congestion, dental problem, facial swelling, postnasal drip, rhinorrhea, sinus pressure and sneezing.    Eyes: Negative for discharge, redness and itching.   Respiratory: Negative for cough, chest tightness, shortness of breath and wheezing.    Cardiovascular: Negative for chest pain.   Gastrointestinal: Negative for diarrhea, nausea and vomiting.   Musculoskeletal: Negative for arthralgias, joint swelling and myalgias.   Skin: Positive for rash.        Positive for Eczema and Skin itching   Neurological: Negative for headaches.   Hematological: Negative for adenopathy.   Psychiatric/Behavioral: Negative for behavioral problems and self-injury.         Current Outpatient Medications:      albuterol (PROAIR HFA/PROVENTIL HFA/VENTOLIN HFA) 108 (90 Base) MCG/ACT inhaler, Inhale 2 puffs into the lungs  every 6 hours as needed for shortness of breath / dyspnea, Disp: 18 g, Rfl: 1     atorvastatin (LIPITOR) 40 MG tablet, Take 1 tablet (40 mg) by mouth At Bedtime, Disp: 90 tablet, Rfl: 0     Cholecalciferol (D3 ADULT PO), Take 2,000 Units by mouth daily., Disp: , Rfl:      cyanocolbalamin (VITAMIN  B-12) 100 MCG tablet, Take 100 mcg by mouth daily., Disp: , Rfl:      levothyroxine (SYNTHROID/LEVOTHROID) 112 MCG tablet, Profile Rx,TAKE 1 TABLET(112 MCG) BY MOUTH DAILY, Disp: 90 tablet, Rfl: 3     montelukast (SINGULAIR) 10 MG tablet, TAKE 1 TABLET(10 MG) BY MOUTH AT BEDTIME, Disp: 90 tablet, Rfl: 3     ondansetron (ZOFRAN ODT) 4 MG ODT tab, Take 1 tablet (4 mg) by mouth every 8 hours as needed for nausea, Disp: 21 tablet, Rfl: 2     triamterene-HCTZ (DYAZIDE) 37.5-25 MG capsule, Take 1 capsule by mouth 2 times daily, Disp: , Rfl:      fluticasone (FLONASE) 50 MCG/ACT nasal spray, Spray 1 spray into both nostrils 2 times daily (Patient not taking: No sig reported), Disp: 9.9 mL, Rfl: 1     meclizine (ANTIVERT) 25 MG tablet, Take 1 tablet (25 mg) by mouth 3 times daily as needed for dizziness, Disp: 20 tablet, Rfl: 0     triamcinolone (KENALOG) 0.1 % external cream, Apply sparingly to affected area three times daily as needed (Patient not taking: Reported on 6/30/2022), Disp: 80 g, Rfl: 0  Allergies   Allergen Reactions     Contrast Dye Hives and Itching     Isovue with ANTON on 1-21-19     Sulfa Drugs Hives and Swelling     Noted in 10/18/09 ER         EXAM:   /75   Pulse 74   Wt 104.3 kg (230 lb)   SpO2 96%   BMI 35.49 kg/m      Physical Exam  Constitutional:       General: She is not in acute distress.     Appearance: Normal appearance. She is not ill-appearing.   HENT:      Head: Normocephalic and atraumatic.      Nose: There is minimal turbinate hypertrophy bilaterally with minor mucosal edema and erythema     Mouth/Throat:      Mouth: Mucous membranes are moist.      Pharynx: Oropharynx is clear. No  posterior oropharyngeal erythema.   Eyes:      General:         Right eye: No discharge.         Left eye: No discharge.   Cardiovascular:      Rate and Rhythm: Normal rate and regular rhythm.      Heart sounds: Normal heart sounds.   Pulmonary:      Effort: Pulmonary effort is normal.      Breath sounds: Normal breath sounds. No wheezing or rhonchi.   Skin:     General: Skin is warm.      Findings: No erythema or rash.   Neurological:      General: No focal deficit present.      Mental Status: She is alert. Mental status is at baseline.   Psychiatric:         Mood and Affect: Mood normal.         Behavior: Behavior normal.     WORKUP: Skin testing revealed borderline positive results to a few different allergens including trees and weeds.    ENVIRONMENTAL PERCUTANEOUS SKIN TESTING: ADULT  [place] [facility] 6/30/2022   Consent N   Ordering Physician Dr. Simmons   Interpreting Physician Dr. Simmons   Testing Technician Azalia CANAS RN   Location Back   Time start: 11:40 AM   Time End: 11:55 AM   Positive Control: Histatrol*ALK 1 mg/ml 5/10   Negative Control: 50% Glycerin 0   Cat Hair*ALK (10,000 BAU/ml) 0   AP Dog Hair/Dander (1:100 w/v) 0   Dust Mite p. 30,000 AU/ml 0   Dust Mite f. (30,000 AU/ml) 0   Geronimo (W/F in millimeters) 0   Meet Grass (100,000 BAU/mL) 0   Red Cedar (W/F in millimeters) 0   Maple/[place] (W/F in millimeters) 0   Hackberry (W/F in millimeters) 0   [place] (W/F in millimeters) 0   Moody *ALK (W/F in millimeters) 0   American Elm (W/F in millimeters) 0   [place] (W/F in millimeters) 0   Black [place] (W/F in millimeters) 0   Birch Mix (W/F in millimeters) 3/5   [place] (W/F in millimeters) 3/8   [place] (W/F in millimeters) 0   Cocklebur (W/F in millimeters) 0   [place] (W/F in millimeters) 0   White Jorge (W/F in millimeters) 0   Careless (W/F in millimeters) 0   Nettle (W/F in millimeters) 0   English Plantain (W/F in millimeters) 0   Kochia (W/F in millimeters) 0   Lamb's Quarter (W/F in  millimeters) 0   Marshelder (W/F in millimeters) 0   Ragweed Mix* ALK (W/F in millimeters) 3/7   Russian Thistle (W/F in millimeters) 0   Sagebrush/Mugwort (W/F in millimeters) 0   Sheep Sorrel (W/F in millimeters) 0   Feather Mix* ALK (W/F in millimeters) 0   Penicillium Mix (1:10 w/v) 0   Curvularia spicifera (1:10 w/v) 0   Epicoccum (1:10 w/v) 0   Aspergillus fumigatus (1:10 w/v): 0   Alternaria tenius (1:10 w/v) 0   H. Cladosporium (1:10 w/v) 0   Phoma herbarum (1:10 w/v) 0        ASSESSMENT/PLAN:  Denita Flores is a 66 year old female seen today for allergy evaluation.  One of her primary concerns is the repeated episodes of vertigo.  She does seem to be responding to triamterene/hydrochlorothiazide which was initiated by otolaryngology after diagnosis of Ménière's disease    Although skin testing was positive, the results were minimal in size and will obtain blood tests to further evaluate.  I would like to see how she does on the increased dose of the triamterene/hydrochlorothiazide before determining if allergy shots would be indicated.  Her primary complaint is the vertigo.  Thus allergy shots likely will not be indicated especially based on the recent skin testing.      If blood testing does reveal additional allergens we will further discuss at her follow-up appointment.  She will continue with the Flonase and montelukast at this time.  She will remain off antihistamines which did seem to cause side effects.    Follow-up in 1 month.      Thank you for allowing me to participate in the care of Denita Flores.      A total of 35 minutes was spent on the day of the encounter performing chart review, history and exam, documentation, and counseling and coordination of care as noted above.       Marcus Simmons MD  Allergy/Immunology  [facility]

## 2022-06-30 NOTE — LETTER
"    6/30/2022         RE: Denita Flores  [address] [facility] [address]        Dear Colleague,    Thank you for referring your patient, Denita Flores, to the [facility]. Please see a copy of my visit note below.    Denita Flores was seen in the Allergy Clinic at [facility].    Denita Flores is a 66 year old White female being seen today at the request of Neli Lara DO / [facility] in consultation for allergies.    For the last 2 years she has had increased allergy symptoms.  She was seen by an allergist approximately 15 or more years ago and was placed on allergy immunotherapy for at least 4 years.  She is guessing is from 2006 to 2009.  She found allergy shots to be quite helpful at that time.  She was not having vertigo at that time.  She was also started on montelukast at that time and found that to be quite helpful.    Over the last 1 to 2 years she has had problems with vertigo and has seen an otolaryngologist and was diagnosed with Ménière's disease.  Her dose of triamterene/hydrochlorothiazide was increased from 1 pill to 2 pills on June 25.  Her other hypertensive medications were stopped due to hypotension.  She has had some decreased hearing also identified.  She describes numerous vertigo episodes that have been quite significant with vomiting on May 25, May 29, June 1, June 11, June 14.  She does feel that the increase dose of the triamterene/hydrochlorothiazide has been helpful which was increased on June 25.    She is wondering if allergies could be contributing to any of these symptoms.  She would like to have further allergy evaluation.  She also has a component of rhinorrhea as well as headache in addition to the vertigo sensation.  Allegra caused her to feel \"to dry\".       Past Medical History:   Diagnosis Date     Coronary artery disease involving native coronary artery of native heart, angina presence " unspecified 6/11/2021     Essential hypertension 6/11/2021     Graves disease      Kidney stones 2000    Passed a 5-10mm stone     Malignant neoplasm (H)     Cervical CA     Mild persistent asthma 5/8/2013     Swollen finger      Thyroid disease      Family History   Problem Relation Age of Onset     Glaucoma Mother      Heart Disease Father      Cancer Father 67        kidney cancer      Heart Disease Sister      Cancer Sister 59        colon cancer      Diabetes Sister      Glaucoma Maternal Grandmother      Colon Cancer Maternal Aunt      Colon Cancer Paternal Aunt      Past Surgical History:   Procedure Laterality Date     BACK SURGERY       COLONOSCOPY WITH CO2 INSUFFLATION N/A 2/21/2017    Procedure: COLONOSCOPY WITH CO2 INSUFFLATION;  Surgeon: Duane, William Charles, MD;  Location: [facility] OR     CYSTOSCOPY FLEXIBLE  8/10/2018     LEEP TX, CERVICAL      in her 20's       ENVIRONMENTAL HISTORY:   Pets inside the house include None.  Do you smoke cigarettes or other recreational drugs? No There is/are 0 smokers living in the house. The house does have a damp basement.     SOCIAL HISTORY:   Denita is employed as . She lives with her boyfriend.  Her boyfriend works as a/an [employer].    Review of Systems   Constitutional: Positive for fatigue. Negative for activity change, chills and fever.   HENT: Positive for ear pain and nosebleeds. Negative for congestion, dental problem, facial swelling, postnasal drip, rhinorrhea, sinus pressure and sneezing.    Eyes: Negative for discharge, redness and itching.   Respiratory: Negative for cough, chest tightness, shortness of breath and wheezing.    Cardiovascular: Negative for chest pain.   Gastrointestinal: Negative for diarrhea, nausea and vomiting.   Musculoskeletal: Negative for arthralgias, joint swelling and myalgias.   Skin: Positive for rash.        Positive for Eczema and Skin itching   Neurological: Negative for headaches.   Hematological:  Negative for adenopathy.   Psychiatric/Behavioral: Negative for behavioral problems and self-injury.         Current Outpatient Medications:      albuterol (PROAIR HFA/PROVENTIL HFA/VENTOLIN HFA) 108 (90 Base) MCG/ACT inhaler, Inhale 2 puffs into the lungs every 6 hours as needed for shortness of breath / dyspnea, Disp: 18 g, Rfl: 1     atorvastatin (LIPITOR) 40 MG tablet, Take 1 tablet (40 mg) by mouth At Bedtime, Disp: 90 tablet, Rfl: 0     Cholecalciferol (D3 ADULT PO), Take 2,000 Units by mouth daily., Disp: , Rfl:      cyanocolbalamin (VITAMIN  B-12) 100 MCG tablet, Take 100 mcg by mouth daily., Disp: , Rfl:      levothyroxine (SYNTHROID/LEVOTHROID) 112 MCG tablet, Profile Rx,TAKE 1 TABLET(112 MCG) BY MOUTH DAILY, Disp: 90 tablet, Rfl: 3     montelukast (SINGULAIR) 10 MG tablet, TAKE 1 TABLET(10 MG) BY MOUTH AT BEDTIME, Disp: 90 tablet, Rfl: 3     ondansetron (ZOFRAN ODT) 4 MG ODT tab, Take 1 tablet (4 mg) by mouth every 8 hours as needed for nausea, Disp: 21 tablet, Rfl: 2     triamterene-HCTZ (DYAZIDE) 37.5-25 MG capsule, Take 1 capsule by mouth 2 times daily, Disp: , Rfl:      fluticasone (FLONASE) 50 MCG/ACT nasal spray, Spray 1 spray into both nostrils 2 times daily (Patient not taking: No sig reported), Disp: 9.9 mL, Rfl: 1     meclizine (ANTIVERT) 25 MG tablet, Take 1 tablet (25 mg) by mouth 3 times daily as needed for dizziness, Disp: 20 tablet, Rfl: 0     triamcinolone (KENALOG) 0.1 % external cream, Apply sparingly to affected area three times daily as needed (Patient not taking: Reported on 6/30/2022), Disp: 80 g, Rfl: 0  Allergies   Allergen Reactions     Contrast Dye Hives and Itching     Isovue with ANTON on 1-21-19     Sulfa Drugs Hives and Swelling     Noted in 10/18/09 ER         EXAM:   /75   Pulse 74   Wt 104.3 kg (230 lb)   SpO2 96%   BMI 35.49 kg/m      Physical Exam  Constitutional:       General: She is not in acute distress.     Appearance: Normal appearance. She is not  ill-appearing.   HENT:      Head: Normocephalic and atraumatic.      Nose: There is minimal turbinate hypertrophy bilaterally with minor mucosal edema and erythema     Mouth/Throat:      Mouth: Mucous membranes are moist.      Pharynx: Oropharynx is clear. No posterior oropharyngeal erythema.   Eyes:      General:         Right eye: No discharge.         Left eye: No discharge.   Cardiovascular:      Rate and Rhythm: Normal rate and regular rhythm.      Heart sounds: Normal heart sounds.   Pulmonary:      Effort: Pulmonary effort is normal.      Breath sounds: Normal breath sounds. No wheezing or rhonchi.   Skin:     General: Skin is warm.      Findings: No erythema or rash.   Neurological:      General: No focal deficit present.      Mental Status: She is alert. Mental status is at baseline.   Psychiatric:         Mood and Affect: Mood normal.         Behavior: Behavior normal.     WORKUP: Skin testing revealed borderline positive results to a few different allergens including trees and weeds.    ENVIRONMENTAL PERCUTANEOUS SKIN TESTING: ADULT  [place] [facility] 6/30/2022   Consent N   Ordering Physician Dr. Simmons   Interpreting Physician Dr. Simmons   Testing Technician Azalia CANAS RN   Location Back   Time start: 11:40 AM   Time End: 11:55 AM   Positive Control: Histatrol*ALK 1 mg/ml 5/10   Negative Control: 50% Glycerin 0   Cat Hair*ALK (10,000 BAU/ml) 0   AP Dog Hair/Dander (1:100 w/v) 0   Dust Mite p. 30,000 AU/ml 0   Dust Mite f. (30,000 AU/ml) 0   [place]imo (W/F in millimeters) 0   Meet Grass (100,000 BAU/mL) 0   Red Cedar (W/F in millimeters) 0   Maple/[place] (W/F in millimeters) 0   Hackberry (W/F in millimeters) 0   [place] (W/F in millimeters) 0   [place] *ALK (W/F in millimeters) 0   American Elm (W/F in millimeters) 0   [place] (W/F in millimeters) 0   Black [place] (W/F in millimeters) 0   Birch Mix (W/F in millimeters) 3/5   [place] (W/F in millimeters) 3/8   [place] (W/F in millimeters) 0    [place]le[place] (W/F in millimeters) 0   [place] (W/F in millimeters) 0   White Jorge (W/F in millimeters) 0   Careless (W/F in millimeters) 0   Nettle (W/F in millimeters) 0   English Plantain (W/F in millimeters) 0   Kochia (W/F in millimeters) 0   Lamb's Quarter (W/F in millimeters) 0   Marshelder (W/F in millimeters) 0   Ragweed Mix* ALK (W/F in millimeters) 3/7   Russian Thistle (W/F in millimeters) 0   Sagebrush/Mugwort (W/F in millimeters) 0   Sheep Sorrel (W/F in millimeters) 0   Feather Mix* ALK (W/F in millimeters) 0   Penicillium Mix (1:10 w/v) 0   Curvularia spicifera (1:10 w/v) 0   Epicoccum (1:10 w/v) 0   Aspergillus fumigatus (1:10 w/v): 0   Alternaria tenius (1:10 w/v) 0   H. Cladosporium (1:10 w/v) 0   Phoma herbarum (1:10 w/v) 0        ASSESSMENT/PLAN:  Denita Flores is a 66 year old female seen today for allergy evaluation.  One of her primary concerns is the repeated episodes of vertigo.  She does seem to be responding to triamterene/hydrochlorothiazide which was initiated by otolaryngology after diagnosis of Ménière's disease    Although skin testing was positive, the results were minimal in size and will obtain blood tests to further evaluate.  I would like to see how she does on the increased dose of the triamterene/hydrochlorothiazide before determining if allergy shots would be indicated.  Her primary complaint is the vertigo.  Thus allergy shots likely will not be indicated especially based on the recent skin testing.      If blood testing does reveal additional allergens we will further discuss at her follow-up appointment.  She will continue with the Flonase and montelukast at this time.  She will remain off antihistamines which did seem to cause side effects.    Follow-up in 1 month.      Thank you for allowing me to participate in the care of Denita Flores.      A total of 35 minutes was spent on the day of the encounter performing chart review, history and exam, documentation, and  counseling and coordination of care as noted above.       Marcus Simmons MD  Allergy/Immunology  [facility]      Per provider verbal order, placed Adult Environmental Panel scratch test.    Once panels were placed, patient was monitored for 15 minutes in clinic.  Provider read test after 15 minutes.  Pt tolerated procedure well.  All questions and concerns were addressed at office visit.       PEYTON Adames, RN            Again, thank you for allowing me to participate in the care of your patient.        Sincerely,        Marcus Simmons MD

## 2022-06-30 NOTE — PROGRESS NOTES
Per provider verbal order, placed Adult Environmental Panel scratch test.    Once panels were placed, patient was monitored for 15 minutes in clinic.  Provider read test after 15 minutes.  Pt tolerated procedure well.  All questions and concerns were addressed at office visit.       NOEMI AdamesN, RN

## 2022-06-30 NOTE — PATIENT INSTRUCTIONS
Skin testing today was borderline positive to trees and weeds.  Due to the small results we will also order blood test to further evaluate.  Depending on those results may consider allergy immunotherapy again in the future.    At this time since the majority of the symptoms are vertigo related would recommend continuation of the triamterene which was recommended by ENT.    Continue with the Flonase.  Hold off on antihistamines which caused side effects.  Continue montelukast

## 2022-07-07 ENCOUNTER — LAB (OUTPATIENT)
Dept: LAB | Facility: CLINIC | Age: 67
End: 2022-07-07
Payer: COMMERCIAL

## 2022-07-07 DIAGNOSIS — J30.1 SEASONAL ALLERGIC RHINITIS DUE TO POLLEN: ICD-10-CM

## 2022-07-07 DIAGNOSIS — J30.89 SEASONAL ALLERGIC RHINITIS DUE TO OTHER ALLERGIC TRIGGER: ICD-10-CM

## 2022-07-07 PROCEDURE — 86003 ALLG SPEC IGE CRUDE XTRC EA: CPT | Mod: 59

## 2022-07-07 PROCEDURE — 36415 COLL VENOUS BLD VENIPUNCTURE: CPT

## 2022-07-07 PROCEDURE — 86003 ALLG SPEC IGE CRUDE XTRC EA: CPT

## 2022-07-11 LAB
A ALTERNATA IGE QN: 0.29 KU(A)/L
A FUMIGATUS IGE QN: <0.1 KU(A)/L
C HERBARUM IGE QN: <0.1 KU(A)/L
CALIF WALNUT POLN IGE QN: <0.1 KU(A)/L
CAT DANDER IGG QN: <0.1 KU(A)/L
CEDAR IGE QN: <0.1 KU(A)/L
COMMON RAGWEED IGE QN: <0.1 KU(A)/L
COTTONWOOD IGE QN: <0.1 KU(A)/L
D FARINAE IGE QN: 0.18 KU(A)/L
D PTERONYSS IGE QN: 0.13 KU(A)/L
DOG DANDER+EPITH IGE QN: <0.1 KU(A)/L
E PURPURASCENS IGE QN: <0.1 KU(A)/L
EAST WHITE PINE IGE QN: <0.1 KU(A)/L
ENGL PLANTAIN IGE QN: <0.1 KU(A)/L
FIREBUSH IGE QN: <0.1 KU(A)/L
GIANT RAGWEED IGE QN: <0.1 KU(A)/L
GOOSEFOOT IGE QN: <0.1 KU(A)/L
JOHNSON GRASS IGE QN: <0.1 KU(A)/L
MAPLE IGE QN: <0.1 KU(A)/L
MUGWORT IGE QN: <0.1 KU(A)/L
NETTLE IGE QN: <0.1 KU(A)/L
P NOTATUM IGE QN: <0.1 KU(A)/L
RED MULBERRY IGE QN: <0.1 KU(A)/L
SALTWORT IGE QN: <0.1 KU(A)/L
SHEEP SORREL IGE QN: <0.1 KU(A)/L
SILVER BIRCH IGE QN: <0.1 KU(A)/L
TIMOTHY IGE QN: <0.1 KU(A)/L
WHITE ASH IGE QN: <0.1 KU(A)/L
WHITE ELM IGE QN: <0.1 KU(A)/L
WHITE MULBERRY IGE QN: <0.1 KU(A)/L
WHITE OAK IGE QN: <0.1 KU(A)/L
WORMWOOD IGE QN: <0.1 KU(A)/L

## 2022-07-12 ENCOUNTER — TELEPHONE (OUTPATIENT)
Dept: ALLERGY | Facility: CLINIC | Age: 67
End: 2022-07-12

## 2022-07-12 NOTE — TELEPHONE ENCOUNTER
Per Dr. Simmons, called the patient about results that are positive to dust mites and one mold.  Mild elevation to all three. Went over avoidance instructions.  AEROALLERGEN AVOIDANCE INSTRUCTIONS  MOLD  Indoors, mold season is year round. Outdoors, most mold prefer seasons with high humidity. Mold prefers damp, dark, warm places. Here are some tips on how to avoid mold exposure.  1.  Keep humidity inside between 35-50% with air conditioning or dehumidifier. The humidity level can be checked with a meter from a hardware store.   2.  Clean surfaces where mold grows and dry wet areas.  3.  Avoid steam cleaning carpets and discard moldy belongings.  4.  Wear a mask when doing yard work and refrain from walking through uncut fields or playing in leaves.  5.  Minimize use of potted plants and do not keep them indoors.  6.  Consider an allergy cover for the pillow and mattress.  DUST MITES  Dust mites can never be entirely eliminated in the house no matter how clean your house is. Dust mites are attracted to warm, moist areas and feed on dead skin flakes. Here are tips to minimize dust mites in your home.  1.  Encase pillows and mattress/box springs in zippered allergy covers.  2.  Wash bedding in hot water (at least 130 F) every 7-14 days.  3.  Avoid curtains, carpet, and upholstered furniture if possible.  4.  Use HEPA air filters and a HEPA filter vacuum . Change filters monthly. Vacuum weekly.  5.  Keep bedroom simple, avoiding clutter, so it can quickly be dusted.  6.  Cover heating vents with vent filters.  7.  Keep stuffed toys in a closed container and wash or freeze regularly.  8.  Keep clothing in the closet with the door closed.      Azalia MAHAN, NOEMIN, RN

## 2022-07-13 ENCOUNTER — HOSPITAL ENCOUNTER (OUTPATIENT)
Dept: PHYSICAL THERAPY | Facility: CLINIC | Age: 67
Discharge: HOME | End: 2022-07-13
Attending: PHYSICIAN ASSISTANT
Payer: COMMERCIAL

## 2022-07-13 DIAGNOSIS — R42 VERTIGO: ICD-10-CM

## 2022-07-13 DIAGNOSIS — R42 DIZZINESS: Primary | ICD-10-CM

## 2022-07-13 PROCEDURE — 97112 NEUROMUSCULAR REEDUCATION: CPT | Mod: GP | Performed by: PHYSICAL THERAPIST

## 2022-07-13 PROCEDURE — 97161 PT EVAL LOW COMPLEX 20 MIN: CPT | Mod: GP | Performed by: PHYSICAL THERAPIST

## 2022-07-13 NOTE — PROGRESS NOTES
07/13/22 1034   Quick Adds   Quick Adds Vestibular Eval   Type of Visit Initial OP PT Evaluation   General Information   Start of Care Date 07/13/22   Referring Physician Lexi Barajas PA-C   Orders Evaluate and Treat as Indicated   Order Date 05/30/22   Medical Diagnosis Vertigo (R42)   Onset of illness/injury or Date of Surgery 05/30/22  (date of order used, onset vague x years)   Precautions/Limitations no known precautions/limitations   Surgical/Medical history reviewed Yes   Pertinent history of current problem (include personal factors and/or comorbidities that impact the POC) Patient presents to PT to address chronic intermittent dizziness/vertigo concern.  She indicates chronic hx of intermittent dizziness/vertigo symptoms x 3 years including vertigo/dizziness, ear pain, ear/sinus pressure, nausea and vomitting, and bloody noses. Episodic vertigo bouts occuring every 1-3 months on average with associated aural fullness.  She has been seen by ENT - nasal cauterization and prescribed a diuretic (taking since mid-June 2022) which seems to be helping.  MRI unremarkable. Using Rx Zofran and Meclizine prn to manage symptoms.  Several ED visits due to symptoms.  Had been prescribed steroids and antiobiotics as well, was using OTC Flonase. She indicates last dizziness attack was 6/14/22 - symptoms lasting typically hours to full day in nature with medication management. She does indicate onset of L hearing loss over the past few months - has had recent hearing test, ongoing L ear muffled sound.  She indicates her doctor thinks she has Meniere's disease.   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 No   Have you fallen and had an injury in the past year? No   Is patient a fall risk? No   Fall screen comments low fall risk per DGI score 22/24   Abuse Screen (yes response referral indicated)   Feels Unsafe at Home or Work/School no   Feels Threatened by Someone no   Does Anyone Try to Keep You From Having Contact with Others or Doing Things Outside Your Home? no   Physical Signs of Abuse Present no   Patient needs abuse support services and resources No   System Outcome Measures   Outcome Measures BPPV   Dizziness Handicap Inventory (score out of 100) A decrease in score by 17.18 or greater indicates a clinically significant change in symptoms. 0  (symptoms minimal/asymptomatic recently x 1 month)   Pain   Patient currently in pain No   Cognitive Status Examination   Orientation orientation to person, place and time   Integumentary   Integumentary Comments mild/subtle BLE swelling lower legs, patient reports it has been improving dramatically with diuretic and is down 20# total   Posture   Posture Forward head position;Protracted shoulders   Range of Motion (ROM)   ROM Comment WFL extremities and c-spine, general reduction in end range AROM about c-spine all planes, ~75%   Strength   Strength Comments WFL and grossly symmetrical strength, mild generalized proximal hip weakness 4+/5 seated hip flexion.   Bed Mobility   Bed Mobility Comments Independent, limited by chronic back issues, no dizziness reported with positional changes   Transfer Skills   Transfer Comments Independent   Gait   Gait Comments Independent ambulation, no AD, WFL gait speed, mild dynamic impairment with head  turns/pitches, low fall risk.   Gait Special Tests   Gait Special Tests 25 FOOT TIMED WALK;DYNAMIC GAIT INDEX   Gait Special Tests 25 Foot Timed Walk   Seconds 6.61   Comments 1.15   Gait Special Tests Dynamic Gait Index   Score out of 24 22   Comments low fall risk, see flowsheet   Balance   Balance Comments WFL static standing balance, mild/subtle impairment with sensory integration and dynamic stability with head turns/pitches, overall low fall risk.   Balance Special Tests   Balance Special Tests Modified CTSIB Conditions   Balance Special Tests Modified CTSIB Conditions   Condition 1, seconds 30 Seconds   Condition 2, seconds 30 Seconds   Condition 4, seconds 30 Seconds   Condition 5, seconds 30 Seconds  (20'', 30'')   Modified CTSIB Comments WFL testing, mildly challenged with condition 5, mild sensory integration impairment given amount of sway   Sensory Examination   Sensory Perception no deficits were identified   Coordination   Coordination no deficits were identified   Cervicogenic Screen   Neck ROM mild end range AROM loss, WFL for testing   Oculomotor Exam   Smooth Pursuit Normal   Saccades Other   Saccades Comments generally intact, mild undershooting with R saccades   VOR Normal   VOR Cancellation Normal   Rapid Head Thrust Corrective Saccade L head thrust   Rapid Head Thrust Comments mild corrective saccade R and L, L more pronounced   Convergence Testing Abnormal   Convergence Testing Comments R eye tends to laterally deviate with close range vergence   Infrared Goggle Exam or Frenzel Lense Exam   Vestibular Suppressant in Last 24 Hours? No   Exam completed with Infrared Goggles   Spontaneous Nystagmus Negative   Gaze Evoked Nystagmus Negative   Head Shake Horizontal Nystagmus Horizontal L   Head Shake Horizontal Nystagmus comments L beating nystagmus x 10-15'', relatively asymptomatic   Positional Testing comments asymptomatic with positional testing and bed mobilities   [place]-Hallpike (right) Other    [place]-Hallpike (right) comments mild DBN/R beating nystagmus > 60'', asymtomatic   [place]el-Hallpike (Left) Other   Nel-Hallpike (left) comments mild DBN/R beating nystagmus > 60'', asymtomatic   HSCC Supine Roll Test (Right) Horizontal L   HSCC Supine Roll Test (Right) Comments mild/subtle ageotropic nystagmus > 60'', asymptomatic   HSCC Supine Roll Test (Left) Other   HSCC Supine Roll Test (Left) Comments  mild DBN/R beating nystagmus > 60'', asymtomatic   Dynamic Visual Acuity (DVA)   Static Acuity ([place]) 0.1   Horizontal Head Movement at 2 Hz (LogMar) 0.5   DVA Comments 4-line degredation at 2Hz testing (abnormal), seated, 10' distance, glasses on, large chart   Planned Therapy Interventions   Planned Therapy Interventions balance training;gait training;neuromuscular re-education   Clinical Impression   Criteria for Skilled Therapeutic Interventions Met yes, treatment indicated   PT Diagnosis Episodic dizziness with impaired activity tolerance   Influenced by the following impairments episodic dizziness, ear pressure/pain, imbalance, nausea/vomitting   Functional limitations due to impairments episodic intolerance with all daily activity including ADLs, walking, work duties, driving, and elevated fall risk   Clinical Presentation Stable/Uncomplicated   Clinical Presentation Rationale symptoms improving/minimal currently, age, PMH, previous workup   Clinical Decision Making (Complexity) Low complexity   Therapy Frequency other (see comments)  (currently e/o wk, modifying as indicated)   Predicted Duration of Therapy Intervention (days/wks) 4-8 wks   Risk & Benefits of therapy have been explained Yes   Patient, Family & other staff in agreement with plan of care Yes   Clinical Impression Comments Vestibular assessment most likely consistent with Meneire's disease etiology of symptoms given episodic bouts of dizziness/vertigo with associated aural symptoms that lasts hours > days in nature. Currently, she is minimally  symptomatic.  She does demonstrate very mild senory integration and balance impairment, as well as impaired DVA.  Will instruct in maintenance HEP as well as instruct in management of symptoms with possible recurrences of symptoms.   Education Assessment   Barriers to Learning No barriers   GOALS   PT Eval Goals 1;2   Goal 1   Goal Identifier DVA   Goal Description Patient will demonstrate WFL testing with DVA indicating improvement in peripheral vestibular function and improved management of symptoms longterm.   Goal Progress Baseline: 4-line degredation at 2Hz testing (norms 1-2 lines)   Target Date 09/11/22   Goal 2   Goal Identifier HEP   Goal Description Patient will demonstrate independent carryover of HEP and understanding of symptom management with potential future dizziness attacks for improved QOL and mobility safety.   Target Date 09/11/22   Total Evaluation Time   PT Blayne, Low Complexity Minutes (90073) 30

## 2022-07-16 DIAGNOSIS — J45.30 MILD PERSISTENT ASTHMA WITHOUT COMPLICATION: ICD-10-CM

## 2022-07-18 DIAGNOSIS — R93.1 ELEVATED CORONARY ARTERY CALCIUM SCORE: ICD-10-CM

## 2022-07-18 DIAGNOSIS — E78.5 DYSLIPIDEMIA: ICD-10-CM

## 2022-07-18 RX ORDER — ATORVASTATIN CALCIUM 40 MG/1
40 TABLET, FILM COATED ORAL AT BEDTIME
Qty: 90 TABLET | Refills: 0 | Status: CANCELLED | OUTPATIENT
Start: 2022-07-18

## 2022-07-18 RX ORDER — ATORVASTATIN CALCIUM 20 MG/1
20 TABLET, FILM COATED ORAL AT BEDTIME
Qty: 90 TABLET | Refills: 1 | Status: SHIPPED | OUTPATIENT
Start: 2022-07-18 | End: 2022-11-18

## 2022-07-18 NOTE — TELEPHONE ENCOUNTER
Please let patient know that I reviewed her cardiologists note and they felt she may not need statin but to discuss with PCP. However since she does have MILD CAD, I would recommend proceeding but with a lower dose of statin. She can stop current rx of 40mg nightly and start 20mg nightly (new rx sent).

## 2022-07-18 NOTE — TELEPHONE ENCOUNTER
LOV 6- Marco Lara - please review - see 6- cardiology OV notes.  ASA was stopped and Dr Varela did not feel that atorvastatin was needed as well.  Patient was to discuss with you.    Pharmacy now seeking refill of atorvastatin.      RT Brandy (R)

## 2022-07-19 ENCOUNTER — TELEPHONE (OUTPATIENT)
Dept: NEPHROLOGY | Facility: CLINIC | Age: 67
End: 2022-07-19

## 2022-07-19 NOTE — TELEPHONE ENCOUNTER
.Nephrology Note: Nursing Outreach Encounter    REASON FOR CALL:                                                      REASON FOR CALL: Results Notification                                          SITUATION/BACKROUND:                                                    Patient is being treated for CKD Stage 2, Hematuria.    Component      Latest Ref Rng & Units 6/29/2022   Albumin Fraction      3.7 - 5.1 g/dL 4.1   Alpha 1 Fraction      0.2 - 0.4 g/dL 0.3   Alpha 2 Fraction      0.5 - 0.9 g/dL 0.7   Beta Fraction      0.6 - 1.0 g/dL 0.8   Gamma Fraction      0.7 - 1.6 g/dL 0.7   Monoclonal Peak      <=0.0 g/dL 0.0   ELP Interpretation:       Essentially normal electrophoretic pattern. No obvious monoclonal proteins seen. Pathologic significance requires clinical correlation. AVIS Callaway M.D., Ph.D., Pathologist ().   Neutrophil Cytoplasmic Antibody      <1:10 <1:10   Neutrophil Cytoplasmic Antibody Pattern       The ANCA IFA is <1:10.  No further testing will be performed.   DNA-ds      <10.0 IU/mL 2.2   Complement C3      81 - 157 mg/dL 127   Complement C4      13 - 39 mg/dL 41 (H)   Hepatitis B Surface Antibody      <8.00 m[IU]/mL 1.38   Hep B Surface Agn      Nonreactive Nonreactive   Hepatitis C Antibody      Nonreactive Nonreactive   Immunofixation ELP       No monoclonal protein seen on immunofixation. Pathologic significance requires clinical correlation.  AVIS Callaway M.D., Ph.D., Pathologist ()   Total Protein Serum for ELP      6.4 - 8.3 g/dL 6.6     Per Dr. Santoyo, Additional workup for kidney disease was normal. No further workup is needed at this point. Please update the patient.    ASSESSMENT:                                                      Reviewed lab results with pt. All questions answered to pt's satisfaction. MD wanted pt to follow up in September with labs prior. Pt reports she will look at schedule and call clinic to schedule appt.     Uremic Symptoms: No        PLAN:                                                      Follow Up:   Patient to follow up as scheduled at next apt     Patient verbalized understanding and will contact the clinic with any further questions or concerns.     Patrica Torre RN

## 2022-07-19 NOTE — TELEPHONE ENCOUNTER
Patient notified of provider response below    Lila BEAR, Triage RN  [facility]

## 2022-07-20 DIAGNOSIS — J45.30 MILD PERSISTENT ASTHMA WITHOUT COMPLICATION: ICD-10-CM

## 2022-07-20 RX ORDER — MONTELUKAST SODIUM 10 MG/1
TABLET ORAL
Qty: 90 TABLET | Refills: 1 | Status: SHIPPED | OUTPATIENT
Start: 2022-07-20 | End: 2023-06-19

## 2022-07-20 RX ORDER — MONTELUKAST SODIUM 10 MG/1
TABLET ORAL
Qty: 90 TABLET | Refills: 3 | OUTPATIENT
Start: 2022-07-20

## 2022-07-20 NOTE — TELEPHONE ENCOUNTER
Prescription approved per [facility] Refill Protocol.  Cassandra Ledezma, RN  [facility] Triage Nurse

## 2022-07-20 NOTE — TELEPHONE ENCOUNTER
Received a call from the patient stating she is completely out of her medication, Montelukast, and needs to get a refill today. Pharmacy keeps sending the refill request to the patient's old PCP which has delayed the refill of this medication.     Medication pended to the requested pharmacy and routing to refill pool.     Cassandra Ledezma RN

## 2022-07-20 NOTE — TELEPHONE ENCOUNTER
Duplicate request.  Singulair 10 mg tabs refilled on 7/20/22-see current med list for details.    Eva Lea RN  [facility]

## 2022-07-27 ENCOUNTER — HOSPITAL ENCOUNTER (OUTPATIENT)
Dept: PHYSICAL THERAPY | Facility: CLINIC | Age: 67
Discharge: HOME | End: 2022-07-27
Attending: PHYSICIAN ASSISTANT
Payer: COMMERCIAL

## 2022-07-27 DIAGNOSIS — R42 DIZZINESS: Primary | ICD-10-CM

## 2022-07-27 PROCEDURE — 95992 CANALITH REPOSITIONING PROC: CPT | Mod: GP | Performed by: PHYSICAL THERAPIST

## 2022-07-27 PROCEDURE — 97112 NEUROMUSCULAR REEDUCATION: CPT | Mod: GP | Performed by: PHYSICAL THERAPIST

## 2022-07-28 ENCOUNTER — OFFICE VISIT (OUTPATIENT)
Dept: ALLERGY | Facility: CLINIC | Age: 67
End: 2022-07-28
Payer: COMMERCIAL

## 2022-07-28 VITALS — HEART RATE: 67 BPM | SYSTOLIC BLOOD PRESSURE: 120 MMHG | DIASTOLIC BLOOD PRESSURE: 77 MMHG | OXYGEN SATURATION: 98 %

## 2022-07-28 DIAGNOSIS — R42 VERTIGO: ICD-10-CM

## 2022-07-28 DIAGNOSIS — J30.1 SEASONAL ALLERGIC RHINITIS DUE TO POLLEN: ICD-10-CM

## 2022-07-28 DIAGNOSIS — J30.89 SEASONAL ALLERGIC RHINITIS DUE TO OTHER ALLERGIC TRIGGER: Primary | ICD-10-CM

## 2022-07-28 PROCEDURE — 99213 OFFICE O/P EST LOW 20 MIN: CPT | Performed by: INTERNAL MEDICINE

## 2022-07-28 NOTE — LETTER
7/28/2022         RE: Denita Flores  [address] [place] [facility] [address]        Dear Colleague,    Thank you for referring your patient, Denita Flores, to the [facility] [place]. Please see a copy of my visit note below.    Denita Flores was seen in the Allergy Clinic at [facility].    Denita Flores is a 66 year old White female being seen today for ongoing evaluation of Seasonal allergic rhinitis due to pollen.    Since the last visit the patient has been doing relatively well.  The primary reason for the last referral was due to vertigo symptoms and she does have Ménière's disease.  She had been recently started on triamterene with hydrochlorothiazide.  She is going through physical therapy currently for her vertigo.    She has done allergy shots in the past.  She is currently using Flonase with montelukast.  She is found montelukast to be helpful in controlling her shortness of breath.  Skin tests at the last clinic were positive to tree and ragweed but negative on blood testing.  On blood testing she had minor reactions to dust mite and Alternaria.    Currently she is not using the Flonase frequently and has found antihistamines to be too drying in nature for for her nasal mucus.    Past Medical History:   Diagnosis Date     Coronary artery disease involving native coronary artery of native heart, angina presence unspecified 6/11/2021     Essential hypertension 6/11/2021     Graves disease      Kidney stones 2000    Passed a 5-10mm stone     Malignant neoplasm (H)     Cervical CA     Mild persistent asthma 5/8/2013     Swollen finger      Thyroid disease      Family History   Problem Relation Age of Onset     Glaucoma Mother      Heart Disease Father      Cancer Father 67        kidney cancer      Heart Disease Sister      Cancer Sister 59        colon cancer      Diabetes Sister      Glaucoma Maternal Grandmother      Colon Cancer Maternal Aunt       Colon Cancer Paternal Aunt      Past Surgical History:   Procedure Laterality Date     BACK SURGERY       COLONOSCOPY WITH CO2 INSUFFLATION N/A 2/21/2017    Procedure: COLONOSCOPY WITH CO2 INSUFFLATION;  Surgeon: Duane, William Charles, MD;  Location: [facility] OR     CYSTOSCOPY FLEXIBLE  8/10/2018     LEEP TX, CERVICAL      in her 20's         Current Outpatient Medications:      albuterol (PROAIR HFA/PROVENTIL HFA/VENTOLIN HFA) 108 (90 Base) MCG/ACT inhaler, Inhale 2 puffs into the lungs every 6 hours as needed for shortness of breath / dyspnea, Disp: 18 g, Rfl: 1     atorvastatin (LIPITOR) 20 MG tablet, Take 1 tablet (20 mg) by mouth At Bedtime, Disp: 90 tablet, Rfl: 1     Cholecalciferol (D3 ADULT PO), Take 2,000 Units by mouth daily., Disp: , Rfl:      cyanocolbalamin (VITAMIN  B-12) 100 MCG tablet, Take 100 mcg by mouth daily., Disp: , Rfl:      levothyroxine (SYNTHROID/LEVOTHROID) 112 MCG tablet, Profile Rx,TAKE 1 TABLET(112 MCG) BY MOUTH DAILY, Disp: 90 tablet, Rfl: 3     meclizine (ANTIVERT) 25 MG tablet, Take 1 tablet (25 mg) by mouth 3 times daily as needed for dizziness, Disp: 20 tablet, Rfl: 0     montelukast (SINGULAIR) 10 MG tablet, TAKE 1 TABLET(10 MG) BY MOUTH AT BEDTIME, Disp: 90 tablet, Rfl: 1     ondansetron (ZOFRAN ODT) 4 MG ODT tab, Take 1 tablet (4 mg) by mouth every 8 hours as needed for nausea, Disp: 21 tablet, Rfl: 2     triamterene-HCTZ (DYAZIDE) 37.5-25 MG capsule, Take 1 capsule by mouth 2 times daily, Disp: , Rfl:      fluticasone (FLONASE) 50 MCG/ACT nasal spray, Spray 1 spray into both nostrils 2 times daily (Patient not taking: No sig reported), Disp: 9.9 mL, Rfl: 1     triamcinolone (KENALOG) 0.1 % external cream, Apply sparingly to affected area three times daily as needed (Patient not taking: No sig reported), Disp: 80 g, Rfl: 0  Allergies   Allergen Reactions     Contrast Dye Hives and Itching     Isovue with ANTON on 1-21-19     Sulfa Drugs Hives and Swelling     Noted in 10/18/09 ER          EXAM:   /77   Pulse 67   SpO2 98%     Constitutional:       General: She is not in acute distress.     Appearance: Normal appearance. She is not ill-appearing.   HENT:      Head: Normocephalic and atraumatic.      Nose: Nose normal. No congestion or rhinorrhea.      Mouth/Throat:      Mouth: Mucous membranes are moist.      Pharynx: Oropharynx is clear. No posterior oropharyngeal erythema.   Eyes:      General:         Right eye: No discharge.         Left eye: No discharge.   Cardiovascular:      Rate and Rhythm: Normal rate and regular rhythm.      Heart sounds: Normal heart sounds.   Pulmonary:      Effort: Pulmonary effort is normal.      Breath sounds: Normal breath sounds. No wheezing or rhonchi.   Skin:     General: Skin is warm.      Findings: No erythema or rash.   Neurological:      General: No focal deficit present.      Mental Status: She is alert. Mental status is at baseline.   Psychiatric:         Mood and Affect: Mood normal.         Behavior: Behavior normal.     ASSESSMENT/PLAN:  Denita Flores is a 66 year old female seen today with history of Ménière's disease with allergic rhinitis.  After a lengthy discussion it is determined that allergy shots would not be started at this time.  Her main symptoms are vertigo and this would be a very atypical reasons to start allergy immunotherapy.    Recommend to continue with montelukast and also to retry Flonase which she has previously found to be helpful in regards to the ear pressure.  She may use this 1 to 2 sprays each nostril daily.    Follow-up as needed      Thank you for allowing me to participate in the care of Denita Flores.      A total of 25 minutes was spent on the day of the encounter performing chart review, history and exam, documentation, and counseling and coordination of care as noted above.       Marcus Simmons MD  Allergy/Immunology  [facility]      Again, thank you for allowing me to participate  in the care of your patient.        Sincerely,        Marcus Simmons MD

## 2022-07-28 NOTE — PROGRESS NOTES
Denita Flores was seen in the Allergy Clinic at [facility].    Denita Flores is a 66 year old White female being seen today for ongoing evaluation of Seasonal allergic rhinitis due to pollen.    Since the last visit the patient has been doing relatively well.  The primary reason for the last referral was due to vertigo symptoms and she does have Ménière's disease.  She had been recently started on triamterene with hydrochlorothiazide.  She is going through physical therapy currently for her vertigo.    She has done allergy shots in the past.  She is currently using Flonase with montelukast.  She is found montelukast to be helpful in controlling her shortness of breath.  Skin tests at the last clinic were positive to tree and ragweed but negative on blood testing.  On blood testing she had minor reactions to dust mite and Alternaria.    Currently she is not using the Flonase frequently and has found antihistamines to be too drying in nature for for her nasal mucus.    Past Medical History:   Diagnosis Date     Coronary artery disease involving native coronary artery of native heart, angina presence unspecified 6/11/2021     Essential hypertension 6/11/2021     Graves disease      Kidney stones 2000    Passed a 5-10mm stone     Malignant neoplasm (H)     Cervical CA     Mild persistent asthma 5/8/2013     Swollen finger      Thyroid disease      Family History   Problem Relation Age of Onset     Glaucoma Mother      Heart Disease Father      Cancer Father 67        kidney cancer      Heart Disease Sister      Cancer Sister 59        colon cancer      Diabetes Sister      Glaucoma Maternal Grandmother      Colon Cancer Maternal Aunt      Colon Cancer Paternal Aunt      Past Surgical History:   Procedure Laterality Date     BACK SURGERY       COLONOSCOPY WITH CO2 INSUFFLATION N/A 2/21/2017    Procedure: COLONOSCOPY WITH CO2 INSUFFLATION;  Surgeon: Duane, William Charles, MD;  Location:  OR      CYSTOSCOPY FLEXIBLE  8/10/2018     SEG[place], CERVICAL      in her 20's         Current Outpatient Medications:      albuterol (PROAIR HFA/PROVENTIL HFA/VENTOLIN HFA) 108 (90 Base) MCG/ACT inhaler, Inhale 2 puffs into the lungs every 6 hours as needed for shortness of breath / dyspnea, Disp: 18 g, Rfl: 1     atorvastatin (LIPITOR) 20 MG tablet, Take 1 tablet (20 mg) by mouth At Bedtime, Disp: 90 tablet, Rfl: 1     Cholecalciferol (D3 ADULT PO), Take 2,000 Units by mouth daily., Disp: , Rfl:      cyanocolbalamin (VITAMIN  B-12) 100 MCG tablet, Take 100 mcg by mouth daily., Disp: , Rfl:      levothyroxine (SYNTHROID/LEVOTHROID) 112 MCG tablet, Profile Rx,TAKE 1 TABLET(112 MCG) BY MOUTH DAILY, Disp: 90 tablet, Rfl: 3     meclizine (ANTIVERT) 25 MG tablet, Take 1 tablet (25 mg) by mouth 3 times daily as needed for dizziness, Disp: 20 tablet, Rfl: 0     montelukast (SINGULAIR) 10 MG tablet, TAKE 1 TABLET(10 MG) BY MOUTH AT BEDTIME, Disp: 90 tablet, Rfl: 1     ondansetron (ZOFRAN ODT) 4 MG ODT tab, Take 1 tablet (4 mg) by mouth every 8 hours as needed for nausea, Disp: 21 tablet, Rfl: 2     triamterene-HCTZ (DYAZIDE) 37.5-25 MG capsule, Take 1 capsule by mouth 2 times daily, Disp: , Rfl:      fluticasone (FLONASE) 50 MCG/ACT nasal spray, Spray 1 spray into both nostrils 2 times daily (Patient not taking: No sig reported), Disp: 9.9 mL, Rfl: 1     triamcinolone (KENALOG) 0.1 % external cream, Apply sparingly to affected area three times daily as needed (Patient not taking: No sig reported), Disp: 80 g, Rfl: 0  Allergies   Allergen Reactions     Contrast Dye Hives and Itching     Isovue with ANTON on 1-21-19     Sulfa Drugs Hives and Swelling     Noted in 10/18/09 ER         EXAM:   /77   Pulse 67   SpO2 98%     Constitutional:       General: She is not in acute distress.     Appearance: Normal appearance. She is not ill-appearing.   HENT:      Head: Normocephalic and atraumatic.      Nose: Nose normal. No  congestion or rhinorrhea.      Mouth/Throat:      Mouth: Mucous membranes are moist.      Pharynx: Oropharynx is clear. No posterior oropharyngeal erythema.   Eyes:      General:         Right eye: No discharge.         Left eye: No discharge.   Cardiovascular:      Rate and Rhythm: Normal rate and regular rhythm.      Heart sounds: Normal heart sounds.   Pulmonary:      Effort: Pulmonary effort is normal.      Breath sounds: Normal breath sounds. No wheezing or rhonchi.   Skin:     General: Skin is warm.      Findings: No erythema or rash.   Neurological:      General: No focal deficit present.      Mental Status: She is alert. Mental status is at baseline.   Psychiatric:         Mood and Affect: Mood normal.         Behavior: Behavior normal.     ASSESSMENT/PLAN:  Denita Flores is a 66 year old female seen today with history of Ménière's disease with allergic rhinitis.  After a lengthy discussion it is determined that allergy shots would not be started at this time.  Her main symptoms are vertigo and this would be a very atypical reasons to start allergy immunotherapy.    Recommend to continue with montelukast and also to retry Flonase which she has previously found to be helpful in regards to the ear pressure.  She may use this 1 to 2 sprays each nostril daily.    Follow-up as needed      Thank you for allowing me to participate in the care of Denita Flores.      A total of 25 minutes was spent on the day of the encounter performing chart review, history and exam, documentation, and counseling and coordination of care as noted above.       Marcus Simmons MD  Allergy/Immunology  [facility]

## 2022-08-04 ENCOUNTER — TRANSFERRED RECORDS (OUTPATIENT)
Dept: FAMILY MEDICINE | Facility: CLINIC | Age: 67
End: 2022-08-04

## 2022-09-15 ENCOUNTER — TELEPHONE (OUTPATIENT)
Dept: NEPHROLOGY | Facility: CLINIC | Age: 67
End: 2022-09-15

## 2022-09-15 NOTE — TELEPHONE ENCOUNTER
----- Message from Mariza Ulloa RN sent at 9/15/2022  9:50 AM CDT -----  Looks like she was last seen in June with Natanael and was supposed to have follow up in Sept- can we get her scheduled?     Thanks Lindsey ACUÑA CSC

## 2022-09-16 DIAGNOSIS — R31.29 MICROSCOPIC HEMATURIA: Primary | ICD-10-CM

## 2022-10-08 ENCOUNTER — HOSPITAL ENCOUNTER (EMERGENCY)
Facility: CLINIC | Age: 67
Discharge: HOME | End: 2022-10-08
Attending: EMERGENCY MEDICINE | Admitting: EMERGENCY MEDICINE
Payer: COMMERCIAL

## 2022-10-08 ENCOUNTER — NURSE TRIAGE (OUTPATIENT)
Dept: NURSING | Facility: CLINIC | Age: 67
End: 2022-10-08

## 2022-10-08 ENCOUNTER — OFFICE VISIT (OUTPATIENT)
Dept: URGENT CARE | Facility: URGENT CARE | Age: 67
End: 2022-10-08
Payer: COMMERCIAL

## 2022-10-08 VITALS
BODY MASS INDEX: 32.96 KG/M2 | RESPIRATION RATE: 16 BRPM | HEART RATE: 69 BPM | TEMPERATURE: 97.6 F | HEIGHT: 67 IN | SYSTOLIC BLOOD PRESSURE: 131 MMHG | DIASTOLIC BLOOD PRESSURE: 62 MMHG | WEIGHT: 210 LBS | OXYGEN SATURATION: 99 %

## 2022-10-08 VITALS
HEART RATE: 71 BPM | WEIGHT: 210 LBS | BODY MASS INDEX: 31.83 KG/M2 | HEIGHT: 68 IN | TEMPERATURE: 99.2 F | SYSTOLIC BLOOD PRESSURE: 120 MMHG | OXYGEN SATURATION: 99 % | RESPIRATION RATE: 18 BRPM | DIASTOLIC BLOOD PRESSURE: 80 MMHG

## 2022-10-08 DIAGNOSIS — R55 SYNCOPE, UNSPECIFIED SYNCOPE TYPE: ICD-10-CM

## 2022-10-08 DIAGNOSIS — R42 DIZZINESS: Primary | ICD-10-CM

## 2022-10-08 DIAGNOSIS — R25.2 LEG CRAMPS: ICD-10-CM

## 2022-10-08 LAB
ALBUMIN UR-MCNC: NEGATIVE MG/DL
ANION GAP SERPL CALCULATED.3IONS-SCNC: 9 MMOL/L (ref 3–14)
APPEARANCE UR: CLEAR
BILIRUB UR QL STRIP: NEGATIVE
BUN SERPL-MCNC: 23 MG/DL (ref 7–30)
CALCIUM SERPL-MCNC: 9.1 MG/DL (ref 8.5–10.1)
CHLORIDE BLD-SCNC: 98 MMOL/L (ref 94–109)
CO2 SERPL-SCNC: 31 MMOL/L (ref 20–32)
COLOR UR AUTO: ABNORMAL
CREAT SERPL-MCNC: 1.03 MG/DL (ref 0.52–1.04)
ERYTHROCYTE [DISTWIDTH] IN BLOOD BY AUTOMATED COUNT: 12.1 % (ref 10–15)
GFR SERPL CREATININE-BSD FRML MDRD: 59 ML/MIN/1.73M2
GLUCOSE BLD-MCNC: 99 MG/DL (ref 70–99)
GLUCOSE UR STRIP-MCNC: NEGATIVE MG/DL
HCT VFR BLD AUTO: 43.3 % (ref 35–47)
HGB BLD-MCNC: 14.2 G/DL (ref 11.7–15.7)
HGB UR QL STRIP: ABNORMAL
HOLD SPECIMEN: NORMAL
KETONES UR STRIP-MCNC: NEGATIVE MG/DL
LEUKOCYTE ESTERASE UR QL STRIP: NEGATIVE
MCH RBC QN AUTO: 30.1 PG (ref 26.5–33)
MCHC RBC AUTO-ENTMCNC: 32.8 G/DL (ref 31.5–36.5)
MCV RBC AUTO: 92 FL (ref 78–100)
NITRATE UR QL: NEGATIVE
PH UR STRIP: 6 [PH] (ref 5–7)
PLATELET # BLD AUTO: 354 10E3/UL (ref 150–450)
POTASSIUM BLD-SCNC: 3.2 MMOL/L (ref 3.4–5.3)
RBC # BLD AUTO: 4.72 10E6/UL (ref 3.8–5.2)
RBC URINE: 7 /HPF
SODIUM SERPL-SCNC: 138 MMOL/L (ref 133–144)
SP GR UR STRIP: 1.01 (ref 1–1.03)
SQUAMOUS EPITHELIAL: <1 /HPF
UROBILINOGEN UR STRIP-MCNC: NORMAL MG/DL
WBC # BLD AUTO: 10.5 10E3/UL (ref 4–11)
WBC URINE: <1 /HPF

## 2022-10-08 PROCEDURE — 80048 BASIC METABOLIC PNL TOTAL CA: CPT | Performed by: EMERGENCY MEDICINE

## 2022-10-08 PROCEDURE — 81001 URINALYSIS AUTO W/SCOPE: CPT | Performed by: EMERGENCY MEDICINE

## 2022-10-08 PROCEDURE — 99284 EMERGENCY DEPT VISIT MOD MDM: CPT

## 2022-10-08 PROCEDURE — 36415 COLL VENOUS BLD VENIPUNCTURE: CPT | Performed by: EMERGENCY MEDICINE

## 2022-10-08 PROCEDURE — 250N000013 HC RX MED GY IP 250 OP 250 PS 637: Performed by: EMERGENCY MEDICINE

## 2022-10-08 PROCEDURE — 93005 ELECTROCARDIOGRAM TRACING: CPT

## 2022-10-08 PROCEDURE — 99213 OFFICE O/P EST LOW 20 MIN: CPT | Performed by: PHYSICIAN ASSISTANT

## 2022-10-08 PROCEDURE — 85014 HEMATOCRIT: CPT | Performed by: EMERGENCY MEDICINE

## 2022-10-08 RX ORDER — POTASSIUM CHLORIDE 1.5 G/1.58G
40 POWDER, FOR SOLUTION ORAL ONCE
Status: COMPLETED | OUTPATIENT
Start: 2022-10-08 | End: 2022-10-08

## 2022-10-08 RX ADMIN — POTASSIUM CHLORIDE 40 MEQ: 1.5 POWDER, FOR SOLUTION ORAL at 15:57

## 2022-10-08 ASSESSMENT — ENCOUNTER SYMPTOMS
SHORTNESS OF BREATH: 0
LIGHT-HEADEDNESS: 0
ABDOMINAL PAIN: 0
FEVER: 0
DIZZINESS: 0

## 2022-10-08 ASSESSMENT — ACTIVITIES OF DAILY LIVING (ADL): ADLS_ACUITY_SCORE: 35

## 2022-10-08 NOTE — TELEPHONE ENCOUNTER
Pt is phoning stating that she was dx with meniere's disease    Pt is having waves of dizziness - not dizzy at this time     Per Disposition: See PCP Within 24 Hours    Care advice given per protocol and when to call back. Pt verbalized understanding and agrees to plan of care.    Bettina Gan RN  [place] Nurse Advisor  9:38 AM 10/8/2022      COVID 19 Nurse Triage Plan/Patient Instructions    Please be aware that novel coronavirus (COVID-19) may be circulating in the community. If you develop symptoms such as fever, cough, or SOB or if you have concerns about the presence of another infection including coronavirus (COVID-19), please contact your health care provider or visit [email or web address].     Disposition/Instructions    In-Person Visit with provider recommended. Reference Visit Selection Guide.    Thank you for taking steps to prevent the spread of this virus.  o Limit your contact with others.  o Wear a simple mask to cover your cough.  o Wash your hands well and often.    Resources    [facility]: About COVID-19: [email or web address]    CDC: What to Do If You're Sick: www.cdc.gov/coronavirus/2019-ncov/about/steps-when-sick.html    CDC: Ending Home Isolation: www.cdc.gov/coronavirus/2019-ncov/hcp/disposition-in-home-patients.html     CDC: Caring for Someone: www.cdc.gov/coronavirus/2019-ncov/if-you-are-sick/care-for-someone.html     [facility]: Interim Guidance for Hospital Discharge to Home: www.health.[facility].mn.us/diseases/coronavirus/hcp/hospdischarge.pdf    [facility] clinical trials (COVID-19 research studies): [email or web address]-clinical-trials     Below are the COVID-19 hotlines at the [place] Department of Health ([facility]). Interpreters are available.   o For health questions: Call 437-878-5258 or 1-482.695.9600 (7 a.m. to 7 p.m.)  o For questions about schools and childcare: Call 376-544-1138 or 1-856.624.7093 (7 a.m. to 7 p.m.)                              Reason for Disposition    [1] NO dizziness now AND [2] age > 59    Additional Information    Negative: [1] Weakness (i.e., paralysis, loss of muscle strength) of the face, arm or leg on one side of the body AND [2] sudden onset AND [3] present now    Negative: [1] Numbness (i.e., loss of sensation) of the face, arm or leg on one side of the body AND [2] sudden onset AND [3] present now    Negative: [1] Loss of speech or garbled speech AND [2] sudden onset AND [3] present now    Negative: Difficult to awaken or acting confused (e.g., disoriented, slurred speech)    Negative: Sounds like a life-threatening emergency to the triager    Negative: Followed a head injury    Negative: Followed an ear injury    Negative: Localized weakness or numbness is main symptom    Negative: Dizziness relates to riding in a car, going to an amusement park, etc.    Negative: [1] Dizziness is main symptom AND [2] NO spinning sensation (i.e., vertigo)    Negative: SEVERE dizziness (vertigo) (e.g., unable to walk without assistance)    Negative: Severe headache (e.g., excruciating)  (Exception: similar to previous migraines)    Negative: [1] Dizziness (vertigo) present now AND [2] one or more STROKE RISK FACTORS (i.e., hypertension, diabetes, prior stroke/TIA, heart attack)  (Exception: prior physician evaluation for this AND no different/worse than usual)    Negative: [1] Dizziness (vertigo) present now AND [2] age > 59  (Exception: prior physician evaluation for this AND no different/worse than usual)    Negative: [1] Weakness (i.e., paralysis, loss of muscle strength) of the face, arm / hand, or leg / foot on one side of the body AND [2] sudden onset AND [3] brief (now gone)    Negative: [1] Numbness (i.e., loss of sensation) of the face, arm / hand, or leg / foot on one side of the body AND [2] sudden onset AND [3] brief (now gone)    Negative: [1] Loss of speech or garbled speech AND [2] sudden onset AND [3]  brief (now gone)    Negative: Loss of vision or double vision (Exception: similar to previous migraines)    Negative: Patient sounds very sick or weak to the triager    Negative: Taking a medicine that could cause dizziness (e.g., phenytoin [Dilantin], carbamazepine [Tegretol], primidone [Mysoline])    Negative: [1] MODERATE dizziness (e.g., vertigo; feels very unsteady, interferes with normal activities) AND [2] has NOT been evaluated by physician for this    Negative: [1] NO dizziness now AND [2] one or more stroke risk factors (i.e., hypertension, diabetes, prior stroke/TIA/heart attack)    Protocols used: DIZZINESS - VERTIGO-A-AH

## 2022-10-08 NOTE — PROGRESS NOTES
SUBJECTIVE:   Denita Flores is a 67 year old female presenting with a chief complaint of   Chief Complaint   Patient presents with     Urgent Care     Dizziness     C/o low potassium. Dizziness on and off. Cramp on legs in the morning.        She is an established patient of [facility].  Patient presents with spells of dizziness for months.  Patient has concerns of low potassium since being on diuretic.    She also has complaints of bilateral leg cramps.   She also states she has been drinking a lot of water.          Review of Systems    Past Medical History:   Diagnosis Date     Coronary artery disease involving native coronary artery of native heart, angina presence unspecified 6/11/2021     Essential hypertension 6/11/2021     Graves disease      Kidney stones 2000    Passed a 5-10mm stone     Malignant neoplasm (H)     Cervical CA     Mild persistent asthma 5/8/2013     Swollen finger      Thyroid disease      Family History   Problem Relation Age of Onset     Glaucoma Mother      Heart Disease Father      Cancer Father 67        kidney cancer      Heart Disease Sister      Cancer Sister 59        colon cancer      Diabetes Sister      Glaucoma Maternal Grandmother      Colon Cancer Maternal Aunt      Colon Cancer Paternal Aunt      Current Outpatient Medications   Medication Sig Dispense Refill     atorvastatin (LIPITOR) 20 MG tablet Take 1 tablet (20 mg) by mouth At Bedtime 90 tablet 1     cyanocolbalamin (VITAMIN  B-12) 100 MCG tablet Take 100 mcg by mouth daily.       levothyroxine (SYNTHROID/LEVOTHROID) 112 MCG tablet TAKE ONE TABLET BY MOUTH DAILY 90 tablet 0     meclizine (ANTIVERT) 25 MG tablet Take 1 tablet (25 mg) by mouth 3 times daily as needed for dizziness 20 tablet 0     montelukast (SINGULAIR) 10 MG tablet TAKE 1 TABLET(10 MG) BY MOUTH AT BEDTIME 90 tablet 3     triamterene-HCTZ (DYAZIDE) 37.5-25 MG capsule Take 1 capsule by mouth 2 times daily       albuterol (PROAIR HFA/PROVENTIL HFA/VENTOLIN  "HFA) 108 (90 Base) MCG/ACT inhaler Inhale 2 puffs into the lungs every 6 hours as needed for shortness of breath / dyspnea 18 g 1     Cholecalciferol (D3 ADULT PO) Take 2,000 Units by mouth daily. (Patient not taking: Reported on 10/8/2022)       fluticasone (FLONASE) 50 MCG/ACT nasal spray Spray 1 spray into both nostrils 2 times daily (Patient not taking: No sig reported) 9.9 mL 1     montelukast (SINGULAIR) 10 MG tablet TAKE 1 TABLET(10 MG) BY MOUTH AT BEDTIME 90 tablet 1     ondansetron (ZOFRAN ODT) 4 MG ODT tab Take 1 tablet (4 mg) by mouth every 8 hours as needed for nausea 21 tablet 2     triamcinolone (KENALOG) 0.1 % external cream Apply sparingly to affected area three times daily as needed (Patient not taking: No sig reported) 80 g 0     Social History     Tobacco Use     Smoking status: Former Smoker     Packs/day: 1.50     Years: 35.00     Pack years: 52.50     Types: Cigarettes     Quit date: 2003     Years since quittin.7     Smokeless tobacco: Never Used   Substance Use Topics     Alcohol use: Yes     Alcohol/week: 0.0 standard drinks     Comment: 3 dinks/ year       OBJECTIVE  /80   Pulse 71   Temp 99.2  F (37.3  C) (Oral)   Resp 18   Ht 1.715 m (5' 7.5\")   Wt 95.3 kg (210 lb)   SpO2 99%   BMI 32.41 kg/m      Physical Exam    No PE.    PLAN:    Concerns of electrolyte imbalances.  Patient sent to ED for evaluation and treatment.  Patient left in stable condition.    Followup:    If not improving or if condition worsens, follow up with your Primary Care Provider, Transfer to ED via private car    There are no Patient Instructions on file for this visit.      "

## 2022-10-08 NOTE — ED PROVIDER NOTES
"  History   Chief Complaint:  Syncope     HPI   Denita Flores is a 67 year old female with history of vertigo who presents with syncope The patient states she has been seeing ENT for 2 years for vertigo. She has been having progressively worsening light-headedness. She states that 2 days ago she experienced a syncopal episode when she got up during the night to use the bathroom and fell. She denies trauma or pain from the fall. She has not had any symptoms or pain over the last two days. She called the nurse line who indicated that she come in.  Currently patient is asymptomatic.    Review of Systems   Constitutional: Negative for fever.   Respiratory: Negative for shortness of breath.    Cardiovascular: Negative for chest pain.   Gastrointestinal: Negative for abdominal pain.   Neurological: Positive for syncope. Negative for dizziness and light-headedness.   All other systems reviewed and are negative.        Allergies:  Contrast Dye  Sulfa Drugs    Medications:  Albuterol inhaler  Meclizine  Levothyroxine  Singulair  Lipitor  Dyazide  Flonase  Zofran    Past Medical History:     Graves disease  Diverticulosis  Obstructive sleep apnea   Plantar warts  Spider veins  Nephrolithiasis  Epistaxis  Tubular adenoma of colon  Hypertension   Hyperlipidemia  Coronary artery disease   Vertigo    Past Surgical History:    Back surgery  LEEP    Family History:    Mother- glaucoma  Father- heart disease, kidney cancer  Sister- heart disease, colon cancer    Social History:  The patient presents to the ED with house mate  PCP: Neli Lara     Physical Exam     Patient Vitals for the past 24 hrs:   BP Temp Temp src Pulse Resp SpO2 Height Weight   10/08/22 1500 131/62 -- -- 69 16 99 % -- --   10/08/22 1435 -- -- -- 73 14 100 % -- --   10/08/22 1430 111/85 -- -- 72 -- 100 % -- --   10/08/22 1212 (!) 169/82 97.6  F (36.4  C) Temporal 82 16 98 % 1.702 m (5' 7\") 95.3 kg (210 lb)       Physical Exam  General: Alert and cooperative " with exam. Patient in mild distress. Normal mentation.  Head:  Scalp is NC/AT  Eyes:  No scleral icterus, PERRL, EOMI  ENT:  The external nose and ears are normal. The oropharynx is normal and without erythema; mucus membranes are moist. Uvula midline, no evidence of deep space infection.  Neck:  Normal range of motion without rigidity.  CV:  Regular rate and rhythm    No pathologic murmur   Resp:  Breath sounds are clear bilaterally    Non-labored, no retractions or accessory muscle use  GI:  Abdomen is soft, no distension, no tenderness. No peritoneal signs  MS:  No lower extremity edema   Skin:  Warm and dry, No rash or lesions noted.  Neuro: Oriented x 3. No gross motor deficits.  CN II through XII intact    Emergency Department Course   ECG  ECG taken at 1222, ECG read at 1232  Normal sinus rhythm.    No significant change as compared to prior, dated 3/7/22.  Rate 79 bpm. FL interval 132 ms. QRS duration 82 ms. QT/QTc 422/483 ms. P-R-T axes 54 51 71.     Laboratory:  Labs Ordered and Resulted from Time of ED Arrival to Time of ED Departure   BASIC METABOLIC PANEL - Abnormal       Result Value    Sodium 138      Potassium 3.2 (*)     Chloride 98      Carbon Dioxide (CO2) 31      Anion Gap 9      Urea Nitrogen 23      Creatinine 1.03      Calcium 9.1      Glucose 99      GFR Estimate 59 (*)    ROUTINE UA WITH MICROSCOPIC - Abnormal    Color Urine Light Yellow      Appearance Urine Clear      Glucose Urine Negative      Bilirubin Urine Negative      Ketones Urine Negative      Specific Gravity Urine 1.014      Blood Urine Small (*)     pH Urine 6.0      Protein Albumin Urine Negative      Urobilinogen Urine Normal      Nitrite Urine Negative      Leukocyte Esterase Urine Negative      RBC Urine 7 (*)     WBC Urine <1      Squamous Epithelials Urine <1     CBC WITH PLATELETS - Normal    WBC Count 10.5      RBC Count 4.72      Hemoglobin 14.2      Hematocrit 43.3      MCV 92      MCH 30.1      MCHC 32.8      RDW  12.1      Platelet Count 354        Emergency Department Course:     Reviewed:  I reviewed nursing notes, vitals, past medical history and Care Everywhere    Assessments:  1526 I obtained history and examined the patient as noted above.   1540 I rechecked the patient and explained findings.     Interventions:  1557: Potassium chloride 40 mEq    Disposition:  The patient was discharged to home.     Impression & Plan     Medical Decision Making:  Denita Flores is a 67 year old female who presents with a history and clinical exam consistent with syncope.  While vasovagal/orthostatic episode was the most likely etiology given the history of this patient, I considered a broad differential for their syncope today including cardiac arrythmia, ACS, aortic stenosis, HOCM, PE, orthostatic hypotension, drugs, situational, carotid hypersensitivity, seizure, TIA, stroke, vasovagal.   She has no signs of a concerning etiology for syncope at this point.  In addition,she has no family history of sudden death, no chest pain, no seizure activity or post-ictal period, no murmur, and no signs of orthostasis in the ED, no focal neurologic symptoms, and no complaints of concerning headache.  The workup in the ED is negative and the physical exam is re-assurring.  Supportive outpatient management is therefore indicated.      Diagnosis:    ICD-10-CM    1. Syncope, unspecified syncope type  R55        Scribe Disclosure:  I, Grace Galvan, am serving as a scribe at 2:42 PM on 10/8/2022 to document services personally performed by George Linton DO based on my observations and the provider's statements to me.            George Linton DO  10/08/22 6967

## 2022-10-08 NOTE — ED TRIAGE NOTES
Patient has been on diuretics for vertigo issues and been helping with symptoms. Last few days experiencing dizziness that feels different than Vertigo.    Triage Assessment     Row Name 10/08/22 1213       Triage Assessment (Adult)    Airway WDL WDL       Respiratory WDL    Respiratory WDL WDL       Skin Circulation/Temperature WDL    Skin Circulation/Temperature WDL WDL       Cardiac WDL    Cardiac WDL WDL       Peripheral/Neurovascular WDL    Peripheral Neurovascular WDL WDL       Cognitive/Neuro/Behavioral WDL    Cognitive/Neuro/Behavioral WDL WDL

## 2022-10-29 DIAGNOSIS — J45.30 MILD PERSISTENT ASTHMA WITHOUT COMPLICATION: ICD-10-CM

## 2022-10-31 RX ORDER — ALBUTEROL SULFATE 90 UG/1
AEROSOL, METERED RESPIRATORY (INHALATION)
Qty: 18 G | Refills: 1 | Status: SHIPPED | OUTPATIENT
Start: 2022-10-31 | End: 2023-10-21

## 2022-11-17 DIAGNOSIS — E89.0 POSTABLATIVE HYPOTHYROIDISM: ICD-10-CM

## 2022-11-18 RX ORDER — LEVOTHYROXINE SODIUM 112 UG/1
TABLET ORAL
Qty: 90 TABLET | Refills: 0 | Status: SHIPPED | OUTPATIENT
Start: 2022-11-18 | End: 2023-02-20

## 2022-11-23 ENCOUNTER — OFFICE VISIT (OUTPATIENT)
Dept: UROLOGY | Facility: CLINIC | Age: 67
End: 2022-11-23
Payer: COMMERCIAL

## 2022-11-23 VITALS
DIASTOLIC BLOOD PRESSURE: 64 MMHG | HEART RATE: 57 BPM | BODY MASS INDEX: 30.31 KG/M2 | HEIGHT: 68 IN | WEIGHT: 200 LBS | OXYGEN SATURATION: 95 % | SYSTOLIC BLOOD PRESSURE: 128 MMHG

## 2022-11-23 DIAGNOSIS — R31.29 MICROSCOPIC HEMATURIA: Primary | ICD-10-CM

## 2022-11-23 DIAGNOSIS — N20.0 NEPHROLITHIASIS: ICD-10-CM

## 2022-11-23 LAB
ALBUMIN UR-MCNC: NEGATIVE MG/DL
APPEARANCE UR: CLEAR
BILIRUB UR QL STRIP: NEGATIVE
COLOR UR AUTO: YELLOW
GLUCOSE UR STRIP-MCNC: NEGATIVE MG/DL
HGB UR QL STRIP: ABNORMAL
KETONES UR STRIP-MCNC: NEGATIVE MG/DL
LEUKOCYTE ESTERASE UR QL STRIP: ABNORMAL
NITRATE UR QL: NEGATIVE
PH UR STRIP: 6.5 [PH] (ref 5–7)
RESIDUAL VOLUME (RV) (EXTERNAL): 17
SP GR UR STRIP: 1.01 (ref 1–1.03)
UROBILINOGEN UR STRIP-ACNC: 0.2 E.U./DL

## 2022-11-23 PROCEDURE — 81003 URINALYSIS AUTO W/O SCOPE: CPT | Mod: QW | Performed by: UROLOGY

## 2022-11-23 PROCEDURE — 87086 URINE CULTURE/COLONY COUNT: CPT | Performed by: UROLOGY

## 2022-11-23 PROCEDURE — 51798 US URINE CAPACITY MEASURE: CPT | Performed by: UROLOGY

## 2022-11-23 PROCEDURE — 99213 OFFICE O/P EST LOW 20 MIN: CPT | Mod: 25 | Performed by: UROLOGY

## 2022-11-23 PROCEDURE — 87186 SC STD MICRODIL/AGAR DIL: CPT | Performed by: UROLOGY

## 2022-11-23 NOTE — LETTER
"11/23/2022       RE: Denita Flores  [address]  [place] [address]     Dear Colleague,    Thank you for referring your patient, Denita Flores, to the [facility] at [facility]. Please see a copy of my visit note below.    November 23, 2022    Denita was seen today for abdomen presser.    Diagnoses and all orders for this visit:    Microscopic hematuria  -     UA without Microscopic [[id number]]; Future  -     UA without Microscopic [[id number]]  -     MEASURE POST-VOID RESIDUAL URINE/BLADDER CAPACITY, US NON-IMAGING (10375)  -     Urine Culture Aerobic Bacterial; Future  -     Urine Culture Aerobic Bacterial    Nephrolithiasis    Stable at this time    Will send urine culture today    RTC 3 months, sooner if needed     7 minutes were spent today on the day of the encounter in reviewing the EMR including reviewing nephrology notes, direct patient care including ordering urine culture, coordination of care and documentation    Darline English MD MPH  (she/her/hers)   of Urology  [facility]      Subjective    Here for follow up    Nephrology did not find an etiology for her microscopic hematuria  (Dr Santoyo's note 6/14/22 was reviewed in Epic)    On triamtere-hydrochlorothiazide which helps her Meniere's disease but causes urinary frequency    Some dysuria    She denies any changes in health since last visit    /64   Pulse 57   Ht 1.715 m (5' 7.5\")   Wt 90.7 kg (200 lb)   SpO2 95%   BMI 30.86 kg/m    GENERAL: healthy, alert and no distress  EYES: Eyes grossly normal to inspection, conjunctivae and sclerae normal  HENT: normal cephalic/atraumatic.  External ears, nose and mouth without ulcers or lesions.  RESP: no audible wheeze, cough, or visible cyanosis.  No visible retractions or increased work of breathing.  Able to speak fully in complete sentences.  NEURO: Cranial nerves grossly intact, " mentation intact and speech normal  PSYCH: mentation appears normal, affect normal/bright, judgement and insight intact, normal speech and appearance well-groomed    Labs/imaging/pathology  Urine dip positive moderate blood and small leuks    CC  Patient Care Team:  Neli Lara DO as PCP - General (Internal Medicine)  Agbeh, Cephas Mawuena, MD as MD (OB/Gyn)  Darline English MD as Assigned Surgical Provider  Erwin Fry DPM as Assigned Musculoskeletal Provider  Kyle Santoyo MD as MD (Nephrology)  Nicolle Villanueva as Referring Physician (Internal Medicine)  Dmitriy Pitt MD as MD (Otolaryngology)  Avery, Jonathan Baires MD as MD (Cardiovascular Disease)  Gabriele Campos MD as Assigned Rheumatology Provider  Neli Lara DO as Assigned PCP  Kyle Santoyo MD as Assigned Nephrology Provider  Marcus Simmons MD as Assigned Allergy Provider  Avery, Jonathan Baires MD as Assigned Heart and Vascular Provider

## 2022-11-23 NOTE — PROGRESS NOTES
"November 23, 2022    Denita was seen today for abdomen presser.    Diagnoses and all orders for this visit:    Microscopic hematuria  -     UA without Microscopic [[id number]]; Future  -     UA without Microscopic [[id number]]  -     MEASURE POST-VOID RESIDUAL URINE/BLADDER CAPACITY, US NON-IMAGING ([id number])  -     Urine Culture Aerobic Bacterial; Future  -     Urine Culture Aerobic Bacterial    Nephrolithiasis    Stable at this time    Will send urine culture today    RTC 3 months, sooner if needed     7 minutes were spent today on the day of the encounter in reviewing the EMR including reviewing nephrology notes, direct patient care including ordering urine culture, coordination of care and documentation    Darline English MD MPH  (she/her/hers)   of Urology  [facility]      Subjective    Here for follow up    Nephrology did not find an etiology for her microscopic hematuria  (Dr Santoyo's note 6/14/22 was reviewed in Epic)    On triamtere-hydrochlorothiazide which helps her Meniere's disease but causes urinary frequency    Some dysuria    She denies any changes in health since last visit    /64   Pulse 57   Ht 1.715 m (5' 7.5\")   Wt 90.7 kg (200 lb)   SpO2 95%   BMI 30.86 kg/m    GENERAL: healthy, alert and no distress  EYES: Eyes grossly normal to inspection, conjunctivae and sclerae normal  HENT: normal cephalic/atraumatic.  External ears, nose and mouth without ulcers or lesions.  RESP: no audible wheeze, cough, or visible cyanosis.  No visible retractions or increased work of breathing.  Able to speak fully in complete sentences.  NEURO: Cranial nerves grossly intact, mentation intact and speech normal  PSYCH: mentation appears normal, affect normal/bright, judgement and insight intact, normal speech and appearance well-groomed    Labs/imaging/pathology  Urine dip positive moderate blood and small leuks    CC  Patient Care Team:  Neli Lara DO as PCP - General (Internal " Medicine)  Agbeh, Cephas Mawuena, MD as MD (OB/Gyn)  Darline English MD as Assigned Surgical Provider  Erwin Fry DPM as Assigned Musculoskeletal Provider  Kyle Santoyo MD as MD (Nephrology)  Nicolle Villanueva as Referring Physician (Internal Medicine)  Dmitriy Pitt MD as MD (Otolaryngology)  Avery, Jonathan Baires MD as MD (Cardiovascular Disease)  Gabriele Campos MD as Assigned Rheumatology Provider  Neli Lara DO as Assigned PCP  Kyle Santoyo MD as Assigned Nephrology Provider  Marcus Simmons MD as Assigned Allergy Provider  Avery, Jonathan Baires MD as Assigned Heart and Vascular Provider

## 2022-11-23 NOTE — NURSING NOTE
Chief Complaint   Patient presents with     Abdomen Presser     Per patient feel like she has UTi         UA RESULTS:  Recent Labs   Lab Test 11/23/22  1052 10/08/22  1557   COLOR Yellow Light Yellow   APPEARANCE Clear Clear   URINEGLC Negative Negative   URINEBILI Negative Negative   URINEKETONE Negative Negative   SG 1.015 1.014   UBLD Moderate* Small*   URINEPH 6.5 6.0   PROTEIN Negative Negative   UROBILINOGEN 0.2  --    NITRITE Negative Negative   LEUKEST Small* Negative   RBCU  --  7*   WBCU  --  <1               PVR 17ML      Milli Diamond, [facility]     none

## 2022-11-23 NOTE — PATIENT INSTRUCTIONS
Websites with free information:    [employer] patient website: www.voicesforpfd.org    Total Control Program: www.totalcontrolprogram.com    Supplements to prevent UTI to consider  -Probiotics  -Cranberry (for these products let them know a doctor is recommending them)   Ellura: www.myellura.Octopusapp   Theracran HP by [employer] [facility] 10001  -d-mannose 2gm daily  -Vitamin C 500-1000mg twice a day    It was a pleasure meeting with you today.  Thank you for allowing me and my team the privilege of caring for you today.  YOU are the reason we are here, and I truly hope we provided you with the excellent service you deserve.  Please let us know if there is anything else we can do for you so that we can be sure you are leaving completely satisfied with your care experience.

## 2022-11-25 DIAGNOSIS — N39.0 URINARY TRACT INFECTION: Primary | ICD-10-CM

## 2022-11-25 LAB — BACTERIA UR CULT: ABNORMAL

## 2022-11-25 RX ORDER — CIPROFLOXACIN 500 MG/1
500 TABLET, FILM COATED ORAL 2 TIMES DAILY
Qty: 10 TABLET | Refills: 0 | Status: SHIPPED | OUTPATIENT
Start: 2022-11-25 | End: 2023-03-14

## 2022-12-05 NOTE — PROGRESS NOTES
[facility]    Outpatient Physical Therapy Discharge Note  Patient: Denita Flores  : 1955    Beginning/End Dates of Reporting Period:  22 to 22    Patient seen x 2 visits 22-22 to address chronic intermittent dizziness concern.  She was planning to f/u with ENT and possible pursue VNG testing, possible Meniere's diagnosis and management.  She cancelled all remaining OP PT appts and did not call to schedule anything else.  All goals not met/assessed at time of discharge.    Referring Provider: Lexi Barajas PA-C    Therapy Diagnosis: Episodic dizziness with impaired activity tolerance     Client Self Report: Denita reports worsened dizziness symptoms since first visit. She indicates increased sense of dizziness, imbalance, and nausea.  She reports episodic vertigo with quick head movments, motion sensitivity with walking in a grocery store and driving.  Symptoms have generally improved over the past 5 days.  She does endorse some B ear pressure, L>R. She continues to be on diuretics and has cut out all sodium from her diet.       Goals:  Goal Identifier DVA   Goal Description Patient will demonstrate WFL testing with DVA indicating improvement in peripheral vestibular function and improved management of symptoms longterm.   Target Date 22   Date Met      Progress (detail required for progress note): Baseline: 4-line degredation at 2Hz testing (norms 1-2 lines). All goals not met/assessed at time of discharge.     Goal Identifier HEP   Goal Description Patient will demonstrate independent carryover of HEP and understanding of symptom management with potential future dizziness attacks for improved QOL and mobility safety.   Target Date 22   Date Met      Progress (detail required for progress note):  HEP issued       Plan:  Discharge from therapy.    Discharge:    Reason for  Discharge: Patient chooses to discontinue therapy.  Patient has failed to schedule further appointments.    Equipment Issued: none    Discharge Plan: Patient to continue home program.

## 2022-12-05 NOTE — ADDENDUM NOTE
Encounter addended by: Flaco Dejesus, PT on: 12/5/2022 11:50 AM   Actions taken: Clinical Note Signed, Episode resolved

## 2022-12-29 ENCOUNTER — ANCILLARY PROCEDURE (OUTPATIENT)
Dept: MAMMOGRAPHY | Facility: CLINIC | Age: 67
End: 2022-12-29
Attending: INTERNAL MEDICINE
Payer: COMMERCIAL

## 2022-12-29 DIAGNOSIS — L23.9 ALLERGIC CONTACT DERMATITIS, UNSPECIFIED TRIGGER: ICD-10-CM

## 2022-12-29 DIAGNOSIS — Z12.31 ENCOUNTER FOR SCREENING MAMMOGRAM FOR BREAST CANCER: ICD-10-CM

## 2022-12-29 DIAGNOSIS — Z12.31 VISIT FOR SCREENING MAMMOGRAM: ICD-10-CM

## 2022-12-29 PROCEDURE — 77063 BREAST TOMOSYNTHESIS BI: CPT | Mod: TC | Performed by: RADIOLOGY

## 2022-12-29 PROCEDURE — 77067 SCR MAMMO BI INCL CAD: CPT | Mod: TC | Performed by: RADIOLOGY

## 2022-12-30 ENCOUNTER — TELEPHONE (OUTPATIENT)
Dept: FAMILY MEDICINE | Facility: CLINIC | Age: 67
End: 2022-12-30

## 2022-12-30 DIAGNOSIS — R21 RASH: Primary | ICD-10-CM

## 2022-12-30 RX ORDER — KETOCONAZOLE 20 MG/G
CREAM TOPICAL 2 TIMES DAILY
Qty: 30 G | Refills: 1 | Status: SHIPPED | OUTPATIENT
Start: 2022-12-30 | End: 2023-09-27

## 2022-12-30 RX ORDER — TRIAMCINOLONE ACETONIDE 1 MG/G
CREAM TOPICAL
Qty: 80 G | Refills: 0 | Status: SHIPPED | OUTPATIENT
Start: 2022-12-30 | End: 2023-10-21

## 2022-12-30 NOTE — TELEPHONE ENCOUNTER
Writer called patient to notify of below, patient appreciative of call back and will follow up with pharmacy.    No further questions or concerns at this time.    Signing encounter.    Reinaldo Polk RN  [facility]

## 2022-12-30 NOTE — TELEPHONE ENCOUNTER
Patient requests to restart ketoconazole for crease in her skin on abdomen. States that it was stopped when all of her heart medications were started. States that the only one she has left is atorvastatin.   Patient requests refill of ketoconazole.    Heidi Freire RN

## 2023-01-28 ENCOUNTER — OFFICE VISIT (OUTPATIENT)
Dept: URGENT CARE | Facility: URGENT CARE | Age: 68
End: 2023-01-28
Payer: COMMERCIAL

## 2023-01-28 VITALS
WEIGHT: 201.2 LBS | RESPIRATION RATE: 18 BRPM | DIASTOLIC BLOOD PRESSURE: 81 MMHG | TEMPERATURE: 98.6 F | SYSTOLIC BLOOD PRESSURE: 129 MMHG | BODY MASS INDEX: 31.05 KG/M2 | OXYGEN SATURATION: 99 % | HEART RATE: 75 BPM

## 2023-01-28 DIAGNOSIS — R30.0 DYSURIA: Primary | ICD-10-CM

## 2023-01-28 DIAGNOSIS — R31.29 MICROSCOPIC HEMATURIA: Chronic | ICD-10-CM

## 2023-01-28 DIAGNOSIS — N20.0 NEPHROLITHIASIS: ICD-10-CM

## 2023-01-28 LAB
ALBUMIN UR-MCNC: NEGATIVE MG/DL
APPEARANCE UR: ABNORMAL
BILIRUB UR QL STRIP: NEGATIVE
COLOR UR AUTO: ABNORMAL
GLUCOSE UR STRIP-MCNC: NEGATIVE MG/DL
HGB UR QL STRIP: ABNORMAL
KETONES UR STRIP-MCNC: NEGATIVE MG/DL
LEUKOCYTE ESTERASE UR QL STRIP: NEGATIVE
NITRATE UR QL: NEGATIVE
PH UR STRIP: 6 [PH] (ref 5–7)
RBC #/AREA URNS AUTO: ABNORMAL /HPF
SP GR UR STRIP: <=1.005 (ref 1–1.03)
UROBILINOGEN UR STRIP-ACNC: 0.2 E.U./DL
WBC #/AREA URNS AUTO: ABNORMAL /HPF

## 2023-01-28 PROCEDURE — 81001 URINALYSIS AUTO W/SCOPE: CPT

## 2023-01-28 PROCEDURE — 99213 OFFICE O/P EST LOW 20 MIN: CPT | Performed by: NURSE PRACTITIONER

## 2023-01-28 PROCEDURE — 87086 URINE CULTURE/COLONY COUNT: CPT | Performed by: NURSE PRACTITIONER

## 2023-01-28 NOTE — PROGRESS NOTES
Assessment & Plan     Dysuria  - UA macro with reflex to Microscopic and Culture - Clinc Collect  - Urine Microscopic  - Urine Culture Aerobic Bacterial - lab collect    Microscopic hematuria  - Urine Culture Aerobic Bacterial - lab collect    Nephrolithiasis  - Urine Culture Aerobic Bacterial - lab collect     Patient Instructions     Results for orders placed or performed in visit on 01/28/23   UA macro with reflex to Microscopic and Culture - Clinc Collect     Status: Abnormal    Specimen: Urine, Midstream   Result Value Ref Range    Color Urine Orange (A) Colorless, Straw, Light Yellow, Yellow    Appearance Urine Slightly Cloudy (A) Clear    Glucose Urine Negative Negative mg/dL    Bilirubin Urine Negative Negative    Ketones Urine Negative Negative mg/dL    Specific Gravity Urine <=1.005 1.003 - 1.035    Blood Urine Small (A) Negative    pH Urine 6.0 5.0 - 7.0    Protein Albumin Urine Negative Negative mg/dL    Urobilinogen Urine 0.2 0.2, 1.0 E.U./dL    Nitrite Urine Negative Negative    Leukocyte Esterase Urine Negative Negative   Urine Microscopic     Status: Abnormal   Result Value Ref Range    RBC Urine 2-5 (A) 0-2 /HPF /HPF    WBC Urine 0-5 0-5 /HPF /HPF    Narrative    Urine Culture not indicated     UA negative, culture ordered.    Push fluids  Pyridium PRN - urine will be dark orange   Will call if culture is positive.     F/u in 1 week if persists or 3 days if worsening.         Return in about 1 week (around 2/4/2023) for with regular provider if symptoms persist.    NANDA Sweeney [facility] KIMBERLY Barger is a 67 year old female who presents to clinic today for the following health issues:  Chief Complaint   Patient presents with     Dysuria     Patient presents with ongoing dysuria, urgency and frequency for the last months     HPI    UTI    Onset of symptoms was 3month(s).  Course of illness is waxing and waning  Severity mild  Current and  associated symptoms dysuria, frequency and urgency  Treatment and measures tried Uristat (Pyridium), Cranberry juice and Increase fluid intake  Predisposing factors include history of frequent UTI's  Patient denies flank pain, temperature > 101 degrees F., vaginal discharge, vaginal odor and vaginal itching      Was seen in urology and had a negative UA and the urine culture came back positive.    Would like urine culture done today.      Review of Systems  Constitutional, HEENT, cardiovascular, pulmonary, GI, , musculoskeletal, neuro, skin, endocrine and psych systems are negative, except as otherwise noted.      Objective    /81 (BP Location: Right arm, Patient Position: Sitting, Cuff Size: Adult Large)   Pulse 75   Temp 98.6  F (37  C) (Oral)   Resp 18   Wt 91.3 kg (201 lb 3.2 oz)   SpO2 99%   BMI 31.05 kg/m    Physical Exam   GENERAL: healthy, alert and no distress  NECK: no adenopathy, no asymmetry, masses, or scars and thyroid normal to palpation  RESP: lungs clear to auscultation - no rales, rhonchi or wheezes  CV: regular rate and rhythm, normal S1 S2, no S3 or S4, no murmur, click or rub, no peripheral edema and peripheral pulses strong  ABDOMEN: soft, nontender, no hepatosplenomegaly, no masses and bowel sounds normal  MS: no gross musculoskeletal defects noted, no edema  BACK: no CVA tenderness, no paralumbar tenderness

## 2023-01-28 NOTE — PATIENT INSTRUCTIONS
Results for orders placed or performed in visit on 01/28/23   UA macro with reflex to Microscopic and Culture - Clinc Collect     Status: Abnormal    Specimen: Urine, Midstream   Result Value Ref Range    Color Urine Orange (A) Colorless, Straw, Light Yellow, Yellow    Appearance Urine Slightly Cloudy (A) Clear    Glucose Urine Negative Negative mg/dL    Bilirubin Urine Negative Negative    Ketones Urine Negative Negative mg/dL    Specific Gravity Urine <=1.005 1.003 - 1.035    Blood Urine Small (A) Negative    pH Urine 6.0 5.0 - 7.0    Protein Albumin Urine Negative Negative mg/dL    Urobilinogen Urine 0.2 0.2, 1.0 E.U./dL    Nitrite Urine Negative Negative    Leukocyte Esterase Urine Negative Negative   Urine Microscopic     Status: Abnormal   Result Value Ref Range    RBC Urine 2-5 (A) 0-2 /HPF /HPF    WBC Urine 0-5 0-5 /HPF /HPF    Narrative    Urine Culture not indicated     UA negative, culture ordered.    Push fluids  Pyridium PRN - urine will be dark orange   Will call if culture is positive.     F/u in 1 week if persists or 3 days if worsening.

## 2023-01-29 ENCOUNTER — TELEPHONE (OUTPATIENT)
Dept: NURSING | Facility: CLINIC | Age: 68
End: 2023-01-29
Payer: COMMERCIAL

## 2023-01-29 NOTE — TELEPHONE ENCOUNTER
Pt seen in UC on 1/28/23 for UTI    Pt calling back for results.    Advised urine culture is still in process.    Pt hoping to hear tonight stating still having UTI symptoms.    msg routed to  pool.    Sonam Tran RN, Nurse Advisor 5:14 PM 1/29/2023

## 2023-01-30 ENCOUNTER — NURSE TRIAGE (OUTPATIENT)
Dept: NURSING | Facility: CLINIC | Age: 68
End: 2023-01-30
Payer: COMMERCIAL

## 2023-01-30 LAB — BACTERIA UR CULT: NORMAL

## 2023-01-31 NOTE — TELEPHONE ENCOUNTER
Calling for UC results.  Given results was negative.  Has been drinking extra fluids.  See's the urologist on 2/23/23.    Alondra Abebe RN, MA  [facility] Triage Nurse Advisor

## 2023-02-20 ENCOUNTER — TELEPHONE (OUTPATIENT)
Dept: FAMILY MEDICINE | Facility: CLINIC | Age: 68
End: 2023-02-20

## 2023-02-20 DIAGNOSIS — E89.0 POSTABLATIVE HYPOTHYROIDISM: ICD-10-CM

## 2023-02-20 RX ORDER — LEVOTHYROXINE SODIUM 112 UG/1
TABLET ORAL
Qty: 90 TABLET | Refills: 0 | Status: SHIPPED | OUTPATIENT
Start: 2023-02-20 | End: 2023-05-15

## 2023-02-20 NOTE — TELEPHONE ENCOUNTER
Medication Question or Refill    Contacts       Type Contact Phone/Fax    02/20/2023 05:03 PM CST Phone (Incoming) Denita Flores BLANCA (Self) [phone] (M)          What medication are you calling about (include dose and sig)?: levothyroxine (SYNTHROID/LEVOTHROID) 112 MCG tablet    Preferred Pharmacy:   [id number] [place] [address] AT [facility] [address]  [facility] [address]  Phone: [phone] Fax: [phone]    [id number] [address] AT [address] [place] [address] [facility] [address]  Phone: [phone] Fax: [phone]      Controlled Substance Agreement on file:   CSA -- Patient Level:    CSA: None found at the patient level.       Who prescribed the medication?:     Do you need a refill? Yes    When did you use the medication last?     Patient offered an appointment? No    Do you have any questions or concerns?  No      Okay to leave a detailed message?: Yes at Home number on file [phone] (home)

## 2023-02-24 ENCOUNTER — OFFICE VISIT (OUTPATIENT)
Dept: UROLOGY | Facility: CLINIC | Age: 68
End: 2023-02-24
Payer: COMMERCIAL

## 2023-02-24 VITALS
DIASTOLIC BLOOD PRESSURE: 62 MMHG | WEIGHT: 190 LBS | BODY MASS INDEX: 28.79 KG/M2 | HEIGHT: 68 IN | SYSTOLIC BLOOD PRESSURE: 118 MMHG

## 2023-02-24 DIAGNOSIS — N20.0 NEPHROLITHIASIS: ICD-10-CM

## 2023-02-24 DIAGNOSIS — Z87.440 HISTORY OF UTI: Primary | ICD-10-CM

## 2023-02-24 DIAGNOSIS — R31.29 MICROSCOPIC HEMATURIA: ICD-10-CM

## 2023-02-24 DIAGNOSIS — R39.89 BLADDER PAIN: ICD-10-CM

## 2023-02-24 DIAGNOSIS — Z85.41 HISTORY OF CERVICAL CANCER: ICD-10-CM

## 2023-02-24 DIAGNOSIS — R35.0 URINARY FREQUENCY: ICD-10-CM

## 2023-02-24 LAB
ALBUMIN UR-MCNC: NEGATIVE MG/DL
APPEARANCE UR: CLEAR
BACTERIA #/AREA URNS HPF: ABNORMAL /HPF
BILIRUB UR QL STRIP: NEGATIVE
COLOR UR AUTO: YELLOW
GLUCOSE UR STRIP-MCNC: NEGATIVE MG/DL
HGB UR QL STRIP: ABNORMAL
KETONES UR STRIP-MCNC: NEGATIVE MG/DL
LEUKOCYTE ESTERASE UR QL STRIP: ABNORMAL
MUCOUS THREADS #/AREA URNS LPF: PRESENT /LPF
NITRATE UR QL: NEGATIVE
PH UR STRIP: 7 [PH] (ref 5–7)
RBC URINE: 25 /HPF
SP GR UR STRIP: 1.01 (ref 1–1.03)
SQUAMOUS EPITHELIAL: <1 /HPF
UROBILINOGEN UR STRIP-ACNC: 0.2 E.U./DL
WBC CLUMPS #/AREA URNS HPF: PRESENT /HPF
WBC URINE: 24 /HPF

## 2023-02-24 PROCEDURE — 81001 URINALYSIS AUTO W/SCOPE: CPT | Performed by: PHYSICIAN ASSISTANT

## 2023-02-24 PROCEDURE — 99214 OFFICE O/P EST MOD 30 MIN: CPT | Performed by: PHYSICIAN ASSISTANT

## 2023-02-24 PROCEDURE — 87086 URINE CULTURE/COLONY COUNT: CPT | Performed by: PHYSICIAN ASSISTANT

## 2023-02-24 ASSESSMENT — PAIN SCALES - GENERAL: PAINLEVEL: NO PAIN (0)

## 2023-02-24 NOTE — NURSING NOTE
Chief Complaint   Patient presents with     UTI     Patient here for 3 month follow up.   Soraida Suarez LPN

## 2023-02-24 NOTE — PROGRESS NOTES
Urology Clinic      Name: Denita Flores    MRN: [id number]   YOB: 1955  Accompanied at today's visit by:self                 Chief Complaint:   Lashae          History of Present Illness:   February 24, 2023    HISTORY:   We have been following 67 year old Denita Flores for Last seen by Dr. English on 11/23/22 for microscopic hematuria,hx of nephrolithiasis (stable) and Lashae. Has family hx of kidney cancer and personal hx of cervical cancer. Has had 2 negative workups for microscopic hematuria. Her last hematuria work-up was in 2/2022 so was sent to nephrology. Nephrology did not find etiology of her microscopic hematuria. Takes triamtere-hydrochlorothiazide which helps her Meniere's disease but causes urinary frequency and night time frequency. No urinary incontinence. Last saw Dr. English for abdominal pressure. UC at the time showed 50,000-100,000 E. Coli. States she also had a UTI about 1 month ago for bladder pain. Her UC came back contaminated. States symptoms much improved. Does not have bladder pain today but unsure if has UTI. Does not have any other UTI symptoms. Hx of falls and syncope. Patient voices no other concerns at this time.            Allergies:     Allergies   Allergen Reactions     Contrast Dye Hives and Itching     Isovue with ANTON on 1-21-19     Sulfa Drugs Hives and Swelling     Noted in 10/18/09 ER            Medications:     Current Outpatient Medications   Medication Sig     atorvastatin (LIPITOR) 20 MG tablet TAKE 1 TABLET(20 MG) BY MOUTH AT BEDTIME     Cholecalciferol (D3 ADULT PO) Take 2,000 Units by mouth daily     ciprofloxacin (CIPRO) 500 MG tablet Take 1 tablet (500 mg) by mouth 2 times daily     cyanocolbalamin (VITAMIN  B-12) 100 MCG tablet Take 100 mcg by mouth daily.     fluticasone (FLONASE) 50 MCG/ACT nasal spray Spray 1 spray into both nostrils 2 times daily     ketoconazole (NIZORAL) 2 % external cream Apply topically 2 times daily     levothyroxine  "(SYNTHROID/LEVOTHROID) 112 MCG tablet TAKE 1 TABLET BY MOUTH DAILY     meclizine (ANTIVERT) 25 MG tablet Take 1 tablet (25 mg) by mouth 3 times daily as needed for dizziness     montelukast (SINGULAIR) 10 MG tablet TAKE 1 TABLET(10 MG) BY MOUTH AT BEDTIME     montelukast (SINGULAIR) 10 MG tablet TAKE 1 TABLET(10 MG) BY MOUTH AT BEDTIME     ondansetron (ZOFRAN ODT) 4 MG ODT tab Take 1 tablet (4 mg) by mouth every 8 hours as needed for nausea     triamcinolone (KENALOG) 0.1 % external cream APPLY SPARINGLY TOPICALLY TO THE AFFECTED AREA THREE TIMES DAILY AS NEEDED     triamterene-HCTZ (DYAZIDE) 37.5-25 MG capsule Take 1 capsule by mouth 2 times daily     VENTOLIN  (90 Base) MCG/ACT inhaler INHALE 2 PUFFS INTO THE LUNGS EVERY 6 HOURS AS NEEDED FOR SHORTNESS OF BREATH OR DIFFICULT BREATHING     No current facility-administered medications for this visit.               Past  Surgical History:     Past Surgical History:   Procedure Laterality Date     BACK SURGERY       COLONOSCOPY WITH CO2 INSUFFLATION N/A 2/21/2017    Procedure: COLONOSCOPY WITH CO2 INSUFFLATION;  Surgeon: Duane, William Charles, MD;  Location: [facility] OR     CYSTOSCOPY FLEXIBLE  8/10/2018     LEEP TX, CERVICAL      in her 20's             Physical Exam:     Vitals:    02/24/23 1029   BP: 118/62   Weight: 86.2 kg (190 lb)   Height: 1.715 m (5' 7.52\")     PSYCH: NAD  EYES: EOMI  NEURO: AAO x3    LABS:   UC  1/28/23 mixed nikolay  11/23/22 50,000-100,000 E. Coli    UA RESULTS:  Recent Labs   Lab Test 02/24/23  1024 01/28/23  1539   COLOR Yellow Orange*   APPEARANCE Clear Slightly Cloudy*   URINEGLC Negative Negative   URINEBILI Negative Negative   URINEKETONE Negative Negative   SG 1.015 <=1.005   UBLD Moderate* Small*   URINEPH 7.0 6.0   PROTEIN Negative Negative   UROBILINOGEN 0.2 0.2   NITRITE Negative Negative   LEUKEST Trace* Negative   RBCU  --  2-5*   WBCU  --  0-5       Creatinine   Date Value Ref Range Status   10/08/2022 1.03 0.52 - 1.04 mg/dL " Final   06/04/2021 0.90 0.52 - 1.04 mg/dL Final       CT urogram 12/22/21  FINDINGS: There are no stones seen within either kidney, either  ureter, or the bladder. There is no hydroureter or hydronephrosis.  There is no perinephric fat stranding. Kidneys are normal in size and  configuration. No suspicious filling defects seen in the opacified  intrarenal collecting systems, ureters, or bladder to suggest a  urothelial malignancy. Stable liver cyst. The liver, spleen, adrenal  glands, and pancreas demonstrate no worrisome focal lesion.  There is  mild diverticulosis without evidence for diverticulitis.  No free  fluid. No free air in the abdomen. There are no abdominal or pelvic  lymph nodes that are abnormal by size criteria.  There are no dilated  loops of small intestine or large bowel to suggest ileus or  obstruction.The visualized lung bases are unremarkable. Bone windows  reveal no destructive lesions.                                                              IMPRESSION:   1. No evidence for renal, ureteral, or bladder calculi.  2. No masses or suspicious filling defects within the opacified  urinary collecting system.         Assessment and Plan:   67 year old is a pleasant female who has bladder pain, urinary frequency & nocturia secondary to diuretic use, hx of kidney stones, Lashae    Plan:  -  UA shows moderate hematuria and trace WBC; will send for micro and culture.   - Not interested in PFPT.  - Consider myrbetriq in the future  - Recommend following IC diet.   - Offered KUB to assess for new stones, patient declined stating she will follow-up with nephrology first. Advised to go to ED if develops fevers/chills, flank pain, gross hematuria.   - Continue to follow with nephrology.  - Advise asking PCP about her diuretic medication if can be changed to morning and 2pm so not voiding as much at night.   - Recommend resuming daily probiotic and avoiding cranberry juice.   - Advised to contact clinic if  "develops UTI in the future. Can try AZO prn.   - Per Dr. English's notes in the past \"Can continue to monitor her microscopic urinalysis with PCP and return if persists in a few years or sooner if the amount increases, she has gross hematuria or any other concerning symptoms\".  - Discussed vaginal estrogen cream but clearing with OB/GYN first; patient declined.   - Plan to follow-up in 6 months.   - After discussing the assessment and plan with patient, patient verbalizes understanding and agrees to the above plan. All questions answered.     Other orders as below:  Orders Placed This Encounter   Procedures     UA without Microscopic [[id number]]     UMIC - Urine Micro Only     30 minutes spent on the date of the encounter doing chart review, review of outside records, review of test results, interpretation of tests, patient visit and documentation.      Eboni Oliveira PA-C  Urology  February 24, 2023      Patient Care Team:  Neli Lara DO as PCP - General (Internal Medicine)  Agbeh, Cephas Mawuena, MD as MD (OB/Gyn)  Darline English MD as Assigned Surgical Provider  Erwin Fry DPM as Assigned Musculoskeletal Provider  Kyle Santoyo MD as MD (Nephrology)  Nicolle Villanueva as Referring Physician (Internal Medicine)  Dmitriy Pitt MD as MD (Otolaryngology)  Avery, Jonathan Baires MD as MD (Cardiovascular Disease)  Gabriele Campos MD as Assigned Rheumatology Provider  Neli Lara DO as Assigned PCP  Kyle Santoyo MD as Assigned Nephrology Provider  Marcus Simmons MD as Assigned Allergy Provider  Avery, Jonathan Baires MD as Assigned Heart and Vascular Provider       "

## 2023-02-24 NOTE — LETTER
2/24/2023       RE: Denita Flores  [address]  [facility] [address]     Dear Colleague,    Thank you for referring your patient, Denita Flores, to the [facility]OLOG[facility] at [facility]. Please see a copy of my visit note below.    Urology Clinic      Name: Denita Flores    MRN: [id number]   YOB: 1955  Accompanied at today's visit by:self                 Chief Complaint:   Lashae          History of Present Illness:   February 24, 2023    HISTORY:   We have been following 67 year old Denita Flores for Last seen by Dr. English on 11/23/22 for microscopic hematuria,hx of nephrolithiasis (stable) and Lashae. Has family hx of kidney cancer and personal hx of cervical cancer. Has had 2 negative workups for microscopic hematuria. Her last hematuria work-up was in 2/2022 so was sent to nephrology. Nephrology did not find etiology of her microscopic hematuria. Takes triamtere-hydrochlorothiazide which helps her Meniere's disease but causes urinary frequency and night time frequency. No urinary incontinence. Last saw Dr. English for abdominal pressure. UC at the time showed 50,000-100,000 E. Coli. States she also had a UTI about 1 month ago for bladder pain. Her UC came back contaminated. States symptoms much improved. Does not have bladder pain today but unsure if has UTI. Does not have any other UTI symptoms. Hx of falls and syncope. Patient voices no other concerns at this time.            Allergies:     Allergies   Allergen Reactions     Contrast Dye Hives and Itching     Isovue with ANTON on 1-21-19     Sulfa Drugs Hives and Swelling     Noted in 10/18/09 ER            Medications:     Current Outpatient Medications   Medication Sig     atorvastatin (LIPITOR) 20 MG tablet TAKE 1 TABLET(20 MG) BY MOUTH AT BEDTIME     Cholecalciferol (D3 ADULT PO) Take 2,000 Units by mouth daily     ciprofloxacin (CIPRO) 500 MG tablet Take 1 tablet (500 mg)  "by mouth 2 times daily     cyanocolbalamin (VITAMIN  B-12) 100 MCG tablet Take 100 mcg by mouth daily.     fluticasone (FLONASE) 50 MCG/ACT nasal spray Spray 1 spray into both nostrils 2 times daily     ketoconazole (NIZORAL) 2 % external cream Apply topically 2 times daily     levothyroxine (SYNTHROID/LEVOTHROID) 112 MCG tablet TAKE 1 TABLET BY MOUTH DAILY     meclizine (ANTIVERT) 25 MG tablet Take 1 tablet (25 mg) by mouth 3 times daily as needed for dizziness     montelukast (SINGULAIR) 10 MG tablet TAKE 1 TABLET(10 MG) BY MOUTH AT BEDTIME     montelukast (SINGULAIR) 10 MG tablet TAKE 1 TABLET(10 MG) BY MOUTH AT BEDTIME     ondansetron (ZOFRAN ODT) 4 MG ODT tab Take 1 tablet (4 mg) by mouth every 8 hours as needed for nausea     triamcinolone (KENALOG) 0.1 % external cream APPLY SPARINGLY TOPICALLY TO THE AFFECTED AREA THREE TIMES DAILY AS NEEDED     triamterene-HCTZ (DYAZIDE) 37.5-25 MG capsule Take 1 capsule by mouth 2 times daily     VENTOLIN  (90 Base) MCG/ACT inhaler INHALE 2 PUFFS INTO THE LUNGS EVERY 6 HOURS AS NEEDED FOR SHORTNESS OF BREATH OR DIFFICULT BREATHING     No current facility-administered medications for this visit.               Past  Surgical History:     Past Surgical History:   Procedure Laterality Date     BACK SURGERY       COLONOSCOPY WITH CO2 INSUFFLATION N/A 2/21/2017    Procedure: COLONOSCOPY WITH CO2 INSUFFLATION;  Surgeon: Duane, William Charles, MD;  Location: [facility] OR     CYSTOSCOPY FLEXIBLE  8/10/2018     LEEP TX, CERVICAL      in her 20's             Physical Exam:     Vitals:    02/24/23 1029   BP: 118/62   Weight: 86.2 kg (190 lb)   Height: 1.715 m (5' 7.52\")     PSYCH: NAD  EYES: EOMI  NEURO: AAO x3    LABS:   UC  1/28/23 mixed nikolay  11/23/22 50,000-100,000 E. Coli    UA RESULTS:  Recent Labs   Lab Test 02/24/23  1024 01/28/23  1539   COLOR Yellow Orange*   APPEARANCE Clear Slightly Cloudy*   URINEGLC Negative Negative   URINEBILI Negative Negative   URINEKETONE " Negative Negative   SG 1.015 <=1.005   UBLD Moderate* Small*   URINEPH 7.0 6.0   PROTEIN Negative Negative   UROBILINOGEN 0.2 0.2   NITRITE Negative Negative   LEUKEST Trace* Negative   RBCU  --  2-5*   WBCU  --  0-5       Creatinine   Date Value Ref Range Status   10/08/2022 1.03 0.52 - 1.04 mg/dL Final   06/04/2021 0.90 0.52 - 1.04 mg/dL Final       CT urogram 12/22/21  FINDINGS: There are no stones seen within either kidney, either  ureter, or the bladder. There is no hydroureter or hydronephrosis.  There is no perinephric fat stranding. Kidneys are normal in size and  configuration. No suspicious filling defects seen in the opacified  intrarenal collecting systems, ureters, or bladder to suggest a  urothelial malignancy. Stable liver cyst. The liver, spleen, adrenal  glands, and pancreas demonstrate no worrisome focal lesion.  There is  mild diverticulosis without evidence for diverticulitis.  No free  fluid. No free air in the abdomen. There are no abdominal or pelvic  lymph nodes that are abnormal by size criteria.  There are no dilated  loops of small intestine or large bowel to suggest ileus or  obstruction.The visualized lung bases are unremarkable. Bone windows  reveal no destructive lesions.                                                              IMPRESSION:   1. No evidence for renal, ureteral, or bladder calculi.  2. No masses or suspicious filling defects within the opacified  urinary collecting system.         Assessment and Plan:   67 year old is a pleasant female who has bladder pain, urinary frequency & nocturia secondary to diuretic use, hx of kidney stones, Lashae    Plan:  -  UA shows moderate hematuria and trace WBC; will send for micro and culture.   - Not interested in PFPT.  - Consider myrbetriq in the future  - Recommend following IC diet.   - Offered KUB to assess for new stones, patient declined stating she will follow-up with nephrology first. Advised to go to ED if develops  "fevers/chills, flank pain, gross hematuria.   - Continue to follow with nephrology.  - Advise asking PCP about her diuretic medication if can be changed to morning and 2pm so not voiding as much at night.   - Recommend resuming daily probiotic and avoiding cranberry juice.   - Advised to contact clinic if develops UTI in the future. Can try AZO prn.   - Per Dr. English's notes in the past \"Can continue to monitor her microscopic urinalysis with PCP and return if persists in a few years or sooner if the amount increases, she has gross hematuria or any other concerning symptoms\".  - Discussed vaginal estrogen cream but clearing with OB/GYN first; patient declined.   - Plan to follow-up in 6 months.   - After discussing the assessment and plan with patient, patient verbalizes understanding and agrees to the above plan. All questions answered.     Other orders as below:  Orders Placed This Encounter   Procedures     UA without Microscopic [[id number]]     UMIC - Urine Micro Only     30 minutes spent on the date of the encounter doing chart review, review of outside records, review of test results, interpretation of tests, patient visit and documentation.      Eboni Oliveira PA-C  Urology  February 24, 2023      Patient Care Team:  Neli Lara DO as PCP - General (Internal Medicine)  Agbeh, Cephas Mawuena, MD as MD (OB/Gyn)  Darline English MD as Assigned Surgical Provider  Erwin Fry DPM as Assigned Musculoskeletal Provider  Kyle Santoyo MD as MD (Nephrology)  Nicolle Villanueva as Referring Physician (Internal Medicine)  Dmitriy Pitt MD as MD (Otolaryngology)  Jonathan Varela MD as MD (Cardiovascular Disease)  Gabriele Campos MD as Assigned Rheumatology Provider  Neli Lara DO as Assigned PCP  Kyle Santoyo MD as Assigned Nephrology Provider  Marcus Simmons MD as Assigned Allergy Provider  Jonathan Varela MD as Assigned Heart and Vascular Provider         "

## 2023-02-24 NOTE — PATIENT INSTRUCTIONS
- start daily women's probiotic  - If develop bladder pain or UTI symptoms, push fluids and take AZO. If your symptoms do not improve after 24-48 hours contact our clinic   - Continue to follow with nephrology  - If you see blood in your urine or have more UTIs let our clinic know.   - If your bladder symptoms change/worsen let us know.   - Continue to drink lots of fluids daily; about 80oz  - Stop drinking water 3 hours before bed.   - Avoid cranberry juice; recommend cranberry supplement.   - Follow-up in 3 months.   - Advise asking your primary care provider about your Diuretic medication if can be changed to morning and 2pm so not voiding as much at night.   - Your urine culture is pending. Will notify you of the results. If develop fevers/chills, blood in the urine or flank pain advise you to go to the ER.     Dietary recommendations:    Foods/Beverages to Avoid:  caffeinated beverages, carbonated beverages, coffee, decaffeinated coffee, tea, caffeine free tea, soda, alcoholic beverages, grapefruit, lemon, oranges, pineapple, cranberry juice, grapefruit juice, orange juice, pineapple juice, tomato or tomato based products, red dyed products, spicy foods, chili, horseradish, vinegar, MSG, artificial sweeteners, Mexican food, French food, American food, sugar, strawberries, cranberries, chocolate, mocha, key lime, salsa, spicy/hot chips/crackers, chili pepper flakes, hot/spicey wing sauces, jalapeno, citric acid, citrus fruits, soy, oil & vinegar salad dressings, Vitamin C & B6.    Foods/beverages that are least irritating:  water, milk, bananas, blueberries, honeydew melon, peers, raisins, watermelon, broccoli, [place] sprouts, cabbage, carrots, cauliflower, celery, cucumber, mushrooms, peas, radishes, squash, zucchini, white potatoes, sweet potatoes/yams, chicken, eggs, turkey, beef, pork, lamb, shrimp, tunafish, salmon, oat, rice, pretzels, popcorn, applesauce, apricots, artichokes, asparagus, avocado, basil,  beans, fresh beats, bell peppers, nonprocessed or overly spiced cheeses, plain chips, corn, oatmeal, cottage cheese, garlic, basil, dill, mushrooms, creamers without soybean oil, nuts, oils, black olives, oregano, parsley, non-chocolate or fruit pastries, non-soy protein powders, pumpkin, quinoa, radish, fresh rhubarb, rice, rosemary, warren, ranch dressing, table salt, non-chocolate puddings, honey, thyme, corn or flour tortillas, vanilla, Vitamins (A, B1, B2, B12, D, E, K), flour, cool whip.   ___________________________________________________________

## 2023-02-25 LAB — BACTERIA UR CULT: NORMAL

## 2023-03-06 ENCOUNTER — LAB (OUTPATIENT)
Dept: LAB | Facility: CLINIC | Age: 68
End: 2023-03-06
Payer: COMMERCIAL

## 2023-03-06 DIAGNOSIS — R31.29 MICROSCOPIC HEMATURIA: ICD-10-CM

## 2023-03-06 LAB
ALBUMIN SERPL BCG-MCNC: 4.4 G/DL (ref 3.5–5.2)
ALBUMIN UR-MCNC: NEGATIVE MG/DL
ANION GAP SERPL CALCULATED.3IONS-SCNC: 13 MMOL/L (ref 7–15)
APPEARANCE UR: CLEAR
BACTERIA #/AREA URNS HPF: ABNORMAL /HPF
BILIRUB UR QL STRIP: NEGATIVE
BUN SERPL-MCNC: 20.2 MG/DL (ref 8–23)
CALCIUM SERPL-MCNC: 9.7 MG/DL (ref 8.8–10.2)
CHLORIDE SERPL-SCNC: 97 MMOL/L (ref 98–107)
COLOR UR AUTO: YELLOW
CREAT SERPL-MCNC: 0.97 MG/DL (ref 0.51–0.95)
DEPRECATED HCO3 PLAS-SCNC: 29 MMOL/L (ref 22–29)
GFR SERPL CREATININE-BSD FRML MDRD: 63 ML/MIN/1.73M2
GLUCOSE SERPL-MCNC: 111 MG/DL (ref 70–99)
GLUCOSE UR STRIP-MCNC: NEGATIVE MG/DL
HGB UR QL STRIP: ABNORMAL
KETONES UR STRIP-MCNC: NEGATIVE MG/DL
LEUKOCYTE ESTERASE UR QL STRIP: ABNORMAL
NITRATE UR QL: NEGATIVE
PH UR STRIP: 7 [PH] (ref 5–7)
PHOSPHATE SERPL-MCNC: 3.7 MG/DL (ref 2.5–4.5)
POTASSIUM SERPL-SCNC: 3.7 MMOL/L (ref 3.4–5.3)
RBC #/AREA URNS AUTO: ABNORMAL /HPF
SODIUM SERPL-SCNC: 139 MMOL/L (ref 136–145)
SP GR UR STRIP: 1.01 (ref 1–1.03)
SQUAMOUS #/AREA URNS AUTO: ABNORMAL /LPF
UROBILINOGEN UR STRIP-ACNC: 0.2 E.U./DL
WBC #/AREA URNS AUTO: ABNORMAL /HPF

## 2023-03-06 PROCEDURE — 80069 RENAL FUNCTION PANEL: CPT

## 2023-03-06 PROCEDURE — 81001 URINALYSIS AUTO W/SCOPE: CPT

## 2023-03-06 PROCEDURE — 36415 COLL VENOUS BLD VENIPUNCTURE: CPT

## 2023-03-09 NOTE — PROGRESS NOTES
Nephrology Progress Note  3/15/2023      Chief Complaint/Reason for Visit  HTN, EMMETT, and Microscopic Hematuria follow up    History of Present Illness  This is a 67 year old female with a past medical history significant for cervical cancer, asthma, hypertension, coronary artery disease, and kidney stones who was referred to our clinic for further work-up and management of microhematuria.    She has been struggling with vertigo.  She had seen an ENT doctor, and was started on Maxide for vertigo. She was seen in ED in October for syncopal episode, work up was negative. It was felt to be related to orthostatic hypotension. It occurred during the night when she got out of bed to use the bathroom.  She has not been checking her BP at home, it was 121/77 in clinic today.     Her vertigo has been much better with maxide and avoiding sodium. She still has some lightheadedness when standing up on occasion.     With regards to her hematuria, this was first diagnosed a few years ago.  The patient gets yearly physicals as she works as a .  The patient was evaluated by Dr. English from urology.  Work-up included a CT urogram last year which was essentially normal.  Showed no evidence of renal, ureteral, or bladder calculi.  It showed no masses, or suspicious filling defects.  Urine cytologies were also performed last year and were negative for high-grade urothelial carcinoma.  As cystoscopy were performed earlier this year, and it was unremarkable.    Recently she's had increased urinary frequency and irritative symptoms. She followed up with urology and is scheduled for CT urogram later this month, possibly another cystoscopy. Her father had kidney cancer.    With regards to her kidney function, her kidney function has been essentially normal.  However, her laboratory work in the fall showed an elevated creatinine of 1.1 mg/dL.  The rest of her work-up is overall reassuring.  She has no proteinuria her serum  glucose is normal.  Her hemoglobin is normal.  Her urinalysis showed trace blood she had in the past.  However, there is no pyuria, crystals, or casts.  Of note, she had an ELVIRA that was borderline positive earlier this year.    Serology studies were obtained including ANCA, Hep B, Hep C, compliments, DNAds, IF, and SPEP which were all negative. No further work up was indicated.     Creatinine improved some to 0.97, baseline appears to be 0.6-0.9.     The following portions of the patient's history were reviewed and updated as appropriate: allergies, current medications, past family history, past medical history, past social history, past surgical history and problem list.    Subjective   Review of Systems  A comprehensive review of systems was performed. Pertinent positives and negatives are described above.    Past Medical/Surgical History  Patient  has a past medical history of Coronary artery disease involving native coronary artery of native heart, angina presence unspecified (6/11/2021), Essential hypertension (6/11/2021), Graves disease, Kidney stones (2000), Malignant neoplasm (H), Mild persistent asthma (5/8/2013), Swollen finger, and Thyroid disease.    She has no past medical history of Congestive heart failure, unspecified, Diabetes mellitus (H), or Unspecified cerebral artery occlusion with cerebral infarction.  Patient  has a past surgical history that includes back surgery; leep tx, cervical; Colonoscopy with CO2 insufflation (N/A, 2/21/2017); and Cystoscopy flexible (8/10/2018).    Social History  Patient  reports that she quit smoking about 20 years ago. Her smoking use included cigarettes. She has a 52.50 pack-year smoking history. She has never used smokeless tobacco. She reports current alcohol use. She reports that she does not use drugs.    Family History  family history includes Cancer (age of onset: 59) in her sister; Cancer (age of onset: 67) in her father; Colon Cancer in her maternal aunt  and paternal aunt; Diabetes in her sister; Glaucoma in her maternal grandmother and mother; Heart Disease in her father and sister.    Objective   Vital Signs  Blood pressure 121/77, pulse 71, weight 88.5 kg (195 lb), SpO2 99 %, not currently breastfeeding. There is no height or weight on file to calculate BMI.     Physical Examination  General: awake and alert, answers questions appropriately  Skin: warm and dry, no visible rashes  Heart: RRR, no murmur noted  Lungs: CTA, no crackles or wheeze  Extremities: no LE edema    Reviewed recent renal panel, UA, and serology studies.     Lab Results   Component Value Date    WBC 10.5 10/08/2022    HGB 14.2 10/08/2022    HCT 43.3 10/08/2022    MCV 92 10/08/2022     10/08/2022     03/06/2023    BUN 20.2 03/06/2023    ANIONGAP 13 03/06/2023    ALBUMIN 4.4 03/06/2023     Lab Results   Component Value Date    UROBILINOGEN 0.2 03/06/2023     Current Outpatient Medications   Medication     atorvastatin (LIPITOR) 20 MG tablet     Cholecalciferol (D3 ADULT PO)     ciprofloxacin (CIPRO) 500 MG tablet     cyanocolbalamin (VITAMIN  B-12) 100 MCG tablet     fluticasone (FLONASE) 50 MCG/ACT nasal spray     ketoconazole (NIZORAL) 2 % external cream     levothyroxine (SYNTHROID/LEVOTHROID) 112 MCG tablet     meclizine (ANTIVERT) 25 MG tablet     montelukast (SINGULAIR) 10 MG tablet     montelukast (SINGULAIR) 10 MG tablet     ondansetron (ZOFRAN ODT) 4 MG ODT tab     triamcinolone (KENALOG) 0.1 % external cream     triamterene-HCTZ (DYAZIDE) 37.5-25 MG capsule     VENTOLIN  (90 Base) MCG/ACT inhaler     No current facility-administered medications for this visit.     Assessment & Plan   #1 EMMETT  #2 Hypertension  #3 Microscopic hematuria  #4 Remote history of a kidney stone      Today, the patient is experiencing bad vertigo.My assessment is limited given that this visit is virtual.  I am concerned about orthostatic hypotension given the introduction of Maxzide last  month.  However, the patient's home machine reported a blood pressure of 147/70.    Given the patient's above symptoms, encouraged her to reach out to her primary care physician, or to go to the emergency department for evaluations if the symptoms are bad or if she can get to see her primary care physician in time.    The patient's laboratory evaluation showed leukocytosis and elevation of serum creatinine.  It is likely that the patient had an episode of acute kidney injury in association with her infection.    The patient had intermittent microscopic hematuria.  She had a thorough urological evaluation that included a CT urogram, cystoscopy, and urine cytologies, all of which have been negative. Parenchymal kidney work up was negative.     Since the evaluation is negative, and her kidney function improved, would not recommend any further work-up.  It is important for her to keep her blood pressure well controlled.    If her kidney function continues to worsen, then it would be reasonable then to do a kidney biopsy.    My recommendations are the following:  - increase water intake, and continue low sodium diet  -- avoid ibuprofen/aleve  - Check your blood pressure at home and keep log  - Return visit in 4 months with Dr. Santoyo as planned.    Gini Bradford PA-C    Visit length 20 minutes. An additional 15 minutes were spent on date of service in chart review, documentation, and other activities as noted.

## 2023-03-14 ENCOUNTER — TELEPHONE (OUTPATIENT)
Dept: UROLOGY | Facility: CLINIC | Age: 68
End: 2023-03-14
Payer: COMMERCIAL

## 2023-03-14 DIAGNOSIS — T50.8X5D ALLERGIC REACTION TO CONTRAST MATERIAL, SUBSEQUENT ENCOUNTER: ICD-10-CM

## 2023-03-14 DIAGNOSIS — R31.29 MICROSCOPIC HEMATURIA: ICD-10-CM

## 2023-03-14 DIAGNOSIS — Z87.440 HISTORY OF UTI: Primary | ICD-10-CM

## 2023-03-14 DIAGNOSIS — N20.0 NEPHROLITHIASIS: ICD-10-CM

## 2023-03-14 RX ORDER — METHYLPREDNISOLONE 32 MG/1
32 TABLET ORAL DAILY
Qty: 2 TABLET | Refills: 0 | Status: SHIPPED | OUTPATIENT
Start: 2023-03-14 | End: 2023-06-19

## 2023-03-14 RX ORDER — DIPHENHYDRAMINE HCL 50 MG
50 CAPSULE ORAL ONCE
Qty: 1 CAPSULE | Refills: 0 | Status: SHIPPED | OUTPATIENT
Start: 2023-03-14 | End: 2023-03-14

## 2023-03-14 NOTE — PROGRESS NOTES
Discussed concerns of increased microscopic hematuria from 2-10RBC to most recent UA 25 RBC in 2/2023 with Dr. English. Patient did have repeat earlier this month of 3/2023 that showed 5-10RBC. Think would benefit for repeat CT urogram. Called patient to discuss repeating CT urogram and f/u with cysto. Patient agrees to CT urogram, though is not interested in moving forward with cystoscopy currently. Discussed with patient that pending CT urogram results, consider cysto in the future; patient verbalizes understanding. Advised patient to continue to follow with nephrology; planning to see them tomorrow. Noted that patient has hx of allergy to contrast dye from 2019. Of note, patient has had CT urogram in 2021. Patient states she took preventative medication prior to most recent CT scan with contrast and did not develop any allergic reaction with contrast dye at that time. Called imaging team at [place] for confirmation of dosing for steroid and benadryl who recommend 2 doses of methylprednisolone 32mg (first dose 12 hours prior and second dose 1 hour prior to CT scan), and 50mg benadryl 1 hour prior to CT scan. Rx for steroid and benadryl sent to [facility]'s pharmacy.

## 2023-03-14 NOTE — TELEPHONE ENCOUNTER
----- Message from Eboni Oliveira PA-C sent at 3/14/2023  2:03 PM CDT -----  Spoke to patient over the phone earlier today about repeating CT urogram for micro hematuria. Patient has allergies to contrast dye but states she tolerated contrast dye in 2021 with CT urogram with premedication. Called [facility] imaging and advise ordering benadryl and methylprednisolone as follows:    Methylprednisolone 2 doses = first dose (32mg orally) 12 hours before CT scan with contrast dye and second dose (32mg orally)  hour before CT scan with contrast dye.    Benadryl 1 dose = take 50mg oral dose 1 hour prior to CT scan with contrast.     #OF NOTE: cipro interacted with methylprednisolone. The cipro looks like an old medication prescribed in 11/2022 for 10 pills and no refills. Please ensure patient is not on cipro currently.    I sent Rx to [facility] pharmacy. Please call patient and instruct her on how/haider to take medication.       Sheila

## 2023-03-15 ENCOUNTER — OFFICE VISIT (OUTPATIENT)
Dept: NEPHROLOGY | Facility: CLINIC | Age: 68
End: 2023-03-15
Payer: COMMERCIAL

## 2023-03-15 VITALS
DIASTOLIC BLOOD PRESSURE: 77 MMHG | BODY MASS INDEX: 30.07 KG/M2 | SYSTOLIC BLOOD PRESSURE: 121 MMHG | WEIGHT: 195 LBS | OXYGEN SATURATION: 99 % | HEART RATE: 71 BPM

## 2023-03-15 DIAGNOSIS — N17.9 AKI (ACUTE KIDNEY INJURY) (H): Primary | ICD-10-CM

## 2023-03-15 DIAGNOSIS — I10 HYPERTENSION, ESSENTIAL: ICD-10-CM

## 2023-03-15 DIAGNOSIS — R31.29 MICROSCOPIC HEMATURIA: ICD-10-CM

## 2023-03-15 PROCEDURE — 99214 OFFICE O/P EST MOD 30 MIN: CPT | Performed by: PHYSICIAN ASSISTANT

## 2023-03-15 NOTE — PATIENT INSTRUCTIONS
It was a pleasure taking care of you today.  I've included a brief summary of our discussion and care plan from today's visit below.  Please review this information with your primary care provider.  _______________________________________________________________________    My recommendations are summarized as follows:  Check blood pressure at home if feeling dizzy or lightheaded.   Increase water intake, continue low sodium diet.   Avoid ibuprofen/aleve.   Labs and follow up with Dr. Santoyo as planned. Call sooner with any questions or concerns.    To schedule imaging please call [phone]     To schedule your lab appointment at [facility] 1st floor lab call [phone]     _______________________________________________________________________    Who do I call with any questions after my visit?    Please be in touch if there are any further questions that arise following today's visit.  There are multiple ways to contact your nephrology care team.      During business hours, you may reach your Nephrology RN Care Coordinator, Kealyn, at [phone].      To schedule or reschedule an appointment, please call [phone].     You can always send a secure message through [employer].  [employer] messages are answered by your nurse or doctor typically within 24 hours.  Please allow extra time on weekends and holidays.      For urgent/emergent questions after business hours, you may reach the on-call Nephrology Fellow by contacting the [facility] at [phone].     How will I get the results of any tests ordered?    You will receive all of your results.  If you have signed up for [employer], any tests ordered at your visit will be available to you after your physician reviews them.  Typically this takes 1-2 weeks.  If there are urgent results that require a change in your care plan, your physician or nurse will call you to discuss the next steps.      What is [employer]?  [employer] is a secure  way for you to access all of your healthcare records from the [facility].  It is a web based computer program, so you can sign on to it from any location.  It also allows you to send secure messages to your care team.  I recommend signing up for Down access if you have not already done so and are comfortable with using a computer.      How do I schedule a follow-up visit?  If you did not schedule a follow-up visit today, please call [phone] to schedule a follow-up office visit.        Sincerely,    HAILEE Headley [facility]  Division of Nephrology and Hypertension

## 2023-03-20 ENCOUNTER — HOSPITAL ENCOUNTER (OUTPATIENT)
Dept: CT IMAGING | Facility: CLINIC | Age: 68
Discharge: HOME | End: 2023-03-20
Attending: PHYSICIAN ASSISTANT | Admitting: PHYSICIAN ASSISTANT
Payer: COMMERCIAL

## 2023-03-20 DIAGNOSIS — N20.0 NEPHROLITHIASIS: ICD-10-CM

## 2023-03-20 DIAGNOSIS — R31.29 MICROSCOPIC HEMATURIA: ICD-10-CM

## 2023-03-20 DIAGNOSIS — Z87.440 HISTORY OF UTI: ICD-10-CM

## 2023-03-20 PROCEDURE — 250N000011 HC RX IP 250 OP 636: Performed by: PHYSICIAN ASSISTANT

## 2023-03-20 PROCEDURE — 74178 CT ABD&PLV WO CNTR FLWD CNTR: CPT

## 2023-03-20 PROCEDURE — 250N000009 HC RX 250: Performed by: PHYSICIAN ASSISTANT

## 2023-03-20 RX ORDER — IOPAMIDOL 755 MG/ML
95 INJECTION, SOLUTION INTRAVASCULAR ONCE
Status: COMPLETED | OUTPATIENT
Start: 2023-03-20 | End: 2023-03-20

## 2023-03-20 RX ADMIN — IOPAMIDOL 95 ML: 755 INJECTION, SOLUTION INTRAVENOUS at 12:11

## 2023-03-20 RX ADMIN — SODIUM CHLORIDE 72 ML: 9 INJECTION, SOLUTION INTRAVENOUS at 12:11

## 2023-03-22 ENCOUNTER — OFFICE VISIT (OUTPATIENT)
Dept: UROLOGY | Facility: CLINIC | Age: 68
End: 2023-03-22
Payer: COMMERCIAL

## 2023-03-22 ENCOUNTER — TELEPHONE (OUTPATIENT)
Dept: UROLOGY | Facility: CLINIC | Age: 68
End: 2023-03-22
Payer: COMMERCIAL

## 2023-03-22 VITALS
OXYGEN SATURATION: 98 % | DIASTOLIC BLOOD PRESSURE: 70 MMHG | HEIGHT: 67 IN | HEART RATE: 80 BPM | BODY MASS INDEX: 30.61 KG/M2 | SYSTOLIC BLOOD PRESSURE: 120 MMHG | WEIGHT: 195 LBS

## 2023-03-22 DIAGNOSIS — R31.29 MICROSCOPIC HEMATURIA: Primary | ICD-10-CM

## 2023-03-22 LAB
ALBUMIN UR-MCNC: NEGATIVE MG/DL
APPEARANCE UR: CLEAR
BILIRUB UR QL STRIP: NEGATIVE
COLOR UR AUTO: YELLOW
GLUCOSE UR STRIP-MCNC: NEGATIVE MG/DL
HGB UR QL STRIP: ABNORMAL
KETONES UR STRIP-MCNC: NEGATIVE MG/DL
LEUKOCYTE ESTERASE UR QL STRIP: ABNORMAL
NITRATE UR QL: NEGATIVE
PH UR STRIP: 6.5 [PH] (ref 5–7)
SP GR UR STRIP: 1.01 (ref 1–1.03)
UROBILINOGEN UR STRIP-ACNC: 0.2 E.U./DL

## 2023-03-22 PROCEDURE — 99212 OFFICE O/P EST SF 10 MIN: CPT | Mod: 25 | Performed by: UROLOGY

## 2023-03-22 PROCEDURE — 88112 CYTOPATH CELL ENHANCE TECH: CPT | Performed by: PATHOLOGY

## 2023-03-22 PROCEDURE — 81003 URINALYSIS AUTO W/O SCOPE: CPT | Mod: QW | Performed by: UROLOGY

## 2023-03-22 PROCEDURE — 52000 CYSTOURETHROSCOPY: CPT | Performed by: UROLOGY

## 2023-03-22 RX ORDER — LIDOCAINE HYDROCHLORIDE 20 MG/ML
JELLY TOPICAL ONCE
Status: COMPLETED | OUTPATIENT
Start: 2023-03-22 | End: 2023-03-22

## 2023-03-22 RX ADMIN — LIDOCAINE HYDROCHLORIDE 10 ML: 20 JELLY TOPICAL at 14:50

## 2023-03-22 ASSESSMENT — PAIN SCALES - GENERAL: PAINLEVEL: NO PAIN (0)

## 2023-03-22 NOTE — PROGRESS NOTES
"March 22, 2023    Return visit    Patient returns today for follow up for microscopic hematuria, UTI and bladder pain.  She recently saw Sheila and had a CT that was unremarkable.  Last cystoscopy was 2/2022.      /70   Pulse 80   Ht 1.702 m (5' 7\")   Wt 88.5 kg (195 lb)   SpO2 98%   BMI 30.54 kg/m    She is comfortable, in no distress, non-labored breathing.  Abdomen is soft, non-tender, non-distended.  Normal external female genitalia.  Negative CST.  Pelvic exam is unremarkable.    Cystoscopy Note: After informed consent was obtained patient was prepped and draped in the standard fashion.  The flexible cystoscope was inserted into a normal appearing urethral meatus.  The urothelium was carefully examined and there were some scattered nonspecific cystitis lesions but no tumors, masses, stones, foreign bodies, or other urothelial abnormalities noted.  Bilateral ureteral orifices were noted in the normal orthotopic position and both effluxed lcear urine.  The cystoscope was retroflexed and the bladder neck was unremarkable.  The urethra was carefully examined upon removing the cystoscope and was unremarkable.  Patient tolerated the procedure without complications noted.       A/P: 67 year old F with (R31.29) Microscopic hematuria  (primary encounter diagnosis)    No obvious etiology    Urine cytology and if negative will repeat in 6 months to assess resolution/stability of the non specific lesions    15 minutes were spent today on the date of the encounter in reviewing the EMR, direct patient care, coordination of care and documentation in addition to the cystoscopy procedure    Darline English MD MPH  (she/her/hers)   of Urology  [facility]      CC  Patient Care Team:  Neli Lara DO as PCP - General (Internal Medicine)  Agbeh, Cephas Mawuena, MD as MD (OB/Gyn)  Darline English MD as Assigned Surgical Provider  Kyle Santoyo MD as MD (Nephrology)  Nicolle Villanueva " Tracey as Referring Physician (Internal Medicine)  Dmitriy Pitt MD as MD (Otolaryngology)  Avery, Jonathan Baires MD as MD (Cardiovascular Disease)  Gabriele Campos MD as Assigned Rheumatology Provider  Neli Lara DO as Assigned PCP  Kyle Santoyo MD as Assigned Nephrology Provider  Marcus Simmons MD as Assigned Allergy Provider  Avery, Jonathan Baires MD as Assigned Heart and Vascular Provider

## 2023-03-22 NOTE — TELEPHONE ENCOUNTER
[facility] Call Center    Phone Message    May a detailed message be left on voicemail: yes     Reason for Call: Other: .   Pt calling wanting to speak with [place] or care team member regarding pt recent CT results. Please call pt     Action Taken: Message routed to:  Other: Uro    Travel Screening: Not Applicable

## 2023-03-22 NOTE — TELEPHONE ENCOUNTER
Returned phone call to patient and informed of CT results. She wants to proceed with Cytoscopy as this point. Patient was transferred to scheduling to arrange Cysto with MD phyllis Sosa.     Nieves Melton LPN

## 2023-03-22 NOTE — RESULT ENCOUNTER NOTE
Has had hematuria work-up in the past with recommendation that if hematuria persisted or worsened consider repeating work-up. Since last work-up, recently noticed RBC in urine analysis. Discussed with patient recently about recommending to repeat hematuria work-up. When last spoke with patient she was unsure if wanted to go through with cystoscopy but did agree to CT urogram. Attempted to call patient with results of CT urogram but no answer. Left message to call back. CT was unremarkable. Though please see if interested in cysto. If not interested in cysto, think should at least repeat urine cytology. I will be more than happy to discuss her results with her over the phone if she calls back as well.     Eboni Oliveira PA-C  Urology

## 2023-03-22 NOTE — NURSING NOTE
Chief Complaint   Patient presents with     Micro-Hemturia      Patient here today for Cystosocpy       UA RESULTS:  Recent Labs   Lab Test 03/22/23  1435 03/06/23  1031   COLOR Yellow Yellow   APPEARANCE Clear Clear   URINEGLC Negative Negative   URINEBILI Negative Negative   URINEKETONE Negative Negative   SG 1.015 1.015   UBLD Moderate* Moderate*   URINEPH 6.5 7.0   PROTEIN Negative Negative   UROBILINOGEN 0.2 0.2   NITRITE Negative Negative   LEUKEST Small* Small*   RBCU  --  5-10*   WBCU  --  10-25*       Prior to the start of the procedure and with procedural staff participation, I verbally confirmed the patient s identity using two indicators, relevant allergies, that the procedure was appropriate and matched the consent or emergent situation, and that the correct equipment/implants were available. Immediately prior to starting the procedure I conducted the Time Out with the procedural staff and re-confirmed the patient s name, procedure, and site/side. I have wiped the patient off with the povidone-Iodine solution, draped them,  used Lidocaine hydrochloride jelly, and instilled sterile water into the bladder. (The Joint Commission universal protocol was followed.)  Yes    Sedation (Moderate or Deep): None    5mL 2% lidocaine hydrochloride Urojet instilled into urethra.    NDC# [address]-6  Lot #: [id number]  Expiration Date:  09/24          CAROLYN Kulkarni

## 2023-03-22 NOTE — PATIENT INSTRUCTIONS
"                        AFTER YOUR CYSTOSCOPY  ?  ?  You have just completed a cystoscopy, or \"cysto\", which allowed your physician to learn more about your bladder (or to remove a stent placed after surgery). We suggest that you continue to avoid caffeine, fruit juice, and alcohol for the next 24 hours, however, you are encouraged to return to your normal activities.  ?  ?  A few things that are considered normal after your cystoscopy:  ?  * small amount of bleeding (or spotting) that clears within the next 24 hours  ?  * slight burning sensation with urination  ?  * sensation of needing to void (urinate) more frequently  ?  * the feeling of \"air\" in your urine  ?  * mild discomfort that is relieved with Tylenol    * bladder spasms  ?  ?  ?  Please contact our office promptly if you:  ?  * develop a fever above 101 degrees  ?  * are unable to urinate  ?  * develop bright red blood that does not stop  ?  * experience severe pain or swelling  ?  ?  ?  And of course, please contact our office with any concerns or questions [phone].  ?    AFTER YOUR CYSTOSCOPY        You have just completed a cystoscopy, or \"cysto\", which allowed your physician to learn more about your bladder (or to remove a stent placed after surgery). We suggest that you continue to avoid caffeine, fruit juice, and alcohol for the next 24 hours, however, you are encouraged to return to your normal activities.         A few things that are considered normal after your cystoscopy:     * Small amount of bleeding (or spotting) that clears within the next 24 hours     * Slight burning sensation with urination     * Sensation to of needing to avoid more frequently     * The feeling of \"air\" in your urine     * Mild discomfort that is relieved with Tylenol        Please contact our office promptly if you:     * Develop a fever above 101 degrees     * Are unable to urinate     * Develop bright red blood that does not stop     * Severe pain or " swelling         Please contact our office with any concerns or questions @[facility].

## 2023-03-22 NOTE — LETTER
"3/22/2023       RE: Denita Flores  [address]  [facility] [address]     Dear Colleague,    Thank you for referring your patient, Denita Flores, to the [facility] at [facility]. Please see a copy of my visit note below.    March 22, 2023    Return visit    Patient returns today for follow up for microscopic hematuria, UTI and bladder pain.  She recently saw Sheila and had a CT that was unremarkable.  Last cystoscopy was 2/2022.      /70   Pulse 80   Ht 1.702 m (5' 7\")   Wt 88.5 kg (195 lb)   SpO2 98%   BMI 30.54 kg/m    She is comfortable, in no distress, non-labored breathing.  Abdomen is soft, non-tender, non-distended.  Normal external female genitalia.  Negative CST.  Pelvic exam is unremarkable.    Cystoscopy Note: After informed consent was obtained patient was prepped and draped in the standard fashion.  The flexible cystoscope was inserted into a normal appearing urethral meatus.  The urothelium was carefully examined and there were some scattered nonspecific cystitis lesions but no tumors, masses, stones, foreign bodies, or other urothelial abnormalities noted.  Bilateral ureteral orifices were noted in the normal orthotopic position and both effluxed lcear urine.  The cystoscope was retroflexed and the bladder neck was unremarkable.  The urethra was carefully examined upon removing the cystoscope and was unremarkable.  Patient tolerated the procedure without complications noted.       A/P: 67 year old F with (R31.29) Microscopic hematuria  (primary encounter diagnosis)    No obvious etiology    Urine cytology and if negative will repeat in 6 months to assess resolution/stability of the non specific lesions    15 minutes were spent today on the date of the encounter in reviewing the EMR, direct patient care, coordination of care and documentation in addition to the cystoscopy procedure    Darline English MD " MPH  (she/her/hers)   of Urology  [facility]      CC  Patient Care Team:  Neli Lara DO as PCP - General (Internal Medicine)  Agbeh, Cephas Mawuena, MD as MD (OB/Gyn)  Darline English MD as Assigned Surgical Provider  Kyle Santoyo MD as MD (Nephrology)  Nicolle Villanueva as Referring Physician (Internal Medicine)  Dmitriy Pitt MD as MD (Otolaryngology)  Avery, Jonathan Baires MD as MD (Cardiovascular Disease)  Gabriele Campos MD as Assigned Rheumatology Provider  Neli Lara DO as Assigned PCP  Kyle Santoyo MD as Assigned Nephrology Provider  Marcus Simmons MD as Assigned Allergy Provider  Avery, Jonathan Baires MD as Assigned Heart and Vascular Provider

## 2023-03-23 LAB
PATH REPORT.COMMENTS IMP SPEC: NORMAL
PATH REPORT.FINAL DX SPEC: NORMAL
PATH REPORT.GROSS SPEC: NORMAL
PATH REPORT.MICROSCOPIC SPEC OTHER STN: NORMAL
PATH REPORT.RELEVANT HX SPEC: NORMAL

## 2023-03-24 ENCOUNTER — TELEPHONE (OUTPATIENT)
Dept: UROLOGY | Facility: CLINIC | Age: 68
End: 2023-03-24
Payer: COMMERCIAL

## 2023-03-24 NOTE — TELEPHONE ENCOUNTER
Called pt bc she called and LM on the nurse line stating someone called her.  She did not answer.  LM for her to call me back.    BALJIT Rodriguez CMA    Pt called back and I informed her I do not see any messages left for her and we decided it was probably the provider who was trying to reach her about her CT results.  I told her we will call her if needed.  Pt agreed with that plan.    BALJIT Rodriguez CMA

## 2023-04-15 ENCOUNTER — TELEPHONE (OUTPATIENT)
Dept: NURSING | Facility: CLINIC | Age: 68
End: 2023-04-15
Payer: COMMERCIAL

## 2023-04-15 NOTE — TELEPHONE ENCOUNTER
Pt called wanting to know when the last time she had an annual exam done. She states she always feels the urgency to urinate and follows a urologist. She is not needing/wanting to be triaged at this time. Below information was noted in her chart. She was transferred to scheduling to set up an appt.    Laurence Claudio RN  [facility] Nurse Advisor   4/15/2023  3:53 PM

## 2023-05-12 DIAGNOSIS — E89.0 POSTABLATIVE HYPOTHYROIDISM: ICD-10-CM

## 2023-05-15 RX ORDER — LEVOTHYROXINE SODIUM 112 UG/1
TABLET ORAL
Qty: 90 TABLET | Refills: 0 | Status: SHIPPED | OUTPATIENT
Start: 2023-05-15 | End: 2023-06-19

## 2023-06-07 ENCOUNTER — DOCUMENTATION ONLY (OUTPATIENT)
Dept: LAB | Facility: CLINIC | Age: 68
End: 2023-06-07
Payer: COMMERCIAL

## 2023-06-07 DIAGNOSIS — E78.5 DYSLIPIDEMIA: Primary | ICD-10-CM

## 2023-06-07 NOTE — PROGRESS NOTES
Denita Flores has an upcoming lab appointment:    Future Appointments   Date Time Provider Department Center   6/12/2023  8:30 AM CS LAB [facility]    6/19/2023  3:00 PM Neli Lara DO [facility]    9/27/2023 10:30 AM Darline English MD [facility]   11/2/2023 11:00 AM Erwin Araujo MD [facility]     Patient is scheduled for the following lab(s): PT has a lab appt before their upcoming lab appt. Please place any necessary orders. Thank you    There is no order available. Please review and place either future orders or HMPO (Review of Health Maintenance Protocol Orders), as appropriate.    Health Maintenance Due   Topic     ANNUAL REVIEW OF HM ORDERS      TSH W/FREE T4 REFLEX      Shamika Chavez

## 2023-06-12 ENCOUNTER — LAB (OUTPATIENT)
Dept: LAB | Facility: CLINIC | Age: 68
End: 2023-06-12
Payer: COMMERCIAL

## 2023-06-12 DIAGNOSIS — E78.5 DYSLIPIDEMIA: ICD-10-CM

## 2023-06-12 LAB
CHOLEST SERPL-MCNC: 128 MG/DL
HDLC SERPL-MCNC: 55 MG/DL
LDLC SERPL CALC-MCNC: 57 MG/DL
NONHDLC SERPL-MCNC: 73 MG/DL
TRIGL SERPL-MCNC: 80 MG/DL

## 2023-06-12 PROCEDURE — 36415 COLL VENOUS BLD VENIPUNCTURE: CPT

## 2023-06-12 PROCEDURE — 80061 LIPID PANEL: CPT

## 2023-06-13 ENCOUNTER — TELEPHONE (OUTPATIENT)
Dept: FAMILY MEDICINE | Facility: CLINIC | Age: 68
End: 2023-06-13
Payer: COMMERCIAL

## 2023-06-13 NOTE — TELEPHONE ENCOUNTER
Called patient regarding PCP message below. She verbalized understanding.     Polina Meyer RN on 6/13/2023 at 4:06 PM

## 2023-06-19 ENCOUNTER — OFFICE VISIT (OUTPATIENT)
Dept: FAMILY MEDICINE | Facility: CLINIC | Age: 68
End: 2023-06-19
Payer: COMMERCIAL

## 2023-06-19 VITALS
WEIGHT: 182.2 LBS | TEMPERATURE: 97.7 F | HEIGHT: 67 IN | BODY MASS INDEX: 28.6 KG/M2 | RESPIRATION RATE: 18 BRPM | OXYGEN SATURATION: 98 % | HEART RATE: 81 BPM | SYSTOLIC BLOOD PRESSURE: 104 MMHG | DIASTOLIC BLOOD PRESSURE: 67 MMHG

## 2023-06-19 DIAGNOSIS — M67.242: ICD-10-CM

## 2023-06-19 DIAGNOSIS — E78.5 DYSLIPIDEMIA: ICD-10-CM

## 2023-06-19 DIAGNOSIS — Z87.898 HISTORY OF VERTIGO: Chronic | ICD-10-CM

## 2023-06-19 DIAGNOSIS — Z12.11 COLON CANCER SCREENING: ICD-10-CM

## 2023-06-19 DIAGNOSIS — J45.30 MILD PERSISTENT ASTHMA WITHOUT COMPLICATION: ICD-10-CM

## 2023-06-19 DIAGNOSIS — E89.0 POSTABLATIVE HYPOTHYROIDISM: ICD-10-CM

## 2023-06-19 DIAGNOSIS — I10 ESSENTIAL HYPERTENSION: Primary | ICD-10-CM

## 2023-06-19 LAB
BASOPHILS # BLD AUTO: 0 10E3/UL (ref 0–0.2)
BASOPHILS NFR BLD AUTO: 1 %
EOSINOPHIL # BLD AUTO: 0.2 10E3/UL (ref 0–0.7)
EOSINOPHIL NFR BLD AUTO: 2 %
ERYTHROCYTE [DISTWIDTH] IN BLOOD BY AUTOMATED COUNT: 12.4 % (ref 10–15)
HCT VFR BLD AUTO: 39.4 % (ref 35–47)
HGB BLD-MCNC: 13.1 G/DL (ref 11.7–15.7)
IMM GRANULOCYTES # BLD: 0 10E3/UL
IMM GRANULOCYTES NFR BLD: 0 %
LYMPHOCYTES # BLD AUTO: 1.6 10E3/UL (ref 0.8–5.3)
LYMPHOCYTES NFR BLD AUTO: 19 %
MCH RBC QN AUTO: 30.2 PG (ref 26.5–33)
MCHC RBC AUTO-ENTMCNC: 33.2 G/DL (ref 31.5–36.5)
MCV RBC AUTO: 91 FL (ref 78–100)
MONOCYTES # BLD AUTO: 0.7 10E3/UL (ref 0–1.3)
MONOCYTES NFR BLD AUTO: 8 %
NEUTROPHILS # BLD AUTO: 6.1 10E3/UL (ref 1.6–8.3)
NEUTROPHILS NFR BLD AUTO: 71 %
PLATELET # BLD AUTO: 332 10E3/UL (ref 150–450)
RBC # BLD AUTO: 4.34 10E6/UL (ref 3.8–5.2)
WBC # BLD AUTO: 8.7 10E3/UL (ref 4–11)

## 2023-06-19 PROCEDURE — 36415 COLL VENOUS BLD VENIPUNCTURE: CPT | Performed by: INTERNAL MEDICINE

## 2023-06-19 PROCEDURE — 99214 OFFICE O/P EST MOD 30 MIN: CPT | Performed by: INTERNAL MEDICINE

## 2023-06-19 PROCEDURE — 84443 ASSAY THYROID STIM HORMONE: CPT | Performed by: INTERNAL MEDICINE

## 2023-06-19 PROCEDURE — 84439 ASSAY OF FREE THYROXINE: CPT | Performed by: INTERNAL MEDICINE

## 2023-06-19 PROCEDURE — 85025 COMPLETE CBC W/AUTO DIFF WBC: CPT | Performed by: INTERNAL MEDICINE

## 2023-06-19 RX ORDER — MONTELUKAST SODIUM 10 MG/1
TABLET ORAL
Qty: 90 TABLET | Refills: 3 | Status: SHIPPED | OUTPATIENT
Start: 2023-06-19 | End: 2024-06-18

## 2023-06-19 RX ORDER — LEVOTHYROXINE SODIUM 112 UG/1
112 TABLET ORAL DAILY
Qty: 90 TABLET | Refills: 3 | Status: SHIPPED | OUTPATIENT
Start: 2023-06-19 | End: 2023-06-20

## 2023-06-19 RX ORDER — ATORVASTATIN CALCIUM 20 MG/1
20 TABLET, FILM COATED ORAL AT BEDTIME
Qty: 90 TABLET | Refills: 3 | Status: SHIPPED | OUTPATIENT
Start: 2023-06-19 | End: 2024-06-18

## 2023-06-19 ASSESSMENT — PAIN SCALES - GENERAL: PAINLEVEL: NO PAIN (0)

## 2023-06-19 ASSESSMENT — ASTHMA QUESTIONNAIRES
ACT_TOTALSCORE: 24
ACT_TOTALSCORE: 24
QUESTION_4 LAST FOUR WEEKS HOW OFTEN HAVE YOU USED YOUR RESCUE INHALER OR NEBULIZER MEDICATION (SUCH AS ALBUTEROL): ONCE A WEEK OR LESS
QUESTION_1 LAST FOUR WEEKS HOW MUCH OF THE TIME DID YOUR ASTHMA KEEP YOU FROM GETTING AS MUCH DONE AT WORK, SCHOOL OR AT HOME: NONE OF THE TIME
QUESTION_2 LAST FOUR WEEKS HOW OFTEN HAVE YOU HAD SHORTNESS OF BREATH: NOT AT ALL
QUESTION_3 LAST FOUR WEEKS HOW OFTEN DID YOUR ASTHMA SYMPTOMS (WHEEZING, COUGHING, SHORTNESS OF BREATH, CHEST TIGHTNESS OR PAIN) WAKE YOU UP AT NIGHT OR EARLIER THAN USUAL IN THE MORNING: NOT AT ALL
QUESTION_5 LAST FOUR WEEKS HOW WOULD YOU RATE YOUR ASTHMA CONTROL: COMPLETELY CONTROLLED

## 2023-06-19 NOTE — PROGRESS NOTES
ADDENDUM: she came back to evaluation room after the lab as she inquires about a 6 month long pronunciation of her left leg veins, no pain, no tenderness, no redness. Not worsening, somewhat better actually today. She forgot to mention this earlier. Discussed compression stockings. Discussed warm compresses if tender. Discussed seek immediate medication attn if leg is swollen/painful. Offered vascular referral, she defers for now. Patient feels her questions/concerns have been answered.    Assessment & Plan     Essential hypertension  Controlled, denies dizziness  - CBC with Platelets & Differential    Dyslipidemia  Lipids well controlled, continue statin  - atorvastatin (LIPITOR) 20 MG tablet  Dispense: 90 tablet; Refill: 3    Postablative hypothyroidism  Update TSH  States she takes Rx on empty stomach in AM  - levothyroxine (SYNTHROID/LEVOTHROID) 112 MCG tablet  Dispense: 90 tablet; Refill: 3  - TSH with free T4 reflex    Mild persistent asthma without complication  Continue rx:  - montelukast (SINGULAIR) 10 MG tablet  Dispense: 90 tablet; Refill: 3    Colon cancer screening  Due for cscope, discussed. Referral placed:  - Colonoscopy Screening  Referral    History of vertigo  Resolved with diuretic. BMP UTD    Synovial hypertrophy of left hand joint  Bilateral hand arthralgias, chronic, with hypertrophied PIP joints  Refer to rheum  - Adult Rheumatology  Referral    Return in about 1 year (around 6/19/2024) for sooner if symptoms worsen or do not improve, Routine preventive.      DO CALE [facility] Barger is a 67 year old, presenting for the following health issues:  RECHECK      History of Present Illness       Reason for visit:  6 month follow up    She eats 4 or more servings of fruits and vegetables daily.She consumes 0 sweetened beverage(s) daily.She exercises with enough effort to increase her heart rate 10 to 19 minutes per day.  She exercises  "with enough effort to increase her heart rate 3 or less days per week.   She is taking medications regularly.     Due for TSH labs    Vertigo: resolved with diuretic, no SE. No recurrence except if increases salt intake which triggers her symptoms    Refills needed    Bilateral hand arthralgias, chronic, Distal joints hypertrophied, reports chronic    Review of Systems   Constitutional, HEENT, cardiovascular, pulmonary, gi and gu systems are negative, except as otherwise noted.      Objective    /67 (BP Location: Left arm, Patient Position: Sitting, Cuff Size: Adult Regular)   Pulse 81   Temp 97.7  F (36.5  C)   Resp 18   Ht 1.702 m (5' 7\")   Wt 82.6 kg (182 lb 3.2 oz)   SpO2 98%   BMI 28.54 kg/m    Body mass index is 28.54 kg/m .  Physical Exam     GENERAL APPEARANCE: AAOx3, no distress. Well developed.    PSYCH: appropriate mood and affect.               "

## 2023-06-20 ENCOUNTER — TELEPHONE (OUTPATIENT)
Dept: FAMILY MEDICINE | Facility: CLINIC | Age: 68
End: 2023-06-20
Payer: COMMERCIAL

## 2023-06-20 LAB
T4 FREE SERPL-MCNC: 2.3 NG/DL (ref 0.9–1.7)
TSH SERPL DL<=0.005 MIU/L-ACNC: 0.02 UIU/ML (ref 0.3–4.2)

## 2023-06-20 RX ORDER — LEVOTHYROXINE SODIUM 100 UG/1
100 TABLET ORAL DAILY
Qty: 90 TABLET | Refills: 1 | Status: SHIPPED | OUTPATIENT
Start: 2023-06-20 | End: 2023-11-07

## 2023-06-20 NOTE — TELEPHONE ENCOUNTER
Neli Lara DO P Cs Triage Im  Please call patient:   Thyroid function is abnormal   Dose is currently too high   Let's stop levothyroxine 112mcg and start levothyroxine 100mcg daily (new rx sent)   Recheck thyroid labs in 6-8 weeks, orders placed. Non-fasting lab appt needed

## 2023-06-20 NOTE — TELEPHONE ENCOUNTER
Called patient regarding PCP message below. She verbalized understanding and will pick rx up and start taking the new dosage tomorrow. She will kaleb on her calendar to have TSH checked again in 6-8 weeks.     Polina Meyer RN on 6/20/2023 at 4:04 PM

## 2023-06-22 ENCOUNTER — OFFICE VISIT (OUTPATIENT)
Dept: RHEUMATOLOGY | Facility: CLINIC | Age: 68
End: 2023-06-22
Payer: COMMERCIAL

## 2023-06-22 VITALS
BODY MASS INDEX: 28.66 KG/M2 | OXYGEN SATURATION: 96 % | WEIGHT: 183 LBS | DIASTOLIC BLOOD PRESSURE: 71 MMHG | HEART RATE: 80 BPM | RESPIRATION RATE: 16 BRPM | SYSTOLIC BLOOD PRESSURE: 110 MMHG

## 2023-06-22 DIAGNOSIS — M67.242: ICD-10-CM

## 2023-06-22 PROCEDURE — 99214 OFFICE O/P EST MOD 30 MIN: CPT | Performed by: STUDENT IN AN ORGANIZED HEALTH CARE EDUCATION/TRAINING PROGRAM

## 2023-06-22 NOTE — PROGRESS NOTES
Rheumatology Clinic Visit     Denita Flores MRN# [id number]   YOB: 1955 Age: 66 year old     Date of Visit: Jun 22, 2023   Primary care provider: Neli Lara          Assessment and Plan:     Assessment     Severe hand osteoarthritis  HTN  Morbid obesity  Lumbar DDD    Ms. Flores is 67 year old female seen in clinic for follow up evaluation of hand pain.     Severe hand Osteoarthritis : She has pain in her hands for few years and for the last one year pain is worse. She has swelling over left middle, index finger PIP, DIP joints, R 2nd PIP joint along with tenderness. No MCP joint involvement noted. No synovitis or tenderness present over bilateral wrists, MTP joints or ankles.  Normal range of motion of shoulders.  No knee effusions.    Hand arthritis is related to erosive osteoarthritis.  Differential included psoriatic arthritis but hand X-rays done in 3/2022 showed findings consistent with degenerative arthritis rather than RA or PsA.     For severe hand osteoarthritis she can try Voltaren gel, compression gloves, paraffin wax. She has noticed triggering of b/l thumbs sometimes. If it worsens then she can be seen in sports medicine for trigger finger injection.     Her Rheumatoid serologies, inflammatory markers done in 2/2022 were normal. She has + ELVIRA - 1:80 Speckled but does not have symptoms or signs raising concerns for ELVIRA associated disease. No further autoimmune work up is needed.       Plan    You have severe Osteoarthritis in your hands.     You can use Voltaren gel topically, You can try Blue Emu cream.     You can try Hot paraffin wax and compression gloves for your hands.     For pain you can use Tylenol arthritis 1 - 2 tab a day.    You can try Online Jamber hand yoga.     No further blood tests or X-rays needed     Follow up as needed.     TT 30 min was spent on date of the encounter doing chart review, history and exam, documentation and further activities as noted above. Any  prior notes, outside records, laboratory results, and imaging studies were reviewed if relevant.    Patient verbalized agreement with and understanding of the rationale for the diagnosis and treatment plan.  All questions were answered to best of my ability and the patient's satisfaction.      Chart documentation done in part with Dragon Voice recognition Software. Although reviewed after completion, some word and grammatical error may remain.                  Active Problem List:     Patient Active Problem List    Diagnosis Date Noted     Contrast media allergy 11/24/2021     Priority: Medium     Former smoker 08/24/2021     Priority: Medium     History of vertigo 08/24/2021     Priority: Medium     Prn zofran       Essential hypertension 06/11/2021     Priority: Medium     Hyperlipidemia LDL goal <100 06/11/2021     Priority: Medium     Coronary artery disease involving native coronary artery of native heart, angina presence unspecified 06/11/2021     Priority: Medium     No hx of cardiac stents  Replacing diagnoses that were inactivated after the 10/1/2021 regulatory import.       Abnormal cardiovascular stress test 03/15/2021     Priority: Medium     Bilateral hand pain 12/09/2020     Priority: Medium     Microscopic hematuria 09/30/2019     Priority: Medium     Polyp of gallbladder, 4mm 09/13/2018     Priority: Medium     Morbid obesity (H) 08/07/2018     Priority: Medium     Family history of kidney cancer 07/30/2018     Priority: Medium     Nephrolithiasis 07/30/2018     Priority: Medium     Epistaxis 01/06/2017     Priority: Medium     Tubular adenoma of colon 01/06/2017     Priority: Medium     Mild persistent asthma without complication 07/06/2016     Priority: Medium     Spider veins of both lower extremities 07/06/2016     Priority: Medium     Seasonal allergic rhinitis due to other allergic trigger 07/06/2016     Priority: Medium     Vitamin B12 deficiency (non anemic) 07/01/2016     Priority: Medium      Venous stasis dermatitis of left lower extremity 11/24/2015     Priority: Medium     Postablative hypothyroidism 06/22/2015     Priority: Medium     Plantar warts 01/06/2015     Priority: Medium     Stasis dermatitis of both legs 01/06/2015     Priority: Medium     Obesity (BMI 30-39.9) 01/06/2015     Priority: Medium     Hx of herpes zoster keratoconjunctivitis, os 10/01/2014     Priority: Medium     Graves disease 05/08/2013     Priority: Medium     Diverticulosis of large intestine without hemorrhage 05/08/2013     Priority: Medium     Colon polyps 05/08/2013     Priority: Medium     History of cervical cancer 05/08/2013     Priority: Medium     Cone biopsy, Remote, all normal paps since then.       YELENA (obstructive sleep apnea) 05/08/2013     Priority: Medium     Uses dental appliance              History of Present Illness:   Denita Flores is a 66 year old female with PMH of HTN, Graves' disease seen in the clinic in consultation at request of Dr. Lara for evaluation of hand pain.     She has pain and swelling in her fingers especially over the left middle finger.  It was swollen and painful for the last few weeks.  She takes Tylenol 650 mg a day which helps.  Also has swelling over bilateral index finger knuckles.  She does not have any pain over the MCP joint.  Denies any pain in her wrists, elbows, shoulders.  Reports mild discomfort in her feet but denies any swelling or pain in her ankles.  She has chronic low back pain related to lumbar degenerative disc disease.    For the last 4 years she has sinusitis and vertigo symptoms.  She has been to the ER 4 times for severe vertigo.  She has follow-up with ENT provider today.  She was given Medrol Dosepak for sinusitis and vertigo symptoms 2 weeks ago.  She finished her Medrol Dosepak on March 21.  Interestingly she did notice improvement in her left middle finger pain and pain after taking Medrol Dosepak.    She reports that her mother possibly has  psoriasis but she is not sure.  She denies having psoriasis herself.  She has eczema.  Denies inflammatory bowel disease, iritis, dactylitis, plantar fasciitis episodes.  She has history of Graves' disease status post thyroid ablation and is now on levothyroxine.    Denies any raynauds, malar rash, photosensitivity, recurrent mouth/genital ulcers, sicca symptoms, pleuritic chest pains, recurrent sinusitis/rhinitis, swallowing difficulty, hearing or visual changes recently. No h/o arterial/venous thrombosis in the past. Denies any tick bite, recent GI/ infection.     June 22, 2023 - She has pain and swelling in her fingers, especially left third DIP, bilateral second PIP joints.  Denies any pain in her wrists, elbows, shoulders, knees, hips, feet or ankle.  She has chronic lower back pain due to lumbar degenerative disc disease and had multiple epidural spine injections. X-rays of her hands done in 2022 showed severe degenerative changes in PIP and DIP joints. She has negative RF, Anti CCP, normal ESR, CRP, + ELVIRA - 1:80 Speckled.           Review of Systems:     Review Of Systems  Constitutional: denies fever, chills, night sweats and weight loss.  Skin: No skin rash.  Eyes: No dryness or irritation in eyes. No episode of eye inflammation or redness.   Ears/Nose/Throat: no recurrent sinus infections.  Respiratory: No shortness of breath, dyspnea on exertion, cough, or hemoptysis  Cardiovascular: no chest pain or palpitations.  Gastrointestinal: no nausea, vomiting, abdominal pain.  Normal bowel movements.  Genitourinary: no dysuria, frequency  or hematuria.  Musculoskeletal: as in HPI  Neurologic: no numbness, tingling.  Psychiatric: no mood disorders.  Hematologic/Lymphatic/Immunologic: no history of easy bruising, petechia or purpura.  No abnormal bleeding.   Endocrine: no h/o thyroid disease or Diabetes.                  Past Medical History:     Past Medical History:   Diagnosis Date     Coronary artery disease  involving native coronary artery of native heart, angina presence unspecified 2021     Essential hypertension 2021     Graves disease      Kidney stones     Passed a 5-10mm stone     Malignant neoplasm (H)     Cervical CA     Mild persistent asthma 2013     Swollen finger      Thyroid disease      Past Surgical History:   Procedure Laterality Date     BACK SURGERY       COLONOSCOPY WITH CO2 INSUFFLATION N/A 2017    Procedure: COLONOSCOPY WITH CO2 INSUFFLATION;  Surgeon: Duane, William Charles, MD;  Location: [facility] OR     CYSTOSCOPY FLEXIBLE  8/10/2018     LEEP TX, CERVICAL      in her 20's            Social History:     Social History     Occupational History     Not on file   Tobacco Use     Smoking status: Former     Packs/day: 1.50     Years: 35.00     Pack years: 52.50     Types: Cigarettes     Quit date: 2003     Years since quittin.4     Smokeless tobacco: Never   Substance and Sexual Activity     Alcohol use: Yes     Alcohol/week: 0.0 standard drinks of alcohol     Comment: 3 dinks/ year     Drug use: No     Sexual activity: Not Currently            Family History:     Family History   Problem Relation Age of Onset     Glaucoma Mother      Heart Disease Father      Cancer Father 67        kidney cancer      Heart Disease Sister      Cancer Sister 59        colon cancer      Diabetes Sister      Glaucoma Maternal Grandmother      Colon Cancer Maternal Aunt      Colon Cancer Paternal Aunt             Allergies:     Allergies   Allergen Reactions     Contrast Dye Hives and Itching     Isovue with ANTON on 19     Sulfa Antibiotics Hives and Swelling     Noted in 10/18/09 ER            Medications:     Current Outpatient Medications   Medication Sig Dispense Refill     atorvastatin (LIPITOR) 20 MG tablet Take 1 tablet (20 mg) by mouth At Bedtime 90 tablet 3     Cholecalciferol (D3 ADULT PO) Take 2,000 Units by mouth daily       cyanocolbalamin (VITAMIN  B-12) 100 MCG tablet Take 100  mcg by mouth daily.       levothyroxine (SYNTHROID/LEVOTHROID) 100 MCG tablet Take 1 tablet (100 mcg) by mouth daily 90 tablet 1     meclizine (ANTIVERT) 25 MG tablet Take 1 tablet (25 mg) by mouth 3 times daily as needed for dizziness 20 tablet 0     montelukast (SINGULAIR) 10 MG tablet TAKE 1 TABLET(10 MG) BY MOUTH AT BEDTIME 90 tablet 3     triamcinolone (KENALOG) 0.1 % external cream APPLY SPARINGLY TOPICALLY TO THE AFFECTED AREA THREE TIMES DAILY AS NEEDED 80 g 0     triamterene-HCTZ (DYAZIDE) 37.5-25 MG capsule Take 1 capsule by mouth 2 times daily       fluticasone (FLONASE) 50 MCG/ACT nasal spray Spray 1 spray into both nostrils 2 times daily (Patient not taking: Reported on 3/22/2023) 9.9 mL 1     ketoconazole (NIZORAL) 2 % external cream Apply topically 2 times daily (Patient not taking: Reported on 6/22/2023) 30 g 1     ondansetron (ZOFRAN ODT) 4 MG ODT tab Take 1 tablet (4 mg) by mouth every 8 hours as needed for nausea 21 tablet 2     VENTOLIN  (90 Base) MCG/ACT inhaler INHALE 2 PUFFS INTO THE LUNGS EVERY 6 HOURS AS NEEDED FOR SHORTNESS OF BREATH OR DIFFICULT BREATHING 18 g 1            Physical Exam:   Blood pressure 110/71, pulse 80, resp. rate 16, weight 83 kg (183 lb), SpO2 96 %, not currently breastfeeding.  Wt Readings from Last 4 Encounters:   06/22/23 83 kg (183 lb)   06/19/23 82.6 kg (182 lb 3.2 oz)   03/22/23 88.5 kg (195 lb)   03/15/23 88.5 kg (195 lb)       Constitutional: Morbidly obese, appearing stated age; cooperative  Eyes: nl EOM, PERRLA, vision, conjunctiva, sclera  ENT: nl external ears, nose, hearing, lips, teeth, gums, throat  No mucous membrane lesions, normal saliva pool  Neck: no mass or thyroid enlargement  Resp: lungs clear to auscultation, nl to palpation  CV: RRR, no murmurs, rubs or gallops, no edema  GI: no ABD mass or tenderness, no HSM  : not tested  Lymph: no cervical, supraclavicular, inguinal or epitrochlear nodes    MS: All TMJ, neck, shoulder, elbow, wrist,  MCP/PIP/DIP, spine, hip, knee, ankle, and foot MTP/IP joints were examined.     -- She has swelling over left middle, index finger PIP, DIP joints, R 2nd PIP joint along with tenderness. No MCP joint involvement noted.  No synovitis or tenderness present over bilateral wrists, MTP joints or ankles.  Normal range of motion of shoulders.  No knee effusions.    -- No dactylitis,  tenosynovitis, enthesopathy.    Skin: no nail pitting, alopecia, rash, nodules or lesions  Neuro: nl cranial nerves, strength, sensation, DTRs.   Psych: nl judgement, orientation, memory, affect.         Data:     No results found for any visits on 06/22/23.    Recent Labs   Lab Test 03/07/22  2114 03/05/22  1044 06/04/21  1048 05/07/21  1523 04/14/21  0920 04/06/21  1206 08/07/18  1527 08/15/17  1028   WBC  --  9.1  --  9.6  --  9.5   < > 6.9   RBC  --  4.36  --  4.27  --  4.50   < > 4.54   HGB  --  13.2  --  12.5  --  13.2   < > 13.8   HCT  --  41.8  --  38.7  --  41.3   < > 41.6   MCV  --  96  --  91  --  92   < > 92   RDW  --  13.5  --  13.0  --  12.9   < > 13.4   PLT  --  267  --  296  --  299   < > 229   ALBUMIN  --   --   --   --   --   --   --  3.7   BUN 13  --  12 10  --  11   < > 11   CREAT  --   --   --   --  0.6  --   --   --     < > = values in this interval not displayed.      Recent Labs   Lab Test 02/27/21  1230 07/20/20  1127 09/30/19  0912 09/07/18  1043 02/20/18  1040 12/18/17  1035   TSH 2.02 1.25 1.29 0.26* 0.35* 0.88   T4  --   --   --  1.46 1.34 1.21     Hemoglobin   Date Value Ref Range Status   06/19/2023 13.1 11.7 - 15.7 g/dL Final   10/08/2022 14.2 11.7 - 15.7 g/dL Final   06/07/2022 14.3 11.7 - 15.7 g/dL Final   05/07/2021 12.5 11.7 - 15.7 g/dL Final   04/06/2021 13.2 11.7 - 15.7 g/dL Final   02/27/2021 13.6 11.7 - 15.7 g/dL Final     Urea Nitrogen   Date Value Ref Range Status   03/06/2023 20.2 8.0 - 23.0 mg/dL Final   10/08/2022 23 7 - 30 mg/dL Final   06/29/2022 18 7 - 30 mg/dL Final   06/07/2022 35 (H) 7 - 30  mg/dL Final   06/04/2021 12 7 - 30 mg/dL Final   05/07/2021 10 7 - 30 mg/dL Final   04/06/2021 11 7 - 30 mg/dL Final     Creatinine   Date Value Ref Range Status   04/14/2021 0.6 0.52 - 1.04 mg/dL Final     Sed Rate   Date Value Ref Range Status   06/06/2012 1.0 0.0 - 20.0 mm/hr Final     Erythrocyte Sedimentation Rate   Date Value Ref Range Status   03/24/2022 11 0 - 30 mm/hr Final     CRP Inflammation   Date Value Ref Range Status   03/24/2022 3.3 0.0 - 8.0 mg/L Final     AST   Date Value Ref Range Status   08/15/2017 9 0 - 45 U/L Final   05/08/2013 18 0 - 45 U/L Final   04/16/2010 16 0 - 45 U/L Final     Albumin   Date Value Ref Range Status   03/06/2023 4.4 3.5 - 5.2 g/dL Final   06/07/2022 3.8 3.4 - 5.0 g/dL Final   08/15/2017 3.7 3.4 - 5.0 g/dL Final   05/08/2013 4.2 3.3 - 4.9 g/dL Final   04/16/2010 4.2 3.3 - 4.9 g/dL Final     Alkaline Phosphatase   Date Value Ref Range Status   08/15/2017 84 40 - 150 U/L Final   05/08/2013 95 40 - 150 U/L Final   04/16/2010 105 40 - 150 U/L Final     ALT   Date Value Ref Range Status   06/16/2022 23 0 - 50 U/L Final   05/18/2021 19 0 - 50 U/L Final   08/15/2017 20 0 - 50 U/L Final   05/08/2013 24 0 - 50 U/L Final     Rheumatoid Factor   Date Value Ref Range Status   03/24/2022 <6 <12 IU/mL Final     Recent Labs   Lab Test 06/19/23  1516 03/06/23  1031 10/08/22  1217 06/29/22  0914 06/16/22  0822 06/07/22  1156 03/24/22  1635 06/04/21  1048 05/18/21  0946 04/06/21  1206 02/27/21  1230 10/09/17  1030 08/15/17  1028   WBC 8.7  --  10.5  --   --  15.4*  --    < >  --    < > 7.5   < > 6.9   HGB 13.1  --  14.2  --   --  14.3  --    < >  --    < > 13.6   < > 13.8   HCT 39.4  --  43.3  --   --  45.3  --    < >  --    < > 41.9   < > 41.6   MCV 91  --  92  --   --  92  --    < >  --    < > 92   < > 92     --  354  --   --  368  --    < >  --    < > 270   < > 229   BUN  --  20.2 23 18  --  35* 13   < >  --    < > 12   < > 11   TSH 0.02*  --   --   --   --   --  0.70  --   --    --  2.02   < > 0.36*   AST  --   --   --   --   --   --   --   --   --   --   --   --  9   ALT  --   --   --   --  23  --   --   --  19  --   --   --  20   ALKPHOS  --   --   --   --   --   --   --   --   --   --   --   --  84    < > = values in this interval not displayed.       Reviewed Rheumatology lab flowsheet    Gabriele Campos MD  [facility]  Department of Rheumatology & Autoimmune Disorders  [facility]: [phone]   Pager - [phone]

## 2023-06-22 NOTE — PATIENT INSTRUCTIONS
You have severe Osteoarthritis in your hands.     You can use Voltaren gel topically, You can try Blue Emu cream.     You can try Hot paraffin wax and compression gloves for your hands.     For pain you can use Tylenol arthritis 1 - 2 tab a day.    You can try Online Jamber hand yoga.     No further blood tests or X-rays needed     Follow up as needed.

## 2023-08-04 ENCOUNTER — TELEPHONE (OUTPATIENT)
Dept: FAMILY MEDICINE | Facility: CLINIC | Age: 68
End: 2023-08-04
Payer: COMMERCIAL

## 2023-08-04 NOTE — TELEPHONE ENCOUNTER
LM for patient to return our call back - Please see message below from Ashely.    Casandra MONROY MA on 8/4/2023 at 11:49 AM

## 2023-08-04 NOTE — TELEPHONE ENCOUNTER
General Call    Contacts         Type Contact Phone/Fax    08/04/2023 09:09 AM CDT Phone (Incoming) Denita Flores (Self) [phone] (M)          Reason for Call:  Pt has question regarding the scheduling of her next lab. How long was she supposed to wait after her last appt? Please call her to confirm    What are your questions or concerns:  see above    Date of last appointment with provider: 06-    Okay to leave a detailed message?: Yes at Cell number on file:    Telephone Information:   Mobile [phone]

## 2023-08-16 ENCOUNTER — LAB (OUTPATIENT)
Dept: LAB | Facility: CLINIC | Age: 68
End: 2023-08-16
Payer: COMMERCIAL

## 2023-08-16 DIAGNOSIS — E89.0 POSTABLATIVE HYPOTHYROIDISM: ICD-10-CM

## 2023-08-16 LAB
T4 FREE SERPL-MCNC: 1.89 NG/DL (ref 0.9–1.7)
TSH SERPL DL<=0.005 MIU/L-ACNC: 0.13 UIU/ML (ref 0.3–4.2)

## 2023-08-16 PROCEDURE — 84439 ASSAY OF FREE THYROXINE: CPT

## 2023-08-16 PROCEDURE — 36415 COLL VENOUS BLD VENIPUNCTURE: CPT

## 2023-08-16 PROCEDURE — 84443 ASSAY THYROID STIM HORMONE: CPT

## 2023-08-17 ENCOUNTER — TELEPHONE (OUTPATIENT)
Dept: FAMILY MEDICINE | Facility: CLINIC | Age: 68
End: 2023-08-17

## 2023-08-17 ENCOUNTER — TRANSFERRED RECORDS (OUTPATIENT)
Dept: HEALTH INFORMATION MANAGEMENT | Facility: CLINIC | Age: 68
End: 2023-08-17

## 2023-08-17 NOTE — TELEPHONE ENCOUNTER
Neli Lara, DO  P Cs Triage Im  Please call patient:    I received her repeat thyroid labs on new dose of medication. Improving but not within normal range yet. However, she did labs a bit early so might simply take longer. I recommend she have this rechecked when she establishes with her new provider given my upcoming departure, ideally in the next 3 months.    Best,  Dr Lara

## 2023-08-18 NOTE — TELEPHONE ENCOUNTER
Called patient regarding PCP message below. She states she is not able to see new PCP until January.     She would like to know if you would order labs or if she should wait until then?    Polina Meyer RN on 8/18/2023 at 4:50 PM

## 2023-08-18 NOTE — TELEPHONE ENCOUNTER
Please inform the patient that Dr. Lara is out of the office  I recommend to make virtual appointment with Dr. Roldan as those slots are available easily  Keep appointment for in person visit with Dr. Roldan as a scheduled    Dr.Nasima Aman MD

## 2023-08-22 NOTE — TELEPHONE ENCOUNTER
Patient Contact    Attempt # 1    Was call answered?  No.  Left message on voicemail with information to call back.    Lila LIGHT, Triage RN  [facility]

## 2023-08-23 NOTE — TELEPHONE ENCOUNTER
Appointments in Next Year      Sep 27, 2023 10:30 AM  Cystoscopy with Darline English MD, UA CYF  [facility] ([facility] ) [phone]     Oct 20, 2023  1:15 PM  (Arrive by 1:00 PM)  NEW GENERAL with Zana Valencia MD  [facility] ([facility]) [phone]     Nov 02, 2023 11:00 AM  (Arrive by 10:45 AM)  New Visit with Erwin Araujo MD  [facility] ([facility] ) [phone]     Jan 02, 2024 11:00 AM  (Arrive by 10:40 AM)  Provider Visit with Zoraida Roldan MD  [facility] ([facility] ) [phone]          Called pt and discussed Dr Newton's response with her. Pt didn't have her calendar on her so she will call back to schedule a VV with Dr Jamar BEAR Triage RN  [facility]

## 2023-09-25 ENCOUNTER — TELEPHONE (OUTPATIENT)
Dept: UROLOGY | Facility: CLINIC | Age: 68
End: 2023-09-25
Payer: COMMERCIAL

## 2023-09-25 NOTE — TELEPHONE ENCOUNTER
[facility] Call Center    Phone Message    May a detailed message be left on voicemail: yes     Reason for Call: Other: Patient calling regarding her upcoming appt on 9/27/2023 with Amador. Patient would like to know if there are any restrictions for her cysto. Please advise and call patient. Thank you     Action Taken: Message routed to:  Other: Uro    Travel Screening: Not Applicable

## 2023-09-27 ENCOUNTER — OFFICE VISIT (OUTPATIENT)
Dept: UROLOGY | Facility: CLINIC | Age: 68
End: 2023-09-27
Payer: COMMERCIAL

## 2023-09-27 VITALS
OXYGEN SATURATION: 99 % | SYSTOLIC BLOOD PRESSURE: 90 MMHG | HEIGHT: 68 IN | HEART RATE: 72 BPM | WEIGHT: 165 LBS | DIASTOLIC BLOOD PRESSURE: 61 MMHG | BODY MASS INDEX: 25.01 KG/M2

## 2023-09-27 DIAGNOSIS — N30.90 CYSTITIS: ICD-10-CM

## 2023-09-27 DIAGNOSIS — R31.29 MICROSCOPIC HEMATURIA: Primary | ICD-10-CM

## 2023-09-27 DIAGNOSIS — N32.81 OAB (OVERACTIVE BLADDER): ICD-10-CM

## 2023-09-27 DIAGNOSIS — Z85.41 HISTORY OF CERVICAL CANCER: ICD-10-CM

## 2023-09-27 DIAGNOSIS — N39.41 URGENCY INCONTINENCE: ICD-10-CM

## 2023-09-27 LAB
ALBUMIN UR-MCNC: NEGATIVE MG/DL
APPEARANCE UR: CLEAR
BILIRUB UR QL STRIP: NEGATIVE
COLOR UR AUTO: YELLOW
GLUCOSE UR STRIP-MCNC: NEGATIVE MG/DL
HGB UR QL STRIP: ABNORMAL
KETONES UR STRIP-MCNC: NEGATIVE MG/DL
LEUKOCYTE ESTERASE UR QL STRIP: ABNORMAL
NITRATE UR QL: NEGATIVE
PH UR STRIP: 6 [PH] (ref 5–7)
SP GR UR STRIP: 1.01 (ref 1–1.03)
UROBILINOGEN UR STRIP-ACNC: 0.2 E.U./DL

## 2023-09-27 PROCEDURE — 52000 CYSTOURETHROSCOPY: CPT | Performed by: UROLOGY

## 2023-09-27 PROCEDURE — 81003 URINALYSIS AUTO W/O SCOPE: CPT | Mod: QW | Performed by: UROLOGY

## 2023-09-27 PROCEDURE — 88112 CYTOPATH CELL ENHANCE TECH: CPT | Performed by: PATHOLOGY

## 2023-09-27 PROCEDURE — 87086 URINE CULTURE/COLONY COUNT: CPT | Performed by: UROLOGY

## 2023-09-27 PROCEDURE — 99213 OFFICE O/P EST LOW 20 MIN: CPT | Mod: 25 | Performed by: UROLOGY

## 2023-09-27 PROCEDURE — 87186 SC STD MICRODIL/AGAR DIL: CPT | Performed by: UROLOGY

## 2023-09-27 RX ORDER — MIRABEGRON 25 MG/1
25 TABLET, FILM COATED, EXTENDED RELEASE ORAL DAILY
Qty: 30 TABLET | Refills: 3 | Status: SHIPPED | OUTPATIENT
Start: 2023-09-27 | End: 2023-10-21

## 2023-09-27 RX ORDER — LIDOCAINE HYDROCHLORIDE 20 MG/ML
JELLY TOPICAL ONCE
Status: COMPLETED | OUTPATIENT
Start: 2023-09-27 | End: 2023-09-27

## 2023-09-27 RX ADMIN — LIDOCAINE HYDROCHLORIDE 5 ML: 20 JELLY TOPICAL at 10:56

## 2023-09-27 ASSESSMENT — PAIN SCALES - GENERAL: PAINLEVEL: NO PAIN (0)

## 2023-09-27 NOTE — NURSING NOTE
Chief Complaint   Patient presents with    Microscopic hematuria     Pt here for in office cystoscopy      Prior to the start of the procedure and with procedural staff participation, I verbally confirmed the patient s identity using two indicators, relevant allergies, that the procedure was appropriate and matched the consent or emergent situation, and that the correct equipment/implants were available. Immediately prior to starting the procedure I conducted the Time Out with the procedural staff and re-confirmed the patient s name, procedure, and site/side. I have wiped the patient off with the povidone-Iodine solution, draped them,  used Lidocaine hydrochloride jelly, and instilled sterile water into the bladder. (The Joint Commission universal protocol was followed.)  Yes    Sedation (Moderate or Deep): None     5mL 2% lidocaine hydrochloride Urojet instilled into urethra.    NDC# [address]-1  Lot #: [id number]  Expiration Date:  02/25      Yuko Ruiz CMA

## 2023-09-27 NOTE — PATIENT INSTRUCTIONS
"Websites with free information:    [employer] patient website: www.voicesforpfd.org    Total Control Program: www.totalcontrolprogram.com    Supplements to prevent UTI to consider  -Probiotics  -Cranberry (for these products let them know a doctor is recommending them)   Ellura: www.myellura.[employer]   Theracran HP by [employer] [facility] [id number]  -d-mannose 2gm daily  -Vitamin C 500-1000mg twice a day    Monitor you blood pressure after starting the medication    It was a pleasure meeting with you today.  Thank you for allowing me and my team the privilege of caring for you today.  YOU are the reason we are here, and I truly hope we provided you with the excellent service you deserve.  Please let us know if there is anything else we can do for you so that we can be sure you are leaving completely satisfied with your care experience.    AFTER YOUR CYSTOSCOPY  ?  ?  You have just completed a cystoscopy, or \"cysto\", which allowed your physician to learn more about your bladder (or to remove a stent placed after surgery). We suggest that you continue to avoid caffeine, fruit juice, and alcohol for the next 24 hours, however, you are encouraged to return to your normal activities.  ?  ?  A few things that are considered normal after your cystoscopy:  ?  * small amount of bleeding (or spotting) that clears within the next 24 hours  ?  * slight burning sensation with urination  ?  * sensation of needing to void (urinate) more frequently  ?  * the feeling of \"air\" in your urine  ?  * mild discomfort that is relieved with Tylenol    * bladder spasms  ?  ?  ?  Please contact our office promptly if you:  ?  * develop a fever above 101 degrees  ?  * are unable to urinate  ?  * develop bright red blood that does not stop  ?  * experience severe pain or swelling  ?  ?  ?  And of course, please contact our office with any concerns or questions [phone].  ?   "

## 2023-09-27 NOTE — LETTER
"9/27/2023       RE: Denita Flores  [address]  [facility] [address]     Dear Colleague,    Thank you for referring your patient, Denita Flores, to the [facility] at [facility]. Please see a copy of my visit note below.    September 27, 2023    Return visit    Patient returns today for follow up, She denies any changes in her health since last visit.    BP 90/61   Pulse 72   Ht 1.715 m (5' 7.5\")   Wt 74.8 kg (165 lb)   SpO2 99%   BMI 25.46 kg/m    She is comfortable, in no distress, non-labored breathing.  Abdomen is soft, non-tender, non-distended.  Normal external female genitalia.  Negative CST.  Pelvic exam is unremarkable.    Cystoscopy Note: After informed consent was obtained patient was prepped and draped in the standard fashion.  The flexible cystoscope was inserted into a normal appearing urethral meatus.  The urothelium was carefully examined and there were scattered areas most consistent with cystitis but there were no tumors, masses, stones, foreign bodies, or other urothelial abnormalities noted.  Bilateral ureteral orifices were noted in the normal orthotopic position and both effluxed clear urine.  The cystoscope was retroflexed and the bladder neck was unremarkable.  The urethra was carefully examined upon removing the cystoscope and was unremarkable.  Patient tolerated the procedure without complications noted.      Urine dip with moderate blood and trace leuks    A/P: 68 year old F with history cervical cancer, microscopic hematuria with cystoscopy findings of possible cystitis, urgency incontinence and OAB.      Urine cyology    UCx    For her OAB symptoms we discussed options and she has elected medications . We discussed anticholinergics versus beta 3 agonists and she has elected beta 3 agonist to decrease risk of cognitive side effects. Monitor BP after she starts medications    RTC 3 months to see Sheila, " sooner if needed    20 minutes were spent today on the date of the encounter in reviewing the EMR, direct patient care, coordination of care and documentation in addition to the cystoscopy procedure    Darline English MD MPH  (she/her/hers)   of Urology  [facility]  Patient Care Team:  Neli Lara DO as PCP - General (Internal Medicine)  Agbeh, Cephas Mawuena, MD as MD (OB/Gyn)  Darline English MD as Assigned Surgical Provider  Kyle Santoyo MD as MD (Nephrology)  Nicolle Villanueva PA as Referring Physician (Internal Medicine)  Dmitriy Pitt MD as MD (Otolaryngology)  Avery, Jonathan Baires MD as MD (Cardiovascular Disease)  Gabriele Campos MD as Assigned Rheumatology Provider  Neli Lara DO as Assigned PCP  Marcus Simmons MD as Assigned Allergy Provider  Avery, Jonathan Baires MD as Assigned Heart and Vascular Provider  Gini Bradford PA-SAMEER as Assigned Nephrology Provider  Erwin Araujo MD as MD (Dermatology)  Erwin Araujo MD as MD (Dermatology)

## 2023-09-29 ENCOUNTER — TELEPHONE (OUTPATIENT)
Dept: UROLOGY | Facility: CLINIC | Age: 68
End: 2023-09-29
Payer: COMMERCIAL

## 2023-09-29 DIAGNOSIS — N39.0 UTI (URINARY TRACT INFECTION): Primary | ICD-10-CM

## 2023-09-29 RX ORDER — CIPROFLOXACIN 500 MG/1
500 TABLET, FILM COATED ORAL 2 TIMES DAILY
Qty: 10 TABLET | Refills: 0 | Status: SHIPPED | OUTPATIENT
Start: 2023-09-29 | End: 2023-10-21

## 2023-09-29 NOTE — TELEPHONE ENCOUNTER
Called patient with  UTI results , She is having some symptoms  frequency  is most symptomatic . Will send in Cipro 500 mg per protocol per dr English.

## 2023-09-30 LAB — BACTERIA UR CULT: ABNORMAL

## 2023-10-20 ENCOUNTER — OFFICE VISIT (OUTPATIENT)
Dept: OPHTHALMOLOGY | Facility: CLINIC | Age: 68
End: 2023-10-20
Payer: COMMERCIAL

## 2023-10-20 DIAGNOSIS — H52.4 MYOPIA OF BOTH EYES WITH ASTIGMATISM AND PRESBYOPIA: Primary | ICD-10-CM

## 2023-10-20 DIAGNOSIS — H25.813 COMBINED FORMS OF AGE-RELATED CATARACT OF BOTH EYES: ICD-10-CM

## 2023-10-20 DIAGNOSIS — H52.13 MYOPIA OF BOTH EYES WITH ASTIGMATISM AND PRESBYOPIA: Primary | ICD-10-CM

## 2023-10-20 DIAGNOSIS — H52.203 MYOPIA OF BOTH EYES WITH ASTIGMATISM AND PRESBYOPIA: Primary | ICD-10-CM

## 2023-10-20 PROCEDURE — 92015 DETERMINE REFRACTIVE STATE: CPT | Performed by: OPHTHALMOLOGY

## 2023-10-20 PROCEDURE — 92004 COMPRE OPH EXAM NEW PT 1/>: CPT | Performed by: OPHTHALMOLOGY

## 2023-10-20 ASSESSMENT — EXTERNAL EXAM - LEFT EYE: OS_EXAM: NORMAL

## 2023-10-20 ASSESSMENT — VISUAL ACUITY
OS_CC: 20/25
METHOD: SNELLEN - LINEAR
OS_CC+: -1
OD_CC+: -2
CORRECTION_TYPE: GLASSES
OD_CC: 20/25

## 2023-10-20 ASSESSMENT — REFRACTION_WEARINGRX
OD_CYLINDER: +1.50
OS_CYLINDER: +2.50
OS_ADD: +2.50
OD_ADD: +2.50
OS_SPHERE: -3.00
OD_AXIS: 092
OS_AXIS: 088
OD_SPHERE: -2.25
SPECS_TYPE: PAL

## 2023-10-20 ASSESSMENT — EXTERNAL EXAM - RIGHT EYE: OD_EXAM: NORMAL

## 2023-10-20 ASSESSMENT — TONOMETRY
IOP_METHOD: ICARE
OD_IOP_MMHG: 13
OS_IOP_MMHG: 14

## 2023-10-20 ASSESSMENT — REFRACTION_MANIFEST
OD_SPHERE: -2.25
OS_AXIS: 085
OD_AXIS: 095
OD_CYLINDER: +1.50
OD_ADD: +2.50
OS_ADD: +2.50
OS_SPHERE: -2.75
OS_CYLINDER: +1.75

## 2023-10-20 ASSESSMENT — SLIT LAMP EXAM - LIDS
COMMENTS: 1+ DERMATOCHALASIS - UPPER LID
COMMENTS: 1+ DERMATOCHALASIS - UPPER LID

## 2023-10-20 ASSESSMENT — CONF VISUAL FIELD
OD_INFERIOR_TEMPORAL_RESTRICTION: 0
OD_INFERIOR_NASAL_RESTRICTION: 0
OD_NORMAL: 1
METHOD: COUNTING FINGERS
OD_SUPERIOR_TEMPORAL_RESTRICTION: 0
OS_SUPERIOR_TEMPORAL_RESTRICTION: 3
OD_SUPERIOR_NASAL_RESTRICTION: 0

## 2023-10-20 ASSESSMENT — CUP TO DISC RATIO
OS_RATIO: 0.2
OD_RATIO: 0.3

## 2023-10-20 NOTE — NURSING NOTE
"Chief Complaints and History of Present Illnesses   Patient presents with    Annual Eye Exam       Chief Complaint(s) and History of Present Illness(es)       Annual Eye Exam              Laterality: both eyes              Comments    Patient her for complete exam, last exam 2018. Feels her distance vision has improved and that she doesn't really need her glasses much anymore for that. Does utilize her bifocal for reading and fine print. No drop use.     Graves disease-\"killed her thyroid\", currently trying to find the right dose of her thyroid medication as numbers have been off lately                   Eva Costa, COT      "

## 2023-10-21 NOTE — PROGRESS NOTES
"HPI       Annual Eye Exam    In both eyes.             Comments    Patient her for complete exam, last exam 2018. Feels her distance vision has improved and that she doesn't really need her glasses much anymore for that. Does utilize her bifocal for reading and fine print. No drop use.     Graves disease-\"killed her thyroid\", currently trying to find the right dose of her thyroid medication as numbers have been off lately. Denies diplopia              Last edited by Zana Valencia MD on 10/20/2023  2:07 PM.         Review of systems for the eyes was negative other than the pertinent positives/negatives listed in the HPI.      Assessment & Plan    HPI:  Denita Flores is a 68 year old female with history of hypothyroid, HLD who presents for  exam. Last exam 2018. She denies diplopia. Denies concerns today.      POHx: myopia with astigmatism and presbyopia  PMHx: hypothyroid, HLD  Current Medications: atorvastatin (LIPITOR) 20 MG tablet, Take 1 tablet (20 mg) by mouth At Bedtime  Cholecalciferol (D3 ADULT PO), Take 2,000 Units by mouth daily  cyanocolbalamin (VITAMIN  B-12) 100 MCG tablet, Take 100 mcg by mouth daily.  levothyroxine (SYNTHROID/LEVOTHROID) 100 MCG tablet, Take 1 tablet (100 mcg) by mouth daily  meclizine (ANTIVERT) 25 MG tablet, Take 1 tablet (25 mg) by mouth 3 times daily as needed for dizziness  montelukast (SINGULAIR) 10 MG tablet, TAKE 1 TABLET(10 MG) BY MOUTH AT BEDTIME  ondansetron (ZOFRAN ODT) 4 MG ODT tab, Take 1 tablet (4 mg) by mouth every 8 hours as needed for nausea    No current facility-administered medications on file prior to visit.    FHx: denies family history of ocular conditions   PSHx:denies history of ocular surgeries       Current Eye Medications:      Assessment & Plan:  (H52.13,  H52.203,  H52.4) Myopia of both eyes with astigmatism and presbyopia  (primary encounter diagnosis)  Patient has minimal change in myopia but a copy of today's glasses prescription was " given.  The patient may wish to update the glasses if the lenses are scratched or the frames are too small.      (H25.813) Combined forms of age-related cataract of both eyes  Mild cataracts are present and may account for some of the patient's visual complaints. No treatment currently recommended. The patient will monitor for vision changes and contact us with any decrease in vision. Recheck in one year.        Return in about 1 year (around 10/20/2024) for Annual Visit-v/t/d/MRx.        Zana Valencia MD     Attending Physician Attestation:  Complete documentation of historical and exam elements from today's encounter can be found in the full encounter summary report (not reduplicated in this progress note).  I personally obtained the chief complaint(s) and history of present illness.  I confirmed and edited as necessary the review of systems, past medical/surgical history, family history, social history, and examination findings as documented by others; and I examined the patient myself.  I personally reviewed the relevant tests, images, and reports as documented above.  I formulated and edited as necessary the assessment and plan and discussed the findings and management plan with the patient and family. - Zana Valencia MD

## 2023-10-25 NOTE — TELEPHONE ENCOUNTER
Called patient and left a follow up voicemail to see if we could help schedule a follow up visit with Dr. Santoyo or Gini Bradford in October or November. Left clinic call back number to help schedule appt.     MARSHA Smith   Attending Only This was a shared visit with the SILVIA. I reviewed and verified the documentation and independently performed the documented:

## 2023-11-02 ENCOUNTER — OFFICE VISIT (OUTPATIENT)
Dept: DERMATOLOGY | Facility: CLINIC | Age: 68
End: 2023-11-02
Payer: COMMERCIAL

## 2023-11-02 DIAGNOSIS — L72.0 EPIDERMAL CYST: Primary | ICD-10-CM

## 2023-11-02 DIAGNOSIS — L30.9 CHRONIC DERMATITIS OF HANDS: ICD-10-CM

## 2023-11-02 DIAGNOSIS — L82.0 INFLAMED SEBORRHEIC KERATOSIS: ICD-10-CM

## 2023-11-02 DIAGNOSIS — L40.9 SCALP PSORIASIS: ICD-10-CM

## 2023-11-02 DIAGNOSIS — D23.9 DERMAL NEVUS: ICD-10-CM

## 2023-11-02 DIAGNOSIS — D18.01 ANGIOMA OF SKIN: ICD-10-CM

## 2023-11-02 DIAGNOSIS — L81.4 LENTIGO: ICD-10-CM

## 2023-11-02 DIAGNOSIS — L82.1 SEBORRHEIC KERATOSES: ICD-10-CM

## 2023-11-02 PROCEDURE — 99203 OFFICE O/P NEW LOW 30 MIN: CPT | Mod: 25 | Performed by: DERMATOLOGY

## 2023-11-02 PROCEDURE — 17110 DESTRUCTION B9 LES UP TO 14: CPT | Performed by: DERMATOLOGY

## 2023-11-02 RX ORDER — CLOBETASOL PROPIONATE 0.5 MG/ML
SOLUTION TOPICAL 2 TIMES DAILY
Qty: 100 ML | Refills: 6 | Status: SHIPPED | OUTPATIENT
Start: 2023-11-02 | End: 2024-02-19

## 2023-11-02 RX ORDER — CLOBETASOL PROPIONATE 0.5 MG/G
CREAM TOPICAL 2 TIMES DAILY
Qty: 120 G | Refills: 6 | Status: SHIPPED | OUTPATIENT
Start: 2023-11-02 | End: 2024-02-19

## 2023-11-02 NOTE — PROGRESS NOTES
Denita Flores is an extremely pleasant 68 year old year old female patient here today for spot on face, back and hands.  Patient has no other skin complaints today.  Remainder of the HPI, Meds, PMH, Allergies, FH, and SH was reviewed in chart.      Past Medical History:   Diagnosis Date    Coronary artery disease involving native coronary artery of native heart, angina presence unspecified 2021    Essential hypertension 2021    Graves disease     Kidney stones     Passed a 5-10mm stone    Malignant neoplasm (H)     Cervical CA    Mild persistent asthma 2013    Swollen finger     Thyroid disease        Past Surgical History:   Procedure Laterality Date    BACK SURGERY      COLONOSCOPY WITH CO2 INSUFFLATION N/A 2017    Procedure: COLONOSCOPY WITH CO2 INSUFFLATION;  Surgeon: Duane, William Charles, MD;  Location: [facility] OR    CYSTOSCOPY      CYSTOSCOPY FLEXIBLE  08/10/2018    LEEP TX, CERVICAL      in her 20's        Family History   Problem Relation Age of Onset    Glaucoma Mother     Heart Disease Father     Cancer Father 67        kidney cancer     Heart Disease Sister     Cancer Sister 59        colon cancer     Diabetes Sister     Glaucoma Maternal Grandmother     Colon Cancer Maternal Aunt     Colon Cancer Paternal Aunt        Social History     Socioeconomic History    Marital status:      Spouse name: Not on file    Number of children: Not on file    Years of education: Not on file    Highest education level: Not on file   Occupational History    Not on file   Tobacco Use    Smoking status: Former     Packs/day: 1.50     Years: 35.00     Additional pack years: 0.00     Total pack years: 52.50     Types: Cigarettes     Quit date: 2003     Years since quittin.8    Smokeless tobacco: Never   Substance and Sexual Activity    Alcohol use: Yes     Alcohol/week: 0.0 standard drinks of alcohol     Comment: 3 dinks/ year    Drug use: No    Sexual activity: Not Currently   Other  Topics Concern    Parent/sibling w/ CABG, MI or angioplasty before 65F 55M? No   Social History Narrative    Not on file     Social Determinants of Health     Financial Resource Strain: Not on file   Food Insecurity: Not on file   Transportation Needs: Not on file   Physical Activity: Not on file   Stress: Not on file   Social Connections: Not on file   Interpersonal Safety: Not on file   Housing Stability: Not on file       Outpatient Encounter Medications as of 11/2/2023   Medication Sig Dispense Refill    atorvastatin (LIPITOR) 20 MG tablet Take 1 tablet (20 mg) by mouth At Bedtime 90 tablet 3    Cholecalciferol (D3 ADULT PO) Take 2,000 Units by mouth daily      cyanocolbalamin (VITAMIN  B-12) 100 MCG tablet Take 100 mcg by mouth daily.      levothyroxine (SYNTHROID/LEVOTHROID) 100 MCG tablet Take 1 tablet (100 mcg) by mouth daily 90 tablet 1    meclizine (ANTIVERT) 25 MG tablet Take 1 tablet (25 mg) by mouth 3 times daily as needed for dizziness 20 tablet 0    montelukast (SINGULAIR) 10 MG tablet TAKE 1 TABLET(10 MG) BY MOUTH AT BEDTIME 90 tablet 3    ondansetron (ZOFRAN ODT) 4 MG ODT tab Take 1 tablet (4 mg) by mouth every 8 hours as needed for nausea 21 tablet 2     No facility-administered encounter medications on file as of 11/2/2023.             O:   NAD, WDWN, Alert & Oriented, Mood & Affect wnl, Vitals stable   General appearance normal   Vitals stable   Alert, oriented and in no acute distress     Fissured eczematous plaues in hands  Post scaly red scaly plaque  Back inflamed seborrheic keratosis   L medial canthus cyst    Stuck on papules and brown macules on trunk and ext   Red papules on trunk  Flesh colored papules on trunk     The remainder of the full exam was normal; the following areas were examined:  conjunctiva/lids, , neck, peripheral vascular system, abdomen, lymph nodes, digits/nails, eccrine and apocrine glands, scalp/hair, face, neck, chest, abdomen, buttocks, back, RUE, LUE, RLE, LLE        Eyes: Conjunctivae/lids:Normal     ENT: Lips, buccal mucosa, tongue: normal    MSK:Normal    Cardiovascular: peripheral edema none    Pulm: Breathing Normal    Lymph Nodes: No Head and Neck Lymphadenopathy     Neuro/Psych: Orientation:Alert and Orientedx3 ; Mood/Affect:normal       A/P:  1. Seborrheic keratosis, lentigo, angioma, dermal nevus  2. Epidermal cyst   Excision discussed with patient   3. Back inflamed seborrheic keratosis   LN2:  Treated with LN2 for 5s for 1-2 cycles. Warned risks of blistering, pain, pigment change, scarring, and incomplete resolution.  Advised patient to return if lesions do not completely resolve.  Wound care sheet given.  4. Hand dermatitis cloebtasol twice daily  5. Scalp psoriasis  Cloebtasol twice daily  6. Back inflamed seborrheic keratosis   LN2:  Treated with LN2 for 5s for 1-2 cycles. Warned risks of blistering, pain, pigment change, scarring, and incomplete resolution.  Advised patient to return if lesions do not completely resolve.  Wound care sheet given.    It was a pleasure speaking to Denita Flores today.  Previous clinic notes and pertinent laboratory tests were reviewed prior to Denita Flores's visit.  Nature and genetics of benign skin lesions dicussed with patient.  Signs and Symptoms of skin cancer discussed with patient.  Patient encouraged to perform monthly skin exams.  UV precautions reviewed with patient.  Return to clinic 3 months

## 2023-11-02 NOTE — LETTER
11/2/2023         RE: Denita Flores  [address]  [facility] [address]        Dear Colleague,    Thank you for referring your patient, Denita Flores, to the [facility]. Please see a copy of my visit note below.    Denita Flores is an extremely pleasant 68 year old year old female patient here today for spot on face, back and hands.  Patient has no other skin complaints today.  Remainder of the HPI, Meds, PMH, Allergies, FH, and SH was reviewed in chart.      Past Medical History:   Diagnosis Date     Coronary artery disease involving native coronary artery of native heart, angina presence unspecified 06/11/2021     Essential hypertension 06/11/2021     Graves disease      Kidney stones 2000    Passed a 5-10mm stone     Malignant neoplasm (H)     Cervical CA     Mild persistent asthma 05/08/2013     Swollen finger      Thyroid disease        Past Surgical History:   Procedure Laterality Date     BACK SURGERY       COLONOSCOPY WITH CO2 INSUFFLATION N/A 2/21/2017    Procedure: COLONOSCOPY WITH CO2 INSUFFLATION;  Surgeon: Duane, William Charles, MD;  Location: [facility] OR     CYSTOSCOPY       CYSTOSCOPY FLEXIBLE  08/10/2018     LEEP TX, CERVICAL      in her 20's        Family History   Problem Relation Age of Onset     Glaucoma Mother      Heart Disease Father      Cancer Father 67        kidney cancer      Heart Disease Sister      Cancer Sister 59        colon cancer      Diabetes Sister      Glaucoma Maternal Grandmother      Colon Cancer Maternal Aunt      Colon Cancer Paternal Aunt        Social History     Socioeconomic History     Marital status:      Spouse name: Not on file     Number of children: Not on file     Years of education: Not on file     Highest education level: Not on file   Occupational History     Not on file   Tobacco Use     Smoking status: Former     Packs/day: 1.50     Years: 35.00     Additional pack years: 0.00     Total pack years: 52.50      Types: Cigarettes     Quit date: 2003     Years since quittin.8     Smokeless tobacco: Never   Substance and Sexual Activity     Alcohol use: Yes     Alcohol/week: 0.0 standard drinks of alcohol     Comment: 3 dinks/ year     Drug use: No     Sexual activity: Not Currently   Other Topics Concern     Parent/sibling w/ CABG, MI or angioplasty before 65F 55M? No   Social History Narrative     Not on file     Social Determinants of Health     Financial Resource Strain: Not on file   Food Insecurity: Not on file   Transportation Needs: Not on file   Physical Activity: Not on file   Stress: Not on file   Social Connections: Not on file   Interpersonal Safety: Not on file   Housing Stability: Not on file       Outpatient Encounter Medications as of 2023   Medication Sig Dispense Refill     atorvastatin (LIPITOR) 20 MG tablet Take 1 tablet (20 mg) by mouth At Bedtime 90 tablet 3     Cholecalciferol (D3 ADULT PO) Take 2,000 Units by mouth daily       cyanocolbalamin (VITAMIN  B-12) 100 MCG tablet Take 100 mcg by mouth daily.       levothyroxine (SYNTHROID/LEVOTHROID) 100 MCG tablet Take 1 tablet (100 mcg) by mouth daily 90 tablet 1     meclizine (ANTIVERT) 25 MG tablet Take 1 tablet (25 mg) by mouth 3 times daily as needed for dizziness 20 tablet 0     montelukast (SINGULAIR) 10 MG tablet TAKE 1 TABLET(10 MG) BY MOUTH AT BEDTIME 90 tablet 3     ondansetron (ZOFRAN ODT) 4 MG ODT tab Take 1 tablet (4 mg) by mouth every 8 hours as needed for nausea 21 tablet 2     No facility-administered encounter medications on file as of 2023.             O:   NAD, WDWN, Alert & Oriented, Mood & Affect wnl, Vitals stable   General appearance normal   Vitals stable   Alert, oriented and in no acute distress     Fissured eczematous plaues in hands  Post scaly red scaly plaque  Back inflamed seborrheic keratosis   L medial canthus cyst    Stuck on papules and brown macules on trunk and ext   Red papules on trunk  Flesh  colored papules on trunk     The remainder of the full exam was normal; the following areas were examined:  conjunctiva/lids, , neck, peripheral vascular system, abdomen, lymph nodes, digits/nails, eccrine and apocrine glands, scalp/hair, face, neck, chest, abdomen, buttocks, back, RUE, LUE, RLE, LLE       Eyes: Conjunctivae/lids:Normal     ENT: Lips, buccal mucosa, tongue: normal    MSK:Normal    Cardiovascular: peripheral edema none    Pulm: Breathing Normal    Lymph Nodes: No Head and Neck Lymphadenopathy     Neuro/Psych: Orientation:Alert and Orientedx3 ; Mood/Affect:normal       A/P:  1. Seborrheic keratosis, lentigo, angioma, dermal nevus  2. Epidermal cyst   Excision discussed with patient   3. Back inflamed seborrheic keratosis   LN2:  Treated with LN2 for 5s for 1-2 cycles. Warned risks of blistering, pain, pigment change, scarring, and incomplete resolution.  Advised patient to return if lesions do not completely resolve.  Wound care sheet given.  4. Hand dermatitis cloebtasol twice daily  5. Scalp psoriasis  Cloebtasol twice daily  6. Back inflamed seborrheic keratosis   LN2:  Treated with LN2 for 5s for 1-2 cycles. Warned risks of blistering, pain, pigment change, scarring, and incomplete resolution.  Advised patient to return if lesions do not completely resolve.  Wound care sheet given.    It was a pleasure speaking to Denita Flores today.  Previous clinic notes and pertinent laboratory tests were reviewed prior to Denita Flores's visit.  Nature and genetics of benign skin lesions dicussed with patient.  Signs and Symptoms of skin cancer discussed with patient.  Patient encouraged to perform monthly skin exams.  UV precautions reviewed with patient.  Return to clinic 3 months      Again, thank you for allowing me to participate in the care of your patient.        Sincerely,        Erwin Araujo MD

## 2023-11-07 DIAGNOSIS — E89.0 POSTABLATIVE HYPOTHYROIDISM: ICD-10-CM

## 2023-11-07 RX ORDER — LEVOTHYROXINE SODIUM 100 UG/1
100 TABLET ORAL DAILY
Qty: 90 TABLET | Refills: 0 | Status: SHIPPED | OUTPATIENT
Start: 2023-11-07 | End: 2023-11-29 | Stop reason: DRUGHIGH

## 2023-11-07 NOTE — TELEPHONE ENCOUNTER
Former [facility] patient    Appointments in Next Year      Jan 02, 2024 11:00 AM  (Arrive by 10:40 AM)  Provider Visit with Zoraida Roldan MD  [facility] ([facility] ) [phone]              RT Brandy (R)

## 2023-11-11 ENCOUNTER — NURSE TRIAGE (OUTPATIENT)
Dept: FAMILY MEDICINE | Facility: CLINIC | Age: 68
End: 2023-11-11
Payer: COMMERCIAL

## 2023-11-11 DIAGNOSIS — E89.0 POSTABLATIVE HYPOTHYROIDISM: Primary | ICD-10-CM

## 2023-11-11 NOTE — TELEPHONE ENCOUNTER
Medication Question or Refill    Contacts         Type Contact Phone/Fax    11/11/2023 03:29 PM CST Phone (Incoming) Denita Flores (Self) [phone] (M)            What medication are you calling about (include dose and sig)?:  levothyroxine (SYNTHROID/LEVOTHROID) 100 MCG tablet    Preferred Pharmacy:   [id number] [place] [address] AT [facility] [address]  [facility] [address]  Phone: [phone] Fax: [phone]      Controlled Substance Agreement on file:   CSA -- Patient Level:    CSA: None found at the patient level.       Who prescribed the medication?: Erwin Hood PA-C    Do you need a refill? Yes    When did you use the medication last? 11/11/2023    Patient offered an appointment? No    Do you have any questions or concerns?  Yes: Pt is unsure is this dosage is correct and labs need to be drawn. Pt is complaining about hair loss with dosage.       Okay to leave a detailed message?: Yes at Cell number on file:  Please call the patient between 945 am to 2 pm Mon-Fri.   Telephone Information:   Mobile [phone]

## 2023-11-13 NOTE — TELEPHONE ENCOUNTER
Nurse Triage SBAR    Is this a 2nd Level Triage? YES, LICENSED PRACTITIONER REVIEW IS REQUIRED    Situation: Patient has been having hair loss, been going on for a year. Has had a lot of hair loss, changed shampoos and soaps after seeing the dermatologist. Patient has no balding spots. Wondering if she should have labs to evaluate thyroid because she sees Dr. Roldan in January but concerned that her dosage is not correct. She has about two weeks of med available currently.     Background: Patient may need to be seen earlier     Assessment: Patient  may need labs     Protocol Recommended Disposition:   Discuss With PCP And Callback By Nurse Within 1 Hour    Recommendation: provider review     Routed to provider    Does the patient meet one of the following criteria for ADS visit consideration? No    Reason for Disposition   Caller has URGENT medicine question about med that PCP or specialist prescribed and triager unable to answer question    Additional Information   Negative: Drug overdose and triager unable to answer question   Negative: Caller requesting a renewal or refill of a medicine patient is currently taking   Negative: Caller requesting information unrelated to medicine   Negative: Caller requesting information about COVID-19 Vaccine   Negative: Caller requesting information about Emergency Contraception   Negative: Caller requesting information about Combined Birth Control Pills   Negative: Caller requesting information about Progestin Birth Control Pills   Negative: Caller requesting information about Post-Op pain or medicines   Negative: Caller requesting a prescription antibiotic (such as penicillin) for Strep throat and has a positive culture result   Negative: Caller requesting a prescription anti-viral med (such as Tamiflu) and has influenza (flu) symptoms   Negative: Immunization reaction suspected   Negative: Rash while taking a medicine or within 3 days of stopping it   Negative: Asthma and  "having symptoms of asthma (cough, wheezing, etc.)   Negative: Symptom of illness (e.g., headache, abdominal pain, earache, vomiting) that are more than mild   Negative: Breastfeeding questions about mother's medicines and diet   Negative: MORE THAN A DOUBLE DOSE of a prescription or over-the-counter (OTC) drug   Negative: DOUBLE DOSE (an extra dose or lesser amount) of prescription drug and any symptoms (e.g., dizziness, nausea, pain, sleepiness)   Negative: DOUBLE DOSE (an extra dose or lesser amount) of over-the-counter (OTC) drug and any symptoms (e.g., dizziness, nausea, pain, sleepiness)   Negative: Took another person's prescription drug   Negative: DOUBLE DOSE (an extra dose or lesser amount) of prescription drug and NO symptoms  (Exception: A double dose of antibiotics.)   Negative: Diabetes drug error or overdose (e.g., took wrong type of insulin or took extra dose)   Negative: Caller has medication question about med NOT prescribed by PCP and triager unable to answer question (e.g., compatibility with other med, storage)   Negative: Pharmacy calling with prescription question and triager unable to answer question   Negative: Prescription not at pharmacy and was prescribed by PCP recently  (Exception: triager has access to EMR and prescription is recorded there. Go to Home Care and confirm for pharmacy.)    Answer Assessment - Initial Assessment Questions  1. NAME of MEDICINE: \"What medicine(s) are you calling about?\"      Levothyroxine - mostly needs a new prescription, lost weight and needs   2. QUESTION: \"What is your question?\" (e.g., double dose of medicine, side effect)      What does she need to do?   3. PRESCRIBER: \"Who prescribed the medicine?\" Reason: if prescribed by specialist, call should be referred to that group.      Dr. Bryant  4. SYMPTOMS: \"Do you have any symptoms?\" If Yes, ask: \"What symptoms are you having?\"  \"How bad are the symptoms (e.g., mild, moderate, severe)      Hair loss- lost a " "lot of hair but has slowed down, changed shamoos   5. PREGNANCY:  \"Is there any chance that you are pregnant?\" \"When was your last menstrual period?\"      No    Protocols used: Medication Question Call-A-OH    "

## 2023-11-24 ENCOUNTER — LAB (OUTPATIENT)
Dept: LAB | Facility: CLINIC | Age: 68
End: 2023-11-24
Payer: COMMERCIAL

## 2023-11-24 DIAGNOSIS — E89.0 POSTABLATIVE HYPOTHYROIDISM: ICD-10-CM

## 2023-11-24 LAB
T4 FREE SERPL-MCNC: 2 NG/DL (ref 0.9–1.7)
TSH SERPL DL<=0.005 MIU/L-ACNC: 0.07 UIU/ML (ref 0.3–4.2)

## 2023-11-24 PROCEDURE — 84443 ASSAY THYROID STIM HORMONE: CPT

## 2023-11-24 PROCEDURE — 36415 COLL VENOUS BLD VENIPUNCTURE: CPT

## 2023-11-24 PROCEDURE — 84439 ASSAY OF FREE THYROXINE: CPT

## 2023-11-25 NOTE — TELEPHONE ENCOUNTER
Please contact patient, her tsh is off and prior md gone, please schedule team office visit this week.    Thanks    Petey Mooney M.D.

## 2023-11-29 ENCOUNTER — OFFICE VISIT (OUTPATIENT)
Dept: FAMILY MEDICINE | Facility: CLINIC | Age: 68
End: 2023-11-29
Payer: COMMERCIAL

## 2023-11-29 VITALS
SYSTOLIC BLOOD PRESSURE: 116 MMHG | DIASTOLIC BLOOD PRESSURE: 73 MMHG | WEIGHT: 164 LBS | TEMPERATURE: 96.8 F | BODY MASS INDEX: 24.86 KG/M2 | RESPIRATION RATE: 18 BRPM | HEIGHT: 68 IN | OXYGEN SATURATION: 99 % | HEART RATE: 72 BPM

## 2023-11-29 DIAGNOSIS — Z80.0 FH: COLON CANCER: ICD-10-CM

## 2023-11-29 DIAGNOSIS — E89.0 POSTABLATIVE HYPOTHYROIDISM: Primary | ICD-10-CM

## 2023-11-29 DIAGNOSIS — Z12.11 SCREEN FOR COLON CANCER: ICD-10-CM

## 2023-11-29 DIAGNOSIS — E05.00 GRAVES DISEASE: ICD-10-CM

## 2023-11-29 PROCEDURE — 99214 OFFICE O/P EST MOD 30 MIN: CPT | Performed by: PHYSICIAN ASSISTANT

## 2023-11-29 RX ORDER — LEVOTHYROXINE SODIUM 75 UG/1
75 TABLET ORAL DAILY
Qty: 60 TABLET | Refills: 1 | Status: SHIPPED | OUTPATIENT
Start: 2023-11-29 | End: 2024-02-19

## 2023-11-29 ASSESSMENT — PAIN SCALES - GENERAL: PAINLEVEL: NO PAIN (0)

## 2023-11-29 ASSESSMENT — ASTHMA QUESTIONNAIRES: ACT_TOTALSCORE: 25

## 2023-11-29 NOTE — PROGRESS NOTES
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  She has dry skin, hair loss, weight loss which may be symptoms of this hyperthyroidism  Pt denies palpitations  Feels anxious at times.    Due for mammogram end of Dec and agrees to schedule    Due for colonoscopy due to personal hx of polyps and FH colon ca in her sister.  She states she cannot tolerate the prep and can't drink Gatorade    TSH   Date Value Ref Range Status   2023 0.07 (L) 0.30 - 4.20 uIU/mL Final   2022 0.70 0.40 - 4.00 mU/L Final   2021 2.02 0.40 - 4.00 mU/L Final     Past Medical History:   Diagnosis Date    Coronary artery disease involving native coronary artery of native heart, angina presence unspecified 2021    Essential hypertension 2021    Graves disease     Kidney stones     Passed a 5-10mm stone    Malignant neoplasm (H)     Cervical CA    Mild persistent asthma 2013    Swollen finger     Thyroid disease      Past Surgical History:   Procedure Laterality Date    BACK SURGERY      COLONOSCOPY WITH CO2 INSUFFLATION N/A 2017    Procedure: COLONOSCOPY WITH CO2 INSUFFLATION;  Surgeon: Duane, William Charles, MD;  Location: [facility] OR    CYSTOSCOPY      CYSTOSCOPY FLEXIBLE  08/10/2018    LEEP TX, CERVICAL      in her 20's     Social History     Tobacco Use    Smoking status: Former     Packs/day: 1.50     Years: 35.00     Additional pack years: 0.00     Total pack years: 52.50     Types: Cigarettes     Quit date: 2003     Years since quittin.9    Smokeless tobacco: Never   Substance Use Topics    Alcohol use: Yes     Alcohol/week: 0.0 standard drinks of alcohol     Comment: 3 dinks/ year     Current Outpatient Medications   Medication Sig Dispense Refill    atorvastatin (LIPITOR) 20 MG tablet Take 1 tablet (20 mg) by mouth At Bedtime 90 tablet  "3    Cholecalciferol (D3 ADULT PO) Take 2,000 Units by mouth daily      clobetasol (TEMOVATE) 0.05 % external cream Apply topically 2 times daily To hands 120 g 6    clobetasol (TEMOVATE) 0.05 % external solution Apply topically 2 times daily To scalp 100 mL 6    cyanocolbalamin (VITAMIN  B-12) 100 MCG tablet Take 100 mcg by mouth daily.      levothyroxine (SYNTHROID/LEVOTHROID) 75 MCG tablet Take 1 tablet (75 mcg) by mouth daily 60 tablet 1    meclizine (ANTIVERT) 25 MG tablet Take 1 tablet (25 mg) by mouth 3 times daily as needed for dizziness 20 tablet 0    montelukast (SINGULAIR) 10 MG tablet TAKE 1 TABLET(10 MG) BY MOUTH AT BEDTIME 90 tablet 3    ondansetron (ZOFRAN ODT) 4 MG ODT tab Take 1 tablet (4 mg) by mouth every 8 hours as needed for nausea 21 tablet 2     Allergies   Allergen Reactions    Contrast Dye Hives and Itching     Isovue with ANTON on 1-21-19    Sulfa Antibiotics Hives and Swelling     Noted in 10/18/09 ER     FAMILY HISTORY NOTED AND REVIEWED    PHYSICAL EXAM:    /73 (BP Location: Right arm, Patient Position: Sitting, Cuff Size: Adult Regular)   Pulse 72   Temp 96.8  F (36  C) (Temporal)   Resp 18   Ht 1.715 m (5' 7.5\")   Wt 74.4 kg (164 lb)   SpO2 99%   BMI 25.31 kg/m      Patient appears non toxic    Assessment and Plan:     (E89.0) Postablative hypothyroidism  (primary encounter diagnosis)  Comment: Decreased dose of synthroid from 100mcg to 75mcg today. Repeat TSH 8 weeks. Referred to endo since this is a change this year and she is anxious about lowering the dose of her synthroid.  Plan: Adult Endocrinology  Referral, **TSH         with free T4 reflex FUTURE 2mo, levothyroxine         (SYNTHROID/LEVOTHROID) 75 MCG tablet            (E05.00) Graves disease  Comment:   Plan:     (Z12.11) Screen for colon cancer  Comment:   Plan: colonoscopy ordered. Discussed Sutab an option. I printed off a coupon for her during the visit.    (Z80.0) FH: colon cancer  Comment:   Plan: " sister had colon ca dx age 59    Spent 30 minutes FTF with patient of which over 50% was spent discussing the coordination of care and management of their issues noted.    Carmela Joy PA-C

## 2023-12-14 ENCOUNTER — TELEPHONE (OUTPATIENT)
Dept: ENDOCRINOLOGY | Facility: CLINIC | Age: 68
End: 2023-12-14
Payer: COMMERCIAL

## 2023-12-14 NOTE — TELEPHONE ENCOUNTER
Patient call:     Appointment type: NEW ENDOCRINE  Provider: ANY MD FOR Postablative hypothyroidism    Return date:RESCHEDULE 08/05/2024  WITHIN 2 MONTHS AROUND  FEBRUARY-MARCH 2024  Speciality phone number: [phone]  Additional appointment(s) needed:   Additional notes:  MATEOM AND SENT LETTER X1  Susanna Lopes on 12/14/2023 at 11:54 AM

## 2023-12-18 ENCOUNTER — TELEPHONE (OUTPATIENT)
Dept: ENDOCRINOLOGY | Facility: CLINIC | Age: 68
End: 2023-12-18
Payer: COMMERCIAL

## 2023-12-18 NOTE — TELEPHONE ENCOUNTER
Patient call:     Appointment type: new endocrine   Provider: Gloria   Return date: 2/19/24   Speciality phone number: [phone]  Additional appointment(s) needed: N/A   Additional notes: Spoke to pt and scheduled within 2 months per sonya in pt calls.     Debora Winchester on 12/18/2023 at 1:18 PM

## 2023-12-27 ENCOUNTER — OFFICE VISIT (OUTPATIENT)
Dept: UROLOGY | Facility: CLINIC | Age: 68
End: 2023-12-27
Payer: COMMERCIAL

## 2023-12-27 VITALS — DIASTOLIC BLOOD PRESSURE: 66 MMHG | OXYGEN SATURATION: 100 % | HEART RATE: 76 BPM | SYSTOLIC BLOOD PRESSURE: 105 MMHG

## 2023-12-27 DIAGNOSIS — Z87.440 HISTORY OF UTI: ICD-10-CM

## 2023-12-27 DIAGNOSIS — N32.81 OAB (OVERACTIVE BLADDER): ICD-10-CM

## 2023-12-27 DIAGNOSIS — N39.41 URGENCY INCONTINENCE: Primary | ICD-10-CM

## 2023-12-27 DIAGNOSIS — R31.29 MICROSCOPIC HEMATURIA: ICD-10-CM

## 2023-12-27 DIAGNOSIS — N20.0 NEPHROLITHIASIS: ICD-10-CM

## 2023-12-27 PROCEDURE — 99214 OFFICE O/P EST MOD 30 MIN: CPT | Mod: 25 | Performed by: PHYSICIAN ASSISTANT

## 2023-12-27 PROCEDURE — 51798 US URINE CAPACITY MEASURE: CPT | Performed by: PHYSICIAN ASSISTANT

## 2023-12-27 NOTE — PROGRESS NOTES
Urology Clinic      Name: Denita Flores    MRN: [id number]   YOB: 1955  Accompanied at today's visit by:self                 Chief Complaint:   Hx of hematuria and OAB          History of Present Illness:   December 27, 2023    HISTORY:   We have been following 68 year old Denita Flores for hematuria with negative workups, hx of kidney stones, OAB. Patient has had 2 hematuria work-ups in the past that were unremarkable and nephrology does not have idea of etiology as well. Last saw Dr. English on 9/27/23 for cysto which was consistent with cystitis so UC obtained. UC was positive for UTI (E. Coli). Urine cytology was negative at that time. Was started on myrbetriq 25mg daily. Here for follow-up. States she never started the myrbetriq, as after she took the antibiotic for the UTI at last encounter, all of her symptoms have resolved. States she feels like she had a UTI for 2 years and now all of her symptoms resolved after taking the antibiotic. Denies s/s of UTI today or gross hematuria. Hx of cervical cancer, former smoker and family hx of kidney cancer. Hx of falls and syncope and meniere's disease. Patient voices no other concerns at this time.            Allergies:     Allergies   Allergen Reactions    Contrast Dye Hives and Itching     Isovue with ANTON on 1-21-19    Sulfa Antibiotics Hives and Swelling     Noted in 10/18/09 ER            Medications:     Current Outpatient Medications   Medication Sig    albuterol (PROAIR HFA/PROVENTIL HFA/VENTOLIN HFA) 108 (90 Base) MCG/ACT inhaler Inhale 2 puffs into the lungs every 6 hours as needed for shortness of breath, wheezing or cough Due for appointment    atorvastatin (LIPITOR) 20 MG tablet Take 1 tablet (20 mg) by mouth At Bedtime    Cholecalciferol (D3 ADULT PO) Take 2,000 Units by mouth daily    clobetasol (TEMOVATE) 0.05 % external cream Apply topically 2 times daily To hands    clobetasol (TEMOVATE) 0.05 % external solution Apply topically 2 times  daily To scalp    cyanocolbalamin (VITAMIN  B-12) 100 MCG tablet Take 100 mcg by mouth daily.    levothyroxine (SYNTHROID/LEVOTHROID) 75 MCG tablet Take 1 tablet (75 mcg) by mouth daily    meclizine (ANTIVERT) 25 MG tablet Take 1 tablet (25 mg) by mouth 3 times daily as needed for dizziness    montelukast (SINGULAIR) 10 MG tablet TAKE 1 TABLET(10 MG) BY MOUTH AT BEDTIME    ondansetron (ZOFRAN ODT) 4 MG ODT tab Take 1 tablet (4 mg) by mouth every 8 hours as needed for nausea     No current facility-administered medications for this visit.          Past  Surgical History:     Past Surgical History:   Procedure Laterality Date    BACK SURGERY      COLONOSCOPY WITH CO2 INSUFFLATION N/A 2/21/2017    Procedure: COLONOSCOPY WITH CO2 INSUFFLATION;  Surgeon: Duane, William Charles, MD;  Location: [facility] OR    CYSTOSCOPY      CYSTOSCOPY FLEXIBLE  08/10/2018    LEEP TX, CERVICAL      in her 20's             Physical Exam:   There were no vitals filed for this visit.  PSYCH: NAD  EYES: EOMI  NEURO: AAO x3    LABS:     PVR 0ml    UC  9/27/23 >100,000 E. Coli (resistant to ampicillin, cefazolin, cefoxitin, bactrim)  2/24/23 mixed nikolay  1/28/23  mixed nikolay    Creatinine   Date Value Ref Range Status   03/06/2023 0.97 (H) 0.51 - 0.95 mg/dL Final   06/04/2021 0.90 0.52 - 1.04 mg/dL Final       Cystoscopy Note 9/27/23: After informed consent was obtained patient was prepped and draped in the standard fashion.  The flexible cystoscope was inserted into a normal appearing urethral meatus.  The urothelium was carefully examined and there were scattered areas most consistent with cystitis but there were no tumors, masses, stones, foreign bodies, or other urothelial abnormalities noted.  Bilateral ureteral orifices were noted in the normal orthotopic position and both effluxed clear urine.  The cystoscope was retroflexed and the bladder neck was unremarkable.  The urethra was carefully examined upon removing the cystoscope and was  unremarkable.  Patient tolerated the procedure without complications noted.       Urine cytology on 9/27/23  Final Diagnosis   Specimen A                 Interpretation:                  Negative for High Grade Urothelial Carcinoma                 Adequacy:                 Satisfactory for evaluation           CT urogram 3/20/23  FINDINGS:   LOWER CHEST: Small hiatal hernia. Visualized lung bases are clear.     HEPATOBILIARY: Stable small cysts in the liver. No new lesions.  Cholelithiasis.     PANCREAS: Normal.     SPLEEN: Normal.     ADRENAL GLANDS: Stable linear thickening of the adrenal glands, left  greater than right.     KIDNEYS/BLADDER: No calculi, hydronephrosis or perinephric stranding  in either kidney. No suspicious renal cortical masses bilaterally. No  abnormal urothelial enhancement or filling defects in the renal  collecting system and opacified ureters the distal right ureter is not  well opacified although that is nonobstructive dilatation to suggest  an underlying lesion. Partly distended urinary bladder without  calculi, focal wall thickening or masses.     BOWEL: Diverticulosis in the colon. No acute inflammatory change. No  obstruction.      PELVIC ORGANS: No pelvic masses.     ADDITIONAL FINDINGS: No free fluid or fluid collections. No  lymphadenopathy. No abdominal aortic aneurysm.     MUSCULOSKELETAL: No suspicious lesions in the bones.                                                                      IMPRESSION:   1.  No urinary system calculus. No renal masses or urothelial lesions  in the upper tracts. No bladder masses.  2.  Cholelithiasis.         Assessment and Plan:   68 year old is a pleasant female who has OAB, negative hematuria work-ups, hx of kidney stones, family hx of kidney cancer, hx of cervical cancer.     Plan:  -  PVR WNL.  -  continue to hold off on starting myrbetriq at this time as symptoms well controlled.   - discussed estrogen cream for UTI prevention in the  future and patient will think about this; would need to clear with PCP prior to starting.   - Encourage OTC supplements for UTI prevention.   - contact clinic if develops s/s of UTI in the future or if develops gross hematuria.   - avoid bladder irritants.   - Plan to follow-up in 6 months with UA/micro at that time.  - consider imaging at next appointment given hx of stones.   - After discussing the assessment and plan with patient, patient verbalizes understanding and agrees to the above plan. All questions answered.     Other orders as below:  Orders Placed This Encounter   Procedures    MEASURE POST-VOID RESIDUAL URINE/BLADDER CAPACITY, US NON-IMAGING (05798)     27 minutes spent on the date of the encounter doing chart review, review of Dr. English's note, review of test results, interpretation of tests, patient visit and documentation.      Eboni Oliveira PA-C  Urology  December 27, 2023      Patient Care Team:  Neli Lara MD as PCP - General (Internal Medicine)  Agbeh, Cephas Mawuena, MD as MD (OB/Gyn)  Darline English MD as Assigned Surgical Provider  Kyle Santoyo MD as MD (Nephrology)  Nicolle Villanueva PA as Referring Physician (Internal Medicine)  Dmitriy Pitt MD as MD (Otolaryngology)  Avery, Jonathan Baires MD as MD (Cardiovascular Disease)  Gabriele Campos MD as Assigned Rheumatology Provider  Marcus Simmons MD as Assigned Allergy Provider  Jonathan Varela MD as Assigned Heart and Vascular Provider  Gini Bradford PA-C as Assigned Nephrology Provider  Erwin Araujo MD as MD (Dermatology)  Erwin Araujo MD as MD (Dermatology)  Gee Hitchcock MD as Assigned PCP

## 2023-12-27 NOTE — PATIENT INSTRUCTIONS
_______________________________________________     UTI Prevention:    - Ellura 36mg tablet daily.   - AZO as needed; if symptoms do not improve after 24-48 hours after taking AZO as needed advise contacting clinic.   - Probiotic daily; recommend for Florajen 3 or any women's probiotic will do  - Make sure you stay hydrated with about 60-80oz of water per day.   - Void after sexual intercourse.   - Recommend good vulvar hygiene such as wearing loose cotton underwear and avoiding scented hygenic products/wipes/soaps or detergents. Wipe front to back after voiding/defecation. Avoid sitting in soiled clothing and keeping vulva dry.   - If you develop symptoms of UTI, please contact clinic. However, if you develop fevers  greater than 100.4 degrees fahrenheit, flank pain or blood in your urine, recommend going to urgent care or ER.   - Make sure to drink plenty of water each day and to really push fluids when have symptoms of a UTI.  - Estrogen cream can help prevent UTIs; let me know if you become interested in this.     _______________________________________________     Below is a list of things that can irritate the bladder and should be eliminated:  Caffeinated soft drinks.  Coffee.  Tea.  Chocolate.  Tomato-based foods.  Acidic juices and fruits. (includes cranberry juice)  Alcohol.  Nicotine  Carbonated drinks.  Aspartame/Nutrasweet.    ________________________________

## 2023-12-27 NOTE — LETTER
12/27/2023       RE: Denita Flores  [address]  [facility] [address]     Dear Colleague,    Thank you for referring your patient, Denita Flores, to the [facility] at [facility]. Please see a copy of my visit note below.    Urology Clinic      Name: Denita Flores    MRN: [id number]   YOB: 1955  Accompanied at today's visit by:self                 Chief Complaint:   Hx of hematuria and OAB          History of Present Illness:   December 27, 2023    HISTORY:   We have been following 68 year old Denita Flores for hematuria with negative workups, hx of kidney stones, OAB. Patient has had 2 hematuria work-ups in the past that were unremarkable and nephrology does not have idea of etiology as well. Last saw Dr. English on 9/27/23 for cysto which was consistent with cystitis so UC obtained. UC was positive for UTI (E. Coli). Urine cytology was negative at that time. Was started on myrbetriq 25mg daily. Here for follow-up. States she never started the myrbetriq, as after she took the antibiotic for the UTI at last encounter, all of her symptoms have resolved. States she feels like she had a UTI for 2 years and now all of her symptoms resolved after taking the antibiotic. Denies s/s of UTI today or gross hematuria. Hx of cervical cancer, former smoker and family hx of kidney cancer. Hx of falls and syncope and meniere's disease. Patient voices no other concerns at this time.            Allergies:     Allergies   Allergen Reactions    Contrast Dye Hives and Itching     Isovue with ANTON on 1-21-19    Sulfa Antibiotics Hives and Swelling     Noted in 10/18/09 ER            Medications:     Current Outpatient Medications   Medication Sig    albuterol (PROAIR HFA/PROVENTIL HFA/VENTOLIN HFA) 108 (90 Base) MCG/ACT inhaler Inhale 2 puffs into the lungs every 6 hours as needed for shortness of breath, wheezing or cough Due for  appointment    atorvastatin (LIPITOR) 20 MG tablet Take 1 tablet (20 mg) by mouth At Bedtime    Cholecalciferol (D3 ADULT PO) Take 2,000 Units by mouth daily    clobetasol (TEMOVATE) 0.05 % external cream Apply topically 2 times daily To hands    clobetasol (TEMOVATE) 0.05 % external solution Apply topically 2 times daily To scalp    cyanocolbalamin (VITAMIN  B-12) 100 MCG tablet Take 100 mcg by mouth daily.    levothyroxine (SYNTHROID/LEVOTHROID) 75 MCG tablet Take 1 tablet (75 mcg) by mouth daily    meclizine (ANTIVERT) 25 MG tablet Take 1 tablet (25 mg) by mouth 3 times daily as needed for dizziness    montelukast (SINGULAIR) 10 MG tablet TAKE 1 TABLET(10 MG) BY MOUTH AT BEDTIME    ondansetron (ZOFRAN ODT) 4 MG ODT tab Take 1 tablet (4 mg) by mouth every 8 hours as needed for nausea     No current facility-administered medications for this visit.          Past  Surgical History:     Past Surgical History:   Procedure Laterality Date    BACK SURGERY      COLONOSCOPY WITH CO2 INSUFFLATION N/A 2/21/2017    Procedure: COLONOSCOPY WITH CO2 INSUFFLATION;  Surgeon: Duane, William Charles, MD;  Location: [facility] OR    CYSTOSCOPY      CYSTOSCOPY FLEXIBLE  08/10/2018    LEEP TX, CERVICAL      in her 20's             Physical Exam:   There were no vitals filed for this visit.  PSYCH: NAD  EYES: EOMI  NEURO: AAO x3    LABS:     PVR 0ml    UC  9/27/23 >100,000 E. Coli (resistant to ampicillin, cefazolin, cefoxitin, bactrim)  2/24/23 mixed nikolay  1/28/23  mixed nikolay    Creatinine   Date Value Ref Range Status   03/06/2023 0.97 (H) 0.51 - 0.95 mg/dL Final   06/04/2021 0.90 0.52 - 1.04 mg/dL Final       Cystoscopy Note 9/27/23: After informed consent was obtained patient was prepped and draped in the standard fashion.  The flexible cystoscope was inserted into a normal appearing urethral meatus.  The urothelium was carefully examined and there were scattered areas most consistent with cystitis but there were no tumors, masses,  stones, foreign bodies, or other urothelial abnormalities noted.  Bilateral ureteral orifices were noted in the normal orthotopic position and both effluxed clear urine.  The cystoscope was retroflexed and the bladder neck was unremarkable.  The urethra was carefully examined upon removing the cystoscope and was unremarkable.  Patient tolerated the procedure without complications noted.       Urine cytology on 9/27/23  Final Diagnosis   Specimen A                 Interpretation:                  Negative for High Grade Urothelial Carcinoma                 Adequacy:                 Satisfactory for evaluation           CT urogram 3/20/23  FINDINGS:   LOWER CHEST: Small hiatal hernia. Visualized lung bases are clear.     HEPATOBILIARY: Stable small cysts in the liver. No new lesions.  Cholelithiasis.     PANCREAS: Normal.     SPLEEN: Normal.     ADRENAL GLANDS: Stable linear thickening of the adrenal glands, left  greater than right.     KIDNEYS/BLADDER: No calculi, hydronephrosis or perinephric stranding  in either kidney. No suspicious renal cortical masses bilaterally. No  abnormal urothelial enhancement or filling defects in the renal  collecting system and opacified ureters the distal right ureter is not  well opacified although that is nonobstructive dilatation to suggest  an underlying lesion. Partly distended urinary bladder without  calculi, focal wall thickening or masses.     BOWEL: Diverticulosis in the colon. No acute inflammatory change. No  obstruction.      PELVIC ORGANS: No pelvic masses.     ADDITIONAL FINDINGS: No free fluid or fluid collections. No  lymphadenopathy. No abdominal aortic aneurysm.     MUSCULOSKELETAL: No suspicious lesions in the bones.                                                                      IMPRESSION:   1.  No urinary system calculus. No renal masses or urothelial lesions  in the upper tracts. No bladder masses.  2.  Cholelithiasis.         Assessment and Plan:   68  year old is a pleasant female who has OAB, negative hematuria work-ups, hx of kidney stones, family hx of kidney cancer, hx of cervical cancer.     Plan:  -  PVR WNL.  -  continue to hold off on starting myrbetriq at this time as symptoms well controlled.   - discussed estrogen cream for UTI prevention in the future and patient will think about this; would need to clear with PCP prior to starting.   - Encourage OTC supplements for UTI prevention.   - contact clinic if develops s/s of UTI in the future or if develops gross hematuria.   - avoid bladder irritants.   - Plan to follow-up in 6 months with UA/micro at that time.  - consider imaging at next appointment given hx of stones.   - After discussing the assessment and plan with patient, patient verbalizes understanding and agrees to the above plan. All questions answered.     Other orders as below:  Orders Placed This Encounter   Procedures    MEASURE POST-VOID RESIDUAL URINE/BLADDER CAPACITY, US NON-IMAGING (11644)     27 minutes spent on the date of the encounter doing chart review, review of Dr. English's note, review of test results, interpretation of tests, patient visit and documentation.      Eboni Oliveira PA-C  Urology  December 27, 2023      Patient Care Team:  Neli Lara MD as PCP - General (Internal Medicine)  Agbeh, Cephas Mawuena, MD as MD (OB/Gyn)  Darline English MD as Assigned Surgical Provider  Kyle Santoyo MD as MD (Nephrology)  Nicolle Villanueva PA as Referring Physician (Internal Medicine)  Dmitriy Pitt MD as MD (Otolaryngology)  Jonathan Varlea MD as MD (Cardiovascular Disease)  Gabriele Campos MD as Assigned Rheumatology Provider  Marcus Simmons MD as Assigned Allergy Provider  Jonathan Varela MD as Assigned Heart and Vascular Provider  Gini Bradford PA-C as Assigned Nephrology Provider  Erwin Araujo MD as MD (Dermatology)  Erwin Araujo MD as MD (Dermatology)  Coretta  Gee MADSEN MD as Assigned PCP

## 2024-01-02 ENCOUNTER — OFFICE VISIT (OUTPATIENT)
Dept: FAMILY MEDICINE | Facility: CLINIC | Age: 69
End: 2024-01-02
Payer: COMMERCIAL

## 2024-01-02 ENCOUNTER — TELEPHONE (OUTPATIENT)
Dept: FAMILY MEDICINE | Facility: CLINIC | Age: 69
End: 2024-01-02

## 2024-01-02 VITALS
RESPIRATION RATE: 13 BRPM | DIASTOLIC BLOOD PRESSURE: 72 MMHG | HEART RATE: 68 BPM | SYSTOLIC BLOOD PRESSURE: 112 MMHG | OXYGEN SATURATION: 99 % | BODY MASS INDEX: 26.05 KG/M2 | TEMPERATURE: 97.8 F | WEIGHT: 168.8 LBS

## 2024-01-02 DIAGNOSIS — E89.0 POSTABLATIVE HYPOTHYROIDISM: Chronic | ICD-10-CM

## 2024-01-02 DIAGNOSIS — R42 VERTIGO: ICD-10-CM

## 2024-01-02 DIAGNOSIS — Z12.31 VISIT FOR SCREENING MAMMOGRAM: ICD-10-CM

## 2024-01-02 DIAGNOSIS — Z12.11 SCREEN FOR COLON CANCER: Primary | ICD-10-CM

## 2024-01-02 DIAGNOSIS — J45.30 MILD PERSISTENT ASTHMA WITHOUT COMPLICATION: Chronic | ICD-10-CM

## 2024-01-02 DIAGNOSIS — G47.33 OSA (OBSTRUCTIVE SLEEP APNEA): Chronic | ICD-10-CM

## 2024-01-02 DIAGNOSIS — K63.5 POLYP OF COLON, UNSPECIFIED PART OF COLON, UNSPECIFIED TYPE: ICD-10-CM

## 2024-01-02 DIAGNOSIS — E78.5 HYPERLIPIDEMIA LDL GOAL <100: Chronic | ICD-10-CM

## 2024-01-02 PROBLEM — E66.01 MORBID OBESITY (H): Chronic | Status: RESOLVED | Noted: 2018-08-07 | Resolved: 2024-01-02

## 2024-01-02 PROCEDURE — 99213 OFFICE O/P EST LOW 20 MIN: CPT | Performed by: INTERNAL MEDICINE

## 2024-01-02 RX ORDER — TRIAMTERENE CAPSULES 50 MG/1
50 CAPSULE ORAL DAILY
COMMUNITY
End: 2024-01-05

## 2024-01-02 ASSESSMENT — ASTHMA QUESTIONNAIRES
ACT_TOTALSCORE: 23
QUESTION_3 LAST FOUR WEEKS HOW OFTEN DID YOUR ASTHMA SYMPTOMS (WHEEZING, COUGHING, SHORTNESS OF BREATH, CHEST TIGHTNESS OR PAIN) WAKE YOU UP AT NIGHT OR EARLIER THAN USUAL IN THE MORNING: NOT AT ALL
QUESTION_5 LAST FOUR WEEKS HOW WOULD YOU RATE YOUR ASTHMA CONTROL: COMPLETELY CONTROLLED
QUESTION_1 LAST FOUR WEEKS HOW MUCH OF THE TIME DID YOUR ASTHMA KEEP YOU FROM GETTING AS MUCH DONE AT WORK, SCHOOL OR AT HOME: NONE OF THE TIME
QUESTION_2 LAST FOUR WEEKS HOW OFTEN HAVE YOU HAD SHORTNESS OF BREATH: ONCE OR TWICE A WEEK
ACT_TOTALSCORE: 23
QUESTION_4 LAST FOUR WEEKS HOW OFTEN HAVE YOU USED YOUR RESCUE INHALER OR NEBULIZER MEDICATION (SUCH AS ALBUTEROL): ONCE A WEEK OR LESS

## 2024-01-02 ASSESSMENT — PAIN SCALES - GENERAL: PAINLEVEL: NO PAIN (0)

## 2024-01-02 NOTE — TELEPHONE ENCOUNTER
Patient calling to report dosage of triameterene as instructed following today's OV with PCP.    Triamterene 37.5mg - hydrochlorothiazide 25mg caps  Take one capsule by mouth twice daily    Patient states that she goes through an ENT doctor for medication refill and does not need a refill through PCP.    FYI to PCP that patient is updating with medication dosage.    Katia Aguilar, RN  -[facility]

## 2024-01-02 NOTE — PROGRESS NOTES
"Assessment & Plan     Screen for colon cancer  - Colonoscopy Screening  Referral; Future    Visit for screening mammogram  - MA SCREENING DIGITAL BILAT - Future  (s+30); Future    Mild persistent asthma without complication  Stable. Continue medication.    Postablative hypothyroidism  Check TSH and change dose as needed  - TSH; Future    Hyperlipidemia LDL goal <100  Stable. Continue medication.    YELENA (obstructive sleep apnea)  Uses mandibular device    Polyp of colon, unspecified part of colon, unspecified type  Colonoscopy due    Vertigo  Stable.       BMI:   Estimated body mass index is 26.05 kg/m  as calculated from the following:    Height as of 11/29/23: 1.715 m (5' 7.5\").    Weight as of this encounter: 76.6 kg (168 lb 12.8 oz).       See Patient Instructions    Zoraida Roldan MD  [facility] is a 68 year old, presenting for the following health issues:  Establish Care and Medication Refill      History of Present Illness       Reason for visit:  Establish care with new primary doctor    She eats 2-3 servings of fruits and vegetables daily.She consumes 0 sweetened beverage(s) daily.She exercises with enough effort to increase her heart rate 9 or less minutes per day.  She exercises with enough effort to increase her heart rate 3 or less days per week.   She is taking medications regularly.     Denita is here to establish care.   She has vertigo, sees ENT, on triamterene, dose not known, eats a low salt diet.   She has hypothyroidism, most recently TSH has been lower and dose was changed, needs to check TSH again.   Former smoker: smoked for 35 years, one pack per day, quit 20 years ago  ETOH: none  Due for colonoscopy and mammogram.      Review of Systems       Objective    /72 (BP Location: Right arm, Patient Position: Sitting, Cuff Size: Adult Regular)   Pulse 68   Temp 97.8  F (36.6  C) (Oral)   Resp 13   Wt 76.6 kg (168 lb 12.8 oz)   SpO2 99%   " BMI 26.05 kg/m    Body mass index is 26.05 kg/m .  Physical Exam

## 2024-01-05 RX ORDER — TRIAMTERENE AND HYDROCHLOROTHIAZIDE 37.5; 25 MG/1; MG/1
1 CAPSULE ORAL 2 TIMES DAILY
COMMUNITY

## 2024-01-11 LAB — RESIDUAL VOLUME (RV) (EXTERNAL): 0

## 2024-01-16 ENCOUNTER — ANCILLARY PROCEDURE (OUTPATIENT)
Dept: MAMMOGRAPHY | Facility: CLINIC | Age: 69
End: 2024-01-16
Attending: INTERNAL MEDICINE
Payer: COMMERCIAL

## 2024-01-16 DIAGNOSIS — Z12.31 VISIT FOR SCREENING MAMMOGRAM: ICD-10-CM

## 2024-01-16 PROCEDURE — 77067 SCR MAMMO BI INCL CAD: CPT | Mod: TC | Performed by: RADIOLOGY

## 2024-01-16 PROCEDURE — 77063 BREAST TOMOSYNTHESIS BI: CPT | Mod: TC | Performed by: RADIOLOGY

## 2024-01-16 NOTE — TELEPHONE ENCOUNTER
RECORDS RECEIVED FROM: INTERNAL/[facility]   DATE RECEIVED: 2/19/24   NOTES (FOR ALL VISITS) STATUS DETAILS   OFFICE NOTES from referring provider Internal 11/29/23 QUENTIN PETERSON PA-C    OFFICE NOTES from other specialist Internal 1/2/24 OV JOHNATHAN SCHNEIDER MD      MEDICATION LIST Internal    IMAGING      MRI (BRAIN) Internal 4/24/22 MR BRAIN   XR (Chest) Internal 2/27/21 XR CHEST   4/6/21 XR CHEST   5/7/21 XR CHEST      CT (HEAD/NECK/CHEST/ABDOMEN) Internal 4/6/21 CT CHEST    LABS     DIABETES: HBGA1C, CREATININE, FASTING LIPIDS, MICROALBUMIN URINE, POTASSIUM, TSH, T4    THYROID: TSH, T4, CBC, THYRODLONULIN, TOTAL T3, FREE T4, CALCITONIN, CEA Internal   6/19/23 CBC   11/24/23 T4 FREE   3/24/22 TSH  11/24/23 TSH W FREE T4

## 2024-01-22 ENCOUNTER — LAB (OUTPATIENT)
Dept: LAB | Facility: CLINIC | Age: 69
End: 2024-01-22
Payer: COMMERCIAL

## 2024-01-22 DIAGNOSIS — E89.0 POSTABLATIVE HYPOTHYROIDISM: Chronic | ICD-10-CM

## 2024-01-22 LAB — TSH SERPL DL<=0.005 MIU/L-ACNC: 1.05 UIU/ML (ref 0.3–4.2)

## 2024-01-22 PROCEDURE — 84443 ASSAY THYROID STIM HORMONE: CPT

## 2024-01-22 PROCEDURE — 36415 COLL VENOUS BLD VENIPUNCTURE: CPT

## 2024-01-25 ENCOUNTER — TELEPHONE (OUTPATIENT)
Dept: FAMILY MEDICINE | Facility: CLINIC | Age: 69
End: 2024-01-25
Payer: COMMERCIAL

## 2024-02-19 ENCOUNTER — PRE VISIT (OUTPATIENT)
Dept: ENDOCRINOLOGY | Facility: CLINIC | Age: 69
End: 2024-02-19

## 2024-02-19 ENCOUNTER — OFFICE VISIT (OUTPATIENT)
Dept: ENDOCRINOLOGY | Facility: CLINIC | Age: 69
End: 2024-02-19
Attending: PHYSICIAN ASSISTANT
Payer: COMMERCIAL

## 2024-02-19 VITALS
OXYGEN SATURATION: 100 % | SYSTOLIC BLOOD PRESSURE: 117 MMHG | WEIGHT: 169 LBS | BODY MASS INDEX: 26.53 KG/M2 | HEART RATE: 70 BPM | DIASTOLIC BLOOD PRESSURE: 71 MMHG | HEIGHT: 67 IN

## 2024-02-19 DIAGNOSIS — E89.0 POSTABLATIVE HYPOTHYROIDISM: Primary | ICD-10-CM

## 2024-02-19 DIAGNOSIS — E05.90 THYROTOXICOSIS WITHOUT THYROID STORM, UNSPECIFIED THYROTOXICOSIS TYPE: ICD-10-CM

## 2024-02-19 PROCEDURE — 99203 OFFICE O/P NEW LOW 30 MIN: CPT | Performed by: INTERNAL MEDICINE

## 2024-02-19 RX ORDER — LEVOTHYROXINE SODIUM 75 UG/1
75 TABLET ORAL DAILY
Qty: 90 TABLET | Refills: 3 | Status: SHIPPED | OUTPATIENT
Start: 2024-02-19

## 2024-02-19 ASSESSMENT — PAIN SCALES - GENERAL: PAINLEVEL: NO PAIN (0)

## 2024-02-19 NOTE — PROGRESS NOTES
=======================================================  Assessment     Denita Flores is a 68 year old female treated with radioactive iodine for Graves' disease at age 48.  Over the years, she maintained normal TFTs with fairly stable dosages of levothyroxine.  In the last couple of years, the patient has lost a significant amount of weight, and, with the weight loss, she developed iatrogenic hyperthyroidism.  I suspect the hair loss, at the skin itchiness were manifestations of hyperthyroidism.  Since the dose of levothyroxine was decreased to 75 mcg daily, her symptoms have improved and the thyroid hormone levels have normalized.    Recommendations:  Continue current dose of 75 mcg levothyroxine daily  Recheck the thyroid hormone levels in 2 months, to confirm they remain stable.  Prescription for levothyroxine refilled.  Long-term, the patient will follow-up with her primary care provider and reconsult us if questions arise.     Orders Placed This Encounter   Procedures    TSH    T4 free     =======================================================  The patient is seen in consultation at the request of Dr. Carmela Joy.     Denita Flores is a 68 year old female with a past medical history significant for coronary artery disease, hypertension, nephrolithiasis, cervical cancer, seen for evaluation of hypothyroidism.    The patient reports being diagnosed with Graves' disease around age 48, when she underwent radioiodine treatment (x 2).  Over the years, the patient has been treated with dosages of levothyroxine of 125-112 mcg daily up until June 2023, when the dose was decreased to 100 mcg daily, as the TSH was suppressed at 0.02 and free T4 was elevated at 2.3.  The thyroid hormone levels remained elevated, with a TSH of 0.07 and a free T4 of 2 on 11/24/2023, when the dose of levothyroxine was further decreased to 75 mcg daily.  Most recent TSH from 1/22/2024 was 1.05.    Her weight back in 2021 was around  245 pounds.  Her current weight is 169 pounds.  In the last year, there is a weight loss of approximately 30 pounds.  Her weight reached a cornel of 164 in November 2023.  The patient denies any intentional weight loss but she reports starting a very low salt diet a couple of years ago, when this was recommended as treatment for recurrent vertigo.  With a low-salt diet, her diet significantly changed.  Denies experiencing nausea, vomiting or other GI issues.  The appetite is good.    For the last 2 years, she has noticed significant hair loss and cracking of her fingertips.  The finger joints were very swollen and they have improved since starting treatment with triamterene/HCTZ.  The vertigo is now well-controlled.  With high thyroid hormone levels, the patient also noticed some skin itchiness, which has resolved since taking 75 mcg levothyroxine.    Past Medical History   Past Medical History:   Diagnosis Date    Coronary artery disease involving native coronary artery of native heart, angina presence unspecified 06/11/2021    Essential hypertension 06/11/2021    Graves disease     Kidney stones 2000    Passed a 5-10mm stone    Malignant neoplasm (H)     Cervical CA    Mild persistent asthma 05/08/2013    Swollen finger     Thyroid disease    Overactive bladder  Hematuria  Normal DXA scan 2019   Vertigo     Past Surgical History   Past Surgical History:   Procedure Laterality Date    BACK SURGERY      COLONOSCOPY WITH CO2 INSUFFLATION N/A 2/21/2017    Procedure: COLONOSCOPY WITH CO2 INSUFFLATION;  Surgeon: Duane, William Charles, MD;  Location: [facility] OR    CYSTOSCOPY      CYSTOSCOPY FLEXIBLE  08/10/2018    LEEP TX, CERVICAL      in her 20's     Current Medications    Current Outpatient Medications:     albuterol (PROAIR HFA/PROVENTIL HFA/VENTOLIN HFA) 108 (90 Base) MCG/ACT inhaler, Inhale 2 puffs into the lungs every 6 hours as needed for shortness of breath, wheezing or cough Due for appointment, Disp: 18 g, Rfl: 0     atorvastatin (LIPITOR) 20 MG tablet, Take 1 tablet (20 mg) by mouth At Bedtime, Disp: 90 tablet, Rfl: 3    Cholecalciferol (D3 ADULT PO), Take 2,000 Units by mouth daily, Disp: , Rfl:     clobetasol (TEMOVATE) 0.05 % external cream, Apply topically 2 times daily To hands, Disp: 120 g, Rfl: 6    clobetasol (TEMOVATE) 0.05 % external solution, Apply topically 2 times daily To scalp, Disp: 100 mL, Rfl: 6    cyanocolbalamin (VITAMIN  B-12) 100 MCG tablet, Take 100 mcg by mouth daily., Disp: , Rfl:     levothyroxine (SYNTHROID/LEVOTHROID) 75 MCG tablet, Take 1 tablet (75 mcg) by mouth daily, Disp: 60 tablet, Rfl: 1    meclizine (ANTIVERT) 25 MG tablet, Take 1 tablet (25 mg) by mouth 3 times daily as needed for dizziness, Disp: 20 tablet, Rfl: 0    montelukast (SINGULAIR) 10 MG tablet, TAKE 1 TABLET(10 MG) BY MOUTH AT BEDTIME, Disp: 90 tablet, Rfl: 3    ondansetron (ZOFRAN ODT) 4 MG ODT tab, Take 1 tablet (4 mg) by mouth every 8 hours as needed for nausea, Disp: 21 tablet, Rfl: 2    triamterene-HCTZ (DYAZIDE) 37.5-25 MG capsule, Take 1 capsule by mouth 2 times daily, Disp: , Rfl:     Family History   Problem Relation Age of Onset    Glaucoma Mother     Heart Disease Father     Cancer Father 67        kidney cancer     Heart Disease Sister     Cancer Sister 59        colon cancer     Diabetes type 2  Sister     Glaucoma Maternal Grandmother     Colon Cancer Maternal Aunt     Colon Cancer Paternal Aunt    Maternal aunt, grandmother and mother - either hypo or hyperthyroidism. Father - kidney stones. Maternal grandmother - hip fracture in her late 80s.     Social History  .  She has no children.  Former smoker for 35 years, 1-1/2 pack a day, quit at age 48.  She denies drinking alcohol or using illicit drugs. Occupation: .    Review of Systems   Systemic:              longstanding fatigue - sleeps 6-7 hrs a night; she has a hard time falling asleep   Eye:                      No eye symptoms  "  Dwayne-Laryngeal:     No dwayne-laryngeal symptoms, no dysphagia, no hoarseness, no cough     Breast:                  No breast symptoms  Cardiovascular:    No cardiovascular symptoms, no CP or palpitations   Pulmonary:           No pulmonary symptoms, no SOB or cough    Gastrointestinal:   No gastrointestinal symptoms, no diarrhea or constipation   Genitourinary:       No increased thirst or urination; urinates 2 times a night since taking the water pill   Endocrine:            chronic cold intolerance   Neurological:        No neurological symptoms, no headaches, no tremor, no numbness or tingling sensation, no dizziness   Musculoskeletal:  hands are stiff   Skin:                    the hair loss has recently improved; no significant facial hair   Psychological:     No psychological symptoms                 Vital Signs     Previous Weights:    Wt Readings from Last 10 Encounters:   02/19/24 76.7 kg (169 lb)   01/02/24 76.6 kg (168 lb 12.8 oz)   11/29/23 74.4 kg (164 lb)   09/27/23 74.8 kg (165 lb)   06/22/23 83 kg (183 lb)   06/19/23 82.6 kg (182 lb 3.2 oz)   03/22/23 88.5 kg (195 lb)   03/15/23 88.5 kg (195 lb)   02/24/23 86.2 kg (190 lb)   01/28/23 91.3 kg (201 lb 3.2 oz)        /71 (BP Location: Right arm, Patient Position: Sitting, Cuff Size: Adult Regular)   Pulse 70   Ht 1.696 m (5' 6.77\")   Wt 76.7 kg (169 lb)   SpO2 100%   BMI 26.65 kg/m      Physical Exam  General Appearance: she is well developed, well nourished and in no distress     Eyes:  conjutivae and extra-ocular motions are normal.                                    pupils round and reactive to light, no lid lag, no stare    HEENT:   oropharynx clear and moist, no JVD, no bruits     No palpable thyroid tissue, no palpable nodules   Cardiovascular:  regular rhythm, no murmurs, distal pulse palpable, no edema  Respiratory:        chest clear, no rales, no rhonchi   Musculoskeletal: osteoarthritic changes of the hands  Psychological:          " affect and judgment normal  Skin: skin crackles noticed at fingertips; multiple moles and nevi  Neurological:  reflexes normal and symmetric, no resting tremor.      Lab Results  I reviewed prior lab results documented in Epic.  TSH   Date Value Ref Range Status   01/22/2024 1.05 0.30 - 4.20 uIU/mL Final   11/24/2023 0.07 (L) 0.30 - 4.20 uIU/mL Final   08/16/2023 0.13 (L) 0.30 - 4.20 uIU/mL Final   06/19/2023 0.02 (L) 0.30 - 4.20 uIU/mL Final   03/24/2022 0.70 0.40 - 4.00 mU/L Final   02/27/2021 2.02 0.40 - 4.00 mU/L Final   07/20/2020 1.25 0.40 - 4.00 mU/L Final   09/30/2019 1.29 0.40 - 4.00 mU/L Final   09/07/2018 0.26 (L) 0.40 - 4.00 mU/L Final   02/20/2018 0.35 (L) 0.40 - 4.00 mU/L Final     T4 Free   Date Value Ref Range Status   09/07/2018 1.46 0.76 - 1.46 ng/dL Final     Free T4   Date Value Ref Range Status   11/24/2023 2.00 (H) 0.90 - 1.70 ng/dL Final

## 2024-02-19 NOTE — LETTER
2/19/2024       RE: Denita Flores  [address]  [facility] [address]     Dear Colleague,    Thank you for referring your patient, Denita Flores, to the [facility] ENDOCRINOLOGY [facility] [place] at [facility]. Please see a copy of my visit note below.    =======================================================  Assessment     Denita Flores is a 68 year old female treated with radioactive iodine for Graves' disease at age 48.  Over the years, she maintained normal TFTs with fairly stable dosages of levothyroxine.  In the last couple of years, the patient has lost a significant amount of weight, and, with the weight loss, she developed iatrogenic hyperthyroidism.  I suspect the hair loss, at the skin itchiness were manifestations of hyperthyroidism.  Since the dose of levothyroxine was decreased to 75 mcg daily, her symptoms have improved and the thyroid hormone levels have normalized.    Recommendations:  Continue current dose of 75 mcg levothyroxine daily  Recheck the thyroid hormone levels in 2 months, to confirm they remain stable.  Prescription for levothyroxine refilled.  Long-term, the patient will follow-up with her primary care provider and reconsult us if questions arise.     Orders Placed This Encounter   Procedures    TSH    T4 free     =======================================================  The patient is seen in consultation at the request of Dr. Carmela Joy.     Denita Flores is a 68 year old female with a past medical history significant for coronary artery disease, hypertension, nephrolithiasis, cervical cancer, seen for evaluation of hypothyroidism.    The patient reports being diagnosed with Graves' disease around age 48, when she underwent radioiodine treatment (x 2).  Over the years, the patient has been treated with dosages of levothyroxine of 125-112 mcg daily up until June 2023, when the dose was decreased to 100 mcg  daily, as the TSH was suppressed at 0.02 and free T4 was elevated at 2.3.  The thyroid hormone levels remained elevated, with a TSH of 0.07 and a free T4 of 2 on 11/24/2023, when the dose of levothyroxine was further decreased to 75 mcg daily.  Most recent TSH from 1/22/2024 was 1.05.    Her weight back in 2021 was around 245 pounds.  Her current weight is 169 pounds.  In the last year, there is a weight loss of approximately 30 pounds.  Her weight reached a cornel of 164 in November 2023.  The patient denies any intentional weight loss but she reports starting a very low salt diet a couple of years ago, when this was recommended as treatment for recurrent vertigo.  With a low-salt diet, her diet significantly changed.  Denies experiencing nausea, vomiting or other GI issues.  The appetite is good.    For the last 2 years, she has noticed significant hair loss and cracking of her fingertips.  The finger joints were very swollen and they have improved since starting treatment with triamterene/HCTZ.  The vertigo is now well-controlled.  With high thyroid hormone levels, the patient also noticed some skin itchiness, which has resolved since taking 75 mcg levothyroxine.    Past Medical History   Past Medical History:   Diagnosis Date    Coronary artery disease involving native coronary artery of native heart, angina presence unspecified 06/11/2021    Essential hypertension 06/11/2021    Graves disease     Kidney stones 2000    Passed a 5-10mm stone    Malignant neoplasm (H)     Cervical CA    Mild persistent asthma 05/08/2013    Swollen finger     Thyroid disease    Overactive bladder  Hematuria  Normal DXA scan 2019   Vertigo     Past Surgical History   Past Surgical History:   Procedure Laterality Date    BACK SURGERY      COLONOSCOPY WITH CO2 INSUFFLATION N/A 2/21/2017    Procedure: COLONOSCOPY WITH CO2 INSUFFLATION;  Surgeon: Duane, William Charles, MD;  Location: [facility] OR    CYSTOSCOPY      CYSTOSCOPY FLEXIBLE   08/10/2018    LEEP TX, CERVICAL      in her 20's     Current Medications    Current Outpatient Medications:     albuterol (PROAIR HFA/PROVENTIL HFA/VENTOLIN HFA) 108 (90 Base) MCG/ACT inhaler, Inhale 2 puffs into the lungs every 6 hours as needed for shortness of breath, wheezing or cough Due for appointment, Disp: 18 g, Rfl: 0    atorvastatin (LIPITOR) 20 MG tablet, Take 1 tablet (20 mg) by mouth At Bedtime, Disp: 90 tablet, Rfl: 3    Cholecalciferol (D3 ADULT PO), Take 2,000 Units by mouth daily, Disp: , Rfl:     clobetasol (TEMOVATE) 0.05 % external cream, Apply topically 2 times daily To hands, Disp: 120 g, Rfl: 6    clobetasol (TEMOVATE) 0.05 % external solution, Apply topically 2 times daily To scalp, Disp: 100 mL, Rfl: 6    cyanocolbalamin (VITAMIN  B-12) 100 MCG tablet, Take 100 mcg by mouth daily., Disp: , Rfl:     levothyroxine (SYNTHROID/LEVOTHROID) 75 MCG tablet, Take 1 tablet (75 mcg) by mouth daily, Disp: 60 tablet, Rfl: 1    meclizine (ANTIVERT) 25 MG tablet, Take 1 tablet (25 mg) by mouth 3 times daily as needed for dizziness, Disp: 20 tablet, Rfl: 0    montelukast (SINGULAIR) 10 MG tablet, TAKE 1 TABLET(10 MG) BY MOUTH AT BEDTIME, Disp: 90 tablet, Rfl: 3    ondansetron (ZOFRAN ODT) 4 MG ODT tab, Take 1 tablet (4 mg) by mouth every 8 hours as needed for nausea, Disp: 21 tablet, Rfl: 2    triamterene-HCTZ (DYAZIDE) 37.5-25 MG capsule, Take 1 capsule by mouth 2 times daily, Disp: , Rfl:     Family History   Problem Relation Age of Onset    Glaucoma Mother     Heart Disease Father     Cancer Father 67        kidney cancer     Heart Disease Sister     Cancer Sister 59        colon cancer     Diabetes type 2  Sister     Glaucoma Maternal Grandmother     Colon Cancer Maternal Aunt     Colon Cancer Paternal Aunt    Maternal aunt, grandmother and mother - either hypo or hyperthyroidism. Father - kidney stones. Maternal grandmother - hip fracture in her late 80s.     Social History  .  She has no  "children.  Former smoker for 35 years, 1-1/2 pack a day, quit at age 48.  She denies drinking alcohol or using illicit drugs. Occupation: .    Review of Systems   Systemic:              longstanding fatigue - sleeps 6-7 hrs a night; she has a hard time falling asleep   Eye:                      No eye symptoms   Dwayne-Laryngeal:     No dwayne-laryngeal symptoms, no dysphagia, no hoarseness, no cough     Breast:                  No breast symptoms  Cardiovascular:    No cardiovascular symptoms, no CP or palpitations   Pulmonary:           No pulmonary symptoms, no SOB or cough    Gastrointestinal:   No gastrointestinal symptoms, no diarrhea or constipation   Genitourinary:       No increased thirst or urination; urinates 2 times a night since taking the water pill   Endocrine:            chronic cold intolerance   Neurological:        No neurological symptoms, no headaches, no tremor, no numbness or tingling sensation, no dizziness   Musculoskeletal:  hands are stiff   Skin:                    the hair loss has recently improved; no significant facial hair   Psychological:     No psychological symptoms                 Vital Signs     Previous Weights:    Wt Readings from Last 10 Encounters:   02/19/24 76.7 kg (169 lb)   01/02/24 76.6 kg (168 lb 12.8 oz)   11/29/23 74.4 kg (164 lb)   09/27/23 74.8 kg (165 lb)   06/22/23 83 kg (183 lb)   06/19/23 82.6 kg (182 lb 3.2 oz)   03/22/23 88.5 kg (195 lb)   03/15/23 88.5 kg (195 lb)   02/24/23 86.2 kg (190 lb)   01/28/23 91.3 kg (201 lb 3.2 oz)        /71 (BP Location: Right arm, Patient Position: Sitting, Cuff Size: Adult Regular)   Pulse 70   Ht 1.696 m (5' 6.77\")   Wt 76.7 kg (169 lb)   SpO2 100%   BMI 26.65 kg/m      Physical Exam  General Appearance: she is well developed, well nourished and in no distress     Eyes:  conjutivae and extra-ocular motions are normal.                                    pupils round and reactive to light, no lid lag, no " stare    HEENT:   oropharynx clear and moist, no JVD, no bruits     No palpable thyroid tissue, no palpable nodules   Cardiovascular:  regular rhythm, no murmurs, distal pulse palpable, no edema  Respiratory:        chest clear, no rales, no rhonchi   Musculoskeletal: osteoarthritic changes of the hands  Psychological:          affect and judgment normal  Skin: skin crackles noticed at fingertips; multiple moles and nevi  Neurological:  reflexes normal and symmetric, no resting tremor.      Lab Results  I reviewed prior lab results documented in Epic.  TSH   Date Value Ref Range Status   01/22/2024 1.05 0.30 - 4.20 uIU/mL Final   11/24/2023 0.07 (L) 0.30 - 4.20 uIU/mL Final   08/16/2023 0.13 (L) 0.30 - 4.20 uIU/mL Final   06/19/2023 0.02 (L) 0.30 - 4.20 uIU/mL Final   03/24/2022 0.70 0.40 - 4.00 mU/L Final   02/27/2021 2.02 0.40 - 4.00 mU/L Final   07/20/2020 1.25 0.40 - 4.00 mU/L Final   09/30/2019 1.29 0.40 - 4.00 mU/L Final   09/07/2018 0.26 (L) 0.40 - 4.00 mU/L Final   02/20/2018 0.35 (L) 0.40 - 4.00 mU/L Final     T4 Free   Date Value Ref Range Status   09/07/2018 1.46 0.76 - 1.46 ng/dL Final     Free T4   Date Value Ref Range Status   11/24/2023 2.00 (H) 0.90 - 1.70 ng/dL Final         Montse Castro MD

## 2024-02-19 NOTE — NURSING NOTE
"Chief Complaint   Patient presents with    Endocrine Problem     Lost a ton of weight over the last couple years. Thyroid was out of whack. Skin was itching, hair was falling out, nails were cracking.     Blood pressure 117/71, pulse 70, height 1.696 m (5' 6.77\"), weight 76.7 kg (169 lb), SpO2 100%, not currently breastfeeding.    Diann Jo    "

## 2024-04-17 NOTE — TELEPHONE ENCOUNTER
MATEOM for PT to call [phone] to schedule a f/u appt from the [facility] appt in June.  This appt should be for Sept/Oct. Can schedule with AMBIKA Bradford.  Schedule labs 7-10 days before the provider appt.    21-Jul-2022

## 2024-04-23 ENCOUNTER — TELEPHONE (OUTPATIENT)
Dept: OPHTHALMOLOGY | Facility: CLINIC | Age: 69
End: 2024-04-23
Payer: COMMERCIAL

## 2024-04-23 NOTE — TELEPHONE ENCOUNTER
Left Voicemail (1st Attempt) for the patient to call back and schedule the following:    Appointment type: return  Provider: dr. gregorio  Return date: 10/20/2024  Specialty phone number: [phone]   Additonal Notes: Return in about 1 year (around 10/20/2024) for Annual Visit-v/t/millicent/Robert lopez Complex   Orthopedics, Podiatry, Sports Medicine, Ent ,Eye , Audiology, Adult Endocrine & Diabetes, Nutrition & Medication Therapy Management Specialties   [facility]

## 2024-04-29 ENCOUNTER — TELEPHONE (OUTPATIENT)
Dept: OPHTHALMOLOGY | Facility: CLINIC | Age: 69
End: 2024-04-29
Payer: COMMERCIAL

## 2024-04-29 NOTE — TELEPHONE ENCOUNTER
Left Voicemail (1st Attempt) for the patient to call back and schedule the following:    Appointment type: return  Provider: dr. gregorio  Return date: 10/20/2024  Specialty phone number: [phone]   Additonal Notes: Return in about 1 year (around 10/20/2024) for Annual Visit-v/t/d/Jarrett lopez Complex   Orthopedics, Podiatry, Sports Medicine, Ent ,Eye , Audiology, Adult Endocrine & Diabetes, Nutrition & Medication Therapy Management Specialties   [facility]

## 2024-05-09 ENCOUNTER — LAB (OUTPATIENT)
Dept: LAB | Facility: CLINIC | Age: 69
End: 2024-05-09
Payer: COMMERCIAL

## 2024-05-09 DIAGNOSIS — E89.0 POSTABLATIVE HYPOTHYROIDISM: ICD-10-CM

## 2024-05-09 LAB
T4 FREE SERPL-MCNC: 1.35 NG/DL (ref 0.9–1.7)
TSH SERPL DL<=0.005 MIU/L-ACNC: 2.63 UIU/ML (ref 0.3–4.2)

## 2024-05-09 PROCEDURE — 36415 COLL VENOUS BLD VENIPUNCTURE: CPT

## 2024-05-09 PROCEDURE — 84439 ASSAY OF FREE THYROXINE: CPT

## 2024-05-09 PROCEDURE — 84443 ASSAY THYROID STIM HORMONE: CPT

## 2024-05-09 NOTE — LETTER
May 14, 2024      Denita Flores  [address] [facility] [address]        Dear ,    We are writing to inform you of your test results.    The thyroid hormone levels are normal. You may continue to take the same dose of levothyroxine.     Resulted Orders   TSH   Result Value Ref Range    TSH 2.63 0.30 - 4.20 uIU/mL   T4 free   Result Value Ref Range    Free T4 1.35 0.90 - 1.70 ng/dL     If you have any questions or concerns, please call the clinic at the number listed above.     Sincerely,    Montse Castro MD

## 2024-05-20 ENCOUNTER — PATIENT OUTREACH (OUTPATIENT)
Dept: GASTROENTEROLOGY | Facility: CLINIC | Age: 69
End: 2024-05-20
Payer: COMMERCIAL

## 2024-06-18 DIAGNOSIS — J45.30 MILD PERSISTENT ASTHMA WITHOUT COMPLICATION: ICD-10-CM

## 2024-06-18 DIAGNOSIS — E78.5 DYSLIPIDEMIA: ICD-10-CM

## 2024-06-18 RX ORDER — MONTELUKAST SODIUM 10 MG/1
TABLET ORAL
Qty: 90 TABLET | Refills: 1 | Status: SHIPPED | OUTPATIENT
Start: 2024-06-18

## 2024-06-18 RX ORDER — ATORVASTATIN CALCIUM 20 MG/1
20 TABLET, FILM COATED ORAL AT BEDTIME
Qty: 90 TABLET | Refills: 0 | Status: SHIPPED | OUTPATIENT
Start: 2024-06-18 | End: 2024-09-18

## 2024-08-12 DIAGNOSIS — R06.02 SOB (SHORTNESS OF BREATH): ICD-10-CM

## 2024-08-12 RX ORDER — ALBUTEROL SULFATE 90 UG/1
AEROSOL, METERED RESPIRATORY (INHALATION)
Qty: 18 G | Refills: 0 | Status: SHIPPED | OUTPATIENT
Start: 2024-08-12 | End: 2024-09-16

## 2024-09-16 DIAGNOSIS — R06.02 SOB (SHORTNESS OF BREATH): ICD-10-CM

## 2024-09-16 DIAGNOSIS — E78.5 DYSLIPIDEMIA: ICD-10-CM

## 2024-09-16 RX ORDER — ALBUTEROL SULFATE 90 UG/1
AEROSOL, METERED RESPIRATORY (INHALATION)
Qty: 18 G | Refills: 0 | Status: SHIPPED | OUTPATIENT
Start: 2024-09-16

## 2024-09-16 NOTE — TELEPHONE ENCOUNTER
Prescription approved per [facility] Refill Protocol.  Stacie Rodriguez RN  [facility]

## 2024-09-18 RX ORDER — ATORVASTATIN CALCIUM 20 MG/1
20 TABLET, FILM COATED ORAL AT BEDTIME
Qty: 90 TABLET | Refills: 0 | Status: SHIPPED | OUTPATIENT
Start: 2024-09-18

## 2024-09-19 ENCOUNTER — TRANSFERRED RECORDS (OUTPATIENT)
Dept: HEALTH INFORMATION MANAGEMENT | Facility: CLINIC | Age: 69
End: 2024-09-19
Payer: COMMERCIAL

## 2024-11-15 ENCOUNTER — TELEPHONE (OUTPATIENT)
Dept: FAMILY MEDICINE | Facility: CLINIC | Age: 69
End: 2024-11-15
Payer: COMMERCIAL

## 2024-11-15 NOTE — TELEPHONE ENCOUNTER
Reason for Call:  Appointment Request    Patient requesting this type of appt:  Preventive     Requested provider: Zoraida Roldan    Reason patient unable to be scheduled: Not within requested timeframe    When does patient want to be seen/preferred time:  January 2025    Comments: n/a    Okay to leave a detailed message?: Yes at Cell number on file:    Telephone Information:   Mobile [phone]       Call taken on 11/15/2024 at 12:08 PM by Renée Lindsay

## 2024-12-18 ENCOUNTER — OFFICE VISIT (OUTPATIENT)
Dept: URGENT CARE | Facility: URGENT CARE | Age: 69
End: 2024-12-18
Payer: MEDICARE

## 2024-12-18 VITALS
RESPIRATION RATE: 17 BRPM | SYSTOLIC BLOOD PRESSURE: 124 MMHG | HEART RATE: 65 BPM | OXYGEN SATURATION: 98 % | TEMPERATURE: 97.6 F | DIASTOLIC BLOOD PRESSURE: 73 MMHG | BODY MASS INDEX: 27.12 KG/M2 | WEIGHT: 172 LBS

## 2024-12-18 DIAGNOSIS — E78.5 HYPERLIPIDEMIA LDL GOAL <130: ICD-10-CM

## 2024-12-18 DIAGNOSIS — M54.2 NECK PAIN: Primary | ICD-10-CM

## 2024-12-18 DIAGNOSIS — J45.30 MILD PERSISTENT ASTHMA WITHOUT COMPLICATION: ICD-10-CM

## 2024-12-18 PROCEDURE — 99214 OFFICE O/P EST MOD 30 MIN: CPT | Performed by: EMERGENCY MEDICINE

## 2024-12-18 RX ORDER — LIDOCAINE 4 G/G
1 PATCH TOPICAL EVERY 24 HOURS
Qty: 10 PATCH | Refills: 0 | Status: SHIPPED | OUTPATIENT
Start: 2024-12-18

## 2024-12-18 RX ORDER — METHOCARBAMOL 500 MG/1
500 TABLET, FILM COATED ORAL 3 TIMES DAILY PRN
Qty: 21 TABLET | Refills: 0 | Status: SHIPPED | OUTPATIENT
Start: 2024-12-18

## 2024-12-18 RX ORDER — MONTELUKAST SODIUM 10 MG/1
10 TABLET ORAL AT BEDTIME
Qty: 30 TABLET | Refills: 1 | Status: SHIPPED | OUTPATIENT
Start: 2024-12-18

## 2024-12-18 RX ORDER — NAPROXEN 500 MG/1
500 TABLET ORAL 2 TIMES DAILY WITH MEALS
Qty: 10 TABLET | Refills: 0 | Status: SHIPPED | OUTPATIENT
Start: 2024-12-18

## 2024-12-18 RX ORDER — ATORVASTATIN CALCIUM 20 MG/1
20 TABLET, FILM COATED ORAL DAILY
Qty: 30 TABLET | Refills: 1 | Status: SHIPPED | OUTPATIENT
Start: 2024-12-18

## 2024-12-19 NOTE — PROGRESS NOTES
"Assessment & Plan     Diagnosis:    No diagnosis found.        Medical Decision Making:  Denita Flores is a 69 year old female who presents for evaluation of ***.     Patient voices understanding and agreement with the plan including reasons to go to the ER immediately as well as to be seen by a more consistent care-giver, such as their PCP, if the symptoms persist more than *** days.       {2021 E&M time (Optional):420684}      William Cooper PA-C  [facility] URGENT CARE    Subjective     Denita Flores is a 69 year old female who presents to clinic today for the following health issues:  Chief Complaint   Patient presents with    Urgent Care     Present with neck pain(feels cracking, changing and getting worse), nauseous, onset 1-2 months. Has hx of disc problems, pt is  and it hurts to turn.        HPI    {UC Conditions (Optional):398845}    Patient describes the symptoms as \"***\"     Patient denies any ***.     Review of Systems    See HPI    Objective      Vitals: /73   Pulse 65   Temp 97.6  F (36.4  C) (Oral)   Resp 17   Wt 78 kg (172 lb)   SpO2 98%   BMI 27.12 kg/m    Resp: ***    Patient Vitals for the past 24 hrs:   BP Temp Temp src Pulse Resp SpO2 Weight   12/18/24 1800 124/73 97.6  F (36.4  C) Oral 65 17 98 % 78 kg (172 lb)       Vital signs reviewed by: William Cooper PA-C    Physical Exam   Constitutional: Patient is alert and cooperative. No acute distress***.  Ears: Right TM is ***. Left TM is ***. External ear canals are normal.  Mouth: Mucous membranes are moist. Normal tongue and tonsil. Posterior oropharynx is clear.  Eyes: Conjunctivae, EOMI and lids are normal. PERRL.  Cardiovascular: Regular rate and rhythm***  Pulmonary/Chest: Lungs are clear to auscultation throughout. Effort normal. No respiratory distress. No wheezes, rales or rhonchi.  GI: Abdomen is soft and non-tender throughout.*** No CVA tenderness bilaterally***.  Neurological: Alert and " oriented x3. CN 3-7 and 9-12 intact. Strength and sensation are intact and symmetric in the bilateral upper and lower extremities.   Skin: No rash noted on visualized skin.  Psychiatric:The patient has a normal mood and affect.     Labs/Imaging:  {Diagnostic Test Results (Optional):050406}      Interventions:    ***    William Cooper PA-C, December 18, 2024

## 2024-12-28 ENCOUNTER — OFFICE VISIT (OUTPATIENT)
Dept: URGENT CARE | Facility: URGENT CARE | Age: 69
End: 2024-12-28
Payer: MEDICARE

## 2024-12-28 VITALS
OXYGEN SATURATION: 98 % | RESPIRATION RATE: 17 BRPM | HEART RATE: 73 BPM | TEMPERATURE: 98.6 F | SYSTOLIC BLOOD PRESSURE: 137 MMHG | DIASTOLIC BLOOD PRESSURE: 78 MMHG

## 2024-12-28 DIAGNOSIS — N39.0 ACUTE UTI (URINARY TRACT INFECTION): Primary | ICD-10-CM

## 2024-12-28 LAB
ALBUMIN UR-MCNC: NEGATIVE MG/DL
APPEARANCE UR: CLEAR
BACTERIA #/AREA URNS HPF: ABNORMAL /HPF
BILIRUB UR QL STRIP: NEGATIVE
COLOR UR AUTO: YELLOW
GLUCOSE UR STRIP-MCNC: NEGATIVE MG/DL
HGB UR QL STRIP: ABNORMAL
KETONES UR STRIP-MCNC: NEGATIVE MG/DL
LEUKOCYTE ESTERASE UR QL STRIP: ABNORMAL
NITRATE UR QL: POSITIVE
PH UR STRIP: 6 [PH] (ref 5–7)
RBC #/AREA URNS AUTO: ABNORMAL /HPF
SP GR UR STRIP: 1.01 (ref 1–1.03)
SQUAMOUS #/AREA URNS AUTO: ABNORMAL /LPF
UROBILINOGEN UR STRIP-ACNC: 0.2 E.U./DL
WBC #/AREA URNS AUTO: ABNORMAL /HPF
WBC CLUMPS #/AREA URNS HPF: PRESENT /HPF

## 2024-12-28 PROCEDURE — 81001 URINALYSIS AUTO W/SCOPE: CPT

## 2024-12-28 PROCEDURE — 87186 SC STD MICRODIL/AGAR DIL: CPT | Performed by: EMERGENCY MEDICINE

## 2024-12-28 PROCEDURE — 99213 OFFICE O/P EST LOW 20 MIN: CPT | Performed by: EMERGENCY MEDICINE

## 2024-12-28 PROCEDURE — 87086 URINE CULTURE/COLONY COUNT: CPT | Performed by: EMERGENCY MEDICINE

## 2024-12-28 RX ORDER — CEPHALEXIN 500 MG/1
500 CAPSULE ORAL 3 TIMES DAILY
Qty: 15 CAPSULE | Refills: 0 | Status: SHIPPED | OUTPATIENT
Start: 2024-12-28 | End: 2025-01-02

## 2024-12-29 NOTE — PROGRESS NOTES
Assessment & Plan     Diagnosis:    ICD-10-CM    1. Acute UTI (urinary tract infection)  N39.0 UA with Microscopic reflex to Culture - Clinic Collect     Urine Microscopic Exam     Urine Culture     cephALEXin (KEFLEX) 500 MG capsule          Medical Decision Making:  Denita Flores is a 69 year old female who presents to clinic today for evaluation of urinary frequency, urgency and dysuria.     This clinically is consistent with a urinary tract infection.  Urinalysis confirms the infection. She has had some chills.  There has been no fever, confusion, flank pain or abdominal pain. There is no clinical evidence of pyelonephritis, appendicitis, colitis, diverticulitis or any intraabdominal catastrophe. The patient will be started on antibiotics for the infection. Go to the ER if increasing pain, vomiting, fever, or inability to tolerate the oral antibiotic.  Follow up with primary physician is indicated if not improving in 2-3 days.     William Cooper PA-C  [facility] URGENT CARE    Subjective     Denita Flores is a 69 year old female who presents  to clinic today for the following health issues:  Chief Complaint   Patient presents with    UTI     Dysuria, hematuria and urgency for the last few days          HPI  Patient states that for the past 2-3 days they experienced a burning sensation, and frequency and urgency of urination. This has gotten worse over time. Does have some chills as well. Patient denies any flank or back pain (that is new -- has chronic neck and low back pain). No fever, nausea, vomiting, diarrhea, inability to urinate, vaginal discharge/bleeding.    Review of Systems    See HPI    Objective      Vitals:    Patient Vitals for the past 24 hrs:   BP Temp Temp src Pulse Resp SpO2   12/28/24 1808 137/78 98.6  F (37  C) Oral 73 17 98 %         Vital signs reviewed by: William Cooper PA-C    Physical Exam   Constitutional: The patient is oriented to person, place, and time. Alert and  cooperative. No acute distress.  Mouth: Mucous membranes are moist.  GI: Abdomen is soft, non-tender and non-distended throughout. No rebound or guarding. No McBurney point tenderness.   MSK: No CVA tenderness bilaterally.  Neurological: Alert and oriented x3.     Labs/Imaging:    Results for orders placed or performed in visit on 12/28/24   UA with Microscopic reflex to Culture - Clinic Collect     Status: Abnormal    Specimen: Urine, Midstream   Result Value Ref Range    Color Urine Yellow Colorless, Straw, Light Yellow, Yellow    Appearance Urine Clear Clear    Glucose Urine Negative Negative mg/dL    Bilirubin Urine Negative Negative    Ketones Urine Negative Negative mg/dL    Specific Gravity Urine 1.015 1.003 - 1.035    Blood Urine Small (A) Negative    pH Urine 6.0 5.0 - 7.0    Protein Albumin Urine Negative Negative mg/dL    Urobilinogen Urine 0.2 0.2, 1.0 E.U./dL    Nitrite Urine Positive (A) Negative    Leukocyte Esterase Urine Small (A) Negative   Urine Microscopic Exam     Status: Abnormal   Result Value Ref Range    Bacteria Urine Many (A) None Seen /HPF    RBC Urine 0-2 0-2 /HPF /HPF    WBC Urine  (A) 0-5 /HPF /HPF    Squamous Epithelials Urine Few (A) None Seen /LPF    WBC Clumps Urine Present (A) None Seen /HPF       William Cooper PA-C, December 28, 2024

## 2024-12-30 LAB — BACTERIA UR CULT: ABNORMAL

## 2025-01-02 ENCOUNTER — PATIENT OUTREACH (OUTPATIENT)
Dept: GASTROENTEROLOGY | Facility: CLINIC | Age: 70
End: 2025-01-02
Payer: MEDICARE

## 2025-01-02 DIAGNOSIS — Z12.11 SPECIAL SCREENING FOR MALIGNANT NEOPLASMS, COLON: Primary | ICD-10-CM

## 2025-01-02 NOTE — PROGRESS NOTES
"Hx adenomatous polyps and family hx of colon ca with 5yr recall recommended on last colonoscopy performed in 2017    [facility] Screening Colonoscopy Referral Review    Patient meets the inclusion criteria for screening colonoscopy standing order.    Ordering/Referring Provider:  Zoraida Roldan      BMI: Estimated body mass index is 27.12 kg/m  as calculated from the following:    Height as of 2/19/24: 1.696 m (5' 6.77\").    Weight as of 12/18/24: 78 kg (172 lb).     Sedation:  Does patient have any of the following conditions affecting sedation?  No medical conditions affecting sedation.    Previous Scopes:  Any previous recommendations or follow up needs based on previous scope?  na / No recommendations.    Medical Concerns to Postpone Order:  Does patient have any of the following medical concerns that should postpone/delay colonoscopy referral?  No medical conditions affecting colonoscopy referral.    Final Referral Details:  Based on patient's medical history patient is appropriate for referral order with moderate sedation. If patient's BMI > 50 do not schedule in ASC.  "

## 2025-01-02 NOTE — LETTER
January 2, 2025      Denita Flores  [address]  [facility] [address]              Salomon Barger,    We hope this letter finds you doing well.     Your health is important to us at [facility]. Our records indicate that you could be due, or possibly overdue, for a screening or surveillance colonoscopy if you have not had a colonoscopy since 2017.     An order has been placed for you to have this completed. Our scheduling team will be reaching out to you to assist in getting this scheduled. If you do not hear from them within 7 days or you would like to schedule sooner, please call [phone], option 2 for endoscopy scheduling.     If you believe this is in error and have already had a colonoscopy done, please have your results and recommendations faxed to [phone].    If you have questions about this order or have further concerns, please reach out to your primary care provider.     [place]     Colorectal Cancer RN Screening Team  [facility]

## 2025-01-03 PROBLEM — M54.2 NECK PAIN: Status: ACTIVE | Noted: 2025-01-03

## 2025-01-09 ENCOUNTER — THERAPY VISIT (OUTPATIENT)
Dept: PHYSICAL THERAPY | Facility: CLINIC | Age: 70
End: 2025-01-09
Attending: EMERGENCY MEDICINE
Payer: COMMERCIAL

## 2025-01-09 DIAGNOSIS — M54.2 NECK PAIN: Primary | ICD-10-CM

## 2025-01-20 ENCOUNTER — ANCILLARY PROCEDURE (OUTPATIENT)
Dept: MAMMOGRAPHY | Facility: CLINIC | Age: 70
End: 2025-01-20
Attending: INTERNAL MEDICINE
Payer: COMMERCIAL

## 2025-01-20 DIAGNOSIS — Z12.31 VISIT FOR SCREENING MAMMOGRAM: ICD-10-CM

## 2025-01-20 PROCEDURE — 77067 SCR MAMMO BI INCL CAD: CPT | Mod: TC | Performed by: RADIOLOGY

## 2025-01-20 PROCEDURE — 77063 BREAST TOMOSYNTHESIS BI: CPT | Mod: TC | Performed by: RADIOLOGY

## 2025-01-23 ENCOUNTER — THERAPY VISIT (OUTPATIENT)
Dept: PHYSICAL THERAPY | Facility: CLINIC | Age: 70
End: 2025-01-23
Payer: MEDICARE

## 2025-01-23 DIAGNOSIS — M54.2 NECK PAIN: Primary | ICD-10-CM

## 2025-02-13 ENCOUNTER — TELEPHONE (OUTPATIENT)
Dept: GASTROENTEROLOGY | Facility: CLINIC | Age: 70
End: 2025-02-13
Payer: MEDICARE

## 2025-02-13 NOTE — TELEPHONE ENCOUNTER
"Pre Assessment RN Review    Focused Assessments    YELENA Hx  Estimated body mass index is 27.12 kg/m  as calculated from the following:    Height as of 2/19/24: 1.696 m (5' 6.77\").    Weight as of 12/18/24: 78 kg (172 lb).     Patient has reported / documented history YELENA and reports she does use a a device for sleep.     Device:  dental device    Severity Assessment    Complete a new STOP-BANG assessment if last assessment is more that 3 months ago.    STOP- BANG Sleep Apnea Questionnaire    STOP  1. Do you snore loudly (louder than talking or loud enough to be heard through closed doors)? No  2. Do you often feel tired, fatigued, or sleepy during daytime? No  3. Has anyone observed you stop breathing during your sleep? No  4. Do you have or are you being treated for high blood pressure? No    BANG  1. BMI more than 35/kg/m2? No  2. Age over 50 years old? Yes Where is the patient located?  3. Neck circumference >16 inches (40cm)?  Not available  4. Gender: Male? No    Total score: 1- Low risk of YELENA      Low Risk   (1 - 2) Intermediate Risk   (3 - 4)   AND NONE of the following:   - Male    - BMI > 35   - Neck Circ > 16\" Intermediate Risk   (3 - 4)   AND ANY of the following:   - Male    - BMI > 35   - Neck Circ > 16\" High Risk   (5+)   No Scheduling Restrictions No Scheduling Restrictions Hospital Only Hospital Only     Scheduling Status & Recommendations    Sedation: Moderate Sedation - Per RN assessment.  Location Type: ASC - Per RN assessment.    Yu Garcia, RN  Endoscopy Procedure Pre Assessment RN            "

## 2025-02-13 NOTE — TELEPHONE ENCOUNTER
"Pt stated they need to have prep without sodium.     Endoscopy Scheduling Screen    Have you had any respiratory illness or flu-like symptoms in the last 10 days?  No    What is your communication preference for Instructions and/or Bowel Prep?   MyChart    What insurance is in the chart?  Other:  Medicare, BCBS    Ordering/Referring Provider: Zoraida Roldan MD   (If ordering provider performs procedure, schedule with ordering provider unless otherwise instructed. )    BMI: Estimated body mass index is 27.12 kg/m  as calculated from the following:    Height as of 2/19/24: 1.696 m (5' 6.77\").    Weight as of 12/18/24: 78 kg (172 lb).     Sedation Ordered  moderate sedation.   If patient BMI > 50 do not schedule in [facility].    If patient BMI > 45 do not schedule at [facility].    Are you taking methadone or Suboxone?  NO, No RN review required.    Have you been diagnosed and are being treated for severe PTSD or severe anxiety?  NO, No RN review required.    Are you taking any prescription medications for pain 3 or more times per week?   NO, No RN review required.    Do you have a history of malignant hyperthermia?  No    (Females) Are you currently pregnant?   No     Have you been diagnosed or told you have pulmonary hypertension?   No    Do you have an LVAD?  No    Have you been told you have moderate to severe sleep apnea?  Yes. Do you use a CPAP? No. (RN Review required for scheduling unless scheduling in Hospital.) - pt stated no issues after losing weight and no longer snores      Have you been told you have COPD, asthma, or any other lung disease?  Yes     What breathing problems do you have?  Asthma     Do you use home oxygen?  No    Have your breathing problems required an ED visit or hospitalization in the last year?  No.    Do you have any heart conditions?  No     Have you ever had or are you waiting for an organ transplant?  No. Continue scheduling, no site restrictions.    Have you had a stroke or transient ischemic " "attack (TIA aka \"mini stroke\" in the last 6 months?   No    Have you been diagnosed with or been told you have cirrhosis of the liver?   No.    Are you currently on dialysis?   No    Do you need assistance transferring?   No    BMI: Estimated body mass index is 27.12 kg/m  as calculated from the following:    Height as of 2/19/24: 1.696 m (5' 6.77\").    Weight as of 12/18/24: 78 kg (172 lb).     Is patients BMI > 40 and scheduling location UP?  No    Do you take an injectable or oral medication for weight loss or diabetes (excluding insulin)?  No    Do you take the medication Naltrexone?  No    Do you take blood thinners?  No       Prep   Are you currently on dialysis or do you have chronic kidney disease?  No    Do you have a diagnosis of diabetes?  No    Do you have a diagnosis of cystic fibrosis (CF)?  No    On a regular basis do you go 3 -5 days between bowel movements?  No    BMI > 40?  No    Preferred Pharmacy:    [id number] [place] [address] [facility]  [address]  [facility] [address]  Phone: [phone] Fax: [phone]      Final Scheduling Details     Procedure scheduled  Colonoscopy    Surgeon:  Chriss     Date of procedure:  03/31/2025     Pre-OP / PAC:   No - Not required for this site.    Location  [facility] - Patient preference.    Sedation   Moderate Sedation - Per order.      Patient Reminders:   You will receive a call from a Nurse to review instructions and health history.  This assessment must be completed prior to your procedure.  Failure to complete the Nurse assessment may result in the procedure being cancelled.      On the day of your procedure, please designate an adult(s) who can drive you home stay with you for the next 24 hours. The medicines used in the exam will make you sleepy. You will not be able to drive.      You cannot take public transportation, ride share services, or non-medical taxi service without a " responsible caregiver.  Medical transport services are allowed with the requirement that a responsible caregiver will receive you at your destination.  We require that drivers and caregivers are confirmed prior to your procedure.

## 2025-02-17 ENCOUNTER — OFFICE VISIT (OUTPATIENT)
Dept: FAMILY MEDICINE | Facility: CLINIC | Age: 70
End: 2025-02-17
Payer: MEDICARE

## 2025-02-17 VITALS
WEIGHT: 187.8 LBS | RESPIRATION RATE: 16 BRPM | HEIGHT: 67 IN | DIASTOLIC BLOOD PRESSURE: 78 MMHG | TEMPERATURE: 97.6 F | SYSTOLIC BLOOD PRESSURE: 130 MMHG | BODY MASS INDEX: 29.47 KG/M2 | HEART RATE: 63 BPM | OXYGEN SATURATION: 100 %

## 2025-02-17 DIAGNOSIS — E78.5 DYSLIPIDEMIA: ICD-10-CM

## 2025-02-17 DIAGNOSIS — I25.10 CORONARY ARTERY DISEASE INVOLVING NATIVE CORONARY ARTERY OF NATIVE HEART WITHOUT ANGINA PECTORIS: ICD-10-CM

## 2025-02-17 DIAGNOSIS — B37.2 INTERTRIGINOUS CANDIDIASIS: ICD-10-CM

## 2025-02-17 DIAGNOSIS — M54.2 CERVICALGIA: ICD-10-CM

## 2025-02-17 DIAGNOSIS — Z78.0 ASYMPTOMATIC POSTMENOPAUSAL STATUS: ICD-10-CM

## 2025-02-17 DIAGNOSIS — J45.30 MILD PERSISTENT ASTHMA WITHOUT COMPLICATION: ICD-10-CM

## 2025-02-17 DIAGNOSIS — I83.93 VARICOSE VEINS OF BOTH LOWER EXTREMITIES, UNSPECIFIED WHETHER COMPLICATED: ICD-10-CM

## 2025-02-17 DIAGNOSIS — Z00.00 ANNUAL PHYSICAL EXAM: Primary | ICD-10-CM

## 2025-02-17 DIAGNOSIS — R06.02 SOB (SHORTNESS OF BREATH): ICD-10-CM

## 2025-02-17 DIAGNOSIS — I10 ESSENTIAL HYPERTENSION: ICD-10-CM

## 2025-02-17 LAB
ALBUMIN SERPL BCG-MCNC: 4.4 G/DL (ref 3.5–5.2)
ALP SERPL-CCNC: 77 U/L (ref 40–150)
ALT SERPL W P-5'-P-CCNC: 17 U/L (ref 0–50)
ANION GAP SERPL CALCULATED.3IONS-SCNC: 13 MMOL/L (ref 7–15)
AST SERPL W P-5'-P-CCNC: 27 U/L (ref 0–45)
BILIRUB SERPL-MCNC: 0.8 MG/DL
BUN SERPL-MCNC: 32.6 MG/DL (ref 8–23)
CALCIUM SERPL-MCNC: 9.7 MG/DL (ref 8.8–10.4)
CHLORIDE SERPL-SCNC: 97 MMOL/L (ref 98–107)
CHOLEST SERPL-MCNC: 156 MG/DL
CREAT SERPL-MCNC: 1.45 MG/DL (ref 0.51–0.95)
EGFRCR SERPLBLD CKD-EPI 2021: 39 ML/MIN/1.73M2
ERYTHROCYTE [DISTWIDTH] IN BLOOD BY AUTOMATED COUNT: 12.7 % (ref 10–15)
FASTING STATUS PATIENT QL REPORTED: ABNORMAL
FASTING STATUS PATIENT QL REPORTED: NORMAL
GLUCOSE SERPL-MCNC: 92 MG/DL (ref 70–99)
HCO3 SERPL-SCNC: 28 MMOL/L (ref 22–29)
HCT VFR BLD AUTO: 41.1 % (ref 35–47)
HDLC SERPL-MCNC: 84 MG/DL
HGB BLD-MCNC: 13.2 G/DL (ref 11.7–15.7)
LDLC SERPL CALC-MCNC: 57 MG/DL
MCH RBC QN AUTO: 29.5 PG (ref 26.5–33)
MCHC RBC AUTO-ENTMCNC: 32.1 G/DL (ref 31.5–36.5)
MCV RBC AUTO: 92 FL (ref 78–100)
NONHDLC SERPL-MCNC: 72 MG/DL
PLATELET # BLD AUTO: 306 10E3/UL (ref 150–450)
POTASSIUM SERPL-SCNC: 3.6 MMOL/L (ref 3.4–5.3)
PROT SERPL-MCNC: 7.2 G/DL (ref 6.4–8.3)
RBC # BLD AUTO: 4.47 10E6/UL (ref 3.8–5.2)
SODIUM SERPL-SCNC: 138 MMOL/L (ref 135–145)
T4 FREE SERPL-MCNC: 1.23 NG/DL (ref 0.9–1.7)
TRIGL SERPL-MCNC: 74 MG/DL
TSH SERPL DL<=0.005 MIU/L-ACNC: 6.36 UIU/ML (ref 0.3–4.2)
WBC # BLD AUTO: 8.9 10E3/UL (ref 4–11)

## 2025-02-17 PROCEDURE — 80061 LIPID PANEL: CPT | Performed by: INTERNAL MEDICINE

## 2025-02-17 PROCEDURE — 99214 OFFICE O/P EST MOD 30 MIN: CPT | Mod: 25 | Performed by: INTERNAL MEDICINE

## 2025-02-17 PROCEDURE — 84439 ASSAY OF FREE THYROXINE: CPT | Performed by: INTERNAL MEDICINE

## 2025-02-17 PROCEDURE — 85027 COMPLETE CBC AUTOMATED: CPT | Performed by: INTERNAL MEDICINE

## 2025-02-17 PROCEDURE — G0439 PPPS, SUBSEQ VISIT: HCPCS | Performed by: INTERNAL MEDICINE

## 2025-02-17 PROCEDURE — 36415 COLL VENOUS BLD VENIPUNCTURE: CPT | Performed by: INTERNAL MEDICINE

## 2025-02-17 PROCEDURE — 80053 COMPREHEN METABOLIC PANEL: CPT | Performed by: INTERNAL MEDICINE

## 2025-02-17 PROCEDURE — 84443 ASSAY THYROID STIM HORMONE: CPT | Performed by: INTERNAL MEDICINE

## 2025-02-17 RX ORDER — ATORVASTATIN CALCIUM 20 MG/1
20 TABLET, FILM COATED ORAL AT BEDTIME
Qty: 90 TABLET | Refills: 3 | Status: SHIPPED | OUTPATIENT
Start: 2025-02-17

## 2025-02-17 RX ORDER — MICONAZOLE NITRATE 20 MG/G
CREAM TOPICAL 2 TIMES DAILY
Qty: 35 G | Refills: 0 | Status: SHIPPED | OUTPATIENT
Start: 2025-02-17

## 2025-02-17 RX ORDER — ALBUTEROL SULFATE 90 UG/1
1-2 INHALANT RESPIRATORY (INHALATION) EVERY 6 HOURS PRN
Qty: 18 G | Refills: 0 | Status: SHIPPED | OUTPATIENT
Start: 2025-02-17

## 2025-02-17 RX ORDER — MONTELUKAST SODIUM 10 MG/1
TABLET ORAL
Qty: 90 TABLET | Refills: 3 | Status: SHIPPED | OUTPATIENT
Start: 2025-02-17

## 2025-02-17 SDOH — HEALTH STABILITY: PHYSICAL HEALTH: ON AVERAGE, HOW MANY DAYS PER WEEK DO YOU ENGAGE IN MODERATE TO STRENUOUS EXERCISE (LIKE A BRISK WALK)?: 2 DAYS

## 2025-02-17 ASSESSMENT — PAIN SCALES - GENERAL: PAINLEVEL_OUTOF10: NO PAIN (0)

## 2025-02-17 ASSESSMENT — ASTHMA QUESTIONNAIRES
ACT_TOTALSCORE: 20
QUESTION_2 LAST FOUR WEEKS HOW OFTEN HAVE YOU HAD SHORTNESS OF BREATH: NOT AT ALL
QUESTION_1 LAST FOUR WEEKS HOW MUCH OF THE TIME DID YOUR ASTHMA KEEP YOU FROM GETTING AS MUCH DONE AT WORK, SCHOOL OR AT HOME: ALL OF THE TIME
QUESTION_5 LAST FOUR WEEKS HOW WOULD YOU RATE YOUR ASTHMA CONTROL: WELL CONTROLLED
QUESTION_4 LAST FOUR WEEKS HOW OFTEN HAVE YOU USED YOUR RESCUE INHALER OR NEBULIZER MEDICATION (SUCH AS ALBUTEROL): NOT AT ALL
ACT_TOTALSCORE: 20
QUESTION_3 LAST FOUR WEEKS HOW OFTEN DID YOUR ASTHMA SYMPTOMS (WHEEZING, COUGHING, SHORTNESS OF BREATH, CHEST TIGHTNESS OR PAIN) WAKE YOU UP AT NIGHT OR EARLIER THAN USUAL IN THE MORNING: NOT AT ALL

## 2025-02-17 ASSESSMENT — SOCIAL DETERMINANTS OF HEALTH (SDOH): HOW OFTEN DO YOU GET TOGETHER WITH FRIENDS OR RELATIVES?: ONCE A WEEK

## 2025-02-17 NOTE — PROGRESS NOTES
Preventive Care Visit  [facility] ROSANNA Roldan MD, Internal Medicine  Feb 17, 2025      Assessment & Plan     Annual physical exam  She is due for dexa scan and colonoscopy.   - Lipid panel reflex to direct LDL Non-fasting; Future  - CBC with Platelets (Today); Future  - COMPREHENSIVE METABOLIC PANEL; Future  - TSH with free T4 reflex; Future  - Sodium Sulfate-Mag Sulfate-KCl 0710-178-899 MG TABS; Take 12 tablets by mouth daily for 2 days.  - Lipid panel reflex to direct LDL Non-fasting  - CBC with Platelets (Today)  - COMPREHENSIVE METABOLIC PANEL  - TSH with free T4 reflex  - T4 free    Essential hypertension  Continue hydrochlorothiazide and triamterene.     Coronary artery disease involving native coronary artery of native heart without angina pectoris  Continue asa and statin  - Lipid panel reflex to direct LDL Non-fasting; Future  - Lipid panel reflex to direct LDL Non-fasting    Cervicalgia  stable    Intertriginous candidiasis  - miconazole (MICATIN) 2 % external cream; Apply topically 2 times daily.  - Adult Dermatology  Referral; Future    Dyslipidemia  Stable. Continue medication.  - atorvastatin (LIPITOR) 20 MG tablet; Take 1 tablet (20 mg) by mouth at bedtime.    Mild persistent asthma without complication  Stable. Continue medication.  - montelukast (SINGULAIR) 10 MG tablet; TAKE 1 TABLET(10 MG) BY MOUTH AT BEDTIME    SOB (shortness of breath)  Stable. Continue medication.  - albuterol (VENTOLIN HFA) 108 (90 Base) MCG/ACT inhaler; Inhale 1-2 puffs into the lungs every 6 hours as needed for shortness of breath, wheezing or cough.    Asymptomatic postmenopausal status  - DX Bone Density; Future    Varicose veins of both lower extremities, unspecified whether complicated  Varicose veins: compressions socks during the day, leg elevation, calf exercises.       Patient has been advised of split billing requirements and indicates understanding: Yes        BMI  Estimated body  "mass index is 29.65 kg/m  as calculated from the following:    Height as of this encounter: 1.695 m (5' 6.73\").    Weight as of this encounter: 85.2 kg (187 lb 12.8 oz).   Weight management plan: Discussed healthy diet and exercise guidelines    Counseling  Appropriate preventive services were addressed with this patient via screening, questionnaire, or discussion as appropriate for fall prevention, nutrition, physical activity, Tobacco-use cessation, social engagement, weight loss and cognition.  Checklist reviewing preventive services available has been given to the patient.  Reviewed patient's diet, addressing concerns and/or questions.   She is at risk for lack of exercise and has been provided with information to increase physical activity for the benefit of her well-being.   Information on urinary incontinence and treatment options given to patient.       See Patient Instructions    Miracle Barger is a 69 year old, presenting for the following:  Physical       HPI  Denita Flores is here for annual wellness exam  She has HTN, CAD, HLD, Asthma, varicose veins.   She has hx of Neck pain, she underwent PT and has been feeling much better since then.   She has intertriginous candidiasis due to abdominal fat.  She has hx of vertigo, was seen by ENT and asked to cut down salt in her diet. She is also on hydrochlorothiazide-triamterene for this.       Health Care Directive  Patient does not have a Health Care Directive: Discussed advance care planning with patient; however, patient declined at this time.      2/17/2025   General Health   How would you rate your overall physical health? Good   Feel stress (tense, anxious, or unable to sleep) Not at all         2/17/2025   Nutrition   Diet: Low salt         2/17/2025   Exercise   Days per week of moderate/strenous exercise 2 days   (!) EXERCISE CONCERN      2/17/2025   Social Factors   Frequency of gathering with friends or relatives Once a week   Worry food " won't last until get money to buy more No   Food not last or not have enough money for food? No   Do you have housing? (Housing is defined as stable permanent housing and does not include staying ouside in a car, in a tent, in an abandoned building, in an overnight shelter, or couch-surfing.) Yes   Are you worried about losing your housing? No   Lack of transportation? No   Unable to get utilities (heat,electricity)? No         2/17/2025   Fall Risk   Fallen 2 or more times in the past year? No   Trouble with walking or balance? No          2/17/2025   Activities of Daily Living- Home Safety   Needs help with the following daily activites None of the above   Safety concerns in the home None of the above         2/17/2025   Dental   Dentist two times every year? Yes         2/17/2025   Hearing Screening   Hearing concerns? None of the above         2/17/2025   Driving Risk Screening   Patient/family members have concerns about driving No         2/17/2025   General Alertness/Fatigue Screening   Have you been more tired than usual lately? No         2/17/2025   Urinary Incontinence Screening   Bothered by leaking urine in past 6 months Yes            Today's PHQ-2 Score:       2/17/2025     9:25 AM   PHQ-2 ( 1999 Pfizer)   Q1: Little interest or pleasure in doing things 0   Q2: Feeling down, depressed or hopeless 0   PHQ-2 Score 0    Q1: Little interest or pleasure in doing things Not at all   Q2: Feeling down, depressed or hopeless Not at all   PHQ-2 Score 0       Patient-reported           2/17/2025   Substance Use   Alcohol more than 3/day or more than 7/wk No   Do you have a current opioid prescription? No   How severe/bad is pain from 1 to 10? 5/10   Do you use any other substances recreationally? No     Social History     Tobacco Use    Smoking status: Former     Current packs/day: 0.00     Average packs/day: 1.5 packs/day for 35.0 years (52.5 ttl pk-yrs)     Types: Cigarettes     Start date: 1/1/1968     Quit  date: 2003     Years since quittin.1    Smokeless tobacco: Never   Vaping Use    Vaping status: Never Used   Substance Use Topics    Alcohol use: Yes     Alcohol/week: 0.0 standard drinks of alcohol     Comment: 3 dinks/ year    Drug use: No           2025   LAST FHS-7 RESULTS   1st degree relative breast or ovarian cancer No   Any relative bilateral breast cancer No   Any male have breast cancer No   Any ONE woman have BOTH breast AND ovarian cancer No   Any woman with breast cancer before 50yrs No   2 or more relatives with breast AND/OR ovarian cancer No   2 or more relatives with breast AND/OR bowel cancer No        Mammogram Screening - Mammogram every 1-2 years updated in Health Maintenance based on mutual decision making      History of abnormal Pap smear: No - age 65 or older with adequate negative prior screening test results (3 consecutive negative cytology results, 2 consecutive negative cotesting results, or 2 consecutive negative HrHPV test results within 10 years, with the most recent test occurring within the recommended screening interval for the test used)        Latest Ref Rng & Units 2018    11:22 AM 2018    11:17 AM 2015    12:00 AM   PAP / HPV   PAP (Historical)   NIL  NIL    HPV 16 DNA NEG^Negative Negative      HPV 18 DNA NEG^Negative Negative      Other HR HPV NEG^Negative Negative        ASCVD Risk   The ASCVD Risk score (Aiden GIPSON, et al., 2019) failed to calculate for the following reasons:    The valid total cholesterol range is 130 to 320 mg/dL    Fracture Risk Assessment Tool  Link to Frax Calculator  Use the information below to complete the Frax calculator  : 1955  Sex: female  Weight (kg): 85.2 kg (actual weight)  Height (cm): 169.5 cm  Previous Fragility Fracture:  No  History of parent with fractured hip:  No  Current Smoking:  No  Patient has been on glucocorticoids for more than 3 months (5mg/day or more): No  Rheumatoid Arthritis on  Problem List:  No  Secondary Osteoporosis on Problem List:  No  Consumes 3 or more units of alcohol per day: No  Femoral Neck BMD (g/cm2)    Reviewed and updated as needed this visit by Provider     Meds              Current providers sharing in care for this patient include:  Patient Care Team:  Zoraida Roldan MD as PCP - General (Internal Medicine)  Agbeh, Cephas Mawuena, MD as MD (OB/Gyn)  Kyle Santoyo MD as MD (Nephrology)  Nicolle Villanueva PA as Referring Physician (Internal Medicine)  Dmitriy Pitt MD as MD (Otolaryngology)  Jonathan Varela MD as MD (Cardiovascular Disease)  Erwin Araujo MD as MD (Dermatology)  Erwin Araujo MD as MD (Dermatology)  Zoraida Roldan MD as Assigned PCP  Montse Castro MD as Assigned Endocrinology Provider  Eboni Oliveira PA-C as Assigned Surgical Provider    The following health maintenance items are reviewed in Epic and correct as of today:  Health Maintenance   Topic Date Due    COLORECTAL CANCER SCREENING  02/21/2022    ASTHMA ACTION PLAN  03/15/2022    MEDICARE ANNUAL WELLNESS VISIT  06/11/2022    ANNUAL REVIEW OF HM ORDERS  06/11/2022    BMP  03/06/2024    LIPID  06/12/2024    COVID-19 Vaccine (10 - 2024-25 season) 03/30/2025    TSH W/FREE T4 REFLEX  05/09/2025    DTAP/TDAP/TD IMMUNIZATION (2 - Td or Tdap) 06/22/2025    ASTHMA CONTROL TEST  08/17/2025    MAMMO SCREENING  01/20/2026    FALL RISK ASSESSMENT  02/17/2026    GLUCOSE  03/06/2026    ADVANCE CARE PLANNING  02/17/2030    DEXA  10/17/2034    HEPATITIS C SCREENING  Completed    PHQ-2 (once per calendar year)  Completed    INFLUENZA VACCINE  Completed    Pneumococcal Vaccine: 50+ Years  Completed    ZOSTER IMMUNIZATION  Completed    RSV VACCINE  Completed    HPV IMMUNIZATION  Aged Out    MENINGITIS IMMUNIZATION  Aged Out            Objective    Exam  /78 (BP Location: Right arm, Patient Position: Sitting, Cuff Size: Adult Regular)   Pulse 63   Temp 97.6  F  "(36.4  C) (Temporal)   Resp 16   Ht 1.695 m (5' 6.73\")   Wt 85.2 kg (187 lb 12.8 oz)   SpO2 100%   BMI 29.65 kg/m     Estimated body mass index is 29.65 kg/m  as calculated from the following:    Height as of this encounter: 1.695 m (5' 6.73\").    Weight as of this encounter: 85.2 kg (187 lb 12.8 oz).    Physical Exam  Vitals reviewed.   Constitutional:       Appearance: Normal appearance.   HENT:      Right Ear: Tympanic membrane normal. There is no impacted cerumen.      Left Ear: Tympanic membrane normal. There is no impacted cerumen.      Mouth/Throat:      Mouth: Mucous membranes are moist.      Pharynx: Oropharynx is clear. No oropharyngeal exudate or posterior oropharyngeal erythema.   Cardiovascular:      Rate and Rhythm: Normal rate and regular rhythm.      Heart sounds: Normal heart sounds. No murmur heard.     No gallop.   Pulmonary:      Effort: Pulmonary effort is normal. No respiratory distress.      Breath sounds: Normal breath sounds. No stridor. No wheezing, rhonchi or rales.   Abdominal:      General: Abdomen is flat. There is no distension.      Palpations: Abdomen is soft. There is no mass.      Tenderness: There is no abdominal tenderness. There is no guarding.      Hernia: No hernia is present.   Musculoskeletal:         General: Normal range of motion.      Cervical back: Normal range of motion and neck supple. No rigidity or tenderness.      Right lower leg: No edema.      Left lower leg: No edema.      Comments: Varicose veins and mild swelling.   Lymphadenopathy:      Cervical: No cervical adenopathy.   Skin:     General: Skin is warm.   Neurological:      Mental Status: She is alert.                2/17/2025   Mini Cog   Clock Draw Score 2 Normal   3 Item Recall 3 objects recalled   Mini Cog Total Score 5         Vision Screen         Signed Electronically by: Zoraida Roldan MD    "

## 2025-02-17 NOTE — PATIENT INSTRUCTIONS
You are due for dexa scan   Please call the following number to make appointment :  [phone]  It is located in [address]

## 2025-02-20 ENCOUNTER — TELEPHONE (OUTPATIENT)
Dept: FAMILY MEDICINE | Facility: CLINIC | Age: 70
End: 2025-02-20
Payer: MEDICARE

## 2025-02-20 DIAGNOSIS — E89.0 POSTABLATIVE HYPOTHYROIDISM: ICD-10-CM

## 2025-02-20 DIAGNOSIS — N17.9 AKI (ACUTE KIDNEY INJURY): Primary | ICD-10-CM

## 2025-02-20 DIAGNOSIS — I10 ESSENTIAL HYPERTENSION: ICD-10-CM

## 2025-02-20 RX ORDER — LEVOTHYROXINE SODIUM 88 UG/1
88 TABLET ORAL DAILY
Qty: 90 TABLET | Refills: 0 | Status: SHIPPED | OUTPATIENT
Start: 2025-02-20

## 2025-02-20 RX ORDER — HYDROCHLOROTHIAZIDE 25 MG/1
25 TABLET ORAL
Qty: 180 TABLET | Refills: 0 | Status: SHIPPED | OUTPATIENT
Start: 2025-02-20

## 2025-02-21 ENCOUNTER — TELEPHONE (OUTPATIENT)
Dept: FAMILY MEDICINE | Facility: CLINIC | Age: 70
End: 2025-02-21
Payer: MEDICARE

## 2025-02-21 NOTE — TELEPHONE ENCOUNTER
Per Telephone encounter dated 02/20/25, Patient was told to not take Naproxen.    Patient wondering if it's ok to still take Tylenol?    Routing to PCP to advise, thank you.    Katja MONROY,  Opal RN  [facility] Internal Medicine

## 2025-02-24 ENCOUNTER — THERAPY VISIT (OUTPATIENT)
Dept: PHYSICAL THERAPY | Facility: CLINIC | Age: 70
End: 2025-02-24
Payer: MEDICARE

## 2025-02-24 DIAGNOSIS — M54.2 NECK PAIN: Primary | ICD-10-CM

## 2025-02-24 PROCEDURE — 97530 THERAPEUTIC ACTIVITIES: CPT | Mod: GP | Performed by: PHYSICAL THERAPIST

## 2025-02-24 PROCEDURE — 97110 THERAPEUTIC EXERCISES: CPT | Mod: GP | Performed by: PHYSICAL THERAPIST

## 2025-02-24 NOTE — TELEPHONE ENCOUNTER
Spoke with pt about PCP recommendations. Pt verbalized understanding and has no further questions or concerns.     Christopher Hilton RN  [facility]

## 2025-02-25 ENCOUNTER — ANCILLARY PROCEDURE (OUTPATIENT)
Dept: BONE DENSITY | Facility: CLINIC | Age: 70
End: 2025-02-25
Attending: INTERNAL MEDICINE
Payer: MEDICARE

## 2025-02-25 DIAGNOSIS — Z78.0 ASYMPTOMATIC POSTMENOPAUSAL STATUS: ICD-10-CM

## 2025-02-25 PROCEDURE — 77080 DXA BONE DENSITY AXIAL: CPT | Performed by: RADIOLOGY

## 2025-02-25 PROCEDURE — 77081 DXA BONE DENSITY APPENDICULR: CPT | Mod: XU | Performed by: RADIOLOGY

## 2025-02-27 ENCOUNTER — LAB (OUTPATIENT)
Dept: LAB | Facility: CLINIC | Age: 70
End: 2025-02-27
Payer: MEDICARE

## 2025-02-27 DIAGNOSIS — N17.9 AKI (ACUTE KIDNEY INJURY): ICD-10-CM

## 2025-02-27 LAB
ANION GAP SERPL CALCULATED.3IONS-SCNC: 12 MMOL/L (ref 7–15)
BUN SERPL-MCNC: 26.4 MG/DL (ref 8–23)
CALCIUM SERPL-MCNC: 9.8 MG/DL (ref 8.8–10.4)
CHLORIDE SERPL-SCNC: 94 MMOL/L (ref 98–107)
CREAT SERPL-MCNC: 1.48 MG/DL (ref 0.51–0.95)
EGFRCR SERPLBLD CKD-EPI 2021: 38 ML/MIN/1.73M2
GLUCOSE SERPL-MCNC: 100 MG/DL (ref 70–99)
HCO3 SERPL-SCNC: 33 MMOL/L (ref 22–29)
POTASSIUM SERPL-SCNC: 3.1 MMOL/L (ref 3.4–5.3)
SODIUM SERPL-SCNC: 139 MMOL/L (ref 135–145)

## 2025-03-03 ENCOUNTER — TELEPHONE (OUTPATIENT)
Dept: FAMILY MEDICINE | Facility: CLINIC | Age: 70
End: 2025-03-03
Payer: MEDICARE

## 2025-03-03 NOTE — TELEPHONE ENCOUNTER
Results reviewed with Patient. Patient will follow up with PCP at appt on 03/06. Patient had no further questions at this time.    Opal Bingham RN  [facility] Internal Medicine

## 2025-03-03 NOTE — TELEPHONE ENCOUNTER
Zoraida Roldan MD sent to [facility]  Please call patient for test results  -Kidney function (GFR) is stable.  ADVISE: stop naproxen, start taking tylenol for pain relief.  -Sodium is normal.  -Potassium is decreased.  ADVISE: starting potassium 10 meq daily (a prescription was sent in to your pharmacy) and then rechecking this in 1 month.  -Calcium is normal.  -Glucose is normal.    Dr Zoraida Roldan  [facility]

## 2025-03-06 ENCOUNTER — OFFICE VISIT (OUTPATIENT)
Dept: FAMILY MEDICINE | Facility: CLINIC | Age: 70
End: 2025-03-06
Payer: MEDICARE

## 2025-03-06 VITALS
OXYGEN SATURATION: 99 % | DIASTOLIC BLOOD PRESSURE: 58 MMHG | SYSTOLIC BLOOD PRESSURE: 101 MMHG | RESPIRATION RATE: 16 BRPM | BODY MASS INDEX: 29.51 KG/M2 | HEIGHT: 67 IN | HEART RATE: 71 BPM | WEIGHT: 188 LBS | TEMPERATURE: 97.8 F

## 2025-03-06 DIAGNOSIS — R59.1 LYMPHADENOPATHY: Primary | ICD-10-CM

## 2025-03-06 DIAGNOSIS — N17.9 AKI (ACUTE KIDNEY INJURY): ICD-10-CM

## 2025-03-06 DIAGNOSIS — E89.0 POSTABLATIVE HYPOTHYROIDISM: ICD-10-CM

## 2025-03-06 DIAGNOSIS — Z80.51 FAMILY HISTORY OF RENAL CELL CARCINOMA: ICD-10-CM

## 2025-03-06 PROBLEM — Z12.11 ENCOUNTER FOR SCREENING COLONOSCOPY: Status: ACTIVE | Noted: 2025-03-06

## 2025-03-06 LAB
ANION GAP SERPL CALCULATED.3IONS-SCNC: 10 MMOL/L (ref 7–15)
BUN SERPL-MCNC: 26.6 MG/DL (ref 8–23)
CALCIUM SERPL-MCNC: 10.3 MG/DL (ref 8.8–10.4)
CHLORIDE SERPL-SCNC: 96 MMOL/L (ref 98–107)
CREAT SERPL-MCNC: 1.36 MG/DL (ref 0.51–0.95)
EGFRCR SERPLBLD CKD-EPI 2021: 42 ML/MIN/1.73M2
GLUCOSE SERPL-MCNC: 96 MG/DL (ref 70–99)
HCO3 SERPL-SCNC: 35 MMOL/L (ref 22–29)
POTASSIUM SERPL-SCNC: 3.5 MMOL/L (ref 3.4–5.3)
SODIUM SERPL-SCNC: 141 MMOL/L (ref 135–145)

## 2025-03-06 PROCEDURE — 80048 BASIC METABOLIC PNL TOTAL CA: CPT | Performed by: INTERNAL MEDICINE

## 2025-03-06 PROCEDURE — 1125F AMNT PAIN NOTED PAIN PRSNT: CPT | Performed by: INTERNAL MEDICINE

## 2025-03-06 PROCEDURE — 88112 CYTOPATH CELL ENHANCE TECH: CPT | Performed by: PATHOLOGY

## 2025-03-06 PROCEDURE — 3074F SYST BP LT 130 MM HG: CPT | Performed by: INTERNAL MEDICINE

## 2025-03-06 PROCEDURE — 3078F DIAST BP <80 MM HG: CPT | Performed by: INTERNAL MEDICINE

## 2025-03-06 PROCEDURE — 99213 OFFICE O/P EST LOW 20 MIN: CPT | Performed by: INTERNAL MEDICINE

## 2025-03-06 PROCEDURE — 36415 COLL VENOUS BLD VENIPUNCTURE: CPT | Performed by: INTERNAL MEDICINE

## 2025-03-06 ASSESSMENT — PAIN SCALES - GENERAL: PAINLEVEL_OUTOF10: MILD PAIN (1)

## 2025-03-06 NOTE — PROGRESS NOTES
"  Assessment & Plan     Lymphadenopathy  Will get an ultrasound of the neck for enlarged lymph nodes.  - US Head Neck Soft Tissue; Future    EMMETT (acute kidney injury)  Repeat BMP  Follow up with nephrology if kidney function is worse.   - Basic metabolic panel  - Cytology non gyn    Family history of renal cell carcinoma  Patient wants to check urine cytology for malignant cells due to family hx of renal cell ca.    Postablative hypothyroidism  - TSH; Future      See Patient Instructions    Miracle Barger is a 69 year old, presenting for the following health issues:  Recheck Medication and Lymph Nodes (Neck )    History of Present Illness       Reason for visit:  Checking lymph nodes in neck  and following up medicationsthat are new and on going   She is taking medications regularly.      Right anterior cervical small lymph node enalrgement  Left submandibular lymph node - possible enlargement.  No B symptoms.     Hydrochlorothiazide 25 mg bid instead of hydrochlorothiazide and triamterene for ear fluid/dizziness.    Follow up with nephro (Dr Santoyo) if kidney function still worse.    Cardiology notes: no asa needed, statin per PCP    Fam hx of renal cell carcinoma in father at age 67.        Objective    /58 (BP Location: Right arm, Patient Position: Sitting, Cuff Size: Adult Regular)   Pulse 71   Temp 97.8  F (36.6  C)   Resp 16   Ht 1.695 m (5' 6.73\")   Wt 85.3 kg (188 lb)   SpO2 99%   BMI 29.68 kg/m    Body mass index is 29.68 kg/m .  Physical Exam           Signed Electronically by: Zoraida Roldan MD    "

## 2025-03-06 NOTE — PATIENT INSTRUCTIONS
Call to schedule an appointment for imaging at [phone]    Calcium 600 mg per day  Vitamin D 2807-5884 units per day

## 2025-03-07 ENCOUNTER — TELEPHONE (OUTPATIENT)
Dept: GASTROENTEROLOGY | Facility: CLINIC | Age: 70
End: 2025-03-07

## 2025-03-07 NOTE — TELEPHONE ENCOUNTER
Incoming call from patient.     Patient states they have a colonoscopy scheduled on 3/31/25 but have not received a nurse call yet and they are nervous they missed it. Informed patient that pre assessment team calls about 2 weeks prior to procedure so not to worry they have not missed the call. Patient appreciated the reassurance.     Patient stated that they were going to try and do the SuTAB prep but it was too expensive. Patient states that the pharmacist told them that they should do gavolite as this prep is low in sodium. Patient states they want this prep for upcoming provider. Informed patient that this is also called Golytely prep and that our pre assessment team can send this prep per request. Asked patient if they would like it sent now to pharmacy bu declined and stated that they will wait for when pre assessment team does their review and call.    Writer will send standard Golytely prep instructions via letter so patient can have this prior to pre assessment call as it may make the pre assessment call go smoother for patient. Patient is aware that pre assessment team will be calling closer to procedure to review bowel prep instructions and appointment details. Patient had no further questions at this time and appreciated the time spent talking to them.     Lexi Garcia RN  Endoscopy Procedure Pre Assessment   [phone] option 3

## 2025-03-07 NOTE — LETTER
2025      Denita Flores  [address]  [facility] [address]              Dear Diamante Barger Golytely (Colyte, Nulytely) Bowel Prep   Prep instructions for your colonoscopy     [facility]; [address] Floor, [place] [address] - Check in location: 2nd Floor at Surgery desk  For prep questions, please call: [facility] - [phone] option 3    Please read these instructions carefully at least 7 days prior to your colonoscopy procedure. Be sure to follow all directions completely. The inside of your colon must be clean to allow for a complete examination for the presence of any growths, polyps, and/or abnormalities, as well as their biopsy or removal. A number of tips are included in order to make this part of the procedure as comfortable as possible.    Getting ready    prescription at the pharmacy. Endoscopy team will order this about 2 weeks before to your scheduled procedure. Included in the kit will be the followin Dulcolax (Bisacodyl) 5mg tablets  1 container of Polyethylene Glycol (Golytely, Colyte, Nulytely, Gavilyte-G)    A nurse will call you to go over your appointment details and prep instructions.    You must arrange for an adult to drive you home after your exam. Your colonoscopy cannot be done unless you have a ride. If you need to use public transportation, someone must ride with you and stay with you for up to 24 hours.       7 days before procedure   Medications that may need to be held before procedure:     GLP-1 agonist medication for diabetes or weight loss: such as Mounjaro (Tirzepatide).  Ozempic (Semaglutide). Rybelsus (Semaglutide), Tirzepatide-Weight Management (Zepbound), Wegovy (Semaglutide) or others, holding times may vary based on how you take this medication. This may be up to a 7 day hold. Our pre assessment nurses will call and discuss holding recommendations 1-2 weeks before  scheduled procedure.     Blood thinning and/or anti platelet medications: such as Coumadin, Plavix, Xarelto, Eliquis, Lovenox or others, ask your your prescribing provider about holding recommendations.     If you take insulin for diabetes, ask your prescribing provider for instructions on how to manage this medication while preparing for a colonoscopy.     Stop taking iron (ferrous sulfate), multivitamins that contain iron, and/or fiber supplements (Metamucil, Benefiber, Psyllium husk powder, Fibercon, Bran, etc.) 7 days before procedure.     Stop eating whole kernel corn, popcorn, nuts, and foods that contain seeds. These can stay in the colon for many days and they can clog up the colonoscope.       3 days before procedure     Begin a low-fiber diet (see examples below). No Olestra (a fat substitute).    Consume no more than 10-15 grams of fiber each day.     It is important to stay hydrated. Drink at least eight 8-ounce glasses of water a day.      LOW FIBER DIET   You can have:   Do not have:    Starches: White bread, rolls, biscuits, croissants, [place] toast, white flour tortillas, waffles, pancakes, Chinese toast; white rice, noodles, pasta, macaroni; cooked and peeled potatoes; plain crackers, saltines; cooked farina or cream of rice; puffed rice, corn flakes, Rice Krispies, Special K      Vegetables: tender cooked and canned, vegetable broths     Fruits and fruit juices: Strained fruit juice, canned fruit without seeds or skin (not pineapple), applesauce, pear sauce, ripe bananas, melons (not watermelon)     Milk products: Milk (plain or flavored), cheese, cottage cheese, yogurt (no berries), custard, ice cream       Proteins: Tender, well-cooked ground beef, lamb, veal, ham, pork, chicken, turkey, fish or organ meat, Tofu, eggs, creamy peanut butter      Fats and condiments:  Margarine, butter, oils, mayonnaise, sour cream, salad dressing, plain gravy; spices, cooked herbs; sugar, clear jelly, honey, syrup       Snacks, sweets and drinks: Pretzels, hard candy; plain cakes and cookies (no nuts or seeds); gelatin, plain pudding, sherbet, Popsicles; coffee, tea, carbonated ( fizzy ) drinks    Starches: Breads or rolls that contain nuts, seeds or fruit; whole wheat or whole grain breads that contain more than 2 grams of fiber per serving; cornbread; corn or whole wheat tortillas; potatoes with skin; brown rice, wild rice, quinoa, kasha (buckwheat), and oatmeal      Vegetables: Any raw or steamed vegetables; vegetables with seeds; corn in any form      Fruits and fruit juices: Prunes, prune juice, raisins and other dried fruits, berries and other fruits with seeds, canned pineapple juices with pulp such as orange, grapefruit, pineapple or tomato juice     Milk products: Any yogurt with nuts, seeds or berries      Proteins: Tough, fibrous meats with gristle; cooked dried beans, peas or lentils; crunchy peanut butter     Fats and condiments: Pickles, olives, relish, horseradish; jam, marmalade, preserves      Snacks, sweets and drinks: Popcorn, nuts, seeds, granola, coconut, candies made with nuts or seeds; all desserts that contain nuts, seeds, raisins and other dried fruits, coconut, whole grains or bran.                                                                                                             1 day before procedure     You can have a light, low-fiber breakfast. But drink only clear liquids after 9 a.m. (see list below).    Drink at least eight to ten 8-ounce glasses of water throughout the day. ? ? ? ? ? ? ? ?    Fill the container of Golytely with a full gallon of warm water. Cover and shake until well mixed. Chill for 3 hours in refrigerator until it is time to drink solution.    CLEAR LIQUID DIET:  You can have: Do not have:    Water, tea, coffee (no milk or cream)   Soda pop, Gatorade (not red or purple)   Coconut water   Jell-O, Popsicles (no milk or fruit pieces - not red or purple)   Fat-free soup  broth or bouillon   Plain hard candy, such as clear life savers (not red or purple)   Clear juices and fruit-flavored drinks, such as apple juice, white grape juice, Hi-C, and Paresh-Aid (not red or purple)  Milk or milk products such as ice cream, malts or shakes, or coffee creamer   Red or purple drinks of any kind such as cranberry juice, grape juice or Paresh-Aid. Avoid red or purple Jell-O, Popsicles, sorbet, sherbet and candy   Juices with pulp such as orange, grapefruit, pineapple or tomato juice   Cream soups of any kind   Alcohol and beer   Protein drinks or protein powder     Step 1     At 3 PM, take 2 Dulcolax (Bisacodyl) tablets.   At 6 PM, start drinking one 8-ounce glass of Golytely mixture every 15 minutes until the container is HALF empty. Drink each glass quickly. Store the rest in the refrigerator.   Continue to drink clear liquids    Step 2     If you arrive for your procedure BEFORE 11 AM:  At 11 PM, take 2 Dulcolax (Bisacodyl) tablets  At 11 PM, start drinking the other half of the Golytely container. Drink one 8-ounce glass every 15 minutes until the container is empty. Drink each glass quickly.    If you arrive for your procedure AFTER 11 AM:  At 6 AM, take 2 Dulcolax (Bisacodyl) tablets  At 6 AM, start drinking the other half of the Golytely container. Drink one 8-ounce glass of Golytely mixture every 15 minutes until the container is empty. Drink each glass quickly.       Reminders While Drinking Laxatives:     After you start drinking the solution, stay near a toilet. You may have watery stools (diarrhea), mild cramping, bloating, and nausea. You may want to use Vaseline on the skin around your anus after each bowel movement or use wet wipes to prevent irritation. Bowel movements will be liquid and dark in color at first and then should turn clear yellow in color. Drinking the prepared solution may make you cold, wearing warm clothing can be helpful.    Some find it helpful to:  For added  flavor, include a crystal light lemonade packet in each glass of Golytely, rather than mixing it into the entire prepared mixture.   In between each glass, try sucking on a lemon or a piece of hard candy to help diminish the flavor of the preparation.  Drinking from a straw can help minimize the taste of the prepared mixture.    If you have nausea or vomiting during drinking the solution, rinse your mouth with water and take a 15-30 minute break and then continue drinking solution.     Day of procedure     2 hours before your arrival time stop drinking all liquids, including water.   Do not smoke or swallow anything, including water or gum for at least 2 hours before your arrival time. This is a safety issue. Your procedure could be cancelled if you do not follow directions.  No chewing tobacco 6 hours prior to procedure arrival time.     You may take your necessary morning medications with sips of water (4 ounces).   Do not take diabetes medicine by mouth until after your exam.  If you have asthma, bring your inhalers.  Please perform your nebulizer treatments and airway clearance therapy in the morning prior to the procedure (if applicable).    Arrive with a responsible adult who can drive you home and stay with you for up to 24 hours. The medications used during the procedure will make you sleepy, so you won't be able to drive yourself home.   You cannot use public transportation, ride-share services, or non-medical taxi services without a responsible caregiver. Medical transport services are okay, but a caregiver must be there to receive you at your destination.  Please check in with your  when you arrive. Drivers should stay on campus.    Expect to be at the procedure center for about 1.5-2.5 hours.    Do not wear jewelry (i.e. earrings, rings, necklaces, watches, etc.). Leave your purse, billfold, credit cards, and other valuables at home.      Bring insurance card and ID.                              Answers to Commonly Asked Questions     How soon can I eat after the procedure?  You may resume your normal diet when you feel ready, unless advised otherwise by the doctor performing your procedure. We recommend starting with a light meal.   Do not drink alcohol for 24 hours after your procedure.  You may resume normal activities (work, exercise, etc.) after 24 hours.    How will I feel after the procedure?  It is normal to feel bloated and gassy after your procedure. Walking will help move the air through your colon. You can take non-aspirin pain relievers that contain acetaminophen (Tylenol).  If you are having sedation, we require a responsible adult to take you home for your safety. The sedation medicines used to relax you during the procedure can impair your judgement and reaction time, and make you forgetful and possibly a little unsteady.  Do not drive, make any important decisions, or sign any legal documents for 24 hours after your procedure.    When will I get my test results?  You should have your procedure results and any lab results (if applicable) by letter, [employer] message, or phone call within 2 weeks. If you have any questions, please call the doctor that referred you for the procedure.    How do I know if my colon is cleaned out?   After completing the bowel prep, your bowel movements should be all liquid and yellow. Your bowel movements will look similar to urine in the toilet. If there are pieces of stool (poop) in the toilet, or if you can't see to the bottom of the toilet, please call our office for advice. Call [phone] and ask to speak with a nurse.    Why is the Golytely bowel prep taken in several steps?   The stool is flushed out by a large wave of fluid going through the colon. Just sipping a large volume of the solution will not achieve the desired result. Studies have shown that two smaller waves (or more in some cases) are better than one large one.       What if I need to cancel or reschedule my procedure?  Contact our endoscopy scheduling team at [phone], option 1. Monday through Friday, 7:00am-5:00pm.

## 2025-03-11 ENCOUNTER — TELEPHONE (OUTPATIENT)
Dept: FAMILY MEDICINE | Facility: CLINIC | Age: 70
End: 2025-03-11
Payer: MEDICARE

## 2025-03-11 NOTE — TELEPHONE ENCOUNTER
Triage Patient Outreach    Attempt # 1    Was call answered?  No.  Left voicemail to return call to Triage at [facility]. Upon callback, please relay results note below.     Deborah Melton RN

## 2025-03-11 NOTE — TELEPHONE ENCOUNTER
Zoraida Roldan MD sent to [facility]  Kidney function is improving  Urine cytology is negative for cancer cells

## 2025-03-12 NOTE — TELEPHONE ENCOUNTER
Results relayed to Patient. Patient had no further questions or concerns at this time.    Katja MONROY,  Triage RN  [facility]

## 2025-03-13 ENCOUNTER — TELEPHONE (OUTPATIENT)
Dept: GASTROENTEROLOGY | Facility: CLINIC | Age: 70
End: 2025-03-13
Payer: MEDICARE

## 2025-03-13 ENCOUNTER — ANCILLARY PROCEDURE (OUTPATIENT)
Dept: ULTRASOUND IMAGING | Facility: CLINIC | Age: 70
End: 2025-03-13
Attending: INTERNAL MEDICINE
Payer: MEDICARE

## 2025-03-13 DIAGNOSIS — R59.1 LYMPHADENOPATHY: ICD-10-CM

## 2025-03-13 DIAGNOSIS — Z12.11 SPECIAL SCREENING FOR MALIGNANT NEOPLASMS, COLON: Primary | ICD-10-CM

## 2025-03-13 PROCEDURE — 76536 US EXAM OF HEAD AND NECK: CPT

## 2025-03-13 RX ORDER — BISACODYL 5 MG/1
TABLET, DELAYED RELEASE ORAL
Qty: 4 TABLET | Refills: 0 | Status: SHIPPED | OUTPATIENT
Start: 2025-03-13

## 2025-03-13 NOTE — TELEPHONE ENCOUNTER
Pre assessment completed for upcoming procedure.   (Please see previous telephone encounter notes for complete details)    I called and spoke with patient       Procedure details:    Approximate time and facility location reviewed.   Patient is aware that endoscopy team will be calling about 2 days prior to confirm arrival time.    Designated  policy reviewed and that site requests drivers to check in and stay on campus. Instructed to have someone stay 6  hours post procedure.   *Disclaimer - please notify the [facility] GI staff with any  issues/concerns.    Medication review:    Medications reviewed. Please see supporting documentation below. Holding recommendations discussed (if applicable).       Prep for procedure:     Procedure prep instructions reviewed.        Any additional information needed:  N/A      Patient verbalized understanding and had no questions or concerns at this time.      Ramona Win LPN  Endoscopy Procedure Pre Assessment   [phone] option 3

## 2025-03-13 NOTE — TELEPHONE ENCOUNTER
Pre visit planning completed.      Procedure details:    Patient scheduled for Colonoscopy on 3/31/25.     Approximate arrival time: 0955. Procedure time 1040.   *Ensure patient is aware that endoscopy team will be calling about 2 days prior to procedure date to confirm arrival time as this may change.     Facility location: [facility]; [address], [place] [address]. Check in location: 2nd Floor at Surgery desk.  *Disclaimer: Drivers are to check in with patient and stay on campus during procedure.     Sedation type: Conscious sedation     Pre op exam needed? No.    Indication for procedure:   Diagnosis   Special screening for malignant neoplasms, colon         Chart review:     Electronic implanted devices? No    Recent diagnosis of diverticulitis within the last 6 weeks? No      Medication review:    Diabetic? No    Anticoagulants? No    Weight loss medication/injectable? No GLP-1 medication per patient's medication list. Nursing to verify with pre-assessment call.    Other medication HOLDING recommendations:  N/A      Prep for procedure:     Bowel prep recommendation: Standard Golytely. Bowel prep sent to [id number] [place], [place] - [address] AT [facility] [address]  Due to: CKD noted    Procedure information and instructions sent via letter         Fallon Wilks RN  Endoscopy Procedure Pre Assessment   [phone] option 3

## 2025-03-13 NOTE — LETTER
March 13, 2025      Denita Flores  [address]  [facility] [address]              Colonoscopy     Procedure date: 3/31/25  Facility location: [facility]; [address], [place] [address] - Check in location: 2nd Floor at Surgery desk    Important Procedure Reminders:     Prep Instructions:   Instructions on how to prepare for your upcoming procedure are found below. Please read instructions carefully. Deviation from instructions may result in less than desired outcomes and procedure may need to be rescheduled.   If you have additional questions regarding how to prepare for your upcoming procedure, contact our endoscopy pre assessment nurses at [phone] option 3 Monday through Friday 7:00am-5:00pm.      Policy:   The medications used during the procedure will make you sleepy, so you won't be able to drive. On the day of your procedure, please have an adult ready to drive you home and stay with you for the next 6 hours.   You can't use public transportation, ride-share services, or non-medical taxi services without a responsible caregiver. Medical transport services are okay, but a caregiver must be there to receive you at your destination.   Make sure your  and caregiver are confirmed before your procedure.    Day of procedure:  Please keep in mind that arrival times may change from when scheduled. Our endoscopy team will call about 2 days prior to procedure to confirm arrival time.   We ask that you please check in at the  with your . Your  should remain on campus.  Expect to be at the procedure center for about 1.5-2.5 hours.    Please do not wear jewelry (i.e. earrings, rings, necklaces, watches, etc). Leave your purse, billfold, credit cards, and other valuables at home.   Bring insurance card and ID.     To cancel or reschedule your procedure:   If you need to cancel or reschedule, our endoscopy scheduling team can be  reached at [phone], option 1. Monday through Friday, 7:00am-5:00pm.    Medication Reminders:    Please note the following medication holding recommendations:   N/A    ---------------------------------------------------------------------------------------------------------------------------------------------------------------------------------------------------------------     Standard Golytely (Colyte, Nulytely) Bowel Prep   Prep instructions for your colonoscopy     [facility]; [address], [place] [address] - Check in location: 2nd Floor at Surgery desk  For prep questions, please call: [facility] - [phone] option 3    Bowel prep sent to [id number] - [facility] [address] AT [facility] [address].    Please read these instructions carefully at least 7 days prior to your colonoscopy procedure. Be sure to follow all directions completely. The inside of your colon must be clean to allow for a complete examination for the presence of any growths, polyps, and/or abnormalities, as well as their biopsy or removal. A number of tips are included in order to make this part of the procedure as comfortable as possible.    Getting ready    prescription at the pharmacy. Endoscopy team will order this about 2 weeks before to your scheduled procedure. Included in the kit will be the followin Dulcolax (Bisacodyl) 5mg tablets  1 container of Polyethylene Glycol (Golytely, Colyte, Nulytely, Gavilyte-G)    A nurse will call you to go over your appointment details and prep instructions.    You must arrange for an adult to drive you home after your exam. Your colonoscopy cannot be done unless you have a ride. If you need to use public transportation, someone must ride with you and stay with you for up to 24 hours.       7 days before procedure   Medications that may need to be held before  procedure:     GLP-1 agonist medication for diabetes or weight loss: such as Mounjaro (Tirzepatide).  Ozempic (Semaglutide). Rybelsus (Semaglutide), Tirzepatide-Weight Management (Zepbound), Wegovy (Semaglutide) or others, holding times may vary based on how you take this medication. This may be up to a 7 day hold. Our pre assessment nurses will call and discuss holding recommendations 1-2 weeks before scheduled procedure.     Blood thinning and/or anti platelet medications: such as Coumadin, Plavix, Xarelto, Eliquis, Lovenox or others, ask your your prescribing provider about holding recommendations.     If you take insulin for diabetes, ask your prescribing provider for instructions on how to manage this medication while preparing for a colonoscopy.     Stop taking iron (ferrous sulfate), multivitamins that contain iron, and/or fiber supplements (Metamucil, Benefiber, Psyllium husk powder, Fibercon, Bran, etc.) 7 days before procedure.     Stop eating whole kernel corn, popcorn, nuts, and foods that contain seeds. These can stay in the colon for many days and they can clog up the colonoscope.       3 days before procedure     Begin a low-fiber diet (see examples below). No Olestra (a fat substitute).    Consume no more than 10-15 grams of fiber each day.     It is important to stay hydrated. Drink at least eight 8-ounce glasses of water a day.      LOW FIBER DIET   You can have:   Do not have:    Starches: White bread, rolls, biscuits, croissants, Rosenberg toast, white flour tortillas, waffles, pancakes, Thai toast; white rice, noodles, pasta, macaroni; cooked and peeled potatoes; plain crackers, saltines; cooked farina or cream of rice; puffed rice, corn flakes, Rice Krispies, Special K      Vegetables: tender cooked and canned, vegetable broths     Fruits and fruit juices: Strained fruit juice, canned fruit without seeds or skin (not pineapple), applesauce, pear sauce, ripe bananas, melons (not watermelon)      Milk products: Milk (plain or flavored), cheese, cottage cheese, yogurt (no berries), custard, ice cream       Proteins: Tender, well-cooked ground beef, lamb, veal, ham, pork, chicken, turkey, fish or organ meat, Tofu, eggs, creamy peanut butter      Fats and condiments:  Margarine, butter, oils, mayonnaise, sour cream, salad dressing, plain gravy; spices, cooked herbs; sugar, clear jelly, honey, syrup      Snacks, sweets and drinks: Pretzels, hard candy; plain cakes and cookies (no nuts or seeds); gelatin, plain pudding, sherbet, Popsicles; coffee, tea, carbonated ( fizzy ) drinks    Starches: Breads or rolls that contain nuts, seeds or fruit; whole wheat or whole grain breads that contain more than 2 grams of fiber per serving; cornbread; corn or whole wheat tortillas; potatoes with skin; brown rice, wild rice, quinoa, kasha (buckwheat), and oatmeal      Vegetables: Any raw or steamed vegetables; vegetables with seeds; corn in any form      Fruits and fruit juices: Prunes, prune juice, raisins and other dried fruits, berries and other fruits with seeds, canned pineapple juices with pulp such as orange, grapefruit, pineapple or tomato juice     Milk products: Any yogurt with nuts, seeds or berries      Proteins: Tough, fibrous meats with gristle; cooked dried beans, peas or lentils; crunchy peanut butter     Fats and condiments: Pickles, olives, relish, horseradish; jam, marmalade, preserves      Snacks, sweets and drinks: Popcorn, nuts, seeds, granola, coconut, candies made with nuts or seeds; all desserts that contain nuts, seeds, raisins and other dried fruits, coconut, whole grains or bran.                                               1 day before procedure     You can have a light, low-fiber breakfast. But drink only clear liquids after 9 a.m. (see list below).    Drink at least eight to ten 8-ounce glasses of water throughout the day. ? ? ? ? ? ? ? ?    Fill the container of Golytely with a full gallon of  warm water. Cover and shake until well mixed. Chill for 3 hours in refrigerator until it is time to drink solution.    CLEAR LIQUID DIET:  You can have: Do not have:    Water, tea, coffee (no milk or cream)   Soda pop, Gatorade (not red or purple)   Coconut water   Jell-O, Popsicles (no milk or fruit pieces - not red or purple)   Fat-free soup broth or bouillon   Plain hard candy, such as clear life savers (not red or purple)   Clear juices and fruit-flavored drinks, such as apple juice, white grape juice, Hi-C, and Paresh-Aid (not red or purple)  Milk or milk products such as ice cream, malts or shakes, or coffee creamer   Red or purple drinks of any kind such as cranberry juice, grape juice or Paresh-Aid. Avoid red or purple Jell-O, Popsicles, sorbet, sherbet and candy   Juices with pulp such as orange, grapefruit, pineapple or tomato juice   Cream soups of any kind   Alcohol and beer   Protein drinks or protein powder     Step 1     At 3 PM, take 2 Dulcolax (Bisacodyl) tablets.   At 6 PM, start drinking one 8-ounce glass of Golytely mixture every 15 minutes until the container is HALF empty. Drink each glass quickly. Store the rest in the refrigerator.   Continue to drink clear liquids    Step 2     If you arrive for your procedure BEFORE 11 AM:  At 11 PM, take 2 Dulcolax (Bisacodyl) tablets  At 11 PM, start drinking the other half of the Golytely container. Drink one 8-ounce glass every 15 minutes until the container is empty. Drink each glass quickly.    If you arrive for your procedure AFTER 11 AM:  At 6 AM, take 2 Dulcolax (Bisacodyl) tablets  At 6 AM, start drinking the other half of the Golytely container. Drink one 8-ounce glass of Golytely mixture every 15 minutes until the container is empty. Drink each glass quickly.       Reminders While Drinking Laxatives:     After you start drinking the solution, stay near a toilet. You may have watery stools (diarrhea), mild cramping, bloating, and nausea. You may want  to use Vaseline on the skin around your anus after each bowel movement or use wet wipes to prevent irritation. Bowel movements will be liquid and dark in color at first and then should turn clear yellow in color. Drinking the prepared solution may make you cold, wearing warm clothing can be helpful.    Some find it helpful to:  For added flavor, include a crystal light lemonade packet in each glass of Golytely, rather than mixing it into the entire prepared mixture.   In between each glass, try sucking on a lemon or a piece of hard candy to help diminish the flavor of the preparation.  Drinking from a straw can help minimize the taste of the prepared mixture.    If you have nausea or vomiting during drinking the solution, rinse your mouth with water and take a 15-30 minute break and then continue drinking solution.     Day of procedure     2 hours before your arrival time stop drinking all liquids, including water.   Do not smoke or swallow anything, including water or gum for at least 2 hours before your arrival time. This is a safety issue. Your procedure could be cancelled if you do not follow directions.  No chewing tobacco 6 hours prior to procedure arrival time.     You may take your necessary morning medications with sips of water (4 ounces).   Do not take diabetes medicine by mouth until after your exam.  If you have asthma, bring your inhalers.  Please perform your nebulizer treatments and airway clearance therapy in the morning prior to the procedure (if applicable).    Arrive with a responsible adult who can drive you home and stay with you for up to 24 hours. The medications used during the procedure will make you sleepy, so you won't be able to drive yourself home.   You cannot use public transportation, ride-share services, or non-medical taxi services without a responsible caregiver. Medical transport services are okay, but a caregiver must be there to receive you at your destination.  Please check in  with your  when you arrive. Drivers should stay on campus.    Expect to be at the procedure center for about 1.5-2.5 hours.    Do not wear jewelry (i.e. earrings, rings, necklaces, watches, etc.). Leave your purse, billfold, credit cards, and other valuables at home.      Bring insurance card and ID.       Answers to Commonly Asked Questions     How soon can I eat after the procedure?  You may resume your normal diet when you feel ready, unless advised otherwise by the doctor performing your procedure. We recommend starting with a light meal.   Do not drink alcohol for 24 hours after your procedure.  You may resume normal activities (work, exercise, etc.) after 24 hours.    How will I feel after the procedure?  It is normal to feel bloated and gassy after your procedure. Walking will help move the air through your colon. You can take non-aspirin pain relievers that contain acetaminophen (Tylenol).  If you are having sedation, we require a responsible adult to take you home for your safety. The sedation medicines used to relax you during the procedure can impair your judgement and reaction time, and make you forgetful and possibly a little unsteady.  Do not drive, make any important decisions, or sign any legal documents for 24 hours after your procedure.    When will I get my test results?  You should have your procedure results and any lab results (if applicable) by letter, MyChart message, or phone call within 2 weeks. If you have any questions, please call the doctor that referred you for the procedure.    How do I know if my colon is cleaned out?   After completing the bowel prep, your bowel movements should be all liquid and yellow. Your bowel movements will look similar to urine in the toilet. If there are pieces of stool (poop) in the toilet, or if you can't see to the bottom of the toilet, please call our office for advice. Call [phone] and ask to speak with a nurse.    Why is the Golytely bowel  prep taken in several steps?   The stool is flushed out by a large wave of fluid going through the colon. Just sipping a large volume of the solution will not achieve the desired result. Studies have shown that two smaller waves (or more in some cases) are better than one large one.      What if I need to cancel or reschedule my procedure?  Contact our endoscopy scheduling team at [phone], option 1. Monday through Friday, 7:00am-5:00pm.

## 2025-03-27 ENCOUNTER — TELEPHONE (OUTPATIENT)
Dept: GASTROENTEROLOGY | Facility: CLINIC | Age: 70
End: 2025-03-27
Payer: MEDICARE

## 2025-03-28 ENCOUNTER — ANESTHESIA EVENT (OUTPATIENT)
Dept: SURGERY | Facility: AMBULATORY SURGERY CENTER | Age: 70
End: 2025-03-28
Payer: MEDICARE

## 2025-03-28 NOTE — ANESTHESIA PREPROCEDURE EVALUATION
Anesthesia Pre-Procedure Evaluation    Patient: Denita Flores   MRN: [id number] : 1955        Procedure : Procedure(s):  Colonoscopy with CO2 insufflation          Past Medical History:   Diagnosis Date    Coronary artery disease involving native coronary artery of native heart, angina presence unspecified 2021    Essential hypertension 2021    Graves disease     Kidney stones     Passed a 5-10mm stone    Malignant neoplasm (H)     Cervical CA    Mild persistent asthma 2013    Swollen finger     Thyroid disease       Past Surgical History:   Procedure Laterality Date    BACK SURGERY      COLONOSCOPY WITH CO2 INSUFFLATION N/A 2017    Procedure: COLONOSCOPY WITH CO2 INSUFFLATION;  Surgeon: Duane, William Charles, MD;  Location: [facility] OR    CYSTOSCOPY      CYSTOSCOPY FLEXIBLE  08/10/2018    LEEP TX, CERVICAL      in her 20's      Allergies   Allergen Reactions    Contrast Dye Hives and Itching     Isovue with ANTON on 19    Sulfa Antibiotics Hives and Swelling     Noted in 10/18/09 ER      Social History     Tobacco Use    Smoking status: Former     Current packs/day: 0.00     Average packs/day: 1.5 packs/day for 35.0 years (52.5 ttl pk-yrs)     Types: Cigarettes     Start date: 1968     Quit date: 2003     Years since quittin.2    Smokeless tobacco: Never   Substance Use Topics    Alcohol use: Yes     Alcohol/week: 0.0 standard drinks of alcohol     Comment: 3 dinks/ year      Wt Readings from Last 1 Encounters:   25 85.3 kg (188 lb)        Anesthesia Evaluation            ROS/MED HX  ENT/Pulmonary:     (+)                      asthma                  Neurologic:       Cardiovascular:     (+) Dyslipidemia hypertension- -  CAD -  - -                                      METS/Exercise Tolerance:     Hematologic:       Musculoskeletal:       GI/Hepatic:       Renal/Genitourinary:     (+) renal disease, type: CRI,            Endo:     (+)          thyroid problem,     "        Psychiatric/Substance Use:       Infectious Disease:       Malignancy:       Other:            Physical Exam    Airway        Mallampati: II   TM distance: > 3 FB   Neck ROM: full   Mouth opening: > 3 cm    Respiratory Devices and Support         Dental       (+) Edentulous      Cardiovascular   cardiovascular exam normal          Pulmonary   pulmonary exam normal                OUTSIDE LABS:  CBC:   Lab Results   Component Value Date    WBC 8.9 02/17/2025    WBC 8.7 06/19/2023    HGB 13.2 02/17/2025    HGB 13.1 06/19/2023    HCT 41.1 02/17/2025    HCT 39.4 06/19/2023     02/17/2025     06/19/2023     BMP:   Lab Results   Component Value Date     03/06/2025     02/27/2025    POTASSIUM 3.5 03/06/2025    POTASSIUM 3.1 (L) 02/27/2025    CHLORIDE 96 (L) 03/06/2025    CHLORIDE 94 (L) 02/27/2025    CO2 35 (H) 03/06/2025    CO2 33 (H) 02/27/2025    BUN 26.6 (H) 03/06/2025    BUN 26.4 (H) 02/27/2025    CR 1.36 (H) 03/06/2025    CR 1.48 (H) 02/27/2025    GLC 96 03/06/2025     (H) 02/27/2025     COAGS: No results found for: \"PTT\", \"INR\", \"FIBR\"  POC: No results found for: \"BGM\", \"HCG\", \"HCGS\"  HEPATIC:   Lab Results   Component Value Date    ALBUMIN 4.4 02/17/2025    PROTTOTAL 7.2 02/17/2025    ALT 17 02/17/2025    AST 27 02/17/2025    ALKPHOS 77 02/17/2025    BILITOTAL 0.8 02/17/2025     OTHER:   Lab Results   Component Value Date    RORY 10.3 03/06/2025    PHOS 3.7 03/06/2023    LIPASE 19 (L) 04/16/2010    TSH 6.36 (H) 02/17/2025    T4 1.23 02/17/2025    CRP 3.3 03/24/2022    SED 11 03/24/2022       Anesthesia Plan    ASA Status:  3    NPO Status:  NPO Appropriate    Anesthesia Type: MAC.     - Reason for MAC: immobility needed              Consents    Anesthesia Plan(s) and associated risks, benefits, and realistic alternatives discussed. Questions answered and patient/representative(s) expressed understanding.     - Discussed: Risks, Benefits and Alternatives for BOTH SEDATION and " "the PROCEDURE were discussed     - Discussed with:  Patient      - Extended Intubation/Ventilatory Support Discussed: No.      - Patient is DNR/DNI Status: No     Use of blood products discussed: No .     Postoperative Care    Pain management: IV analgesics.   PONV prophylaxis: Ondansetron (or other 5HT-3), Background Propofol Infusion     Comments:               Hudson Edge MD    Clinically Significant Risk Factors Present on Admission                   # Hypertension: Noted on problem list           # Overweight: Estimated body mass index is 29.68 kg/m  as calculated from the following:    Height as of 3/6/25: 1.695 m (5' 6.73\").    Weight as of 3/6/25: 85.3 kg (188 lb).       # Asthma: noted on problem list          "

## 2025-03-31 ENCOUNTER — HOSPITAL ENCOUNTER (OUTPATIENT)
Facility: AMBULATORY SURGERY CENTER | Age: 70
Discharge: HOME | End: 2025-03-31
Attending: INTERNAL MEDICINE
Payer: MEDICARE

## 2025-03-31 ENCOUNTER — ANESTHESIA (OUTPATIENT)
Dept: SURGERY | Facility: AMBULATORY SURGERY CENTER | Age: 70
End: 2025-03-31
Payer: MEDICARE

## 2025-03-31 VITALS
BODY MASS INDEX: 28.42 KG/M2 | HEART RATE: 66 BPM | RESPIRATION RATE: 16 BRPM | SYSTOLIC BLOOD PRESSURE: 107 MMHG | OXYGEN SATURATION: 98 % | DIASTOLIC BLOOD PRESSURE: 50 MMHG | WEIGHT: 180 LBS | TEMPERATURE: 97.4 F

## 2025-03-31 VITALS — HEART RATE: 65 BPM

## 2025-03-31 DIAGNOSIS — D12.2 ADENOMATOUS POLYP OF ASCENDING COLON: Primary | ICD-10-CM

## 2025-03-31 LAB — COLONOSCOPY: NORMAL

## 2025-03-31 PROCEDURE — 45390 COLONOSCOPY W/RESECTION: CPT

## 2025-03-31 PROCEDURE — 45385 COLONOSCOPY W/LESION REMOVAL: CPT | Mod: XS

## 2025-03-31 PROCEDURE — G8918 PT W/O PREOP ORDER IV AB PRO: HCPCS

## 2025-03-31 PROCEDURE — 88305 TISSUE EXAM BY PATHOLOGIST: CPT | Performed by: PATHOLOGY

## 2025-03-31 PROCEDURE — G8907 PT DOC NO EVENTS ON DISCHARG: HCPCS

## 2025-03-31 RX ORDER — LIDOCAINE HYDROCHLORIDE 20 MG/ML
INJECTION, SOLUTION INFILTRATION; PERINEURAL PRN
Status: DISCONTINUED | OUTPATIENT
Start: 2025-03-31 | End: 2025-03-31

## 2025-03-31 RX ORDER — LABETALOL HYDROCHLORIDE 5 MG/ML
10 INJECTION, SOLUTION INTRAVENOUS
Status: DISCONTINUED | OUTPATIENT
Start: 2025-03-31 | End: 2025-04-01 | Stop reason: HOSPADM

## 2025-03-31 RX ORDER — NALOXONE HYDROCHLORIDE 0.4 MG/ML
0.2 INJECTION, SOLUTION INTRAMUSCULAR; INTRAVENOUS; SUBCUTANEOUS
Status: DISCONTINUED | OUTPATIENT
Start: 2025-03-31 | End: 2025-04-01 | Stop reason: HOSPADM

## 2025-03-31 RX ORDER — FENTANYL CITRATE 50 UG/ML
50 INJECTION, SOLUTION INTRAMUSCULAR; INTRAVENOUS EVERY 5 MIN PRN
Status: DISCONTINUED | OUTPATIENT
Start: 2025-03-31 | End: 2025-04-01 | Stop reason: HOSPADM

## 2025-03-31 RX ORDER — ONDANSETRON 2 MG/ML
4 INJECTION INTRAMUSCULAR; INTRAVENOUS
Status: DISCONTINUED | OUTPATIENT
Start: 2025-03-31 | End: 2025-04-01 | Stop reason: HOSPADM

## 2025-03-31 RX ORDER — ONDANSETRON 2 MG/ML
4 INJECTION INTRAMUSCULAR; INTRAVENOUS EVERY 30 MIN PRN
Status: DISCONTINUED | OUTPATIENT
Start: 2025-03-31 | End: 2025-04-01 | Stop reason: HOSPADM

## 2025-03-31 RX ORDER — ONDANSETRON 2 MG/ML
4 INJECTION INTRAMUSCULAR; INTRAVENOUS EVERY 6 HOURS PRN
Status: DISCONTINUED | OUTPATIENT
Start: 2025-03-31 | End: 2025-04-01 | Stop reason: HOSPADM

## 2025-03-31 RX ORDER — NALOXONE HYDROCHLORIDE 0.4 MG/ML
0.1 INJECTION, SOLUTION INTRAMUSCULAR; INTRAVENOUS; SUBCUTANEOUS
Status: DISCONTINUED | OUTPATIENT
Start: 2025-03-31 | End: 2025-04-01 | Stop reason: HOSPADM

## 2025-03-31 RX ORDER — FLUMAZENIL 0.1 MG/ML
0.2 INJECTION, SOLUTION INTRAVENOUS
Status: ACTIVE | OUTPATIENT
Start: 2025-03-31 | End: 2025-03-31

## 2025-03-31 RX ORDER — DIPHENHYDRAMINE HCL 12.5MG/5ML
12.5 LIQUID (ML) ORAL EVERY 6 HOURS PRN
Status: DISCONTINUED | OUTPATIENT
Start: 2025-03-31 | End: 2025-04-01 | Stop reason: HOSPADM

## 2025-03-31 RX ORDER — ONDANSETRON 4 MG/1
4 TABLET, ORALLY DISINTEGRATING ORAL EVERY 30 MIN PRN
Status: DISCONTINUED | OUTPATIENT
Start: 2025-03-31 | End: 2025-04-01 | Stop reason: HOSPADM

## 2025-03-31 RX ORDER — LIDOCAINE 40 MG/G
CREAM TOPICAL
Status: DISCONTINUED | OUTPATIENT
Start: 2025-03-31 | End: 2025-04-01 | Stop reason: HOSPADM

## 2025-03-31 RX ORDER — SODIUM CHLORIDE, SODIUM LACTATE, POTASSIUM CHLORIDE, CALCIUM CHLORIDE 600; 310; 30; 20 MG/100ML; MG/100ML; MG/100ML; MG/100ML
INJECTION, SOLUTION INTRAVENOUS CONTINUOUS
Status: DISCONTINUED | OUTPATIENT
Start: 2025-03-31 | End: 2025-04-01 | Stop reason: HOSPADM

## 2025-03-31 RX ORDER — DIPHENHYDRAMINE HYDROCHLORIDE 50 MG/ML
12.5 INJECTION, SOLUTION INTRAMUSCULAR; INTRAVENOUS EVERY 6 HOURS PRN
Status: DISCONTINUED | OUTPATIENT
Start: 2025-03-31 | End: 2025-04-01 | Stop reason: HOSPADM

## 2025-03-31 RX ORDER — ACETAMINOPHEN 325 MG/1
975 TABLET ORAL ONCE
Status: DISCONTINUED | OUTPATIENT
Start: 2025-03-31 | End: 2025-04-01 | Stop reason: HOSPADM

## 2025-03-31 RX ORDER — OXYCODONE HYDROCHLORIDE 5 MG/1
10 TABLET ORAL
Status: DISCONTINUED | OUTPATIENT
Start: 2025-03-31 | End: 2025-04-01 | Stop reason: HOSPADM

## 2025-03-31 RX ORDER — DEXAMETHASONE SODIUM PHOSPHATE 4 MG/ML
4 INJECTION, SOLUTION INTRA-ARTICULAR; INTRALESIONAL; INTRAMUSCULAR; INTRAVENOUS; SOFT TISSUE
Status: DISCONTINUED | OUTPATIENT
Start: 2025-03-31 | End: 2025-04-01 | Stop reason: HOSPADM

## 2025-03-31 RX ORDER — NALOXONE HYDROCHLORIDE 0.4 MG/ML
0.4 INJECTION, SOLUTION INTRAMUSCULAR; INTRAVENOUS; SUBCUTANEOUS
Status: DISCONTINUED | OUTPATIENT
Start: 2025-03-31 | End: 2025-04-01 | Stop reason: HOSPADM

## 2025-03-31 RX ORDER — ONDANSETRON 4 MG/1
4 TABLET, ORALLY DISINTEGRATING ORAL EVERY 6 HOURS PRN
Status: DISCONTINUED | OUTPATIENT
Start: 2025-03-31 | End: 2025-04-01 | Stop reason: HOSPADM

## 2025-03-31 RX ORDER — HYDRALAZINE HYDROCHLORIDE 20 MG/ML
2.5-5 INJECTION INTRAMUSCULAR; INTRAVENOUS EVERY 10 MIN PRN
Status: DISCONTINUED | OUTPATIENT
Start: 2025-03-31 | End: 2025-04-01 | Stop reason: HOSPADM

## 2025-03-31 RX ORDER — PROPOFOL 10 MG/ML
INJECTION, EMULSION INTRAVENOUS CONTINUOUS PRN
Status: DISCONTINUED | OUTPATIENT
Start: 2025-03-31 | End: 2025-03-31

## 2025-03-31 RX ORDER — PROCHLORPERAZINE MALEATE 5 MG/1
5 TABLET ORAL EVERY 6 HOURS PRN
Status: DISCONTINUED | OUTPATIENT
Start: 2025-03-31 | End: 2025-04-01 | Stop reason: HOSPADM

## 2025-03-31 RX ORDER — FENTANYL CITRATE 50 UG/ML
25 INJECTION, SOLUTION INTRAMUSCULAR; INTRAVENOUS EVERY 5 MIN PRN
Status: DISCONTINUED | OUTPATIENT
Start: 2025-03-31 | End: 2025-04-01 | Stop reason: HOSPADM

## 2025-03-31 RX ORDER — OXYCODONE HYDROCHLORIDE 5 MG/1
5 TABLET ORAL
Status: DISCONTINUED | OUTPATIENT
Start: 2025-03-31 | End: 2025-04-01 | Stop reason: HOSPADM

## 2025-03-31 RX ADMIN — SODIUM CHLORIDE, SODIUM LACTATE, POTASSIUM CHLORIDE, CALCIUM CHLORIDE: 600; 310; 30; 20 INJECTION, SOLUTION INTRAVENOUS at 10:22

## 2025-03-31 RX ADMIN — PROPOFOL 150 MCG/KG/MIN: 10 INJECTION, EMULSION INTRAVENOUS at 10:39

## 2025-03-31 RX ADMIN — LIDOCAINE HYDROCHLORIDE 60 MG: 20 INJECTION, SOLUTION INFILTRATION; PERINEURAL at 10:39

## 2025-03-31 RX ADMIN — SODIUM CHLORIDE, SODIUM LACTATE, POTASSIUM CHLORIDE, CALCIUM CHLORIDE: 600; 310; 30; 20 INJECTION, SOLUTION INTRAVENOUS at 10:25

## 2025-03-31 RX ADMIN — PROPOFOL 70 MG: 10 INJECTION, EMULSION INTRAVENOUS at 10:43

## 2025-03-31 NOTE — H&P
ENDOSCOPY PRE-SEDATION H&P FOR OUTPATIENT PROCEDURES    Denita Flores  [id number]  1955    Procedure: colonoscopy    Pre-procedure diagnosis: screening, fam hx CRC    Past medical history:   Past Medical History:   Diagnosis Date    Coronary artery disease involving native coronary artery of native heart, angina presence unspecified 06/11/2021    Essential hypertension 06/11/2021    Graves disease     Kidney stones 2000    Passed a 5-10mm stone    Malignant neoplasm (H)     Cervical CA    Mild persistent asthma 05/08/2013    Swollen finger     Thyroid disease        Past surgical history:   Past Surgical History:   Procedure Laterality Date    BACK SURGERY      COLONOSCOPY WITH CO2 INSUFFLATION N/A 2/21/2017    Procedure: COLONOSCOPY WITH CO2 INSUFFLATION;  Surgeon: Duane, William Charles, MD;  Location: [facility] OR    CYSTOSCOPY      CYSTOSCOPY FLEXIBLE  08/10/2018    LEEP TX, CERVICAL      in her 20's       Current Outpatient Medications   Medication Sig Dispense Refill    albuterol (VENTOLIN HFA) 108 (90 Base) MCG/ACT inhaler Inhale 1-2 puffs into the lungs every 6 hours as needed for shortness of breath, wheezing or cough. 18 g 0    atorvastatin (LIPITOR) 20 MG tablet Take 1 tablet (20 mg) by mouth at bedtime. 90 tablet 3    Calcium Carbonate-Vitamin D (CALCIUM-VITAMIN D PO) Take by mouth.      Cholecalciferol (D3 ADULT PO) Take 2,000 Units by mouth daily      cyanocolbalamin (VITAMIN  B-12) 100 MCG tablet Take 100 mcg by mouth daily.      hydroCHLOROthiazide (HYDRODIURIL) 25 MG tablet Take 1 tablet (25 mg) by mouth 2 times daily. 180 tablet 0    levothyroxine (SYNTHROID/LEVOTHROID) 88 MCG tablet Take 1 tablet (88 mcg) by mouth daily. 90 tablet 0    meclizine (ANTIVERT) 25 MG tablet Take 1 tablet (25 mg) by mouth 3 times daily as needed for dizziness 20 tablet 0    miconazole (MICATIN) 2 % external cream Apply topically 2 times daily. 35 g 0    montelukast (SINGULAIR) 10 MG tablet TAKE 1 TABLET(10 MG) BY MOUTH  AT BEDTIME 90 tablet 3    ondansetron (ZOFRAN ODT) 4 MG ODT tab Take 1 tablet (4 mg) by mouth every 8 hours as needed for nausea 21 tablet 2    potassium chloride alvin ER (KLOR-CON M10) 10 MEQ CR tablet Take 1 tablet (10 mEq) by mouth daily. 30 tablet 0    bisacodyl (DULCOLAX) 5 MG EC tablet Take 2 tablets at 3 pm the day before your procedure. If your procedure is before 11 am, take 2 additional tablets at 11 pm. If your procedure is after 11 am, take 2 additional tablets at 6 am. For additional instructions refer to your colonoscopy prep instructions. 4 tablet 0    polyethylene glycol (GOLYTELY) 236 g suspension The night before the exam at 6 pm drink an 8-ounce glass every 15 minutes until the jug is half empty. If you arrive before 11 AM: Drink the other half of the Golytely jug at 11 PM night before procedure. If you arrive after 11 AM: Drink the other half of the Golytely jug at 6 AM day of procedure. For additional instructions refer to your colonoscopy prep instructions. 4000 mL 0     Current Facility-Administered Medications   Medication Dose Route Frequency Provider Last Rate Last Admin    acetaminophen (TYLENOL) tablet 975 mg  975 mg Oral Once Hudson Edge MD        lactated ringers infusion   Intravenous Continuous Hudson Edge MD        lidocaine (LMX4) kit   Topical Q1H PRN Hudson Edge MD        lidocaine (LMX4) kit   Topical Q1H PRN George Banerjee MD        lidocaine 1 % 0.1-1 mL  0.1-1 mL Other Q1H PRN Hudson Edge MD        lidocaine 1 % 0.1-1 mL  0.1-1 mL Other Q1H PRN George Banerjee MD        ondansetron (ZOFRAN) injection 4 mg  4 mg Intravenous Once PRN George Banerjee MD        sodium chloride (PF) 0.9% PF flush 3 mL  3 mL Intracatheter Q8H Hudson Edge MD        sodium chloride (PF) 0.9% PF flush 3 mL  3 mL Intracatheter q1 min prn Hudson Edge MD        sodium chloride (PF) 0.9% PF flush 3 mL  3 mL Intracatheter Q8H CASTILLO Banerjee  George Mejia MD        sodium chloride (PF) 0.9% PF flush 3 mL  3 mL Intracatheter q1 min prn George Banerjee MD           Allergies   Allergen Reactions    Contrast Dye Hives and Itching     Isovue with ANTON on 1-21-19    Sulfa Antibiotics Hives and Swelling     Noted in 10/18/09 ER       History of Anesthesia/Sedation Problems: no    PHYSICAL EXAMINATION:  Constitutional: aaox3, cooperative, pleasant  Vitals reviewed: There were no vitals taken for this visit.  Wt:   Wt Readings from Last 2 Encounters:   03/06/25 85.3 kg (188 lb)   02/17/25 85.2 kg (187 lb 12.8 oz)      Eyes: Sclera anicteric/injected  Ears/nose/mouth/throat: Normal oropharynx without ulcers or exudate, mucus membranes moist, hearing intact  Neck: supple, thyroid normal size  CV: No edema  Respiratory: Unlabored breathing  Lymph: No submandibular, supraclavicular or inguinal lymphadenopathy  Abd: Nondistended, no masses, nontender  Skin: warm, perfused, no jaundice  Psych: Normal affect  MSK: normal movement on limited exam.    ASA Score: See Provation note    Assessment/Plan:     The patient is an appropriate candidate to receive sedation.    Informed consent was discussed with the patient/family, including the risks, benefits, potential complications and any alternative options associated with sedation.    Patient assessment completed just prior to sedation and while under constant observation by the provider. Condition determined to be adequate for proceeding with sedation.    The specific risks for the procedure were discussed with the patient at the time of informed consent and include but are not limited to perforation which could require surgery, missing significant neoplasm or lesion, hemorrhage and adverse sedative complication.      George Banerjee MD

## 2025-03-31 NOTE — ANESTHESIA POSTPROCEDURE EVALUATION
Patient: Denita Flores    Procedure: Procedure(s):  Colonoscopy with CO2 insufflation  COLONOSCOPY, FLEXIBLE, WITH LESION REMOVAL USING SNARE       Anesthesia Type:  MAC    Note:  Disposition: Outpatient   Postop Pain Control: Uneventful            Sign Out: Well controlled pain   PONV: No   Neuro/Psych: Uneventful            Sign Out: Acceptable/Baseline neuro status   Airway/Respiratory: Uneventful            Sign Out: Acceptable/Baseline resp. status   CV/Hemodynamics: Uneventful            Sign Out: Acceptable CV status; No obvious hypovolemia; No obvious fluid overload   Other NRE: NONE   DID A NON-ROUTINE EVENT OCCUR?            Last vitals:  Vitals Value Taken Time   BP 95/40 03/31/25 1145   Temp     Pulse 66 03/31/25 1145   Resp 16 03/31/25 1145   SpO2 98 % 03/31/25 1145       Electronically Signed By: Hudson Edge MD  March 31, 2025  12:01 PM   cough

## 2025-03-31 NOTE — ANESTHESIA CARE TRANSFER NOTE
Patient: Denita Flores    Procedure: Procedure(s):  Colonoscopy with CO2 insufflation  COLONOSCOPY, FLEXIBLE, WITH LESION REMOVAL USING SNARE       Diagnosis: Special screening for malignant neoplasms, colon [Z12.11]  Diagnosis Additional Information: No value filed.    Anesthesia Type:   MAC     Note:    Oropharynx: oropharynx clear of all foreign objects and spontaneously breathing  Level of Consciousness: drowsy  Oxygen Supplementation: room air    Independent Airway: airway patency satisfactory and stable  Dentition: dentition unchanged  Vital Signs Stable: post-procedure vital signs reviewed and stable  Report to RN Given: handoff report given  Patient transferred to: Phase II    Handoff Report: Identifed the Patient, Identified the Reponsible Provider, Reviewed the pertinent medical history, Discussed the surgical course, Reviewed Intra-OP anesthesia mangement and issues during anesthesia, Set expectations for post-procedure period and Allowed opportunity for questions and acknowledgement of understanding      Vitals:  Vitals Value Taken Time   BP     Temp     Pulse 65 03/31/25 1130   Resp     SpO2         Electronically Signed By: NANDA Brandon CRNA  March 31, 2025  11:36 AM

## 2025-04-01 ENCOUNTER — TELEPHONE (OUTPATIENT)
Dept: FAMILY MEDICINE | Facility: CLINIC | Age: 70
End: 2025-04-01
Payer: MEDICARE

## 2025-04-01 DIAGNOSIS — N17.9 AKI (ACUTE KIDNEY INJURY): ICD-10-CM

## 2025-04-01 RX ORDER — POTASSIUM CHLORIDE 750 MG/1
10 TABLET, EXTENDED RELEASE ORAL DAILY
Qty: 90 TABLET | Refills: 0 | Status: CANCELLED | OUTPATIENT
Start: 2025-04-01

## 2025-04-01 NOTE — TELEPHONE ENCOUNTER
Dr. Roldan,    Pt calling as was given 30 days of potassium, and wanted to know if should be taking ongoing.  Pended for your review.    Also last labs were done per patients right around hydrochlorothiazide change, would you want to recheck BMP when she's in for her thyroid check in a few weeks?  Pended for review.    Does notice hands and ankles are a little swollen since going to just hydrochlorothiazide, but is tolerating ok for now so will keep taking as is.

## 2025-04-01 NOTE — TELEPHONE ENCOUNTER
Will hold off on 90d Rx for potassium until after bmp, she can finish the rx she has  Order for bmp signed thanks

## 2025-04-02 ENCOUNTER — OFFICE VISIT (OUTPATIENT)
Dept: DERMATOLOGY | Facility: CLINIC | Age: 70
End: 2025-04-02
Payer: MEDICARE

## 2025-04-02 DIAGNOSIS — D22.9 MULTIPLE NEVI: ICD-10-CM

## 2025-04-02 DIAGNOSIS — L82.1 SEBORRHEIC KERATOSIS: ICD-10-CM

## 2025-04-02 DIAGNOSIS — Z12.83 SKIN CANCER SCREENING: Primary | ICD-10-CM

## 2025-04-02 DIAGNOSIS — L82.0 SEBORRHEIC KERATOSIS, INFLAMED: ICD-10-CM

## 2025-04-02 DIAGNOSIS — D18.01 CHERRY ANGIOMA: ICD-10-CM

## 2025-04-02 DIAGNOSIS — L81.4 LENTIGO: ICD-10-CM

## 2025-04-02 LAB
PATH REPORT.COMMENTS IMP SPEC: NORMAL
PATH REPORT.COMMENTS IMP SPEC: NORMAL
PATH REPORT.FINAL DX SPEC: NORMAL
PATH REPORT.GROSS SPEC: NORMAL
PATH REPORT.MICROSCOPIC SPEC OTHER STN: NORMAL
PATH REPORT.RELEVANT HX SPEC: NORMAL
PHOTO IMAGE: NORMAL

## 2025-04-02 PROCEDURE — 17110 DESTRUCTION B9 LES UP TO 14: CPT | Performed by: STUDENT IN AN ORGANIZED HEALTH CARE EDUCATION/TRAINING PROGRAM

## 2025-04-02 PROCEDURE — 99213 OFFICE O/P EST LOW 20 MIN: CPT | Mod: 25 | Performed by: STUDENT IN AN ORGANIZED HEALTH CARE EDUCATION/TRAINING PROGRAM

## 2025-04-02 NOTE — PROGRESS NOTES
[facility] Dermatology Note  Encounter Date: Apr 2, 2025  Office Visit     Reviewed patients past medical history and pertinent chart review prior to patients visit today.     Dermatology Problem List:  Last skin check: 4/2/2025  # ISK, s/p cryo    Personal Hx: No personal history of skin cancer  Family Hx: Negative for family history of skin cancer.  _________________________________________    Assessment & Plan:     # Inflamed Seborrheic Keratoses, right flank, right lateral neck, bilateral abdominal folds x4, bilateral thighs x4, and bilateral popliteal fossa x4  -The benign nature of the skin lesion(s) was discussed with the patient. The patient requested treatment intervention due to finding the lesions to be bothersome. Discussed cryotherapy treatment with patient. Patient elects to pursue cryotherapy today. After verbal consent and discussion of risks and benefits including but no limited to dyspigmentation/scar, blister, and pain, 14 was(were) treated with 1-2mm freeze border for 2 cycles with liquid nitrogen. Post cryotherapy instructions were provided.     # Multiple nevi, trunk and extremities  # Solar lentigines  - Continued observation recommended.   - Nevi demonstrate no concerning features on dermoscopy. We discussed the importance of self exams at home.   - ABCDEs: Counseled ABCDEs of melanoma: Asymmetry, Border (irregularity), Color (not uniform, changes in color), Diameter (greater than 6 mm which is about the size of a pencil eraser), and Evolving (any changes in preexisting moles).  - Sun protection: Counseled SPF 30+ sunscreen, UPF clothing, sun avoidance, tanning bed avoidance.    # Cherry angiomas  # Seborrheic keratoses  - We discussed the benign nature of the skin lesions. No treatment required. Continued observation recommended. Follow up with any concerns.      Follow-up: 1 year(s) for follow up full body skin exam, prn for new or changing lesions or new concerns    All  risks, benefits and alternatives were discussed with patient.  Patient is in agreement and understands the assessment and plan.  All questions were answered.  Shamika Meek PA-C  [facility]ermat[facility]  ____________________________________________    CC: Skin cancer screening exam    HPI:  Ms. Denita Flores is a(n) 69 year old female who presents today as a return patient for a full body skin cancer screening. Patient has concerns today about lesions throughout her skin that are occasionally bothersome for her. They are raised from the skin.    Patient is not diligent with photoprotection but she practices sun avoidance. She does not sun burn easily and tans well. Patient is otherwise feeling well, without additional skin concerns.     Physical Exam:  Vitals: There were no vitals taken for this visit.  SKIN: Total skin excluding the genitalia areas was performed. The exam included the head/face, neck, both arms, chest, back, abdomen, both legs, digits, mons pubis, buttock and nails.   -Bhat II  -multiple tan/brown flat round macules and raised papules scattered throughout trunk, extremities, and face. No worrisome features for malignancy noted on examination.  -scattered tan, homogenous macules scattered on sun exposed areas of trunk, extremities, and face  -scattered waxy, stuck on tan/brown papules and patches on the trunk and extremities  -several 1-2mm red dome shaped symmetric papules scattered on the trunk and extremities  -right flank, right lateral neck, bilateral abdominal folds x4, bilateral thighs x4, and bilateral popliteal fossa x4, waxy, stuck on tan to brown papules and plaques     - No other lesions of concern on areas examined.     Medications:  Current Outpatient Medications   Medication Sig Dispense Refill    albuterol (VENTOLIN HFA) 108 (90 Base) MCG/ACT inhaler Inhale 1-2 puffs into the lungs every 6 hours as needed for shortness of breath, wheezing or cough. 18 g 0     atorvastatin (LIPITOR) 20 MG tablet Take 1 tablet (20 mg) by mouth at bedtime. 90 tablet 3    bisacodyl (DULCOLAX) 5 MG EC tablet Take 2 tablets at 3 pm the day before your procedure. If your procedure is before 11 am, take 2 additional tablets at 11 pm. If your procedure is after 11 am, take 2 additional tablets at 6 am. For additional instructions refer to your colonoscopy prep instructions. 4 tablet 0    Calcium Carbonate-Vitamin D (CALCIUM-VITAMIN D PO) Take by mouth.      Cholecalciferol (D3 ADULT PO) Take 2,000 Units by mouth daily      cyanocolbalamin (VITAMIN  B-12) 100 MCG tablet Take 100 mcg by mouth daily.      hydroCHLOROthiazide (HYDRODIURIL) 25 MG tablet Take 1 tablet (25 mg) by mouth 2 times daily. 180 tablet 0    levothyroxine (SYNTHROID/LEVOTHROID) 88 MCG tablet Take 1 tablet (88 mcg) by mouth daily. 90 tablet 0    meclizine (ANTIVERT) 25 MG tablet Take 1 tablet (25 mg) by mouth 3 times daily as needed for dizziness 20 tablet 0    miconazole (MICATIN) 2 % external cream Apply topically 2 times daily. 35 g 0    montelukast (SINGULAIR) 10 MG tablet TAKE 1 TABLET(10 MG) BY MOUTH AT BEDTIME 90 tablet 3    ondansetron (ZOFRAN ODT) 4 MG ODT tab Take 1 tablet (4 mg) by mouth every 8 hours as needed for nausea 21 tablet 2    polyethylene glycol (GOLYTELY) 236 g suspension The night before the exam at 6 pm drink an 8-ounce glass every 15 minutes until the jug is half empty. If you arrive before 11 AM: Drink the other half of the Golytely jug at 11 PM night before procedure. If you arrive after 11 AM: Drink the other half of the Golytely jug at 6 AM day of procedure. For additional instructions refer to your colonoscopy prep instructions. 4000 mL 0    potassium chloride alvin ER (KLOR-CON M10) 10 MEQ CR tablet Take 1 tablet (10 mEq) by mouth daily. 30 tablet 0     No current facility-administered medications for this visit.      Past Medical History:   Patient Active Problem List   Diagnosis    Graves disease     Diverticulosis of large intestine without hemorrhage    Colon polyps    History of cervical cancer    YELENA (obstructive sleep apnea)    Hx of herpes zoster keratoconjunctivitis, os    Plantar warts    Stasis dermatitis of both legs    Obesity (BMI 30-39.9)    Postablative hypothyroidism    Venous stasis dermatitis of left lower extremity    Vitamin B12 deficiency (non anemic)    Mild persistent asthma without complication    Spider veins of both lower extremities    Seasonal allergic rhinitis due to other allergic trigger    Epistaxis    Tubular adenoma of colon    Family history of kidney cancer    Nephrolithiasis    Polyp of gallbladder, 4mm    Microscopic hematuria    Bilateral hand pain    Abnormal cardiovascular stress test    Essential hypertension    Hyperlipidemia LDL goal <100    Coronary artery disease involving native coronary artery of native heart, angina presence unspecified    Former smoker    History of vertigo    Contrast media allergy    Screen for colon cancer    Visit for screening mammogram    Vertigo    Neck pain    Cervicalgia    Intertriginous candidiasis    Dyslipidemia    SOB (shortness of breath)    Annual physical exam    Asymptomatic postmenopausal status    Varicose veins of both lower extremities, unspecified whether complicated    EMMETT (acute kidney injury)    Lymphadenopathy    Family history of renal cell carcinoma    Encounter for screening colonoscopy     Past Medical History:   Diagnosis Date    Coronary artery disease involving native coronary artery of native heart, angina presence unspecified 06/11/2021    Essential hypertension 06/11/2021    Graves disease     Kidney stones 2000    Passed a 5-10mm stone    Malignant neoplasm (H)     Cervical CA    Mild persistent asthma 05/08/2013    Swollen finger     Thyroid disease        CC Zoraida Roldan MD  [id number] [id number] on close of this encounter.

## 2025-04-02 NOTE — LETTER
4/2/2025      Denita Flores  [address]  [facility] [address]      Dear Colleague,    Thank you for referring your patient, Denita Flores, to the [facility]. Please see a copy of my visit note below.    [facility] Dermatology Note  Encounter Date: Apr 2, 2025  Office Visit     Reviewed patients past medical history and pertinent chart review prior to patients visit today.     Dermatology Problem List:  Last skin check: 4/2/2025  # ISK, s/p cryo    Personal Hx: No personal history of skin cancer  Family Hx: Negative for family history of skin cancer.  _________________________________________    Assessment & Plan:     # Inflamed Seborrheic Keratoses, right flank, right lateral neck, bilateral abdominal folds x4, bilateral thighs x4, and bilateral popliteal fossa x4  -The benign nature of the skin lesion(s) was discussed with the patient. The patient requested treatment intervention due to finding the lesions to be bothersome. Discussed cryotherapy treatment with patient. Patient elects to pursue cryotherapy today. After verbal consent and discussion of risks and benefits including but no limited to dyspigmentation/scar, blister, and pain, 14 was(were) treated with 1-2mm freeze border for 2 cycles with liquid nitrogen. Post cryotherapy instructions were provided.     # Multiple nevi, trunk and extremities  # Solar lentigines  - Continued observation recommended.   - Nevi demonstrate no concerning features on dermoscopy. We discussed the importance of self exams at home.   - ABCDEs: Counseled ABCDEs of melanoma: Asymmetry, Border (irregularity), Color (not uniform, changes in color), Diameter (greater than 6 mm which is about the size of a pencil eraser), and Evolving (any changes in preexisting moles).  - Sun protection: Counseled SPF 30+ sunscreen, UPF clothing, sun avoidance, tanning bed avoidance.    # Cherry angiomas  # Seborrheic keratoses  - We  discussed the benign nature of the skin lesions. No treatment required. Continued observation recommended. Follow up with any concerns.      Follow-up: 1 year(s) for follow up full body skin exam, prn for new or changing lesions or new concerns    All risks, benefits and alternatives were discussed with patient.  Patient is in agreement and understands the assessment and plan.  All questions were answered.  Shamika Meek PA-C  [facility] D[facility]  ____________________________________________    CC: Skin cancer screening exam    HPI:  Ms. Denita Flores is a(n) 69 year old female who presents today as a return patient for a full body skin cancer screening. Patient has concerns today about lesions throughout her skin that are occasionally bothersome for her. They are raised from the skin.    Patient is not diligent with photoprotection but she practices sun avoidance. She does not sun burn easily and tans well. Patient is otherwise feeling well, without additional skin concerns.     Physical Exam:  Vitals: There were no vitals taken for this visit.  SKIN: Total skin excluding the genitalia areas was performed. The exam included the head/face, neck, both arms, chest, back, abdomen, both legs, digits, mons pubis, buttock and nails.   -Bhat II  -multiple tan/brown flat round macules and raised papules scattered throughout trunk, extremities, and face. No worrisome features for malignancy noted on examination.  -scattered tan, homogenous macules scattered on sun exposed areas of trunk, extremities, and face  -scattered waxy, stuck on tan/brown papules and patches on the trunk and extremities  -several 1-2mm red dome shaped symmetric papules scattered on the trunk and extremities  -right flank, right lateral neck, bilateral abdominal folds x4, bilateral thighs x4, and bilateral popliteal fossa x4, waxy, stuck on tan to brown papules and plaques     - No other lesions of concern on areas examined.      Medications:  Current Outpatient Medications   Medication Sig Dispense Refill     albuterol (VENTOLIN HFA) 108 (90 Base) MCG/ACT inhaler Inhale 1-2 puffs into the lungs every 6 hours as needed for shortness of breath, wheezing or cough. 18 g 0     atorvastatin (LIPITOR) 20 MG tablet Take 1 tablet (20 mg) by mouth at bedtime. 90 tablet 3     bisacodyl (DULCOLAX) 5 MG EC tablet Take 2 tablets at 3 pm the day before your procedure. If your procedure is before 11 am, take 2 additional tablets at 11 pm. If your procedure is after 11 am, take 2 additional tablets at 6 am. For additional instructions refer to your colonoscopy prep instructions. 4 tablet 0     Calcium Carbonate-Vitamin D (CALCIUM-VITAMIN D PO) Take by mouth.       Cholecalciferol (D3 ADULT PO) Take 2,000 Units by mouth daily       cyanocolbalamin (VITAMIN  B-12) 100 MCG tablet Take 100 mcg by mouth daily.       hydroCHLOROthiazide (HYDRODIURIL) 25 MG tablet Take 1 tablet (25 mg) by mouth 2 times daily. 180 tablet 0     levothyroxine (SYNTHROID/LEVOTHROID) 88 MCG tablet Take 1 tablet (88 mcg) by mouth daily. 90 tablet 0     meclizine (ANTIVERT) 25 MG tablet Take 1 tablet (25 mg) by mouth 3 times daily as needed for dizziness 20 tablet 0     miconazole (MICATIN) 2 % external cream Apply topically 2 times daily. 35 g 0     montelukast (SINGULAIR) 10 MG tablet TAKE 1 TABLET(10 MG) BY MOUTH AT BEDTIME 90 tablet 3     ondansetron (ZOFRAN ODT) 4 MG ODT tab Take 1 tablet (4 mg) by mouth every 8 hours as needed for nausea 21 tablet 2     polyethylene glycol (GOLYTELY) 236 g suspension The night before the exam at 6 pm drink an 8-ounce glass every 15 minutes until the jug is half empty. If you arrive before 11 AM: Drink the other half of the StubHubytely jug at 11 PM night before procedure. If you arrive after 11 AM: Drink the other half of the Golytely jug at 6 AM day of procedure. For additional instructions refer to your colonoscopy prep instructions. 4000 mL 0      potassium chloride alvin ER (KLOR-CON M10) 10 MEQ CR tablet Take 1 tablet (10 mEq) by mouth daily. 30 tablet 0     No current facility-administered medications for this visit.      Past Medical History:   Patient Active Problem List   Diagnosis     Graves disease     Diverticulosis of large intestine without hemorrhage     Colon polyps     History of cervical cancer     YELENA (obstructive sleep apnea)     Hx of herpes zoster keratoconjunctivitis, os     Plantar warts     Stasis dermatitis of both legs     Obesity (BMI 30-39.9)     Postablative hypothyroidism     Venous stasis dermatitis of left lower extremity     Vitamin B12 deficiency (non anemic)     Mild persistent asthma without complication     Spider veins of both lower extremities     Seasonal allergic rhinitis due to other allergic trigger     Epistaxis     Tubular adenoma of colon     Family history of kidney cancer     Nephrolithiasis     Polyp of gallbladder, 4mm     Microscopic hematuria     Bilateral hand pain     Abnormal cardiovascular stress test     Essential hypertension     Hyperlipidemia LDL goal <100     Coronary artery disease involving native coronary artery of native heart, angina presence unspecified     Former smoker     History of vertigo     Contrast media allergy     Screen for colon cancer     Visit for screening mammogram     Vertigo     Neck pain     Cervicalgia     Intertriginous candidiasis     Dyslipidemia     SOB (shortness of breath)     Annual physical exam     Asymptomatic postmenopausal status     Varicose veins of both lower extremities, unspecified whether complicated     EMMETT (acute kidney injury)     Lymphadenopathy     Family history of renal cell carcinoma     Encounter for screening colonoscopy     Past Medical History:   Diagnosis Date     Coronary artery disease involving native coronary artery of native heart, angina presence unspecified 06/11/2021     Essential hypertension 06/11/2021     Graves disease      Kidney  stones 2000    Passed a 5-10mm stone     Malignant neoplasm (H)     Cervical CA     Mild persistent asthma 05/08/2013     Swollen finger      Thyroid disease        CC Zoraida Roldan MD  [address] [id number] on close of this encounter.      Again, thank you for allowing me to participate in the care of your patient.        Sincerely,        Ivana Meek PA-C    Electronically signed

## 2025-04-02 NOTE — PATIENT INSTRUCTIONS
Cryotherapy    What is it?  Use of a very cold liquid, such as liquid nitrogen, to freeze and destroy abnormal skin cells that need to be removed    What should I expect?  Tenderness and redness  A small blister that might grow and fill with dark purple blood. There may be crusting.  More than one treatment may be needed if the lesions do not go away.    How do I care for the treated area?  Gently wash the area with your hands when bathing.  Use a thin layer of Vaseline to help with healing. You may use a Band-Aid.   The area should heal within 7-10 days and may leave behind a pink or lighter color.   Do not use an antibiotic or Neosporin ointment.   You may take acetaminophen (Tylenol) for pain.     Call your doctor if you have:  Severe pain  Signs of infection (warmth, redness, cloudy yellow drainage, and or a bad smell)  Questions or concerns    Who should I call with questions?      [facility]: [phone]      [facility]: [phone]      For urgent needs outside of business hours call the [facility] at [phone] and ask for the dermatology resident on call     Patient Education       Proper skin care from [facility]:    -Eliminate harsh soaps as they strip the natural oils from the skin, often resulting in dry itchy skin ( i.e. Dial, Zest, Malia Spring)  -Use mild soaps such as Cetaphil or Dove Sensitive Skin in the shower. You do not need to use soap on arms, legs, and trunk every time you shower unless visibly soiled.   -Avoid hot or cold showers.  -After showering, lightly dry off and apply moisturizing within 2-3 minutes. This will help trap moisture in the skin.   -Aggressive use of a moisturizer at least 1-2 times a day to the entire body (including -Vanicream, Cetaphil, Aquaphor or Cerave) and moisturize hands after every washing.  -We recommend using moisturizers that come in a tub that needs to be scooped out, not a  pump. This has more of an oil base. It will hold moisture in your skin much better than a water base moisturizer. The above recommended are non-pore clogging.      Wear a sunscreen with at least SPF 30 on your face, ears, neck and V of the chest daily. Wear sunscreen on other areas of the body if those areas are exposed to the sun throughout the day. Sunscreens can contain physical and/or chemical blockers. Physical blockers are less likely to clog pores, these include zinc oxide and titanium dioxide. Reapply every two hour and after swimming.     Sunscreen examples: https://www.ewg.org/sunscreen/    UV radiation  UVA radiation remains constant throughout the day and throughout the year. It is a longer wavelength than UVB and therefore penetrates deeper into the skin leading to immediate and delayed tanning, photoaging, and skin cancer. 70-80% of UVA and UVB radiation occurs between the hours of 10am-2pm.  UVB radiation  UVB radiation causes the most harmful effects and is more significant during the summer months. However, snow and ice can reflect UVB radiation leading to skin damage during the winter months as well. UVB radiation is responsible for tanning, burning, inflammation, delayed erythema (pinkness), pigmentation (brown spots), and skin cancer.     I recommend self monthly full body exams and yearly full body exams with a dermatology provider. If you develop a new or changing lesion please follow up for examination. Most skin cancers are pink and scaly or pink and pearly. However, we do see blue/brown/black skin cancers.  Consider the ABCDEs of melanoma when giving yourself your monthly full body exam ( don't forget the groin, buttocks, feet, toes, etc). A-asymmetry, B-borders, C-color, D-diameter, E-elevation or evolving. If you see any of these changes please follow up in clinic. If you cannot see your back I recommend purchasing a hand held mirror to use with a larger wall mirror.       Checking for  Skin Cancer  You can find cancer early by checking your skin each month. There are 3 kinds of skin cancer. They are melanoma, basal cell carcinoma, and squamous cell carcinoma. Doing monthly skin checks is the best way to find new marks or skin changes. Follow the instructions below for checking your skin.   The ABCDEs of checking moles for melanoma   Check your moles or growths for signs of melanoma using ABCDE:   Asymmetry: the sides of the mole or growth don t match  Border: the edges are ragged, notched, or blurred  Color: the color within the mole or growth varies  Diameter: the mole or growth is larger than 6 mm (size of a pencil eraser)  Evolving: the size, shape, or color of the mole or growth is changing (evolving is not shown in the images below)    Checking for other types of skin cancer  Basal cell carcinoma or squamous cell carcinoma have symptoms such as:     A spot or mole that looks different from all other marks on your skin  Changes in how an area feels, such as itching, tenderness, or pain  Changes in the skin's surface, such as oozing, bleeding, or scaliness  A sore that does not heal  New swelling or redness beyond the border of a mole    Who s at risk?  Anyone can get skin cancer. But you are at greater risk if you have:   Fair skin, light-colored hair, or light-colored eyes  Many moles or abnormal moles on your skin  A history of sunburns from sunlight or tanning beds  A family history of skin cancer  A history of exposure to radiation or chemicals  A weakened immune system  If you have had skin cancer in the past, you are at risk for recurring skin cancer.   How to check your skin  Do your monthly skin checkups in front of a full-length mirror. Check all parts of your body, including your:   Head (ears, face, neck, and scalp)  Torso (front, back, and sides)  Arms (tops, undersides, upper, and lower armpits)  Hands (palms, backs, and fingers, including under the nails)  Buttocks and  genitals  Legs (front, back, and sides)  Feet (tops, soles, toes, including under the nails, and between toes)  If you have a lot of moles, take digital photos of them each month. Make sure to take photos both up close and from a distance. These can help you see if any moles change over time.   Most skin changes are not cancer. But if you see any changes in your skin, call your doctor right away. Only he or she can diagnose a problem. If you have skin cancer, seeing your doctor can be the first step toward getting the treatment that could save your life.   [employer] last reviewed this educational content on 4/1/2019 2000-2020 [employer]. [address] [place] [address], [place] [address]. All rights reserved. This information is not intended as a substitute for professional medical care. Always follow your healthcare professional's instructions.       When should I call my doctor?  If you are worsening or not improving, please, contact us or seek urgent care as noted below.     Who should I call with questions (adults)?  [facility] (adult and pediatric): [phone]  [facility] (adult): [phone]  [facility] ([facility] [place]) [phone]  For urgent needs outside of business hours call the [facility] at [phone] and ask for the dermatology resident on call to be paged  If this is a medical emergency and you are unable to reach an ER, Call [phone]      If you need a prescription refill, please contact your pharmacy. Refills are approved or denied by our Physicians during normal business hours, Monday through Fridays  Per office policy, refills will not be granted if you have not been seen within the past year (or sooner depending on your child's condition

## 2025-04-15 ENCOUNTER — TELEPHONE (OUTPATIENT)
Dept: FAMILY MEDICINE | Facility: CLINIC | Age: 70
End: 2025-04-15
Payer: MEDICARE

## 2025-04-15 DIAGNOSIS — B15.9 VIRAL HEPATITIS A WITHOUT HEPATIC COMA: ICD-10-CM

## 2025-04-15 DIAGNOSIS — Z11.59 NEED FOR HEPATITIS B SCREENING TEST: Primary | ICD-10-CM

## 2025-04-15 NOTE — TELEPHONE ENCOUNTER
Patient calling back. Reports the paperwork showed Hepatitis A, AB IGG positive reactive. Reports exposure to Hep A for sure but unsure if there was exposure to Hep B as well.     Patient wanting to see if she can be tested for both Hep A and B as well with her upcoming blood work. Please advise.    Laurence Garcia RN 04/15/25 6:47 PM   [facility] Triage Nurse Advisor

## 2025-04-15 NOTE — TELEPHONE ENCOUNTER
Please advise, if pt needs to be seen for needle stick injury on Sept 2024 or if PCP would like to order blood test on Hep A, Hep AB, and IGG to R/O. Please advise, thank you.     Christopher Hilton RN  [facility]

## 2025-04-15 NOTE — TELEPHONE ENCOUNTER
Spoke with pt about clarifying what the pt was expose to. 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     Triage wanting to confirm before routing to PCP - requesting call back. Pt will call back to the clinic when she is home with paperwork.    Awaiting recall.     Christopher Hilton RN  [facility]

## 2025-04-15 NOTE — TELEPHONE ENCOUNTER
Hepatitis A is self limiting and is not a blood borne illness.    I am not sure what she means by hepatitis AB ?    If the patient came in contact with someone with a blood borne disease like hepatitis B, hepatitis C or HIV then we can definitely do screening at this time. Please clarify and route back

## 2025-04-15 NOTE — TELEPHONE ENCOUNTER
Order/Referral Request    Who is requesting: patient    Orders being requested: hepatitis a, ab, and igg    Reason service is needed/diagnosis: patient wondering if she should have this added to her blood work order. She poked herself with a needle in September of 2024 as she was checking his blood for diabetes, he had hep a, ab, igg positive reactive. She forgot to tell Dr Roldan this at her physical    When are orders needed by: 4-17    Has this been discussed with Provider: No    Does patient have a preference on a Group/Provider/Facility? [facility]    Does patient have an appointment scheduled?: Yes: 4-17    Where to send orders: Place orders within Epic    Okay to leave a detailed message?: Yes at Home number on file [phone] (home) from 10 am until 2, or after 5

## 2025-04-17 ENCOUNTER — LAB (OUTPATIENT)
Dept: LAB | Facility: CLINIC | Age: 70
End: 2025-04-17
Payer: MEDICARE

## 2025-04-17 DIAGNOSIS — B15.9 VIRAL HEPATITIS A WITHOUT HEPATIC COMA: ICD-10-CM

## 2025-04-17 DIAGNOSIS — E89.0 POSTABLATIVE HYPOTHYROIDISM: ICD-10-CM

## 2025-04-17 DIAGNOSIS — Z11.59 NEED FOR HEPATITIS B SCREENING TEST: ICD-10-CM

## 2025-04-17 DIAGNOSIS — N17.9 AKI (ACUTE KIDNEY INJURY): ICD-10-CM

## 2025-04-17 LAB
ANION GAP SERPL CALCULATED.3IONS-SCNC: 12 MMOL/L (ref 7–15)
BUN SERPL-MCNC: 27.8 MG/DL (ref 8–23)
CALCIUM SERPL-MCNC: 9.3 MG/DL (ref 8.8–10.4)
CHLORIDE SERPL-SCNC: 97 MMOL/L (ref 98–107)
CREAT SERPL-MCNC: 1.27 MG/DL (ref 0.51–0.95)
EGFRCR SERPLBLD CKD-EPI 2021: 45 ML/MIN/1.73M2
GLUCOSE SERPL-MCNC: 110 MG/DL (ref 70–99)
HAV AB SER QL IA: NONREACTIVE
HBV SURFACE AB SERPL IA-ACNC: <3.5 M[IU]/ML
HBV SURFACE AB SERPL IA-ACNC: NONREACTIVE M[IU]/ML
HBV SURFACE AG SERPL QL IA: NONREACTIVE
HCO3 SERPL-SCNC: 30 MMOL/L (ref 22–29)
POTASSIUM SERPL-SCNC: 3.3 MMOL/L (ref 3.4–5.3)
SODIUM SERPL-SCNC: 139 MMOL/L (ref 135–145)
TSH SERPL DL<=0.005 MIU/L-ACNC: 2.73 UIU/ML (ref 0.3–4.2)

## 2025-04-18 ENCOUNTER — TELEPHONE (OUTPATIENT)
Dept: FAMILY MEDICINE | Facility: CLINIC | Age: 70
End: 2025-04-18
Payer: MEDICARE

## 2025-04-18 DIAGNOSIS — I10 ESSENTIAL HYPERTENSION: ICD-10-CM

## 2025-04-18 DIAGNOSIS — N17.9 AKI (ACUTE KIDNEY INJURY): ICD-10-CM

## 2025-04-18 DIAGNOSIS — E89.0 POSTABLATIVE HYPOTHYROIDISM: ICD-10-CM

## 2025-04-18 NOTE — TELEPHONE ENCOUNTER
LVM for pt call back.  It Looks like pcp has not had a chance to review lab results.         Monika Ayala MA on 4/18/2025 at 2:28 PM

## 2025-04-18 NOTE — TELEPHONE ENCOUNTER
Test Results    Contacts       Contact Date/Time Type Contact Phone/Fax    04/18/2025 04:59 PM CDT Phone (Incoming) Denita Flores (Self) [phone] (M)            Who ordered the test:  PCP    Type of test: Lab    Date of test:  4/17/25    Where was the test performed:  [facility]    What are your questions/concerns?:  Patient is wondering specifically about thyroid test and increasing medication but also what her other results were from the blood tests. 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 Please reach out once results are reviewed. 10-2pm is the best time to call    Okay to leave a detailed message?: Yes at Cell number on file:    Telephone Information:   Mobile [phone]

## 2025-04-18 NOTE — TELEPHONE ENCOUNTER
Test Results        Who ordered the test:  PCP    Type of test: Lab    Date of test:  4/17/25    Where was the test performed:  [facility]    What are your questions/concerns?:  Patient is wondering specifically about thyroid test and increasing medication but also what her other results were from the blood tests. 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

## 2025-04-19 NOTE — TELEPHONE ENCOUNTER
Patient returned the call reports she ran out of her potassium about 4/3. Reports she was given a 30 day supply and ran out. Requesting this be refilled and sent to her pharmacy. Reviewed notes from lab results from 4/18 with patient expressing understanding. Patient also reports concern with her Thyroid medication, reports gaining 30 lbs since this has been decreased. Requesting provider input.     Laurence Garcia RN 04/19/25 12:21 PM   [facility] Triage Nurse Advisor

## 2025-04-21 RX ORDER — POTASSIUM CHLORIDE 750 MG/1
10 TABLET, EXTENDED RELEASE ORAL DAILY
Qty: 90 TABLET | Refills: 3 | Status: SHIPPED | OUTPATIENT
Start: 2025-04-21

## 2025-04-21 RX ORDER — LEVOTHYROXINE SODIUM 75 UG/1
75 TABLET ORAL DAILY
Qty: 90 TABLET | Refills: 3 | Status: SHIPPED | OUTPATIENT
Start: 2025-04-21 | End: 2025-04-21

## 2025-04-21 NOTE — TELEPHONE ENCOUNTER
Patient informed of message below. Pt confirmed she is taking Levothyroxine 88 mg. She also reports her fingers feeling stiff and swollen. She also complains of feeling tired and has gain 35 lbs in 3 months. States hydrochlorothiazide doesn't work as well as triamterene HCTZ , but she needs refill.  Rx pended.

## 2025-04-21 NOTE — TELEPHONE ENCOUNTER
Potassium supplement sent    Thyroid supplement dose was INCREASED recently, from 75 mcg to 88 mcg.  Was she taking the new dose 88 mcg ?

## 2025-04-22 RX ORDER — HYDROCHLOROTHIAZIDE 25 MG/1
25 TABLET ORAL
Qty: 180 TABLET | Refills: 0 | Status: SHIPPED | OUTPATIENT
Start: 2025-04-22

## 2025-04-22 RX ORDER — LEVOTHYROXINE SODIUM 88 UG/1
88 TABLET ORAL
Qty: 90 TABLET | Refills: 0 | Status: SHIPPED | OUTPATIENT
Start: 2025-04-22

## 2025-04-22 NOTE — TELEPHONE ENCOUNTER
Called and spoke to patient, scheduled a visit with Ashely for 4/25 at 11:00 am.      Casandra MONROY MA on 4/22/2025 at 5:44 PM

## 2025-04-22 NOTE — TELEPHONE ENCOUNTER
Pt inquires if she needs to recheck her potassium in the future. If yes, when?  Anny Brown, CMA

## 2025-04-23 ENCOUNTER — OFFICE VISIT (OUTPATIENT)
Dept: FAMILY MEDICINE | Facility: CLINIC | Age: 70
End: 2025-04-23
Payer: MEDICARE

## 2025-04-23 VITALS
SYSTOLIC BLOOD PRESSURE: 107 MMHG | BODY MASS INDEX: 30.75 KG/M2 | RESPIRATION RATE: 16 BRPM | DIASTOLIC BLOOD PRESSURE: 68 MMHG | OXYGEN SATURATION: 97 % | HEIGHT: 67 IN | WEIGHT: 195.9 LBS | TEMPERATURE: 98.2 F | HEART RATE: 71 BPM

## 2025-04-23 DIAGNOSIS — E87.6 HYPOKALEMIA: ICD-10-CM

## 2025-04-23 DIAGNOSIS — E03.9 ACQUIRED HYPOTHYROIDISM: Primary | ICD-10-CM

## 2025-04-23 ASSESSMENT — PAIN SCALES - GENERAL: PAINLEVEL_OUTOF10: MILD PAIN (2)

## 2025-04-23 NOTE — PROGRESS NOTES
"Assessment and Plan:     (E03.9) Acquired hypothyroidism  (primary encounter diagnosis)  Comment: had been on levothyroxine 112-125 for years, in last year dose has been decreased to 75mcg w/recent adjustment to 88mcg, she has noticed steady increase in weight gain since change along with increased generalized fatigue, has also noticed hands seem more \"puffy\" though this is a chronic issues, no joint erythema, warmth, she has no pitting edema, lungs are clear, denies chest pain, denies sob, she is active but does not do any dedicated exercise  Plan: I've declined increasing the levothyroxine to aid weight loss as last TSH check with well WNL on 4/17/25, she would like to discuss further with her pcp so VV was arranged today  Recommend avoiding salt and at least 150 minutes of walking per day    (E87.6) Hypokalemia  Comment: mild on last bmp, pcp started K+  Plan: Basic metabolic panel  (Ca, Cl, CO2, Creat,         Gluc, K, Na, BUN)  Recheck bmp in two weeks    [facility] Same [facility] Provider   30 minutes on the day of the encounter doing chart review, history and exam, documentation and further activities as noted above.      Subjective   Denita is a 69 year old, presenting for the following health issues:  Fatigue      4/23/2025    10:47 AM   Additional Questions   Roomed by Chely   Accompanied by quinn DEVINE      Denita is here for follow-up  She recently started hydrochlorothiazide instead of triamterene-hydrochlorothiazide   She has since noticed an increase in chronic puffiness in hands and ankles  She also notes a 15lb weight gain and fatigue since starting lower dose of levothyroxine over the last year, now on 88mcg was previously on 112-125mcg   She would like an increase in levothyroxine to help her lose weight   Does not do much dedicated exercise but is active, gardens, housework  She denies dyspnea, chest pain      Objective    /68 (BP Location: Left arm, Patient Position: " "Sitting, Cuff Size: Adult Large)   Pulse 71   Temp 98.2  F (36.8  C) (Tympanic)   Resp 16   Ht 1.702 m (5' 7\")   Wt 88.9 kg (195 lb 14.4 oz)   SpO2 97%   BMI 30.68 kg/m    Body mass index is 30.68 kg/m .    Physical Exam     GENERAL: healthy, alert and no distress  ENT: mmm, op clear   RESP: lungs clear to auscultation - no rales, no rhonchi, no wheezes  CV: regular rates and rhythm, normal S1 S2, no S3 or S4 and no murmur, no click or rub   MS: extremities- no gross deformities noted, no edema bilat lower ext  HAND: minimal swelling, obvious osteoarthritis in joints of fingers, no ulnar deviation, no erythema or tenderness     Signed Electronically by: Mally Laboy PA-C  {Email feedback regarding this note to [email or web address]   :[id number]}  "

## 2025-04-23 NOTE — PATIENT INSTRUCTIONS
Continue current medications    Get 150 minutes of brisk walking in per week    Watch salt intake     Make virtual visit with Dr. Roldan to discuss levothyroxine

## 2025-05-07 ENCOUNTER — LAB (OUTPATIENT)
Dept: LAB | Facility: CLINIC | Age: 70
End: 2025-05-07
Payer: MEDICARE

## 2025-05-07 ENCOUNTER — RESULTS FOLLOW-UP (OUTPATIENT)
Dept: FAMILY MEDICINE | Facility: CLINIC | Age: 70
End: 2025-05-07

## 2025-05-07 DIAGNOSIS — E87.6 HYPOKALEMIA: ICD-10-CM

## 2025-05-07 LAB
ANION GAP SERPL CALCULATED.3IONS-SCNC: 12 MMOL/L (ref 7–15)
BUN SERPL-MCNC: 29.7 MG/DL (ref 8–23)
CALCIUM SERPL-MCNC: 9.5 MG/DL (ref 8.8–10.4)
CHLORIDE SERPL-SCNC: 96 MMOL/L (ref 98–107)
CREAT SERPL-MCNC: 1.35 MG/DL (ref 0.51–0.95)
EGFRCR SERPLBLD CKD-EPI 2021: 42 ML/MIN/1.73M2
GLUCOSE SERPL-MCNC: 102 MG/DL (ref 70–99)
HCO3 SERPL-SCNC: 30 MMOL/L (ref 22–29)
POTASSIUM SERPL-SCNC: 3.6 MMOL/L (ref 3.4–5.3)
SODIUM SERPL-SCNC: 138 MMOL/L (ref 135–145)

## 2025-05-07 PROCEDURE — 80048 BASIC METABOLIC PNL TOTAL CA: CPT

## 2025-05-07 PROCEDURE — 36415 COLL VENOUS BLD VENIPUNCTURE: CPT

## 2025-05-22 ENCOUNTER — VIRTUAL VISIT (OUTPATIENT)
Dept: FAMILY MEDICINE | Facility: CLINIC | Age: 70
End: 2025-05-22
Payer: MEDICARE

## 2025-05-22 DIAGNOSIS — N17.9 AKI (ACUTE KIDNEY INJURY): ICD-10-CM

## 2025-05-22 DIAGNOSIS — E03.9 ACQUIRED HYPOTHYROIDISM: Primary | ICD-10-CM

## 2025-05-22 DIAGNOSIS — I10 ESSENTIAL HYPERTENSION: ICD-10-CM

## 2025-05-22 DIAGNOSIS — R63.5 WEIGHT GAIN: ICD-10-CM

## 2025-05-22 PROCEDURE — 98013 SYNCH AUDIO-ONLY EST LOW 20: CPT | Performed by: INTERNAL MEDICINE

## 2025-05-22 NOTE — PROGRESS NOTES
Denita is a 69 year old who is being evaluated via a billable telephone visit.    What phone number would you like to be contacted at? [phone]  How would you like to obtain your AVS? Mail a copy  Originating Location (pt. Location): Home    Distant Location (provider location):  On-site  Telephone visit completed due to the patient did not have access to video, while the distant provider did.    Assessment & Plan     Acquired hypothyroidism  Patient reports her back is hurting due to the weight gain  She wants to check TSH  - TSH; Future    Weight gain  Patient reports that she is putting on weight for the last few months.  Per visit notes, in February 2025 she weighed 187 lbs and she is now 190 lbs in may 2025.  She weighed 168 lbs in January 2024.  She is eating more sweets as well.   She will cut down on high carb intake     EMMETT (acute kidney injury)  BMP reviewed: potassium is normal, kidney function is slightly improved.     Essential hypertension  Continue hydrochlorothiazide 25 mg bid.     Follow-up  Return in about 3 months (around 8/22/2025).    Subjective   Denita is a 69 year old, presenting for the following health issues:  RECHECK and Thyroid Problem      5/22/2025     9:56 AM   Additional Questions   Roomed by kathy     History of Present Illness       Reason for visit:  Thyroid    She eats 4 or more servings of fruits and vegetables daily.She consumes 1 sweetened beverage(s) daily.She exercises with enough effort to increase her heart rate 9 or less minutes per day.  She exercises with enough effort to increase her heart rate 3 or less days per week.   She is taking medications regularly.        Denita Flores is here for follow up via telephone          Objective       Vitals:  No vitals were obtained today due to virtual visit.    Physical Exam   General: Alert and no distress //Respiratory: No audible wheeze, cough, or shortness of breath // Psychiatric:  Appropriate affect, tone, and pace of  words          Phone call duration: 20 minutes  Signed Electronically by: Zoraida Roldan MD

## 2025-05-31 ENCOUNTER — RESULTS FOLLOW-UP (OUTPATIENT)
Dept: FAMILY MEDICINE | Facility: CLINIC | Age: 70
End: 2025-05-31

## 2025-07-16 DIAGNOSIS — I10 ESSENTIAL HYPERTENSION: ICD-10-CM

## 2025-07-16 DIAGNOSIS — N17.9 AKI (ACUTE KIDNEY INJURY): ICD-10-CM

## 2025-07-16 DIAGNOSIS — E89.0 POSTABLATIVE HYPOTHYROIDISM: ICD-10-CM

## 2025-07-16 RX ORDER — HYDROCHLOROTHIAZIDE 25 MG/1
25 TABLET ORAL
Qty: 180 TABLET | Refills: 3 | Status: SHIPPED | OUTPATIENT
Start: 2025-07-16

## 2025-07-16 RX ORDER — LEVOTHYROXINE SODIUM 88 UG/1
88 TABLET ORAL
Qty: 90 TABLET | Refills: 2 | Status: SHIPPED | OUTPATIENT
Start: 2025-07-16

## 2025-07-20 ENCOUNTER — OFFICE VISIT (OUTPATIENT)
Dept: URGENT CARE | Facility: URGENT CARE | Age: 70
End: 2025-07-20
Payer: MEDICARE

## 2025-07-20 VITALS
DIASTOLIC BLOOD PRESSURE: 80 MMHG | RESPIRATION RATE: 20 BRPM | HEART RATE: 66 BPM | BODY MASS INDEX: 31.71 KG/M2 | HEIGHT: 67 IN | WEIGHT: 202 LBS | SYSTOLIC BLOOD PRESSURE: 135 MMHG | OXYGEN SATURATION: 99 % | TEMPERATURE: 97.8 F

## 2025-07-20 DIAGNOSIS — N39.0 ACUTE UTI (URINARY TRACT INFECTION): Primary | ICD-10-CM

## 2025-07-20 LAB
ALBUMIN UR-MCNC: NEGATIVE MG/DL
APPEARANCE UR: CLEAR
BACTERIA #/AREA URNS HPF: ABNORMAL /HPF
BILIRUB UR QL STRIP: NEGATIVE
COLOR UR AUTO: YELLOW
GLUCOSE UR STRIP-MCNC: NEGATIVE MG/DL
HGB UR QL STRIP: ABNORMAL
KETONES UR STRIP-MCNC: NEGATIVE MG/DL
LEUKOCYTE ESTERASE UR QL STRIP: ABNORMAL
NITRATE UR QL: NEGATIVE
PH UR STRIP: 7 [PH] (ref 5–7)
RBC #/AREA URNS AUTO: ABNORMAL /HPF
SP GR UR STRIP: 1.01 (ref 1–1.03)
SQUAMOUS #/AREA URNS AUTO: ABNORMAL /LPF
UROBILINOGEN UR STRIP-ACNC: 0.2 E.U./DL
WBC #/AREA URNS AUTO: ABNORMAL /HPF

## 2025-07-20 PROCEDURE — 99214 OFFICE O/P EST MOD 30 MIN: CPT | Performed by: EMERGENCY MEDICINE

## 2025-07-20 PROCEDURE — 3079F DIAST BP 80-89 MM HG: CPT | Performed by: EMERGENCY MEDICINE

## 2025-07-20 PROCEDURE — 3075F SYST BP GE 130 - 139MM HG: CPT | Performed by: EMERGENCY MEDICINE

## 2025-07-20 PROCEDURE — 87086 URINE CULTURE/COLONY COUNT: CPT | Performed by: EMERGENCY MEDICINE

## 2025-07-20 PROCEDURE — 81001 URINALYSIS AUTO W/SCOPE: CPT | Performed by: EMERGENCY MEDICINE

## 2025-07-20 RX ORDER — CEPHALEXIN 500 MG/1
500 CAPSULE ORAL 3 TIMES DAILY
Qty: 15 CAPSULE | Refills: 0 | Status: SHIPPED | OUTPATIENT
Start: 2025-07-20 | End: 2025-07-25

## 2025-07-20 NOTE — PROGRESS NOTES
Urgent Care Clinic Visit    Chief Complaint   Patient presents with    UTI     Patient here with concern of possible UTI. States her symptoms started last night. Dysuria, lower abdominal pain and feeling of inability to completely void.                7/20/2025     6:05 PM   Additional Questions   Roomed by Taylor Paulson MA   Accompanied by self     Pre-Provider Visit Orders- Urinalysis UA/UC  Patient reports the following symptoms:  possible urinary tract infection (UTI)   Does the patient report any of the following symptoms: vaginal discharge, vaginal itching, possible yeast infection, has a urinary catheter in place, or unable to void in a specimen cup?  No

## 2025-07-20 NOTE — PROGRESS NOTES
"Assessment & Plan     Diagnosis:    ICD-10-CM    1. Acute UTI (urinary tract infection)  N39.0 UA Macroscopic with reflex to Microscopic and Culture - Clinic Collect     UA Microscopic with Reflex to Culture     Urine Culture     cephALEXin (KEFLEX) 500 MG capsule          Medical Decision Making:  Denita Flores is a 69 year old female who presents to clinic today for evaluation of urinary frequency, urgency and dysuria.     This clinically is consistent with a urinary tract infection.  Urinalysis confirms the infection.  There has been no fever, confusion, flank pain or significant abdominal pain.  T There is no clinical evidence of pyelonephritis, appendicitis, colitis, diverticulitis or any intraabdominal catastrophe. The patient will be started on antibiotics for the infection. Go to the ER if increasing pain, vomiting, fever, or inability to tolerate the oral antibiotic.  Follow up with primary physician is indicated if not improving in 2-3 days.       William Cooper PA-C  [facility]    Subjective     Denita Flores is a 69 year old female who presents with  to clinic today for the following health issues:  Chief Complaint   Patient presents with    UTI     Patient here with concern of possible UTI. States her symptoms started last night. Dysuria, lower abdominal pain and feeling of inability to completely void.          HPI  Patient states that since last night she has experienced a burning sensation, and frequency and urgency of urination. This has gotten worse over time. They note that they have mild lower abdominal pain. Patient denies any flank or back pain, fever, nausea, vomiting, diarrhea, inability to urinate, vaginal discharge/bleeding.    Review of Systems    See HPI    Objective      Vitals:    Patient Vitals for the past 24 hrs:   BP Temp Temp src Pulse Resp SpO2 Height Weight   07/20/25 1806 135/80 97.8  F (36.6  C) Tympanic 66 20 99 % 1.689 m (5' 6.5\") 91.6 kg (202 lb) "         Vital signs reviewed by: William Cooper PA-C    Physical Exam   Constitutional: The patient is oriented to person, place, and time. Alert and cooperative. No acute distress.  Mouth: Mucous membranes are moist.  Cardiovascular: Regular rate and rhythm.  Pulmonary/Chest: Effort normal. No respiratory distress.   GI:  Abdomen with mild suprapubic tenderness to palpation. No rebound or guarding. No McBurney point tenderness.   MSK: No CVA tenderness bilaterally.  Neurological: Alert and oriented x3.     Labs/Imaging:    Results for orders placed or performed in visit on 07/20/25   UA Macroscopic with reflex to Microscopic and Culture - Clinic Collect     Status: Abnormal    Specimen: Urine, Midstream   Result Value Ref Range    Color Urine Yellow Colorless, Straw, Light Yellow, Yellow    Appearance Urine Clear Clear    Glucose Urine Negative Negative mg/dL    Bilirubin Urine Negative Negative    Ketones Urine Negative Negative mg/dL    Specific Gravity Urine 1.015 1.003 - 1.035    Blood Urine Moderate (A) Negative    pH Urine 7.0 5.0 - 7.0    Protein Albumin Urine Negative Negative mg/dL    Urobilinogen Urine 0.2 0.2, 1.0 E.U./dL    Nitrite Urine Negative Negative    Leukocyte Esterase Urine Trace (A) Negative   UA Microscopic with Reflex to Culture     Status: Abnormal   Result Value Ref Range    Bacteria Urine Few (A) None Seen /HPF    RBC Urine 5-10 (A) 0-2 /HPF /HPF    WBC Urine 10-25 (A) 0-5 /HPF /HPF    Squamous Epithelials Urine Few (A) None Seen /LPF     Urine culture pending    William Cooper PA-C, July 20, 2025

## 2025-07-22 LAB — BACTERIA UR CULT: NORMAL

## (undated) DEVICE — CLIP HEMOSTASIS ASSURANCE W18 MM 00711885

## (undated) DEVICE — ENDOSCOPIC MARKER

## (undated) DEVICE — LIFTER SURGICAL ASCENDO SUBMUCOSAL LIFT AGENT BX00712934

## (undated) RX ORDER — DEXAMETHASONE SODIUM PHOSPHATE 10 MG/ML
INJECTION, SOLUTION INTRAMUSCULAR; INTRAVENOUS
Status: DISPENSED
Start: 2019-04-22

## (undated) RX ORDER — LIDOCAINE HYDROCHLORIDE 10 MG/ML
INJECTION, SOLUTION EPIDURAL; INFILTRATION; INTRACAUDAL; PERINEURAL
Status: DISPENSED
Start: 2019-04-22

## (undated) RX ORDER — METOPROLOL TARTRATE 1 MG/ML
INJECTION, SOLUTION INTRAVENOUS
Status: DISPENSED
Start: 2021-04-14

## (undated) RX ORDER — DIPHENHYDRAMINE HCL 25 MG
CAPSULE ORAL
Status: DISPENSED
Start: 2019-01-21

## (undated) RX ORDER — NITROGLYCERIN 0.4 MG/1
TABLET SUBLINGUAL
Status: DISPENSED
Start: 2021-04-14

## (undated) RX ORDER — METOPROLOL TARTRATE 50 MG
TABLET ORAL
Status: DISPENSED
Start: 2021-04-14

## (undated) RX ORDER — DEXAMETHASONE SODIUM PHOSPHATE 10 MG/ML
INJECTION, SOLUTION INTRAMUSCULAR; INTRAVENOUS
Status: DISPENSED
Start: 2019-01-21

## (undated) RX ORDER — LIDOCAINE HYDROCHLORIDE 10 MG/ML
INJECTION, SOLUTION EPIDURAL; INFILTRATION; INTRACAUDAL; PERINEURAL
Status: DISPENSED
Start: 2019-01-21